# Patient Record
Sex: MALE | Race: WHITE | Employment: PART TIME | URBAN - METROPOLITAN AREA
[De-identification: names, ages, dates, MRNs, and addresses within clinical notes are randomized per-mention and may not be internally consistent; named-entity substitution may affect disease eponyms.]

---

## 2017-01-09 ENCOUNTER — PRE VISIT (OUTPATIENT)
Dept: UROLOGY | Facility: CLINIC | Age: 42
End: 2017-01-09

## 2017-01-09 NOTE — TELEPHONE ENCOUNTER
Patient coming in for UDS results. UDS completed 12/21/16 by Lety Dorantes. Results in [facility]. No need to call patient

## 2017-01-16 ENCOUNTER — OFFICE VISIT (OUTPATIENT)
Dept: UROLOGY | Facility: CLINIC | Age: 42
End: 2017-01-16

## 2017-01-16 VITALS
HEART RATE: 84 BPM | WEIGHT: 130 LBS | SYSTOLIC BLOOD PRESSURE: 87 MMHG | DIASTOLIC BLOOD PRESSURE: 61 MMHG | BODY MASS INDEX: 17.61 KG/M2 | HEIGHT: 72 IN

## 2017-01-16 DIAGNOSIS — N31.9 NEUROGENIC BLADDER: Primary | ICD-10-CM

## 2017-01-16 RX ORDER — CEFAZOLIN SODIUM 1 G/3ML
1 INJECTION, POWDER, FOR SOLUTION INTRAMUSCULAR; INTRAVENOUS SEE ADMIN INSTRUCTIONS
Status: CANCELLED | OUTPATIENT
Start: 2017-01-16

## 2017-01-16 ASSESSMENT — PAIN SCALES - GENERAL: PAINLEVEL: MILD PAIN (3)

## 2017-01-16 NOTE — MR AVS SNAPSHOT
After Visit Summary   1/16/2017    Riley Gifford    MRN: [id number]           Patient Information     Date Of Birth          1975        Visit Information        Provider Department      1/16/2017 3:30 PM Melo Walden, [facility] and [facility]rolog[facility]        Today's Diagnoses     Neurogenic bladder    -  1       Care Instructions    Follow up with Nelli Amador in June/2017 with a renal ultrasound/labs prior    It was a pleasure meeting with you today.  Thank you for allowing me and my team the privilege of caring for you today.  YOU are the reason we are here, and I truly hope we provided you with the excellent service you deserve.  Please let us know if there is anything else we can do for you so that we can be sure you are leaving completely satisfied with your care experience.                Follow-ups after your visit        Follow-up notes from your care team     Return in about 6 months (around 7/16/2017).      Your next 10 appointments already scheduled     Jun 05, 2017  1:45 PM   LAB with  LAB   [facility])    [address] [place] [id number] [phone]           Patient must bring picture ID.  Patient should be prepared to give a urine specimen  Please do not eat 10-12 hours before your appointment if you are coming in fasting for labs on lipids, cholesterol, or glucose (sugar).  Pregnant women should follow their Care Team instructions. Water with medications is okay. Do not drink coffee or other fluids.   If you have concerns about taking  your medications, please ask at office or if scheduling via [employer], send a message by clicking on Secure Messaging, Message Your Care Team.            Jun 05, 2017  2:00 PM   US RENAL COMPLETE with [id number]   [facility] US ([facility])    [address] [address]    [phone]           Please bring a list of your medicines (including vitamins, minerals and over-the-counter drugs). Also, tell your doctor about any allergies you may have. Wear comfortable clothes and leave your valuables at home.  You do not need to do anything special to prepare for your exam.  Please call the Imaging Department at your exam site with any questions.            Jun 05, 2017  3:00 PM   (Arrive by 2:45 PM)   Return Visit with Nelli Martell PA-C   [facility] and [facility] for Prostate and Urologic Cancers ([facility])    [address] [place] [address]  4th [facility] [address] [phone]              Future tests that were ordered for you today     Open Future Orders        Priority Expected Expires Ordered    Renal US Routine 1/16/2017 1/16/2018 1/16/2017    Basic metabolic panel Routine  1/16/2018 1/16/2017            Who to contact     Please call your clinic at [phone] to:    Ask questions about your health    Make or cancel appointments    Discuss your medicines    Learn about your test results    Speak to your doctor   If you have compliments or concerns about an experience at your clinic, or if you wish to file a complaint, please contact [facility] Patient Relations at [phone] or email us at [email or web address]         Additional Information About Your Visit        MyChart Information     [employer] gives you secure access to your electronic health record. If you see a primary care provider, you can also send messages to your care team and make appointments. If you have questions, please call your primary care clinic.  If you do not have a primary care provider, please call [phone] and they will assist you.      [employer] is an electronic gateway that provides easy, online access to your medical records. With [employer], you can request a clinic appointment, read your test results, renew a  prescription or communicate with your care team.     To access your existing account, please contact your [facility] or call [phone] for assistance.        Care EveryWhere ID     This is your Care EveryWhere ID. This could be used by other organizations to access your [place] medical records  [id number]        Your Vitals Were     Pulse Height BMI (Body Mass Index)             84 1.829 m (6') 17.63 kg/m2          Blood Pressure from Last 3 Encounters:   01/16/17 87/61   12/08/16 92/63   11/11/16 108/78    Weight from Last 3 Encounters:   01/16/17 58.968 kg (130 lb)   11/11/16 58.968 kg (130 lb)   06/13/16 58.968 kg (130 lb)               Primary Care Provider Office Phone # Fax #    Laura Yao [phone]       [address] [address]        Goals        Patient-Stated    I will use the medications Tylenol or Tramadol for pain every 4-6 hours as needed.  Evidenced by the patient  using the medications to control worsening pain as verbalized by the patient.     Goal Comments - Note created  3/22/2016  2:39 PM by Robbin Andino RN    As of today's date 3/22/2016 goal is met at 0 - 25%.   Goal Status:  Active        I will work with the [facility] nurse to get the best results for wound care as evidenced by decrease in size and verbalized pain in the area of the wound on the buttock.     Goal Comments - Note created  1/6/2016  1:43 PM by Robbin Andino RN    As of today's date 1/6/2016 goal is met at 0 - 25%.   Goal Status:  Active          Thank you!     Thank you for choosing [facility] UROLOGY AND [facility] FOR PROSTATE AND UROLOGIC CANCERS  for your care. Our goal is always to provide you with excellent care. Hearing back from our patients is one way we can continue to improve our services. Please take a few minutes to complete the written survey that you may receive in the mail after your visit with us. Thank you!              Your Updated Medication List - Protect others around you: Learn how to safely use, store and throw away your medicines at www.disposemymeds.org.          This list is accurate as of: 1/16/17  4:13 PM.  Always use your most recent med list.                   Brand Name Dispense Instructions for use    baclofen 20 MG tablet    LIORESAL    360 tablet    Take 1 tablet (20 mg) by mouth 4 times daily       budesonide 0.5 MG/2ML neb solution    PULMICORT    180 ampule    Use two vials twice daily , mix with honey and swallow.       clotrimazole 1 % cream    LOTRIMIN    60 g    Apply topically twice a day as needed.       diclofenac 1.3 % Patch    FLECTOR    30 patch    Place 1 patch onto the skin 2 times daily       diphenhydrAMINE 25 MG tablet    BENADRYL     Take 1 tablet (25 mg) by mouth every 8 hours as needed for itching or allergies       docusate sodium 283 MG enema    ENEMEEZ MINI    30 enema    Use one every other day and prn per patients's request. Please give patient whichever mini enema's are covered by his insurance       hydrocortisone 2.5 % cream    ANUSOL-HC    30 g    Place rectally 2 times daily       nystatin 969303 UNIT/GM Powd    MYCOSTATIN    60 g    Apply twice daily to rash 2-3 times daily       Omeprazole-Sodium Bicarbonate  MG Pack     90 each    Take 1 packet by mouth daily       ondansetron 4 MG ODT tab    ZOFRAN ODT    18 tablet    Take 1-2 tablets (4-8 mg) by mouth every 8 hours as needed for nausea       order for DME     30 Units    Equipment being ordered: 60 cc catheter tip syringes.       oxybutynin 5 MG tablet    DITROPAN    270 tablet    Crush to be instilled intravesically.  One tablet in the morning and 2 tablets in the evening.       phenylephrine-cocoa butter 0.25-88.44 % per suppository    PREPARATION H    48 suppository    Insert one suppository rectally twice daily as needed.       polyethylene glycol powder    MIRALAX    510 g    Take 17 g (1 capful) by mouth  daily as needed for constipation       simethicone 125 MG Chew chewable tablet    MYLICON    1 tablet    Take 1-2 tablets (125-250 mg) by mouth 4 times daily as needed for intestinal gas       Sterile Water Liqd     2 Bottle    60 cc sterile water irrigation daily for the bladder. Please provide the 500 cc bottle for patient.       tolterodine 2 MG tablet    DETROL    180 tablet    Take 1 tablet (2 mg) by mouth 2 times daily       traMADol 5 mg/mL suspension    ULTRAM    200 mL    Take 10 mLs (50 mg) by mouth 2 times daily as needed for moderate pain       TYLENOL PO      Take 500 mg by mouth as needed for mild pain or fever TAKE 2 TABS PRN       UNABLE TO FIND      Gentamycin and sodium chloride for bladder instillation daily       zolpidem 10 MG tablet    AMBIEN    30 tablet    Take 0.5-1 tablets (5-10 mg) by mouth nightly as needed for sleep

## 2017-01-16 NOTE — PATIENT INSTRUCTIONS
Follow up with Nelli Amador in June/2017 with a renal ultrasound/labs prior    It was a pleasure meeting with you today.  Thank you for allowing me and my team the privilege of caring for you today.  YOU are the reason we are here, and I truly hope we provided you with the excellent service you deserve.  Please let us know if there is anything else we can do for you so that we can be sure you are leaving completely satisfied with your care experience.
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
## 2017-01-19 DIAGNOSIS — K20.0 EOSINOPHILIC ESOPHAGITIS: Primary | ICD-10-CM

## 2017-01-19 RX ORDER — BUDESONIDE 0.5 MG/2ML
INHALANT ORAL
Qty: 360 AMPULE | Refills: 1 | Status: SHIPPED | OUTPATIENT
Start: 2017-01-19 | End: 2017-03-16

## 2017-01-19 NOTE — TELEPHONE ENCOUNTER
Prescription approved per [facility] Refill Protocol.  Neelima Yeung RN

## 2017-01-19 NOTE — TELEPHONE ENCOUNTER
Budesonide 0.5mg/2 ml       Last Written Prescription Date: 11/23/2016  Last Fill Quantity: 180, # refills: 3    Last Office Visit with G, P or [facility] prescribing provider:  10/19/2016   Future Office Visit:       Date of Last Asthma Action Plan Letter:   There are no preventive care reminders to display for this patient.   Asthma Control Test: No flowsheet data found.    Date of Last Spirometry Test:   No results found for this or any previous visit.    Patient requesting 90 day supply

## 2017-01-25 ENCOUNTER — CARE COORDINATION (OUTPATIENT)
Dept: CARE COORDINATION | Facility: CLINIC | Age: 42
End: 2017-01-25

## 2017-01-25 NOTE — PROGRESS NOTES
Clinic Care Coordination Contact  OUTREACH    Referral Information:  Referral Source: PCP  Reason for Contact: follow up chronic pain and neurogenic bladder  Care Conference: No     Universal Utilization:   ED Visits in last year: 1  Hospital visits in last year: 0  Last PCP appointment: 10/19/16  Missed Appointments: 0  Concerns: none  Multiple Providers or Specialists: yes    Clinical Concerns:  Current Medical Concerns: chronic back pain, neurogenic bladder, eosinophilic esophagitis,     Current Behavioral Concerns: worries    Education Provided to patient: reviewed goal of trying acupuncture again for chronic pain  Clinical Pathway Name: None  Clinical Pathway: None    Medication Management:  The patient was having trouble getting the pulmicort it needed a prior authorization and was then able to be filled.     Functional Status:  Mobility Status: Dependent/Assisted by Another  Equipment Currently Used at Home: power chair  Transportation: Uses transport with his insurance.            Psychosocial:  Current living arrangement:: I live in assisted living  Financial/Insurance: Medicare/[employer]       Resources and Interventions:  Current Resources:  (unknown);  (unknown)  PAS Number:  (unknown)  Senior Linkage Line Referral Placed:  (unknown)  Advanced Care Plans/Directives on file:: No  Referrals Placed:  (unknown)     Goals:   Goal 1 Statement: I will continue to try acupuncture to assist with pain control  Goal 1 Progression Percent: 10%  Goal 1 Progression Date: 01/25/17              Barriers: unsure if it was working before or not.  Strengths: positive attitude toward the treatment  Patient/Caregiver understanding: Full understanding of the process  Frequency of Care Coordination: 3-4 weeks  Upcoming appointment: 06/05/17     Plan: 1).  The patient will attend the upcoming appointments with acupuncture to see if it will help with pain control.  2).  The patient will notify the Care Coordination RN if there is a  problem obtaining any of his medications.  3).  The Care Coordination RN will contact the patient for follow up in 3-4 weeks.  4).  Care Coordination to remain available for the patient to contact in the event of future needs.          Robbin Vanegas, MSN, RN, PHN  [facility] Care Coordinator  [facility]  Phone: [phone]  [email or web address]

## 2017-01-27 DIAGNOSIS — N31.9 NEUROGENIC BLADDER: Primary | ICD-10-CM

## 2017-02-09 ENCOUNTER — TELEPHONE (OUTPATIENT)
Dept: PALLIATIVE MEDICINE | Facility: CLINIC | Age: 42
End: 2017-02-09

## 2017-02-09 ENCOUNTER — OFFICE VISIT (OUTPATIENT)
Dept: PALLIATIVE MEDICINE | Facility: CLINIC | Age: 42
End: 2017-02-09
Payer: MEDICARE

## 2017-02-09 ENCOUNTER — TRANSFERRED RECORDS (OUTPATIENT)
Dept: HEALTH INFORMATION MANAGEMENT | Facility: CLINIC | Age: 42
End: 2017-02-09

## 2017-02-09 VITALS
SYSTOLIC BLOOD PRESSURE: 96 MMHG | DIASTOLIC BLOOD PRESSURE: 68 MMHG | RESPIRATION RATE: 18 BRPM | HEART RATE: 81 BPM | OXYGEN SATURATION: 93 %

## 2017-02-09 DIAGNOSIS — G89.29 CHRONIC MIDLINE THORACIC BACK PAIN: Primary | ICD-10-CM

## 2017-02-09 DIAGNOSIS — M54.6 CHRONIC MIDLINE THORACIC BACK PAIN: Primary | ICD-10-CM

## 2017-02-09 PROCEDURE — 99214 OFFICE O/P EST MOD 30 MIN: CPT | Performed by: NURSE PRACTITIONER

## 2017-02-09 RX ORDER — HYDROCODONE BITARTRATE AND ACETAMINOPHEN 5; 325 MG/1; MG/1
1 TABLET ORAL DAILY PRN
Qty: 30 TABLET | Refills: 0 | Status: SHIPPED | OUTPATIENT
Start: 2017-02-09 | End: 2017-03-16

## 2017-02-09 RX ORDER — HYDROCODONE BITARTRATE AND ACETAMINOPHEN 5; 325 MG/1; MG/1
1 TABLET ORAL DAILY PRN
COMMUNITY
End: 2017-02-17

## 2017-02-09 ASSESSMENT — PAIN SCALES - GENERAL: PAINLEVEL: MILD PAIN (3)

## 2017-02-09 NOTE — MR AVS SNAPSHOT
After Visit Summary   2017    Riley Gifford    MRN: [id number]           Patient Information     Date Of Birth          1975        Visit Information        Provider Department      2017 4:00 PM Alice Marte APRN CNP [facility]        Today's Diagnoses     Chronic midline thoracic back pain    -  1       Care Instructions    After Visit Instructions:     Thank you for coming to [facility] for your care. It is my goal to partner with you to help you reach your optimal state of health.     I am recommending multidisciplinary care at this time.  The focus of care will be to continue gradual rehabilitation and pain management with medication adjustments as needed.    Continue daily self-care, identifying contributing factors, and monitoring variations in pain level. Continue to integrate self-care into your life.      1. Schedule follow-up with SUJATA Goss NP-C in 4 weeks  2. Labs: Oral swab drug screen   3. Medication recommendations: Norco (hydrocodone-APAP) 5/325 m tab daily as needed for pain.   4. Controlled substance agreement signed today.       SUJATA Whitman NP-C  [facility]    Contact information: [facility]    Please call if any side effects, questions, or concerns arise.    Nurse Triage line:  [phone]   Call this number with any questions or concerns. You may leave a detailed message anytime. Calls are typically returned Monday through Friday between 8 AM and 4:30 PM. We usually get back to you within 2 business days depending on the issue/request.    Scheduling number: [phone].  Call this number to schedule or change appointments.          Medication Refills Policy:    For non-narcotic medications, please your pharmacy directly to request a refill and the pharmacy will call the [facility] for authorization. Please allow 3-4 days  for these refills.    For narcotic refills, call the nurse triage line and leave a message requesting your refill along with the name of the pharmacy that you use. Narcotic prescriptions will be mailed to your pharmacy or you may pick them up at the clinic.  Please call 7-10 days before your refill is due  The above policy allows adequate time so that you do not run out of medication.    No Show - Late Cancellation - Late Arrival Policy  We believe regular attendance is key to your success in our program.    Any time you are unable to keep your appointment we ask that you call us at  least 24 hours in advance to let us know. This will allow us to offer the appointment time to another patient. The following is our policy for missed appointments. This also applies to appointments cancelled with less than 24 hours notice.    You will receive a letter after missing your 1st and 2nd appointments without contacting the clinic before your scheduled appointment time.     After missing 3 appointments without calling first, we will cancel all of your future appointments at [facility].    At that point, you will not be able to resume services unless approved by your care team  We understand that unforseen circumstances arise, however, out of respect for all concerned and to provide this appointment to another patient, this policy will be enforced.    Please note that most follow up appointments are 30 minutes long. If you arrive late, your provider may not be able to see you for the entire 30 minutes. Please also note that if you arrive more than 15 minutes for any appointment, it may be rescheduled.                                   Follow-ups after your visit        Your next 10 appointments already scheduled     Jun 05, 2017  1:45 PM   LAB with [facility] ([facility])    [address]  1st Floor  [facility] [address] [phone]           Patient must  bring picture ID.  Patient should be prepared to give a urine specimen  Please do not eat 10-12 hours before your appointment if you are coming in fasting for labs on lipids, cholesterol, or glucose (sugar).  Pregnant women should follow their Care Team instructions. Water with medications is okay. Do not drink coffee or other fluids.   If you have concerns about taking  your medications, please ask at office or if scheduling via i[employer], send a message by clicking on Secure Messaging, Message Your Care Team.            Jun 05, 2017  2:00 PM   US RENAL COMPLETE with UCUS3   [facility] US ([facility])    [address]  1st [facility] [address] [phone]           Please bring a list of your medicines (including vitamins, minerals and over-the-counter drugs). Also, tell your doctor about any allergies you may have. Wear comfortable clothes and leave your valuables at home.  You do not need to do anything special to prepare for your exam.  Please call the Imaging Department at your exam site with any questions.            Jun 05, 2017  3:00 PM   (Arrive by 2:45 PM)   Return Visit with Nelli Sims PA-C   [facility] ([facility])    [address] [place] [address]  4th [facility] [address] [phone]              Who to contact     If you have questions or need follow up information about today's clinic visit or your schedule please contact [facility] directly at [phone].  Normal or non-critical lab and imaging results will be communicated to you by MyChart, letter or phone within 4 business days after the clinic has received the results. If you do not hear from us within 7 days, please contact the clinic through MyChart or phone. If you have a critical or abnormal lab result, we will notify you by phone as soon as possible.  Submit refill requests  through [employer] or call your pharmacy and they will forward the refill request to us. Please allow 3 business days for your refill to be completed.          Additional Information About Your Visit        [employer] Information     [employer] gives you secure access to your electronic health record. If you see a primary care provider, you can also send messages to your care team and make appointments. If you have questions, please call your primary care clinic.  If you do not have a primary care provider, please call [phone] and they will assist you.        Care EveryWhere ID     This is your Care EveryWhere ID. This could be used by other organizations to access your [place] medical records  [id number]        Your Vitals Were     Pulse Respirations Pulse Oximetry             81 18 93%          Blood Pressure from Last 3 Encounters:   02/09/17 96/68   01/16/17 87/61   12/08/16 92/63    Weight from Last 3 Encounters:   01/16/17 58.968 kg (130 lb)   11/11/16 58.968 kg (130 lb)   06/13/16 58.968 kg (130 lb)              Today, you had the following     No orders found for display         Today's Medication Changes          These changes are accurate as of: 2/9/17  4:23 PM.  If you have any questions, ask your nurse or doctor.               These medicines have changed or have updated prescriptions.        Dose/Directions    * HYDROcodone-acetaminophen 5-325 MG per tablet   Commonly known as:  NORCO   This may have changed:  Another medication with the same name was added. Make sure you understand how and when to take each.   Changed by:  Alice Marte APRN CNP        Dose:  1 tablet   Take 1 tablet by mouth daily as needed for moderate to severe pain   Refills:  0       * HYDROcodone-acetaminophen 5-325 MG per tablet   Commonly known as:  NORCO   This may have changed:  You were already taking a medication with the same name, and this prescription was added. Make sure you understand how and when to take each.    Used for:  Chronic midline thoracic back pain   Changed by:  Alice Marte APRN CNP        Dose:  1 tablet   Take 1 tablet by mouth daily as needed for moderate to severe pain Max 1 tab per day.   Quantity:  30 tablet   Refills:  0       * Notice:  This list has 2 medication(s) that are the same as other medications prescribed for you. Read the directions carefully, and ask your doctor or other care provider to review them with you.         Where to get your medicines      Some of these will need a paper prescription and others can be bought over the counter.  Ask your nurse if you have questions.     Bring a paper prescription for each of these medications    - HYDROcodone-acetaminophen 5-325 MG per tablet             Primary Care Provider Office Phone # Fax #    Laura Yao [phone]       [address] [address]        Goals        Patient-Stated    I will use the medications Tylenol or Tramadol for pain every 4-6 hours as needed.  Evidenced by the patient  using the medications to control worsening pain as verbalized by the patient.     Goal Comments - Note created  3/22/2016  2:39 PM by Robbin Andino RN    As of today's date 3/22/2016 goal is met at 0 - 25%.   Goal Status:  Active        I will work with the [facility] nurse to get the best results for wound care as evidenced by decrease in size and verbalized pain in the area of the wound on the buttock.     Goal Comments - Note created  1/6/2016  1:43 PM by Robbin Andino RN    As of today's date 1/6/2016 goal is met at 0 - 25%.   Goal Status:  Active          Thank you!     Thank you for choosing [facility]  for your care. Our goal is always to provide you with excellent care. Hearing back from our patients is one way we can continue to improve our services. Please take a few minutes to complete the written survey that you may receive in the mail after your  visit with us. Thank you!             Your Updated Medication List - Protect others around you: Learn how to safely use, store and throw away your medicines at www.disposemymeds.org.          This list is accurate as of: 2/9/17  4:23 PM.  Always use your most recent med list.                   Brand Name Dispense Instructions for use    baclofen 20 MG tablet    LIORESAL    360 tablet    Take 1 tablet (20 mg) by mouth 4 times daily       budesonide 0.5 MG/2ML neb solution    PULMICORT    360 ampule    Use two vials twice daily , mix with honey and swallow.       clotrimazole 1 % cream    LOTRIMIN    60 g    Apply topically twice a day as needed.       diclofenac 1.3 % Patch    FLECTOR    30 patch    Place 1 patch onto the skin 2 times daily       diphenhydrAMINE 25 MG tablet    BENADRYL     Take 1 tablet (25 mg) by mouth every 8 hours as needed for itching or allergies       docusate sodium 283 MG enema    ENEMEEZ MINI    30 enema    Use one every other day and prn per patients's request. Please give patient whichever mini enema's are covered by his insurance       * HYDROcodone-acetaminophen 5-325 MG per tablet    NORCO     Take 1 tablet by mouth daily as needed for moderate to severe pain       * HYDROcodone-acetaminophen 5-325 MG per tablet    NORCO    30 tablet    Take 1 tablet by mouth daily as needed for moderate to severe pain Max 1 tab per day.       hydrocortisone 2.5 % cream    ANUSOL-HC    30 g    Place rectally 2 times daily       nystatin 730683 UNIT/GM Powd    MYCOSTATIN    60 g    Apply twice daily to rash 2-3 times daily       Omeprazole-Sodium Bicarbonate  MG Pack     90 each    Take 1 packet by mouth daily       ondansetron 4 MG ODT tab    ZOFRAN ODT    18 tablet    Take 1-2 tablets (4-8 mg) by mouth every 8 hours as needed for nausea       order for DME     30 Units    Equipment being ordered: 60 cc catheter tip syringes.       oxybutynin 5 MG tablet    DITROPAN    270 tablet    Crush to be  instilled intravesically.  One tablet in the morning and 2 tablets in the evening.       phenylephrine-cocoa butter 0.25-88.44 % per suppository    PREPARATION H    48 suppository    Insert one suppository rectally twice daily as needed.       polyethylene glycol powder    MIRALAX    510 g    Take 17 g (1 capful) by mouth daily as needed for constipation       simethicone 125 MG Chew chewable tablet    MYLICON    1 tablet    Take 1-2 tablets (125-250 mg) by mouth 4 times daily as needed for intestinal gas       sterile water (bottle) irrigation     1000 mL    Irrigate with 60 mLs as directed daily       Sterile Water Liqd     2 Bottle    60 cc sterile water irrigation daily for the bladder. Please provide the 500 cc bottle for patient.       tolterodine 2 MG tablet    DETROL    180 tablet    Take 1 tablet (2 mg) by mouth 2 times daily       traMADol 5 mg/mL suspension    ULTRAM    200 mL    Take 10 mLs (50 mg) by mouth 2 times daily as needed for moderate pain       TYLENOL PO      Take 500 mg by mouth as needed for mild pain or fever TAKE 2 TABS PRN       UNABLE TO FIND      Gentamycin and sodium chloride for bladder instillation daily       zolpidem 10 MG tablet    AMBIEN    30 tablet    Take 0.5-1 tablets (5-10 mg) by mouth nightly as needed for sleep       * Notice:  This list has 2 medication(s) that are the same as other medications prescribed for you. Read the directions carefully, and ask your doctor or other care provider to review them with you.

## 2017-02-09 NOTE — TELEPHONE ENCOUNTER
Patient left clinic without prescription and AVS. Script for Norco and AVS are in the med management folder. I called the patient to alert him and will touch base again tomorrow. I asked him to call the nurse line and we will have to decide how to get best get him his paperwork.      ONDINA KhanN, RN  Care Coordinator  [facility]

## 2017-02-09 NOTE — PATIENT INSTRUCTIONS
After Visit Instructions:     Thank you for coming to [facility] for your care. It is my goal to partner with you to help you reach your optimal state of health.     I am recommending multidisciplinary care at this time.  The focus of care will be to continue gradual rehabilitation and pain management with medication adjustments as needed.    Continue daily self-care, identifying contributing factors, and monitoring variations in pain level. Continue to integrate self-care into your life.      1. Schedule follow-up with SUJATA Goss NP-C in 4 weeks  2. Labs: Oral swab drug screen   3. Medication recommendations: Norco (hydrocodone-APAP) 5/325 m tab daily as needed for pain.   4. Controlled substance agreement signed today.       SUJATA Whitman, NP-C  [facility]    Contact information: [facility]    Please call if any side effects, questions, or concerns arise.    Nurse Triage line:  [phone]   Call this number with any questions or concerns. You may leave a detailed message anytime. Calls are typically returned Monday through Friday between 8 AM and 4:30 PM. We usually get back to you within 2 business days depending on the issue/request.    Scheduling number: [phone].  Call this number to schedule or change appointments.          Medication Refills Policy:    For non-narcotic medications, please your pharmacy directly to request a refill and the pharmacy will call the [facility] for authorization. Please allow 3-4 days for these refills.    For narcotic refills, call the nurse triage line and leave a message requesting your refill along with the name of the pharmacy that you use. Narcotic prescriptions will be mailed to your pharmacy or you may pick them up at the clinic.  Please call 7-10 days before your refill is due  The above policy allows adequate time so that you do not run out of  medication.    No Show - Late Cancellation - Late Arrival Policy  We believe regular attendance is key to your success in our program.    Any time you are unable to keep your appointment we ask that you call us at  least 24 hours in advance to let us know. This will allow us to offer the appointment time to another patient. The following is our policy for missed appointments. This also applies to appointments cancelled with less than 24 hours notice.    You will receive a letter after missing your 1st and 2nd appointments without contacting the clinic before your scheduled appointment time.     After missing 3 appointments without calling first, we will cancel all of your future appointments at [facility].    At that point, you will not be able to resume services unless approved by your care team  We understand that unforseen circumstances arise, however, out of respect for all concerned and to provide this appointment to another patient, this policy will be enforced.    Please note that most follow up appointments are 30 minutes long. If you arrive late, your provider may not be able to see you for the entire 30 minutes. Please also note that if you arrive more than 15 minutes for any appointment, it may be rescheduled.

## 2017-02-09 NOTE — Clinical Note
[facility]    02/09/2017    Patient: Riley Gifford  YOB: 1975  Medical Record Number: [id number]                                                                  Controlled Substance Agreement  I understand that my care provider has prescribed controlled substances (narcotics, tranquilizers, and/or stimulants) to help manage my condition(s).  I am taking this medicine to help me function or work.  I know that this is strong medicine.  It could have serious side effects and even cause a dependency on the drug.  If I stop these medicines suddenly, I could have severe withdrawal symptoms.    The risks, benefits, and side effects of these medication(s) were explained to me.  I agree that:  1. I will take part in other treatments as advised by my provider.  This may be psychiatry or counseling, physical therapy, behavioral therapy, group treatment, or a referral to a pain clinic.  I will reduce or stop my medicine when my provider tells me to do so.   2. I will take my medicines as prescribed.  I will not change the dose or schedule unless my provider tells me to.  There will be no refills if I  run out early.   I may be contacted at any time without warning and asked to complete a drug test or pill count.   3. I will keep all my appointments at the clinic.  If I miss appointments or fail to follow instructions, my provider may stop my medicine.  4. I will not ask other providers to prescribe controlled substances. And I will not accept controlled substances from other people. If I need another prescribed controlled substance for a new reason, I will notify my provider within one business day.  5. If I enroll in the [place] Medical Marijuana program, I will tell my provider.  I will also sign an agreement to share my medical records with my provider.  6. I will use one pharmacy to fill all of my controlled substance prescriptions.  If my prescription is mailed to my pharmacy, it may  take 5 to 7 days for my medicine to be ready.  7. I understand that my provider, clinic care team, and pharmacy can track controlled substance prescriptions from other providers through a central database (prescription monitoring program).  8. I will bring in my list of medications (or my medicine bottles) each time I come to the clinic.  REV- 04/2016                                                                                                                                            Page 1 of 2      [facility]    02/09/2017    Patient: Riley Gifford  YOB: 1975  Medical Record Number: [id number]    9. Refills of controlled substances will be made only during office hours.  It is up to me to make sure that I do not run out of my medicines on weekends or holidays.    10. I am responsible for my prescriptions.  If the medicine is lost or stolen, it will not be replaced.   I also agree not to share these medicines with anyone.  11. I agree to not use ANY illegal or recreational drugs.  This includes marijuana, cocaine, bath salts or other drugs.  I agree not to use alcohol unless my provider says I may.  I agree to give urine samples whenever asked.  If I fail to give a urine sample, the provider may stop my medicine.     12. I will tell my nurse or provider right away if I become pregnant or have a new medical problem treated outside of [facility].  13. I understand that this medicine can affect my thinking and judgment.  It may be unsafe for me to drive, use machinery and do dangerous tasks.  I will not do any of these things until I know how the medicine affects me.  If my dose changes, I will wait to see how it affects me.  I will contact my provider if I have concerns about medicine side effects.  I understand that if I do not follow any of the conditions above, my prescriptions or treatment may be stopped.    I agree that my provider, clinic care team, and pharmacy may  work with any city, state or federal law enforcement agency that investigates the misuse, sale, or other diversion of my controlled medicine. I will allow my provider to discuss my care with or share a copy of this agreement with any other treating provider, pharmacy or emergency room where I receive care.  I agree to give up (waive) any right of privacy or confidentiality with respect to these authorizations.   I have read this agreement and have asked questions about anything I did not understand.   ___________________________________    ___________________________  Patient Signature                                                           Date and Time  ___________________________________     ____________________________  Witness                                                                            Date and Time  ___________________________________  SUJATA Valdez CNP  REV-  04/2016                                                                                                                                                                 Page 2 of 2  {Controlled Substance Agreement (CSA) Patient Instructions:413690}

## 2017-02-09 NOTE — NURSING NOTE
Chief Complaint   Patient presents with     Pain       Initial BP 96/68 mmHg  Pulse 81  Resp 18  SpO2 93% Estimated body mass index is 17.63 kg/(m^2) as calculated from the following:    Height as of 1/16/17: 1.829 m (6').    Weight as of 1/16/17: 58.968 kg (130 lb).  Medication Reconciliation: complete       Tashi Mondragon MA  [facility]

## 2017-02-10 NOTE — TELEPHONE ENCOUNTER
I spoke with Riley and per his instructions mailed his script to the [facility] in [place]. The AVS was mailed to his house.    ONDINA KhanN, RN  Care Coordinator  [facility]

## 2017-02-10 NOTE — TELEPHONE ENCOUNTER
Pt LM on 2/9 at 1736:    States that he received a call that he left some paperwork and prescription at the clinic. States that it would be fine for it to be mailed to him. States that he doesn't really know what else to do. Please call back.   ----  Will route to nurse [place].     ONDINA SimmsN, RN-BC  Patient Care Supervisor/Care Coordinator  [facility]

## 2017-02-13 ENCOUNTER — TELEPHONE (OUTPATIENT)
Dept: FAMILY MEDICINE | Facility: CLINIC | Age: 42
End: 2017-02-13

## 2017-02-13 NOTE — TELEPHONE ENCOUNTER
Care Coordination RN spoke with the patient today and he is not feeling well with an increase in back and side pain again.  At this point he is not running a fever although is occasionally feeling the chills.  The pain is 8/10 at the worst and he is using the Vicodin that he was last prescribed by [facility].  The last injection of steroids was in November.  His EE is causing his throat to be very sore at this time.  He would like to be seen by Dr. Yao or anyone else that she recommends.  Please contact the patient back with an update as to appointment availability.      Robbin Vanegas, MSN, RN, PHN  N Clinic Care Coordinator  [facility]  Phone: [phone]  [email or web address]

## 2017-02-13 NOTE — TELEPHONE ENCOUNTER
"Will route to provider appointment scheduled with. ELISA-used a same day hold-only appointment available tomorrow. See note below, and pain clinic note. Please let me know if you would like him to be seen somewhere else.    Riley Gifford is a 41 year old male who calls with increasing back pain.    NURSING ASSESSMENT:  Description:  Patient is seen by pain clinic for back pain. He was seen on 2/9, and over the weekend his back pain has increased to 8/10 pain. He is unable to tell if he has weakness or change in leg sensation due to being quadraplegic. The pain does not radiate anywhere. He denies dizziness, cool/moist skin, inability to urinate (he straight caths himself), blood in urine, fever, sudden pain with prolonged time in bed/wheelchair. Patient requesting appointment with anyone ASAP.   Onset/duration:  Increase in pain over the weekend  Precip. factors:  n/a  Associated symptoms:  none  Improves/worsens symptoms:  norco helps some  Pain scale (0-10)   8/10  Allergies:   Allergies   Allergen Reactions     Egg/Pro [Chicken-Derived Products (Egg)]      Fish      Wheat        MEDICATIONS:   Taking medication(s) as prescribed? Yes  Taking over the counter medication(s?) No  Any medication side effects? No significant side effects    Any barriers to taking medication(s) as prescribed?  No  Medication(s) improving/managing symptoms?  Yes  Medication reconciliation completed: No      NURSING PLAN: Nursing advice to patient see provider within 24 hours    RECOMMENDED DISPOSITION:  See in 24 hours - appointment scheduled  Will comply with recommendation: Yes  If further questions/concerns or if symptoms do not improve, worsen or new symptoms develop, call your PCP or [facility] as soon as possible.      Guideline used:  Telephone Triage Protocols for Nurses, Fifth Edition, Yessy Anton \"back pain\"    Kem Joya RN    "

## 2017-02-15 ENCOUNTER — OFFICE VISIT (OUTPATIENT)
Dept: FAMILY MEDICINE | Facility: CLINIC | Age: 42
End: 2017-02-15
Payer: MEDICARE

## 2017-02-15 VITALS
HEART RATE: 74 BPM | SYSTOLIC BLOOD PRESSURE: 108 MMHG | DIASTOLIC BLOOD PRESSURE: 64 MMHG | HEIGHT: 72 IN | TEMPERATURE: 97.6 F

## 2017-02-15 DIAGNOSIS — R10.9 BILATERAL FLANK PAIN: ICD-10-CM

## 2017-02-15 DIAGNOSIS — K20.0 EOSINOPHILIC ESOPHAGITIS: ICD-10-CM

## 2017-02-15 DIAGNOSIS — M54.6 CHRONIC MIDLINE THORACIC BACK PAIN: Primary | ICD-10-CM

## 2017-02-15 DIAGNOSIS — G89.29 CHRONIC MIDLINE THORACIC BACK PAIN: Primary | ICD-10-CM

## 2017-02-15 PROCEDURE — 99214 OFFICE O/P EST MOD 30 MIN: CPT | Performed by: FAMILY MEDICINE

## 2017-02-15 PROCEDURE — 36415 COLL VENOUS BLD VENIPUNCTURE: CPT | Performed by: FAMILY MEDICINE

## 2017-02-15 PROCEDURE — 80048 BASIC METABOLIC PNL TOTAL CA: CPT | Performed by: FAMILY MEDICINE

## 2017-02-15 NOTE — MR AVS SNAPSHOT
After Visit Summary   2/15/2017    Riley Gifford    MRN: [id number]           Patient Information     Date Of Birth          1975        Visit Information        Provider Department      2/15/2017 11:00 AM Laura Yao MD [facility]        Today's Diagnoses     Chronic midline thoracic back pain    -  1    Bilateral flank pain          Care Instructions    Schedule with PMR    [facility]   Discharged by : Ammy JONES CMA (AAMA)    Paper scripts provided to patient : none      If you have any questions regarding your visit please contact your care team:     [facility] Clinic Hours Telephone Number   Dr. Isaura Gonzales, DANNI   7am-7pm Monday - Thursday   7am-5pm Fridays  [phone]   (Appointment scheduling available 24/7)   RN Line   [phone] option 2       For a Price Quote for your services, please call our Consumer Price Line at [phone].     What options do I have for visits at the clinic other than the traditional office visit?     To expand how we care for you, many of our providers are utilizing electronic visits (e-visits) and telephone visits, when medically appropriate, for interactions with their patients rather than a visit in the clinic. We also offer nurse visits for many medical concerns. Just like any other service, we will bill your insurance company for this type of visit based on time spent on the phone with your provider. Not all insurance companies cover these visits. Please check with your medical insurance if this type of visit is covered. You will be responsible for any charges that are not paid by your insurance.   E-visits via "[employer]": generally incur a $35.00 fee.     Telephone visits:   Time spent on the phone: *charged based on time that is spent on the phone in increments of 10 minutes. Estimated cost:   5-10 mins $30.00   11-20 mins. $59.00   21-30 mins. $85.00      Use eyetok (secure email communication and access to your chart) to send your primary care provider a message or make an appointment. Ask someone on your Team how to sign up for eyetok.     As always, Thank you for trusting us with your health care needs!      [facility]:    Scheduling Appointments  [facility]  Call: [phone]    [facility]  Call: [phone]    [facility]  Call: [phone]      WHERE TO GO FOR CARE?    Clinic    Make an appointment if you:       Are sick (cold, cough, flu, sore throat, earache or in pain).       Have a small injury (sprain, small cut, burn or broken bone).       Need a physical exam, Pap smear, vaccine or prescription refill.       Have questions about your health or medicines.    To reach us:      Call [phone] ([phone]). Open 24 hours every day. (For counseling services, call [phone].)    Log into eyetok at [email or web address]. (Visit [email or web address] to create an account.) Hospital emergency room    An emergency is a serious or life- threatening problem that must be treated right away.    Call 811 or get to the hospital if you have:      Very bad or sudden:            - Chest pain or pressure         - Bleeding         - Head or belly pain         - Dizziness or trouble seeing, walking or                          Speaking      Problems breathing      Blood in your vomit or you are coughing up blood      A major injury (knocked out, loss of a finger or limb, rape, broken bone protruding from skin)    A mental health crisis. (Or call the Mental Health Crisis line at 1-672.909.7798 or Suicide Prevention Hotline at 1-550.550.7938.)    Open 24 hours every day. You don't need an appointment.     Urgent care    Visit urgent care for sickness or small injuries when the clinic is closed. You don't need an appointment. To check hours or find an urgent care  near you, visit [email or web address]. Online care    Get online care from [facility] for more than 70 common problems, like colds, allergies and infections. Open 24 hours every day at: [email or web address]   Need help deciding?    For advice about where to be seen, you may call your clinic and ask to speak with a nurse. We're here for you 24 hours every day.         If you are deaf or hard of hearing, please let us know. We provide many free services including sign language interpreters, oral interpreters, TTYs, telephone amplifiers, note takers and written materials.                       Follow-ups after your visit        Your next 10 appointments already scheduled     Jun 05, 2017  1:45 PM CDT   LAB with [facility] ([facility])    [address] [place] [address] [phone]           Patient must bring picture ID.  Patient should be prepared to give a urine specimen  Please do not eat 10-12 hours before your appointment if you are coming in fasting for labs on lipids, cholesterol, or glucose (sugar).  Pregnant women should follow their Care Team instructions. Water with medications is okay. Do not drink coffee or other fluids.   If you have concerns about taking  your medications, please ask at office or if scheduling via AVIA, send a message by clicking on Secure Messaging, Message Your Care Team.            Jun 05, 2017  2:00 PM CDT   US RENAL COMPLETE with UC[facility]   [facility] US ([facility])    [address] [place] [address] [phone]           Please bring a list of your medicines (including vitamins, minerals and over-the-counter drugs). Also, tell your doctor about any allergies you may have. Wear comfortable clothes and leave your valuables at home.  You do not need to do anything special to prepare for your exam.  Please call the Imaging Department at your exam  site with any questions.            Jun 05, 2017  3:00 PM CDT   (Arrive by 2:45 PM)   Return Visit with Nelli Sims PA-C   [facility] ([facility])    [address] [address] [phone]              Who to contact     If you have questions or need follow up information about today's clinic visit or your schedule please contact [facility] directly at [phone].  Normal or non-critical lab and imaging results will be communicated to you by oroecohart, letter or phone within 4 business days after the clinic has received the results. If you do not hear from us within 7 days, please contact the clinic through [employer] or phone. If you have a critical or abnormal lab result, we will notify you by phone as soon as possible.  Submit refill requests through [employer]ustech or call your pharmacy and they will forward the refill request to us. Please allow 3 business days for your refill to be completed.          Additional Information About Your Visit        MyChart Information     Venustech gives you secure access to your electronic health record. If you see a primary care provider, you can also send messages to your care team and make appointments. If you have questions, please call your primary care clinic.  If you do not have a primary care provider, please call [phone] and they will assist you.        Care EveryWhere ID     This is your Care EveryWhere ID. This could be used by other organizations to access your [facility] medical records  [id number]        Your Vitals Were     Pulse Temperature Height             74 97.6  F (36.4  C) (Oral) 6' (1.829 m)          Blood Pressure from Last 3 Encounters:   02/15/17 108/64   02/09/17 96/68   01/16/17 (!) 87/61    Weight from Last 3 Encounters:   01/16/17 130 lb (59 kg)   11/11/16 130 lb (59 kg)   06/13/16 130 lb (59 kg)              We  Performed the Following     Basic metabolic panel        Primary Care Provider Office Phone # Fax #    Laura Yao [phone]       [facility] [address] [place] [address]        Goals        General    I will use the medications Tylenol or Tramadol for pain every 4-6 hours as needed.  Evidenced by the patient  using the medications to control worsening pain as verbalized by the patient. (pt-stated)     Notes - Note created  3/22/2016  2:39 PM by Robbin Andino RN    As of today's date 3/22/2016 goal is met at 0 - 25%.   Goal Status:  Active        I will work with the [facility] nurse to get the best results for wound care as evidenced by decrease in size and verbalized pain in the area of the wound on the buttock. (pt-stated)     Notes - Note created  1/6/2016  1:43 PM by Robbin Andino RN    As of today's date 1/6/2016 goal is met at 0 - 25%.   Goal Status:  Active          Thank you!     Thank you for choosing [facility]  for your care. Our goal is always to provide you with excellent care. Hearing back from our patients is one way we can continue to improve our services. Please take a few minutes to complete the written survey that you may receive in the mail after your visit with us. Thank you!             Your Updated Medication List - Protect others around you: Learn how to safely use, store and throw away your medicines at www.disposemymeds.org.          This list is accurate as of: 2/15/17 12:17 PM.  Always use your most recent med list.                   Brand Name Dispense Instructions for use    baclofen 20 MG tablet    LIORESAL    360 tablet    Take 1 tablet (20 mg) by mouth 4 times daily       budesonide 0.5 MG/2ML neb solution    PULMICORT    360 ampule    Use two vials twice daily , mix with honey and swallow.       clotrimazole 1 % cream    LOTRIMIN    60 g    Apply topically twice a day as needed.       diclofenac 1.3 % Patch     FLECTOR    30 patch    Place 1 patch onto the skin 2 times daily       diphenhydrAMINE 25 MG tablet    BENADRYL     Take 25 mg by mouth every 8 hours as needed for itching or allergies Reported on 2/15/2017       docusate sodium 283 MG enema    ENEMEEZ MINI    30 enema    Use one every other day and prn per patients's request. Please give patient whichever mini enema's are covered by his insurance       * HYDROcodone-acetaminophen 5-325 MG per tablet    NORCO     Take 1 tablet by mouth daily as needed for moderate to severe pain Reported on 2/15/2017       * HYDROcodone-acetaminophen 5-325 MG per tablet    NORCO    30 tablet    Take 1 tablet by mouth daily as needed for moderate to severe pain Max 1 tab per day.       hydrocortisone 2.5 % cream    ANUSOL-HC    30 g    Place rectally 2 times daily       nystatin 511134 UNIT/GM Powd    MYCOSTATIN    60 g    Apply twice daily to rash 2-3 times daily       Omeprazole-Sodium Bicarbonate  MG Pack     90 each    Take 1 packet by mouth daily       ondansetron 4 MG ODT tab    ZOFRAN ODT    18 tablet    Take 1-2 tablets (4-8 mg) by mouth every 8 hours as needed for nausea       order for DME     30 Units    Equipment being ordered: 60 cc catheter tip syringes.       oxybutynin 5 MG tablet    DITROPAN    270 tablet    Crush to be instilled intravesically.  One tablet in the morning and 2 tablets in the evening.       phenylephrine-cocoa butter 0.25-88.44 % per suppository    PREPARATION H    48 suppository    Insert one suppository rectally twice daily as needed.       polyethylene glycol powder    MIRALAX    510 g    Take 17 g (1 capful) by mouth daily as needed for constipation       simethicone 125 MG Chew chewable tablet    MYLICON    1 tablet    Take 1-2 tablets (125-250 mg) by mouth 4 times daily as needed for intestinal gas       sterile water (bottle) irrigation     1000 mL    Irrigate with 60 mLs as directed daily       Sterile Water Liqd     2 Bottle    60  cc sterile water irrigation daily for the bladder. Please provide the 500 cc bottle for patient.       tolterodine 2 MG tablet    DETROL    180 tablet    Take 1 tablet (2 mg) by mouth 2 times daily       traMADol 5 mg/mL suspension    ULTRAM    200 mL    Take 10 mLs (50 mg) by mouth 2 times daily as needed for moderate pain       TYLENOL PO      Take 500 mg by mouth as needed for mild pain or fever Reported on 2/15/2017       UNABLE TO FIND      Reported on 2/15/2017       zolpidem 10 MG tablet    AMBIEN    30 tablet    Take 0.5-1 tablets (5-10 mg) by mouth nightly as needed for sleep       * Notice:  This list has 2 medication(s) that are the same as other medications prescribed for you. Read the directions carefully, and ask your doctor or other care provider to review them with you.

## 2017-02-15 NOTE — PROGRESS NOTES
HPI      ROS    SUBJECTIVE:                                                    Riley Gifford is a 41 year old male who presents to clinic today for the following health issues:    Triage: Patient states no changes since discussion with nurses. He reported that he did get a good nights sleep last night which seems to help a bit.     Care Coordination RN spoke with the patient today and he is not feeling well with an increase in back and side pain again. At this point he is not running a fever although is occasionally feeling the chills. The pain is 8/10 at the worst and he is using the Vicodin that he was last prescribed by Pain [facility]. The last injection of steroids was in November. His EE is causing his throat to be very sore at this time    NURSING ASSESSMENT:  Description: Patient is seen by pain clinic for back pain. He was seen on 2/9, and over the weekend his back pain has increased to 8/10 pain. He is unable to tell if he has weakness or change in leg sensation due to being quadraplegic. The pain does not radiate anywhere. He denies dizziness, cool/moist skin, inability to urinate (he straight caths himself), blood in urine, fever, sudden pain with prolonged time in bed/wheelchair.    Low Back Pain  Has been trying acupuncture with little success   Was on keflex regimen after a tooth was pulled so ruled out infection  Middle and sides of back at abdominal area - stays around trunk  Vicodin - which he initially received from dental, has helped him sleep  Flector Patch - NSAIDs Path - was applying it every 12 hours   Had tried over the counter lidocaine patches with little success, also can't afford it   Tried massage yesterday, which didn't help   At times pain feels like it is near kidney area which concerns patient    EXAMINATION: US ABDOMEN COMPLETE, 8/11/2016 11:06 AM    COMPARISON: 1/25/2016  Impression:   Cholelithiasis without sonographic findings of cholecystitis.     I have personally  reviewed the examination and initial interpretation  and I agree with the findings.     KRYSTA CHEN MD    Urine  Urine clear  Has follow up ultrasound scheduled    Chronic Esophagitis  Seeing GI in March  Continues to be on Budesonide       Problem list and histories reviewed & adjusted, as indicated.  Additional history: as documented    Patient Active Problem List   Diagnosis     Vitamin D deficiency     Eosinophilic esophagitis     Neurogenic bladder     CARDIOVASCULAR SCREENING; LDL GOAL LESS THAN 160     Quadriplegia (H)     Chronic constipation     Internal hemorrhoids with other complication     Diagnostic skin and sensitization tests(aka ALLERGENS)     Anal or rectal pain     Allergic rhinitis due to animal dander     Allergy to mold spores     House dust mite allergy     Insomnia     Health Care Home     Feeling worried     Gallstones     Past Surgical History   Procedure Laterality Date     C nonspecific procedure       C5-6 Fusion     C nonspecific procedure       Pressure Ulcer     Back surgery       Colonoscopy       Gi surgery       endoscopy x2     Cystoscopy, intravesical injection N/A 5/12/2016     Procedure: CYSTOSCOPY, INTRAVESICAL INJECTION;  Surgeon: Evaristo Hayes MD;  Location: [facility] OR     Cystoscopy, intravesical injection N/A 11/11/2016     Procedure: CYSTOSCOPY, INTRAVESICAL INJECTION;  Surgeon: Evaristo Hayes MD;  Location:  OR       Social History   Substance Use Topics     Smoking status: Never Smoker     Smokeless tobacco: Never Used     Alcohol use 0.0 oz/week     0 Standard drinks or equivalent per week      Comment: 1-2 drinks per month     Family History   Problem Relation Age of Onset     Breast Cancer Mother      Prostate Cancer Father      Breast Cancer Maternal Grandmother      CANCER Maternal Grandfather      Glaucoma No family hx of      Macular Degeneration No family hx of      DIABETES No family hx of      Hypertension No family hx of          Current  Outpatient Prescriptions   Medication Sig Dispense Refill     HYDROcodone-acetaminophen (NORCO) 5-325 MG per tablet Take 1 tablet by mouth daily as needed for moderate to severe pain Reported on 2/15/2017       HYDROcodone-acetaminophen (NORCO) 5-325 MG per tablet Take 1 tablet by mouth daily as needed for moderate to severe pain Max 1 tab per day. 30 tablet 0     sterile water, bottle, irrigation Irrigate with 60 mLs as directed daily 1000 mL 0     budesonide (PULMICORT) 0.5 MG/2ML neb solution Use two vials twice daily , mix with honey and swallow. 360 ampule 1     diphenhydrAMINE (BENADRYL) 25 MG tablet Take 25 mg by mouth every 8 hours as needed for itching or allergies Reported on 2/15/2017       zolpidem (AMBIEN) 10 MG tablet Take 0.5-1 tablets (5-10 mg) by mouth nightly as needed for sleep 30 tablet 5     diclofenac (FLECTOR) 1.3 % patch Place 1 patch onto the skin 2 times daily 30 patch 1     hydrocortisone (ANUSOL-HC) 2.5 % rectal cream Place rectally 2 times daily 30 g 3     baclofen (LIORESAL) 20 MG tablet Take 1 tablet (20 mg) by mouth 4 times daily 360 tablet 1     order for DME Equipment being ordered: 60 cc catheter tip syringes. 30 Units 11     Omeprazole-Sodium Bicarbonate  MG PACK Take 1 packet by mouth daily 90 each 3     oxybutynin (DITROPAN) 5 MG tablet Crush to be instilled intravesically.  One tablet in the morning and 2 tablets in the evening. 270 tablet 3     traMADol (ULTRAM) 5 mg/mL suspension Take 10 mLs (50 mg) by mouth 2 times daily as needed for moderate pain 200 mL 0     UNABLE TO FIND Reported on 2/15/2017       Sterile Water LIQD 60 cc sterile water irrigation daily for the bladder. Please provide the 500 cc bottle for patient. 2 Bottle 6     polyethylene glycol (MIRALAX) powder Take 17 g (1 capful) by mouth daily as needed for constipation 510 g 1     docusate sodium (ENEMEEZ MINI) 283 MG enema Use one every other day and prn per patients's request.  Please give patient  whichever mini enema's are covered by his insurance 30 enema 6     simethicone (MYLICON) 125 MG CHEW Take 1-2 tablets (125-250 mg) by mouth 4 times daily as needed for intestinal gas 1 tablet 0     ondansetron (ZOFRAN ODT) 4 MG disintegrating tablet Take 1-2 tablets (4-8 mg) by mouth every 8 hours as needed for nausea 18 tablet 0     tolterodine (DETROL) 2 MG tablet Take 1 tablet (2 mg) by mouth 2 times daily 180 tablet 3     clotrimazole (LOTRIMIN) 1 % cream Apply topically twice a day as needed. 60 g 2     phenylephrine-cocoa butter (PREPARATION H) 0.25-88.44 % suppository Insert one suppository rectally twice daily as needed. 48 suppository 3     Acetaminophen (TYLENOL PO) Take 500 mg by mouth as needed for mild pain or fever Reported on 2/15/2017       nystatin (MYCOSTATIN) 656597 UNIT/GM POWD Apply twice daily to rash 2-3 times daily (Patient not taking: Reported on 2/15/2017) 60 g 1     Allergies   Allergen Reactions     Egg/Pro [Chicken-Derived Products (Egg)]      Fish      Wheat      Problem list, Medication list, Allergies, and Medical/Social/Surgical histories reviewed in [facility] and updated as appropriate.    ROS:  Constitutional, neuro, ENT, endocrine, pulmonary, cardiac, gastrointestinal, genitourinary, musculoskeletal, integument and psychiatric systems are negative, except as otherwise noted.    This document serves as a record of the services and decisions personally performed and made by Laura Yao. It was created on her behalf by Jamaal Ponce, a trained medical scribe. The creation of this document is based the provider's statements to the medical scribe.  aJmaal Ponce February 15, 2017 12:10 PM      OBJECTIVE:                                                    /64 (BP Location: Right arm, Patient Position: Chair, Cuff Size: Adult Regular)  Pulse 74  Temp 97.6  F (36.4  C) (Oral)  Ht 6' (1.829 m)  There is no height or weight on file to calculate BMI.  GENERAL APPEARANCE: healthy,  alert, no distress and in wheelchair   RESP: lungs clear to auscultation - no rales, rhonchi or wheezes  CV: regular rates and rhythm, normal S1 S2, no S3 or S4 and no murmur, click or rub  ABDOMEN: soft, nontender, without hepatosplenomegaly or masses and bowel sounds normal  MS: no CVA tenderness, right mid back pain to palpation, minimal, no localized tenderness  PSYCH: mentation appears normal and affect normal/bright         ASSESSMENT/PLAN:                                                      Riley was seen today for back pain.    Diagnoses and all orders for this visit:    Chronic midline thoracic back pain   - followed by pain clinic, will be scheduling with PMR.  Very reluctant to do mediation trial.  Has done PT    Bilateral flank pain  -     Basic metabolic panel  - Patients main concern with current flare up is kidney health, will check BMP, had abdominal US August of 2016 and we reviewed that   -  patient is quit aware of when he has symptoms of urinary tract infections and he is not having any acute symptoms today      (K20.0) Eosinophilic esophagitis  Comment:   Plan: scheduled with [facility] for follow up.  Still taking budosenide            Follow up with Provider - pending results    Patient Instructions     Schedule with PMR      The information in this document, created by the medical scribe, Jamaal Ponce, for me, accurately reflects the services I personally performed and the decisions made by me. I have reviewed and approved this document for accuracy prior to leaving the patient care area.  Laura Yao, February 15, 2017 12:01 PM      Laura Yao MD  [facility]      Physical Exam

## 2017-02-15 NOTE — NURSING NOTE
Chief Complaint   Patient presents with     Back Pain       Initial /64 (BP Location: Right arm, Patient Position: Chair, Cuff Size: Adult Regular)  Pulse 74  Temp 97.6  F (36.4  C) (Oral)  Ht 6' (1.829 m) Estimated body mass index is 17.63 kg/(m^2) as calculated from the following:    Height as of 1/16/17: 6' (1.829 m).    Weight as of 1/16/17: 130 lb (59 kg).  Medication Reconciliation: complete   Ammy Lambert MA

## 2017-02-15 NOTE — PATIENT INSTRUCTIONS
Schedule with PMR    [facility]   Discharged by : Ammy JONES CMA (AAMA)    Paper scripts provided to patient : none      If you have any questions regarding your visit please contact your care team:     Team Gold Clinic Hours Telephone Number   Dr. Isaura Gonzales, DANNI   7am-7pm Monday - Thursday   7am-5pm Fridays  [phone]   (Appointment scheduling available 24/7)   RN Line   [phone] option 2       For a Price Quote for your services, please call our [employer] Price Line at [phone].     What options do I have for visits at the clinic other than the traditional office visit?     To expand how we care for you, many of our providers are utilizing electronic visits (e-visits) and telephone visits, when medically appropriate, for interactions with their patients rather than a visit in the clinic. We also offer nurse visits for many medical concerns. Just like any other service, we will bill your insurance company for this type of visit based on time spent on the phone with your provider. Not all insurance companies cover these visits. Please check with your medical insurance if this type of visit is covered. You will be responsible for any charges that are not paid by your insurance.   E-visits via "[employer]: generally incur a $35.00 fee.     Telephone visits:   Time spent on the phone: *charged based on time that is spent on the phone in increments of 10 minutes. Estimated cost:   5-10 mins $30.00   11-20 mins. $59.00   21-30 mins. $85.00     Use Jocooshart (secure email communication and access to your chart) to send your primary care provider a message or make an appointment. Ask someone on your Team how to sign up for WishGeniet.     As always, Thank you for trusting us with your health care needs!      [facility]:    Scheduling Appointments  [facility]  Call: [phone]    [facility]  Call: [phone]    [facility]  Call: [phone]      WHERE TO GO FOR CARE?    Clinic    Make an appointment if you:       Are sick (cold, cough, flu, sore throat, earache or in pain).       Have a small injury (sprain, small cut, burn or broken bone).       Need a physical exam, Pap smear, vaccine or prescription refill.       Have questions about your health or medicines.    To reach us:      Call [phone] ([phone]). Open 24 hours every day. (For counseling services, call [phone].)    Log into [email or web address] at [employer]. (Visit [employer] to create an account.) Hospital emergency room    An emergency is a serious or life- threatening problem that must be treated right away.    Call 092 or get to the hospital if you have:      Very bad or sudden:            - Chest pain or pressure         - Bleeding         - Head or belly pain         - Dizziness or trouble seeing, walking or                          Speaking      Problems breathing      Blood in your vomit or you are coughing up blood      A major injury (knocked out, loss of a finger or limb, rape, broken bone protruding from skin)    A mental health crisis. (Or call the Mental Health Crisis line at 1-854.412.4589 or Suicide Prevention Hotline at 1-403.516.7620.)    Open 24 hours every day. You don't need an appointment.     Urgent care    Visit urgent care for sickness or small injuries when the clinic is closed. You don't need an appointment. To check hours or find an urgent care near you, visit [email or web address]. Online care    Get online care from [employer] for more than 70 common problems, like colds, allergies and infections. Open 24 hours every day at: [email or web address]   Need help deciding?    For advice about where to be seen, you may call your clinic and ask to speak with a nurse. We're here for you 24 hours every day.         If you are deaf or hard of  hearing, please let us know. We provide many free services including sign language interpreters, oral interpreters, TTYs, telephone amplifiers, note takers and written materials.

## 2017-02-16 LAB
ANION GAP SERPL CALCULATED.3IONS-SCNC: 11 MMOL/L (ref 3–14)
BUN SERPL-MCNC: 10 MG/DL (ref 7–30)
CALCIUM SERPL-MCNC: 8.8 MG/DL (ref 8.5–10.1)
CHLORIDE SERPL-SCNC: 105 MMOL/L (ref 94–109)
CO2 SERPL-SCNC: 23 MMOL/L (ref 20–32)
CREAT SERPL-MCNC: 0.56 MG/DL (ref 0.66–1.25)
GFR SERPL CREATININE-BSD FRML MDRD: ABNORMAL ML/MIN/1.7M2
GLUCOSE SERPL-MCNC: 86 MG/DL (ref 70–99)
POTASSIUM SERPL-SCNC: 4.2 MMOL/L (ref 3.4–5.3)
SODIUM SERPL-SCNC: 139 MMOL/L (ref 133–144)

## 2017-02-17 NOTE — PROGRESS NOTES
[facility]    CHIEF COMPLAINT: Thoracic back pain    INTERVAL HISTORY:  Last seen on 12/8/16.        Recommendations/plan at the last visit included:  1. Continue pain psychology visits with Shakir  2. Schedule follow-up with SUJATA Goss NP-C in 4 weeks. Alice will be in touch after talking to Dr Ahn.   3. Referrals: acupuncture referral given  4. Medication recommendations: Low dose naltrexone for pain control. We will talk about this in the future.      Since his last visit, Riley CROW Balta reports:  - Riley has tried several sessions of acupuncture without appreciable improvement in his pain.  - He does not wish to go back to [facility] at this time    Pain Information:   Pain quality: Aching and Sharp    Pain timing: Worse first thing in the morning     Pain rating: intensity ranges from 1/10 to 6/10, and averages 3/10 on a 0-10 scale.   Aggravating factors include: Nothing noted   Relieving factors include: Nothing noted      Annual Controlled Substance Agreement/UDS due date: Today    Current Pain Relevant Medications:   Baclofen 10 mg TID  Flector patch 1 BID     Patient is using the medication as prescribed:  N/A  Is your medication helpful? N/A   Medication side effects? N/A    Past Pain Treatments:    Pain Clinic:   No     PT: Yes: about dozen visits, was seen for thoracic pain, neck pain, shoulder pain. Somewhat helpful for shoulder, seemed to make back pain worse. Saw PM&R at .      Psychologist: Yes, saw counselor in the spring for depression, [facility]   Relaxation techniques/biofeedback: No   Chiropractor: No   Acupuncture: No   Pharmacotherapy:     Opioids: No      Non-opioids:  Yes    TENs Unit:No   Injections: Yes: NH   Self-care:   Yes       Previous Pain Relevant Medications: (H--helped; HI--Helped initially; SWH--Somewhat helpful; NH--No help; W--worse; SE--side effects; ?--Unsure if helpful)   NOTE: This medication information taken from patient's intake form, not medical  records.    Opiates: Codeine: H for dental work, tramadol:NH, caused constipation   NSAIDS: Ibuprofen:SE upset stomach, Flector patch: NH, SE nausea    Muscle Relaxants: Baclofen: Takes daily for spasticity related to quadriplegia   Anti-migraine mediations: None noted   Anti-depressants: None noted   Sleep aids: None noted   Anti-convulsants: None noted    Topicals: Lidocaine patch:NH, too expensive   Other medications not covered above: Tylenol: NH    Any illicit drug use: denies  EtOH use: monthly, 1 beer/drink  Any use of prescriptions other than how they were prescribed:emanuel  [place] Board of Pharmacy Data Base Reviewed:    YES; No concern for abuse or misuse of controlled medications based on this report.     SELF CARE:   How often do you practice SELF-CARE (relaxing, stretching, pacing, monitoring posture, taking mini-breaks) in a typical day:  3 x daily        Medications:  Current Outpatient Prescriptions   Medication Sig Dispense Refill     HYDROcodone-acetaminophen (NORCO) 5-325 MG per tablet Take 1 tablet by mouth daily as needed for moderate to severe pain Reported on 2/15/2017       HYDROcodone-acetaminophen (NORCO) 5-325 MG per tablet Take 1 tablet by mouth daily as needed for moderate to severe pain Max 1 tab per day. 30 tablet 0     sterile water, bottle, irrigation Irrigate with 60 mLs as directed daily 1000 mL 0     budesonide (PULMICORT) 0.5 MG/2ML neb solution Use two vials twice daily , mix with honey and swallow. 360 ampule 1     diphenhydrAMINE (BENADRYL) 25 MG tablet Take 25 mg by mouth every 8 hours as needed for itching or allergies Reported on 2/15/2017       zolpidem (AMBIEN) 10 MG tablet Take 0.5-1 tablets (5-10 mg) by mouth nightly as needed for sleep 30 tablet 5     hydrocortisone (ANUSOL-HC) 2.5 % rectal cream Place rectally 2 times daily 30 g 3     baclofen (LIORESAL) 20 MG tablet Take 1 tablet (20 mg) by mouth 4 times daily 360 tablet 1     order for DME Equipment being  ordered: 60 cc catheter tip syringes. 30 Units 11     Omeprazole-Sodium Bicarbonate  MG PACK Take 1 packet by mouth daily 90 each 3     oxybutynin (DITROPAN) 5 MG tablet Crush to be instilled intravesically.  One tablet in the morning and 2 tablets in the evening. 270 tablet 3     UNABLE TO FIND Reported on 2/15/2017       polyethylene glycol (MIRALAX) powder Take 17 g (1 capful) by mouth daily as needed for constipation 510 g 1     docusate sodium (ENEMEEZ MINI) 283 MG enema Use one every other day and prn per patients's request.  Please give patient whichever mini enema's are covered by his insurance 30 enema 6     simethicone (MYLICON) 125 MG CHEW Take 1-2 tablets (125-250 mg) by mouth 4 times daily as needed for intestinal gas 1 tablet 0     nystatin (MYCOSTATIN) 157594 UNIT/GM POWD Apply twice daily to rash 2-3 times daily (Patient not taking: Reported on 2/15/2017) 60 g 1     ondansetron (ZOFRAN ODT) 4 MG disintegrating tablet Take 1-2 tablets (4-8 mg) by mouth every 8 hours as needed for nausea 18 tablet 0     tolterodine (DETROL) 2 MG tablet Take 1 tablet (2 mg) by mouth 2 times daily 180 tablet 3     clotrimazole (LOTRIMIN) 1 % cream Apply topically twice a day as needed. 60 g 2     phenylephrine-cocoa butter (PREPARATION H) 0.25-88.44 % suppository Insert one suppository rectally twice daily as needed. 48 suppository 3     Acetaminophen (TYLENOL PO) Take 500 mg by mouth as needed for mild pain or fever Reported on 2/15/2017       diclofenac (FLECTOR) 1.3 % patch Place 1 patch onto the skin 2 times daily 30 patch 1     traMADol (ULTRAM) 5 mg/mL suspension Take 10 mLs (50 mg) by mouth 2 times daily as needed for moderate pain 200 mL 0     Sterile Water LIQD 60 cc sterile water irrigation daily for the bladder. Please provide the 500 cc bottle for patient. 2 Bottle 6       Review of Systems: A 10-point review of systems was negative, with the exception of chronic pain issues.      Social History:  Reviewed; unchanged from initial consultation.      Family history: Reviewed; unchanged from initial consultation.     PHYSICAL EXAM    Vitals:    02/09/17 1610   BP: 96/68   Pulse: 81   Resp: 18   SpO2: 93%       Constitutional: healthy, alert and no distress  HEENT: Head atraumatic, normocephalic. Eyes without conjunctival injection or jaundice. Neck supple. No obvious neck masses.  Skin: No rash, lesions, or petechiae of exposed skin.   Extremities: Peripheral pulses intact. No clubbing, cyanosis, or edema.   Psychiatric/mental status: Alert, without lethargy or stupor. Appropriate affect. Mood normal.   Neurologic exam:  CN:  Cranial nerves 2-12 are grossly normal.    Musculoskeletal exam:  Cervical spine:   Tenderness in the cervical spine at midline:  No   Tenderness in the cervical paraspinal muscles:  No   Thoracic spine:    Tenderness in the thoracic spine at midline:  Yes    Tenderness in the thoracic paraspinal muscles:  Yes      Psychiatric:  mentation appears normal., affect and mood normal    DIRE Score for ongoing opioid management is calculated as follows:    Diagnosis = 2 pts (slowly progressive; moderate pain/objective findings)    Intractability = 2 pts (most treatments tried; patient not fully engaged/barriers)    Risk        Psych = 3 pts (no significant personality dysfunction/mental illness; good communication with clinic)         Chem Hlth = 3 pts (no history of chemical dependency; not drug-focused)       Reliability = 3 pts (highly reliable with meds, appointments, treatments)       Social = 3 pts (supportive family/close relationships; involved in work/school; no isolation)       (Psych + Chem hlth + Reliability + Social) = 16    Efficacy = 2 pts (moderate benefit/function; low med dose; too early/not tried meds)    DIRE Score = 18        7-13: likely NOT suitable candidate for long-term opioid analgesia       14-21: may be a suitable candidate for long-term opioid analgesia    Previous  Diagnostic Tests:   Imaging Studies:   See EMR                                                                    Impression:     1. Posterior fusion with laminar hooks around the C5 and C6 lamina  with preserved anatomic alignment.  2. Multilevel neural foraminal stenoses as described above without  evidence of bony spinal canal stenosis at any level.    ASSESSMENT:   1.  Thoracic back pain, unknown etiology  2.  Bilateral flank pain, left > right    Riley follows up regarding thoracic and flank pain. He has had multiple work workups for this pain without a definitive cause. He does frequent UTIs which may intermittently explain the flank pain but does not fully determine cause. He's also had physical therapy and acupuncture without improvement. This is complicated by his quadriplegia. We will at this time allow a small amount of opiate pain medication for his most severe pain days. Controlled substance agreement is signed today and we will collect and oral swab drug screen.    Addendum: Oral swab drug screen returned without concern and will be scanned into the chart.    Plan:    Diagnosis reviewed, treatment option addressed, and risk/benifits discussed.  Self-care instructions given.  I am recommending a multidisciplinary treatment plan to help this patient better manage pain.      1. Schedule follow-up with SUJATA Goss NP-C in 4 weeks  2. Labs: Oral swab drug screen   3. Medication recommendations: Norco (hydrocodone-APAP) 5/325 m tab daily as needed for pain.   4. Controlled substance agreement signed today.       Total time spent face to face was 30 minutes and more than 50% of face to face time was spent in counseling and/or coordination of care regarding the diagnosis and recommendations above.      SUJATA Whitman, NP-C   [facility]

## 2017-02-22 ENCOUNTER — MYC MEDICAL ADVICE (OUTPATIENT)
Dept: PALLIATIVE MEDICINE | Facility: CLINIC | Age: 42
End: 2017-02-22

## 2017-02-22 ENCOUNTER — CARE COORDINATION (OUTPATIENT)
Dept: CARE COORDINATION | Facility: CLINIC | Age: 42
End: 2017-02-22

## 2017-02-22 NOTE — TELEPHONE ENCOUNTER
Rui message from pt at 1508:    Alice,   I think we talk brief once about doing a MRI or a CAT scan with contrast.  I know I had CAT scan done for my back year or 2 ago but it was with out contrast.  I was think maybe getting another CAT scan with contrast or MRI before my next visit.  Or we can talk about at my next visit and do the scans before I see Dr. Ahn in April.  You thoughts.     Riley Gifford   -----  Will route to Alice for review.     Anni Ortiz, ONDINAN, RN-BC  Patient Care Supervisor/Care Coordinator  [facility]

## 2017-02-22 NOTE — PROGRESS NOTES
Clinic Care Coordination Contact  OUTREACH    Referral Information:  Referral Source: PCP  Reason for Contact: follow up   Care Conference: No     Universal Utilization:   ED Visits in last year: 1  Hospital visits in last year: 0  Last PCP appointment: 02/17/17  Missed Appointments: 0  Concerns: none  Multiple Providers or Specialists: yes    Clinical Concerns:  Current Medical Concerns: quadriplegia, neurogenic bladder, eosinophilic esophagitis, chronic midline thoracic back pain.    Current Behavioral Concerns: none noted    Education Provided to patient: reassured that calling for appointment and leaving a EDUonGohart message for Alice may open the opportunity for MRI or CT with contrast prior to an appointment with her.   Clinical Pathway Name: None  Clinical Pathway: None    Medication Management:  Reviewed and answered patient questions.     Functional Status:  Mobility Status: Dependent/Assisted by Another  Equipment Currently Used at Home: power chair  Transportation: personal transportation options.           Psychosocial:  Current living arrangement:: I live in assisted living  Financial/Insurance: Medicare       Resources and Interventions:  Current Resources:  (unknown);  (unknown)  PAS Number:  (unknown)  Senior Linkage Line Referral Placed:  (unknown)  Advanced Care Plans/Directives on file:: No  Referrals Placed:  (unknown)     Goals:   Goal 1 Statement: I will continue to try accupuncture to assist with pain control              Barriers: not sure that it is helping at all.  Strengths: Always willing to try new options  Patient/Caregiver understanding: Very good understanding of the process going on, and the use of other modalities to overcome the pain.  Frequency of Care Coordination: 3-4 weeks  Upcoming appointment: 04/11/17     Plan: 1).  The patient will send a EDUonGohart message to Alice for follow up appointment and possible testing prior to the appointment.  2).  The patient will follow up with the  PCP as needed, and contact the Care Coordination RN with any questions or concerns.  3).  Care Coordination RN will contact the patient in 3-4 weeks for follow up.  4).  Care Coordination to remain available for the patient to contact in the event of future needs.          Robbin Vanegas, MSN, RN, PHN  [facility] Care Coordinator  [facility]  Phone: [phone]  [email or web address]

## 2017-02-28 NOTE — TELEPHONE ENCOUNTER
Riley,     Sorry for the delay in getting back to you.   I reviewed all the available imaging in your electronic medical record. I don't see a compelling reason to order additional imagine at this time. The CTs that we have are without contrast but do give a good picture of your spine. If Dr Ahn would like additional imaging, I would prefer that he order it so that he can request the specific imaging that he thinks is best to evaluate your pain issues. I hope that clarifies my thoughts.     Take care,   SUJATA Whitman, NP-C  [facility]

## 2017-03-06 ENCOUNTER — TRANSFERRED RECORDS (OUTPATIENT)
Dept: HEALTH INFORMATION MANAGEMENT | Facility: CLINIC | Age: 42
End: 2017-03-06

## 2017-03-08 ENCOUNTER — OFFICE VISIT (OUTPATIENT)
Dept: FAMILY MEDICINE | Facility: CLINIC | Age: 42
End: 2017-03-08
Payer: MEDICARE

## 2017-03-08 VITALS
SYSTOLIC BLOOD PRESSURE: 108 MMHG | HEART RATE: 60 BPM | HEIGHT: 72 IN | DIASTOLIC BLOOD PRESSURE: 64 MMHG | TEMPERATURE: 98 F

## 2017-03-08 DIAGNOSIS — N31.9 NEUROGENIC BLADDER: ICD-10-CM

## 2017-03-08 DIAGNOSIS — G82.50 QUADRIPLEGIA (H): Primary | ICD-10-CM

## 2017-03-08 PROCEDURE — 99213 OFFICE O/P EST LOW 20 MIN: CPT | Performed by: PHYSICIAN ASSISTANT

## 2017-03-08 NOTE — NURSING NOTE
Chief Complaint   Patient presents with     Clinic Care Coordination - Face To Face     evaluation for new equiptment, shower comode     Forms       Initial /64 (BP Location: Right arm, Patient Position: Chair, Cuff Size: Adult Regular)  Pulse 60  Temp 98  F (36.7  C) (Oral)  Ht 6' (1.829 m) Estimated body mass index is 17.63 kg/(m^2) as calculated from the following:    Height as of 1/16/17: 6' (1.829 m).    Weight as of 1/16/17: 130 lb (59 kg).  Medication Reconciliation: complete   Ammy Lambert MA

## 2017-03-08 NOTE — PATIENT INSTRUCTIONS
[facility]   Discharged by : Isaura RICH, Certified Medical Assistant (AAMA)March 8, 2017 3:53 PM    Paper scripts provided to patient : none   If you have any questions regarding to your visit please contact your care team:   Team Gold Clinic Hours Telephone Number   Dr. Isaura Gonzales, DANNI   7am-7pm Monday - Thursday   7am-5pm Fridays  [phone]   (Appointment scheduling available 24/7)   RN Line   [phone] option 2     What options do I have for visits at the clinic other than the traditional office visit?   To expand how we care for you, many of our providers are utilizing electronic visits (e-visits) and telephone visits, when medically appropriate, for interactions with their patients rather than a visit in the clinic. We also offer nurse visits for many medical concerns. Just like any other service, we will bill your insurance company for this type of visit based on time spent on the phone with your provider. Not all insurance companies cover these visits. Please check with your medical insurance if this type of visit is covered. You will be responsible for any charges that are not paid by your insurance.   E-visits via Chirpifyhart: generally incur a $35.00 fee.   Telephone visits:   Time spent on the phone: *charged based on time that is spent on the phone in increments of 10 minutes. Estimated cost:   5-10 mins $30.00   11-20 mins. $59.00   21-30 mins. $85.00   Use Chirpifyhart (secure email communication and access to your chart) to send your primary care provider a message or make an appointment. Ask someone on your Team how to sign up for LiveN[employer].   For a Price Quote for your services, please call our Consumer Price Line at [phone].   As always, Thank you for trusting us with your health care needs!                    [facility]:    Scheduling Appointments  [facility]  Call:  [phone]    [facility], [facility]  Call: [phone]    [facility]  Call: [phone]    WHERE TO GO FOR CARE?    Clinic    Make an appointment if you:       Are sick (cold, cough, flu, sore throat, earache or in pain).       Have a small injury (sprain, small cut, burn or broken bone).       Need a physical exam, Pap smear, vaccine or prescription refill.       Have questions about your health or medicines.    To reach us:      Call [phone] ([phone]). Open 24 hours every day. (For counseling services, call [phone].)    Log into Jaree at [employer] (Visit Cen[employer]Trunk Club to create an account.) Hospital emergency room    An emergency is a serious or life- threatening problem that must be treated right away.    Call [phone] or get to the hospital if you have:      Very bad or sudden:            - Chest pain or pressure         - Bleeding         - Head or belly pain         - Dizziness or trouble seeing, walking or                          Speaking      Problems breathing      Blood in your vomit or you are coughing up blood      A major injury (knocked out, loss of a finger or limb, rape, broken bone protruding from skin)    A mental health crisis. (Or call the Mental Health Crisis line at 1-825.773.5799 or Suicide Prevention Hotline at 1-406.927.4098.)    Open 24 hours every day. You don't need an appointment.     Urgent care    Visit urgent care for sickness or small injuries when the clinic is closed. You don't need an appointment. To check hours or find an urgent care near you, visit [email or web address]. Online care    Get online care from [employer] for more than 70 common problems, like colds, allergies and infections. Open 24 hours every day at: [email or web address]   Need help deciding?    For advice about where to be seen, you may call your clinic and ask to speak with a nurse. We're here for you 24 hours every day.          If you are deaf or hard of hearing, please let us know. We provide many free services including sign language interpreters, oral interpreters, TTYs, telephone amplifiers, note takers and written materials.

## 2017-03-08 NOTE — PROGRESS NOTES
SUBJECTIVE:                                                    Riley Gifford is a 41 year old male who presents to clinic today for the following health issues:    Face -to-face evaluation.  Patient needing shower comode.   Forms needing to be filled out.    Patient has history of quadriplegia and neurogenic bladder. He has used the same shower commode for the last 20 years. His commode has wheels and will also fit over the toilet. This has allowed patient to be independent. A standard shower chair would not work for him due to multiple complex physical issues including quadriplegia.       -------------------------------------    Problem list, Medication list, Allergies, and Medical/Social/Surgical histories reviewed in [facility] and updated as appropriate.    ROS:  A pertinent ROS of the General  Musculoskeletal   Neurological   systems is otherwise unremarkable.      OBJECTIVE:                                                    /64 (BP Location: Right arm, Patient Position: Chair, Cuff Size: Adult Regular)  Pulse 60  Temp 98  F (36.7  C) (Oral)  Ht 6' (1.829 m) .  GENERAL:: healthy, alert and no distress  Patient is power wheel chair.     Diagnostic test results:  Diagnostic Test Results:  none      ASSESSMENT/PLAN:                                                          ICD-10-CM    1. Quadriplegia (H) G82.50 OCCUPATIONAL THERAPY REFERRAL   2. Neurogenic bladder N31.9 OCCUPATIONAL THERAPY REFERRAL       Patient will need an OT evaluation to determine the best fit for a new shower commode. He will not be able to use a standard shower chair because this will not allow for patient to be independent using the shower and toilet.     Follow up with Provider - SHANE Gonzales PA-C  [facility]     Chart reviewed.  Encounter was reviewed with provider.  Patient was here with his AXIS worker.  Patient was not examined by me.  Laura Yao MD

## 2017-03-16 ENCOUNTER — OFFICE VISIT (OUTPATIENT)
Dept: PALLIATIVE MEDICINE | Facility: CLINIC | Age: 42
End: 2017-03-16
Payer: MEDICARE

## 2017-03-16 ENCOUNTER — TELEPHONE (OUTPATIENT)
Dept: FAMILY MEDICINE | Facility: CLINIC | Age: 42
End: 2017-03-16

## 2017-03-16 VITALS
SYSTOLIC BLOOD PRESSURE: 90 MMHG | RESPIRATION RATE: 16 BRPM | OXYGEN SATURATION: 93 % | DIASTOLIC BLOOD PRESSURE: 59 MMHG | HEART RATE: 81 BPM

## 2017-03-16 DIAGNOSIS — M54.6 CHRONIC MIDLINE THORACIC BACK PAIN: ICD-10-CM

## 2017-03-16 DIAGNOSIS — G82.50 QUADRIPLEGIA (H): ICD-10-CM

## 2017-03-16 DIAGNOSIS — K20.0 EOSINOPHILIC ESOPHAGITIS: ICD-10-CM

## 2017-03-16 DIAGNOSIS — G89.29 CHRONIC MIDLINE THORACIC BACK PAIN: ICD-10-CM

## 2017-03-16 PROCEDURE — 99214 OFFICE O/P EST MOD 30 MIN: CPT | Performed by: NURSE PRACTITIONER

## 2017-03-16 RX ORDER — HYDROCODONE BITARTRATE AND ACETAMINOPHEN 5; 325 MG/1; MG/1
1 TABLET ORAL DAILY PRN
Qty: 30 TABLET | Refills: 0 | Status: SHIPPED | OUTPATIENT
Start: 2017-03-16 | End: 2017-07-11

## 2017-03-16 RX ORDER — BUDESONIDE 0.5 MG/2ML
INHALANT ORAL
Qty: 1080 ML | Refills: 0 | Status: SHIPPED | OUTPATIENT
Start: 2017-03-16 | End: 2017-11-26

## 2017-03-16 ASSESSMENT — PAIN SCALES - GENERAL: PAINLEVEL: MODERATE PAIN (4)

## 2017-03-16 NOTE — TELEPHONE ENCOUNTER
baclofen (LIORESAL) 20 MG tablet   20 mg, 4 TIMES DAILY 1 ordered  Edit     Summary: Take 1 tablet (20 mg) by mouth 4 times daily, Disp-360 tablet, R-1, E-Prescribe   Dose, Route, Frequency: 20 mg, Oral, 4 TIMES DAILY  Start: 9/13/2016  Ord/Sold: 9/13/2016 (O)  Report  Taking:   Long-term:   Pharmacy: [facility] [address] [place] [address] AT [facility] [address]  Med Dose History       Patient Sig: Take 1 tablet (20 mg) by mouth 4 times daily       Ordered on: 9/13/2016       Authorized by: JUANITO MARTIN       Dispense: 360 tablet        Last Office Visit with [facility] prescribing provider: 3-8-2017  Future Office visit:    Next 5 appointments (look out 90 days)     Mar 16, 2017  4:00 PM CDT   Return Visit with SUJATA Valedz CNP   [facility] ([facility]    [address] [place], [place] [address]  [facility] [address]  [facility] [address] [phone]                   Routing refill request to provider for review/approval because:  Drug not on the [facility] refill protocol or controlled substance

## 2017-03-16 NOTE — PATIENT INSTRUCTIONS
After Visit Instructions:     Thank you for coming to [facility] for your care. It is my goal to partner with you to help you reach your optimal state of health.     I am recommending multidisciplinary care at this time.  The focus of care will be to continue gradual rehabilitation and pain management with medication adjustments as needed.    Continue daily self-care, identifying contributing factors, and monitoring variations in pain level. Continue to integrate self-care into your life.      1. Schedule follow-up with SUJATA Goss NP-C in 3 months or sooner as needed  2. Touch base after appt with Dr Ahn with update  3. Medication recommendations:   1. No change to Norco dose  2. Gabapentin 250 mg in 5 ml solution: Take 2.5 ml at bedtime for 4-5 days. If no excessive sleepiness, take 5 ml at bedtime for 4-5 days. Okay to increase in this fashion up to total of 10 ml (500 mg) at bedtime.       SUJATA Whitman, NP-C  [facility]    Contact information: [facility]    Please call if any side effects, questions, or concerns arise.    Nurse Triage line:  [phone]   Call this number with any questions or concerns. You may leave a detailed message anytime. Calls are typically returned Monday through Friday between 8 AM and 4:30 PM. We usually get back to you within 2 business days depending on the issue/request.    Scheduling number: [phone].  Call this number to schedule or change appointments.          Medication Refills Policy:    For non-narcotic medications, please your pharmacy directly to request a refill and the pharmacy will call the [facility] Management Center for authorization. Please allow 3-4 days for these refills.    For narcotic refills, call the nurse triage line and leave a message requesting your refill along with the name of the pharmacy that you use. Narcotic prescriptions will be mailed to your  pharmacy or you may pick them up at the clinic.  Please call 7-10 days before your refill is due  The above policy allows adequate time so that you do not run out of medication.    No Show - Late Cancellation - Late Arrival Policy  We believe regular attendance is key to your success in our program.    Any time you are unable to keep your appointment we ask that you call us at  least 24 hours in advance to let us know. This will allow us to offer the appointment time to another patient. The following is our policy for missed appointments. This also applies to appointments cancelled with less than 24 hours notice.    You will receive a letter after missing your 1st and 2nd appointments without contacting the clinic before your scheduled appointment time.     After missing 3 appointments without calling first, we will cancel all of your future appointments at [facility].    At that point, you will not be able to resume services unless approved by your care team  We understand that unforseen circumstances arise, however, out of respect for all concerned and to provide this appointment to another patient, this policy will be enforced.    Please note that most follow up appointments are 30 minutes long. If you arrive late, your provider may not be able to see you for the entire 30 minutes. Please also note that if you arrive more than 15 minutes for any appointment, it may be rescheduled.

## 2017-03-16 NOTE — TELEPHONE ENCOUNTER
budesonide (PULMICORT) 0.5 MG/2ML neb solution    1 ordered  Edit     Summary: Use two vials twice daily , mix with honey and swallow., Disp-360 ampule, R-1, E-Prescribe   Start: 1/19/2017  Ord/Sold: 1/19/2017 (O)  Report  Taking:   Long-term:   Pharmacy: [facility] [address] [place] [address] AT [facility] [address]  Med Dose History       Patient Sig: Use two vials twice daily , mix with honey and swallow.       Ordered on: 1/19/2017       Authorized by: SIMON FERGUSON       Dispense: 360 ampule       Admin Instructions: Use two vials twice daily , mix with honey and swallow.       Prior Authorization: Request PA        Last Office Visit with [facility] prescribing provider:  3-8-2017   Future Office Visit:    Next 5 appointments (look out 90 days)     Mar 16, 2017  4:00 PM CDT   Return Visit with SUJATA Valdez CNP   [facility] ([facility]    [address]  [address]  [facility] [address] [phone]                   Date of Last Asthma Action Plan Letter:   There are no preventive care reminders to display for this patient.   Asthma Control Test: No flowsheet data found.    Date of Last Spirometry Test:   No results found for this or any previous visit.

## 2017-03-16 NOTE — NURSING NOTE
Chief Complaint   Patient presents with     Pain       Initial BP 90/59  Pulse 81  Resp 16  SpO2 93% Estimated body mass index is 17.63 kg/(m^2) as calculated from the following:    Height as of 1/16/17: 1.829 m (6').    Weight as of 1/16/17: 59 kg (130 lb).  Medication Reconciliation: complete     Tashi Mondragon MA  [facility]

## 2017-03-16 NOTE — MR AVS SNAPSHOT
After Visit Summary   3/16/2017    Riley Gifford    MRN: [id number]           Patient Information     Date Of Birth          1975        Visit Information        Provider Department      3/16/2017 4:00 PM Alice Marte APRN CNP [facility]        Today's Diagnoses     Chronic midline thoracic back pain          Care Instructions    After Visit Instructions:     Thank you for coming to [facility] for your care. It is my goal to partner with you to help you reach your optimal state of health.     I am recommending multidisciplinary care at this time.  The focus of care will be to continue gradual rehabilitation and pain management with medication adjustments as needed.    Continue daily self-care, identifying contributing factors, and monitoring variations in pain level. Continue to integrate self-care into your life.      1. Schedule follow-up with SUJATA Goss NP-C in 3 months or sooner as needed  2. Touch base after appt with Dr Ahn with update  3. Medication recommendations:   1. No change to Norco dose  2. Gabapentin 250 mg in 5 ml solution: Take 2.5 ml at bedtime for 4-5 days. If no excessive sleepiness, take 5 ml at bedtime for 4-5 days. Okay to increase in this fashion up to total of 10 ml (500 mg) at bedtime.       SUJATA Whitman, NP-C  [facility]    Contact information: [facility]    Please call if any side effects, questions, or concerns arise.    Nurse Triage line:  [phone]   Call this number with any questions or concerns. You may leave a detailed message anytime. Calls are typically returned Monday through Friday between 8 AM and 4:30 PM. We usually get back to you within 2 business days depending on the issue/request.    Scheduling number: [phone].  Call this number to schedule or change appointments.          Medication Refills Policy:    For  non-narcotic medications, please your pharmacy directly to request a refill and the pharmacy will call the [facility] Center for authorization. Please allow 3-4 days for these refills.    For narcotic refills, call the nurse triage line and leave a message requesting your refill along with the name of the pharmacy that you use. Narcotic prescriptions will be mailed to your pharmacy or you may pick them up at the clinic.  Please call 7-10 days before your refill is due  The above policy allows adequate time so that you do not run out of medication.    No Show - Late Cancellation - Late Arrival Policy  We believe regular attendance is key to your success in our program.    Any time you are unable to keep your appointment we ask that you call us at  least 24 hours in advance to let us know. This will allow us to offer the appointment time to another patient. The following is our policy for missed appointments. This also applies to appointments cancelled with less than 24 hours notice.    You will receive a letter after missing your 1st and 2nd appointments without contacting the clinic before your scheduled appointment time.     After missing 3 appointments without calling first, we will cancel all of your future appointments at [facility].    At that point, you will not be able to resume services unless approved by your care team  We understand that unforseen circumstances arise, however, out of respect for all concerned and to provide this appointment to another patient, this policy will be enforced.    Please note that most follow up appointments are 30 minutes long. If you arrive late, your provider may not be able to see you for the entire 30 minutes. Please also note that if you arrive more than 15 minutes for any appointment, it may be rescheduled.                                   Follow-ups after your visit        Your next 10 appointments already scheduled     Mar 17, 2017  1:00 PM  CDT   Evaluation with Ana Laura Bruno OT   [facility] ([place])    [address] [facility] [id number] [phone]            Apr 11, 2017  4:00 PM CDT   (Arrive by 3:45 PM)   Return Visit with Josh Ahn MD   [facility] ([facility] [place])    [address] [place] [address]  3rd Floor  [facility] [address] [phone]            May 26, 2017   Procedure with Evaristo Hayes MD   [facility] ([facility] [place])    [address] [place] [address]  [address] [facility] [address] [phone]           Located in the Clinics and Surgery Center at [address] [place], [place] [id number].   parking is very convenient and highly recommended.  is a $6 flat rate fee.  Both  and self parkers should enter the main arrival plaza from [facility]; parking attendants will direct you based on your parking preference.            Jun 05, 2017  1:45 PM CDT   LAB with  LAB   [facility] ([facility])    [address] [place] [address] [phone]           Patient must bring picture ID.  Patient should be prepared to give a urine specimen  Please do not eat 10-12 hours before your appointment if you are coming in fasting for labs on lipids, cholesterol, or glucose (sugar).  Pregnant women should follow their Care Team instructions. Water with medications is okay. Do not drink coffee or other fluids.   If you have concerns about taking  your medications, please ask at office or if scheduling via [place], send a message by clicking on Secure Messaging, Message Your Care Team.            Jun 05, 2017  2:00 PM CDT   US RENAL COMPLETE with UCUS3   [facility] US ([facility])    [address] [place] [address]  [address] [facility] [address] [phone]            Please bring a list of your medicines (including vitamins, minerals and over-the-counter drugs). Also, tell your doctor about any allergies you may have. Wear comfortable clothes and leave your valuables at home.  You do not need to do anything special to prepare for your exam.  Please call the Imaging Department at your exam site with any questions.            Jun 05, 2017  3:00 PM CDT   (Arrive by 2:45 PM)   Return Visit with Nelli Sims PA-C   [facility] ([facility])    [address] [facility]  4th [facility] [address] [phone]              Who to contact     If you have questions or need follow up information about today's clinic visit or your schedule please contact [facility] directly at [phone].  Normal or non-critical lab and imaging results will be communicated to you by MyChart, letter or phone within 4 business days after the clinic has received the results. If you do not hear from us within 7 days, please contact the clinic through [facility] or phone. If you have a critical or abnormal lab result, we will notify you by phone as soon as possible.  Submit refill requests through epacube or call your pharmacy and they will forward the refill request to us. Please allow 3 business days for your refill to be completed.          Additional Information About Your Visit        MyChart Information     epacube gives you secure access to your electronic health record. If you see a primary care provider, you can also send messages to your care team and make appointments. If you have questions, please call your primary care clinic.  If you do not have a primary care provider, please call [phone] and they will assist you.        Care EveryWhere ID     This is your Care EveryWhere ID. This could be used by other organizations to access your [facility] medical records  [id number]        Your  Vitals Were     Pulse Respirations Pulse Oximetry             81 16 93%          Blood Pressure from Last 3 Encounters:   03/16/17 90/59   03/08/17 108/64   02/15/17 108/64    Weight from Last 3 Encounters:   01/16/17 59 kg (130 lb)   11/11/16 59 kg (130 lb)   06/13/16 59 kg (130 lb)              Today, you had the following     No orders found for display         Where to get your medicines      Some of these will need a paper prescription and others can be bought over the counter.  Ask your nurse if you have questions.     Bring a paper prescription for each of these medications     HYDROcodone-acetaminophen 5-325 MG per tablet          Primary Care Provider Office Phone # Fax #    Laura Yao [phone]       [address] [address]        Goals        General    I will use the medications Tylenol or Tramadol for pain every 4-6 hours as needed.  Evidenced by the patient  using the medications to control worsening pain as verbalized by the patient. (pt-stated)     Notes - Note created  3/22/2016  2:39 PM by Robbin Andino RN    As of today's date 3/22/2016 goal is met at 0 - 25%.   Goal Status:  Active        I will work with the [facility] nurse to get the best results for wound care as evidenced by decrease in size and verbalized pain in the area of the wound on the buttock. (pt-stated)     Notes - Note created  1/6/2016  1:43 PM by Robbin Andino RN    As of today's date 1/6/2016 goal is met at 0 - 25%.   Goal Status:  Active          Thank you!     Thank you for choosing [facility]  for your care. Our goal is always to provide you with excellent care. Hearing back from our patients is one way we can continue to improve our services. Please take a few minutes to complete the written survey that you may receive in the mail after your visit with us. Thank you!             Your Updated Medication List - Protect others around  you: Learn how to safely use, store and throw away your medicines at www.disposemymeds.org.          This list is accurate as of: 3/16/17  4:11 PM.  Always use your most recent med list.                   Brand Name Dispense Instructions for use    baclofen 20 MG tablet    LIORESAL    360 tablet    Take 1 tablet (20 mg) by mouth 4 times daily       budesonide 0.5 MG/2ML neb solution    PULMICORT    360 ampule    Use two vials twice daily , mix with honey and swallow.       clotrimazole 1 % cream    LOTRIMIN    60 g    Apply topically twice a day as needed.       diphenhydrAMINE 25 MG tablet    BENADRYL     Take 25 mg by mouth every 8 hours as needed for itching or allergies Reported on 2/15/2017       docusate sodium 283 MG enema    ENEMEEZ MINI    30 enema    Use one every other day and prn per patients's request. Please give patient whichever mini enema's are covered by his insurance       HYDROcodone-acetaminophen 5-325 MG per tablet    NORCO    30 tablet    Take 1 tablet by mouth daily as needed for moderate to severe pain Max 1 tab per day.       hydrocortisone 2.5 % cream    ANUSOL-HC    30 g    Place rectally 2 times daily       nystatin 225200 UNIT/GM Powd    MYCOSTATIN    60 g    Apply twice daily to rash 2-3 times daily       Omeprazole-Sodium Bicarbonate  MG Pack     90 each    Take 1 packet by mouth daily       ondansetron 4 MG ODT tab    ZOFRAN ODT    18 tablet    Take 1-2 tablets (4-8 mg) by mouth every 8 hours as needed for nausea       order for DME     30 Units    Equipment being ordered: 60 cc catheter tip syringes.       oxybutynin 5 MG tablet    DITROPAN    270 tablet    Crush to be instilled intravesically.  One tablet in the morning and 2 tablets in the evening.       phenylephrine-cocoa butter 0.25-88.44 % per suppository    PREPARATION H    48 suppository    Insert one suppository rectally twice daily as needed.       polyethylene glycol powder    MIRALAX    510 g    Take 17 g (1  capful) by mouth daily as needed for constipation       simethicone 125 MG Chew chewable tablet    MYLICON    1 tablet    Take 1-2 tablets (125-250 mg) by mouth 4 times daily as needed for intestinal gas       sterile water (bottle) irrigation     1000 mL    Irrigate with 60 mLs as directed daily       Sterile Water Liqd     2 Bottle    60 cc sterile water irrigation daily for the bladder. Please provide the 500 cc bottle for patient.       tolterodine 2 MG tablet    DETROL    180 tablet    Take 1 tablet (2 mg) by mouth 2 times daily       TYLENOL PO      Take 500 mg by mouth as needed for mild pain or fever Reported on 2/15/2017       UNABLE TO FIND      Reported on 2/15/2017       zolpidem 10 MG tablet    AMBIEN    30 tablet    Take 0.5-1 tablets (5-10 mg) by mouth nightly as needed for sleep

## 2017-03-17 ENCOUNTER — HOSPITAL ENCOUNTER (OUTPATIENT)
Dept: OCCUPATIONAL THERAPY | Facility: CLINIC | Age: 42
Setting detail: THERAPIES SERIES
End: 2017-03-17
Attending: PHYSICIAN ASSISTANT
Payer: MEDICARE

## 2017-03-17 PROCEDURE — 97166 OT EVAL MOD COMPLEX 45 MIN: CPT | Mod: GO | Performed by: OCCUPATIONAL THERAPIST

## 2017-03-17 PROCEDURE — 97535 SELF CARE MNGMENT TRAINING: CPT | Mod: GO | Performed by: OCCUPATIONAL THERAPIST

## 2017-03-17 PROCEDURE — G8989 SELF CARE D/C STATUS: HCPCS | Mod: GO,CI | Performed by: OCCUPATIONAL THERAPIST

## 2017-03-17 PROCEDURE — G8988 SELF CARE GOAL STATUS: HCPCS | Mod: GO,CI | Performed by: OCCUPATIONAL THERAPIST

## 2017-03-17 PROCEDURE — G8987 SELF CARE CURRENT STATUS: HCPCS | Mod: GO,CJ | Performed by: OCCUPATIONAL THERAPIST

## 2017-03-17 PROCEDURE — 40000125 ZZHC STATISTIC OT OUTPT VISIT: Performed by: OCCUPATIONAL THERAPIST

## 2017-03-17 NOTE — PROGRESS NOTES
[facility]          OUTPATIENT OCCUPATIONAL THERAPY  EVALUATION  PLAN OF TREATMENT FOR OUTPATIENT REHABILITATION  (COMPLETE FOR INITIAL CLAIMS ONLY)  Patient's Last Name, First Name, M.I.  YOB: 1975  Riley Gifford                        Provider's Name  [facility] Medical Record No.  [id number]                               Onset Date:         Start of Care Date:     03/17/17   Type:     ___PT   _X_OT   ___SLP Medical Diagnosis:     H/o quadraplegia, neurogenic bladder, hemorroids, back pain, insomnia.                            OT Diagnosis:     decreased ADL performance and pt safety with bathroom transfers Visits from SOC:  1   _________________________________________________________________________________  Plan of Treatment/Functional Goals:  ADL training, Self care/Home management                    Goals  Goal Identifier: DME  Goal Description: Pt will, independently, verbalize optimal DME needed for shower/toileting tasks to ensure pt safety and the lowest burden of care for caregivers at [facility].    Target Date: 03/17/17  Date Met: 03/17/17  Goal Identifier: ROM  Goal Description: Pt will demonstate IND with SROM HEP for digits to prevent greater digit contractures to ensure proper hygiene of hands and preserve use of hand function for ADLs.  Target Date: 03/17/17  Date Met: 03/17/17                                                                               Therapy Frequency: 1x/ evaluation and tx session     Predicted Duration of Therapy Intervention (days/wks): 1x evaluation and tx session  Ana Laura Bruno OT          I CERTIFY THE NEED FOR THESE SERVICES FURNISHED UNDER        THIS PLAN OF TREATMENT AND WHILE UNDER MY CARE     (Physician co-signature of this document indicates review and certification of the therapy plan).                 ,    Certification date  from: 03/17/17, Certification date to: 03/17/17               Referring Physician: Valery Gonzales PA-C     Initial Assessment        See Epic Evaluation      Start Of Care Date: 03/17/17

## 2017-03-17 NOTE — PROGRESS NOTES
03/17/17 1314   Quick Adds   Quick Adds Certification   Type of Visit Initial Outpatient Occupational Therapy Evaluation       Present No   General Information   Start Of Care Date 03/17/17   Referring Physician Valery Gonzales PA-C   Orders Evaluate and treat as indicated   Other Orders Assess for shower chair/commode with wheels per Medicare Guidelines   Orders Date 03/08/17   Medical Diagnosis H/o quadraplegia, neurogenic bladder, hemorroids, back pain, insomnia.     Surgical/Medical History Reviewed Yes   Comments/Observations Pt arrived in power, tilt WC but headrest not attached.  Pt, constantly readjusting postition in chair due to discomfort in back and pelvis. Using armrests and WC hands to assist with repositioning.  Very slight stature and muscle atrophy noted in BLE and BUEs.     Role/Living Environment   Current Community Support Personal care attendant;Housekeeping service;Emergency call system  (Pt able to use 24/7 call service as needed)   Patient role/Employment history Employed  (part-time, 5 hours/day)   Community/Avocational Activities WC adapted curling, archery   Current Living Environment Assisted living   Number of Stairs to Enter Home 0   Number of Stairs Within Home 0   Primary Bathroom Location/Comments too small to use shirin inside bathroom.  Uses shirin lift to transfer to shower chair with wheels, then wheeled into bathroom to use toilet and shower.   Home/Community Accessibility Comments WC assessible   Prior Level - Transfers Assistive equipment and person;Completely dependent  (set up and SBA for WC to bed, shirin to commode/shower chair)   Prior Level - Ambulation Completely dependent   Prior Level - ADLS Assistive person   Prior Responsibilities - IADL Meal Preparation;Driving;Work   Current Assistive Devices - Mobility Power wheelchair   Role/Living Environment Comments shower chair with wheels, cut out and roho cushion for pressure relief-old and needing  "replacement to ensure pt safety   Patient/family Goals Statement to get new shower chair with wheels and cut out to use the toilet as current chair is old and \"falling apart\"   Pain   Patient currently in pain Yes   Pain location butt, groin   Pain rating 5/10   Pain comments soreness, musculoskeletal   Cognitive Status Examination   Orientation Orientation to person, place and time   Level of Consciousness Alert   Follows Commands and Answers Questions 100% of the time   Organization/Problem Solving No deficits were identified   Visual Perception   Visual Perception Wears glasses   Sensation   Sensation Comments Pt with light touch sensation in feet and legs but unable to distiguish hot/cold.  Diminished sensation in BUEs on medial surface.  Intact light touch and dull sensation on lateral surface.  Able to feel hot/cold on thumb and first fingers, bilaterally.     Posture   Posture Forward head position   Range of Motion (ROM)   ROM Quick Adds Shoulder, Left;Shoulder, Right;Elbow/Forearm, Left;Elbow/Forearm, Right;Wrist, Left;Wrist, Right;Hand, General;Hand, Detailed   ROM Comments Bilateral shoulder flexion and abduction AROM to 90 degrees.  Right shoulder PROM flexion to 110 degrees and PROM abduction to 150 degrees-lowers head to compensate for deficits.  Left shoulder PROM flexion to 150 degrees and abduction to 170 degrees.  AROM of bilateral elbow flexion/extension, wrist flexion/extension to WNL. Digits with mild contractures of flexion; not able to achieve full passive extension.  Very limited AROM of bilateral digits   Strength   Strength Comments 4/5 strength of bilateral shoulder elevation, shoulder flexion/extension and elbow flexion/extension.   No AROM in BLEs due to muscle weakness   Hand Strength   Hand Dominance Right   Hand Strength Comments Significant digit weakness, bilaterally.  Relies on rings for zippers.  Flexion contractures assist with holding utensils.   Functional Mobility   Wheelchair " Use of power WC   Mobility   Bed Mobility Comments Per pt report, Alberto with supine to/from sit.  Unable to demonstrate as pt did not have gloves at appointment to use for WC to bed transfer.   Transfer Skills   Transfer Comments SBA for bed to/from WC transfer  with gloves and set up.  Isaac lift to commode/shower chair   Toilet Transfer   Toilet Transfer Comments dependent-isaac   Tub/Shower Transfer   Tub/Shower Transfer Comments dependent-isaac to shower chair with commode   Bathing   Bathing Comments Alberto and use of shower chair with wheels   Upper Body Dressing   Level of [place]: Dress Upper Body minimum assist (75% patients effort)   Physical Assist/Nonphysical Assist: Dress Upper Body set-up required;supervision   Lower Body Dressing   Level of [place]: Dress Lower Body dependent (less than 25% patients effort)   Toileting   Toileting Comments Pt on bowel program-isaac lift transfer to shower chair/commode with wheels to use toilet and then wheeled to shower to bathe   Grooming   Level of [place]: Grooming stand-by assist  (set up for oral care )   Eating/Self-Feeding   Level of [place]: Eating independent   Eating/Self Feeding Comments flexion contractures of digits allows pt to  utensils   Activity Tolerance   Activity Tolerance good, able to work 5 hours/day   Instrumental Activities of Daily Living Assessment   IADL Assessment/Observations assistance with laundry, cleaning, main meals, shopping   Planned Therapy Interventions   Planned Therapy Interventions ADL training;Self care/Home management   Adult OT Eval Goals   OT Eval Goals (Adult) 1;2   OT Goal 1   Goal Identifier DME   Goal Description Pt will, independently, verbalize optimal DME needed for shower/toileting tasks to ensure pt safety and the lowest burden of care for caregivers at [facility].     Target Date 03/17/17   Date Met 03/17/17   OT Goal 2   Goal Identifier ROM   Goal Description Pt will demonstate IND with SROM HEP  for digits to prevent greater digit contractures to ensure proper hygiene of hands and preserve use of hand function for ADLs.   Target Date 17   Date Met 17   Clinical Impression   Criteria for Skilled Therapeutic Interventions Met Yes, treatment indicated   OT Diagnosis decreased ADL performance and pt safety with bathroom transfers   Influenced by the following impairments decreased muscle strength of BUEs and no active movement/strength of BLEs   Assessment of Occupational Performance 3-5 Performance Deficits   Identified Performance Deficits assistance with LE dressing, bathing, transfers, dependent mobility in WC, assistanc with IADLs   Clinical Decision Making (Complexity) Moderate complexity   Therapy Frequency 1x/ evaluation and tx session   Predicted Duration of Therapy Intervention (days/wks) 1x evaluation and tx session   Risks and Benefits of Treatment have been explained. Yes   Patient, Family & other staff in agreement with plan of care Yes   Clinical Impression Comments Pt presents with  muscle strength in BUEs, no muscle activation in BLEs, decreased BUE/BLE AROM due to quadraplegia limiting ADL performance and transfers.  Pt, currently, uses a shower chair/commode with wheels, opening for commode use and roho cushion to prevent pressure ulcers but it is getting old and causing concern for pt safety.  Due to the above mentioned deficits, muscle atrophy and slight stature therapist recommends new shower chair/commode with wheels, opening and roho cushion to decrease caregiver burden, allow for adequate bowel/bladder program and modified IND with shower task. Pt at risk of skin shearing and breakdown using slide board transfer due to slight statue and muscle atrophy as well as safety risk with BUE muscle weakness in an environment with a wet surface.     Therapy Certification   Certification date from 17   Certification date to 17   Total Evaluation Time   Total  Evaluation Time 25

## 2017-03-19 RX ORDER — BACLOFEN 20 MG/1
TABLET ORAL
Qty: 360 TABLET | Refills: 3 | Status: SHIPPED | OUTPATIENT
Start: 2017-03-19 | End: 2018-03-01

## 2017-03-24 ENCOUNTER — CARE COORDINATION (OUTPATIENT)
Dept: CARE COORDINATION | Facility: CLINIC | Age: 42
End: 2017-03-24

## 2017-03-24 NOTE — LETTER
[facility]  [address] [place] [address]  [phone]    March 24, 2017    Riley Gifford  [address] [place] [address] I   [place] [address]    Dear Riley,  I am the Clinic Care Coordinator that works with your primary care provider's clinic. I wanted to introduce myself and provide you with my contact information for you to be able to call me with any questions or concerns. Below is a description of what Clinic Care Coordination is and how I can further assist you.     The Clinic Care Coordinator role is a Registered Nurse and/or  who understands the health care system. The goal of Clinic Care Coordination is to help you manage your health and improve access to the [facility] in the most efficient manner.  The Registered Nurse can assist you in meeting your health care goals by providing education, coordinating services, and strengthening the communication among your providers. The  can assist you with financial, behavioral, psychosocial, and chemical dependency and counseling/psychiatric resources.    Please feel free to keep this letter and contact information to contact me at [phone] with any further questions or concerns that may arise. We at [facility] are focused on providing you with the highest-quality healthcare experience possible and that all starts with you.       Sincerely,     Robbin Vanegas, MSN, RN, PHN  N Clinic Care Coordinator  [facility]  Phone: [phone]  [email or web address]      Enclosed: I have enclosed a copy of a 24 Hour Access Plan. This has helpful phone numbers for you to call when needed. Please keep this in an easy to access place to use as needed.

## 2017-03-24 NOTE — PROGRESS NOTES
[facility]    CHIEF COMPLAINT: mid back and flank pain    INTERVAL HISTORY:  Last seen on 17.        Recommendations/plan at the last visit included:  1. Schedule follow-up with SUJATA Goss NP-C in 4 weeks  2. Labs: Oral swab drug screen   3. Medication recommendations: Norco (hydrocodone-APAP) 5/325 m tab daily as needed for pain.   4. Controlled substance agreement signed today.     Since his last visit, Riley Gifford reports:  - Norco helps with pain at night allowing to get sleep    Pain Information:   Pain quality: Aching, Numb and Penetrating    Pain timing: Intermittent     Pain rating: intensity ranges from 1/10 to 6/10, and averages 3/10 on a 0-10 scale.   Aggravating factors include: Nothing noted   Relieving factors include: nothing noted      Annual Controlled Substance Agreement/UDS due date: 2018    Current Pain Relevant Medications:   Baclofen 10 mg TID  Flector patch 1 BID     Patient is using the medication as prescribed:  N/A  Is your medication helpful? N/A   Medication side effects? N/A    Past Pain Treatments:    Pain Clinic:   No     PT: Yes: about dozen visits, was seen for thoracic pain, neck pain, shoulder pain. Somewhat helpful for shoulder, seemed to make back pain worse. Saw PM&R at .      Psychologist: Yes, saw counselor in the spring for depression, [facility]   Relaxation techniques/biofeedback: No   Chiropractor: No   Acupuncture: No   Pharmacotherapy:     Opioids: No      Non-opioids:  Yes    TENs Unit:No   Injections: Yes: NH   Self-care:   Yes       Previous Pain Relevant Medications: (H--helped; HI--Helped initially; SWH--Somewhat helpful; NH--No help; W--worse; SE--side effects; ?--Unsure if helpful)   NOTE: This medication information taken from patient's intake form, not medical records.    Opiates: Codeine: H for dental work, tramadol:NH, caused constipation   NSAIDS: Ibuprofen:SE upset stomach, Flector patch: NH, SE nausea    Muscle  Relaxants: Baclofen: Takes daily for spasticity related to quadriplegia   Anti-migraine mediations: None noted   Anti-depressants: None noted   Sleep aids: None noted   Anti-convulsants: None noted    Topicals: Lidocaine patch:NH, too expensive   Other medications not covered above: Tylenol: NH    Any illicit drug use: denies  EtOH use: monthly, 1 beer/drink  Any use of prescriptions other than how they were prescribed:michelleies  [place] Board of Pharmacy Data Base Reviewed:    YES; No concern for abuse or misuse of controlled medications based on this report.     SELF CARE:   How often do you practice SELF-CARE (relaxing, stretching, pacing, monitoring posture, taking mini-breaks) in a typical day:  3 x daily      Medications:  Current Outpatient Prescriptions   Medication Sig Dispense Refill     HYDROcodone-acetaminophen (NORCO) 5-325 MG per tablet Take 1 tablet by mouth daily as needed for moderate to severe pain Max 1 tab per day. 30 tablet 0     sterile water, bottle, irrigation Irrigate with 60 mLs as directed daily 1000 mL 0     diphenhydrAMINE (BENADRYL) 25 MG tablet Take 25 mg by mouth every 8 hours as needed for itching or allergies Reported on 2/15/2017       zolpidem (AMBIEN) 10 MG tablet Take 0.5-1 tablets (5-10 mg) by mouth nightly as needed for sleep 30 tablet 5     hydrocortisone (ANUSOL-HC) 2.5 % rectal cream Place rectally 2 times daily 30 g 3     order for DME Equipment being ordered: 60 cc catheter tip syringes. 30 Units 11     Omeprazole-Sodium Bicarbonate  MG PACK Take 1 packet by mouth daily 90 each 3     oxybutynin (DITROPAN) 5 MG tablet Crush to be instilled intravesically.  One tablet in the morning and 2 tablets in the evening. 270 tablet 3     UNABLE TO FIND Reported on 2/15/2017       Sterile Water LIQD 60 cc sterile water irrigation daily for the bladder. Please provide the 500 cc bottle for patient. 2 Bottle 6     polyethylene glycol (MIRALAX) powder Take 17 g (1 capful) by mouth  daily as needed for constipation 510 g 1     docusate sodium (ENEMEEZ MINI) 283 MG enema Use one every other day and prn per patients's request.  Please give patient whichever mini enema's are covered by his insurance 30 enema 6     simethicone (MYLICON) 125 MG CHEW Take 1-2 tablets (125-250 mg) by mouth 4 times daily as needed for intestinal gas 1 tablet 0     nystatin (MYCOSTATIN) 776992 UNIT/GM POWD Apply twice daily to rash 2-3 times daily 60 g 1     ondansetron (ZOFRAN ODT) 4 MG disintegrating tablet Take 1-2 tablets (4-8 mg) by mouth every 8 hours as needed for nausea 18 tablet 0     tolterodine (DETROL) 2 MG tablet Take 1 tablet (2 mg) by mouth 2 times daily 180 tablet 3     clotrimazole (LOTRIMIN) 1 % cream Apply topically twice a day as needed. 60 g 2     phenylephrine-cocoa butter (PREPARATION H) 0.25-88.44 % suppository Insert one suppository rectally twice daily as needed. 48 suppository 3     Acetaminophen (TYLENOL PO) Take 500 mg by mouth as needed for mild pain or fever Reported on 2/15/2017       baclofen (LIORESAL) 20 MG tablet TAKE 1 TABLET(20 MG) BY MOUTH FOUR TIMES DAILY 360 tablet 3     budesonide (PULMICORT) 0.5 MG/2ML neb solution USE 2 VIALS TWICE DAILY. MIX WITH HONEY AND SWALLOW 1080 mL 0       Review of Systems: A 10-point review of systems was negative, with the exception of chronic pain issues.      Social History: Reviewed; unchanged from initial consultation.      Family history: Reviewed; unchanged from initial consultation.     PHYSICAL EXAM    Vitals:    03/16/17 1559   BP: 90/59   Pulse: 81   Resp: 16   SpO2: 93%       Constitutional: healthy, alert and no distress  HEENT: Head atraumatic, normocephalic. Eyes without conjunctival injection or jaundice. Neck supple. No obvious neck masses.  Skin: No rash, lesions, or petechiae of exposed skin.   Extremities: Peripheral pulses intact. No clubbing, cyanosis, or edema.   Psychiatric/mental status: Alert, without lethargy or stupor.  Appropriate affect. Mood normal.   Neurologic exam:  CN:  Cranial nerves 2-12 are grossly normal.    Musculoskeletal exam:  Cervical spine:   Tenderness in the cervical spine at midline:  No   Tenderness in the cervical paraspinal muscles:  No   Thoracic spine:    Tenderness in the thoracic spine at midline:  Yes    Tenderness in the thoracic paraspinal muscles:  Yes      Psychiatric:  mentation appears normal., affect and mood normal    DIRE Score for ongoing opioid management is calculated as follows:    Diagnosis = 2 pts (slowly progressive; moderate pain/objective findings)    Intractability = 2 pts (most treatments tried; patient not fully engaged/barriers)    Risk        Psych = 3 pts (no significant personality dysfunction/mental illness; good communication with clinic)         Chem Hlth = 3 pts (no history of chemical dependency; not drug-focused)       Reliability = 3 pts (highly reliable with meds, appointments, treatments)       Social = 3 pts (supportive family/close relationships; involved in work/school; no isolation)       (Psych + Chem hlth + Reliability + Social) = 16    Efficacy = 2 pts (moderate benefit/function; low med dose; too early/not tried meds)    DIRE Score = 18        7-13: likely NOT suitable candidate for long-term opioid analgesia       14-21: may be a suitable candidate for long-term opioid analgesia    Previous Diagnostic Tests:   Imaging Studies:   See EMR                                                                    Impression:     1. Posterior fusion with laminar hooks around the C5 and C6 lamina  with preserved anatomic alignment.  2. Multilevel neural foraminal stenoses as described above without  evidence of bony spinal canal stenosis at any level.    ASSESSMENT:   1.  Thoracic back pain, unknown etiology  2.  Bilateral flank pain, left > right    Riley presents for follow-up regarding thoracic back pain and bilateral flank pain. He notes Norco does help with the pain at  night to allow him to sleep better. He did several sessions of acupuncture and did not find that it was providing any significant benefit. He will be following with Dr. Ahn in the next couple of weeks and like him to touch base with me after that appointment with an update. Depending on his treatment plan with Dr. Ahn we may initiate health psychology through the pain clinic for additional pain management skill building. I'm also going to reinstitute gabapentin which she had previously taken and has a supply of at home to see if this helps with his pain. We will start with just a bedtime dose to avoid daytime sedation.    Plan:    Diagnosis reviewed, treatment option addressed, and risk/benifits discussed.  Self-care instructions given.  I am recommending a multidisciplinary treatment plan to help this patient better manage pain.      1. Schedule follow-up with SUJATA Goss, NP-C in 3 months or sooner as needed  2. Touch base after appt with Dr Ahn with update  3. Medication recommendations:   1. No change to Norco dose  2. Gabapentin 250 mg in 5 ml solution: Take 2.5 ml at bedtime for 4-5 days. If no excessive sleepiness, take 5 ml at bedtime for 4-5 days. Okay to increase in this fashion up to total of 10 ml (500 mg) at bedtime.       Total time spent face to face was 30 minutes and more than 50% of face to face time was spent in counseling and/or coordination of care regarding the diagnosis and recommendations above.      SUJATA Whitman, NP-C   [facility]

## 2017-03-24 NOTE — PROGRESS NOTES
Clinic Care Coordination Contact  [facility]/Voicemail    Referral Source: PCP  Clinical Data: Care Coordinator Outreach  Outreach attempted x 1.  Left message on voicemail with call back information and requested return call.  Plan: Care Coordinator will mail out care coordination introduction letter with care coordinator contact information and explanation of care coordination services. Care Coordinator will try to reach patient again in 3-5 business days.      Robbin Vanegas, MSN, RN, PHN  [facility] Clinic Care Coordinator  [facility]  Phone: [phone]  [email or web address]

## 2017-03-24 NOTE — LETTER
Health Care Home - Access Care Plan    About Me  Patient Name:  Riley Gifford    YOB: 1975  Age:                            41 year old   [place] MRN:         [id number] Telephone Information:     Home Phone [phone]   Mobile Not on file.       Address:    [address] [place] [address] [address] [place] [address] Email address:  [email or web address]      Emergency Contact(s)  Name Relationship Lgl Grd Work Phone Home Phone Mobile Phone   1. HEMANT, TREY Relative No noen none [phone]   2. ROCÍO HER Sister No none [phone] none             Health Maintenance:      My Access Plan  Medical Emergency 911   Questions or concerns during clinic hours Primary Clinic Line, I will call the clinic directly: Primary Clinic: [facility]- [phone]   24 Hour Appointment Line [phone] or  [phone])  (toll free)   24 Hour Nurse Line [phone] (toll free)   Questions or concerns outside clinic hours 24 Hour Appointment Line, I will call the after-hours on-call line:   [facility] [phone] or [phone]) (toll-free)   Preferred Urgent Care Preferred Urgent Care: [facility], [phone]   Preferred Hospital Preferred Hospital: [facility]  [phone]   Preferred Pharmacy [facility] - [place], [place] - [address]     Behavioral Health Crisis Line [facility], [phone] or 911     My Care Team Members  Patient Care Team       Relationship Specialty Notifications Start End    Laura Yao MD PCP - General Family Practice  7/18/13     Phone: [phone] Fax: [phone]         [facility] [address] [facility] [address]    Robbin Andino, RN Clinic Care Coordinator Nurse Admissions 1/6/16     Comment:  phone:  [phone]    Rober Leo MD Referring Physician Urology  1/8/16     Phone: [phone] Fax: [phone]         [place]  [place] [id number] [facility] [address]    Kimberly Zabala MD MD Urology  1/8/16     Phone: [phone] Fax: [phone]         [facility] [address] [address]    Amanda Other (see comments)   1/8/16     Comment:  [employer]     Phone: [phone]         Norma Urena MD MD Urology  4/1/16     Phone: [phone] Fax: [phone]         XX RESIGNED XX [facility] [address]    Evaristo Hayes MD MD Urology  4/19/16     Phone: [phone] Fax: [phone]         [address] [address]    Jenny Ames, RN Registered Nurse Urology  4/19/16     Marta Adames, RN Clinic Care Coordinator Urology Abnormal results only, Admissions 11/3/16     Phone: [phone] Pager: [phone]            My Medical and Care Information  Problem List   Patient Active Problem List   Diagnosis     Vitamin D deficiency     Eosinophilic esophagitis     Neurogenic bladder     CARDIOVASCULAR SCREENING; LDL GOAL LESS THAN 160     Quadriplegia (H)     Chronic constipation     Internal hemorrhoids with other complication     Diagnostic skin and sensitization tests(aka ALLERGENS)     Anal or rectal pain     Allergic rhinitis due to animal dander     Allergy to mold spores     House dust mite allergy     Insomnia     Health Care Home     Feeling worried     Gallstones     Chronic midline thoracic back pain

## 2017-03-27 ENCOUNTER — TELEPHONE (OUTPATIENT)
Dept: FAMILY MEDICINE | Facility: CLINIC | Age: 42
End: 2017-03-27

## 2017-03-27 DIAGNOSIS — M25.519 SHOULDER PAIN, UNSPECIFIED CHRONICITY, UNSPECIFIED LATERALITY: ICD-10-CM

## 2017-03-27 NOTE — TELEPHONE ENCOUNTER
Physician orders received.  Given to Cresencio Stuart RN for med rec.  Please give to provider for review and signature upon completion.    Fax to: [phone]      Luli Correa  Patient Representative

## 2017-03-28 NOTE — PROGRESS NOTES
Clinic Care Coordination Contact  Care Team Conversations    The patient returned the call of the Care Coordination RN to give an update on his current condition.  The patient stated that he is still having some back and side pain.  The GI specialist requests a repeat of his EGD to check on the EE status. The patient will be slightly sedated for this procedure as well as for the procedure involving the botox in his bladder a couple of weeks later.  The patient is scheduled for a pre-op visit with the PCP.  The patient was in recently with his axis worker to have the paperwork done for replacing his shower commode.  The axis worker is Amanda and she also assisted him with an OT review that was needed for this piece as well.  The Care Coordination RN was able to get the appointments scheduled for the patient that were needed.  Amanda's phone number is listed under the care teams.  The Care Coordination RN encouraged the patient to call with any questions or concerns.  This information will be forwarded to the PCP.      Robbin Vanegas, MSN, RN, PHN  [facility] Clinic Care Coordinator  [facility]  Phone: [phone]  [email or web address]

## 2017-03-29 ENCOUNTER — TRANSFERRED RECORDS (OUTPATIENT)
Dept: HEALTH INFORMATION MANAGEMENT | Facility: CLINIC | Age: 42
End: 2017-03-29

## 2017-04-07 ENCOUNTER — CARE COORDINATION (OUTPATIENT)
Dept: CARE COORDINATION | Facility: CLINIC | Age: 42
End: 2017-04-07

## 2017-04-07 DIAGNOSIS — R82.90 ABNORMAL URINE: Primary | ICD-10-CM

## 2017-04-07 DIAGNOSIS — R82.90 NONSPECIFIC FINDING ON EXAMINATION OF URINE: Primary | ICD-10-CM

## 2017-04-07 DIAGNOSIS — R82.90 ABNORMAL URINE: ICD-10-CM

## 2017-04-07 LAB
ALBUMIN UR-MCNC: NEGATIVE MG/DL
APPEARANCE UR: CLEAR
BACTERIA #/AREA URNS HPF: ABNORMAL /HPF
BILIRUB UR QL STRIP: NEGATIVE
COLOR UR AUTO: YELLOW
GLUCOSE UR STRIP-MCNC: NEGATIVE MG/DL
HGB UR QL STRIP: NEGATIVE
KETONES UR STRIP-MCNC: NEGATIVE MG/DL
LEUKOCYTE ESTERASE UR QL STRIP: ABNORMAL
NITRATE UR QL: NEGATIVE
PH UR STRIP: 7.5 PH (ref 5–7)
RBC #/AREA URNS AUTO: ABNORMAL /HPF (ref 0–2)
SP GR UR STRIP: 1.01 (ref 1–1.03)
URN SPEC COLLECT METH UR: ABNORMAL
UROBILINOGEN UR STRIP-ACNC: 0.2 EU/DL (ref 0.2–1)
WBC #/AREA URNS AUTO: ABNORMAL /HPF (ref 0–2)

## 2017-04-07 PROCEDURE — 81001 URINALYSIS AUTO W/SCOPE: CPT | Performed by: FAMILY MEDICINE

## 2017-04-07 PROCEDURE — 87088 URINE BACTERIA CULTURE: CPT | Performed by: FAMILY MEDICINE

## 2017-04-07 PROCEDURE — 87086 URINE CULTURE/COLONY COUNT: CPT | Performed by: FAMILY MEDICINE

## 2017-04-07 PROCEDURE — 87186 SC STD MICRODIL/AGAR DIL: CPT | Performed by: FAMILY MEDICINE

## 2017-04-07 NOTE — PROGRESS NOTES
Clinic Care Coordination Contact  Care Team Conversations    Spoke with the patient and he is having back and side pain as well as abdominal pain.  He has no fever although his urine is cloudy.  These symptoms have persisted for the last 11/2 weeks.  The patient does have an appointment with Dr. Ahn on Tuesday.  The request from the patient is could an order be placed for a U/A today as he doesn't feel that he needs to be seen but he has a concern for a UTI.  This request for a U/A is being sent to the provider.    Robbin Vanegas, MSN, RN, PHN  N Clinic Care Coordinator  [facility]  Phone: [phone]  [email or web address]

## 2017-04-07 NOTE — PROGRESS NOTES
The patient was notified and instructed to make an appointment with the lab prior to his arrival at the clinic.  He stated understanding.    Robbin Vanegas, MSN, RN, PHN  [facility] Clinic Care Coordinator  [facility]  Phone: [phone]  [email or web address]

## 2017-04-10 ENCOUNTER — TELEPHONE (OUTPATIENT)
Dept: FAMILY MEDICINE | Facility: CLINIC | Age: 42
End: 2017-04-10

## 2017-04-10 ENCOUNTER — MYC MEDICAL ADVICE (OUTPATIENT)
Dept: FAMILY MEDICINE | Facility: CLINIC | Age: 42
End: 2017-04-10

## 2017-04-10 DIAGNOSIS — N30.00 ACUTE CYSTITIS WITHOUT HEMATURIA: Primary | ICD-10-CM

## 2017-04-10 DIAGNOSIS — N31.9 NEUROGENIC BLADDER: ICD-10-CM

## 2017-04-10 DIAGNOSIS — K59.09 CHRONIC CONSTIPATION: ICD-10-CM

## 2017-04-10 LAB
BACTERIA SPEC CULT: ABNORMAL
MICRO REPORT STATUS: ABNORMAL
MICROORGANISM SPEC CULT: ABNORMAL
SPECIMEN SOURCE: ABNORMAL

## 2017-04-10 RX ORDER — AMOXICILLIN AND CLAVULANATE POTASSIUM 500; 125 MG/1; MG/1
1 TABLET, FILM COATED ORAL 2 TIMES DAILY
Qty: 20 TABLET | Refills: 0 | Status: CANCELLED | OUTPATIENT
Start: 2017-04-10

## 2017-04-10 NOTE — TELEPHONE ENCOUNTER
docusate sodium (ENEMEEZ MINI) 283 MG enema    6 ordered  EditCancel Reorder     Summary: Use one every other day and prn per patients's request. Please give patient whichever mini enema's are covered by his insurance, Disp-30 enema, R-6, Fax   Start: 3/23/2016  Ord/Sold: 3/23/2016 (O)  Report  Taking:   Long-term:   Pharmacy: [facility] [address] [place] [address] AT [facility] & [address]  Med Dose History       Patient Sig: Use one every other day and prn per patients's request.  Please give patient whichever mini enema's are covered by his insurance       Ordered on: 3/23/2016       Authorized by: LAURA YAO       Dispense: 30 enema       Admin Instructions: Use one every other day and prn per patients's request.  Please give patient whichever mini enema's are covered by his insurance          Last Office Visit with [facility], [facility] or [facility] prescribing provider: 3-8-2017  Future Office visit:    Next 5 appointments (look out 90 days)     Jun 07, 2017 12:40 PM CDT   Office Visit with Laura Yao MD   [facility]    [address] [place] [id number] [phone]                   Routing refill request to provider for review/approval because:  Drug not on the [facility], [facility] or [facility] refill protocol or controlled substance

## 2017-04-10 NOTE — TELEPHONE ENCOUNTER
tolterodine (DETROL) 2 MG tablet  Last Written Prescription Date: 03/02/2016  Last Fill Quantity: 180,  # refills: 3   Last Office Visit with [facility], [facility] or [facility] prescribing provider: 02/15/17 Maximilian                                         Next 5 appointments (look out 90 days)     Jun 07, 2017 12:40 PM CDT   Office Visit with Laura Yao MD   [facility] ([facility])    [address] [place] [id number] [phone]                        GIGI Shah (R)

## 2017-04-11 ENCOUNTER — OFFICE VISIT (OUTPATIENT)
Dept: PHYSICAL MEDICINE AND REHAB | Facility: CLINIC | Age: 42
End: 2017-04-11

## 2017-04-11 VITALS — SYSTOLIC BLOOD PRESSURE: 96 MMHG | HEIGHT: 72 IN | DIASTOLIC BLOOD PRESSURE: 63 MMHG | HEART RATE: 88 BPM

## 2017-04-11 DIAGNOSIS — G82.50 QUADRIPLEGIA (H): ICD-10-CM

## 2017-04-11 DIAGNOSIS — G89.29 CHRONIC MIDLINE THORACIC BACK PAIN: Primary | ICD-10-CM

## 2017-04-11 DIAGNOSIS — M54.6 CHRONIC MIDLINE THORACIC BACK PAIN: Primary | ICD-10-CM

## 2017-04-11 ASSESSMENT — PAIN SCALES - GENERAL: PAINLEVEL: MILD PAIN (3)

## 2017-04-11 NOTE — NURSING NOTE
Chief Complaint   Patient presents with     RECHECK     C6 quad, low back pain    Kiah Alejandro CMA

## 2017-04-11 NOTE — MR AVS SNAPSHOT
After Visit Summary   4/11/2017    Riley Gifford    MRN: [id number]           Patient Information     Date Of Birth          1975        Visit Information        Provider Department      4/11/2017 4:00 PM Josh Ahn MD [facility] Physical Medicine and Rehabilitation        Today's Diagnoses     Chronic midline thoracic back pain    -  1    Quadriplegia (H)           Follow-ups after your visit        Your next 10 appointments already scheduled     Apr 26, 2017  1:30 PM CDT   New Visit with Natalee Lopez OD   [facility] ([facility])    [address] [place] [id number] [phone]            Jun 05, 2017  1:45 PM CDT   LAB with  LAB   [facility] Lab ([facility])    [address] [place] [address] [phone]           Patient must bring picture ID.  Patient should be prepared to give a urine specimen  Please do not eat 10-12 hours before your appointment if you are coming in fasting for labs on lipids, cholesterol, or glucose (sugar).  Pregnant women should follow their Care Team instructions. Water with medications is okay. Do not drink coffee or other fluids.   If you have concerns about taking  your medications, please ask at office or if scheduling via [employer], send a message by clicking on Secure Messaging, Message Your Care Team.            Jun 05, 2017  2:00 PM CDT   US RENAL COMPLETE with UCUS3   [facility] US ([facility])    [address] [place] [address] [phone]           Please bring a list of your medicines (including vitamins, minerals and over-the-counter drugs). Also, tell your doctor about any allergies you may have. Wear comfortable clothes and leave your valuables at home.  You do not need to do anything special to prepare for your exam.  Please call the Imaging Department at your exam site with  any questions.            Jun 05, 2017  3:00 PM CDT   (Arrive by 2:45 PM)   Return Visit with Nelli Sims PA-C   [facility] ([facility])    [address] [place] [address]  4th [facility] [id number] [phone]            Jun 07, 2017 12:40 PM CDT   Office Visit with Laura Yao MD   [facility] ([facility])    [address] [place] [address] [phone]            Jun 23, 2017   Procedure with Evaristo Hayes MD   [facility] ([facility])    [address] [place] [address]  5th [facility] [address] [phone]           Located in the Clinics and Surgery Center at [address] [place] [address].   parking is very convenient and highly recommended.  is a $6 flat rate fee.  Both  and self parkers should enter the main arrival plaza from [facility]; parking attendants will direct you based on your parking preference.              Who to contact     Please call your clinic at [phone] to:    Ask questions about your health    Make or cancel appointments    Discuss your medicines    Learn about your test results    Speak to your doctor   If you have compliments or concerns about an experience at your clinic, or if you wish to file a complaint, please contact [facility] Patient Relations at [phone] or email us at [email or web address]         Additional Information About Your Visit        [employer] Information     TRA gives you secure access to your electronic health record. If you see a primary care provider, you can also send messages to your care team and make appointments. If you have questions, please call your primary care clinic.  If you do not have a primary care provider, please call [phone] and they will assist you.       [employer] is an electronic gateway that provides easy, online access to your medical records. With [employer], you can request a clinic appointment, read your test results, renew a prescription or communicate with your care team.     To access your existing account, please contact your [facility] or call [phone] for assistance.        Care EveryWhere ID     This is your Care EveryWhere ID. This could be used by other organizations to access your [place] medical records  [id number]        Your Vitals Were     Pulse Height                88 1.829 m (6')           Blood Pressure from Last 3 Encounters:   04/11/17 96/63   03/16/17 90/59   03/08/17 108/64    Weight from Last 3 Encounters:   01/16/17 59 kg (130 lb)   11/11/16 59 kg (130 lb)   06/13/16 59 kg (130 lb)              Today, you had the following     No orders found for display         Today's Medication Changes          These changes are accurate as of: 4/11/17 11:59 PM.  If you have any questions, ask your nurse or doctor.               Start taking these medicines.        Dose/Directions    gabapentin 8 % Gel topical PLO cream   Used for:  Chronic midline thoracic back pain, Quadriplegia (H)   Started by:  Josh Ahn MD        Dose:  1 g   Apply 1 g topically every 8 hours   Quantity:  100 g   Refills:  3       Lidocaine 0.5 % Gel   Used for:  Chronic midline thoracic back pain   Started by:  Josh Ahn MD        Dose:  1 Application   Externally apply 1 Application topically every 6 hours as needed (for mid thoracic pain)   Quantity:  5 Tube   Refills:  3            Where to get your medicines      These medications were sent to [facility] [address], [facility] [address]     Phone:  [phone]     Lidocaine 0.5 % Gel         Some of these will need a paper prescription and others can be bought over the  counter.  Ask your nurse if you have questions.     Bring a paper prescription for each of these medications     gabapentin 8 % Gel topical PLO cream                Primary Care Provider Office Phone # Fax #    Laura Yao [phone]       [address] [address]        Goals        General    I will use the medications Tylenol or Tramadol for pain every 4-6 hours as needed.  Evidenced by the patient  using the medications to control worsening pain as verbalized by the patient. (pt-stated)     Notes - Note created  3/22/2016  2:39 PM by Robbin Andino RN    As of today's date 3/22/2016 goal is met at 0 - 25%.   Goal Status:  Active        I will work with the [facility] nurse to get the best results for wound care as evidenced by decrease in size and verbalized pain in the area of the wound on the buttock. (pt-stated)     Notes - Note created  1/6/2016  1:43 PM by Robbin Andino RN    As of today's date 1/6/2016 goal is met at 0 - 25%.   Goal Status:  Active          Thank you!     Thank you for choosing [facility] PHYSICAL MEDICINE AND REHABILITATION  for your care. Our goal is always to provide you with excellent care. Hearing back from our patients is one way we can continue to improve our services. Please take a few minutes to complete the written survey that you may receive in the mail after your visit with us. Thank you!             Your Updated Medication List - Protect others around you: Learn how to safely use, store and throw away your medicines at www.disposemymeds.org.          This list is accurate as of: 4/11/17 11:59 PM.  Always use your most recent med list.                   Brand Name Dispense Instructions for use    baclofen 20 MG tablet    LIORESAL    360 tablet    TAKE 1 TABLET(20 MG) BY MOUTH FOUR TIMES DAILY       budesonide 0.5 MG/2ML neb solution    PULMICORT    1080 mL    USE 2 VIALS TWICE DAILY. MIX WITH HONEY AND SWALLOW        clotrimazole 1 % cream    LOTRIMIN    60 g    Apply topically twice a day as needed.       diphenhydrAMINE 25 MG tablet    BENADRYL     Take 25 mg by mouth every 8 hours as needed for itching or allergies Reported on 2/15/2017       docusate sodium 283 MG enema    ENEMEEZ MINI    30 enema    Use one every other day and prn per patients's request. Please give patient whichever mini enema's are covered by his insurance       gabapentin 8 % Gel topical PLO cream     100 g    Apply 1 g topically every 8 hours       HYDROcodone-acetaminophen 5-325 MG per tablet    NORCO    30 tablet    Take 1 tablet by mouth daily as needed for moderate to severe pain Max 1 tab per day.       hydrocortisone 2.5 % cream    ANUSOL-HC    30 g    Place rectally 2 times daily       Lidocaine 0.5 % Gel     5 Tube    Externally apply 1 Application topically every 6 hours as needed (for mid thoracic pain)       nystatin 684365 UNIT/GM Powd    MYCOSTATIN    60 g    Apply twice daily to rash 2-3 times daily       omeprazole 20 MG CR capsule    priLOSEC     Take 1 capsule (20 mg) by mouth daily as needed       ondansetron 4 MG ODT tab    ZOFRAN ODT    18 tablet    Take 1-2 tablets (4-8 mg) by mouth every 8 hours as needed for nausea       order for DME     30 Units    Equipment being ordered: 60 cc catheter tip syringes.       oxybutynin 5 MG tablet    DITROPAN    270 tablet    Crush to be instilled intravesically.  One tablet in the morning and 2 tablets in the evening.       phenylephrine-cocoa butter 0.25-88.44 % per suppository    PREPARATION H    48 suppository    Insert one suppository rectally twice daily as needed.       polyethylene glycol powder    MIRALAX    510 g    Take 17 g (1 capful) by mouth daily as needed for constipation       simethicone 125 MG Chew chewable tablet    MYLICON    1 tablet    Take 1-2 tablets (125-250 mg) by mouth 4 times daily as needed for intestinal gas       sterile water (bottle) irrigation     1000 mL     Irrigate with 60 mLs as directed daily       tolterodine 2 MG tablet    DETROL    180 tablet    Take 1 tablet (2 mg) by mouth 2 times daily       traMADol 5 mg/mL Susp suspension    ULTRAM     Take 10 mLs (50 mg) by mouth 2 times daily as needed for moderate pain       TYLENOL PO      Take 500 mg by mouth as needed for mild pain or fever Reported on 2/15/2017       zolpidem 10 MG tablet    AMBIEN    30 tablet    Take 0.5-1 tablets (5-10 mg) by mouth nightly as needed for sleep

## 2017-04-11 NOTE — LETTER
4/11/2017       RE: Riley Gifford  [address] [place] [address] I   [place] [address]     Dear Colleague,    Thank you for referring your patient, Riley Gifford, to the [facility] PHYSICAL MEDICINE AND REHABILITATION at [facility]. Please see a copy of my visit note below.    Physical Medicine and Rehabilitation Progress Note  4/11/17    HPI:  Riley Gifford is a 39 yo man presenting clinically with C6 quadriplegia. Continues to have mid back pain. Thinks it has gotten worse since last visit  3/16/16. Has also developed diffuse abdominal pain and has been worked up for causes and nothing remarkable was found. Ultrasound (08/11/16) Cholelithiasis without sonographic findings of cholecystitis. The pain in back and abdomen come and go. Rates it as 3/10. Has been following with Alice Marte in the pain management clinic. Last visit was 3/16/17. Recommended starting norco 5-325 mg 1 tab qhs and gabapentin. Has been using norco was some improvement. Has not used the gabapentin because has been forgetting to take it and it makes his groggy. Had thoracic epidural steroid injection 9/21/16 without improvement. Feels like a dull ache. No movements make it worse or better. Has tried acupuncture for 6 weeks and it didn't help. Has tried PT without much help. Takes tylenol 2000 mg daily.      PMHx:     -Quadriplegia with incomplete C5-C6 injury     -Neurogenic bladder     -Dysphagia     -Esophageal perforation (10/07)     -Vitamin D deficiency     -Hypoalbuminemia     -Eosinophilia     -Chronic constipation     -Eosinophilic esophagitis     -Allergic rhinitis    PSHx:       -C5-C6 spinal fusion    Current Outpatient Prescriptions   Medication     omeprazole (PRILOSEC) 20 MG CR capsule     traMADol (ULTRAM) 5 mg/mL SUSP suspension     baclofen (LIORESAL) 20 MG tablet     budesonide (PULMICORT) 0.5 MG/2ML neb solution     HYDROcodone-acetaminophen (NORCO) 5-325 MG per tablet     sterile water, bottle,  irrigation     diphenhydrAMINE (BENADRYL) 25 MG tablet     zolpidem (AMBIEN) 10 MG tablet     hydrocortisone (ANUSOL-HC) 2.5 % rectal cream     order for DME     oxybutynin (DITROPAN) 5 MG tablet     polyethylene glycol (MIRALAX) powder     docusate sodium (ENEMEEZ MINI) 283 MG enema     simethicone (MYLICON) 125 MG CHEW     nystatin (MYCOSTATIN) 626555 UNIT/GM POWD     ondansetron (ZOFRAN ODT) 4 MG disintegrating tablet     tolterodine (DETROL) 2 MG tablet     clotrimazole (LOTRIMIN) 1 % cream     phenylephrine-cocoa butter (PREPARATION H) 0.25-88.44 % suppository     Acetaminophen (TYLENOL PO)     No current facility-administered medications for this visit.      Family History:     -Mother - breast cancer     -Father - Prostate cancer     -Maternal grandmother - breast cancer    Social History: Riley lives alone in an assisted living apartment complex. He requires nursing help with ADLs. 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     Allergies: Egg/Pro (chicken-derived products), Fish, Wheat     Objective:  BP 96/63  Pulse 88  Ht 1.829 m (6')\    Physical Exam:  General: Sitting in powered wheelchair, no acute distress  MS: Tenderness along mid thoracic spine and paraspinous muscles.    Neuro: AOx3    Assessment/Plan:   Riley Gifford is a 41 yo man presenting clinically with C6 quadriplegia with thoracic back pain and bilateral flank/abdominal pain.     1. Will try lidocaine gel for back pain  2. Continue to f/u with Alice Byrnes, MS4       Patient seen with ALICE Byrnes this clinic visit.   We have seen Riley Gifford in the past with last visit about a year ago.   Hx of underlying C6 tetraplegia presents with predominant mid thoracic PSP back Pain.   Last year we have assessed him to have a component of myofascial pain and sent to PT.   Patient reports PT has not helped him when he went for his back pain.   He has been seeing pain  management service as well and now on Norco prn and was instructed to restart gabapentin.   He has relatively new chair with somewhat similar back seat portion and not a lot different from prior.   Instructed to follow pain management recs and restart back on gabapentin, if not beneficial, may consider trial of other medications such as pregabalin, amitriptyline, cymbalta, etc.   Will order topicals, lidocaine gel and gabapentin gel, and see if this will be covered by insurance and also to try if this will help alleviate some of this pain.   More than 15 minutes spent with at least half on care coordination.       Again, thank you for allowing me to participate in the care of your patient.      Sincerely,    Josh Ahn MD

## 2017-04-11 NOTE — PROGRESS NOTES
Physical Medicine and Rehabilitation Progress Note  4/11/17    HPI:  Riley Gifford is a 41 yo man presenting clinically with C6 quadriplegia. Continues to have mid back pain. Thinks it has gotten worse since last visit  3/16/16. Has also developed diffuse abdominal pain and has been worked up for causes and nothing remarkable was found. Ultrasound (08/11/16) Cholelithiasis without sonographic findings of cholecystitis. The pain in back and abdomen come and go. Rates it as 3/10. Has been following with Alice Marte in the pain management clinic. Last visit was 3/16/17. Recommended starting norco 5-325 mg 1 tab qhs and gabapentin. Has been using norco was some improvement. Has not used the gabapentin because has been forgetting to take it and it makes his groggy. Had thoracic epidural steroid injection 9/21/16 without improvement. Feels like a dull ache. No movements make it worse or better. Has tried acupuncture for 6 weeks and it didn't help. Has tried PT without much help. Takes tylenol 2000 mg daily.      PMHx:     -Quadriplegia with incomplete C5-C6 injury     -Neurogenic bladder     -Dysphagia     -Esophageal perforation (10/07)     -Vitamin D deficiency     -Hypoalbuminemia     -Eosinophilia     -Chronic constipation     -Eosinophilic esophagitis     -Allergic rhinitis    PSHx:       -C5-C6 spinal fusion    Current Outpatient Prescriptions   Medication     omeprazole (PRILOSEC) 20 MG CR capsule     traMADol (ULTRAM) 5 mg/mL SUSP suspension     baclofen (LIORESAL) 20 MG tablet     budesonide (PULMICORT) 0.5 MG/2ML neb solution     HYDROcodone-acetaminophen (NORCO) 5-325 MG per tablet     sterile water, bottle, irrigation     diphenhydrAMINE (BENADRYL) 25 MG tablet     zolpidem (AMBIEN) 10 MG tablet     hydrocortisone (ANUSOL-HC) 2.5 % rectal cream     order for DME     oxybutynin (DITROPAN) 5 MG tablet     polyethylene glycol (MIRALAX) powder     docusate sodium (ENEMEEZ MINI) 283 MG enema     simethicone (MYLICON)  125 MG CHEW     nystatin (MYCOSTATIN) 403345 UNIT/GM POWD     ondansetron (ZOFRAN ODT) 4 MG disintegrating tablet     tolterodine (DETROL) 2 MG tablet     clotrimazole (LOTRIMIN) 1 % cream     phenylephrine-cocoa butter (PREPARATION H) 0.25-88.44 % suppository     Acetaminophen (TYLENOL PO)     No current facility-administered medications for this visit.      Family History:     -Mother - breast cancer     -Father - Prostate cancer     -Maternal grandmother - breast cancer    Social History: Riley lives alone in an assisted living apartment complex. He requires nursing help with ADLs. 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     Allergies: Egg/Pro (chicken-derived products), Fish, Wheat     Objective:  BP 96/63  Pulse 88  Ht 1.829 m (6')\    Physical Exam:  General: Sitting in powered wheelchair, no acute distress  MS: Tenderness along mid thoracic spine and paraspinous muscles.    Neuro: AOx3    Assessment/Plan:   Riley Gifford is a 39 yo man presenting clinically with C6 quadriplegia with thoracic back pain and bilateral flank/abdominal pain.     1. Will try lidocaine gel for back pain  2. Continue to f/u with Alice Byrnes, MS4       Patient seen with MS4 Twan Byrnes this clinic visit.   We have seen Riley Gifford in the past with last visit about a year ago.   Hx of underlying C6 tetraplegia presents with predominant mid thoracic PSP back Pain.   Last year we have assessed him to have a component of myofascial pain and sent to PT.   Patient reports PT has not helped him when he went for his back pain.   He has been seeing pain management service as well and now on Norco prn and was instructed to restart gabapentin.   He has relatively new chair with somewhat similar back seat portion and not a lot different from prior.   Instructed to follow pain management recs and restart back on gabapentin, if not beneficial, may consider trial of  other medications such as pregabalin, amitriptyline, cymbalta, etc.   Will order topicals, lidocaine gel and gabapentin gel, and see if this will be covered by insurance and also to try if this will help alleviate some of this pain.   More than 15 minutes spent with at least half on care coordination.

## 2017-04-12 RX ORDER — TOLTERODINE TARTRATE 2 MG/1
2 TABLET, EXTENDED RELEASE ORAL 2 TIMES DAILY
Qty: 180 TABLET | Refills: 2 | Status: SHIPPED | OUTPATIENT
Start: 2017-04-12 | End: 2018-01-17

## 2017-04-12 RX ORDER — AMOXICILLIN 250 MG/5ML
500 POWDER, FOR SUSPENSION ORAL 2 TIMES DAILY
Qty: 200 ML | Refills: 0 | Status: SHIPPED | OUTPATIENT
Start: 2017-04-12 | End: 2017-04-22

## 2017-04-12 NOTE — TELEPHONE ENCOUNTER
Phone call to Riley. He reports his symptoms are unchanged (sometimes cloudy urine, mild back/abdominal pain).  If he is to take an antibiotic, he is requesting liquid form.  Pharmacy pended.    Rolo Marie RN

## 2017-04-12 NOTE — TELEPHONE ENCOUNTER
Unable to e-prescribe due to sig length, Will route to provider to print and sign or edit sig.     Yue Conroy RN

## 2017-04-12 NOTE — TELEPHONE ENCOUNTER
Routing refill request to provider for review/approval because:  Dose emily'd up does not match epic      Denisse Barrera RN

## 2017-04-21 ENCOUNTER — TELEPHONE (OUTPATIENT)
Dept: FAMILY MEDICINE | Facility: CLINIC | Age: 42
End: 2017-04-21

## 2017-04-21 DIAGNOSIS — N31.9 NEUROGENIC BLADDER: ICD-10-CM

## 2017-04-26 ENCOUNTER — OFFICE VISIT (OUTPATIENT)
Dept: OPTOMETRY | Facility: CLINIC | Age: 42
End: 2017-04-26
Payer: MEDICARE

## 2017-04-26 DIAGNOSIS — H52.203 MYOPIA OF BOTH EYES WITH ASTIGMATISM AND PRESBYOPIA: Primary | ICD-10-CM

## 2017-04-26 DIAGNOSIS — H52.13 MYOPIA OF BOTH EYES WITH ASTIGMATISM AND PRESBYOPIA: Primary | ICD-10-CM

## 2017-04-26 DIAGNOSIS — H52.4 MYOPIA OF BOTH EYES WITH ASTIGMATISM AND PRESBYOPIA: Primary | ICD-10-CM

## 2017-04-26 PROCEDURE — 92015 DETERMINE REFRACTIVE STATE: CPT | Mod: GY | Performed by: OPTOMETRIST

## 2017-04-26 PROCEDURE — 92004 COMPRE OPH EXAM NEW PT 1/>: CPT | Performed by: OPTOMETRIST

## 2017-04-26 ASSESSMENT — REFRACTION_WEARINGRX
OD_SPHERE: -2.75
OD_CYLINDER: +1.25
OD_CYLINDER: +1.25
OS_AXIS: 090
OD_SPHERE: -2.25
OS_SPHERE: -1.50
OS_AXIS: 098
OS_SPHERE: -1.50
OD_AXIS: 100
OD_AXIS: 099
OS_CYLINDER: +0.75
OS_CYLINDER: +0.50
SPECS_TYPE: SVL

## 2017-04-26 ASSESSMENT — REFRACTION_MANIFEST
OD_SPHERE: -2.75
OD_ADD: +1.25
OS_CYLINDER: +1.25
OS_AXIS: 090
OS_SPHERE: -2.00
OD_CYLINDER: +1.75
OD_AXIS: 102
OS_ADD: +1.25

## 2017-04-26 ASSESSMENT — CUP TO DISC RATIO
OD_RATIO: 0.3
OS_RATIO: 0.4

## 2017-04-26 ASSESSMENT — TONOMETRY
OS_IOP_MMHG: 9
OD_IOP_MMHG: 9
IOP_METHOD: APPLANATION

## 2017-04-26 ASSESSMENT — VISUAL ACUITY
OD_SC: 20/60
OS_SC: 20/40
OS_SC: 20/100
METHOD: SNELLEN - LINEAR
OD_CC: 20/20
OS_CC: 20/20
OS_CC: 20/30 -1
CORRECTION_TYPE: GLASSES
OD_CC: 20/40
OD_SC: 20/150

## 2017-04-26 ASSESSMENT — EXTERNAL EXAM - RIGHT EYE: OD_EXAM: NORMAL

## 2017-04-26 ASSESSMENT — CONF VISUAL FIELD
OS_NORMAL: 1
OD_NORMAL: 1

## 2017-04-26 ASSESSMENT — SLIT LAMP EXAM - LIDS
COMMENTS: NORMAL
COMMENTS: NORMAL

## 2017-04-26 ASSESSMENT — EXTERNAL EXAM - LEFT EYE: OS_EXAM: NORMAL

## 2017-04-26 NOTE — MR AVS SNAPSHOT
After Visit Summary   4/26/2017    Riley Gifford    MRN: [id number]           Patient Information     Date Of Birth          1975        Visit Information        Provider Department      4/26/2017 1:30 PM Natalee Lopez OD [facility]        Today's Diagnoses     Myopia of both eyes with astigmatism and presbyopia    -  1      Care Instructions        A final glasses prescription was given.  Allow plenty of time for adaptation.  The glasses may cause dizziness and affect depth perception for awhile.  Return to clinic 1 year for Comprehensive Vision Exam      Natalee Lopez O.D  [address]  [place]  [address]    [phone]                Follow-ups after your visit        Follow-up notes from your care team     Return in about 1 year (around 4/26/2018) for Eye Exam.      Your next 10 appointments already scheduled     May 15, 2017  4:20 PM CDT   [facility]    [address] [place] [address] [phone]            Jun 05, 2017  1:45 PM CDT   LAB with [facility] ([facility])    [address] [address] [phone]           Patient must bring picture ID.  Patient should be prepared to give a urine specimen  Please do not eat 10-12 hours before your appointment if you are coming in fasting for labs on lipids, cholesterol, or glucose (sugar).  Pregnant women should follow their Care Team instructions. Water with medications is okay. Do not drink coffee or other fluids.   If you have concerns about taking  your medications, please ask at office or if scheduling via [employer], send a message by clicking on Secure Messaging, Message Your Care Team.            Jun 05, 2017  2:00 PM CDT   US RENAL COMPLETE with [address] [facility])    [address] [place] [address]  1st Floor  [facility] [address] [phone]           Please bring a list of your medicines (including vitamins, minerals and over-the-counter drugs). Also, tell your doctor about any allergies you may have. Wear comfortable clothes and leave your valuables at home.  You do not need to do anything special to prepare for your exam.  Please call the Imaging Department at your exam site with any questions.            Jun 05, 2017  3:00 PM CDT   (Arrive by 2:45 PM)   Return Visit with Nelli Sims PA-C   [facility] ([facility])    [address] [place] [address]  4th Floor  [facility] [address] [phone]            Jun 07, 2017 12:40 PM CDT   Office Visit with Laura Yao MD   [facility]    [address] [facility] [address] [phone]            Jun 23, 2017   Procedure with Evaristo Hayes MD   [facility] ([facility])    [address] [place] [address]  5th Floor  [facility] [address] [phone]           Located in the Clinics and Surgery Center at [address] [place], [place] [id number].   parking is very convenient and highly recommended.  is a $6 flat rate fee.  Both  and self parkers should enter the main arrival plaza from [facility]; parking attendants will direct you based on your parking preference.              Who to contact     If you have questions or need follow up information about today's clinic visit or your schedule please contact [facility] directly at [phone].  Normal or non-critical lab and imaging results will be communicated to you by MyChart, letter or phone within 4 business days after the clinic has received the results. If you do not hear from us within 7 days, please contact the clinic  through [employer] or phone. If you have a critical or abnormal lab result, we will notify you by phone as soon as possible.  Submit refill requests through [employer] or call your pharmacy and they will forward the refill request to us. Please allow 3 business days for your refill to be completed.          Additional Information About Your Visit        [employer] Information     [employer] gives you secure access to your electronic health record. If you see a primary care provider, you can also send messages to your care team and make appointments. If you have questions, please call your primary care clinic.  If you do not have a primary care provider, please call [phone] and they will assist you.        Care EveryWhere ID     This is your Care EveryWhere ID. This could be used by other organizations to access your [facility] medical records  [id number]         Blood Pressure from Last 3 Encounters:   04/11/17 96/63   03/16/17 90/59   03/08/17 108/64    Weight from Last 3 Encounters:   01/16/17 59 kg (130 lb)   11/11/16 59 kg (130 lb)   06/13/16 59 kg (130 lb)              We Performed the Following     EYE EXAM (SIMPLE-NONBILLABLE)     REFRACTION        Primary Care Provider Office Phone # Fax #    Laura Yao [phone]       [address] [address]        Goals        General    I will use the medications Tylenol or Tramadol for pain every 4-6 hours as needed.  Evidenced by the patient  using the medications to control worsening pain as verbalized by the patient. (pt-stated)     Notes - Note created  3/22/2016  2:39 PM by Robbin Andino RN    As of today's date 3/22/2016 goal is met at 0 - 25%.   Goal Status:  Active        I will work with the [facility] nurse to get the best results for wound care as evidenced by decrease in size and verbalized pain in the area of the wound on the buttock. (pt-stated)     Notes - Note created  1/6/2016  1:43 PM by  Durose, Robbin L, RN    As of today's date 1/6/2016 goal is met at 0 - 25%.   Goal Status:  Active          Thank you!     Thank you for choosing [facility]  for your care. Our goal is always to provide you with excellent care. Hearing back from our patients is one way we can continue to improve our services. Please take a few minutes to complete the written survey that you may receive in the mail after your visit with us. Thank you!             Your Updated Medication List - Protect others around you: Learn how to safely use, store and throw away your medicines at www.disposemymeds.org.          This list is accurate as of: 4/26/17  2:48 PM.  Always use your most recent med list.                   Brand Name Dispense Instructions for use    baclofen 20 MG tablet    LIORESAL    360 tablet    TAKE 1 TABLET(20 MG) BY MOUTH FOUR TIMES DAILY       budesonide 0.5 MG/2ML neb solution    PULMICORT    1080 mL    USE 2 VIALS TWICE DAILY. MIX WITH HONEY AND SWALLOW       clotrimazole 1 % cream    LOTRIMIN    60 g    Apply topically twice a day as needed.       diphenhydrAMINE 25 MG tablet    BENADRYL     Take 25 mg by mouth every 8 hours as needed for itching or allergies Reported on 2/15/2017       docusate sodium 283 MG enema    ENEMEEZ MINI    30 enema    Use one every other day and prn per patients's request. Please give patient whichever mini enema's are covered by his insurance       gabapentin 8 % Gel topical PLO cream     100 g    Apply 1 g topically every 8 hours       HYDROcodone-acetaminophen 5-325 MG per tablet    NORCO    30 tablet    Take 1 tablet by mouth daily as needed for moderate to severe pain Max 1 tab per day.       hydrocortisone 2.5 % cream    ANUSOL-HC    30 g    Place rectally 2 times daily       Lidocaine 0.5 % Gel     5 Tube    Externally apply 1 Application topically every 6 hours as needed (for mid thoracic pain)       nystatin 554930 UNIT/GM Powd    MYCOSTATIN    60 g    Apply twice  daily to rash 2-3 times daily       omeprazole 20 MG CR capsule    priLOSEC     Take 1 capsule (20 mg) by mouth daily as needed       ondansetron 4 MG ODT tab    ZOFRAN ODT    18 tablet    Take 1-2 tablets (4-8 mg) by mouth every 8 hours as needed for nausea       order for DME     30 Units    Equipment being ordered: 60 cc catheter tip syringes.       oxybutynin 5 MG tablet    DITROPAN    270 tablet    Crush to be instilled intravesically.  One tablet in the morning and 2 tablets in the evening.       phenylephrine-cocoa butter 0.25-88.44 % per suppository    PREPARATION H    48 suppository    Insert one suppository rectally twice daily as needed.       polyethylene glycol powder    MIRALAX    510 g    Take 17 g (1 capful) by mouth daily as needed for constipation       simethicone 125 MG Chew chewable tablet    MYLICON    1 tablet    Take 1-2 tablets (125-250 mg) by mouth 4 times daily as needed for intestinal gas       sterile water (bottle) irrigation     1000 mL    Irrigate with 60 mLs as directed daily       tolterodine 2 MG tablet    DETROL    180 tablet    Take 1 tablet (2 mg) by mouth 2 times daily       traMADol 5 mg/mL Susp suspension    ULTRAM     Take 10 mLs (50 mg) by mouth 2 times daily as needed for moderate pain       TYLENOL PO      Take 500 mg by mouth as needed for mild pain or fever Reported on 2/15/2017       zolpidem 10 MG tablet    AMBIEN    30 tablet    Take 0.5-1 tablets (5-10 mg) by mouth nightly as needed for sleep

## 2017-04-26 NOTE — PATIENT INSTRUCTIONS
A final glasses prescription was given.  Allow plenty of time for adaptation.  The glasses may cause dizziness and affect depth perception for awhile.  Return to clinic 1 year for Comprehensive Vision Exam      Natalee Lopez O.D  [address]. [facility] [place]  [address]    [phone]

## 2017-04-26 NOTE — PROGRESS NOTES
Chief Complaint   Patient presents with     COMPREHENSIVE EYE EXAM      Accompanied by self  Last Eye Exam: 5 years ago  Dilated Previously: Yes    What are you currently using to see?  Glasses-tested patient with glasses of rx1       Distance Vision Acuity: Satisfied with vision    Near Vision Acuity: Satisfied with vision while reading  with glasses    Eye Comfort: good  Do you use eye drops? : No  Occupation or Hobbies: web site     Yvette Aviles, Optometric Tech          Medical, surgical and family histories reviewed and updated 4/26/2017.       OBJECTIVE: See Ophthalmology exam    ASSESSMENT:    ICD-10-CM    1. Myopia of both eyes with astigmatism and presbyopia H52.13 EYE EXAM (SIMPLE-NONBILLABLE)    H52.203 REFRACTION    H52.4       PLAN:   A final glasses prescription was given.  Allow plenty of time for adaptation.  The glasses may cause dizziness and affect depth perception for awhile.  Return to clinic 1 year for Comprehensive Vision Exam      Natalee Lopez O.D  [address] [place]  [address]    [phone]

## 2017-04-26 NOTE — TELEPHONE ENCOUNTER
Called patient and left a VM message to call clinic if they have any questions.    Luli Correa

## 2017-04-28 ENCOUNTER — APPOINTMENT (OUTPATIENT)
Dept: OPTOMETRY | Facility: CLINIC | Age: 42
End: 2017-04-28
Payer: MEDICARE

## 2017-04-28 PROCEDURE — 92340 FIT SPECTACLES MONOFOCAL: CPT | Performed by: OPTOMETRIST

## 2017-05-01 NOTE — TELEPHONE ENCOUNTER
Called patient and left a VM message that we do not have any earlier appointments to get him in earlier.    Luli Correa

## 2017-05-03 ENCOUNTER — TELEPHONE (OUTPATIENT)
Dept: FAMILY MEDICINE | Facility: CLINIC | Age: 42
End: 2017-05-03

## 2017-05-03 NOTE — TELEPHONE ENCOUNTER
Forms received from [facility] for Laura Yao MD.  Forms placed in provider 'sign me' folder.  Please fax forms to [phone] after completion.    Luli Correa  Patient Representative

## 2017-05-04 ENCOUNTER — CARE COORDINATION (OUTPATIENT)
Dept: CARE COORDINATION | Facility: CLINIC | Age: 42
End: 2017-05-04

## 2017-05-04 ENCOUNTER — TELEPHONE (OUTPATIENT)
Dept: CARE COORDINATION | Facility: CLINIC | Age: 42
End: 2017-05-04

## 2017-05-04 NOTE — TELEPHONE ENCOUNTER
The patient called and is no longer able to get the Gentamycin instillation for his bladder from the pharmacy that he has been using.  [facility] suggested using another pharmacy as they have a laguerre and they are unable to do it as well.  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  If this is permissible then the patient would need orders for the items sent to [facility].  This request will be sent to the PCP and Urology Department.  The patient has enough solution to get through the weekend.    Robbin Vanegas, MSN, RN, PHN  N Clinic Care Coordinator  [facility]  Phone: [phone]  [email or web address]

## 2017-05-05 NOTE — PROGRESS NOTES
Clinic Care Coordination Contact  OUTREACH    Referral Information:  Referral Source: PCP  Reason for Contact: follow up on chronic pain in a paraplegic patient.  The patient expressed the struggle that he is having trying to get the gentamycin solution for instillation in his bladder.  It is now required to have a laguerre in order for the pharmacy to mix the solution therefore [facility] can no longer mix the solution.  Lien has been assisting the patient in attempting to find a facility that can do this and has been unsuccessful.  Therefore is was suggested that the patient be given the drug Gentamycin separate from the Sterile water and have the staff that assists the patient mix it prior to instillation.  A request to this effect was sent to the urology department and the PCP of the patient for a prescription to be sent for the change.   Care Conference: No     Universal Utilization:   ED Visits in last year: 1  Hospital visits in last year: 0  Last PCP appointment: 02/17/17  Missed Appointments: 0  Concerns: none  Multiple Providers or Specialists: yes    Clinical Concerns:  Current Medical Concerns: paraplegic, chronic pain, neurogenic bladder, chronic constipation    Current Behavioral Concerns: anxiety, worried    Education Provided to patient: The process for sending the request to the providers to see if someone will send in the orders for Gentamycin separate from the sterile water.   Clinical Pathway Name: None  Clinical Pathway: None    Medication Management:  Reviewed.  Focus was on the change in obtaining the Gentamycin instillation solution.     Functional Status:  Mobility Status: Dependent/Assisted by Another  Equipment Currently Used at Home: power chair  Transportation: medical transportation           Psychosocial:  Current living arrangement:: I live in assisted living  Financial/Insurance: Medicare       Resources and Interventions:  Current Resources:  (unknown);  (unknown)  PAS Number:   (unknown)  [facility] Line Referral Placed:  (unknown)  Advanced Care Plans/Directives on file:: No  Referrals Placed:  (unknown)     Goals:   Goal 1 Statement: I will continue to try accupuncture to assist with pain control              Barriers: not instant relief  Strengths: willing to try  Patient/Caregiver understanding: The patient has an excellent understanding of the disease process.  Frequency of Care Coordination: 3-4 weeks  Upcoming appointment: 05/15/17     Plan: 1).  The patient will continue to work with the providers in getting a change on the in the Gentamycin solution.  2).  The patient will continue working with the pain clinic on modalities for pain control.  3).  The patient will attend all upcoming appointments.  4).  Care Coordination RN will make a follow up call to the patient again in 3-4 weeks.  5).  Care Coordination to remain available for the patient to contact in the event of future needs.        Robbin Vanegas, MSN, RN, PHN  [facility] Clinic Care Coordinator  [facility]  Phone: [phone]  [email or web address]

## 2017-05-09 ENCOUNTER — CARE COORDINATION (OUTPATIENT)
Dept: UROLOGY | Facility: CLINIC | Age: 42
End: 2017-05-09

## 2017-05-09 ENCOUNTER — PRE VISIT (OUTPATIENT)
Dept: UROLOGY | Facility: CLINIC | Age: 42
End: 2017-05-09

## 2017-05-09 DIAGNOSIS — N39.0 RECURRENT UTI: Primary | ICD-10-CM

## 2017-05-09 NOTE — TELEPHONE ENCOUNTER
Patient is coming in to see Nelli Sims PA-C for a neurogenic bladder follow up with labs and imaging before. Labs and imgaing are before appointment no need for a call.

## 2017-05-09 NOTE — PROGRESS NOTES
Needs refill of his Gentamycin bladder irrigation solution.    Marta Adames, ARISTEO-BC, BSN  Care Coordinator - Reconstructive Urology

## 2017-05-12 ENCOUNTER — CARE COORDINATION (OUTPATIENT)
Dept: CARE COORDINATION | Facility: CLINIC | Age: 42
End: 2017-05-12

## 2017-05-12 NOTE — PROGRESS NOTES
Clinic Care Coordination Contact  OUTREACH    Referral Information:  Referral Source: PCP  Reason for Contact: Follow up         Universal Utilization:   ED Visits in last year: 1  Hospital visits in last year: 0  Last PCP appointment: 02/17/17  Missed Appointments: 0  Concerns: none  Multiple Providers or Specialists: yes    Clinical Concerns:  Current Medical Concerns: Patient has an appointment on Monday with PCP.  Patient reporting he is feeling worse than yesterday and having abdominal pain. Patient wondering if there are any blood tests or urine samples needed for this appointment. He would like to know if he could come in today to have those labs done for his Monday appointment.       Plan: 1) Care team to review and advise.  2) Care Coordination to remain available for future needs.     René Villafana RN  Clinic Care Coordinator  [phone] or [phone]

## 2017-05-15 ENCOUNTER — OFFICE VISIT (OUTPATIENT)
Dept: FAMILY MEDICINE | Facility: CLINIC | Age: 42
End: 2017-05-15
Payer: MEDICARE

## 2017-05-15 VITALS — HEART RATE: 68 BPM | SYSTOLIC BLOOD PRESSURE: 90 MMHG | DIASTOLIC BLOOD PRESSURE: 58 MMHG

## 2017-05-15 DIAGNOSIS — R07.81 RIB PAIN: Primary | ICD-10-CM

## 2017-05-15 DIAGNOSIS — M54.6 CHRONIC MIDLINE THORACIC BACK PAIN: ICD-10-CM

## 2017-05-15 DIAGNOSIS — R10.84 ABDOMINAL PAIN, GENERALIZED: ICD-10-CM

## 2017-05-15 DIAGNOSIS — G89.29 CHRONIC MIDLINE THORACIC BACK PAIN: ICD-10-CM

## 2017-05-15 DIAGNOSIS — K20.0 EOSINOPHILIC ESOPHAGITIS: ICD-10-CM

## 2017-05-15 LAB
ALBUMIN UR-MCNC: NEGATIVE MG/DL
APPEARANCE UR: CLEAR
BACTERIA #/AREA URNS HPF: ABNORMAL /HPF
BILIRUB UR QL STRIP: NEGATIVE
COLOR UR AUTO: YELLOW
DIFFERENTIAL METHOD BLD: ABNORMAL
EOSINOPHIL # BLD AUTO: 2.2 10E9/L (ref 0–0.7)
EOSINOPHIL NFR BLD AUTO: 20 %
ERYTHROCYTE [DISTWIDTH] IN BLOOD BY AUTOMATED COUNT: 11.9 % (ref 10–15)
GLUCOSE UR STRIP-MCNC: NEGATIVE MG/DL
HCT VFR BLD AUTO: 41.3 % (ref 40–53)
HGB BLD-MCNC: 13.9 G/DL (ref 13.3–17.7)
HGB UR QL STRIP: NEGATIVE
KETONES UR STRIP-MCNC: NEGATIVE MG/DL
LEUKOCYTE ESTERASE UR QL STRIP: ABNORMAL
LYMPHOCYTES # BLD AUTO: 1.7 10E9/L (ref 0.8–5.3)
LYMPHOCYTES NFR BLD AUTO: 15 %
MCH RBC QN AUTO: 32.3 PG (ref 26.5–33)
MCHC RBC AUTO-ENTMCNC: 33.7 G/DL (ref 31.5–36.5)
MCV RBC AUTO: 96 FL (ref 78–100)
MONOCYTES # BLD AUTO: 0.7 10E9/L (ref 0–1.3)
MONOCYTES NFR BLD AUTO: 6 %
NEUTROPHILS # BLD AUTO: 6.6 10E9/L (ref 1.6–8.3)
NEUTROPHILS NFR BLD AUTO: 59 %
NITRATE UR QL: NEGATIVE
NON-SQ EPI CELLS #/AREA URNS LPF: ABNORMAL /LPF
PH UR STRIP: 7 PH (ref 5–7)
PLATELET # BLD AUTO: 256 10E9/L (ref 150–450)
RBC # BLD AUTO: 4.31 10E12/L (ref 4.4–5.9)
RBC #/AREA URNS AUTO: ABNORMAL /HPF (ref 0–2)
SP GR UR STRIP: 1.01 (ref 1–1.03)
URN SPEC COLLECT METH UR: ABNORMAL
UROBILINOGEN UR STRIP-ACNC: 0.2 EU/DL (ref 0.2–1)
WBC # BLD AUTO: 11.2 10E9/L (ref 4–11)
WBC #/AREA URNS AUTO: ABNORMAL /HPF (ref 0–2)

## 2017-05-15 PROCEDURE — 80053 COMPREHEN METABOLIC PANEL: CPT | Performed by: FAMILY MEDICINE

## 2017-05-15 PROCEDURE — 81001 URINALYSIS AUTO W/SCOPE: CPT | Performed by: FAMILY MEDICINE

## 2017-05-15 PROCEDURE — 36415 COLL VENOUS BLD VENIPUNCTURE: CPT | Performed by: FAMILY MEDICINE

## 2017-05-15 PROCEDURE — 87186 SC STD MICRODIL/AGAR DIL: CPT | Performed by: FAMILY MEDICINE

## 2017-05-15 PROCEDURE — 85025 COMPLETE CBC W/AUTO DIFF WBC: CPT | Performed by: FAMILY MEDICINE

## 2017-05-15 PROCEDURE — 87086 URINE CULTURE/COLONY COUNT: CPT | Performed by: FAMILY MEDICINE

## 2017-05-15 PROCEDURE — 99214 OFFICE O/P EST MOD 30 MIN: CPT | Performed by: FAMILY MEDICINE

## 2017-05-15 PROCEDURE — 87088 URINE BACTERIA CULTURE: CPT | Performed by: FAMILY MEDICINE

## 2017-05-15 PROCEDURE — 87077 CULTURE AEROBIC IDENTIFY: CPT | Performed by: FAMILY MEDICINE

## 2017-05-15 NOTE — MR AVS SNAPSHOT
After Visit Summary   5/15/2017    Riley Gifford    MRN: [id number]           Patient Information     Date Of Birth          1975        Visit Information        Provider Department      5/15/2017 4:20 PM Laura Yao MD [facility]        Today's Diagnoses     Rib pain    -  1    Abdominal pain, generalized        Eosinophilic esophagitis        Chronic midline thoracic back pain          Care Instructions    [facility]   Discharged by : Daniella    Paper scripts provided to patient :      If you have any questions regarding your visit please contact your care team:     [facility] Clinic Hours Telephone Number   DANNI Lawson Dr., Dr., Dr.   7am-7pm Monday - Thursday   7am-5pm Fridays  [phone]   (Appointment scheduling available 24/7)   RN Line   [phone] option 2       For a Price Quote for your services, please call our Consumer Price Line at [phone].     What options do I have for visits at the clinic other than the traditional office visit?     To expand how we care for you, many of our providers are utilizing electronic visits (e-visits) and telephone visits, when medically appropriate, for interactions with their patients rather than a visit in the clinic. We also offer nurse visits for many medical concerns. Just like any other service, we will bill your insurance company for this type of visit based on time spent on the phone with your provider. Not all insurance companies cover these visits. Please check with your medical insurance if this type of visit is covered. You will be responsible for any charges that are not paid by your insurance.   E-visits via Bizanga: generally incur a $35.00 fee.     Telephone visits:   Time spent on the phone: *charged based on time that is spent on the phone in increments of 10 minutes. Estimated cost:   5-10 mins $30.00   11-20  mins. $59.00   21-30 mins. $85.00     Use [employer] (secure email communication and access to your chart) to send your primary care provider a message or make an appointment. Ask someone on your Team how to sign up for [employer].     As always, Thank you for trusting us with your health care needs!      [facility]:    Scheduling Appointments  Danielle Camarena [facility]  Call: [phone]    [facility]  Call: [phone]    [facility]  Call: [phone]      WHERE TO GO FOR CARE?    Clinic    Make an appointment if you:       Are sick (cold, cough, flu, sore throat, earache or in pain).       Have a small injury (sprain, small cut, burn or broken bone).       Need a physical exam, Pap smear, vaccine or prescription refill.       Have questions about your health or medicines.    To reach us:      Call [phone] ([phone]). Open 24 hours every day. (For counseling services, call [phone].)    Log into [employer] at [email or web address]. (Visit [email or web address] to create an account.) Hospital emergency room    An emergency is a serious or life- threatening problem that must be treated right away.    Call 349 or get to the hospital if you have:      Very bad or sudden:            - Chest pain or pressure         - Bleeding         - Head or belly pain         - Dizziness or trouble seeing, walking or                          Speaking      Problems breathing      Blood in your vomit or you are coughing up blood      A major injury (knocked out, loss of a finger or limb, rape, broken bone protruding from skin)    A mental health crisis. (Or call the Mental Health Crisis line at 1-128.266.2345 or Suicide Prevention Hotline at 1-816.848.2593.)    Open 24 hours every day. You don't need an appointment.     Urgent care    Visit urgent care for sickness or small injuries when the clinic is closed. You don't need an appointment. To  check hours or find an urgent care near you, visit [email or web address]. Online care    Get online care from [facility] for more than 70 common problems, like colds, allergies and infections. Open 24 hours every day at:   [email or web address]   Need help deciding?    For advice about where to be seen, you may call your clinic and ask to speak with a nurse. We're here for you 24 hours every day.         If you are deaf or hard of hearing, please let us know. We provide many free services including sign language interpreters, oral interpreters, TTYs, telephone amplifiers, note takers and written materials.                       Follow-ups after your visit        Your next 10 appointments already scheduled     Jun 05, 2017  1:45 PM CDT   LAB with [facility] ([facility])    [address] [place] [id number] [phone]           Patient must bring picture ID.  Patient should be prepared to give a urine specimen  Please do not eat 10-12 hours before your appointment if you are coming in fasting for labs on lipids, cholesterol, or glucose (sugar).  Pregnant women should follow their Care Team instructions. Water with medications is okay. Do not drink coffee or other fluids.   If you have concerns about taking  your medications, please ask at office or if scheduling via Thalchemy, send a message by clicking on Secure Messaging, Message Your Care Team.            Jun 05, 2017  2:00 PM CDT   US RENAL COMPLETE with UCUS3   [facility] ([facility])    [address] [place] [address] [phone]           Please bring a list of your medicines (including vitamins, minerals and over-the-counter drugs). Also, tell your doctor about any allergies you may have. Wear comfortable clothes and leave your valuables at home.  You do not need to do anything special to prepare for your exam.  Please call the Imaging  Department at your exam site with any questions.            Jun 05, 2017  3:00 PM CDT   (Arrive by 2:45 PM)   Return Visit with Nelli Sims PA-C   [facility] ([facility])    [address] [place] [address]  [address] [facility] [id number] [phone]            Jun 07, 2017 12:40 PM CDT   [facility]   [facility]    [address] [place] [id number] [phone]            Jun 23, 2017   Procedure with Evaristo Hayes MD   [facility] ([facility])    [address] [place] [address]  [address] [facility] [address] [phone]           Located in the Clinics and Surgery Center at [address] [place] [address].   parking is very convenient and highly recommended.  is a $6 flat rate fee.  Both  and self parkers should enter the main arrival plaza from [facility]; parking attendants will direct you based on your parking preference.              Who to contact     If you have questions or need follow up information about today's clinic visit or your schedule please contact [facility] directly at [phone].  Normal or non-critical lab and imaging results will be communicated to you by Spindrift Beveragehart, letter or phone within 4 business days after the clinic has received the results. If you do not hear from us within 7 days, please contact the clinic through MedEncentivet or phone. If you have a critical or abnormal lab result, we will notify you by phone as soon as possible.  Submit refill requests through Melboss or call your pharmacy and they will forward the refill request to us. Please allow 3 business days for your refill to be completed.          Additional Information About Your Visit        Melboss Information     Melboss gives you secure access to  your electronic health record. If you see a primary care provider, you can also send messages to your care team and make appointments. If you have questions, please call your primary care clinic.  If you do not have a primary care provider, please call [phone] and they will assist you.        Care EveryWhere ID     This is your Care EveryWhere ID. This could be used by other organizations to access your [address] medical records  [id number]        Your Vitals Were     Pulse                   68            Blood Pressure from Last 3 Encounters:   05/15/17 90/58   04/11/17 96/63   03/16/17 90/59    Weight from Last 3 Encounters:   01/16/17 130 lb (59 kg)   11/11/16 130 lb (59 kg)   06/13/16 130 lb (59 kg)              We Performed the Following     *UA reflex to Microscopic and Culture ([place] [facility] (except [place] and [place])     CBC with platelets differential     Comprehensive metabolic panel (BMP + Alb, Alk Phos, ALT, AST, Total. Bili, TP)        Primary Care Provider Office Phone # Fax #    Laura Bree Yao [phone]       [facility] [address] [facility] [address]        Goals        General    I will use the medications Tylenol or Tramadol for pain every 4-6 hours as needed.  Evidenced by the patient  using the medications to control worsening pain as verbalized by the patient. (pt-stated)     Notes - Note created  3/22/2016  2:39 PM by Robbin Andino RN    As of today's date 3/22/2016 goal is met at 0 - 25%.   Goal Status:  Active        I will work with the [facility] nurse to get the best results for wound care as evidenced by decrease in size and verbalized pain in the area of the wound on the buttock. (pt-stated)     Notes - Note created  1/6/2016  1:43 PM by Robbin Andino RN    As of today's date 1/6/2016 goal is met at 0 - 25%.   Goal Status:  Active          Thank you!     Thank you for choosing [facility]  for  your care. Our goal is always to provide you with excellent care. Hearing back from our patients is one way we can continue to improve our services. Please take a few minutes to complete the written survey that you may receive in the mail after your visit with us. Thank you!             Your Updated Medication List - Protect others around you: Learn how to safely use, store and throw away your medicines at www.disposemymeds.org.          This list is accurate as of: 5/15/17  5:23 PM.  Always use your most recent med list.                   Brand Name Dispense Instructions for use    baclofen 20 MG tablet    LIORESAL    360 tablet    TAKE 1 TABLET(20 MG) BY MOUTH FOUR TIMES DAILY       budesonide 0.5 MG/2ML neb solution    PULMICORT    1080 mL    USE 2 VIALS TWICE DAILY. MIX WITH HONEY AND SWALLOW       clotrimazole 1 % cream    LOTRIMIN    60 g    Apply topically twice a day as needed.       diphenhydrAMINE 25 MG tablet    BENADRYL     Take 25 mg by mouth every 8 hours as needed for itching or allergies Reported on 2/15/2017       docusate sodium 283 MG enema    ENEMEEZ MINI    30 enema    Use one every other day and prn per patients's request. Please give patient whichever mini enema's are covered by his insurance       gabapentin 8 % Gel topical PLO cream     100 g    Apply 1 g topically every 8 hours       HYDROcodone-acetaminophen 5-325 MG per tablet    NORCO    30 tablet    Take 1 tablet by mouth daily as needed for moderate to severe pain Max 1 tab per day.       hydrocortisone 2.5 % cream    ANUSOL-HC    30 g    Place rectally 2 times daily       Lidocaine 0.5 % Gel     5 Tube    Externally apply 1 Application topically every 6 hours as needed (for mid thoracic pain)       NEW MED     500 mL    Gentamycin 240mg in 500mL 0.9% Normal Saline. Instill 30mL into empty bladder at bedtime. Leave in bladder overnight and drain in the morning       nystatin 761211 UNIT/GM Powd    MYCOSTATIN    60 g    Apply twice  daily to rash 2-3 times daily       omeprazole 20 MG CR capsule    priLOSEC     Take 1 capsule (20 mg) by mouth daily as needed       ondansetron 4 MG ODT tab    ZOFRAN ODT    18 tablet    Take 1-2 tablets (4-8 mg) by mouth every 8 hours as needed for nausea       order for DME     30 Units    Equipment being ordered: 60 cc catheter tip syringes.       oxybutynin 5 MG tablet    DITROPAN    270 tablet    Crush to be instilled intravesically.  One tablet in the morning and 2 tablets in the evening.       phenylephrine-cocoa butter 0.25-88.44 % per suppository    PREPARATION H    48 suppository    Insert one suppository rectally twice daily as needed.       polyethylene glycol powder    MIRALAX    510 g    Take 17 g (1 capful) by mouth daily as needed for constipation       simethicone 125 MG Chew chewable tablet    MYLICON    1 tablet    Take 1-2 tablets (125-250 mg) by mouth 4 times daily as needed for intestinal gas       sterile water (bottle) irrigation     1000 mL    Irrigate with 60 mLs as directed daily       tolterodine 2 MG tablet    DETROL    180 tablet    Take 1 tablet (2 mg) by mouth 2 times daily       TYLENOL PO      Take 500 mg by mouth as needed for mild pain or fever Reported on 2/15/2017       zolpidem 10 MG tablet    AMBIEN    30 tablet    Take 0.5-1 tablets (5-10 mg) by mouth nightly as needed for sleep

## 2017-05-15 NOTE — PATIENT INSTRUCTIONS
[facility]   Discharged by : Daniella    Paper scripts provided to patient :      If you have any questions regarding your visit please contact your care team:     [facility] Clinic Hours Telephone Number   DANNI Lawson Dr., Dr., Dr.   7am-7pm Monday - Thursday   7am-5pm Fridays  [phone]   (Appointment scheduling available 24/7)   RN Line   [phone] option 2       For a Price Quote for your services, please call our Consumer Price Line at [phone].     What options do I have for visits at the clinic other than the traditional office visit?     To expand how we care for you, many of our providers are utilizing electronic visits (e-visits) and telephone visits, when medically appropriate, for interactions with their patients rather than a visit in the clinic. We also offer nurse visits for many medical concerns. Just like any other service, we will bill your insurance company for this type of visit based on time spent on the phone with your provider. Not all insurance companies cover these visits. Please check with your medical insurance if this type of visit is covered. You will be responsible for any charges that are not paid by your insurance.   E-visits via TapMetrics: generally incur a $35.00 fee.     Telephone visits:   Time spent on the phone: *charged based on time that is spent on the phone in increments of 10 minutes. Estimated cost:   5-10 mins $30.00   11-20 mins. $59.00   21-30 mins. $85.00     Use Joule Unlimitedhart (secure email communication and access to your chart) to send your primary care provider a message or make an appointment. Ask someone on your Team how to sign up for ProtoSharet.     As always, Thank you for trusting us with your health care needs!      [facility]:    Scheduling Appointments  Danielle Camarena [facility]  Call: [phone]    [facility]  [facility]  Call: [phone]    [facility]  Call: [phone]      WHERE TO GO FOR CARE?    Clinic    Make an appointment if you:       Are sick (cold, cough, flu, sore throat, earache or in pain).       Have a small injury (sprain, small cut, burn or broken bone).       Need a physical exam, Pap smear, vaccine or prescription refill.       Have questions about your health or medicines.    To reach us:      Call [phone] ([phone]). Open 24 hours every day. (For counseling services, call [phone].)    Log into Sembraire at Gamida Cell. (Visit Squidbid to create an account.) Hospital emergency room    An emergency is a serious or life- threatening problem that must be treated right away.    Call [phone] or get to the hospital if you have:      Very bad or sudden:            - Chest pain or pressure         - Bleeding         - Head or belly pain         - Dizziness or trouble seeing, walking or                          Speaking      Problems breathing      Blood in your vomit or you are coughing up blood      A major injury (knocked out, loss of a finger or limb, rape, broken bone protruding from skin)    A mental health crisis. (Or call the Mental Health Crisis line at 1-308.712.8220 or Suicide Prevention Hotline at 1-965.772.5551.)    Open 24 hours every day. You don't need an appointment.     Urgent care    Visit urgent care for sickness or small injuries when the clinic is closed. You don't need an appointment. To check hours or find an urgent care near you, visit [email or web address]. Online care    Get online care from [facility] for more than 70 common problems, like colds, allergies and infections. Open 24 hours every day at:   [email or web address]   Need help deciding?    For advice about where to be seen, you may call your clinic and ask to speak with a nurse. We're here for you 24 hours every day.         If you are deaf or hard of hearing, please let us  know. We provide many free services including sign language interpreters, oral interpreters, TTYs, telephone amplifiers, note takers and written materials.

## 2017-05-15 NOTE — NURSING NOTE
Chief Complaint   Patient presents with     Recheck Medication     chronic pain       Initial BP 90/58 (BP Location: Right arm, Patient Position: Chair, Cuff Size: Adult Regular) Estimated body mass index is 17.63 kg/(m^2) as calculated from the following:    Height as of 1/16/17: 6' (1.829 m).    Weight as of 1/16/17: 130 lb (59 kg).  Medication Reconciliation: complete

## 2017-05-15 NOTE — PROGRESS NOTES
SUBJECTIVE:                                                    Riley Gifford is a 41 year old male who presents to clinic today for the following health issues:        Chronic Pain Follow-Up       Type / Location of Pain: thoracic , buttock ,rib pain and radiates to sides and of back and sides of abdomin  Analgesia/pain control:       Recent changes:  worse      Overall control: Inadequate pain control  Activity level/function:      Daily activities:  Functioning has been more difficult lately    Work:  not applicable  Adverse effects:  No  Adherance    Taking medication as directed?  Yes    Participating in other treatments: not applicable  Risk Factors:    Sleep:  Fair to poor    Mood/anxiety:  not addressed due to acute problem    Recent family or social stressors:  none noted    Other aggravating factors:       Middle back  Left buttock pain.  Bilateral torso pain  Abdominal pain  Some increase cloudy urine    No pressure sores  Nurse reports skin looks good    Upper endoscopy next week    Stools are regular    CT 2014 at [facility] everywhere:  Impression:  1. Long-standing deformity of the left inferior pubic ramus and ischial tuberosity with paucity of overlying soft tissues. This may therefore be related to an old pressure ulcer and osteomyelitis. However, there is no evidence of current infection in the soft tissues or osteomyelitis.  2. Degenerative changes in the hips with diffuse bone demineralization and ankylosis in the sacroiliac joints incidentally noted.  [facility]    PHQ-9 SCORE 2/5/2016 7/14/2016 11/16/2016   Total Score 9 5 9     JUANA-7 SCORE 7/14/2016   Total Score 3     Encounter-Level CSA - 02/09/2017:                 Controlled Substance Agreement - Scan on 2/13/2017  3:31 PM : CONTROLLED SUBSTANCE AGREEMENT (below)                 Amount of exercise or physical activity: N/A    Problems taking medications regularly: No    Medication side effects: none    Diet:  gluten-free/reduced          Problem list and histories reviewed & adjusted, as indicated.  Additional history: as documented    Patient Active Problem List   Diagnosis     Vitamin D deficiency     Eosinophilic esophagitis     Neurogenic bladder     CARDIOVASCULAR SCREENING; LDL GOAL LESS THAN 160     Quadriplegia (H)     Chronic constipation     Internal hemorrhoids with other complication     Diagnostic skin and sensitization tests(aka ALLERGENS)     Anal or rectal pain     Allergic rhinitis due to animal dander     Allergy to mold spores     House dust mite allergy     Insomnia     Health Care Home     Feeling worried     Gallstones     Chronic midline thoracic back pain     Past Surgical History:   Procedure Laterality Date     BACK SURGERY       C NONSPECIFIC PROCEDURE      C5-6 Fusion     C NONSPECIFIC PROCEDURE      Pressure Ulcer     COLONOSCOPY       CYSTOSCOPY, INTRAVESICAL INJECTION N/A 5/12/2016    Procedure: CYSTOSCOPY, INTRAVESICAL INJECTION;  Surgeon: Evaristo Hayes MD;  Location: [facility]     CYSTOSCOPY, INTRAVESICAL INJECTION N/A 11/11/2016    Procedure: CYSTOSCOPY, INTRAVESICAL INJECTION;  Surgeon: Evaristo Hayes MD;  [facility]     GI SURGERY      endoscopy x2       Social History   Substance Use Topics     Smoking status: Never Smoker     Smokeless tobacco: Never Used     Alcohol use 0.0 oz/week     0 Standard drinks or equivalent per week      Comment: 1-2 drinks per month     Family History   Problem Relation Age of Onset     Breast Cancer Mother      Prostate Cancer Father      Breast Cancer Maternal Grandmother      CANCER Maternal Grandfather      Glaucoma No family hx of      Macular Degeneration No family hx of      DIABETES No family hx of      Hypertension No family hx of            Reviewed and updated as needed this visit by clinical staff       Reviewed and updated as needed this visit by Provider         ROS:  Constitutional, HEENT, cardiovascular, pulmonary, gi and  gu systems are negative, except as otherwise noted.    OBJECTIVE:                                                    BP 90/58 (BP Location: Right arm, Patient Position: Chair, Cuff Size: Adult Regular)  Pulse 68  There is no height or weight on file to calculate BMI.  GENERAL: healthy, alert and no distress, in wheelchair  NECK: no adenopathy, no asymmetry, masses, or scars and thyroid normal to palpation  RESP: lungs clear to auscultation - no rales, rhonchi or wheezes  CV: regular rate and rhythm, normal S1 S2, no S3 or S4, no murmur, click or rub, no peripheral edema and peripheral pulses strong  ABDOMEN: soft, nontender, no hepatosplenomegaly, no masses and bowel sounds normal  MS: does have similar pain to palpation as previous visits in thoracic/rib cage area  SKIN: no suspicious lesions or rashes  PSYCH: mentation appears normal, affect normal/bright    Diagnostic Test Results:  Results for orders placed or performed in visit on 05/15/17   *UA reflex to Microscopic and Culture ([place] [facility] (except [place] and [place])   Result Value Ref Range    Color Urine Yellow     Appearance Urine Clear     Glucose Urine Negative NEG mg/dL    Bilirubin Urine Negative NEG    Ketones Urine Negative NEG mg/dL    Specific Gravity Urine 1.015 1.003 - 1.035    Blood Urine Negative NEG    pH Urine 7.0 5.0 - 7.0 pH    Protein Albumin Urine Negative NEG mg/dL    Urobilinogen Urine 0.2 0.2 - 1.0 EU/dL    Nitrite Urine Negative NEG    Leukocyte Esterase Urine Small (A) NEG    Source Catheterized Urine    Comprehensive metabolic panel (BMP + Alb, Alk Phos, ALT, AST, Total. Bili, TP)   Result Value Ref Range    Sodium 139 133 - 144 mmol/L    Potassium 4.4 3.4 - 5.3 mmol/L    Chloride 106 94 - 109 mmol/L    Carbon Dioxide 22 20 - 32 mmol/L    Anion Gap 11 3 - 14 mmol/L    Glucose 85 70 - 99 mg/dL    Urea Nitrogen 10 7 - 30 mg/dL    Creatinine 0.49 (L) 0.66 - 1.25 mg/dL    GFR Estimate >90  Non  GFR  Calc   >60 mL/min/1.7m2    GFR Estimate If Black >90   GFR Calc   >60 mL/min/1.7m2    Calcium 8.5 8.5 - 10.1 mg/dL    Bilirubin Total 0.4 0.2 - 1.3 mg/dL    Albumin 3.6 3.4 - 5.0 g/dL    Protein Total 6.3 (L) 6.8 - 8.8 g/dL    Alkaline Phosphatase 48 40 - 150 U/L    ALT 15 0 - 70 U/L    AST 9 0 - 45 U/L   CBC with platelets differential   Result Value Ref Range    WBC 11.2 (H) 4.0 - 11.0 10e9/L    RBC Count 4.31 (L) 4.4 - 5.9 10e12/L    Hemoglobin 13.9 13.3 - 17.7 g/dL    Hematocrit 41.3 40.0 - 53.0 %    MCV 96 78 - 100 fl    MCH 32.3 26.5 - 33.0 pg    MCHC 33.7 31.5 - 36.5 g/dL    RDW 11.9 10.0 - 15.0 %    Platelet Count 256 150 - 450 10e9/L    % Neutrophils 59.0 %    % Lymphocytes 15.0 %    % Monocytes 6.0 %    % Eosinophils 20.0 %    Absolute Neutrophil 6.6 1.6 - 8.3 10e9/L    Absolute Lymphocytes 1.7 0.8 - 5.3 10e9/L    Absolute Monocytes 0.7 0.0 - 1.3 10e9/L    Absolute Eosinophils 2.2 (H) 0.0 - 0.7 10e9/L    Diff Method Automated Method    Urine Microscopic   Result Value Ref Range    WBC Urine O - 2 0 - 2 /HPF    RBC Urine O - 2 0 - 2 /HPF    Squamous Epithelial /LPF Urine Few FEW /LPF    Bacteria Urine Few (A) NEG /HPF   Urine Culture Aerobic Bacterial   Result Value Ref Range    Specimen Description Catheterized Urine     Culture Micro (A)      50,000 to 100,000 colonies/mL Enterococcus faecalis    Micro Report Status FINAL 05/17/2017     Organism:       50,000 to 100,000 colonies/mL Enterococcus faecalis       Susceptibility    50,000 to 100,000 colonies/ml enterococcus faecalis (christine) -  (no method available)     AMPICILLIN <=2 Susceptible  ug/mL     NITROFURANTOIN <=16 Susceptible  ug/mL     PENICILLIN 8 Susceptible  ug/mL     VANCOMYCIN 1 Susceptible  ug/mL     Gentamicin Screen Value in next row        ResistantHigh level gentamicin resistance was found and this is predictive of resistance to tobramycin and amikacin.        ASSESSMENT/PLAN:                                                       1. Rib pain  Has seen PM & R  Has had injection through pain clinic that didn't seem to work for long  Interested in consult with pain clinic for comprehensive overview  - [facility] ([place] REFERRAL  - Urine Microscopic    2. Abdominal pain, generalized  Has seen PM & R  Has had injection through pain clinic that didn't seem to work for long  Interested in consult with pain clinic for comprehensive overview    Will tx UTI if positive culture  He is also seeing GI for esphagitis next week  - *UA reflex to Microscopic and Culture ([place] [facility] (except [place] and [place])  - Comprehensive metabolic panel (BMP + Alb, Alk Phos, ALT, AST, Total. Bili, TP)  - CBC with platelets differential  - Urine Culture Aerobic Bacterial    3. Eosinophilic esophagitis  Followed by [facility]    4. Chronic midline thoracic back pain  Has seen PM & R  Has had injection through pain clinic that didn't seem to work for long  Interested in consult with pain clinic for comprehensive overview  - [facility] ([place] REFERRAL    FUTURE APPOINTMENTS:       - Follow-up visit in 3 months    Laura Yao MD  [facility]

## 2017-05-16 LAB
ALBUMIN SERPL-MCNC: 3.6 G/DL (ref 3.4–5)
ALP SERPL-CCNC: 48 U/L (ref 40–150)
ALT SERPL W P-5'-P-CCNC: 15 U/L (ref 0–70)
ANION GAP SERPL CALCULATED.3IONS-SCNC: 11 MMOL/L (ref 3–14)
AST SERPL W P-5'-P-CCNC: 9 U/L (ref 0–45)
BILIRUB SERPL-MCNC: 0.4 MG/DL (ref 0.2–1.3)
BUN SERPL-MCNC: 10 MG/DL (ref 7–30)
CALCIUM SERPL-MCNC: 8.5 MG/DL (ref 8.5–10.1)
CHLORIDE SERPL-SCNC: 106 MMOL/L (ref 94–109)
CO2 SERPL-SCNC: 22 MMOL/L (ref 20–32)
CREAT SERPL-MCNC: 0.49 MG/DL (ref 0.66–1.25)
GFR SERPL CREATININE-BSD FRML MDRD: ABNORMAL ML/MIN/1.7M2
GLUCOSE SERPL-MCNC: 85 MG/DL (ref 70–99)
POTASSIUM SERPL-SCNC: 4.4 MMOL/L (ref 3.4–5.3)
PROT SERPL-MCNC: 6.3 G/DL (ref 6.8–8.8)
SODIUM SERPL-SCNC: 139 MMOL/L (ref 133–144)

## 2017-05-18 ENCOUNTER — MYC MEDICAL ADVICE (OUTPATIENT)
Dept: FAMILY MEDICINE | Facility: CLINIC | Age: 42
End: 2017-05-18

## 2017-05-18 DIAGNOSIS — N39.0 URINARY TRACT INFECTION ASSOCIATED WITH CATHETERIZATION OF URINARY TRACT, UNSPECIFIED INDWELLING URINARY CATHETER TYPE, INITIAL ENCOUNTER (H): Primary | ICD-10-CM

## 2017-05-18 DIAGNOSIS — T83.511A URINARY TRACT INFECTION ASSOCIATED WITH CATHETERIZATION OF URINARY TRACT, UNSPECIFIED INDWELLING URINARY CATHETER TYPE, INITIAL ENCOUNTER (H): Primary | ICD-10-CM

## 2017-05-18 RX ORDER — AMOXICILLIN 250 MG/5ML
500 POWDER, FOR SUSPENSION ORAL 2 TIMES DAILY
Qty: 200 ML | Refills: 0 | Status: SHIPPED | OUTPATIENT
Start: 2017-05-18 | End: 2017-06-07

## 2017-05-18 RX ORDER — AMOXICILLIN 500 MG/1
500 CAPSULE ORAL 2 TIMES DAILY
Qty: 20 CAPSULE | Refills: 0 | Status: CANCELLED | OUTPATIENT
Start: 2017-05-18

## 2017-05-18 NOTE — TELEPHONE ENCOUNTER
Please call or my chart Riley  Ucx positive  Amoxicillin 500 mg twice a day for 10 days pended.  Laura Yao MD

## 2017-05-18 NOTE — TELEPHONE ENCOUNTER
Relayed message. Patient prefers liquid if possible due to trouble swallowing.   Route to PCP or team to switch. Pended [facility] pharmacy.  Neelima Yeung RN

## 2017-05-19 ENCOUNTER — TELEPHONE (OUTPATIENT)
Dept: FAMILY MEDICINE | Facility: CLINIC | Age: 42
End: 2017-05-19

## 2017-05-19 NOTE — TELEPHONE ENCOUNTER
Forms received from [employer] for Laura Yao MD.  Forms placed in provider 'sign me' folder.  Please fax forms to [phone] after completion.    Luli Correa  Patient Representative

## 2017-05-23 ENCOUNTER — CARE COORDINATION (OUTPATIENT)
Dept: CARE COORDINATION | Facility: CLINIC | Age: 42
End: 2017-05-23

## 2017-05-23 NOTE — PROGRESS NOTES
Clinic Care Coordination Contact  Care Team Conversations    The Care Coordination RN spoke with the patient.  Yesterday he had abdominal pain in addition to the back pain that he consistently suffers from.  The patient states that his urine looks better and he denies any chills or fever.  The patient responds that yes he is using the Tylenol and occasionally the Norco for the pain.  He does not want to use the narcotics as constipation is a side effect that he suffers from.  Nausea is a problem as well although mostly in the morning.      The Care Coordination RN was able to follow up with the patient later the next day and the results were fairly good coverage of the pain and nausea.  The patient has a recheck with the PCP on 6-7-17.  He will notify us of any change in condition or any further questions.     Plan:  1).  The patient will try using Tylenol as a first thing in the morning medication to see if that will assist in decreasing the pain and nausea.  2).  The patient will also use the Lidoderm gel applied to the abdominal area and the back to try and control the pain.  3).  The Care Coordination RN will call the patient again in 3-4 weeks for a follow up.  4).  Care Coordination to remain available for the patient to contact in the event of future needs.        Robbin Vanegas, MSN, RN, PHN  N Clinic Care Coordinator  [facility]  Phone: [phone]  [email or web address]

## 2017-05-24 ENCOUNTER — TELEPHONE (OUTPATIENT)
Dept: FAMILY MEDICINE | Facility: CLINIC | Age: 42
End: 2017-05-24

## 2017-05-26 ENCOUNTER — TRANSFERRED RECORDS (OUTPATIENT)
Dept: HEALTH INFORMATION MANAGEMENT | Facility: CLINIC | Age: 42
End: 2017-05-26

## 2017-05-31 ENCOUNTER — TELEPHONE (OUTPATIENT)
Dept: FAMILY MEDICINE | Facility: CLINIC | Age: 42
End: 2017-05-31

## 2017-05-31 NOTE — TELEPHONE ENCOUNTER
Forms received from [facility] Order form for Shower commode chair assessment  for Laura Yao MD.  Forms placed in provider 'sign me' folder.  Please fax forms to [phone] after completion.    Luli Correa  Patient Representative

## 2017-05-31 NOTE — TELEPHONE ENCOUNTER
Reason for Call:  Other order    Detailed comments: Patient is in desperate need for chair. Care Coordinator Amanda is hoping someone besides Laura Yao can get the orders signed a bit faster.    Phone Number Patient can be reached at: Other phone number:  [phone]*    Best Time: anytime    Can we leave a detailed message on this number? Not Applicable    Call taken on 5/31/2017 at 4:54 PM by Kem Peters

## 2017-05-31 NOTE — TELEPHONE ENCOUNTER
Reason for Call:  Other     Detailed comments: Need clarification lidocaine ointment. This is only available 5 % percent.    Phone Number Patient can be reached at: Other phone number:    [facility]  [phone]         Best Time:     Can we leave a detailed message on this number? Not Applicable    Call taken on 5/31/2017 at 9:55 AM by Violetta Mcarthur

## 2017-06-05 ENCOUNTER — OFFICE VISIT (OUTPATIENT)
Dept: UROLOGY | Facility: CLINIC | Age: 42
End: 2017-06-05

## 2017-06-05 VITALS
SYSTOLIC BLOOD PRESSURE: 100 MMHG | HEART RATE: 86 BPM | HEIGHT: 72 IN | BODY MASS INDEX: 17.61 KG/M2 | DIASTOLIC BLOOD PRESSURE: 70 MMHG | WEIGHT: 130 LBS

## 2017-06-05 DIAGNOSIS — N31.9 NEUROGENIC BLADDER: Primary | ICD-10-CM

## 2017-06-05 DIAGNOSIS — N31.9 NEUROGENIC BLADDER: ICD-10-CM

## 2017-06-05 DIAGNOSIS — N32.89 BLADDER SPASMS: ICD-10-CM

## 2017-06-05 DIAGNOSIS — N39.0 RECURRENT UTI: ICD-10-CM

## 2017-06-05 LAB
ANION GAP SERPL CALCULATED.3IONS-SCNC: 10 MMOL/L (ref 3–14)
BUN SERPL-MCNC: 9 MG/DL (ref 7–30)
CALCIUM SERPL-MCNC: 8.6 MG/DL (ref 8.5–10.1)
CHLORIDE SERPL-SCNC: 106 MMOL/L (ref 94–109)
CO2 SERPL-SCNC: 23 MMOL/L (ref 20–32)
CREAT SERPL-MCNC: 0.51 MG/DL (ref 0.66–1.25)
GFR SERPL CREATININE-BSD FRML MDRD: ABNORMAL ML/MIN/1.7M2
GLUCOSE SERPL-MCNC: 109 MG/DL (ref 70–99)
POTASSIUM SERPL-SCNC: 3.8 MMOL/L (ref 3.4–5.3)
SODIUM SERPL-SCNC: 138 MMOL/L (ref 133–144)

## 2017-06-05 ASSESSMENT — PAIN SCALES - GENERAL: PAINLEVEL: MODERATE PAIN (4)
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
## 2017-06-05 NOTE — MR AVS SNAPSHOT
After Visit Summary   6/5/2017    Riley Gifford    MRN: [id number]           Patient Information     Date Of Birth          1975        Visit Information        Provider Department      6/5/2017 3:00 PM Nelli Sims PA-C [facility]        Today's Diagnoses     Neurogenic bladder    -  1    Bladder spasms        Recurrent UTI           Follow-ups after your visit        Your next 10 appointments already scheduled     Jun 07, 2017 12:40 PM CDT   [facility] with [facility]ao MD   [facility]    [address] [facility] [id number] [phone]            Jun 23, 2017   Procedure with Evaristo Hayes MD   [facility] ([facility])    [address] [place] [address]  [address]th [facility] [address] [phone]           Located in the Clinics and Surgery Center at [address] [place] [id number].   parking is very convenient and highly recommended.  is a $6 flat rate fee.  Both  and self parkers should enter the main arrival plaza from [facility]; parking attendants will direct you based on your parking preference.            Jul 31, 2017  3:00 PM CDT   New Visit with Evangelista Erazo MD   [facility] ([facility])    [address] [facility] [address] [phone]              Future tests that were ordered for you today     Open Future Orders        Priority Expected Expires Ordered    US Renal Complete [ESQ5489] Routine  6/5/2018 6/5/2017    Basic metabolic panel [LAB15] Routine 6/4/2018 6/5/2018 6/5/2017    UA reflex to Microscopic Routine 6/7/2017 6/6/2018 6/5/2017    Urine Culture Aerobic Bacterial Routine 6/7/2017 6/5/2018 6/5/2017            Who to contact     Please call your clinic at [phone] to:    Ask questions about your  health    Make or cancel appointments    Discuss your medicines    Learn about your test results    Speak to your doctor   If you have compliments or concerns about an experience at your clinic, or if you wish to file a complaint, please contact [facility] Patient Relations at [phone] or email us at [email or web address]         Additional Information About Your Visit        MyChart Information     [employer] gives you secure access to your electronic health record. If you see a primary care provider, you can also send messages to your care team and make appointments. If you have questions, please call your primary care clinic.  If you do not have a primary care provider, please call [phone] and they will assist you.      [employer] is an electronic gateway that provides easy, online access to your medical records. With [employer], you can request a clinic appointment, read your test results, renew a prescription or communicate with your care team.     To access your existing account, please contact your [facility] or call [phone] for assistance.        Care EveryWhere ID     This is your Care EveryWhere ID. This could be used by other organizations to access your [place] medical records  [id number]        Your Vitals Were     Pulse Height BMI (Body Mass Index)             86 1.829 m (6') 17.63 kg/m2          Blood Pressure from Last 3 Encounters:   06/05/17 100/70   05/15/17 90/58   04/11/17 96/63    Weight from Last 3 Encounters:   06/05/17 59 kg (130 lb)   01/16/17 59 kg (130 lb)   11/11/16 59 kg (130 lb)               Primary Care Provider Office Phone # Fax #    Laura Yao [phone]       [address] [address]        Goals        General    I will use the medications Tylenol or Tramadol for pain every 4-6 hours as needed.  Evidenced by the patient  using the  medications to control worsening pain as verbalized by the patient. (pt-stated)     Notes - Note created  3/22/2016  2:39 PM by Robbin Andino RN    As of today's date 3/22/2016 goal is met at 0 - 25%.   Goal Status:  Active        I will work with the [facility] nurse to get the best results for wound care as evidenced by decrease in size and verbalized pain in the area of the wound on the buttock. (pt-stated)     Notes - Note created  1/6/2016  1:43 PM by Robbin Andino RN    As of today's date 1/6/2016 goal is met at 0 - 25%.   Goal Status:  Active          Thank you!     Thank you for choosing [facility] AND [facility]  for your care. Our goal is always to provide you with excellent care. Hearing back from our patients is one way we can continue to improve our services. Please take a few minutes to complete the written survey that you may receive in the mail after your visit with us. Thank you!             Your Updated Medication List - Protect others around you: Learn how to safely use, store and throw away your medicines at www.disposemymeds.org.          This list is accurate as of: 6/5/17  3:53 PM.  Always use your most recent med list.                   Brand Name Dispense Instructions for use    amoxicillin 250 MG/5ML suspension    AMOXIL    200 mL    Take 10 mLs (500 mg) by mouth 2 times daily       baclofen 20 MG tablet    LIORESAL    360 tablet    TAKE 1 TABLET(20 MG) BY MOUTH FOUR TIMES DAILY       budesonide 0.5 MG/2ML neb solution    PULMICORT    1080 mL    USE 2 VIALS TWICE DAILY. MIX WITH HONEY AND SWALLOW       clotrimazole 1 % cream    LOTRIMIN    60 g    Apply topically twice a day as needed.       diphenhydrAMINE 25 MG tablet    BENADRYL     Take 25 mg by mouth every 8 hours as needed for itching or allergies Reported on 2/15/2017       docusate sodium 283 MG enema    ENEMEEZ MINI    30 enema    Use one every other day and prn per patients's request. Please give  patient whichever mini enema's are covered by his insurance       gabapentin 8 % Gel topical PLO cream     100 g    Apply 1 g topically every 8 hours       HYDROcodone-acetaminophen 5-325 MG per tablet    NORCO    30 tablet    Take 1 tablet by mouth daily as needed for moderate to severe pain Max 1 tab per day.       hydrocortisone 2.5 % cream    ANUSOL-HC    30 g    Place rectally 2 times daily       Lidocaine 0.5 % Gel     5 Tube    Externally apply 1 Application topically every 6 hours as needed (for mid thoracic pain)       NEW MED     500 mL    Gentamycin 240mg in 500mL 0.9% Normal Saline. Instill 30mL into empty bladder at bedtime. Leave in bladder overnight and drain in the morning       nystatin 182183 UNIT/GM Powd    MYCOSTATIN    60 g    Apply twice daily to rash 2-3 times daily       omeprazole 20 MG CR capsule    priLOSEC     Take 1 capsule (20 mg) by mouth daily as needed       ondansetron 4 MG ODT tab    ZOFRAN ODT    18 tablet    Take 1-2 tablets (4-8 mg) by mouth every 8 hours as needed for nausea       order for DME     30 Units    Equipment being ordered: 60 cc catheter tip syringes.       oxybutynin 5 MG tablet    DITROPAN    270 tablet    Crush to be instilled intravesically.  One tablet in the morning and 2 tablets in the evening.       phenylephrine-cocoa butter 0.25-88.44 % per suppository    PREPARATION H    48 suppository    Insert one suppository rectally twice daily as needed.       polyethylene glycol powder    MIRALAX    510 g    Take 17 g (1 capful) by mouth daily as needed for constipation       simethicone 125 MG Chew chewable tablet    MYLICON    1 tablet    Take 1-2 tablets (125-250 mg) by mouth 4 times daily as needed for intestinal gas       sterile water (bottle) irrigation     1000 mL    Irrigate with 60 mLs as directed daily       tolterodine 2 MG tablet    DETROL    180 tablet    Take 1 tablet (2 mg) by mouth 2 times daily       TYLENOL PO      Take 500 mg by mouth as needed  for mild pain or fever Reported on 2/15/2017       zolpidem 10 MG tablet    AMBIEN    30 tablet    Take 0.5-1 tablets (5-10 mg) by mouth nightly as needed for sleep
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
## 2017-06-05 NOTE — NURSING NOTE
Chief Complaint   Patient presents with     RECHECK     neurogenic bladder follow up with labs and imaging before       Ammy Perez MA

## 2017-06-07 ENCOUNTER — TELEPHONE (OUTPATIENT)
Dept: FAMILY MEDICINE | Facility: CLINIC | Age: 42
End: 2017-06-07

## 2017-06-07 ENCOUNTER — OFFICE VISIT (OUTPATIENT)
Dept: FAMILY MEDICINE | Facility: CLINIC | Age: 42
End: 2017-06-07
Payer: MEDICARE

## 2017-06-07 VITALS — TEMPERATURE: 97.6 F | HEART RATE: 85 BPM | SYSTOLIC BLOOD PRESSURE: 94 MMHG | DIASTOLIC BLOOD PRESSURE: 67 MMHG

## 2017-06-07 DIAGNOSIS — Z01.818 PREOP GENERAL PHYSICAL EXAM: Primary | ICD-10-CM

## 2017-06-07 DIAGNOSIS — R10.84 ABDOMINAL PAIN, GENERALIZED: ICD-10-CM

## 2017-06-07 DIAGNOSIS — G89.29 CHRONIC BILATERAL THORACIC BACK PAIN: ICD-10-CM

## 2017-06-07 DIAGNOSIS — N31.9 NEUROGENIC BLADDER: ICD-10-CM

## 2017-06-07 DIAGNOSIS — N39.0 RECURRENT UTI: ICD-10-CM

## 2017-06-07 DIAGNOSIS — M54.6 CHRONIC BILATERAL THORACIC BACK PAIN: ICD-10-CM

## 2017-06-07 LAB
ALBUMIN UR-MCNC: NEGATIVE MG/DL
AMORPH CRY #/AREA URNS HPF: ABNORMAL /HPF
APPEARANCE UR: ABNORMAL
BACTERIA #/AREA URNS HPF: ABNORMAL /HPF
BILIRUB UR QL STRIP: NEGATIVE
COLOR UR AUTO: YELLOW
GLUCOSE UR STRIP-MCNC: NEGATIVE MG/DL
HGB UR QL STRIP: NEGATIVE
KETONES UR STRIP-MCNC: NEGATIVE MG/DL
LEUKOCYTE ESTERASE UR QL STRIP: ABNORMAL
NITRATE UR QL: NEGATIVE
PH UR STRIP: 8.5 PH (ref 5–7)
RBC #/AREA URNS AUTO: ABNORMAL /HPF (ref 0–2)
SP GR UR STRIP: 1.01 (ref 1–1.03)
URN SPEC COLLECT METH UR: ABNORMAL
UROBILINOGEN UR STRIP-ACNC: 0.2 EU/DL (ref 0.2–1)
WBC #/AREA URNS AUTO: ABNORMAL /HPF (ref 0–2)

## 2017-06-07 PROCEDURE — 81001 URINALYSIS AUTO W/SCOPE: CPT | Performed by: PHYSICIAN ASSISTANT

## 2017-06-07 PROCEDURE — 87086 URINE CULTURE/COLONY COUNT: CPT | Performed by: PHYSICIAN ASSISTANT

## 2017-06-07 PROCEDURE — 87088 URINE BACTERIA CULTURE: CPT | Performed by: PHYSICIAN ASSISTANT

## 2017-06-07 PROCEDURE — 87186 SC STD MICRODIL/AGAR DIL: CPT | Performed by: PHYSICIAN ASSISTANT

## 2017-06-07 PROCEDURE — 99214 OFFICE O/P EST MOD 30 MIN: CPT | Performed by: FAMILY MEDICINE

## 2017-06-07 NOTE — MR AVS SNAPSHOT
After Visit Summary   6/7/2017    Riley Gifford    MRN: [id number]           Patient Information     Date Of Birth          1975        Visit Information        Provider Department      6/7/2017 12:40 PM Laura Yao MD [facility]        Today's Diagnoses     Preop general physical exam    -  1    Neurogenic bladder        Recurrent UTI        Chronic bilateral thoracic back pain        Abdominal pain, generalized          Care Instructions    - You can schedule an appointment with one of the allergists over at the [facility]. Let me know if you would like a referral.   - Schedule CT of the chest and abdomen.     Before Your Surgery      Call your surgeon if there is any change in your health. This includes signs of a cold or flu (such as a sore throat, runny nose, cough, rash or fever).    Do not smoke, drink alcohol or take over the counter medicine (unless your surgeon or primary care doctor tells you to) for the 24 hours before and after surgery.    If you take prescribed drugs: Follow your doctor s orders about which medicines to take and which to stop until after surgery.    Eating and drinking prior to surgery: follow the instructions from your surgeon    Take a shower or bath the night before surgery. Use the soap your surgeon gave you to gently clean your skin. If you do not have soap from your surgeon, use your regular soap. Do not shave or scrub the surgery site.  Wear clean pajamas and have clean sheets on your bed.     [facility]   Discharged by : Ammy JONES CMA (AAMA)    Paper scripts provided to patient : none      If you have any questions regarding your visit please contact your care team:     [facility] Clinic Hours Telephone Number   DANNI [address] Dr., Dr.   7am-7pm Monday - Thursday   7am-5pm Fridays  [phone]   (Appointment scheduling available  24/7)   RN Line   [phone] option 2       For a Price Quote for your services, please call our Consumer Price Line at [phone].     What options do I have for visits at the clinic other than the traditional office visit?     To expand how we care for you, many of our providers are utilizing electronic visits (e-visits) and telephone visits, when medically appropriate, for interactions with their patients rather than a visit in the clinic. We also offer nurse visits for many medical concerns. Just like any other service, we will bill your insurance company for this type of visit based on time spent on the phone with your provider. Not all insurance companies cover these visits. Please check with your medical insurance if this type of visit is covered. You will be responsible for any charges that are not paid by your insurance.   E-visits via CommProve: generally incur a $35.00 fee.     Telephone visits:   Time spent on the phone: *charged based on time that is spent on the phone in increments of 10 minutes. Estimated cost:   5-10 mins $30.00   11-20 mins. $59.00   21-30 mins. $85.00     Use Factabaset (secure email communication and access to your chart) to send your primary care provider a message or make an appointment. Ask someone on your Team how to sign up for CommProve.     As always, Thank you for trusting us with your health care needs!      [facility]:    Scheduling Appointments  Danielle Camarena [facility]  Call: [phone]    [facility]  Call: [phone]    [facility]  Call: [phone]      WHERE TO GO FOR CARE?    Clinic    Make an appointment if you:       Are sick (cold, cough, flu, sore throat, earache or in pain).       Have a small injury (sprain, small cut, burn or broken bone).       Need a physical exam, Pap smear, vaccine or prescription refill.       Have questions about your health or medicines.    To reach  us:      Call [phone] ([phone]). Open 24 hours every day. (For counseling services, call [phone].)    Log into View[employer] at [employer]. (Visit QBE to create an account.) Hospital emergency room    An emergency is a serious or life- threatening problem that must be treated right away.    Call 911 or get to the hospital if you have:      Very bad or sudden:            - Chest pain or pressure         - Bleeding         - Head or belly pain         - Dizziness or trouble seeing, walking or                          Speaking      Problems breathing      Blood in your vomit or you are coughing up blood      A major injury (knocked out, loss of a finger or limb, rape, broken bone protruding from skin)    A mental health crisis. (Or call the Mental Health Crisis line at 1-553.206.4388 or Suicide Prevention Hotline at 1-820.501.8928.)    Open 24 hours every day. You don't need an appointment.     Urgent care    Visit urgent care for sickness or small injuries when the clinic is closed. You don't need an appointment. To check hours or find an urgent care near you, visit [email or web address]. Online care    Get online care from [facility] for more than 70 common problems, like colds, allergies and infections. Open 24 hours every day at:   [email or web address]   Need help deciding?    For advice about where to be seen, you may call your clinic and ask to speak with a nurse. We're here for you 24 hours every day.         If you are deaf or hard of hearing, please let us know. We provide many free services including sign language interpreters, oral interpreters, TTYs, telephone amplifiers, note takers and written materials.                         Follow-ups after your visit        Your next 10 appointments already scheduled     Jun 23, 2017   Procedure with Evaristo Hayes MD   [facility] ([facility])    [address] [place] [address]  Floor  [facility] [address] [phone]           Located in the Clinics and Surgery Center at [address], [facility] [address].   parking is very convenient and highly recommended.  is a $6 flat rate fee.  Both  and self parkers should enter the main arrival plaza from [address]; parking attendants will direct you based on your parking preference.            Jul 31, 2017  3:00 PM CDT   New Visit with Evangelista Erazo MD   [facility] [place] ([facility])    [address] [address] [facility] [facility]  [place] [address] [phone]              Future tests that were ordered for you today     Open Future Orders        Priority Expected Expires Ordered    CT Chest Abdomen w/o & w Contrast Routine  6/7/2018 6/7/2017            Who to contact     If you have questions or need follow up information about today's clinic visit or your schedule please contact [facility] directly at [phone].  Normal or non-critical lab and imaging results will be communicated to you by MyChart, letter or phone within 4 business days after the clinic has received the results. If you do not hear from us within 7 days, please contact the clinic through [email or web address] or phone. If you have a critical or abnormal lab result, we will notify you by phone as soon as possible.  Submit refill requests through Omeg[employer] or call your pharmacy and they will forward the refill request to us. Please allow 3 business days for your refill to be completed.          Additional Information About Your Visit        [email or web address] Information     O[employer] gives you secure access to your electronic health record. If you see a primary care provider, you can also send messages to your care team and make appointments. If you have questions, please call your primary care clinic.  If you do not have a primary care provider, please call [phone] and they will assist you.        Care EveryWhere ID     This  is your Care EveryWhere ID. This could be used by other organizations to access your [facility] medical records  [id number]        Your Vitals Were     Pulse Temperature                85 97.6  F (36.4  C) (Oral)           Blood Pressure from Last 3 Encounters:   06/07/17 94/67   06/05/17 100/70   05/15/17 90/58    Weight from Last 3 Encounters:   06/05/17 130 lb (59 kg)   01/16/17 130 lb (59 kg)   11/11/16 130 lb (59 kg)              We Performed the Following     UA reflex to Microscopic     Urine Culture Aerobic Bacterial     Urine Microscopic          Today's Medication Changes          These changes are accurate as of: 6/7/17  1:26 PM.  If you have any questions, ask your nurse or doctor.               Stop taking these medicines if you haven't already. Please contact your care team if you have questions.     amoxicillin 250 MG/5ML suspension   Commonly known as:  AMOXIL   Stopped by:  Laura Yao MD           gabapentin 8 % Gel topical PLO cream   Stopped by:  Laura Yao MD                    Primary Care Provider Office Phone # Fax #    Laura Yao [phone]       [address] [address]        Goals        General    I will use the medications Tylenol or Tramadol for pain every 4-6 hours as needed.  Evidenced by the patient  using the medications to control worsening pain as verbalized by the patient. (pt-stated)     Notes - Note created  3/22/2016  2:39 PM by Robbin Andino RN    As of today's date 3/22/2016 goal is met at 0 - 25%.   Goal Status:  Active        I will work with the [facility] nurse to get the best results for wound care as evidenced by decrease in size and verbalized pain in the area of the wound on the buttock. (pt-stated)     Notes - Note created  1/6/2016  1:43 PM by Robbin Andino RN    As of today's date 1/6/2016 goal is met at 0 - 25%.   Goal Status:  Active          Thank you!     Thank you for  choosing [facility]  for your care. Our goal is always to provide you with excellent care. Hearing back from our patients is one way we can continue to improve our services. Please take a few minutes to complete the written survey that you may receive in the mail after your visit with us. Thank you!             Your Updated Medication List - Protect others around you: Learn how to safely use, store and throw away your medicines at www.disposemymeds.org.          This list is accurate as of: 6/7/17  1:26 PM.  Always use your most recent med list.                   Brand Name Dispense Instructions for use    baclofen 20 MG tablet    LIORESAL    360 tablet    TAKE 1 TABLET(20 MG) BY MOUTH FOUR TIMES DAILY       budesonide 0.5 MG/2ML neb solution    PULMICORT    1080 mL    USE 2 VIALS TWICE DAILY. MIX WITH HONEY AND SWALLOW       clotrimazole 1 % cream    LOTRIMIN    60 g    Apply topically twice a day as needed.       diphenhydrAMINE 25 MG tablet    BENADRYL     Take 25 mg by mouth every 8 hours as needed for itching or allergies Reported on 2/15/2017       docusate sodium 283 MG enema    ENEMEEZ MINI    30 enema    Use one every other day and prn per patients's request. Please give patient whichever mini enema's are covered by his insurance       HYDROcodone-acetaminophen 5-325 MG per tablet    NORCO    30 tablet    Take 1 tablet by mouth daily as needed for moderate to severe pain Max 1 tab per day.       hydrocortisone 2.5 % cream    ANUSOL-HC    30 g    Place rectally 2 times daily       Lidocaine 0.5 % Gel     5 Tube    Externally apply 1 Application topically every 6 hours as needed (for mid thoracic pain)       NEW MED     500 mL    Gentamycin 240mg in 500mL 0.9% Normal Saline. Instill 30mL into empty bladder at bedtime. Leave in bladder overnight and drain in the morning       nystatin 136183 UNIT/GM Powd    MYCOSTATIN    60 g    Apply twice daily to rash 2-3 times daily       omeprazole 20  MG CR capsule    priLOSEC     Take 1 capsule (20 mg) by mouth daily as needed       ondansetron 4 MG ODT tab    ZOFRAN ODT    18 tablet    Take 1-2 tablets (4-8 mg) by mouth every 8 hours as needed for nausea       order for DME     30 Units    Equipment being ordered: 60 cc catheter tip syringes.       oxybutynin 5 MG tablet    DITROPAN    270 tablet    Crush to be instilled intravesically.  One tablet in the morning and 2 tablets in the evening.       phenylephrine-cocoa butter 0.25-88.44 % per suppository    PREPARATION H    48 suppository    Insert one suppository rectally twice daily as needed.       polyethylene glycol powder    MIRALAX    510 g    Take 17 g (1 capful) by mouth daily as needed for constipation       simethicone 125 MG Chew chewable tablet    MYLICON    1 tablet    Take 1-2 tablets (125-250 mg) by mouth 4 times daily as needed for intestinal gas       sterile water (bottle) irrigation     1000 mL    Irrigate with 60 mLs as directed daily       tolterodine 2 MG tablet    DETROL    180 tablet    Take 1 tablet (2 mg) by mouth 2 times daily       TYLENOL PO      Take 500 mg by mouth as needed for mild pain or fever Reported on 2/15/2017       zolpidem 10 MG tablet    AMBIEN    30 tablet    Take 0.5-1 tablets (5-10 mg) by mouth nightly as needed for sleep

## 2017-06-07 NOTE — TELEPHONE ENCOUNTER
Forms received from [facility] [employer]/ labor repair 15 minutes - mattress protocol deluxe [phone] for Laura Yao MD.  Forms placed in provider 'sign me' folder.  Please fax forms to [phone] after completion.    Luli Correa  Patient Representative
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
## 2017-06-07 NOTE — PATIENT INSTRUCTIONS
- You can schedule an appointment with one of the allergists over at the [facility]. Let me know if you would like a referral.   - Schedule CT of the chest and abdomen.     Before Your Surgery      Call your surgeon if there is any change in your health. This includes signs of a cold or flu (such as a sore throat, runny nose, cough, rash or fever).    Do not smoke, drink alcohol or take over the counter medicine (unless your surgeon or primary care doctor tells you to) for the 24 hours before and after surgery.    If you take prescribed drugs: Follow your doctor s orders about which medicines to take and which to stop until after surgery.    Eating and drinking prior to surgery: follow the instructions from your surgeon    Take a shower or bath the night before surgery. Use the soap your surgeon gave you to gently clean your skin. If you do not have soap from your surgeon, use your regular soap. Do not shave or scrub the surgery site.  Wear clean pajamas and have clean sheets on your bed.     [facility]   Discharged by : Ammy JONES CMA ([facility])    Paper scripts provided to patient : none      If you have any questions regarding your visit please contact your care team:     [facility] Hours Telephone Number   [address] Dr., Dr.   7am-7pm Monday - Thursday   7am-5pm Fridays  [phone]   (Appointment scheduling available 24/7)   RN Line   [phone] option 2       For a Price Quote for your services, please call our Consumer Price Line at [phone].     What options do I have for visits at the clinic other than the traditional office visit?     To expand how we care for you, many of our providers are utilizing electronic visits (e-visits) and telephone visits, when medically appropriate, for interactions with their patients rather than a visit in the clinic. We also offer nurse visits for many medical  concerns. Just like any other service, we will bill your insurance company for this type of visit based on time spent on the phone with your provider. Not all insurance companies cover these visits. Please check with your medical insurance if this type of visit is covered. You will be responsible for any charges that are not paid by your insurance.   E-visits via [employer]: generally incur a $35.00 fee.     Telephone visits:   Time spent on the phone: *charged based on time that is spent on the phone in increments of 10 minutes. Estimated cost:   5-10 mins $30.00   11-20 mins. $59.00   21-30 mins. $85.00     Use [employer] (secure email communication and access to your chart) to send your primary care provider a message or make an appointment. Ask someone on your Team how to sign up for [employer].     As always, Thank you for trusting us with your health care needs!      [facility]:    Scheduling Appointments  [facility]  Call: [phone]    [facility]  Call: [phone]    [facility]  Call: [phone]      WHERE TO GO FOR CARE?    Clinic    Make an appointment if you:       Are sick (cold, cough, flu, sore throat, earache or in pain).       Have a small injury (sprain, small cut, burn or broken bone).       Need a physical exam, Pap smear, vaccine or prescription refill.       Have questions about your health or medicines.    To reach us:      Call [phone] ([phone]). Open 24 hours every day. (For counseling services, call [phone].)    Log into [employer] at [email or web address]. (Visit [email or web address] to create an account.) Hospital emergency room    An emergency is a serious or life- threatening problem that must be treated right away.    Call [phone] or get to the hospital if you have:      Very bad or sudden:            - Chest pain or pressure         - Bleeding         - Head or belly pain         -  Dizziness or trouble seeing, walking or                          Speaking      Problems breathing      Blood in your vomit or you are coughing up blood      A major injury (knocked out, loss of a finger or limb, rape, broken bone protruding from skin)    A mental health crisis. (Or call the Mental Health Crisis line at 1-149.264.6831 or Suicide Prevention Hotline at 1-135.482.7308.)    Open 24 hours every day. You don't need an appointment.     Urgent care    Visit urgent care for sickness or small injuries when the clinic is closed. You don't need an appointment. To check hours or find an urgent care near you, visit [email or web address]. Online care    Get online care from [facility] for more than 70 common problems, like colds, allergies and infections. Open 24 hours every day at:   [email or web address]   Need help deciding?    For advice about where to be seen, you may call your clinic and ask to speak with a nurse. We're here for you 24 hours every day.         If you are deaf or hard of hearing, please let us know. We provide many free services including sign language interpreters, oral interpreters, TTYs, telephone amplifiers, note takers and written materials.

## 2017-06-07 NOTE — NURSING NOTE
Chief Complaint   Patient presents with     Pre-Op Exam       Initial BP 94/67  Pulse 85  Temp 97.6  F (36.4  C) (Oral) Estimated body mass index is 17.63 kg/(m^2) as calculated from the following:    Height as of 6/5/17: 6' (1.829 m).    Weight as of 6/5/17: 130 lb (59 kg).  Medication Reconciliation: complete   Ammy Zelaya CMA (AAMA)

## 2017-06-09 ENCOUNTER — RADIANT APPOINTMENT (OUTPATIENT)
Dept: CT IMAGING | Facility: CLINIC | Age: 42
End: 2017-06-09
Attending: FAMILY MEDICINE
Payer: MEDICARE

## 2017-06-09 ENCOUNTER — TELEPHONE (OUTPATIENT)
Dept: FAMILY MEDICINE | Facility: CLINIC | Age: 42
End: 2017-06-09

## 2017-06-09 DIAGNOSIS — R10.84 ABDOMINAL PAIN, GENERALIZED: ICD-10-CM

## 2017-06-09 DIAGNOSIS — M54.6 CHRONIC BILATERAL THORACIC BACK PAIN: ICD-10-CM

## 2017-06-09 DIAGNOSIS — G89.29 CHRONIC BILATERAL THORACIC BACK PAIN: ICD-10-CM

## 2017-06-09 LAB — RADIOLOGIST FLAGS: NORMAL

## 2017-06-09 PROCEDURE — 71270 CT THORAX DX C-/C+: CPT | Performed by: RADIOLOGY

## 2017-06-09 PROCEDURE — 74170 CT ABD WO CNTRST FLWD CNTRST: CPT | Performed by: RADIOLOGY

## 2017-06-09 RX ORDER — IOPAMIDOL 755 MG/ML
80 INJECTION, SOLUTION INTRAVASCULAR ONCE
Status: COMPLETED | OUTPATIENT
Start: 2017-06-09 | End: 2017-06-09

## 2017-06-09 RX ADMIN — IOPAMIDOL 80 ML: 755 INJECTION, SOLUTION INTRAVASCULAR at 11:33

## 2017-06-09 NOTE — TELEPHONE ENCOUNTER
ARISTEO Alvarado states he does have a history of hemorrhoids & they use cream regularly. He requires digital stimulation every few days. Aides bath him every other day & haven't noticed anything but she will have her team look tomorrow & call us with a report & will schedule an appointment PRN. RN verbalizes plan.  Neelima Yeung RN

## 2017-06-09 NOTE — TELEPHONE ENCOUNTER
Patient had CT scan as ordered by Dr. Yao.  But she is now out of the office until Next Friday.  Dr. Yao will review in detail when she returns - but there is one finding I don't want to wait for her return if we can get more info    Please call patient to get a bit more information.    CT scan shows:  A small nodule near his anus/rectum  This could represent a hemorrhoid, but could also represent a pressure ulcer or rectal prolapse.  Does patient have a home health nurse or caregiver that could reassure patient and myself that he does not have a rectal prolapse or pressure ulcer?  If so, please gather that reassurance.  If not, patient may benefit from a visit to evaluate

## 2017-06-12 ENCOUNTER — CARE COORDINATION (OUTPATIENT)
Dept: UROLOGY | Facility: CLINIC | Age: 42
End: 2017-06-12

## 2017-06-12 NOTE — PROGRESS NOTES
Upcoming surgical procedure with Dr Evaristo Hayes on 6.23.17 at 1230, check in at 1100.   [facility]  Patient is having a Cystoscopy and Botox injection into the bladder  Pre-op physical completed: YES. Date-6.7.17.  PAC: No  Bowel Prep: No, not needed  Urine culture completed: YES. Date-6.7.17 - 10,000 to 50,000 colonies/mL Enterococcus faecalis. Macrobid BID started on 6.15.17 due to patient having symptoms. RX sent to patient's pharmacy.  Post-operative appointment needed: No    6.12.17 - [employer] message sent to patient.    Marta Adames RN-BC, BSN  Care Coordinator - Reconstructive Urology  [phone]

## 2017-06-12 NOTE — TELEPHONE ENCOUNTER
ARISTEO Siegel took a call from patient about the below. Left VM.   Urology is following [facility].  Neelima Yeung RN

## 2017-06-12 NOTE — TELEPHONE ENCOUNTER
Relayed message, he states his aide looked this morning & didn't see anything other than hemorrhoids. He has been having regular bowel movements.  He is aware to watch for results from urology in 2 more days.  Neelima Yeung RN

## 2017-06-13 ENCOUNTER — CARE COORDINATION (OUTPATIENT)
Dept: CARE COORDINATION | Facility: CLINIC | Age: 42
End: 2017-06-13

## 2017-06-13 NOTE — PROGRESS NOTES
Clinic Care Coordination Contact  Care Team Conversations    The patient called the Care Coordination RN regarding the results of the CT scan and some questions that he has.  The Care Coordination RN was able to clarify the information that the patient was requesting.  The patient is still having symptoms of abdominal pain of 6/10 with nausea no vomiting.  The patient occasionally will try omeprazole or Peptobismol with minimal relief.  The question that the patient has is whether the antibiotic could be started today rather than to wait until Sunday.  A call to Marta ALBERTS for urology was made [phone], option #3 and August answered stating that Marta is out of the office at this time.  A message was left with August who will contact the staff for Dr. Hayes the urologist.  And they will respond to the patient.   The patient was notified of this plan.    Robbin Vanegas, MSN, RN, PHN  N Clinic Care Coordinator  [facility]  Phone: [phone]  [email or web address]

## 2017-06-14 ENCOUNTER — TELEPHONE (OUTPATIENT)
Dept: FAMILY MEDICINE | Facility: CLINIC | Age: 42
End: 2017-06-14

## 2017-06-14 NOTE — TELEPHONE ENCOUNTER
Forms received from [employer]/ lubricating jelly 3gm/box of 144/month and syringe cath timp 60cc/month for Laura Yao MD.  Forms placed in provider 'sign me' folder.  Please fax forms to [phone] after completion.    Luli Correa  Patient Representative

## 2017-06-15 ENCOUNTER — TELEPHONE (OUTPATIENT)
Dept: FAMILY MEDICINE | Facility: CLINIC | Age: 42
End: 2017-06-15

## 2017-06-15 DIAGNOSIS — R93.5 ABNORMAL CT OF THE ABDOMEN: ICD-10-CM

## 2017-06-15 DIAGNOSIS — N30.00 ACUTE CYSTITIS WITHOUT HEMATURIA: Primary | ICD-10-CM

## 2017-06-15 DIAGNOSIS — N31.9 NEUROGENIC BLADDER: ICD-10-CM

## 2017-06-15 DIAGNOSIS — G89.29 CHRONIC MIDLINE THORACIC BACK PAIN: ICD-10-CM

## 2017-06-15 DIAGNOSIS — R91.8 PULMONARY NODULES: ICD-10-CM

## 2017-06-15 DIAGNOSIS — K62.89 NODULE OF ANUS: Primary | ICD-10-CM

## 2017-06-15 DIAGNOSIS — M54.6 CHRONIC MIDLINE THORACIC BACK PAIN: ICD-10-CM

## 2017-06-15 RX ORDER — NITROFURANTOIN 25 MG/5ML
100 SUSPENSION ORAL 2 TIMES DAILY
Qty: 280 ML | Refills: 0 | Status: SHIPPED | OUTPATIENT
Start: 2017-06-15 | End: 2017-06-23

## 2017-06-15 NOTE — TELEPHONE ENCOUNTER
Medication Detail      Disp Refills Start End TERRANCE   oxybutynin (DITROPAN) 5 MG tablet 270 tablet 3 6/15/2016  No   Sig: Crush to be instilled intravesically.  One tablet in the morning and 2 tablets in the evening.   Class: E-Prescribe   Order: [id number]       Last Office Visit with [facility], P or [facility] prescribing provider: 6/7/2017

## 2017-06-15 NOTE — TELEPHONE ENCOUNTER
Patient states he wanted to try gabapentin solution again before bedtime. His back & abdominal pain has been bothering him more. See CT results. He denies injury or any new concerns.   He had a pre-op on 6/7. Would you like some type of visit?  Neelima Yeung RN

## 2017-06-15 NOTE — TELEPHONE ENCOUNTER
gabapentin (NEURONTIN) 250 MG/5ML solution (Discontinued) 450 mL 0 7/29/2016 9/9/2016 --      Sig: Take 2.5 mLs (125 mg) by mouth 3 times daily as needed     Class: E-Prescribe     Route: Oral     Reason for Discontinue: Stopped by Patient     Order: [id number]       Last Office Visit with [facility], P or [facility] prescribing provider: 6/7/2017  Future Office visit:       Routing refill request to provider for review/approval because:  Drug not active on patient's medication list

## 2017-06-19 RX ORDER — GABAPENTIN 250 MG/5ML
125 SOLUTION ORAL 3 TIMES DAILY PRN
Qty: 450 ML | Refills: 0 | Status: SHIPPED | OUTPATIENT
Start: 2017-06-19 | End: 2018-03-01

## 2017-06-19 NOTE — TELEPHONE ENCOUNTER
Colorectal referral to confirm that the anal nodule is hemorrhoid and not anything else given the CT results.    Home care reports no decubitus ulcer, I would like to colorectal evaluation, see above     Okay to try gabapentin again with slow titration, reviewed with Robbin ALBERTS.    Laura Yao MD

## 2017-06-19 NOTE — TELEPHONE ENCOUNTER
Clinic Care Coordination Contact  [facility]/Voicemail      Clinical Data: Care Coordinator Outreach  Outreach attempted x 1.  Left message on voicemail with call back information and requested return call.  Plan: Care Coordinator mailed out care coordination introduction letter on 3-24-17. Care Coordinator will try to reach patient again in 3-5 business days.      Robbin Vanegas, MSN, RN, PHN  [facility] Clinic Care Coordinator  [facility]  Phone: [phone]  [email or web address]

## 2017-06-19 NOTE — TELEPHONE ENCOUNTER
Robbin, Please talk with me about this patient and making a plan regarding his recent CT scan and request for medication.  Laura Yao MD

## 2017-06-19 NOTE — TELEPHONE ENCOUNTER
Clinic Care Coordination Contact  Care Team Conversations    The patient called back to the Care Coordination RN.  The information was given to the patient to seek an opinion from the colorectal surgeons of the situation and the results of the CT scan.  The patient was willing to follow through with this suggestion.  The phone number was given to the patient for general surgery [phone].  Then the prescription for the gabapentin was reviewed with the patient and to begin with the nighttime dose due to the side effect of drowsiness.  The Care Coordination RN also explained the option of decreasing the dose if the drowsiness became a problem and then to increase the dose slowly.  The patient stated understanding.  The Care Coordination RN will contact the patient again in 3-4 weeks with the agreement that the patient will call should any questions arise in the interim.      Robbin Vanegas, MSN, RN, PHN  [facility] Clinic Care Coordinator  [facility]  Phone: [phone]  [email or web address]

## 2017-06-22 ENCOUNTER — ANESTHESIA EVENT (OUTPATIENT)
Dept: SURGERY | Facility: AMBULATORY SURGERY CENTER | Age: 42
End: 2017-06-22

## 2017-06-23 ENCOUNTER — HOSPITAL ENCOUNTER (OUTPATIENT)
Facility: AMBULATORY SURGERY CENTER | Age: 42
End: 2017-06-23
Attending: UROLOGY

## 2017-06-23 ENCOUNTER — ANESTHESIA (OUTPATIENT)
Dept: SURGERY | Facility: AMBULATORY SURGERY CENTER | Age: 42
End: 2017-06-23

## 2017-06-23 VITALS
HEIGHT: 73 IN | DIASTOLIC BLOOD PRESSURE: 104 MMHG | TEMPERATURE: 98 F | RESPIRATION RATE: 16 BRPM | BODY MASS INDEX: 17.23 KG/M2 | WEIGHT: 130 LBS | SYSTOLIC BLOOD PRESSURE: 137 MMHG | OXYGEN SATURATION: 94 %

## 2017-06-23 RX ORDER — ONDANSETRON 4 MG/1
4 TABLET, ORALLY DISINTEGRATING ORAL EVERY 30 MIN PRN
Status: DISCONTINUED | OUTPATIENT
Start: 2017-06-23 | End: 2017-06-24 | Stop reason: HOSPADM

## 2017-06-23 RX ORDER — FENTANYL CITRATE 50 UG/ML
25-50 INJECTION, SOLUTION INTRAMUSCULAR; INTRAVENOUS
Status: DISCONTINUED | OUTPATIENT
Start: 2017-06-23 | End: 2017-06-23 | Stop reason: HOSPADM

## 2017-06-23 RX ORDER — PROPOFOL 10 MG/ML
INJECTION, EMULSION INTRAVENOUS PRN
Status: DISCONTINUED | OUTPATIENT
Start: 2017-06-23 | End: 2017-06-23

## 2017-06-23 RX ORDER — SODIUM CHLORIDE, SODIUM LACTATE, POTASSIUM CHLORIDE, CALCIUM CHLORIDE 600; 310; 30; 20 MG/100ML; MG/100ML; MG/100ML; MG/100ML
INJECTION, SOLUTION INTRAVENOUS CONTINUOUS
Status: DISCONTINUED | OUTPATIENT
Start: 2017-06-23 | End: 2017-06-24 | Stop reason: HOSPADM

## 2017-06-23 RX ORDER — SODIUM CHLORIDE, SODIUM LACTATE, POTASSIUM CHLORIDE, CALCIUM CHLORIDE 600; 310; 30; 20 MG/100ML; MG/100ML; MG/100ML; MG/100ML
INJECTION, SOLUTION INTRAVENOUS CONTINUOUS
Status: DISCONTINUED | OUTPATIENT
Start: 2017-06-23 | End: 2017-06-23 | Stop reason: HOSPADM

## 2017-06-23 RX ORDER — ONDANSETRON 2 MG/ML
INJECTION INTRAMUSCULAR; INTRAVENOUS PRN
Status: DISCONTINUED | OUTPATIENT
Start: 2017-06-23 | End: 2017-06-23

## 2017-06-23 RX ORDER — ACETAMINOPHEN 325 MG/1
975 TABLET ORAL ONCE
Status: COMPLETED | OUTPATIENT
Start: 2017-06-23 | End: 2017-06-23

## 2017-06-23 RX ORDER — NALOXONE HYDROCHLORIDE 0.4 MG/ML
.1-.4 INJECTION, SOLUTION INTRAMUSCULAR; INTRAVENOUS; SUBCUTANEOUS
Status: DISCONTINUED | OUTPATIENT
Start: 2017-06-23 | End: 2017-06-24 | Stop reason: HOSPADM

## 2017-06-23 RX ORDER — MEPERIDINE HYDROCHLORIDE 25 MG/ML
12.5 INJECTION INTRAMUSCULAR; INTRAVENOUS; SUBCUTANEOUS
Status: DISCONTINUED | OUTPATIENT
Start: 2017-06-23 | End: 2017-06-24 | Stop reason: HOSPADM

## 2017-06-23 RX ORDER — DEXAMETHASONE SODIUM PHOSPHATE 4 MG/ML
INJECTION, SOLUTION INTRA-ARTICULAR; INTRALESIONAL; INTRAMUSCULAR; INTRAVENOUS; SOFT TISSUE PRN
Status: DISCONTINUED | OUTPATIENT
Start: 2017-06-23 | End: 2017-06-23

## 2017-06-23 RX ORDER — PROPOFOL 10 MG/ML
INJECTION, EMULSION INTRAVENOUS CONTINUOUS PRN
Status: DISCONTINUED | OUTPATIENT
Start: 2017-06-23 | End: 2017-06-23

## 2017-06-23 RX ORDER — CEFAZOLIN SODIUM 1 G/3ML
1 INJECTION, POWDER, FOR SOLUTION INTRAMUSCULAR; INTRAVENOUS SEE ADMIN INSTRUCTIONS
Status: DISCONTINUED | OUTPATIENT
Start: 2017-06-23 | End: 2017-06-23 | Stop reason: HOSPADM

## 2017-06-23 RX ORDER — ONDANSETRON 2 MG/ML
4 INJECTION INTRAMUSCULAR; INTRAVENOUS EVERY 30 MIN PRN
Status: DISCONTINUED | OUTPATIENT
Start: 2017-06-23 | End: 2017-06-24 | Stop reason: HOSPADM

## 2017-06-23 RX ORDER — LIDOCAINE HYDROCHLORIDE 20 MG/ML
INJECTION, SOLUTION INFILTRATION; PERINEURAL PRN
Status: DISCONTINUED | OUTPATIENT
Start: 2017-06-23 | End: 2017-06-23

## 2017-06-23 RX ORDER — GABAPENTIN 300 MG/1
300 CAPSULE ORAL ONCE
Status: DISCONTINUED | OUTPATIENT
Start: 2017-06-23 | End: 2017-06-23 | Stop reason: HOSPADM

## 2017-06-23 RX ORDER — LIDOCAINE 40 MG/G
CREAM TOPICAL
Status: DISCONTINUED | OUTPATIENT
Start: 2017-06-23 | End: 2017-06-23 | Stop reason: HOSPADM

## 2017-06-23 RX ORDER — KETOROLAC TROMETHAMINE 30 MG/ML
30 INJECTION, SOLUTION INTRAMUSCULAR; INTRAVENOUS EVERY 6 HOURS PRN
Status: DISCONTINUED | OUTPATIENT
Start: 2017-06-23 | End: 2017-06-24 | Stop reason: HOSPADM

## 2017-06-23 RX ADMIN — LIDOCAINE HYDROCHLORIDE 100 MG: 20 INJECTION, SOLUTION INFILTRATION; PERINEURAL at 13:08

## 2017-06-23 RX ADMIN — PROPOFOL 30 MG: 10 INJECTION, EMULSION INTRAVENOUS at 13:31

## 2017-06-23 RX ADMIN — SODIUM CHLORIDE, SODIUM LACTATE, POTASSIUM CHLORIDE, CALCIUM CHLORIDE: 600; 310; 30; 20 INJECTION, SOLUTION INTRAVENOUS at 13:06

## 2017-06-23 RX ADMIN — ONDANSETRON 4 MG: 2 INJECTION INTRAMUSCULAR; INTRAVENOUS at 13:35

## 2017-06-23 RX ADMIN — DEXAMETHASONE SODIUM PHOSPHATE 4 MG: 4 INJECTION, SOLUTION INTRA-ARTICULAR; INTRALESIONAL; INTRAMUSCULAR; INTRAVENOUS; SOFT TISSUE at 13:06

## 2017-06-23 RX ADMIN — SODIUM CHLORIDE, SODIUM LACTATE, POTASSIUM CHLORIDE, CALCIUM CHLORIDE: 600; 310; 30; 20 INJECTION, SOLUTION INTRAVENOUS at 11:06

## 2017-06-23 RX ADMIN — PROPOFOL 50 MCG/KG/MIN: 10 INJECTION, EMULSION INTRAVENOUS at 13:11

## 2017-06-23 RX ADMIN — ACETAMINOPHEN 975 MG: 325 TABLET ORAL at 11:05

## 2017-06-23 NOTE — IP AVS SNAPSHOT
[facility]    [address] [place] [id number]    Phone:  [phone]    Fax:  [phone]                                       After Visit Summary   6/23/2017    Riley Gifford    MRN: [id number]           After Visit Summary Signature Page     I have received my discharge instructions, and my questions have been answered. I have discussed any challenges I see with this plan with the nurse or doctor.    ..........................................................................................................................................  Patient/Patient Representative Signature      ..........................................................................................................................................  Patient Representative Print Name and Relationship to Patient    ..................................................               ................................................  Date                                            Time    ..........................................................................................................................................  Reviewed by Signature/Title    ...................................................              ..............................................  Date                                                            Time

## 2017-06-23 NOTE — IP AVS SNAPSHOT
MRN:[id number]                      After Visit Summary   6/23/2017    Riley Gifford    MRN: [id number]           Thank you!     Thank you for choosing [facility] for your care. Our goal is always to provide you with excellent care. Hearing back from our patients is one way we can continue to improve our services. Please take a few minutes to complete the written survey that you may receive in the mail after you visit with us. Thank you!        Patient Information     Date Of Birth          1975        About your hospital stay     You were admitted on:  June 23, 2017 [facility]    You were discharged on:  June 23, 2017       Who to Call     For medical emergencies, please call 911.  For non-urgent questions about your medical care, please call your primary care provider or clinic, [phone]  For questions related to your surgery, please call your surgery clinic        Attending Provider     Provider Specialty    Evaristo Hayes MD Urology       Primary Care Provider Office Phone # Fax #    Laura Yao [phone]      After Care Instructions     No lifting        Activity  - No strenuous exercise for 3-5 days.  - No lifting, pushing, pulling more than 15 pounds for 3-5 days.   - Do not strain with bowel movements.  - Do not drive until you can press the brake pedal quickly and fully without pain.   - Do not operate a motor vehicle while taking narcotic pain medications.   - Some blood in the urine is expected. Increase your fluid intake to help flush the blood out of your bladder      Medications  - No narcotic pain medications are required and over the counter tylenol should suffice.  Wean yourself off all pain medications as you are able.  - Some pain medications contain both tylenol (acetaminophen) and a narcotic (Norco, vicodin, percocet), do not take more than 4,000mg of Tylenol (acetaminophen) from  "all sources in any 24 hour period.  - Take over the counter fiber (metamucil or benefiber) and stool softeners (miralax, docusate or senna) to prevent postoperative constipation, but stop if you develop diarrhea.  - No driving or operating machinery while taking narcotic pain medications     Follow-Up:  - Follow up as scheduled. Otherwise you can just follow up when your symptoms return for your next botox injection  - Call your primary care provider to touch base regarding your recent admission.     - Call or return sooner than your regularly scheduled visit if you develop any of the following: fever (greater than 101.5), uncontrolled pain, uncontrolled nausea or vomiting, as well as worsening blood in the urine    Phone numbers:   - Monday through Friday 8am to 4:30pm: Call [phone] with questions or to schedule or confirm appointment.    - Nights or weekends: call the after hours emergency pager - [phone] and tell the  \"I would like to page the Urology Resident on call.\"  - For emergencies, call 911                  Your next 10 appointments already scheduled     Jul 31, 2017  3:00 PM CDT   New Visit with Evangelista Erazo MD   [facility] ([facility])    [address] [facility] [address] [phone]              Further instructions from your care team       [facility]  Home Care Following Anesthesia  For 24 hours after surgery:  1. Get plenty of rest.  A responsible adult must stay with you for at least 24 hours after you leave the surgery center.  2. Do not drive or use heavy equipment.  If you have weakness or tingling, don't drive or use heavy equipment until this feeling goes away.   3. Do not drink alcohol.   4. Avoid strenuous or risky activities.  Ask for help when climbing stairs.  5. You may feel lightheaded.  IF so, sit for a few minutes before standing.  Have someone help you get up.   6. If " you have nausea (feel sick to your stomach): Drink only clear liquids such as apple juice, ginger ale, broth or 7-Up.  Rest may also help.  Be sure to drink enough fluids.  Move to a regular diet as you feel able.   7. You may have a slight fever.  Call the doctor if your fever is over 100 F (37.7 C) (taken under the tongue) or lasts longer than 24 hours.  8. You may have a dry mouth, a sore throat, muscle aches or trouble sleeping. These should go away after 24 hours.  9. Do not make important or legal decisions.               Tips for taking pain medications  To get the best pain relief possible, remember these points:    Take pain medications as directed, before pain becomes severe.    Pain medication can upset your stomach: taking it with food may help.    Constipation is a common side effect of pain medication. Drink plenty of  fluids.    Eat foods high in fiber. Take a stool softener if recommended by your doctor or pharmacist.    Do not drink alcohol, drive or operate machinery while taking pain medications.    Ask about other ways to control pain, such as with heat, ice or relaxation.    Call a doctor for any of the followin. Signs of infection (fever, growing tenderness at the surgery site, a large amount of drainage or bleeding, severe pain, foul-smelling drainage, redness, swelling).  2. It has been over 8 to 10 hours since surgery and you are still not able to urinate (pass water).  3. Headache for over 24 hours.  4. Numbness, tingling or weakness the day after surgery (if you had spinal anesthesia).  Your doctor is:  Dr. Evaristo Giraldo, Prostate and Urology: [phone]                    Or dial [phone] and ask for the resident on call for:  Prostate Urology  For emergency care, call the:  [facility]:  [phone] (TTY for hearing impaired: [phone])                Pending Results     No orders found from 2017 to 2017.            Admission Information     Date  "& Time Provider Department Dept. Phone    6/23/2017 Evaristo Hayes MD [facility] [phone]      Your Vitals Were     Blood Pressure Temperature Respirations Height Weight Pulse Oximetry    145/107 98  F (36.7  C) (Oral) 18 1.854 m (6' 1\") 59 kg (130 lb) 95%    BMI (Body Mass Index)                   17.15 kg/m2           Re-APP Information     Re-APP gives you secure access to your electronic health record. If you see a primary care provider, you can also send messages to your care team and make appointments. If you have questions, please call your primary care clinic.  If you do not have a primary care provider, please call [phone] and they will assist you.      Re-APP is an electronic gateway that provides easy, online access to your medical records. With Re-APP, you can request a clinic appointment, read your test results, renew a prescription or communicate with your care team.     To access your existing account, please contact your [facility] or call [phone] for assistance.        Care EveryWhere ID     This is your Care EveryWhere ID. This could be used by other organizations to access your [facility] medical records  [id number]        Equal Access to Services     THERON REEVES : Juhi Vides, waphu esparza, qayossita kaalmada petra, monae diaz. So [facility] [phone].    ATENCIÓN: Si habla español, tiene a borden disposición servicios gratuitos de asistencia lingüística. Llame al [phone].    We comply with applicable federal civil rights laws and [place] laws. We do not discriminate on the basis of race, color, national origin, age, disability sex, sexual orientation or gender identity.               Review of your medicines      CONTINUE these medicines which have NOT CHANGED        Dose / Directions    baclofen 20 MG tablet   Commonly known as:  LIORESAL   Used for:  " Quadriplegia (H)        TAKE 1 TABLET(20 MG) BY MOUTH FOUR TIMES DAILY   Quantity:  360 tablet   Refills:  3       budesonide 0.5 MG/2ML neb solution   Commonly known as:  PULMICORT   Used for:  Eosinophilic esophagitis        USE 2 VIALS TWICE DAILY. MIX WITH HONEY AND SWALLOW   Quantity:  1080 mL   Refills:  0       clotrimazole 1 % cream   Commonly known as:  LOTRIMIN   Used for:  Dermatophytosis of groin and perianal area        Apply topically twice a day as needed.   Quantity:  60 g   Refills:  2       diphenhydrAMINE 25 MG tablet   Commonly known as:  BENADRYL        Dose:  25 mg   Take 25 mg by mouth every 8 hours as needed for itching or allergies Reported on 2/15/2017   Refills:  0       docusate sodium 283 MG enema   Commonly known as:  ENEMEEZ MINI   Used for:  Chronic constipation        Use one every other day and prn per patients's request. Please give patient whichever mini enema's are covered by his insurance   Quantity:  30 enema   Refills:  6       gabapentin 250 MG/5ML solution   Commonly known as:  NEURONTIN   Used for:  Chronic midline thoracic back pain        Dose:  125 mg   Take 2.5 mLs (125 mg) by mouth 3 times daily as needed   Quantity:  450 mL   Refills:  0       HYDROcodone-acetaminophen 5-325 MG per tablet   Commonly known as:  NORCO   Used for:  Chronic midline thoracic back pain        Dose:  1 tablet   Take 1 tablet by mouth daily as needed for moderate to severe pain Max 1 tab per day.   Quantity:  30 tablet   Refills:  0       hydrocortisone 2.5 % cream   Commonly known as:  ANUSOL-HC   Used for:  Hemorrhoids, unspecified hemorrhoid type        Place rectally 2 times daily   Quantity:  30 g   Refills:  3       Lidocaine 0.5 % Gel   Used for:  Chronic midline thoracic back pain        Dose:  1 Application   Externally apply 1 Application topically every 6 hours as needed (for mid thoracic pain)   Quantity:  5 Tube   Refills:  3       NEW MED   Used for:  Recurrent UTI         Gentamycin 240mg in 500mL 0.9% Normal Saline. Instill 30mL into empty bladder at bedtime. Leave in bladder overnight and drain in the morning   Quantity:  500 mL   Refills:  3       nitrofurantoin 25 MG/5ML suspension   Commonly known as:  FURADANTIN   Used for:  Acute cystitis without hematuria        Dose:  100 mg   Take 20 mLs (100 mg) by mouth 2 times daily for 7 days   Quantity:  280 mL   Refills:  0       nystatin 838793 UNIT/GM Powd   Commonly known as:  MYCOSTATIN   Used for:  Candidal intertrigo        Apply twice daily to rash 2-3 times daily   Quantity:  60 g   Refills:  1       omeprazole 20 MG CR capsule   Commonly known as:  priLOSEC        Dose:  20 mg   Take 1 capsule (20 mg) by mouth daily as needed   Refills:  0       ondansetron 4 MG ODT tab   Commonly known as:  ZOFRAN ODT   Used for:  Nausea        Dose:  4-8 mg   Take 1-2 tablets (4-8 mg) by mouth every 8 hours as needed for nausea   Quantity:  18 tablet   Refills:  0       order for DME   Used for:  Neurogenic bladder        Equipment being ordered: 60 cc catheter tip syringes.   Quantity:  30 Units   Refills:  11       oxybutynin 5 MG tablet   Commonly known as:  DITROPAN   Used for:  Neurogenic bladder        Crush to be instilled intravesically.  One tablet in the morning and 2 tablets in the evening.   Quantity:  270 tablet   Refills:  3       phenylephrine-cocoa butter 0.25-88.44 % per suppository   Commonly known as:  PREPARATION H   Used for:  External hemorrhoids        Insert one suppository rectally twice daily as needed.   Quantity:  48 suppository   Refills:  3       polyethylene glycol powder   Commonly known as:  MIRALAX   Used for:  Constipation, unspecified constipation type        Dose:  1 capful   Take 17 g (1 capful) by mouth daily as needed for constipation   Quantity:  510 g   Refills:  1       simethicone 125 MG Chew chewable tablet   Commonly known as:  MYLICON   Used for:  LLQ abdominal pain        Dose:  125-250 mg    Take 1-2 tablets (125-250 mg) by mouth 4 times daily as needed for intestinal gas   Quantity:  1 tablet   Refills:  0       sterile water (bottle) irrigation   Used for:  Neurogenic bladder        Dose:  60 mL   Irrigate with 60 mLs as directed daily   Quantity:  1000 mL   Refills:  0       tolterodine 2 MG tablet   Commonly known as:  DETROL   Used for:  Neurogenic bladder        Dose:  2 mg   Take 1 tablet (2 mg) by mouth 2 times daily   Quantity:  180 tablet   Refills:  2       TYLENOL PO        Dose:  500 mg   Take 500 mg by mouth as needed for mild pain or fever Reported on 2/15/2017   Refills:  0       zolpidem 10 MG tablet   Commonly known as:  AMBIEN   Used for:  Primary insomnia        Dose:  5-10 mg   Take 0.5-1 tablets (5-10 mg) by mouth nightly as needed for sleep   Quantity:  30 tablet   Refills:  5                Protect others around you: Learn how to safely use, store and throw away your medicines at www.disposemymeds.org.             Medication List: This is a list of all your medications and when to take them. Check marks below indicate your daily home schedule. Keep this list as a reference.      Medications           Morning Afternoon Evening Bedtime As Needed    baclofen 20 MG tablet   Commonly known as:  LIORESAL   TAKE 1 TABLET(20 MG) BY MOUTH FOUR TIMES DAILY                                budesonide 0.5 MG/2ML neb solution   Commonly known as:  PULMICORT   USE 2 VIALS TWICE DAILY. MIX WITH HONEY AND SWALLOW                                clotrimazole 1 % cream   Commonly known as:  LOTRIMIN   Apply topically twice a day as needed.                                diphenhydrAMINE 25 MG tablet   Commonly known as:  BENADRYL   Take 25 mg by mouth every 8 hours as needed for itching or allergies Reported on 2/15/2017                                docusate sodium 283 MG enema   Commonly known as:  ENEMEEZ MINI   Use one every other day and prn per patients's request. Please give patient  whichever mini enema's are covered by his insurance                                gabapentin 250 MG/5ML solution   Commonly known as:  NEURONTIN   Take 2.5 mLs (125 mg) by mouth 3 times daily as needed                                HYDROcodone-acetaminophen 5-325 MG per tablet   Commonly known as:  NORCO   Take 1 tablet by mouth daily as needed for moderate to severe pain Max 1 tab per day.                                hydrocortisone 2.5 % cream   Commonly known as:  ANUSOL-HC   Place rectally 2 times daily                                Lidocaine 0.5 % Gel   Externally apply 1 Application topically every 6 hours as needed (for mid thoracic pain)                                NEW MED   Gentamycin 240mg in 500mL 0.9% Normal Saline. Instill 30mL into empty bladder at bedtime. Leave in bladder overnight and drain in the morning                                nitrofurantoin 25 MG/5ML suspension   Commonly known as:  FURADANTIN   Take 20 mLs (100 mg) by mouth 2 times daily for 7 days                                nystatin [id number] UNIT/GM Powd   Commonly known as:  MYCOSTATIN   Apply twice daily to rash 2-3 times daily                                omeprazole 20 MG CR capsule   Commonly known as:  priLOSEC   Take 1 capsule (20 mg) by mouth daily as needed                                ondansetron 4 MG ODT tab   Commonly known as:  ZOFRAN ODT   Take 1-2 tablets (4-8 mg) by mouth every 8 hours as needed for nausea                                order for DME   Equipment being ordered: 60 cc catheter tip syringes.                                oxybutynin 5 MG tablet   Commonly known as:  DITROPAN   Crush to be instilled intravesically.  One tablet in the morning and 2 tablets in the evening.                                phenylephrine-cocoa butter 0.25-88.44 % per suppository   Commonly known as:  PREPARATION H   Insert one suppository rectally twice daily as needed.                                polyethylene  glycol powder   Commonly known as:  MIRALAX   Take 17 g (1 capful) by mouth daily as needed for constipation                                simethicone 125 MG Chew chewable tablet   Commonly known as:  MYLICON   Take 1-2 tablets (125-250 mg) by mouth 4 times daily as needed for intestinal gas                                sterile water (bottle) irrigation   Irrigate with 60 mLs as directed daily                                tolterodine 2 MG tablet   Commonly known as:  DETROL   Take 1 tablet (2 mg) by mouth 2 times daily                                TYLENOL PO   Take 500 mg by mouth as needed for mild pain or fever Reported on 2/15/2017   Last time this was given:  975 mg on 6/23/2017 11:05 AM                                zolpidem 10 MG tablet   Commonly known as:  AMBIEN   Take 0.5-1 tablets (5-10 mg) by mouth nightly as needed for sleep

## 2017-06-23 NOTE — DISCHARGE INSTRUCTIONS
[facility]  Home Care Following Anesthesia  For 24 hours after surgery:  1. Get plenty of rest.  A responsible adult must stay with you for at least 24 hours after you leave the surgery center.  2. Do not drive or use heavy equipment.  If you have weakness or tingling, don't drive or use heavy equipment until this feeling goes away.   3. Do not drink alcohol.   4. Avoid strenuous or risky activities.  Ask for help when climbing stairs.  5. You may feel lightheaded.  IF so, sit for a few minutes before standing.  Have someone help you get up.   6. If you have nausea (feel sick to your stomach): Drink only clear liquids such as apple juice, ginger ale, broth or 7-Up.  Rest may also help.  Be sure to drink enough fluids.  Move to a regular diet as you feel able.   7. You may have a slight fever.  Call the doctor if your fever is over 100 F (37.7 C) (taken under the tongue) or lasts longer than 24 hours.  8. You may have a dry mouth, a sore throat, muscle aches or trouble sleeping. These should go away after 24 hours.  9. Do not make important or legal decisions.               Tips for taking pain medications  To get the best pain relief possible, remember these points:    Take pain medications as directed, before pain becomes severe.    Pain medication can upset your stomach: taking it with food may help.    Constipation is a common side effect of pain medication. Drink plenty of  fluids.    Eat foods high in fiber. Take a stool softener if recommended by your doctor or pharmacist.    Do not drink alcohol, drive or operate machinery while taking pain medications.    Ask about other ways to control pain, such as with heat, ice or relaxation.    Call a doctor for any of the followin. Signs of infection (fever, growing tenderness at the surgery site, a large amount of drainage or bleeding, severe pain, foul-smelling drainage, redness, swelling).  2. It has been over 8 to 10 hours  since surgery and you are still not able to urinate (pass water).  3. Headache for over 24 hours.  4. Numbness, tingling or weakness the day after surgery (if you had spinal anesthesia).  Your doctor is:  Dr. Evaristo Giraldo, Prostate and Urology: [phone]                    Or dial [phone] and ask for the resident on call for:  Prostate Urology  For emergency care, call the:  [facility]:  [phone] (TTY for hearing impaired: [phone])

## 2017-06-23 NOTE — ANESTHESIA PREPROCEDURE EVALUATION
Anesthesia Evaluation     . Pt has had prior anesthetic. Type: General and MAC    No history of anesthetic complications (Egg allergy - propofol avoided during last anesthestic.  Patient unsure if he has a propofol allergy.)          ROS/MED HX    ENT/Pulmonary:  - neg pulmonary ROS     Neurologic:     (+)Spinal cord injury (Incomplete C5-6 injury) year sustained: 1995 without autonomic hyperflexia symptoms,     Cardiovascular:  - neg cardiovascular ROS       METS/Exercise Tolerance:  1 - Eating, dressing   Hematologic:  - neg hematologic  ROS       Musculoskeletal:  - neg musculoskeletal ROS       GI/Hepatic:     (+) GERD (Eosinophillic Esophagitis) Asymptomatic on medication,      (-) liver disease   Renal/Genitourinary:  - ROS Renal section negative   (+) Other Renal/ Genitourinary, Neurogenic bladder - self caths q3 hours.      Endo:  - neg endo ROS       Psychiatric:  - neg psychiatric ROS       Infectious Disease:  - neg infectious disease ROS       Malignancy:      - no malignancy   Other:    (+) other significant disability Wheelchair bound  - neg other ROS                 Physical Exam  Normal systems: dental    Airway   Mallampati: II  TM distance: >3 FB  Neck ROM: full    Dental     Cardiovascular   Rhythm and rate: regular and normal      Pulmonary    breath sounds clear to auscultation                        Anesthesia Plan      History & Physical Review  History and physical reviewed and following examination; no interval change.    ASA Status:  3 .    NPO Status:  > 8 hours    Plan for MAC with Intravenous (MAC with Methohexital, fentanyl, versed) induction. Maintenance will be TIVA.  Reason for MAC:  Deep or markedly invasive procedure (G8)  PONV prophylaxis:  Ondansetron       Postoperative Care  Postoperative pain management:  IV analgesics and Oral pain medications.      Consents  Anesthetic plan, risks, benefits and alternatives discussed with:  Patient.  Use of blood products discussed: No .    .                          .

## 2017-06-23 NOTE — OP NOTE
PRE-OPERATIVE DIAGNOSIS:   1.  Neurogenic bladder    POST-OPERATIVE DIAGNOSIS:  1.  Neurogenic bladder    PROCEDURE:    1. Cystourethroscopy.   2. Injection of botulinum toxin A 200units in 20cc sterile saline      SURGEON:  Evaristo Hayes MD; available for the entire case, present for key portions of the procedure    RESIDENT:  Sade Young    ANESTHESIA:  MAC    ESTIMATED BLOOD LOSS:  5 mL    DRAINS:  None    SIGNIFICANT FINDINGS: Very high bladder neck. Autonomic dysreflexia with full bladder. Small capacity bladder.    OPERATIVE INDICATIONS:  Riley Gifford is a 41 year old male with neurogenic bladder due to C-5 spinal cord injury. He elects to proceed with botulinum toxin injection understanding the risks for urinary retention, infection, pain, bleeding, need for future procedures and risks of anesthesia.     OPERATIVE DETAILS:  The patient was taken to the operating room in her usual state of health.   He was positioned in modified dorsal lithotomy with yellowfin stirrups.  His genitalia were prepped and draped in the standard fashion. A time-out was performed to ensure proper patient, procedure and positioning.  The patient received appropriate IV antibiotics prior to the procedure.    A 21-Gambian De La Garza injection cystoscope was advanced into the urethra and into the bladder.  The urethra was unremarkable. The bladder neck was high. The bladder mucosa appeared to be normal without stones, tumors or diverticulum on 360 degree inspection. The ureteral orifices were in the normal orthotopic position bilaterally.  We mixed 2 vials of 100 U botulinum toxin A into 20 mL of injectable saline for a total of (10 units/mL).  We performed a template injection procedure with 5 columns of 4 injections. All injections were placed deep to the mucosa into the detrusor muscle.  We then emptied the bladder to re-inspect our injection sites and found that there was a minimal amount of blood. The bladder was then emptied  again. The patient was returned to supine position and transported to recovery in stable condition.

## 2017-06-23 NOTE — ANESTHESIA POSTPROCEDURE EVALUATION
Patient: Riley Gifford    Procedure(s):  Cystoscopy and Botox Injection Into the Bladder   - Wound Class: II-Clean Contaminated   - Wound Class: II-Clean Contaminated    Diagnosis:Neurogenic Bladder  Diagnosis Additional Information: No value filed.    Anesthesia Type:  MAC    Note:  Anesthesia Post Evaluation    Patient location during evaluation: Phase 2  Patient participation: Able to fully participate in evaluation  Level of consciousness: awake and alert  Pain management: adequate  Airway patency: patent  Cardiovascular status: acceptable  Respiratory status: acceptable  Hydration status: acceptable  PONV: none     Anesthetic complications: None          Last vitals:  Vitals:    06/23/17 1053 06/23/17 1341 06/23/17 1356   BP: (!) 89/60 (!) 137/101 (!) 145/107   Resp: 12 18 18   Temp: 36.4  C (97.5  F) 36.7  C (98.1  F) 36.7  C (98  F)   SpO2: 93% 94% 95%         Electronically Signed By: Rubina Abad MD  June 23, 2017  2:09 PM

## 2017-06-26 RX ORDER — OXYBUTYNIN CHLORIDE 5 MG/1
TABLET ORAL
Qty: 270 TABLET | Refills: 3 | Status: SHIPPED | OUTPATIENT
Start: 2017-06-26 | End: 2017-12-05

## 2017-06-27 ENCOUNTER — CARE COORDINATION (OUTPATIENT)
Dept: CARE COORDINATION | Facility: CLINIC | Age: 42
End: 2017-06-27

## 2017-06-27 NOTE — PROGRESS NOTES
Clinic Care Coordination Contact  Care Team Conversations    Spoke with the patient answering questions regarding the constipation resulting from the medications and procedures from last week.  The process was reviewed to reduce the pain and be successful at relieving the constipation. The Care Coordination RN discussed with the patient starting the process a couple of days prior to the next procedure to maybe avoid a similar outcome.  The patient is agreeable. The patient is using the Neurontin only at night as it is still making him drowsy although it does help him get a good night's sleep. The Care Coordination RN encouraged the patient to continue to trying the Neurontin as it does take a while for the side effects to subside and the positive effects to be noted.  The patient is otherwise doing very well.   At this point in time he has no other questions or concerns.  The patient stated that he has an appointment with colorectal surgery in July.  The Care Coordination RN encouraged the patient to keep the team updated as to the signs and symptoms that are bothering him.    Plan:  1).  The patient will use the plan for constipation to relieve the symptoms that are present now.  2).  The patient will follow up with the appointment with colorectal surgery in July.  3).  The patient will continue to use the Neurontin on a daily basis.  4).  The Care Coordination RN will make a follow up call to the patient in 3-4 weeks.  5).  Care Coordination to remain available for the patient to contact in the event of future needs.        Robbin Vanegas, MSN, RN, PHN  N Clinic Care Coordinator  [facility]  Phone: [phone]  [email or web address]

## 2017-06-28 ENCOUNTER — TELEPHONE (OUTPATIENT)
Dept: FAMILY MEDICINE | Facility: CLINIC | Age: 42
End: 2017-06-28

## 2017-06-28 NOTE — TELEPHONE ENCOUNTER
Forms received from [employer]/ physician order - clarified lidocaine 5% apply topically to affected areas every 6 hours as needed for Laura Yao MD.  Forms placed in provider 'sign me' folder.  Please fax forms to [phone] after completion.    Luli Correa  Patient Representative

## 2017-07-04 DIAGNOSIS — K64.9 HEMORRHOIDS, UNSPECIFIED HEMORRHOID TYPE: ICD-10-CM

## 2017-07-05 NOTE — TELEPHONE ENCOUNTER
hydrocortisone (ANUSOL-HC) 2.5 % rectal cream (Discontinued) 30 g 3 8/9/2016 11/14/2016 No      Sig: Place rectally 2 times daily     Class: E-Prescribe     Route: Rectal     Reason for Discontinue: Reorder     Order: [id number]       Last Office Visit with [facility] or [facility] prescribing provider: 6/7/2017  Future Office visit:       Routing refill request to provider for review/approval because:  Drug not active on patient's medication list

## 2017-07-07 ENCOUNTER — PRE VISIT (OUTPATIENT)
Dept: SURGERY | Facility: CLINIC | Age: 42
End: 2017-07-07

## 2017-07-07 NOTE — TELEPHONE ENCOUNTER
1.  Date/reason for appt: 7/24/17- Hemorrhoid     2.  Referring provider: Laura Yao MD     3.  Call to patient (Yes / No - short description): No - pt is referred. All records and imaging in Epic/PACS.     4.  Previous care at / records requested from:     1. YADIRA [facility]Farlan

## 2017-07-10 ENCOUNTER — TELEPHONE (OUTPATIENT)
Dept: FAMILY MEDICINE | Facility: CLINIC | Age: 42
End: 2017-07-10

## 2017-07-10 NOTE — TELEPHONE ENCOUNTER
The patient called the Care Coordination RN with symptoms of substernal chest pain, fatigue, mild abdominal pain, and, cloudy urine.  The patient denies fever, although he has felt warm.  He states that he is taking all of his medications including the pulmicort for the eosinophilic esophagitis.  The substernal chest pain is reminiscent of the previous EE pain.  He is concerned about the fatigue and the cloudy urine.  The Care Coordination RN will forward to the PCP and the Gold team at [facility] for a possible appointment or orders for a UA/UC.  Please contact the patient with a decision.      Robbin Vanegas, MSN, RN, PHN  N Clinic Care Coordinator  [facility]  Phone: [phone]  [email or web address]

## 2017-07-10 NOTE — TELEPHONE ENCOUNTER
Scheduled with Lily Harper PA-C tomorrow AM as PCP is out of the office until Wednesday.  I looked at 5 clinics for this afternoon & all clinics were booked.  Neelima Yeung RN

## 2017-07-11 ENCOUNTER — OFFICE VISIT (OUTPATIENT)
Dept: FAMILY MEDICINE | Facility: CLINIC | Age: 42
End: 2017-07-11
Payer: MEDICARE

## 2017-07-11 VITALS
TEMPERATURE: 98.3 F | SYSTOLIC BLOOD PRESSURE: 104 MMHG | DIASTOLIC BLOOD PRESSURE: 60 MMHG | HEART RATE: 97 BPM | OXYGEN SATURATION: 93 %

## 2017-07-11 DIAGNOSIS — J30.81 ALLERGIC RHINITIS DUE TO ANIMAL DANDER: ICD-10-CM

## 2017-07-11 DIAGNOSIS — F51.04 PSYCHOPHYSIOLOGICAL INSOMNIA: ICD-10-CM

## 2017-07-11 DIAGNOSIS — R53.83 FATIGUE, UNSPECIFIED TYPE: Primary | ICD-10-CM

## 2017-07-11 DIAGNOSIS — K20.0 EOSINOPHILIC ESOPHAGITIS: ICD-10-CM

## 2017-07-11 DIAGNOSIS — N31.9 NEUROGENIC BLADDER: ICD-10-CM

## 2017-07-11 DIAGNOSIS — G82.50 QUADRIPLEGIA (H): ICD-10-CM

## 2017-07-11 DIAGNOSIS — N30.00 ACUTE CYSTITIS WITHOUT HEMATURIA: ICD-10-CM

## 2017-07-11 LAB
ALBUMIN UR-MCNC: NEGATIVE MG/DL
AMORPH CRY #/AREA URNS HPF: ABNORMAL /HPF
APPEARANCE UR: CLEAR
BACTERIA #/AREA URNS HPF: ABNORMAL /HPF
BASOPHILS # BLD AUTO: 0.1 10E9/L (ref 0–0.2)
BASOPHILS NFR BLD AUTO: 1.1 %
BILIRUB UR QL STRIP: NEGATIVE
COLOR UR AUTO: YELLOW
DIFFERENTIAL METHOD BLD: ABNORMAL
EOSINOPHIL # BLD AUTO: 4.3 10E9/L (ref 0–0.7)
EOSINOPHIL NFR BLD AUTO: 42 %
ERYTHROCYTE [DISTWIDTH] IN BLOOD BY AUTOMATED COUNT: 11.8 % (ref 10–15)
GLUCOSE UR STRIP-MCNC: NEGATIVE MG/DL
HCT VFR BLD AUTO: 42.1 % (ref 40–53)
HGB BLD-MCNC: 14.3 G/DL (ref 13.3–17.7)
HGB UR QL STRIP: NEGATIVE
KETONES UR STRIP-MCNC: NEGATIVE MG/DL
LEUKOCYTE ESTERASE UR QL STRIP: ABNORMAL
LYMPHOCYTES # BLD AUTO: 1.7 10E9/L (ref 0.8–5.3)
LYMPHOCYTES NFR BLD AUTO: 16.2 %
MCH RBC QN AUTO: 32.4 PG (ref 26.5–33)
MCHC RBC AUTO-ENTMCNC: 34 G/DL (ref 31.5–36.5)
MCV RBC AUTO: 95 FL (ref 78–100)
MONOCYTES # BLD AUTO: 0.5 10E9/L (ref 0–1.3)
MONOCYTES NFR BLD AUTO: 4.6 %
NEUTROPHILS # BLD AUTO: 3.7 10E9/L (ref 1.6–8.3)
NEUTROPHILS NFR BLD AUTO: 36.1 %
NITRATE UR QL: NEGATIVE
PH UR STRIP: 8.5 PH (ref 5–7)
PLATELET # BLD AUTO: 246 10E9/L (ref 150–450)
RBC # BLD AUTO: 4.42 10E12/L (ref 4.4–5.9)
RBC #/AREA URNS AUTO: ABNORMAL /HPF (ref 0–2)
SP GR UR STRIP: 1.01 (ref 1–1.03)
URN SPEC COLLECT METH UR: ABNORMAL
UROBILINOGEN UR STRIP-ACNC: 0.2 EU/DL (ref 0.2–1)
VIT B12 SERPL-MCNC: 591 PG/ML (ref 193–986)
WBC # BLD AUTO: 10.2 10E9/L (ref 4–11)
WBC #/AREA URNS AUTO: ABNORMAL /HPF (ref 0–2)

## 2017-07-11 PROCEDURE — 82607 VITAMIN B-12: CPT | Performed by: PHYSICIAN ASSISTANT

## 2017-07-11 PROCEDURE — 87086 URINE CULTURE/COLONY COUNT: CPT | Performed by: PHYSICIAN ASSISTANT

## 2017-07-11 PROCEDURE — 80050 GENERAL HEALTH PANEL: CPT | Performed by: PHYSICIAN ASSISTANT

## 2017-07-11 PROCEDURE — 82306 VITAMIN D 25 HYDROXY: CPT | Performed by: PHYSICIAN ASSISTANT

## 2017-07-11 PROCEDURE — 36415 COLL VENOUS BLD VENIPUNCTURE: CPT | Performed by: PHYSICIAN ASSISTANT

## 2017-07-11 PROCEDURE — 81001 URINALYSIS AUTO W/SCOPE: CPT | Performed by: PHYSICIAN ASSISTANT

## 2017-07-11 PROCEDURE — 99214 OFFICE O/P EST MOD 30 MIN: CPT | Performed by: PHYSICIAN ASSISTANT

## 2017-07-11 RX ORDER — SULFAMETHOXAZOLE AND TRIMETHOPRIM 200; 40 MG/5ML; MG/5ML
20 SUSPENSION ORAL 2 TIMES DAILY
Qty: 280 ML | Refills: 0 | Status: SHIPPED | OUTPATIENT
Start: 2017-07-11 | End: 2018-09-25

## 2017-07-11 ASSESSMENT — ENCOUNTER SYMPTOMS
CONSTIPATION: 0
COUGH: 1
DIARRHEA: 0
HEADACHES: 0
SORE THROAT: 0
ABDOMINAL PAIN: 1
CHILLS: 0
SHORTNESS OF BREATH: 0
FREQUENCY: 0
VOMITING: 0
NAUSEA: 1
FEVER: 0
HEMATURIA: 0
BLOOD IN STOOL: 0
FOCAL WEAKNESS: 0
MYALGIAS: 0

## 2017-07-11 NOTE — NURSING NOTE
"Chief Complaint   Patient presents with     Urinary Problem     Fatigue       Initial /60 (BP Location: Right arm, Patient Position: Chair, Cuff Size: Adult Regular)  Pulse 97  Temp 98.3  F (36.8  C) (Oral)  SpO2 93% Estimated body mass index is 17.15 kg/(m^2) as calculated from the following:    Height as of 6/23/17: 6' 1\" (1.854 m).    Weight as of 6/23/17: 130 lb (59 kg).  Medication Reconciliation: complete    "

## 2017-07-11 NOTE — PATIENT INSTRUCTIONS
[facility]   Discharged by : Sue POLANCO MA    If you have any questions regarding your visit please contact your care team:     Team [facility] Clinic Hours Telephone Number   [address] Dr., Dr.   7am-7pm Monday - Thursday   7am-5pm Fridays  [phone]   (Appointment scheduling available 24/7)   RN Line   [phone] option 2       For a Price Quote for your services, please call our Consumer Price Line at [phone].     What options do I have for visits at the clinic other than the traditional office visit?     To expand how we care for you, many of our providers are utilizing electronic visits (e-visits) and telephone visits, when medically appropriate, for interactions with their patients rather than a visit in the clinic. We also offer nurse visits for many medical concerns. Just like any other service, we will bill your insurance company for this type of visit based on time spent on the phone with your provider. Not all insurance companies cover these visits. Please check with your medical insurance if this type of visit is covered. You will be responsible for any charges that are not paid by your insurance.   E-visits via [employer]: generally incur a $35.00 fee.     Telephone visits:   Time spent on the phone: *charged based on time that is spent on the phone in increments of 10 minutes. Estimated cost:   5-10 mins $30.00   11-20 mins. $59.00   21-30 mins. $85.00     Use [employer] (secure email communication and access to your chart) to send your primary care provider a message or make an appointment. Ask someone on your Team how to sign up for [employer].     As always, Thank you for trusting us with your health care needs!      [facility]:    Scheduling Appointments  [facility]  Call: [phone]    [facility]  Call: [phone]    [facility]  [facility]  Call: [phone]      WHERE TO GO FOR CARE?    Clinic    Make an appointment if you:       Are sick (cold, cough, flu, sore throat, earache or in pain).       Have a small injury (sprain, small cut, burn or broken bone).       Need a physical exam, Pap smear, vaccine or prescription refill.       Have questions about your health or medicines.    To reach us:      Call [phone] ([phone]). Open 24 hours every day. (For counseling services, call [phone].)    Log into [employer] [facility] [employer]. (Visit [place] to create an account.) Hospital emergency room    An emergency is a serious or life- threatening problem that must be treated right away.    Call 392 or get to the hospital if you have:      Very bad or sudden:            - Chest pain or pressure         - Bleeding         - Head or belly pain         - Dizziness or trouble seeing, walking or                          Speaking      Problems breathing      Blood in your vomit or you are coughing up blood      A major injury (knocked out, loss of a finger or limb, rape, broken bone protruding from skin)    A mental health crisis. (Or call the Mental Health Crisis line at 1-879.947.1581 or Suicide Prevention Hotline at 1-312.121.5126.)    Open 24 hours every day. You don't need an appointment.     Urgent care    Visit urgent care for sickness or small injuries when the clinic is closed. You don't need an appointment. To check hours or find an urgent care near you, visit [email or web address]. Online care    Get online care from [facility] for more than 70 common problems, like colds, allergies and infections. Open 24 hours every day at:   [email or web address]   Need help deciding?    For advice about where to be seen, you may call your clinic and ask to speak with a nurse. We're here for you 24 hours every day.         If you are deaf or hard of hearing, please let us know. We provide many free services including  sign language interpreters, oral interpreters, TTYs, telephone amplifiers, note takers and written materials.

## 2017-07-11 NOTE — MR AVS SNAPSHOT
After Visit Summary   7/11/2017    Riley Gifford    MRN: [id number]           Patient Information     Date Of Birth          1975        Visit Information        Provider Department      7/11/2017 9:20 AM Lily Harper PA-C [facility]        Today's Diagnoses     Fatigue, unspecified type    -  1    Eosinophilic esophagitis        Acute cystitis without hematuria        Quadriplegia (H)        Neurogenic bladder        Allergic rhinitis due to animal dander        Psychophysiological insomnia          Care Instructions    [facility]   Discharged by : Sue POLANCO MA    If you have any questions regarding your visit please contact your care team:     [facility] Clinic Hours Telephone Number   [address] Dr., Dr.   7am-7pm Monday - Thursday   7am-5pm Fridays  [phone]   (Appointment scheduling available 24/7)   RN Line   [phone] option 2       For a Price Quote for your services, please call our Tink Price Line at [phone].     What options do I have for visits at the clinic other than the traditional office visit?     To expand how we care for you, many of our providers are utilizing electronic visits (e-visits) and telephone visits, when medically appropriate, for interactions with their patients rather than a visit in the clinic. We also offer nurse visits for many medical concerns. Just like any other service, we will bill your insurance company for this type of visit based on time spent on the phone with your provider. Not all insurance companies cover these visits. Please check with your medical insurance if this type of visit is covered. You will be responsible for any charges that are not paid by your insurance.   E-visits via DecisionDesk: generally incur a $35.00 fee.     Telephone visits:   Time spent on the phone: *charged based on time that is spent  on the phone in increments of 10 minutes. Estimated cost:   5-10 mins $30.00   11-20 mins. $59.00   21-30 mins. $85.00     Use ???? (secure email communication and access to your chart) to send your primary care provider a message or make an appointment. Ask someone on your Team how to sign up for ????.     As always, Thank you for trusting us with your health care needs!      [facility]:    Scheduling Appointments  [place] [facility]  Call: [phone]    [place] [facility]  Call: [phone]    [facility]  Call: [phone]      WHERE TO GO FOR CARE?    Clinic    Make an appointment if you:       Are sick (cold, cough, flu, sore throat, earache or in pain).       Have a small injury (sprain, small cut, burn or broken bone).       Need a physical exam, Pap smear, vaccine or prescription refill.       Have questions about your health or medicines.    To reach us:      Call [phone] ([phone]). Open 24 hours every day. (For counseling services, call [phone].)    Log into ???? at [email or web address]. (Visit [email or web address] to create an account.) Hospital emergency room    An emergency is a serious or life- threatening problem that must be treated right away.    Call 363 or get to the hospital if you have:      Very bad or sudden:            - Chest pain or pressure         - Bleeding         - Head or belly pain         - Dizziness or trouble seeing, walking or                          Speaking      Problems breathing      Blood in your vomit or you are coughing up blood      A major injury (knocked out, loss of a finger or limb, rape, broken bone protruding from skin)    A mental health crisis. (Or call the Mental Health Crisis line at 1-426.297.8596 or Suicide Prevention Hotline at 1-308.180.1397.)    Open 24 hours every day. You don't need an appointment.     Urgent care    Visit urgent care for  sickness or small injuries when the clinic is closed. You don't need an appointment. To check hours or find an urgent care near you, visit [email or web address]. Online care    Get online care from [facility] for more than 70 common problems, like colds, allergies and infections. Open 24 hours every day at:   [email or web address]   Need help deciding?    For advice about where to be seen, you may call your clinic and ask to speak with a nurse. We're here for you 24 hours every day.         If you are deaf or hard of hearing, please let us know. We provide many free services including sign language interpreters, oral interpreters, TTYs, telephone amplifiers, note takers and written materials.                         Follow-ups after your visit        Your next 10 appointments already scheduled     Jul 24, 2017  3:45 PM CDT   (Arrive by 3:30 PM)   New Patient Visit with SUJATA Kerr [facility] ([facility])    [address] [place] [address]  4th [facility] [address] [phone]            Jul 31, 2017  3:00 PM CDT   New Visit with Evangelista Erazo MD   [facility] ([facility])    [address] [facility] [address] [phone]              Who to contact     If you have questions or need follow up information about today's clinic visit or your schedule please contact [facility] directly at [phone].  Normal or non-critical lab and imaging results will be communicated to you by MyChart, letter or phone within 4 business days after the clinic has received the results. If you do not hear from us within 7 days, please contact the clinic through MyChart or phone. If you have a critical or abnormal lab result, we will notify you by phone as soon as possible.  Submit refill requests through Snaptrip or call your pharmacy and they will forward the refill request to us. Please allow 3  business days for your refill to be completed.          Additional Information About Your Visit        MyChart Information     mediafeedia gives you secure access to your electronic health record. If you see a primary care provider, you can also send messages to your care team and make appointments. If you have questions, please call your primary care clinic.  If you do not have a primary care provider, please call [phone] and they will assist you.        Care EveryWhere ID     This is your Care EveryWhere ID. This could be used by other organizations to access your [place] medical records  [id number]        Your Vitals Were     Pulse Temperature Pulse Oximetry             97 98.3  F (36.8  C) (Oral) 93%          Blood Pressure from Last 3 Encounters:   07/11/17 104/60   06/23/17 (!) 137/104   06/07/17 94/67    Weight from Last 3 Encounters:   06/23/17 130 lb (59 kg)   06/05/17 130 lb (59 kg)   01/16/17 130 lb (59 kg)              We Performed the Following     CBC with platelets differential     Comprehensive metabolic panel     TSH with free T4 reflex     UA reflex to Microscopic and Culture     Urine Culture Aerobic Bacterial     Urine Microscopic     Vitamin B12     Vitamin D Deficiency          Today's Medication Changes          These changes are accurate as of: 7/11/17 10:10 AM.  If you have any questions, ask your nurse or doctor.               Start taking these medicines.        Dose/Directions    sod bicarbonate-citric acid-simethicone 2.21-1.53-0.04 G Pack   Commonly known as:  EZ GAS   Used for:  Eosinophilic esophagitis   Started by:  Lily Harper PA-C        Dose:  4 g   Take 1 packet (4 g) by mouth daily as needed   Quantity:  50 packet   Refills:  0       sulfamethoxazole-trimethoprim suspension   Commonly known as:  BACTRIM/SEPTRA   Used for:  Acute cystitis without hematuria   Started by:  Lily Harper PA-C        Dose:  20 mL   Take 20 mLs (160 mg) by mouth 2 times  daily for 7 days Dose based on TMP component.   Quantity:  280 mL   Refills:  0            Where to get your medicines      These medications were sent to [id number] - [place], [place] - [address] AT [facility] [address]  [address], [place] [address]     Phone:  [phone]     sod bicarbonate-citric acid-simethicone 2.21-1.53-0.04 G Pack    sulfamethoxazole-trimethoprim suspension                Primary Care Provider Office Phone # Fax #    Laura Yao [phone]       [facility] [address] [facility] [address]        Goals        General    I will use the medications Tylenol or Tramadol for pain every 4-6 hours as needed.  Evidenced by the patient  using the medications to control worsening pain as verbalized by the patient. (pt-stated)     Notes - Note created  3/22/2016  2:39 PM by Robbin Andino RN    As of today's date 3/22/2016 goal is met at 0 - 25%.   Goal Status:  Active        I will work with the [facility] nurse to get the best results for wound care as evidenced by decrease in size and verbalized pain in the area of the wound on the buttock. (pt-stated)     Notes - Note created  1/6/2016  1:43 PM by Robbin Andino RN    As of today's date 1/6/2016 goal is met at 0 - 25%.   Goal Status:  Active          Equal Access to Services     [facility] AH: Hadii alan jorge hadasho Somarisolali, waaxda luqadaha, qaybta kaalmada adeegyada, monae castillo adenancie diaz. So [facility] [phone].    ATENCIÓN: Si habla español, tiene a borden disposición servicios gratuitos de asistencia lingüística. Llame al [phone].    We comply with applicable federal civil rights laws and [place] laws. We do not discriminate on the basis of race, color, national origin, age, disability sex, sexual orientation or gender identity.            Thank you!     Thank you for choosing [facility]  for your care. Our goal is  always to provide you with excellent care. Hearing back from our patients is one way we can continue to improve our services. Please take a few minutes to complete the written survey that you may receive in the mail after your visit with us. Thank you!             Your Updated Medication List - Protect others around you: Learn how to safely use, store and throw away your medicines at www.disposemymeds.org.          This list is accurate as of: 7/11/17 10:10 AM.  Always use your most recent med list.                   Brand Name Dispense Instructions for use Diagnosis    baclofen 20 MG tablet    LIORESAL    360 tablet    TAKE 1 TABLET(20 MG) BY MOUTH FOUR TIMES DAILY    Quadriplegia (H)       budesonide 0.5 MG/2ML neb solution    PULMICORT    1080 mL    USE 2 VIALS TWICE DAILY. MIX WITH HONEY AND SWALLOW    Eosinophilic esophagitis       clotrimazole 1 % cream    LOTRIMIN    60 g    Apply topically twice a day as needed.    Dermatophytosis of groin and perianal area       diphenhydrAMINE 25 MG tablet    BENADRYL     Take 25 mg by mouth every 8 hours as needed for itching or allergies Reported on 2/15/2017        docusate sodium 283 MG enema    ENEMEEZ MINI    30 enema    Use one every other day and prn per patients's request. Please give patient whichever mini enema's are covered by his insurance    Chronic constipation       gabapentin 250 MG/5ML solution    NEURONTIN    450 mL    Take 2.5 mLs (125 mg) by mouth 3 times daily as needed    Chronic midline thoracic back pain       hydrocortisone 2.5 % cream    ANUSOL-HC    30 g    Place rectally 2 times daily    Hemorrhoids, unspecified hemorrhoid type       Lidocaine 0.5 % Gel     5 Tube    Externally apply 1 Application topically every 6 hours as needed (for mid thoracic pain)    Chronic midline thoracic back pain       NEW MED     500 mL    Gentamycin 240mg in 500mL 0.9% Normal Saline. Instill 30mL into empty bladder at bedtime. Leave in bladder overnight and drain  in the morning    Recurrent UTI       nystatin 546099 UNIT/GM Powd    MYCOSTATIN    60 g    Apply twice daily to rash 2-3 times daily    Candidal intertrigo       omeprazole 20 MG CR capsule    priLOSEC     Take 1 capsule (20 mg) by mouth daily as needed        ondansetron 4 MG ODT tab    ZOFRAN ODT    18 tablet    Take 1-2 tablets (4-8 mg) by mouth every 8 hours as needed for nausea    Nausea       order for DME     30 Units    Equipment being ordered: 60 cc catheter tip syringes.    Neurogenic bladder       oxybutynin 5 MG tablet    DITROPAN    270 tablet    Crush to be instilled intravesically.  One tablet in the morning and 2 tablets in the evening.    Neurogenic bladder       phenylephrine-cocoa butter 0.25-88.44 % per suppository    PREPARATION H    48 suppository    Insert one suppository rectally twice daily as needed.    External hemorrhoids       polyethylene glycol powder    MIRALAX    510 g    Take 17 g (1 capful) by mouth daily as needed for constipation    Constipation, unspecified constipation type       simethicone 125 MG Chew chewable tablet    MYLICON    1 tablet    Take 1-2 tablets (125-250 mg) by mouth 4 times daily as needed for intestinal gas    LLQ abdominal pain       sod bicarbonate-citric acid-simethicone 2.21-1.53-0.04 G Pack    EZ GAS    50 packet    Take 1 packet (4 g) by mouth daily as needed    Eosinophilic esophagitis       sterile water (bottle) irrigation     1000 mL    Irrigate with 60 mLs as directed daily    Neurogenic bladder       sulfamethoxazole-trimethoprim suspension    BACTRIM/SEPTRA    280 mL    Take 20 mLs (160 mg) by mouth 2 times daily for 7 days Dose based on TMP component.    Acute cystitis without hematuria       tolterodine 2 MG tablet    DETROL    180 tablet    Take 1 tablet (2 mg) by mouth 2 times daily    Neurogenic bladder       TYLENOL PO      Take 500 mg by mouth as needed for mild pain or fever Reported on 2/15/2017        zolpidem 10 MG tablet    AMBIEN     30 tablet    Take 0.5-1 tablets (5-10 mg) by mouth nightly as needed for sleep    Primary insomnia

## 2017-07-11 NOTE — PROGRESS NOTES
HPI    SUBJECTIVE:                                                    Riley Gifford is a 41 year old male with hx quadriplegia who presents to clinic today for the following health issues:    Genitourinary - Male-- Cloudy urine  Onset: Ongoing- off and on    Description:   Dysuria (painful urination): no   Hematuria (blood in urine): no   Frequency: no - Cath  Are you urinating at night : no   Hesitancy (delay in urine): no   Retention (unable to empty): no   Decrease in urinary flow: no   Incontinence: no     Progression of Symptoms:  same    Accompanying Signs & Symptoms:  Fever: no - felt warm  Back/Flank pain: YES-  Chronic Pain  Urethral discharge: no   Testicle lumps/masses/pain: no   Nausea and/or vomiting: YES-  Nausea this morning  Abdominal pain: no     History:   History of frequent UTI's: YES  History of kidney stones: no   History of hernias: no   Personal or Family history of Prostate problems: YES- Father  Sexually active: no     Precipitating factors:   None    Alleviating factors:  None    Has some bladder spasms (hx neurogenic bladder) but these have improved since receiving Botox a few weeks ago.    Fatigue  Onset 1 week ago. Pt has hx insomnia due to medical issues, usually only gets 6 hrs a night. Takes 0.5 mg Ambien, doesn't like to take full 1 mg as it worsens his incontinence at night.   Also reports some cough & congestion which pt believes may be due to his allergic rhinitis.  Some nausea which is normal for him.  Denies sore throat, rash, arthralgias, myalgias, fever, chills, vomiting, easy bruising/bleeding, or weight changes.   BMs regular    Epigastric pain  Midsternal pain past 2 days, has now resolved. Felt similar to past exacerbations of EE. Takes omeprazole daily and budesonide neb. Is followed by [facility]. Denies hematemesis, dark/bloody stool, worsening dysphagia,or  globus sensation.    States he used to use a liquid compound for acute episodes of EE and would like to  know if he can get another Rx for that. He cannot recall the name of this medication.    Chart Review:  History   Smoking Status     Never Smoker   Smokeless Tobacco     Never Used     PHQ-9 SCORE 2/5/2016 7/14/2016 11/16/2016   Total Score 9 5 9     JUANA-7 SCORE 7/14/2016   Total Score 3     Patient Active Problem List   Diagnosis     Vitamin D deficiency     Eosinophilic esophagitis     Neurogenic bladder     CARDIOVASCULAR SCREENING; LDL GOAL LESS THAN 160     Quadriplegia (H)     Chronic constipation     Internal hemorrhoids with other complication     Diagnostic skin and sensitization tests(aka ALLERGENS)     Anal or rectal pain     Allergic rhinitis due to animal dander     Allergy to mold spores     House dust mite allergy     Insomnia     Health Care Home     Feeling worried     Gallstones     Chronic midline thoracic back pain     Pulmonary nodules     Past Surgical History:   Procedure Laterality Date     BACK SURGERY       C NONSPECIFIC PROCEDURE      C5-6 Fusion     C NONSPECIFIC PROCEDURE      Pressure Ulcer     COLONOSCOPY       CYSTOSCOPY N/A 6/23/2017    Procedure: CYSTOSCOPY;  Cystoscopy and Botox Injection Into the Bladder  ;  Surgeon: Evaristo Hayes MD;  Location: [facility]     CYSTOSCOPY, INTRAVESICAL INJECTION N/A 5/12/2016    Procedure: CYSTOSCOPY, INTRAVESICAL INJECTION;  Surgeon: Evaristo Hayes MD;  Location: [facility] OR     CYSTOSCOPY, INTRAVESICAL INJECTION N/A 11/11/2016    Procedure: CYSTOSCOPY, INTRAVESICAL INJECTION;  Surgeon: Evaristo Hayes MD;  Location: [facility]     GI SURGERY      endoscopy x2     INJECT BOTOX N/A 6/23/2017    Procedure: INJECT BOTOX;;  Surgeon: Evaristo Hayes MD;  Location: [facility]     Problem list, Medication list, Allergies, Medical/Social/Surg hx reviewed in [facility], updated as appropriate.      Review of Systems   Constitutional: Positive for malaise/fatigue. Negative for chills and fever.   HENT: Positive for congestion. Negative for sore throat.     Respiratory: Positive for cough. Negative for shortness of breath.    Cardiovascular: Negative for chest pain.   Gastrointestinal: Positive for abdominal pain (epigastric-resolved) and nausea. Negative for blood in stool, constipation, diarrhea, melena and vomiting.   Genitourinary: Negative for frequency and hematuria.        Cloudy urine   Musculoskeletal: Negative for joint pain and myalgias.   Skin: Negative for rash.   Neurological: Negative for focal weakness and headaches.   All other systems reviewed and are negative.        Physical Exam   Constitutional: He is oriented to person, place, and time and well-developed, well-nourished, and in no distress.   HENT:   Head: Normocephalic and atraumatic.   Mouth/Throat: Uvula is midline, oropharynx is clear and moist and mucous membranes are normal.   Cardiovascular: Normal rate, regular rhythm and normal heart sounds.    Pulmonary/Chest: Effort normal and breath sounds normal.   Abdominal: Soft. Normal appearance. There is no tenderness.   Musculoskeletal: Normal range of motion.   Neurological: He is alert and oriented to person, place, and time. Gait normal.   Skin: Skin is warm and dry.   Nursing note and vitals reviewed.    Vital Signs  /60 (BP Location: Right arm, Patient Position: Chair, Cuff Size: Adult Regular)  Pulse 97  Temp 98.3  F (36.8  C) (Oral)  SpO2 93%   There is no height or weight on file to calculate BMI.    Diagnostic Test Results:  Results for orders placed or performed in visit on 07/11/17 (from the past 24 hour(s))   UA reflex to Microscopic and Culture   Result Value Ref Range    Color Urine Yellow     Appearance Urine Clear     Glucose Urine Negative NEG mg/dL    Bilirubin Urine Negative NEG    Ketones Urine Negative NEG mg/dL    Specific Gravity Urine 1.015 1.003 - 1.035    Blood Urine Negative NEG    pH Urine 8.5 (H) 5.0 - 7.0 pH    Protein Albumin Urine Negative NEG mg/dL    Urobilinogen Urine 0.2 0.2 - 1.0 EU/dL    Nitrite  Urine Negative NEG    Leukocyte Esterase Urine Small (A) NEG    Source Midstream Urine    Urine Microscopic   Result Value Ref Range    WBC Urine O - 2 0 - 2 /HPF    RBC Urine O - 2 0 - 2 /HPF    Bacteria Urine Few (A) NEG /HPF    Amorphous Crystals Moderate (A) NEG /HPF       ASSESSMENT/PLAN:                                                        ICD-10-CM    1. Fatigue, unspecified type R53.83 CBC with platelets differential     Comprehensive metabolic panel     Vitamin B12     Vitamin D Deficiency     TSH with free T4 reflex   2. Acute cystitis without hematuria N30.00 sulfamethoxazole-trimethoprim (BACTRIM/SEPTRA) suspension     Urine Culture Aerobic Bacterial   3. Neurogenic bladder N31.9    4. Eosinophilic esophagitis K20.0 sod bicarbonate-citric acid-simethicone (EZ GAS) 2.21-1.53-0.04 G PACK   5. Quadriplegia (H) G82.50    6. Allergic rhinitis due to animal dander J30.81    7. Psychophysiological insomnia F51.04      Suspect pt may have viral illness with fatigue, cough, & congestion. Will get labs to rule out other causes of fatigue.    Small leuk est on UA, no other signs of infection. Discussed starting empiric antibiotic for UTI vs. Waiting for culture result. Pt would like to get started on an antibiotic now. Prefers liquid meds due to dysphagia.    Neurogenic bladder improved s/p Botox injections. Continue to monitor.    After review of medical record, past Rx for sodium bicarb/citric acid/simethicone noted. Unsure if this is medication pt is referring to that helped with his EE, but will give it a try. Also recommended contacting GI to see what they recommend.    Informed pt he may use 1 mg Ambien occasionally if having a difficult time sleeping.     I have discussed any lab or imaging results, the patient's diagnosis, and my plan of treatment with the patient and/or family. Patient is aware to come back in if with worsening symptoms or if no relief despite treatment plan.  Patient voiced  understanding and had no further questions.       Follow Up: Data Unavailable    KATHY Barnett, PA-C  [facility]

## 2017-07-12 LAB
ALBUMIN SERPL-MCNC: 3.6 G/DL (ref 3.4–5)
ALP SERPL-CCNC: 52 U/L (ref 40–150)
ALT SERPL W P-5'-P-CCNC: 15 U/L (ref 0–70)
ANION GAP SERPL CALCULATED.3IONS-SCNC: 8 MMOL/L (ref 3–14)
AST SERPL W P-5'-P-CCNC: 9 U/L (ref 0–45)
BACTERIA SPEC CULT: NO GROWTH
BILIRUB SERPL-MCNC: 0.4 MG/DL (ref 0.2–1.3)
BUN SERPL-MCNC: 8 MG/DL (ref 7–30)
CALCIUM SERPL-MCNC: 8.4 MG/DL (ref 8.5–10.1)
CHLORIDE SERPL-SCNC: 108 MMOL/L (ref 94–109)
CO2 SERPL-SCNC: 24 MMOL/L (ref 20–32)
CREAT SERPL-MCNC: 0.54 MG/DL (ref 0.66–1.25)
DEPRECATED CALCIDIOL+CALCIFEROL SERPL-MC: 25 UG/L (ref 20–75)
GFR SERPL CREATININE-BSD FRML MDRD: ABNORMAL ML/MIN/1.7M2
GLUCOSE SERPL-MCNC: 76 MG/DL (ref 70–99)
MICRO REPORT STATUS: NORMAL
POTASSIUM SERPL-SCNC: 4.2 MMOL/L (ref 3.4–5.3)
PROT SERPL-MCNC: 6.3 G/DL (ref 6.8–8.8)
SODIUM SERPL-SCNC: 140 MMOL/L (ref 133–144)
SPECIMEN SOURCE: NORMAL
TSH SERPL DL<=0.005 MIU/L-ACNC: 1.47 MU/L (ref 0.4–4)

## 2017-07-17 DIAGNOSIS — R10.13 EPIGASTRIC PAIN: ICD-10-CM

## 2017-07-17 DIAGNOSIS — K20.0 EOSINOPHILIC ESOPHAGITIS: ICD-10-CM

## 2017-07-17 NOTE — TELEPHONE ENCOUNTER
Patient sent Robbin care coordinator, a email requesting refill of mouthwash.    Please review his medication history and confirm what patient needs.  Okay to refill if this is a chronic medication he uses.    Laura Yao MD

## 2017-07-18 NOTE — TELEPHONE ENCOUNTER
"Called and spoke to  Riley. It was magic mouthwash he was referring to which he states he uses periodically for \"esophagus pain.\" He isn't having pain any more but he did over the weekend. Prior to this he hadn't used it in a few months. Will route to provider to advise if OK to send refills to pharmacy for them to have on file in case it flares up again. RN will not refill as this is not a \"chronic\" medication as specified below.     Yue Conroy RN    "

## 2017-07-19 ENCOUNTER — CARE COORDINATION (OUTPATIENT)
Dept: CARE COORDINATION | Facility: CLINIC | Age: 42
End: 2017-07-19

## 2017-07-19 ENCOUNTER — OFFICE VISIT (OUTPATIENT)
Dept: FAMILY MEDICINE | Facility: CLINIC | Age: 42
End: 2017-07-19
Payer: MEDICARE

## 2017-07-19 VITALS
HEART RATE: 82 BPM | SYSTOLIC BLOOD PRESSURE: 100 MMHG | DIASTOLIC BLOOD PRESSURE: 60 MMHG | OXYGEN SATURATION: 96 % | TEMPERATURE: 97.1 F

## 2017-07-19 DIAGNOSIS — I51.7 BILATERAL ENLARGEMENT OF ATRIA: Primary | ICD-10-CM

## 2017-07-19 PROCEDURE — 99213 OFFICE O/P EST LOW 20 MIN: CPT | Performed by: FAMILY MEDICINE

## 2017-07-19 RX ORDER — DIPHENHYDRAMINE HYDROCHLORIDE AND LIDOCAINE HYDROCHLORIDE AND ALUMINUM HYDROXIDE AND MAGNESIUM HYDRO
KIT
Qty: 240 ML | Refills: 1 | Status: SHIPPED | OUTPATIENT
Start: 2017-07-19 | End: 2017-07-19

## 2017-07-19 NOTE — PATIENT INSTRUCTIONS
[facility]    If you have any questions regarding to your visit please contact your care team:       Team Purple:   Clinic Hours Telephone Number   Dr. Fadumo Pierson     7am-7pm  Monday - Thursday   7am-5pm  Fridays  [phone]  (Appointment scheduling available 24/7)    Questions about your Visit?   Team Line:  [phone]   Urgent Care - [place] and [facility] - 11am-9pm Monday-Friday Saturday-Sunday- 9am-5pm   [place] - 5pm-9pm Monday-Friday Saturday-Sunday- 9am-5pm  [phone] - [facility]  [phone] - [place]       What options do I have for visits at the clinic other than the traditional office visit?  To expand how we care for you, many of our providers are utilizing electronic visits (e-visits) and telephone visits, when medically appropriate, for interactions with their patients rather than a visit in the clinic.   We also offer nurse visits for many medical concerns. Just like any other service, we will bill your insurance company for this type of visit based on time spent on the phone with your provider. Not all insurance companies cover these visits. Please check with your medical insurance if this type of visit is covered. You will be responsible for any charges that are not paid by your insurance.      E-visits via Joberator:  generally incur a $35.00 fee.  Telephone visits:  Time spent on the phone: *charged based on time that is spent on the phone in increments of 10 minutes. Estimated cost:   5-10 mins $30.00   11-20 mins. $59.00   21-30 mins. $85.00     Use Telepot (secure email communication and access to your chart) to send your primary care provider a message or make an appointment. Ask someone on your Team how to sign up for Joberator.  For a Price Quote for your services, please call our Consumer Price Line at [phone].  As always, Thank you for trusting us with your health care needs!

## 2017-07-19 NOTE — PROGRESS NOTES
SUBJECTIVE:                                                    Riley Gifford is a 41 year old male who presents to clinic today for the following health issues:      Patient presents with:  Chest Pain: center of chest for off and on x 1 week   Hand Problem: both hand and wrist numbness and tingling              Problem list and histories reviewed & adjusted, as indicated.  Additional history: as documented    Patient Active Problem List   Diagnosis     Vitamin D deficiency     Eosinophilic esophagitis     Neurogenic bladder     CARDIOVASCULAR SCREENING; LDL GOAL LESS THAN 160     Quadriplegia (H)     Chronic constipation     Internal hemorrhoids with other complication     Diagnostic skin and sensitization tests(aka ALLERGENS)     Anal or rectal pain     Allergic rhinitis due to animal dander     Allergy to mold spores     House dust mite allergy     Insomnia     Health Care Home     Feeling worried     Gallstones     Chronic midline thoracic back pain     Pulmonary nodules     Past Surgical History:   Procedure Laterality Date     BACK SURGERY       C NONSPECIFIC PROCEDURE      C5-6 Fusion     C NONSPECIFIC PROCEDURE      Pressure Ulcer     COLONOSCOPY       CYSTOSCOPY N/A 6/23/2017    Procedure: CYSTOSCOPY;  Cystoscopy and Botox Injection Into the Bladder  ;  Surgeon: Evaristo Hayes MD;  [facility]     CYSTOSCOPY, INTRAVESICAL INJECTION N/A 5/12/2016    Procedure: CYSTOSCOPY, INTRAVESICAL INJECTION;  Surgeon: Evaristo Hayes MD;  [facility] OR     CYSTOSCOPY, INTRAVESICAL INJECTION N/A 11/11/2016    Procedure: CYSTOSCOPY, INTRAVESICAL INJECTION;  Surgeon: Evaristo Hayes MD;  [facility]     GI SURGERY      endoscopy x2     INJECT BOTOX N/A 6/23/2017    Procedure: INJECT BOTOX;;  Surgeon: Evaristo Hayes MD;  [facility]       Social History   Substance Use Topics     Smoking status: Never Smoker     Smokeless tobacco: Never Used     Alcohol use 0.0 oz/week     0  Standard drinks or equivalent per week      Comment: 1-2 drinks per month     Family History   Problem Relation Age of Onset     Breast Cancer Mother      Prostate Cancer Father      Breast Cancer Maternal Grandmother      CANCER Maternal Grandfather      Glaucoma No family hx of      Macular Degeneration No family hx of      DIABETES No family hx of      Hypertension No family hx of          Current Outpatient Prescriptions   Medication Sig Dispense Refill     hydrocortisone (ANUSOL-HC) 2.5 % cream Place rectally 2 times daily 30 g 3     oxybutynin (DITROPAN) 5 MG tablet Crush to be instilled intravesically.  One tablet in the morning and 2 tablets in the evening. 270 tablet 3     gabapentin (NEURONTIN) 250 MG/5ML solution Take 2.5 mLs (125 mg) by mouth 3 times daily as needed 450 mL 0     NEW MED Gentamycin 240mg in 500mL 0.9% Normal Saline.  Instill 30mL into empty bladder at bedtime.  Leave in bladder overnight and drain in the morning 500 mL 3     tolterodine (DETROL) 2 MG tablet Take 1 tablet (2 mg) by mouth 2 times daily 180 tablet 2     docusate sodium (ENEMEEZ MINI) 283 MG enema Use one every other day and prn per patients's request.  Please give patient whichever mini enema's are covered by his insurance 30 enema 6     Lidocaine 0.5 % GEL Externally apply 1 Application topically every 6 hours as needed (for mid thoracic pain) 5 Tube 3     omeprazole (PRILOSEC) 20 MG CR capsule Take 1 capsule (20 mg) by mouth daily as needed       baclofen (LIORESAL) 20 MG tablet TAKE 1 TABLET(20 MG) BY MOUTH FOUR TIMES DAILY 360 tablet 3     budesonide (PULMICORT) 0.5 MG/2ML neb solution USE 2 VIALS TWICE DAILY. MIX WITH HONEY AND SWALLOW 1080 mL 0     sterile water, bottle, irrigation Irrigate with 60 mLs as directed daily 1000 mL 0     diphenhydrAMINE (BENADRYL) 25 MG tablet Take 25 mg by mouth every 8 hours as needed for itching or allergies Reported on 2/15/2017       zolpidem (AMBIEN) 10 MG tablet Take 0.5-1 tablets  (5-10 mg) by mouth nightly as needed for sleep 30 tablet 5     polyethylene glycol (MIRALAX) powder Take 17 g (1 capful) by mouth daily as needed for constipation 510 g 1     clotrimazole (LOTRIMIN) 1 % cream Apply topically twice a day as needed. 60 g 2     phenylephrine-cocoa butter (PREPARATION H) 0.25-88.44 % suppository Insert one suppository rectally twice daily as needed. 48 suppository 3     Acetaminophen (TYLENOL PO) Take 500 mg by mouth as needed for mild pain or fever Reported on 2/15/2017       Allergies   Allergen Reactions     Egg/Pro [Chicken-Derived Products (Egg)]      Fish      Wheat      BP Readings from Last 3 Encounters:   07/19/17 100/60   07/11/17 104/60   06/23/17 (!) 137/104    Wt Readings from Last 3 Encounters:   06/23/17 130 lb (59 kg)   06/05/17 130 lb (59 kg)   01/16/17 130 lb (59 kg)                  Labs reviewed in EPIC        Reviewed and updated as needed this visit by clinical staffTobacco  Allergies  Meds  Med Hx  Surg Hx  Fam Hx  Soc Hx      Reviewed and updated as needed this visit by Provider         ROS:  This 41 year old male is here today because he has a history of being quadriplegic and having eosinophilic esophagitis. He is on omeprazole daily and has had several esophageal dilations for esophageal stricture and retained foods. He comes today because he has had some left chest pains associated with some numb feelings in his wrists. His last lipid panel was 5 years ago and it was extremely normal. He stays very slim as it is hard for him to eat quickly or in large volumes. He is worried that something may be wrong with his heart. He had 2 EKG's at [facility] in the past: both showing bilateral atrial enlargements. He wasn't aware of that. He is willing to have an echocardiogram. He is not able to get on our exam table as he needs a shirin lift to get in and out of his chair. All other review of systems are negative  Personal, family, and social history reviewed with  patient and revised.         OBJECTIVE:     /60  Pulse 82  Temp 97.1  F (36.2  C) (Oral)  SpO2 96%  There is no height or weight on file to calculate BMI.   patient is wheelchair bound in his motorized wheelchair.   He is quadriplegic but does have some wrist and arm movements.   Negative tinnel's sign of his wrists  He is tender when I push on his left sternal area. This could be all costochondritis as he is constantly arching his back and trying to reposition his upper body in the chair  Heart: normal rate and rhythm with no murmur  Lungs: clear in all fields  Well hydrated  Well nourished  Well groomed      Diagnostic Test Results:  none     ASSESSMENT/PLAN:              1. Bilateral enlargement of atria  As above   - Echocardiogram Complete; Future    Return to clinic if no improvement     DEVON KELLER MD  [facility]

## 2017-07-19 NOTE — MR AVS SNAPSHOT
After Visit Summary   7/19/2017    Riley Gifford    MRN: [id number]           Patient Information     Date Of Birth          1975        Visit Information        Provider Department      7/19/2017 4:45 PM Fadumo Camilo MD [facility]        Today's Diagnoses     Bilateral enlargement of atria    -  1      Care Instructions    [facility]    If you have any questions regarding to your visit please contact your care team:       Team Purple:   Clinic Hours Telephone Number   Dr. Fadumo Pierson     7am-7pm  Monday - Thursday   7am-5pm  Fridays  [phone]  (Appointment scheduling available 24/7)    Questions about your Visit?   Team Line:  [phone]   Urgent Care - [place] and [facility] - 11am-9pm Monday-Friday Saturday-Sunday- 9am-5pm   [place] - 5pm-9pm Monday-Friday Saturday-Sunday- 9am-5pm  [phone] - [facility]  [phone] - [place]       What options do I have for visits at the clinic other than the traditional office visit?  To expand how we care for you, many of our providers are utilizing electronic visits (e-visits) and telephone visits, when medically appropriate, for interactions with their patients rather than a visit in the clinic.   We also offer nurse visits for many medical concerns. Just like any other service, we will bill your insurance company for this type of visit based on time spent on the phone with your provider. Not all insurance companies cover these visits. Please check with your medical insurance if this type of visit is covered. You will be responsible for any charges that are not paid by your insurance.      E-visits via [employer]:  generally incur a $35.00 fee.  Telephone visits:  Time spent on the phone: *charged based on time that is spent on the phone in increments of 10 minutes. Estimated cost:   5-10 mins $30.00   11-20 mins. $59.00   21-30 mins. $85.00     Use  SpringSourcehart (secure email communication and access to your chart) to send your primary care provider a message or make an appointment. Ask someone on your Team how to sign up for CAPPTURE.  For a Price Quote for your services, please call our [employer] Price Line at [phone].  As always, Thank you for trusting us with your health care needs!              Follow-ups after your visit        Your next 10 appointments already scheduled     Jul 24, 2017  3:45 PM CDT   (Arrive by 3:30 PM)   New Patient Visit with SUJATA Kerr [facility] ([facility])    [address]  [address]  [place] [address] [phone]            Jul 31, 2017  3:00 PM CDT   New Visit with Evangelista Erazo MD   [facility] ([facility])    [address] [address] [phone]              Future tests that were ordered for you today     Open Future Orders        Priority Expected Expires Ordered    Echocardiogram Complete Routine  7/19/2018 7/19/2017            Who to contact     If you have questions or need follow up information about today's clinic visit or your schedule please contact [facility] directly at [phone].  Normal or non-critical lab and imaging results will be communicated to you by SpringSourcehart, letter or phone within 4 business days after the clinic has received the results. If you do not hear from us within 7 days, please contact the clinic through MyChart or phone. If you have a critical or abnormal lab result, we will notify you by phone as soon as possible.  Submit refill requests through CAPPTURE or call your pharmacy and they will forward the refill request to us. Please allow 3 business days for your refill to be completed.          Additional Information About Your Visit        SpringSourceharGlocalReach Information     CAPPTURE gives you secure access to your electronic health  record. If you see a primary care provider, you can also send messages to your care team and make appointments. If you have questions, please call your primary care clinic.  If you do not have a primary care provider, please call [phone] and they will assist you.        Care EveryWhere ID     This is your Care EveryWhere ID. This could be used by other organizations to access your [place] medical records  [id number]        Your Vitals Were     Pulse Temperature Pulse Oximetry             82 97.1  F (36.2  C) (Oral) 96%          Blood Pressure from Last 3 Encounters:   07/19/17 100/60   07/11/17 104/60   06/23/17 (!) 137/104    Weight from Last 3 Encounters:   06/23/17 130 lb (59 kg)   06/05/17 130 lb (59 kg)   01/16/17 130 lb (59 kg)               Primary Care Provider Office Phone # Fax #    Laura Yao [phone]       [address] [address]        Goals        General    I will use the medications Tylenol or Tramadol for pain every 4-6 hours as needed.  Evidenced by the patient  using the medications to control worsening pain as verbalized by the patient. (pt-stated)     Notes - Note created  3/22/2016  2:39 PM by Robbin Andino, RN    As of today's date 3/22/2016 goal is met at 0 - 25%.   Goal Status:  Active        I will work with the [facility] nurse to get the best results for wound care as evidenced by decrease in size and verbalized pain in the area of the wound on the buttock. (pt-stated)     Notes - Note created  1/6/2016  1:43 PM by Robbin Andino RN    As of today's date 1/6/2016 goal is met at 0 - 25%.   Goal Status:  Active          Equal Access to Services     [facility] : Juhi Vides, tom esparza, monae stafford. [facility] [phone].    ATENCIÓN: Si habla español, tiene a borden disposición servicios gratuitos de asistencia lingüística. Llame al  [phone].    We comply with applicable federal civil rights laws and [place] laws. We do not discriminate on the basis of race, color, national origin, age, disability sex, sexual orientation or gender identity.            Thank you!     Thank you for choosing [facility]  for your care. Our goal is always to provide you with excellent care. Hearing back from our patients is one way we can continue to improve our services. Please take a few minutes to complete the written survey that you may receive in the mail after your visit with us. Thank you!             Your Updated Medication List - Protect others around you: Learn how to safely use, store and throw away your medicines at www.disposemymeds.org.          This list is accurate as of: 7/19/17  5:05 PM.  Always use your most recent med list.                   Brand Name Dispense Instructions for use Diagnosis    baclofen 20 MG tablet    LIORESAL    360 tablet    TAKE 1 TABLET(20 MG) BY MOUTH FOUR TIMES DAILY    Quadriplegia (H)       budesonide 0.5 MG/2ML neb solution    PULMICORT    1080 mL    USE 2 VIALS TWICE DAILY. MIX WITH HONEY AND SWALLOW    Eosinophilic esophagitis       clotrimazole 1 % cream    LOTRIMIN    60 g    Apply topically twice a day as needed.    Dermatophytosis of groin and perianal area       diphenhydrAMINE 25 MG tablet    BENADRYL     Take 25 mg by mouth every 8 hours as needed for itching or allergies Reported on 2/15/2017        docusate sodium 283 MG enema    ENEMEEZ MINI    30 enema    Use one every other day and prn per patients's request. Please give patient whichever mini enema's are covered by his insurance    Chronic constipation       gabapentin 250 MG/5ML solution    NEURONTIN    450 mL    Take 2.5 mLs (125 mg) by mouth 3 times daily as needed    Chronic midline thoracic back pain       hydrocortisone 2.5 % cream    ANUSOL-HC    30 g    Place rectally 2 times daily    Hemorrhoids, unspecified hemorrhoid type        Lidocaine 0.5 % Gel     5 Tube    Externally apply 1 Application topically every 6 hours as needed (for mid thoracic pain)    Chronic midline thoracic back pain       NEW MED     500 mL    Gentamycin 240mg in 500mL 0.9% Normal Saline. Instill 30mL into empty bladder at bedtime. Leave in bladder overnight and drain in the morning    Recurrent UTI       omeprazole 20 MG CR capsule    priLOSEC     Take 1 capsule (20 mg) by mouth daily as needed        oxybutynin 5 MG tablet    DITROPAN    270 tablet    Crush to be instilled intravesically.  One tablet in the morning and 2 tablets in the evening.    Neurogenic bladder       phenylephrine-cocoa butter 0.25-88.44 % per suppository    PREPARATION H    48 suppository    Insert one suppository rectally twice daily as needed.    External hemorrhoids       polyethylene glycol powder    MIRALAX    510 g    Take 17 g (1 capful) by mouth daily as needed for constipation    Constipation, unspecified constipation type       sterile water (bottle) irrigation     1000 mL    Irrigate with 60 mLs as directed daily    Neurogenic bladder       tolterodine 2 MG tablet    DETROL    180 tablet    Take 1 tablet (2 mg) by mouth 2 times daily    Neurogenic bladder       TYLENOL PO      Take 500 mg by mouth as needed for mild pain or fever Reported on 2/15/2017        zolpidem 10 MG tablet    AMBIEN    30 tablet    Take 0.5-1 tablets (5-10 mg) by mouth nightly as needed for sleep    Primary insomnia

## 2017-07-19 NOTE — PROGRESS NOTES
Clinic Care Coordination Contact  Care Team Conversations    The Care Coordination RN spoke with the patient today.  The patient is stating that he is having sternal chest pain as well as numbness and tingling in both arms times 2 days.  The patient is very concerned regarding the symptoms as they are not subsiding.  No signs or symptoms remain of a UTI at this time.  An appointment was made with Dr. Camilo at the [facility].  The Care Coordination RN will follow up with the patient again in a few days.      Robbin Vanegas, MSN, RN, PHN  N Clinic Care Coordinator  [facility]  Phone: [phone]  [email or web address]

## 2017-07-19 NOTE — NURSING NOTE
"Chief Complaint   Patient presents with     Chest Pain     center of chest for off and on x 1 week      Hand Problem     both hand and wrist numbness and tingling        Initial /60  Pulse 82  Temp 97.1  F (36.2  C) (Oral)  SpO2 96% Estimated body mass index is 17.15 kg/(m^2) as calculated from the following:    Height as of 6/23/17: 6' 1\" (1.854 m).    Weight as of 6/23/17: 130 lb (59 kg).  Medication Reconciliation: complete     An DUANE Hartley    "

## 2017-07-20 ENCOUNTER — TELEPHONE (OUTPATIENT)
Dept: FAMILY MEDICINE | Facility: CLINIC | Age: 42
End: 2017-07-20

## 2017-07-20 NOTE — TELEPHONE ENCOUNTER
Forms received from [employer]: Certification of Need for Laura Yao MD.  Forms placed in provider 'sign me' folder.  Please fax forms to [phone] after completion.    Gem Segal,

## 2017-07-21 ENCOUNTER — TELEPHONE (OUTPATIENT)
Dept: CARE COORDINATION | Facility: CLINIC | Age: 42
End: 2017-07-21

## 2017-07-21 ENCOUNTER — RADIANT APPOINTMENT (OUTPATIENT)
Dept: CARDIOLOGY | Facility: CLINIC | Age: 42
End: 2017-07-21
Attending: FAMILY MEDICINE
Payer: MEDICARE

## 2017-07-21 DIAGNOSIS — I51.7 BILATERAL ENLARGEMENT OF ATRIA: ICD-10-CM

## 2017-07-21 PROCEDURE — 40000264 ZZHC STATISTIC IV PUSH SINGLE INITIAL SUBSTANCE: Performed by: INTERNAL MEDICINE

## 2017-07-21 PROCEDURE — 93306 TTE W/DOPPLER COMPLETE: CPT | Performed by: INTERNAL MEDICINE

## 2017-07-21 RX ADMIN — Medication 7 ML: at 16:45

## 2017-07-21 NOTE — TELEPHONE ENCOUNTER
The patient is having an ECHO done this afternoon at 4 pm and he wanted the PCP to know so that she would get the results.  The patient is also questioning the use of a short burst of prednisone to get the EE under control again.  He thought that this may help to reduce the pain that he is having.  The final request from the patient was a follow up appointment to be able to discuss all of these items.  The Care Coordination RN was unable to schedule and will defer to the team to find an appointment for the patient.  This message will be forwarded to the PCP and the team.      Robbin Vanegas, MSN, RN, PHN  N Clinic Care Coordinator  [facility]  Phone: [phone]  [email or web address]

## 2017-07-24 ENCOUNTER — OFFICE VISIT (OUTPATIENT)
Dept: SURGERY | Facility: CLINIC | Age: 42
End: 2017-07-24

## 2017-07-24 VITALS
HEART RATE: 89 BPM | OXYGEN SATURATION: 95 % | SYSTOLIC BLOOD PRESSURE: 94 MMHG | DIASTOLIC BLOOD PRESSURE: 64 MMHG | TEMPERATURE: 98.4 F

## 2017-07-24 DIAGNOSIS — K62.5 HEMORRHAGE OF RECTUM AND ANUS: ICD-10-CM

## 2017-07-24 DIAGNOSIS — K64.8 HEMORRHOID PROLAPSE: Primary | ICD-10-CM

## 2017-07-24 ASSESSMENT — PAIN SCALES - GENERAL: PAINLEVEL: SEVERE PAIN (6)
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
## 2017-07-24 NOTE — MR AVS SNAPSHOT
After Visit Summary   7/24/2017    Riley Gifford    MRN: [id number]           Patient Information     Date Of Birth          1975        Visit Information        Provider Department      7/24/2017 3:45 PM Lulú Reveles APRN CNP [facility]        Today's Diagnoses     Hemorrhoid prolapse    -  1    Hemorrhage of rectum and anus           Follow-ups after your visit        Your next 10 appointments already scheduled     Jul 31, 2017  3:00 PM CDT   New Visit with Evangelista Erazo MD   [facility] ([facility])    [address] [facility] [address] [phone]              Who to contact     Please call your clinic at [phone] to:    Ask questions about your health    Make or cancel appointments    Discuss your medicines    Learn about your test results    Speak to your doctor   If you have compliments or concerns about an experience at your clinic, or if you wish to file a complaint, please contact [facility] Patient Relations at [phone] or email us at [email or web address]         Additional Information About Your Visit        MyChart Information     [employer] gives you secure access to your electronic health record. If you see a primary care provider, you can also send messages to your care team and make appointments. If you have questions, please call your primary care clinic.  If you do not have a primary care provider, please call [phone] and they will assist you.      [employer] is an electronic gateway that provides easy, online access to your medical records. With [employer], you can request a clinic appointment, read your test results, renew a prescription or communicate with your care team.     To access your existing account, please contact your [facility] or call [phone] for assistance.        Care EveryWhere ID      This is your Care EveryWhere ID. This could be used by other organizations to access your [facility] medical records  [id number]        Your Vitals Were     Pulse Temperature Pulse Oximetry             89 98.4  F (36.9  C) (Oral) 95%          Blood Pressure from Last 3 Encounters:   07/24/17 94/64   07/19/17 100/60   07/11/17 104/60    Weight from Last 3 Encounters:   06/23/17 130 lb   06/05/17 130 lb   01/16/17 130 lb              We Performed the Following     ANOSCOPY W/WO BRUSH/WASH        Primary Care Provider Office Phone # Fax #    Laura Yao [phone]       [address] [address]        Goals        General    I will use the medications Tylenol or Tramadol for pain every 4-6 hours as needed.  Evidenced by the patient  using the medications to control worsening pain as verbalized by the patient. (pt-stated)     Notes - Note created  3/22/2016  2:39 PM by Robbin Andino RN    As of today's date 3/22/2016 goal is met at 0 - 25%.   Goal Status:  Active        I will work with the [facility] nurse to get the best results for wound care as evidenced by decrease in size and verbalized pain in the area of the wound on the buttock. (pt-stated)     Notes - Note created  1/6/2016  1:43 PM by Robbin Andino RN    As of today's date 1/6/2016 goal is met at 0 - 25%.   Goal Status:  Active          Equal Access to Services     [facility] AH: Hadii aad ku hadasho Soomaali, waaxda luqadaha, qaybta kaalmada olenaegyada, waxmarylu cherie castillo [facility] cerv[facility] . So [facility] [phone].    ATENCIÓN: Si habla español, tiene a borden disposición servicios gratuitos de asistencia lingüística. Llame al [phone].    We comply with applicable federal civil rights laws and [place] laws. We do not discriminate on the basis of race, color, national origin, age, disability sex, sexual orientation or gender identity.            Thank you!     Thank you for choosing [facility]  for your care. Our goal is always to provide you with excellent care. Hearing back from our patients is one way we can continue to improve our services. Please take a few minutes to complete the written survey that you may receive in the mail after your visit with us. Thank you!             Your Updated Medication List - Protect others around you: Learn how to safely use, store and throw away your medicines at www.disposemymeds.org.          This list is accurate as of: 7/24/17  4:54 PM.  Always use your most recent med list.                   Brand Name Dispense Instructions for use Diagnosis    baclofen 20 MG tablet    LIORESAL    360 tablet    TAKE 1 TABLET(20 MG) BY MOUTH FOUR TIMES DAILY    Quadriplegia (H)       budesonide 0.5 MG/2ML neb solution    PULMICORT    1080 mL    USE 2 VIALS TWICE DAILY. MIX WITH HONEY AND SWALLOW    Eosinophilic esophagitis       clotrimazole 1 % cream    LOTRIMIN    60 g    Apply topically twice a day as needed.    Dermatophytosis of groin and perianal area       diphenhydrAMINE 25 MG tablet    BENADRYL     Take 25 mg by mouth every 8 hours as needed for itching or allergies Reported on 2/15/2017        docusate sodium 283 MG enema    ENEMEEZ MINI    30 enema    Use one every other day and prn per patients's request. Please give patient whichever mini enema's are covered by his insurance    Chronic constipation       gabapentin 250 MG/5ML solution    NEURONTIN    450 mL    Take 2.5 mLs (125 mg) by mouth 3 times daily as needed    Chronic midline thoracic back pain       hydrocortisone 2.5 % cream    ANUSOL-HC    30 g    Place rectally 2 times daily    Hemorrhoids, unspecified hemorrhoid type       Lidocaine 0.5 % Gel     5 Tube    Externally apply 1 Application topically every 6 hours as needed (for mid thoracic pain)    Chronic midline thoracic back pain       NEW MED     500 mL    Gentamycin 240mg in 500mL 0.9% Normal Saline. Instill 30mL into  empty bladder at bedtime. Leave in bladder overnight and drain in the morning    Recurrent UTI       omeprazole 20 MG CR capsule    priLOSEC     Take 1 capsule (20 mg) by mouth daily as needed        oxybutynin 5 MG tablet    DITROPAN    270 tablet    Crush to be instilled intravesically.  One tablet in the morning and 2 tablets in the evening.    Neurogenic bladder       phenylephrine-cocoa butter 0.25-88.44 % per suppository    PREPARATION H    48 suppository    Insert one suppository rectally twice daily as needed.    External hemorrhoids       polyethylene glycol powder    MIRALAX    510 g    Take 17 g (1 capful) by mouth daily as needed for constipation    Constipation, unspecified constipation type       sterile water (bottle) irrigation     1000 mL    Irrigate with 60 mLs as directed daily    Neurogenic bladder       tolterodine 2 MG tablet    DETROL    180 tablet    Take 1 tablet (2 mg) by mouth 2 times daily    Neurogenic bladder       TYLENOL PO      Take 500 mg by mouth as needed for mild pain or fever Reported on 2/15/2017        zolpidem 10 MG tablet    AMBIEN    30 tablet    Take 0.5-1 tablets (5-10 mg) by mouth nightly as needed for sleep    Primary insomnia

## 2017-07-24 NOTE — NURSING NOTE
Chief Complaint   Patient presents with     Clinic Care Coordination - Initial     new       Vitals:    07/24/17 1602   BP: 94/64   Pulse: 89   Temp: 98.4  F (36.9  C)   TempSrc: Oral   SpO2: 95%       There is no height or weight on file to calculate BMI.      Aib REDDING LPN

## 2017-07-25 NOTE — TELEPHONE ENCOUNTER
I.  Noted the echo  2.  Is in connection with his GI team regarding the EE symptoms?  He reported to me that his recent endoscopy was okay, I don't have the results.  I don't see that I have prescribed prednisone for him in past, not sure who has?  3.  Schedule him a visit with me at next available.  Laura Yao MD

## 2017-07-25 NOTE — TELEPHONE ENCOUNTER
Relayed message, he will call his GI team next. He wasn't sure who had given him prednisone in the past.  Scheduled 9/6 with PCP.  Neelima Yeung RN

## 2017-07-26 ENCOUNTER — CARE COORDINATION (OUTPATIENT)
Dept: CARE COORDINATION | Facility: CLINIC | Age: 42
End: 2017-07-26

## 2017-07-26 NOTE — PROGRESS NOTES
Clinic Care Coordination Contact    Situation: Patient chart reviewed by care coordinator.    Background: Quadriplegic with recurrent back pain and UTI.       Assessment: The patient has completed the current course of antibiotics.  The patient has been having a flare of his EE and recently requested a burst of prednisone which was referred to his GI specialist.  The patient has followed up with colo-rectal surgery with reassurance that the findings of the CT scan are the hemorrhoids that the patient suffers from.  The patient follows a bowel program to prevent constipation.  The patient does have a follow up appointment set with the PCP for 9-6-17.       Plan/Recommendations: 1).  The patient will follow up with the GI physician for the question on the prednisone burst.  2).  The patient will attend the appointment with the PCP for follow up on chronic conditions.  3).  The patient will continue the current plan for bowel control.  4).  Care Coordination RN will make a follow up call to the patient in 4 weeks.  5).  Care Coordination to remain available for the patient to contact in the event of future needs.        Robbin Vanegas, MSN, RN, PHN  [facility] Care Coordinator  [facility]  Phone: [phone]  [email or web address]

## 2017-07-28 DIAGNOSIS — K59.09 CHRONIC CONSTIPATION: ICD-10-CM

## 2017-07-28 DIAGNOSIS — K20.0 EOSINOPHILIC ESOPHAGITIS: ICD-10-CM

## 2017-07-28 NOTE — TELEPHONE ENCOUNTER
docusate sodium (ENEMEEZ MINI) 283 MG enema    6 ordered  EditCancel Reorder     Summary: Use one every other day and prn per patients's request. Please give patient whichever mini enema's are covered by his insurance, Disp-30 enema, R-6, Local Print   Start: 4/12/2017  Ord/Sold: 4/12/2017 (O)  Report  Taking:   Long-term:   [email or web address] [address] [place] [address] AT [facility]  Med Dose History       Patient Sig: Use one every other day and prn per patients's request.  Please give patient whichever mini enema's are covered by his insurance       Ordered on: 4/12/2017       Authorized by: LAURA YAO       Dispense: 30 enema       Admin Instructions: Use one every other day and prn per patients's request.  Please give patient whichever mini enema's are covered by his insurance          Last Office Visit with [facility], [facility] or [facility] prescribing provider: 7-                                         Next 5 appointments (look out 90 days)     Sep 06, 2017  1:00 PM CDT   SHORT with Laura Yao MD   [facility] ([facility])    [address] [place] [id number] [phone]                  Gas Relief Max Str, Lactose-Free 125mg tab chew (Mylanta Gas Maximum Strength)    Reorder       Summary: Take 1-2 tablets (125-250 mg) by mouth 4 times daily as needed for intestinal gas, Disp-1 tablet, R-0, OTC   Dose, Route, Frequency: 125-250 mg, Oral, 4 TIMES DAILY PRN  Start: 3/14/2016  End: 7/19/2017  Ord/Sold: 3/14/2016 (O)  Report  Long-term:   Pharmacy: [facility] [address] [place] [address] AT [facility] [address]  Med Dose History  ChangeDiscontinue        Patient Sig: Take 1-2 tablets (125-250 mg) by mouth 4 times daily as needed for intestinal gas        Ordered on: 3/14/2016        Authorized by: LAURA YAO        Dispense: 1 tablet        Discontinued On: 7/19/2017         DC Reason: Stopped by Patient        Patient not taking. Reported on 7/19/2017         Last Office Visit with [facility], [facility] or [facility] prescribing provider: 7-  Future Office visit:    Next 5 appointments (look out 90 days)     Sep 06, 2017  1:00 PM CDT   [facility]    [address] [place] [id number] [phone]                   Routing refill request to provider for review/approval because:  Drug not active on patient's medication list

## 2017-07-28 NOTE — TELEPHONE ENCOUNTER
He should have several refills left of the docusate if pharmacy received paper script on 4/12. Resent as a e-prescribe script.  Simethicone was marked as stopped by patient on 7/19 by another provider but it was just ordered on 7/11 by Lily Harper PA-C. Route to reorder, pended.   Neelima Yeung RN

## 2017-07-31 ENCOUNTER — OFFICE VISIT (OUTPATIENT)
Dept: PALLIATIVE MEDICINE | Facility: CLINIC | Age: 42
End: 2017-07-31
Payer: MEDICARE

## 2017-07-31 VITALS — SYSTOLIC BLOOD PRESSURE: 94 MMHG | HEART RATE: 97 BPM | DIASTOLIC BLOOD PRESSURE: 67 MMHG

## 2017-07-31 DIAGNOSIS — G89.29 CHRONIC MIDLINE THORACIC BACK PAIN: Primary | ICD-10-CM

## 2017-07-31 DIAGNOSIS — K20.0 EOSINOPHILIC ESOPHAGITIS: ICD-10-CM

## 2017-07-31 DIAGNOSIS — M51.369 DDD (DEGENERATIVE DISC DISEASE), LUMBAR: ICD-10-CM

## 2017-07-31 DIAGNOSIS — M47.894 THORACIC FACET JOINT SYNDROME: ICD-10-CM

## 2017-07-31 DIAGNOSIS — G82.50 QUADRIPLEGIA (H): ICD-10-CM

## 2017-07-31 DIAGNOSIS — M51.34 THORACIC DEGENERATIVE DISC DISEASE: ICD-10-CM

## 2017-07-31 DIAGNOSIS — G82.20 PARAPLEGIC SPINAL PARALYSIS (H): ICD-10-CM

## 2017-07-31 DIAGNOSIS — M54.6 CHRONIC MIDLINE THORACIC BACK PAIN: Primary | ICD-10-CM

## 2017-07-31 PROCEDURE — 99215 OFFICE O/P EST HI 40 MIN: CPT | Performed by: ANESTHESIOLOGY

## 2017-07-31 ASSESSMENT — PAIN SCALES - GENERAL: PAINLEVEL: MILD PAIN (3)
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
## 2017-07-31 NOTE — NURSING NOTE
The PT orders were faxed to Jasvir Payton.    Joie Mirza RN-BSN  [facility]

## 2017-07-31 NOTE — PATIENT INSTRUCTIONS
Assessment:  1.  Chronic thoracic pain  2.  Sternal pain due to esophagitis  3.  Paraplegia  4.  Thoracic degenerative disc disease  5.  Sacral pain    Plan:  Diagnosis reviewed, treatment option addressed, and risk/benefits discussed.  Self-care instructions given.  I am recommending a multidisciplinary treatment plan to help this patient better manage his pain.      1. Physical Therapy: Jasvir Payton for physical therapy eval and treat   2. Clinical Health Psychologist to address issues of relaxation, behavioral change, coping style, and other factors important to improvement: deferred - has undergone multiple pain psychology treatments  3. Diagnostic Studies: not indicated at this time  4. Medication Management:   1. Continue Baclofen 20 mg QID  2. Continue Gabapentin as prescribed - try to get some daytime doses on board  3. Continue Tylenol 500 mg two tabs TID  4. Continue Ambien 10 mg QHS  5. Further procedures recommended:   1. Consider thoracic facet joint injections at T10-11  2. Consider thoracic trigger point injections  6. Acupuncture: deferred  7. Urine toxicology screen today: deferred   8. Recommendations/follow-up for PCP:  Continue current treatments  9. Release of information: n/a  10. Follow up: three months      ----------------------------------------------------------------  Nurse Triage line:  [phone]   Call this number with any questions or concerns. You may leave a detailed message anytime. Calls are typically returned Monday through Friday between 8 AM and 4:30 PM. We usually get back to you within 2 business days depending on the issue/request.       Medication refills:    For non-narcotic medications, call your pharmacy directly to request a refill. The pharmacy will contact the [facility] for authorization. Please allow 3-4 days for these refills to be processed.     For narcotic refills, call the nurse triage line or send a [employer] message. Please contact us 7-10 days  before your refill is due. The message MUST include the name of the specific medication(s) requested and how you would like to receive the prescription(s). The options are as follows:    Pain Clinic staff can mail the prescription to your pharmacy. Please tell us the name of the pharmacy.    You may pick the prescription up at the Pain Clinic (tell us the location) or during a clinic visit with your pain provider    Pain Clinic staff can deliver the prescription to the [facility] in the clinic building. Please tell us the location.      Scheduling number: [phone].  Call this number to schedule or change appointments.    We believe regular attendance is key to your success in our program.    Any time you are unable to keep your appointment we ask that you call us at least 24 hours in advance to let us know. This will allow us to offer the appointment time to another patient.

## 2017-07-31 NOTE — NURSING NOTE
"Chief Complaint   Patient presents with     Pain       Initial BP 94/67  Pulse 97 Estimated body mass index is 17.15 kg/(m^2) as calculated from the following:    Height as of 6/23/17: 1.854 m (6' 1\").    Weight as of 6/23/17: 59 kg (130 lb).  Medication Reconciliation: mack Addison CMA (AAMA)      "

## 2017-07-31 NOTE — MR AVS SNAPSHOT
After Visit Summary   7/31/2017    Riley Gifford    MRN: [id number]           Patient Information     Date Of Birth          1975        Visit Information        Provider Department      7/31/2017 3:00 PM Evangelista Carreno MD [facility]        Today's Diagnoses     Chronic midline thoracic back pain    -  1    Paraplegic spinal paralysis (H)          Care Instructions    Assessment:  1.  Chronic thoracic pain  2.  Sternal pain due to esophagitis  3.  Paraplegia  4.  Thoracic degenerative disc disease  5.  Sacral pain    Plan:  Diagnosis reviewed, treatment option addressed, and risk/benefits discussed.  Self-care instructions given.  I am recommending a multidisciplinary treatment plan to help this patient better manage his pain.      1. Physical Therapy: Jasvir Payton for physical therapy eval and treat   2. Clinical Health Psychologist to address issues of relaxation, behavioral change, coping style, and other factors important to improvement: deferred - has undergone multiple pain psychology treatments  3. Diagnostic Studies: not indicated at this time  4. Medication Management:   1. Continue Baclofen 20 mg QID  2. Continue Gabapentin as prescribed - try to get some daytime doses on board  3. Continue Tylenol 500 mg two tabs TID  4. Continue Ambien 10 mg QHS  5. Further procedures recommended:   1. Consider thoracic facet joint injections at T10-11  2. Consider thoracic trigger point injections  6. Acupuncture: deferred  7. Urine toxicology screen today: deferred   8. Recommendations/follow-up for PCP:  Continue current treatments  9. Release of information: n/a  10. Follow up: three months      ----------------------------------------------------------------  Nurse Triage line:  [phone]   Call this number with any questions or concerns. You may leave a detailed message anytime. Calls are typically returned Monday through Friday between 8 AM and 4:30 PM. We usually get back  to you within 2 business days depending on the issue/request.       Medication refills:    For non-narcotic medications, call your pharmacy directly to request a refill. The pharmacy will contact the [facility] for authorization. Please allow 3-4 days for these refills to be processed.     For narcotic refills, call the nurse triage line or send a VibeDeckt message. Please contact us 7-10 days before your refill is due. The message MUST include the name of the specific medication(s) requested and how you would like to receive the prescription(s). The options are as follows:    Pain Clinic staff can mail the prescription to your pharmacy. Please tell us the name of the pharmacy.    You may pick the prescription up at the Pain Clinic (tell us the location) or during a clinic visit with your pain provider    Pain Clinic staff can deliver the prescription to the [facility] pharmacy in the clinic building. Please tell us the location.      Scheduling number: [phone].  Call this number to schedule or change appointments.    We believe regular attendance is key to your success in our program.    Any time you are unable to keep your appointment we ask that you call us at least 24 hours in advance to let us know. This will allow us to offer the appointment time to another patient.               Follow-ups after your visit        Additional Services     PHYSICAL THERAPY REFERRAL       Jasvir Payton physical therapy eval and treat                  Your next 10 appointments already scheduled     Sep 06, 2017  1:00 PM CDT   SHORT with Laura Yao MD   [facility] ([facility])    [address] [place] [id number] [phone]           Your Arrival times is X, We need you to be here at this time to get checked in and have the assistant get you ready for the provider, which can take about 15 minutes. Your appointment time with your provider is at  X. Thank  you.              Who to contact     If you have questions or need follow up information about today's clinic visit or your schedule please contact [facility] directly at [phone].  Normal or non-critical lab and imaging results will be communicated to you by MyChart, letter or phone within 4 business days after the clinic has received the results. If you do not hear from us within 7 days, please contact the clinic through [employer] or phone. If you have a critical or abnormal lab result, we will notify you by phone as soon as possible.  Submit refill requests through [employer] or call your pharmacy and they will forward the refill request to us. Please allow 3 business days for your refill to be completed.          Additional Information About Your Visit        [employer] Information     [employer] gives you secure access to your electronic health record. If you see a primary care provider, you can also send messages to your care team and make appointments. If you have questions, please call your primary care clinic.  If you do not have a primary care provider, please call [phone] and they will assist you.        Care EveryWhere ID     This is your Care EveryWhere ID. This could be used by other organizations to access your [facility] medical records  [id number]        Your Vitals Were     Pulse                   97            Blood Pressure from Last 3 Encounters:   07/31/17 94/67   07/24/17 94/64   07/19/17 100/60    Weight from Last 3 Encounters:   06/23/17 59 kg (130 lb)   06/05/17 59 kg (130 lb)   01/16/17 59 kg (130 lb)              We Performed the Following     PHYSICAL THERAPY REFERRAL        Primary Care Provider Office Phone # Fax #    Laura Yao [phone]       [address] [address]        Goals        General    I will use the medications Tylenol or Tramadol for pain every 4-6 hours as needed.  Evidenced by the  patient  using the medications to control worsening pain as verbalized by the patient. (pt-stated)     Notes - Note created  3/22/2016  2:39 PM by Robbin Andino RN    As of today's date 3/22/2016 goal is met at 0 - 25%.   Goal Status:  Active        I will work with the [facility] nurse to get the best results for wound care as evidenced by decrease in size and verbalized pain in the area of the wound on the buttock. (pt-stated)     Notes - Note created  1/6/2016  1:43 PM by Robbin Andino RN    As of today's date 1/6/2016 goal is met at 0 - 25%.   Goal Status:  Active          Equal Access to Services     [facility]: Hadlenora Vides, tom esparza, carolann bakermalillian lambert, monae cervantes . So [facility] [phone].    ATENCIÓN: Si habla español, tiene a borden disposición servicios gratuitos de asistencia lingüística. Llame al [phone].    We comply with applicable federal civil rights laws and [place] laws. We do not discriminate on the basis of race, color, national origin, age, disability sex, sexual orientation or gender identity.            Thank you!     Thank you for choosing [facility]  for your care. Our goal is always to provide you with excellent care. Hearing back from our patients is one way we can continue to improve our services. Please take a few minutes to complete the written survey that you may receive in the mail after your visit with us. Thank you!             Your Updated Medication List - Protect others around you: Learn how to safely use, store and throw away your medicines at www.disposemymeds.org.          This list is accurate as of: 7/31/17  4:21 PM.  Always use your most recent med list.                   Brand Name Dispense Instructions for use Diagnosis    baclofen 20 MG tablet    LIORESAL    360 tablet    TAKE 1 TABLET(20 MG) BY MOUTH FOUR TIMES DAILY    Quadriplegia (H)       budesonide 0.5 MG/2ML neb solution    PULMICORT    1080  mL    USE 2 VIALS TWICE DAILY. MIX WITH HONEY AND SWALLOW    Eosinophilic esophagitis       clotrimazole 1 % cream    LOTRIMIN    60 g    Apply topically twice a day as needed.    Dermatophytosis of groin and perianal area       diphenhydrAMINE 25 MG tablet    BENADRYL     Take 25 mg by mouth every 8 hours as needed for itching or allergies Reported on 2/15/2017        docusate sodium 283 MG enema    ENEMEEZ MINI    30 enema    Use one every other day and prn per patients's request.    Chronic constipation       gabapentin 250 MG/5ML solution    NEURONTIN    450 mL    Take 2.5 mLs (125 mg) by mouth 3 times daily as needed    Chronic midline thoracic back pain       hydrocortisone 2.5 % cream    ANUSOL-HC    30 g    Place rectally 2 times daily    Hemorrhoids, unspecified hemorrhoid type       Lidocaine 0.5 % Gel     5 Tube    Externally apply 1 Application topically every 6 hours as needed (for mid thoracic pain)    Chronic midline thoracic back pain       NEW MED     500 mL    Gentamycin 240mg in 500mL 0.9% Normal Saline. Instill 30mL into empty bladder at bedtime. Leave in bladder overnight and drain in the morning    Recurrent UTI       omeprazole 20 MG CR capsule    priLOSEC     Take 1 capsule (20 mg) by mouth daily as needed        oxybutynin 5 MG tablet    DITROPAN    270 tablet    Crush to be instilled intravesically.  One tablet in the morning and 2 tablets in the evening.    Neurogenic bladder       phenylephrine-cocoa butter 0.25-88.44 % per suppository    PREPARATION H    48 suppository    Insert one suppository rectally twice daily as needed.    External hemorrhoids       polyethylene glycol powder    MIRALAX    510 g    Take 17 g (1 capful) by mouth daily as needed for constipation    Constipation, unspecified constipation type       sod bicarbonate-citric acid-simethicone 2.21-1.53-0.04 G Pack    EZ GAS    50 packet    Take 1 packet (4 g) by mouth daily as needed    Eosinophilic esophagitis        sterile water (bottle) irrigation     1000 mL    Irrigate with 60 mLs as directed daily    Neurogenic bladder       tolterodine 2 MG tablet    DETROL    180 tablet    Take 1 tablet (2 mg) by mouth 2 times daily    Neurogenic bladder       TYLENOL PO      Take 500 mg by mouth as needed for mild pain or fever Reported on 2/15/2017        zolpidem 10 MG tablet    AMBIEN    30 tablet    Take 0.5-1 tablets (5-10 mg) by mouth nightly as needed for sleep    Primary insomnia

## 2017-08-04 ENCOUNTER — TELEPHONE (OUTPATIENT)
Dept: PALLIATIVE MEDICINE | Facility: CLINIC | Age: 42
End: 2017-08-04

## 2017-08-04 NOTE — TELEPHONE ENCOUNTER
Received initial physical therapy assessment from Jasvir Payton. Put in Dr. Ary Erazo's in-basket for review and signature. Will fax back when signed.

## 2017-08-04 NOTE — TELEPHONE ENCOUNTER
Form signed and returned to MA basket for processing.    Evangelista Erazo MD  [facility]

## 2017-08-04 NOTE — TELEPHONE ENCOUNTER
[facility] to [facility]. RightFax confirmed. Placed in to area to be scanned to Epic.   Close encounter.       Philip Herzog MA

## 2017-08-14 ENCOUNTER — TELEPHONE (OUTPATIENT)
Dept: FAMILY MEDICINE | Facility: CLINIC | Age: 42
End: 2017-08-14

## 2017-08-14 NOTE — TELEPHONE ENCOUNTER
Yes this is the team he has worked with in the past.  Usually they are able to go out the same day.    Robbin Vanegas, MSN, RN, PHN  [facility] Clinic Care Coordinator  [facility]  Phone: [phone]  [email or web address]

## 2017-08-14 NOTE — TELEPHONE ENCOUNTER
TC returned call to ARISTEO POTTS at 11:55 wondering about his insurance number & his phone number.  Left a detailed VM with the above information.  Neelima Yeung RN

## 2017-08-14 NOTE — TELEPHONE ENCOUNTER
Left a detailed VM giving verbal orders to [facility] at [phone] with our call back & fax number. Asked that they call back stating they received message & when they may be able to see patient.   Neelima Yeung RN

## 2017-08-14 NOTE — TELEPHONE ENCOUNTER
Clinic Care Coordination Contact  Care Team Conversations    The patient called today with a sore on his tailbone.  Per his caregivers it is very dark and about a quarter in size. None of the creams available to them or bandaging options have worked to reduce the size or color of the spot.  And it has gotten darker over the last few days.  The patient denies drainage, fever, chills.  He feels no pain in that area from the accident.  No signs of UTI per the patient.  He is still having the lower back pain unchanged.  Due to the history that he has with open wounds he would like to have [facility] come out for an assessment and to treat as necessary.  He would need orders sent to them.  Any additional questions can be directed to the Care Coordination RN or the patient directly.      Robbin Vanegas, MSN, RN, PHN  [facility] Clinic Care Coordinator  [facility]  Phone: [phone]  [email or web address]

## 2017-08-14 NOTE — TELEPHONE ENCOUNTER
Please give orders for [facility] wound care team for assessment and treatment.  Is this a team that Riley has worked with in the past?  Find out how quickly this can be addressed.    Laura Yao MD

## 2017-08-17 ENCOUNTER — TELEPHONE (OUTPATIENT)
Dept: FAMILY MEDICINE | Facility: CLINIC | Age: 42
End: 2017-08-17

## 2017-08-17 DIAGNOSIS — K20.0 EOSINOPHILIC ESOPHAGITIS: ICD-10-CM

## 2017-08-17 DIAGNOSIS — K21.9 GASTROESOPHAGEAL REFLUX DISEASE WITHOUT ESOPHAGITIS: ICD-10-CM

## 2017-08-17 RX ORDER — OMEPRAZOLE/SODIUM BICARBONATE 20; 1680 MG/1; MG/1
POWDER, FOR SUSPENSION ORAL
Qty: 90 EACH | Refills: 0 | Status: SHIPPED | OUTPATIENT
Start: 2017-08-17 | End: 2020-01-24

## 2017-08-17 NOTE — TELEPHONE ENCOUNTER
See 8/17 encounter. Scheduled 9/6. Prescription approved per [facility] Refill Protocol.    Neelima Yeung RN

## 2017-08-17 NOTE — TELEPHONE ENCOUNTER
The patient called today stating that he has been having an increase in the pain in his chest from his EE and as has happened before it is now affecting his wrists and hands.  There is pain in the wrists and hands.  The patient does not have any braces for this area at all and the pain coincides with the sternal chest pain.  Lying down helps, and it is much worse when he sits up.  The patient did make an appointment with GLEN DIALLO for next Thursday.  The patient requests that Dr. Yao and the triage nurse be informed in case he should be seen in the clinic sooner than the upcoming appointment with GLEN DIALLO.  The Care Coordination RN suggested to the patient the use of warm or cool packs on the area to reduce the pain.  The patient was also reminded to use the pain medications that he has to get on top of the pain.  The patient stated understanding.  This will be forwarded to the PCP and triage nurse.    Robbin Vanegas, MSN, RN, PHN  [facility] Clinic Care Coordinator  [facility]  Phone: [phone]  [email or web address]

## 2017-08-17 NOTE — TELEPHONE ENCOUNTER
Route to team as PCP is out of the office for 1.5 weeks. Patient last seen for EE on 7/11. Okay to wait until next Thursday for GI or do you have a different recommendation?  Reviewed 3/21 [place] [facility] notes & they are very familiar with patient.   Neelima Yeung RN

## 2017-08-17 NOTE — TELEPHONE ENCOUNTER
Relayed message, patient verbalized understanding.  He just asked for a refill of sodium bicarb combo from [facility], prescription approved per [facility] Refill Protocol. He is scheduled here 9/6.  Neelima Yeung RN

## 2017-08-17 NOTE — TELEPHONE ENCOUNTER
Reviewed. I know Riley well. His is a complex case. It isn't clear to me if this is his eosinophilic esophagitis or not.  I'd suggest he double up on his omeprazole for now and can try taking ranitidine (zanctac) 150 mg over the counter today x 1 pill, can take 1 pill twice daily of that in addition to the omeprazole. If it is related to GI cause, I would expect his wrist / hand symptoms to improve.    However, I don't know that the two are related and he can probably review that with his PCP upon return if the issue persists.    Thanks.  Raffy Harris, MPAS, PA-C

## 2017-08-17 NOTE — TELEPHONE ENCOUNTER
Medication Detail      Disp Refills Start End TERRANCE   omeprazole (PRILOSEC) 20 MG CR capsule   3/28/2017  --   Sig: Take 1 capsule (20 mg) by mouth daily as needed   Class: Historical   Route: Oral       Last Office Visit with [facility], [facility] or [facility] prescribing provider: 7/11/2017                                         Next 5 appointments (look out 90 days)     Sep 06, 2017  1:00 PM CDT   SHORT with Laura [facility]ao MD   [facility]    [address] [id number] [phone]            Nov 07, 2017  3:30 PM CST   Return Visit with Evangelista Erazo MD   [facility] ([facility])    [address]  [place] [id number] [phone]

## 2017-08-24 ENCOUNTER — TRANSFERRED RECORDS (OUTPATIENT)
Dept: HEALTH INFORMATION MANAGEMENT | Facility: CLINIC | Age: 42
End: 2017-08-24

## 2017-08-29 ENCOUNTER — TELEPHONE (OUTPATIENT)
Dept: FAMILY MEDICINE | Facility: CLINIC | Age: 42
End: 2017-08-29

## 2017-08-29 DIAGNOSIS — F51.01 PRIMARY INSOMNIA: ICD-10-CM

## 2017-08-29 NOTE — TELEPHONE ENCOUNTER
Forms received from [employer] [facility]ician Certification of Need for Assisted Living Services  for Laura Yao MD.  Forms placed in provider 'sign me' folder.  Please fax forms to [phone] after completion.    Luli Correa  Patient Representative

## 2017-08-29 NOTE — TELEPHONE ENCOUNTER
Medication Detail      Disp Refills Start End TERRANCE   zolpidem (AMBIEN) 10 MG tablet 30 tablet 5 11/30/2016  No   Sig: Take 0.5-1 tablets (5-10 mg) by mouth nightly as needed for sleep   Class: Local Print   Route: Oral   Order: [id number]       Last Office Visit with [facility], [facility] or [facility] prescribing provider: 7/19/2017  Future Office visit:    Next 5 appointments (look out 90 days)     Sep 06, 2017  1:00 PM CDT   SHORT with Laura Yao MD   [facility] ([facility])    [address] [place] [phone]   [phone]            Nov 07, 2017  3:30 PM CST   Return Visit with Evangelista Erazo MD   [facility] [place] ([facility])    [address] [place] [id number]   [phone]                   Routing refill request to provider for review/approval because:  Drug not on the [facility], [facility] or [facility] refill protocol or controlled substance

## 2017-08-30 RX ORDER — ZOLPIDEM TARTRATE 10 MG/1
5-10 TABLET ORAL
Qty: 30 TABLET | Refills: 5 | Status: SHIPPED | OUTPATIENT
Start: 2017-08-30 | End: 2018-03-01

## 2017-08-31 NOTE — TELEPHONE ENCOUNTER
Prescription for Ambient 10 mg faxed to; [facility].  Ammy Zelaya CMA ([facility])

## 2017-09-11 ENCOUNTER — CARE COORDINATION (OUTPATIENT)
Dept: CARE COORDINATION | Facility: CLINIC | Age: 42
End: 2017-09-11

## 2017-09-11 NOTE — PROGRESS NOTES
Clinic Care Coordination Contact  Care Team Conversations    The Care Coordination RN contacted the patient for a follow up call.  The patient states that he is still having some chest and lower back discomfort.  The patient has recently seen GI which gave him carafate to try and reduce the chest discomfort feeling that this is most likely due to the EE.  The patient is uncertain if it is helping although he will continue to use it for at least 1-2 weeks prior to contacting the GI provider again.  The patient is also interested in seeing a new allergist as the one he had been seeing is no longer a part of [place].  The Care Coordination RN encouraged the patient to look on line at the information for each of the new allergists to see if one of them fits his needs.  He stated that he will do that and then make an appointment.  The patient is still going to physical therapy and feels that it is helping somewhat.  The Care Coordination RN encouraged him to look for small successes rather than looking for all of the pain to be gone.  He stated understanding and said that he has been having some small successes.  The Care Coordination RN asked the patient about the wound on his tailbone, he stated that the wound nurse feels that it is from the way he lays in bed at night and that it will always have some pinkness to the area.      Plan:  1).  The patient will research the allergists within [place] and make an appointment with one in the near future.  2).  The patient will continue with the carafate for 1-2 additional weeks and then notify the GI provider if it is not working.  3).  The patient will continue to monitor the spot on his tailbone for an increase in redness.  4).  Care Coordination RN will contact the patient for a follow up call again in 4 weeks.  5).  Care Coordination to remain available for the patient to contact in the event of future needs.        Robbin Vanegas, MSN, RN, PHN  [facility] Care  Coordinator  [facility]  Phone: [phone]  [email or web address]

## 2017-09-14 ENCOUNTER — TELEPHONE (OUTPATIENT)
Dept: FAMILY MEDICINE | Facility: CLINIC | Age: 42
End: 2017-09-14

## 2017-09-14 NOTE — TELEPHONE ENCOUNTER
Forms received from [facility]/ Discharge Summary dated 08/25/2017  for Laura Yao MD.  Forms placed in provider 'sign me' folder.  Please fax forms to [phone] after completion.    Luli Correa  Patient Representative

## 2017-09-21 ENCOUNTER — OFFICE VISIT (OUTPATIENT)
Dept: ALLERGY | Facility: CLINIC | Age: 42
End: 2017-09-21
Payer: MEDICARE

## 2017-09-21 VITALS
BODY MASS INDEX: 17.15 KG/M2 | DIASTOLIC BLOOD PRESSURE: 68 MMHG | WEIGHT: 130 LBS | TEMPERATURE: 97.1 F | HEART RATE: 85 BPM | SYSTOLIC BLOOD PRESSURE: 106 MMHG

## 2017-09-21 DIAGNOSIS — K20.0 EOSINOPHILIC ESOPHAGITIS: Primary | ICD-10-CM

## 2017-09-21 DIAGNOSIS — J30.89 OTHER ALLERGIC RHINITIS: ICD-10-CM

## 2017-09-21 DIAGNOSIS — D72.10 EOSINOPHILIA: ICD-10-CM

## 2017-09-21 DIAGNOSIS — R07.9 CHEST PAIN, UNSPECIFIED TYPE: ICD-10-CM

## 2017-09-21 DIAGNOSIS — T78.1XXA REACTION TO FOOD, INITIAL ENCOUNTER: ICD-10-CM

## 2017-09-21 PROCEDURE — 36415 COLL VENOUS BLD VENIPUNCTURE: CPT | Performed by: ALLERGY & IMMUNOLOGY

## 2017-09-21 PROCEDURE — 86003 ALLG SPEC IGE CRUDE XTRC EA: CPT | Performed by: ALLERGY & IMMUNOLOGY

## 2017-09-21 PROCEDURE — 85025 COMPLETE CBC W/AUTO DIFF WBC: CPT | Performed by: ALLERGY & IMMUNOLOGY

## 2017-09-21 PROCEDURE — 99204 OFFICE O/P NEW MOD 45 MIN: CPT | Performed by: ALLERGY & IMMUNOLOGY

## 2017-09-21 RX ORDER — EPINEPHRINE 0.3 MG/.3ML
0.3 INJECTION SUBCUTANEOUS PRN
Qty: 0.6 ML | Refills: 3 | Status: SHIPPED | OUTPATIENT
Start: 2017-09-21 | End: 2021-06-21

## 2017-09-21 RX ORDER — PREDNISONE 20 MG/1
40 TABLET ORAL DAILY
Qty: 14 TABLET | Refills: 0 | Status: SHIPPED | OUTPATIENT
Start: 2017-09-21 | End: 2017-09-28

## 2017-09-21 ASSESSMENT — ENCOUNTER SYMPTOMS
DIARRHEA: 0
CHEST TIGHTNESS: 0
HEADACHES: 0
ACTIVITY CHANGE: 0
EYE DISCHARGE: 0
WHEEZING: 0
RHINORRHEA: 1
SINUS PRESSURE: 0
EYE ITCHING: 0
FATIGUE: 1
BACK PAIN: 1
NAUSEA: 0
STRIDOR: 0
EYE REDNESS: 0
VOMITING: 0
COUGH: 1
SHORTNESS OF BREATH: 0
FACIAL SWELLING: 0
MYALGIAS: 0
ADENOPATHY: 0
FEVER: 0

## 2017-09-21 NOTE — Clinical Note
Dear Laura Gage  The patient was seen in Allergy Clinic for  a new patient visit. Please see the office visit note for more details. Thank you for the referral!

## 2017-09-21 NOTE — LETTER
ANAPHYLAXIS ALLERGY PLAN    Name: Riley Gifford      :  1975    Allergy to:  Wheat, fish    Weight: 130 lbs 0 oz           Asthma:  No      Do not depend on antihistamines or inhalers (bronchodilators) to treat a severe reaction; USE EPINEPHRINE      MEDICATIONS/DOSES   Epinephrine dose:  0.3 mg IM   Antihistamine:  Zyrtec (Cetirizine)  Antihistamine dose:  20 mg  Other (e.g., inhaler-bronchodilator if wheezing):         ANAPHYLAXIS ALLERGY PLAN (Page 2)  Patient:  Riley Gifford  :  1975         Electronically signed on 2017 by:  Zelalem Reid MD  Parent/Guardian Authorization Signature:  ___________________________ Date:    FORM PROVIDED COURTESY OF FOOD ALLERGY RESEARCH & EDUCATION (FARE) ([email or web address]) 2017

## 2017-09-21 NOTE — PATIENT INSTRUCTIONS
Get the bloodwork done. If high eosinophils, consider Hem-Onc evaluation.  Talk to GI about colonoscopy, if there is a concern about eosinophilic gastrointestinal disease. Tell them you are taking 1 mg of budesonide twice daily. Should you use Splenda as a vehicle?    --Start prednisone 40 mg by mouth once a day for 7 days. Crush the tablets.    6 food elimination should be considered. You will need a nutritionist for that.   Http://apfed.org/ for eoe patients      - Recommended avoidance of all fish and wheat.   - Carry EpiPen 2-Keon and liquid cetirizine all the time and use it accordingly in case of accidental exposure. Call 911 or see ER after use of epinephrine.  -  Www.foodallergy.org      View  Recognizing and Treating Anaphylaxis , an online video produced by the [employer] at [facility]: https://www.motify.com/watch?v=TULhp3tp44M&feature=youtu.be

## 2017-09-21 NOTE — MR AVS SNAPSHOT
After Visit Summary   9/21/2017    Riley Gifford    MRN: [id number]           Patient Information     Date Of Birth          1975        Visit Information        Provider Department      9/21/2017 2:00 PM Zelalem Reid MD [facility] [address]        Today's Diagnoses     Eosinophilic esophagitis    -  1    Chest pain, unspecified type        Reaction to food, initial encounter        Other allergic rhinitis           Care Instructions    Get the bloodwork done. If high eosinophils, consider Hem-Onc evaluation.  Talk to GI about colonoscopy, if there is a concern about eosinophilic gastrointestinal disease. Tell them you are taking 1 gm of budesonide twice daily. Should you use Splenda as a vehicle?    --Start prednisone 40 mg by mouth once a day for 7 days. Crush the tablets.    6 food elimination should be considered. You will jesus a nutritionist for that.   Http://apfed.org/ for eoe patients      - Recommended avoidance of all fish and wheat.   - Carry EpiPen 2-Keon and liquid cetirizine all the time and use it accordingly in case of accidental exposure. Call 911 or see ER after use of epinephrine.  -  Www.foodallergy.org      View  Recognizing and Treating Anaphylaxis , an online video produced by the [employer] at [facility]: [email or web address].com/[email or web address]?[email or web address].be                    Follow-ups after your visit        Your next 10 appointments already scheduled     Nov 07, 2017  3:30 PM CST   Return Visit with Evangelista Erazo MD   [facility] [place] ([facility] [place])    [address]  [place] [address] [phone]              Who to contact     If you have questions or need follow up information about today's clinic visit or your schedule please contact [facility] directly at [phone].  Normal or non-critical lab and imaging results will be communicated to you by MyChart, letter or  phone within 4 business days after the clinic has received the results. If you do not hear from us within 7 days, please contact the clinic through Mapflow or phone. If you have a critical or abnormal lab result, we will notify you by phone as soon as possible.  Submit refill requests through Mapflow or call your pharmacy and they will forward the refill request to us. Please allow 3 business days for your refill to be completed.          Additional Information About Your Visit        ViewdleharMyows Information     Mapflow gives you secure access to your electronic health record. If you see a primary care provider, you can also send messages to your care team and make appointments. If you have questions, please call your primary care clinic.  If you do not have a primary care provider, please call [phone] and they will assist you.        Care EveryWhere ID     This is your Care EveryWhere ID. This could be used by other organizations to access your [place] medical records  [id number]        Your Vitals Were     Pulse Temperature BMI (Body Mass Index)             85 97.1  F (36.2  C) (Oral) 17.15 kg/m2          Blood Pressure from Last 3 Encounters:   09/21/17 106/68   07/31/17 94/67   07/24/17 94/64    Weight from Last 3 Encounters:   09/21/17 130 lb (59 kg)   06/23/17 130 lb (59 kg)   06/05/17 130 lb (59 kg)              We Performed the Following     Allergen codfish IgE     Allergen flounder IgE     Allergen Tramaine IgE     Allergen Halibut IgE     Allergen salmon IgE     Allergen Trout IgE     Allergen tuna IgE     CBC with platelets differential          Today's Medication Changes          These changes are accurate as of: 9/21/17  3:23 PM.  If you have any questions, ask your nurse or doctor.               Start taking these medicines.        Dose/Directions    EPINEPHrine 0.3 MG/0.3ML injection 2-pack   Commonly known as:  EPIPEN/ADRENACLICK/or ANY BX GENERIC EQUIV   Used for:  Reaction to food, initial  encounter   Started by:  Zelalem Reid MD        Dose:  0.3 mg   Inject 0.3 mLs (0.3 mg) into the muscle as needed for anaphylaxis   Quantity:  0.6 mL   Refills:  3       predniSONE 20 MG tablet   Commonly known as:  DELTASONE   Started by:  Zelalem Reid MD        Dose:  40 mg   Take 2 tablets (40 mg) by mouth daily for 7 days   Quantity:  14 tablet   Refills:  0            Where to get your medicines      These medications were sent to [x+1[id number] [place], [place] [id number] [facility] [address] [facility] [address] [facility] [address]  [address], [place] [address]     Phone:  [phone]     EPINEPHrine 0.3 MG/0.3ML injection 2-pack    predniSONE 20 MG tablet                Primary Care Provider Office Phone # Fax #    Laura Yao [phone]       [address] [facility] [address]        Goals        General    I will use the medications Tylenol or Tramadol for pain every 4-6 hours as needed.  Evidenced by the patient  using the medications to control worsening pain as verbalized by the patient. (pt-stated)     Notes - Note created  3/22/2016  2:39 PM by Robbin Andino RN    As of today's date 3/22/2016 goal is met at 0 - 25%.   Goal Status:  Active        I will work with the [facility] nurse to get the best results for wound care as evidenced by decrease in size and verbalized pain in the area of the wound on the buttock. (pt-stated)     Notes - Note created  1/6/2016  1:43 PM by Robbin Andino RN    As of today's date 1/6/2016 goal is met at 0 - 25%.   Goal Status:  Active          Equal Access to Services     [facility]: Hadlenora Vides, tom esparza, carolann youngalmamonae ponce. So [facility] [phone].    ATENCIÓN: Si habla español, tiene a borden disposición servicios gratuitos de asistencia lingüística. Llame al [phone].    We comply with applicable federal civil rights laws and [place] laws. We do not  discriminate on the basis of race, color, national origin, age, disability sex, sexual orientation or gender identity.            Thank you!     Thank you for choosing [facility]  for your care. Our goal is always to provide you with excellent care. Hearing back from our patients is one way we can continue to improve our services. Please take a few minutes to complete the written survey that you may receive in the mail after your visit with us. Thank you!             Your Updated Medication List - Protect others around you: Learn how to safely use, store and throw away your medicines at www.disposemymeds.org.          This list is accurate as of: 9/21/17  3:23 PM.  Always use your most recent med list.                   Brand Name Dispense Instructions for use Diagnosis    baclofen 20 MG tablet    LIORESAL    360 tablet    TAKE 1 TABLET(20 MG) BY MOUTH FOUR TIMES DAILY    Quadriplegia (H)       budesonide 0.5 MG/2ML neb solution    PULMICORT    1080 mL    USE 2 VIALS TWICE DAILY. MIX WITH HONEY AND SWALLOW    Eosinophilic esophagitis       clotrimazole 1 % cream    LOTRIMIN    60 g    Apply topically twice a day as needed.    Dermatophytosis of groin and perianal area       diphenhydrAMINE 25 MG tablet    BENADRYL     Take 25 mg by mouth every 8 hours as needed for itching or allergies Reported on 2/15/2017        docusate sodium 283 MG enema    ENEMEEZ MINI    30 enema    Use one every other day and prn per patients's request.    Chronic constipation       EPINEPHrine 0.3 MG/0.3ML injection 2-pack    EPIPEN/ADRENACLICK/or ANY BX GENERIC EQUIV    0.6 mL    Inject 0.3 mLs (0.3 mg) into the muscle as needed for anaphylaxis    Reaction to food, initial encounter       gabapentin 250 MG/5ML solution    NEURONTIN    450 mL    Take 2.5 mLs (125 mg) by mouth 3 times daily as needed    Chronic midline thoracic back pain       hydrocortisone 2.5 % cream    ANUSOL-HC    30 g    Place rectally 2 times daily     Hemorrhoids, unspecified hemorrhoid type       Lidocaine 0.5 % Gel     5 Tube    Externally apply 1 Application topically every 6 hours as needed (for mid thoracic pain)    Chronic midline thoracic back pain       NEW MED     500 mL    Gentamycin 240mg in 500mL 0.9% Normal Saline. Instill 30mL into empty bladder at bedtime. Leave in bladder overnight and drain in the morning    Recurrent UTI       omeprazole 20 MG CR capsule    priLOSEC     Take 1 capsule (20 mg) by mouth daily as needed        Omeprazole-Sodium Bicarbonate  MG Pack     90 each    TAKE 1 PACKET BY MOUTH DISSOLVED IN LIQUID DAILY    Eosinophilic esophagitis, Gastroesophageal reflux disease without esophagitis       oxybutynin 5 MG tablet    DITROPAN    270 tablet    Crush to be instilled intravesically.  One tablet in the morning and 2 tablets in the evening.    Neurogenic bladder       phenylephrine-cocoa butter 0.25-88.44 % per suppository    PREPARATION H    48 suppository    Insert one suppository rectally twice daily as needed.    External hemorrhoids       polyethylene glycol powder    MIRALAX    510 g    Take 17 g (1 capful) by mouth daily as needed for constipation    Constipation, unspecified constipation type       predniSONE 20 MG tablet    DELTASONE    14 tablet    Take 2 tablets (40 mg) by mouth daily for 7 days        sod bicarbonate-citric acid-simethicone 2.21-1.53-0.04 G Pack    EZ GAS    50 packet    Take 1 packet (4 g) by mouth daily as needed    Eosinophilic esophagitis       sterile water (bottle) irrigation     1000 mL    Irrigate with 60 mLs as directed daily    Neurogenic bladder       tolterodine 2 MG tablet    DETROL    180 tablet    Take 1 tablet (2 mg) by mouth 2 times daily    Neurogenic bladder       TYLENOL PO      Take 500 mg by mouth as needed for mild pain or fever Reported on 2/15/2017        zolpidem 10 MG tablet    AMBIEN    30 tablet    Take 0.5-1 tablets (5-10 mg) by mouth nightly as needed for sleep     Primary insomnia

## 2017-09-21 NOTE — NURSING NOTE
"Chief Complaint   Patient presents with     Other     EoE       Initial /68 (BP Location: Left arm, Patient Position: Sitting, Cuff Size: Adult Regular)  Pulse 85  Temp 97.1  F (36.2  C) (Oral)  Wt 130 lb (59 kg)  BMI 17.15 kg/m2 Estimated body mass index is 17.15 kg/(m^2) as calculated from the following:    Height as of 6/23/17: 6' 1\" (1.854 m).    Weight as of this encounter: 130 lb (59 kg).  Medication Reconciliation: complete    "

## 2017-09-21 NOTE — NURSING NOTE
RN educated on the symptoms and treatment of anaphylaxis. Went through the different ways that a reaction can present, and the body systems that it can affect. Instructed on when to use Epinephrine Auto Injector if she has a reaction. Patient verbalized understanding.     Writer demonstrated how to use an EpiPen auto-injector.  Patient instructed to form a fist around the auto-injector, remove blue safety release by pulling straight up, then firmly push orange tip against outer thigh so it clicks, holding for 5 seconds.  Patient advised that once used, needle will not be exposed, as orange tip extends.  Patient advised to call 911 or go to emergency department after epi-pen use for further monitoring.   Pt demonstrated EpiPen use.    Release of information sent to [facility] for results of pt's endoscopy.      Janett Zhou RN
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
## 2017-09-23 LAB
CODFISH IGE QN: <0.1 KU(A)/L
FLOUNDER IGE QN: <0.1 KU(A)/L
HADDOCK IGE QN: <0.1 KU(A)/L
HALIBUT IGE QN: 0.1 KU(A)/L
SALMON IGE QN: 0.12 KU(A)/L
TROUT IGE QN: <0.1 KU(A)/L
TUNA IGE QN: <0.1 KU(A)/L

## 2017-09-26 LAB
BASOPHILS # BLD AUTO: 0 10E9/L (ref 0–0.2)
BASOPHILS NFR BLD AUTO: 0 %
DIFFERENTIAL METHOD BLD: ABNORMAL
EOSINOPHIL # BLD AUTO: 3.2 10E9/L (ref 0–0.7)
EOSINOPHIL NFR BLD AUTO: 31 %
ERYTHROCYTE [DISTWIDTH] IN BLOOD BY AUTOMATED COUNT: 11.4 % (ref 10–15)
HCT VFR BLD AUTO: 43 % (ref 40–53)
HGB BLD-MCNC: 14.4 G/DL (ref 13.3–17.7)
LYMPHOCYTES # BLD AUTO: 1.8 10E9/L (ref 0.8–5.3)
LYMPHOCYTES NFR BLD AUTO: 17 %
MCH RBC QN AUTO: 32.4 PG (ref 26.5–33)
MCHC RBC AUTO-ENTMCNC: 33.5 G/DL (ref 31.5–36.5)
MCV RBC AUTO: 97 FL (ref 78–100)
MONOCYTES # BLD AUTO: 0.6 10E9/L (ref 0–1.3)
MONOCYTES NFR BLD AUTO: 6 %
NEUTROPHILS # BLD AUTO: 4.8 10E9/L (ref 1.6–8.3)
NEUTROPHILS NFR BLD AUTO: 46 %
PLATELET # BLD AUTO: 241 10E9/L (ref 150–450)
PLATELET # BLD EST: NORMAL 10*3/UL
RBC # BLD AUTO: 4.44 10E12/L (ref 4.4–5.9)
RBC MORPH BLD: ABNORMAL
WBC # BLD AUTO: 10.4 10E9/L (ref 4–11)

## 2017-09-28 ENCOUNTER — TRANSFERRED RECORDS (OUTPATIENT)
Dept: HEALTH INFORMATION MANAGEMENT | Facility: CLINIC | Age: 42
End: 2017-09-28

## 2017-10-01 NOTE — PROGRESS NOTES
Equivocal results for salmon.  The rest of fish IgE is negative.  -In case if his GI symptoms, and esophageal eosinophilia are well controlled, I may suggest performing percutaneous skin puncture testing and oral food challenge test.  However, at this point, he will need 6 food elimination diet.    Significant hypereosinophilia noted.  Some patients with eosinophilic esophagitis may have high levels of eosinophils; however, I suggest the patient to have a formal evaluation by hematology for hypereosinophilic syndrome.

## 2017-10-02 ENCOUNTER — TELEPHONE (OUTPATIENT)
Dept: ALLERGY | Facility: CLINIC | Age: 42
End: 2017-10-02

## 2017-10-02 NOTE — PROGRESS NOTES
Reviewed the results of upper GI endoscopy with biopsy. Procedure date: May 26, 2017.    Severe, benign-appearing, stenosis, that was dilated. Mucosal changes, including ringed esophagus and longitudinal furrows in the middle third of the esophagus and in the lower third of the esophagus.  Biopsy, esophagus, distal, eosinophilia with peak count of 30.  Mid esophagus: Esophageal eosinophilia with peak count of 10.

## 2017-10-02 NOTE — TELEPHONE ENCOUNTER
"Per Dr. Reid result note: \"Equivocal results for salmon.  The rest of fish IgE is negative.   -In case if his GI symptoms, and esophageal eosinophilia are well controlled, I may suggest performing percutaneous skin puncture testing and oral food challenge test.  However, at this point, he will need 6 food elimination diet.    Significant hypereosinophilia noted.  Some patients with eosinophilic esophagitis may have high levels of eosinophils; however, I suggest the patient to have a formal evaluation by hematology for hypereosinophilic syndrome.   Referral placed. \"      "

## 2017-10-02 NOTE — TELEPHONE ENCOUNTER
Spoke with patient and reviewed lab results.  He is not interested in skin testing or oral food challenges at this time.  He will contact his GI specialist for recommendations on the 6 food elimination diet and will call to schedule an appointment with Hematology.  Phone # given.  No further questions or concerns.  Silvina Zambrano RN

## 2017-10-06 ENCOUNTER — OFFICE VISIT (OUTPATIENT)
Dept: FAMILY MEDICINE | Facility: CLINIC | Age: 42
End: 2017-10-06
Payer: MEDICARE

## 2017-10-06 ENCOUNTER — TELEPHONE (OUTPATIENT)
Dept: PALLIATIVE MEDICINE | Facility: CLINIC | Age: 42
End: 2017-10-06

## 2017-10-06 ENCOUNTER — CARE COORDINATION (OUTPATIENT)
Dept: CARE COORDINATION | Facility: CLINIC | Age: 42
End: 2017-10-06

## 2017-10-06 VITALS
DIASTOLIC BLOOD PRESSURE: 62 MMHG | HEART RATE: 77 BPM | TEMPERATURE: 97.9 F | SYSTOLIC BLOOD PRESSURE: 98 MMHG | OXYGEN SATURATION: 98 %

## 2017-10-06 DIAGNOSIS — N31.9 NEUROGENIC BLADDER: ICD-10-CM

## 2017-10-06 DIAGNOSIS — K20.0 EOSINOPHILIC ESOPHAGITIS: ICD-10-CM

## 2017-10-06 DIAGNOSIS — R53.83 FATIGUE, UNSPECIFIED TYPE: Primary | ICD-10-CM

## 2017-10-06 LAB
ALBUMIN UR-MCNC: NEGATIVE MG/DL
AMORPH CRY #/AREA URNS HPF: ABNORMAL /HPF
APPEARANCE UR: CLEAR
BACTERIA #/AREA URNS HPF: ABNORMAL /HPF
BILIRUB UR QL STRIP: NEGATIVE
COLOR UR AUTO: YELLOW
GLUCOSE UR STRIP-MCNC: NEGATIVE MG/DL
HGB UR QL STRIP: NEGATIVE
KETONES UR STRIP-MCNC: NEGATIVE MG/DL
LEUKOCYTE ESTERASE UR QL STRIP: ABNORMAL
MUCOUS THREADS #/AREA URNS LPF: PRESENT /LPF
NITRATE UR QL: NEGATIVE
NON-SQ EPI CELLS #/AREA URNS LPF: ABNORMAL /LPF
PH UR STRIP: 7 PH (ref 5–7)
RBC #/AREA URNS AUTO: ABNORMAL /HPF
SOURCE: ABNORMAL
SP GR UR STRIP: 1.01 (ref 1–1.03)
UROBILINOGEN UR STRIP-ACNC: 0.2 EU/DL (ref 0.2–1)
WBC #/AREA URNS AUTO: ABNORMAL /HPF

## 2017-10-06 PROCEDURE — 99213 OFFICE O/P EST LOW 20 MIN: CPT | Performed by: FAMILY MEDICINE

## 2017-10-06 PROCEDURE — 81001 URINALYSIS AUTO W/SCOPE: CPT | Performed by: FAMILY MEDICINE

## 2017-10-06 NOTE — PROGRESS NOTES
Clinic Care Coordination Contact    Situation: Patient chart reviewed by care coordinator.    Background: The patient is confined to a wheelchair.  Chronic pain syndrome.    Assessment: The patient was seen by allergy and GI for the Eosinophilic Esophagitis.  The allergist has referred the patient to hematology for further evaluation of the fatigue that the patient has a continual problem with.  The patient has an appointment next week with hematology and an appointment today with [facility] practice at [facility].  The patient is being seen today for fatigue as there is concern he has picked up something such as the flu. Chronic fatigue and pain plague the patient constantly and he is being seen by specialists for these reasons.  It would maybe be a good idea if the patient has a set appointment with the provider every so many months so that his concerns can be addressed.  Without this type of set up the patient is seeing other providers that are not familiar with him and many times are not at the same clinic.  The Care Coordination RN will present this to the patient at the next call.   The patient will make and attend all follow up appointments with the PCP and the specialists.   The Care Coordination RN will make a follow up call to the patient again in 3-4 weeks.    Plan/Recommendations: 1).   The patient will make and attend all follow up appointments with the PCP and the specialists.   2).  The patient will be presented with the idea of making an appointment with the PCP for every so many weeks or months to preserve the continuity of care.  3).  The Care Coordination RN will make a follow up call to the patient again in 3-4 weeks.  4).  Care Coordination to remain available for the patient to contact in the event of future needs.        Robbin Vanegas, MSN, RN, PHN  N Clinic Care Coordinator  [facility]  Phone: [phone]  [email or web address]

## 2017-10-06 NOTE — NURSING NOTE
"Chief Complaint   Patient presents with     Fatigue       Initial BP 98/62  Pulse 77  Temp 97.9  F (36.6  C) (Oral)  SpO2 98% Estimated body mass index is 17.15 kg/(m^2) as calculated from the following:    Height as of 6/23/17: 6' 1\" (1.854 m).    Weight as of 9/21/17: 130 lb (59 kg).  Medication Reconciliation: complete    "

## 2017-10-06 NOTE — MR AVS SNAPSHOT
After Visit Summary   10/6/2017    Riley Gifford    MRN: [id number]           Patient Information     Date Of Birth          1975        Visit Information        Provider Department      10/6/2017 2:20 PM Cyrus Duffy MD [facility]        Today's Diagnoses     Fatigue, unspecified type    -  1    Neurogenic bladder        Eosinophilic esophagitis          Care Instructions    [facility]    If you have any questions regarding to your visit please contact your care team:       Team Purple:   Clinic Hours Telephone Number   Dr. Fadumo Pierson     7am-7pm  Monday - Thursday   7am-5pm  Fridays  [phone]  (Appointment scheduling available 24/7)    Questions about your Visit?   Team Line:  [phone]   Urgent Care - [place] and [facility] - 11am-9pm Monday-Friday Saturday-Sunday- 9am-5pm   [place] - 5pm-9pm Monday-Friday Saturday-Sunday- 9am-5pm  [phone] - [facility]  [phone] - [place]       What options do I have for visits at the clinic other than the traditional office visit?  To expand how we care for you, many of our providers are utilizing electronic visits (e-visits) and telephone visits, when medically appropriate, for interactions with their patients rather than a visit in the clinic.   We also offer nurse visits for many medical concerns. Just like any other service, we will bill your insurance company for this type of visit based on time spent on the phone with your provider. Not all insurance companies cover these visits. Please check with your medical insurance if this type of visit is covered. You will be responsible for any charges that are not paid by your insurance.      E-visits via Pixate:  generally incur a $35.00 fee.  Telephone visits:  Time spent on the phone: *charged based on time that is spent on the phone in increments of 10 minutes. Estimated cost:   5-10 mins  $30.00   11-20 mins. $59.00   21-30 mins. $85.00     Use NovoPedicshart (secure email communication and access to your chart) to send your primary care provider a message or make an appointment. Ask someone on your Team how to sign up for [employer].  For a Price Quote for your services, please call our [employer] Price Line at [phone].  As always, Thank you for trusting us with your health care needs!    Discharge by DUANE TERESA             Follow-ups after your visit        Your next 10 appointments already scheduled     Oct 11, 2017 11:15 AM CDT   [facility] Visit with Hazel Thompson MD   [facility]    [address] [place] [address] [phone]            Nov 07, 2017  3:30 PM CST   Return Visit with Evangelista Erazo MD   [facility] ([facility])    [address] [facility] [address] [phone]              Who to contact     If you have questions or need follow up information about today's clinic visit or your schedule please contact [facility] directly at [phone].  Normal or non-critical lab and imaging results will be communicated to you by NovoPedicshart, letter or phone within 4 business days after the clinic has received the results. If you do not hear from us within 7 days, please contact the clinic through NovoPedicshart or phone. If you have a critical or abnormal lab result, we will notify you by phone as soon as possible.  Submit refill requests through Openfinance or call your pharmacy and they will forward the refill request to us. Please allow 3 business days for your refill to be completed.          Additional Information About Your Visit        NovoPedicshar[employer] Network Information     [employer]ance gives you secure access to your electronic health record. If you see a primary care provider, you can also send messages to your care team and make appointments. If you have questions, please call your primary care  clinic.  If you do not have a primary care provider, please call [phone] and they will assist you.        Care EveryWhere ID     This is your Care EveryWhere ID. This could be used by other organizations to access your [place] medical records  [id number]        Your Vitals Were     Pulse Temperature Pulse Oximetry             77 97.9  F (36.6  C) (Oral) 98%          Blood Pressure from Last 3 Encounters:   10/06/17 98/62   09/21/17 106/68   07/31/17 94/67    Weight from Last 3 Encounters:   09/21/17 130 lb (59 kg)   06/23/17 130 lb (59 kg)   06/05/17 130 lb (59 kg)              We Performed the Following     UA reflex to Microscopic and Culture        Primary Care Provider Office Phone # Fax #    Laura Yao [phone] [address] [facility] [address]        Goals        General    I will use the medications Tylenol or Tramadol for pain every 4-6 hours as needed.  Evidenced by the patient  using the medications to control worsening pain as verbalized by the patient. (pt-stated)     Notes - Note created  3/22/2016  2:39 PM by Robbin Andino, RN    As of today's date 3/22/2016 goal is met at 0 - 25%.   Goal Status:  Active        I will work with the [facility] nurse to get the best results for wound care as evidenced by decrease in size and verbalized pain in the area of the wound on the buttock. (pt-stated)     Notes - Note created  1/6/2016  1:43 PM by Robbin Andino, RN    As of today's date 1/6/2016 goal is met at 0 - 25%.   Goal Status:  Active          Equal Access to Services     [facility] AH: Hadii alan Vides, tom esparza, monae stafford. So [facility] [phone].    ATENCIÓN: Si habla español, tiene a borden disposición servicios gratuitos de asistencia lingüística. Llame al [phone].    We comply with applicable federal civil rights laws and [place] laws. We do not discriminate on the basis of  race, color, national origin, age, disability, sex, sexual orientation, or gender identity.            Thank you!     Thank you for choosing [facility]  for your care. Our goal is always to provide you with excellent care. Hearing back from our patients is one way we can continue to improve our services. Please take a few minutes to complete the written survey that you may receive in the mail after your visit with us. Thank you!             Your Updated Medication List - Protect others around you: Learn how to safely use, store and throw away your medicines at www.disposemymeds.org.          This list is accurate as of: 10/6/17  2:56 PM.  Always use your most recent med list.                   Brand Name Dispense Instructions for use Diagnosis    baclofen 20 MG tablet    LIORESAL    360 tablet    TAKE 1 TABLET(20 MG) BY MOUTH FOUR TIMES DAILY    Quadriplegia (H)       budesonide 0.5 MG/2ML neb solution    PULMICORT    1080 mL    USE 2 VIALS TWICE DAILY. MIX WITH HONEY AND SWALLOW    Eosinophilic esophagitis       clotrimazole 1 % cream    LOTRIMIN    60 g    Apply topically twice a day as needed.    Dermatophytosis of groin and perianal area       diphenhydrAMINE 25 MG tablet    BENADRYL     Take 25 mg by mouth every 8 hours as needed for itching or allergies Reported on 2/15/2017        docusate sodium 283 MG enema    ENEMEEZ MINI    30 enema    Use one every other day and prn per patients's request.    Chronic constipation       EPINEPHrine 0.3 MG/0.3ML injection 2-pack    EPIPEN/ADRENACLICK/or ANY BX GENERIC EQUIV    0.6 mL    Inject 0.3 mLs (0.3 mg) into the muscle as needed for anaphylaxis    Reaction to food, initial encounter       gabapentin 250 MG/5ML solution    NEURONTIN    450 mL    Take 2.5 mLs (125 mg) by mouth 3 times daily as needed    Chronic midline thoracic back pain       hydrocortisone 2.5 % cream    ANUSOL-HC    30 g    Place rectally 2 times daily    Hemorrhoids, unspecified  hemorrhoid type       Lidocaine 0.5 % Gel     5 Tube    Externally apply 1 Application topically every 6 hours as needed (for mid thoracic pain)    Chronic midline thoracic back pain       NEW MED     500 mL    Gentamycin 240mg in 500mL 0.9% Normal Saline. Instill 30mL into empty bladder at bedtime. Leave in bladder overnight and drain in the morning    Recurrent UTI       omeprazole 20 MG CR capsule    priLOSEC     Take 1 capsule (20 mg) by mouth daily as needed        Omeprazole-Sodium Bicarbonate  MG Pack     90 each    TAKE 1 PACKET BY MOUTH DISSOLVED IN LIQUID DAILY    Eosinophilic esophagitis, Gastroesophageal reflux disease without esophagitis       oxybutynin 5 MG tablet    DITROPAN    270 tablet    Crush to be instilled intravesically.  One tablet in the morning and 2 tablets in the evening.    Neurogenic bladder       phenylephrine-cocoa butter 0.25-88.44 % per suppository    PREPARATION H    48 suppository    Insert one suppository rectally twice daily as needed.    External hemorrhoids       polyethylene glycol powder    MIRALAX    510 g    Take 17 g (1 capful) by mouth daily as needed for constipation    Constipation, unspecified constipation type       sod bicarbonate-citric acid-simethicone 2.21-1.53-0.04 G Pack    EZ GAS    50 packet    Take 1 packet (4 g) by mouth daily as needed    Eosinophilic esophagitis       sterile water (bottle) irrigation     1000 mL    Irrigate with 60 mLs as directed daily    Neurogenic bladder       tolterodine 2 MG tablet    DETROL    180 tablet    Take 1 tablet (2 mg) by mouth 2 times daily    Neurogenic bladder       TYLENOL PO      Take 500 mg by mouth as needed for mild pain or fever Reported on 2/15/2017        zolpidem 10 MG tablet    AMBIEN    30 tablet    Take 0.5-1 tablets (5-10 mg) by mouth nightly as needed for sleep    Primary insomnia

## 2017-10-06 NOTE — PROGRESS NOTES
SUBJECTIVE:   Riley Gifford is a 41 year old male who presents to clinic today for the following health issues:    Chief Complaint   Patient presents with     Fatigue     Fatigue:  X 1 week  He was having more spasticity in the last week; urine been cloudy early in the week but not bad lately.  He was on prednisone and completed dose a week ago  He slef catches  Wt Readings from Last 4 Encounters:   09/21/17 130 lb (59 kg)   06/23/17 130 lb (59 kg)   06/05/17 130 lb (59 kg)   01/16/17 130 lb (59 kg)     Problem list and histories reviewed & adjusted, as indicated.  Additional history: as documented    Patient Active Problem List   Diagnosis     Vitamin D deficiency     Eosinophilic esophagitis     Neurogenic bladder     CARDIOVASCULAR SCREENING; LDL GOAL LESS THAN 160     Quadriplegia (H)     Chronic constipation     Internal hemorrhoids with other complication     Diagnostic skin and sensitization tests(aka ALLERGENS)     Anal or rectal pain     Allergic rhinitis due to animal dander     Allergy to mold spores     House dust mite allergy     Insomnia     Health Care Home     Feeling worried     Gallstones     Chronic midline thoracic back pain     Pulmonary nodules     Past Surgical History:   Procedure Laterality Date     BACK SURGERY       C NONSPECIFIC PROCEDURE      C5-6 Fusion     C NONSPECIFIC PROCEDURE      Pressure Ulcer     COLONOSCOPY       CYSTOSCOPY N/A 6/23/2017    Procedure: CYSTOSCOPY;  Cystoscopy and Botox Injection Into the Bladder  ;  Surgeon: Evaristo Hayes MD;  [facility]     CYSTOSCOPY, INTRAVESICAL INJECTION N/A 5/12/2016    Procedure: CYSTOSCOPY, INTRAVESICAL INJECTION;  Surgeon: Evaristo Hayes MD;  [facility] OR     CYSTOSCOPY, INTRAVESICAL INJECTION N/A 11/11/2016    Procedure: CYSTOSCOPY, INTRAVESICAL INJECTION;  Surgeon: Evaristo Hayes MD;  [facility]     GI SURGERY      endoscopy x2     INJECT BOTOX N/A 6/23/2017    Procedure: INJECT BOTOX;;  Surgeon:  Evaristo Hayes MD;  Location:  OR       Social History   Substance Use Topics     Smoking status: Never Smoker     Smokeless tobacco: Never Used     Alcohol use 0.0 oz/week     0 Standard drinks or equivalent per week      Comment: 1-2 drinks per month     Family History   Problem Relation Age of Onset     Breast Cancer Mother      Prostate Cancer Father      Breast Cancer Maternal Grandmother      CANCER Maternal Grandfather      Glaucoma No family hx of      Macular Degeneration No family hx of      DIABETES No family hx of      Hypertension No family hx of          Reviewed and updated as needed this visit by clinical staffTobacco  Allergies  Meds       ROS:  Constitutional, HEENT, cardiovascular, pulmonary and gi systems are negative, except as otherwise noted.      OBJECTIVE:   BP 98/62  Pulse 77  Temp 97.9  F (36.6  C) (Oral)  SpO2 98%  There is no height or weight on file to calculate BMI.  GENERAL: Wheelchair bound, alert and no distress  NECK: no adenopathy and thyroid normal to palpation  RESP: lungs clear to auscultation - no rales, rhonchi or wheezes  CV: regular rate and rhythm, no murmur, click or rub, no peripheral edema   MS: no gross musculoskeletal defects noted, no edema    Diagnostic Test Results:  Urinalysis - unremarkable    ASSESSMENT/PLAN:     (R53.83) Fatigue, unspecified type  (primary encounter diagnosis)  Comment: Concerned about UTI. UA unremarkable discussed good hydration and recheck if not improving in 1 week  Plan: UA reflex to Microscopic and Culture, Urine         Microscopic    (N31.9) Neurogenic bladder  Comment: Wheel chair bound and self cathes    Call or return to clinic prn if these symptoms worsen or fail to improve as anticipated in 1 week.    Cyrus Duffy MD  [facility]

## 2017-10-06 NOTE — TELEPHONE ENCOUNTER
Form received from [facility]. Asking for signature. Will fax back when it's signed.     Routed to Dr. Castaneda.     Philip Herzog MA

## 2017-10-06 NOTE — PATIENT INSTRUCTIONS
[facility]    If you have any questions regarding to your visit please contact your care team:       Team Purple:   Clinic Hours Telephone Number   Dr. Fadumo Pierson     7am-7pm  Monday - Thursday   7am-5pm  Fridays  [phone]  (Appointment scheduling available 24/7)    Questions about your Visit?   Team Line:  [phone]   Urgent Care - [place] and [facility] - 11am-9pm Monday-Friday Saturday-Sunday- 9am-5pm   [place] - 5pm-9pm Monday-Friday Saturday-Sunday- 9am-5pm  [phone] - [facility]  [phone] - [place]       What options do I have for visits at the clinic other than the traditional office visit?  To expand how we care for you, many of our providers are utilizing electronic visits (e-visits) and telephone visits, when medically appropriate, for interactions with their patients rather than a visit in the clinic.   We also offer nurse visits for many medical concerns. Just like any other service, we will bill your insurance company for this type of visit based on time spent on the phone with your provider. Not all insurance companies cover these visits. Please check with your medical insurance if this type of visit is covered. You will be responsible for any charges that are not paid by your insurance.      E-visits via [employer]:  generally incur a $35.00 fee.  Telephone visits:  Time spent on the phone: *charged based on time that is spent on the phone in increments of 10 minutes. Estimated cost:   5-10 mins $30.00   11-20 mins. $59.00   21-30 mins. $85.00     Use CollabRxt (secure email communication and access to your chart) to send your primary care provider a message or make an appointment. Ask someone on your Team how to sign up for [employer].  For a Price Quote for your services, please call our Consumer Price Line at [phone].  As always, Thank you for trusting us with your health care needs!    Discharge by DUANE TERESA

## 2017-10-09 NOTE — TELEPHONE ENCOUNTER
Form completed and placed in MA basket.    Evangelista Erazo MD  [facility]

## 2017-10-11 ENCOUNTER — ONCOLOGY VISIT (OUTPATIENT)
Dept: ONCOLOGY | Facility: CLINIC | Age: 42
End: 2017-10-11
Payer: MEDICARE

## 2017-10-11 VITALS
RESPIRATION RATE: 20 BRPM | HEART RATE: 80 BPM | DIASTOLIC BLOOD PRESSURE: 64 MMHG | OXYGEN SATURATION: 95 % | TEMPERATURE: 98 F | SYSTOLIC BLOOD PRESSURE: 91 MMHG

## 2017-10-11 DIAGNOSIS — D72.10 EOSINOPHILIA: Primary | ICD-10-CM

## 2017-10-11 LAB
DIFFERENTIAL METHOD BLD: ABNORMAL
EOSINOPHIL # BLD AUTO: 1.7 10E9/L (ref 0–0.7)
EOSINOPHIL NFR BLD AUTO: 19 %
ERYTHROCYTE [DISTWIDTH] IN BLOOD BY AUTOMATED COUNT: 12 % (ref 10–15)
HCT VFR BLD AUTO: 43.2 % (ref 40–53)
HGB BLD-MCNC: 14.6 G/DL (ref 13.3–17.7)
LYMPHOCYTES # BLD AUTO: 2.4 10E9/L (ref 0.8–5.3)
LYMPHOCYTES NFR BLD AUTO: 27 %
MCH RBC QN AUTO: 32.7 PG (ref 26.5–33)
MCHC RBC AUTO-ENTMCNC: 33.8 G/DL (ref 31.5–36.5)
MCV RBC AUTO: 97 FL (ref 78–100)
MONOCYTES # BLD AUTO: 0.1 10E9/L (ref 0–1.3)
MONOCYTES NFR BLD AUTO: 1 %
NEUTROPHILS # BLD AUTO: 4.8 10E9/L (ref 1.6–8.3)
NEUTROPHILS NFR BLD AUTO: 53 %
PLATELET # BLD AUTO: 249 10E9/L (ref 150–450)
PLATELET # BLD EST: NORMAL 10*3/UL
RBC # BLD AUTO: 4.47 10E12/L (ref 4.4–5.9)
RBC MORPH BLD: NORMAL
RETICS # AUTO: 69.7 10E9/L (ref 25–95)
RETICS/RBC NFR AUTO: 1.6 % (ref 0.5–2)
WBC # BLD AUTO: 9 10E9/L (ref 4–11)

## 2017-10-11 PROCEDURE — 99204 OFFICE O/P NEW MOD 45 MIN: CPT | Performed by: INTERNAL MEDICINE

## 2017-10-11 PROCEDURE — 36415 COLL VENOUS BLD VENIPUNCTURE: CPT | Performed by: INTERNAL MEDICINE

## 2017-10-11 PROCEDURE — 40000611 ZZHCL STATISTIC MORPHOLOGY W/INTERP HEMEPATH TC 85060: Performed by: INTERNAL MEDICINE

## 2017-10-11 PROCEDURE — 86682 HELMINTH ANTIBODY: CPT | Mod: 90 | Performed by: INTERNAL MEDICINE

## 2017-10-11 PROCEDURE — 85045 AUTOMATED RETICULOCYTE COUNT: CPT | Performed by: INTERNAL MEDICINE

## 2017-10-11 PROCEDURE — 99000 SPECIMEN HANDLING OFFICE-LAB: CPT | Performed by: INTERNAL MEDICINE

## 2017-10-11 PROCEDURE — 85025 COMPLETE CBC W/AUTO DIFF WBC: CPT | Performed by: INTERNAL MEDICINE

## 2017-10-11 ASSESSMENT — PAIN SCALES - GENERAL: PAINLEVEL: NO PAIN (0)

## 2017-10-11 NOTE — MR AVS SNAPSHOT
After Visit Summary   10/11/2017    Riley Gifford    MRN: [id number]           Patient Information     Date Of Birth          1975        Visit Information        Provider Department      10/11/2017 11:15 AM Hazel Thompson MD [facility]        Today's Diagnoses     Eosinophilia    -  1      Care Instructions    Labs today    Follow with PCP    See me back as needed          Follow-ups after your visit        Your next 10 appointments already scheduled     Nov 07, 2017  3:30 PM CST   Return Visit with Evangelista Erazo MD   [facility] [place] ([facility])    [address] [facility]  [place] [address] [phone]              Who to contact     If you have questions or need follow up information about today's clinic visit or your schedule please contact [facility] directly at [phone].  Normal or non-critical lab and imaging results will be communicated to you by YourPlacehart, letter or phone within 4 business days after the clinic has received the results. If you do not hear from us within 7 days, please contact the clinic through YourPlacehart or phone. If you have a critical or abnormal lab result, we will notify you by phone as soon as possible.  Submit refill requests through HaloSource or call your pharmacy and they will forward the refill request to us. Please allow 3 business days for your refill to be completed.          Additional Information About Your Visit        MyChart Information     [employer] gives you secure access to your electronic health record. If you see a primary care provider, you can also send messages to your care team and make appointments. If you have questions, please call your primary care clinic.  If you do not have a primary care provider, please call [phone] and they will assist you.      HaloSource is an electronic gateway that provides easy, online access to your medical records. With HaloSource, you can  request a clinic appointment, read your test results, renew a prescription or communicate with your care team.     To access your existing account, please contact your [facility] or call [phone] for assistance.        Care EveryWhere ID     This is your Care EveryWhere ID. This could be used by other organizations to access your [place] medical records  [id number]        Your Vitals Were     Pulse Temperature Respirations Pulse Oximetry          80 98  F (36.7  C) (Oral) 20 95%         Blood Pressure from Last 3 Encounters:   10/11/17 91/64   10/06/17 98/62   09/21/17 106/68    Weight from Last 3 Encounters:   09/21/17 59 kg (130 lb)   06/23/17 59 kg (130 lb)   06/05/17 59 kg (130 lb)              We Performed the Following     Blood Morphology Pathologist Review     CBC with platelets differential     Reticulocyte Count     Strongyloides antibody IgG        Primary Care Provider Office Phone # Fax #    Laura Yao [phone] [address] [facility] [address]        Goals        General    I will use the medications Tylenol or Tramadol for pain every 4-6 hours as needed.  Evidenced by the patient  using the medications to control worsening pain as verbalized by the patient. (pt-stated)     Notes - Note created  3/22/2016  2:39 PM by Robbin Andino RN    As of today's date 3/22/2016 goal is met at 0 - 25%.   Goal Status:  Active        I will work with the [facility] nurse to get the best results for wound care as evidenced by decrease in size and verbalized pain in the area of the wound on the buttock. (pt-stated)     Notes - Note created  1/6/2016  1:43 PM by Robbin Andino RN    As of today's date 1/6/2016 goal is met at 0 - 25%.   Goal Status:  Active          Equal Access to Services     [facility]ES : Juhi Vides, tom esparza, monae stafford. So [facility]  [phone].    ATENCIÓN: Si karishma boogie, tiene a borden disposición servicios gratuitos de asistencia lingüística. Juhi mcmahon [phone].    We comply with applicable federal civil rights laws and [place] laws. We do not discriminate on the basis of race, color, national origin, age, disability, sex, sexual orientation, or gender identity.            Thank you!     Thank you for choosing [facility]  for your care. Our goal is always to provide you with excellent care. Hearing back from our patients is one way we can continue to improve our services. Please take a few minutes to complete the written survey that you may receive in the mail after your visit with us. Thank you!             Your Updated Medication List - Protect others around you: Learn how to safely use, store and throw away your medicines at www.disposemymeds.org.          This list is accurate as of: 10/11/17 12:18 PM.  Always use your most recent med list.                   Brand Name Dispense Instructions for use Diagnosis    baclofen 20 MG tablet    LIORESAL    360 tablet    TAKE 1 TABLET(20 MG) BY MOUTH FOUR TIMES DAILY    Quadriplegia (H)       budesonide 0.5 MG/2ML neb solution    PULMICORT    1080 mL    USE 2 VIALS TWICE DAILY. MIX WITH HONEY AND SWALLOW    Eosinophilic esophagitis       clotrimazole 1 % cream    LOTRIMIN    60 g    Apply topically twice a day as needed.    Dermatophytosis of groin and perianal area       diphenhydrAMINE 25 MG tablet    BENADRYL     Take 25 mg by mouth every 8 hours as needed for itching or allergies Reported on 2/15/2017        docusate sodium 283 MG enema    ENEMEEZ MINI    30 enema    Use one every other day and prn per patients's request.    Chronic constipation       EPINEPHrine 0.3 MG/0.3ML injection 2-pack    EPIPEN/ADRENACLICK/or ANY BX GENERIC EQUIV    0.6 mL    Inject 0.3 mLs (0.3 mg) into the muscle as needed for anaphylaxis    Reaction to food, initial encounter       gabapentin  250 MG/5ML solution    NEURONTIN    450 mL    Take 2.5 mLs (125 mg) by mouth 3 times daily as needed    Chronic midline thoracic back pain       hydrocortisone 2.5 % cream    ANUSOL-HC    30 g    Place rectally 2 times daily    Hemorrhoids, unspecified hemorrhoid type       Lidocaine 0.5 % Gel     5 Tube    Externally apply 1 Application topically every 6 hours as needed (for mid thoracic pain)    Chronic midline thoracic back pain       NEW MED     500 mL    Gentamycin 240mg in 500mL 0.9% Normal Saline. Instill 30mL into empty bladder at bedtime. Leave in bladder overnight and drain in the morning    Recurrent UTI       omeprazole 20 MG CR capsule    priLOSEC     Take 1 capsule (20 mg) by mouth daily as needed        Omeprazole-Sodium Bicarbonate  MG Pack     90 each    TAKE 1 PACKET BY MOUTH DISSOLVED IN LIQUID DAILY    Eosinophilic esophagitis, Gastroesophageal reflux disease without esophagitis       oxybutynin 5 MG tablet    DITROPAN    270 tablet    Crush to be instilled intravesically.  One tablet in the morning and 2 tablets in the evening.    Neurogenic bladder       phenylephrine-cocoa butter 0.25-88.44 % per suppository    PREPARATION H    48 suppository    Insert one suppository rectally twice daily as needed.    External hemorrhoids       polyethylene glycol powder    MIRALAX    510 g    Take 17 g (1 capful) by mouth daily as needed for constipation    Constipation, unspecified constipation type       sod bicarbonate-citric acid-simethicone 2.21-1.53-0.04 G Pack    EZ GAS    50 packet    Take 1 packet (4 g) by mouth daily as needed    Eosinophilic esophagitis       sterile water (bottle) irrigation     1000 mL    Irrigate with 60 mLs as directed daily    Neurogenic bladder       tolterodine 2 MG tablet    DETROL    180 tablet    Take 1 tablet (2 mg) by mouth 2 times daily    Neurogenic bladder       TYLENOL PO      Take 500 mg by mouth as needed for mild pain or fever Reported on 2/15/2017         zolpidem 10 MG tablet    AMBIEN    30 tablet    Take 0.5-1 tablets (5-10 mg) by mouth nightly as needed for sleep    Primary insomnia
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
## 2017-10-11 NOTE — NURSING NOTE
"Oncology Rooming Note    October 11, 2017 11:26 AM   Riley Gifford is a 41 year old male who presents for:    Chief Complaint   Patient presents with     Oncology Clinic Visit     [facility]     Initial Vitals: There were no vitals taken for this visit. Estimated body mass index is 17.15 kg/(m^2) as calculated from the following:    Height as of 6/23/17: 1.854 m (6' 1\").    Weight as of 9/21/17: 59 kg (130 lb). There is no height or weight on file to calculate BSA.  Data Unavailable Comment: Data Unavailable   No LMP for male patient.  Allergies reviewed: Yes  Medications reviewed: Yes    Medications: Medication refills not needed today.  Pharmacy name entered into [employer]: [facility] - [address] AT [facility] [address]    Clinical concerns:     8 minutes for nursing intake (face to face time)     CAITLYN CESAR LPN              "

## 2017-10-12 LAB — COPATH REPORT: NORMAL

## 2017-10-12 NOTE — TELEPHONE ENCOUNTER
Signed form was faxed to [facility]. RightFax confirmed.     Closing encounter.       Philip Herzog MA

## 2017-10-16 LAB — STRONGYLOIDES IGG SER IA-ACNC: 0.8 IV

## 2017-11-01 ENCOUNTER — TRANSFERRED RECORDS (OUTPATIENT)
Dept: HEALTH INFORMATION MANAGEMENT | Facility: CLINIC | Age: 42
End: 2017-11-01

## 2017-11-07 ENCOUNTER — TELEPHONE (OUTPATIENT)
Dept: FAMILY MEDICINE | Facility: CLINIC | Age: 42
End: 2017-11-07

## 2017-11-07 ENCOUNTER — OFFICE VISIT (OUTPATIENT)
Dept: PALLIATIVE MEDICINE | Facility: CLINIC | Age: 42
End: 2017-11-07
Payer: MEDICARE

## 2017-11-07 VITALS
DIASTOLIC BLOOD PRESSURE: 65 MMHG | WEIGHT: 130 LBS | BODY MASS INDEX: 17.15 KG/M2 | SYSTOLIC BLOOD PRESSURE: 96 MMHG | HEART RATE: 82 BPM

## 2017-11-07 DIAGNOSIS — G82.50 QUADRIPLEGIA (H): Primary | ICD-10-CM

## 2017-11-07 DIAGNOSIS — R10.9 FLANK PAIN: ICD-10-CM

## 2017-11-07 DIAGNOSIS — G89.29 CHRONIC MIDLINE THORACIC BACK PAIN: ICD-10-CM

## 2017-11-07 DIAGNOSIS — M54.6 CHRONIC MIDLINE THORACIC BACK PAIN: ICD-10-CM

## 2017-11-07 DIAGNOSIS — M53.3 SACRAL PAIN: ICD-10-CM

## 2017-11-07 PROCEDURE — 99213 OFFICE O/P EST LOW 20 MIN: CPT | Performed by: ANESTHESIOLOGY

## 2017-11-07 RX ORDER — CETIRIZINE HYDROCHLORIDE 10 MG/1
10 TABLET ORAL DAILY
COMMUNITY

## 2017-11-07 ASSESSMENT — PAIN SCALES - GENERAL: PAINLEVEL: SEVERE PAIN (6)

## 2017-11-07 NOTE — TELEPHONE ENCOUNTER
"Riley Gifford is a 41 year old male who calls with abdominal pain and nausea.    NURSING ASSESSMENT:  Description:  Patient has had abdominal pain and nausea the past 3-4 days, that is gradually getting worse. He says that he thinks it could be a UTI. He does not have any urinary symptoms. He denies severe weakness, cold pale skin, light headedness, vomiting black or bloody stools  Onset/duration:  3-4 days  Precip. factors:  n/a  Associated symptoms:  See description  Improves/worsens symptoms:  n/a  Allergies:   Allergies   Allergen Reactions     Banana Hives     Other reaction(s): GI Upset     Egg/Pro [Chicken-Derived Products (Egg)]      Fish      Soybean Oil      Other reaction(s): *Unknown  Discovered on allergy testing. Has never had any reaction to his knowledge     Wheat      NURSING PLAN: Nursing advice to patient see provider in 24 hours    RECOMMENDED DISPOSITION:  See in 24 hours, go to ER if symptoms worsen  Will comply with recommendation: Yes  If further questions/concerns or if symptoms do not improve, worsen or new symptoms develop, call your PCP or [facility] as soon as possible.      Guideline used:  Telephone Triage Protocols for Nurses, Fifth Edition, Yessy Anton \"abdominal pain\"    Kem Joya RN    "

## 2017-11-07 NOTE — TELEPHONE ENCOUNTER
Reason for call:  Patient reporting a symptom    Symptom or request: UTI, Patient is calling to request to be tested for a UTI.  He has been feeling sick to his stomach-getting worse and abdominal pain    Duration (how long have symptoms been present): 3-4 days    Have you been treated for this before? No    Additional comments: Patient would like to come in and drop off a Urine Sample    Phone Number patient can be reached at:  Home number on file [phone] (home)    Best Time:  Any    Can we leave a detailed message on this number:  YES    Call taken on 11/7/2017 at 4:43 PM by Mary Ramos

## 2017-11-07 NOTE — NURSING NOTE
"Chief Complaint   Patient presents with     Pain       Initial Wt 59 kg (130 lb)  BMI 17.15 kg/m2 Estimated body mass index is 17.15 kg/(m^2) as calculated from the following:    Height as of 6/23/17: 1.854 m (6' 1\").    Weight as of this encounter: 59 kg (130 lb).  Medication Reconciliation: complete     Alix Addison CMA (AAMA)      "

## 2017-11-07 NOTE — PROGRESS NOTES
[facility]    Date of visit: 11/7/2017    Chief complaint:   Chief Complaint   Patient presents with     Pain       Interval history:  Riley Gifford was last seen by me on 7/31/17.      Recommendations/plan at the last visit included:  Plan:  Diagnosis reviewed, treatment option addressed, and risk/benefits discussed.  Self-care instructions given.  I am recommending a multidisciplinary treatment plan to help this patient better manage his pain.       1. Physical Therapy: Jasvir Payton for physical therapy eval and treat   2. Clinical Health Psychologist to address issues of relaxation, behavioral change, coping style, and other factors important to improvement: deferred - has undergone multiple pain psychology treatments  3. Diagnostic Studies: not indicated at this time  4. Medication Management:   1. Continue Baclofen 20 mg QID  2. Continue Gabapentin as prescribed - try to get some daytime doses on board  3. Continue Tylenol 500 mg two tabs TID  4. Continue Ambien 10 mg QHS  5. Further procedures recommended:   1. Consider thoracic facet joint injections at T10-11  2. Consider thoracic trigger point injections  6. Acupuncture: deferred  7. Urine toxicology screen today: deferred   8. Recommendations/follow-up for PCP:  Continue current treatments  9. Release of information: n/a  10. Follow up: three months    Since his last visit, Riley Gifford reports:  He has been having lower abdominal and flank pain that started about 5 days ago.  He states that he was very fatigued last month and had labs and urine done.  The UA showed WBC, bacteria and was thought to be due to self cath.  Patient states they were going to send the urine for culture but he has not heard back from his doctor yet and no culture results are in the chart.    He complains of nausea associated with his abdominal pain.  He has had decreased appetite but denies nausea.  He denies fever or chills.    He continues with pain in the  thoracic area as well as his sacral area.  This pain is essentially unchanged since the last time he was seen.  He does have pain across his lower back into the flanks bilaterally associated with his abdominal pain.  He denies fever, chills, or vomiting associated with his nausea and states he had a normal bowel movement yesterday.  He has incomplete sensory sensation below his level of cervical injury to include his lower extremities.  He states that physical therapy was not very helpful.  He tries to get to the fitness center twice a week.    He states that his spasms have been about the same.  He denies side effects from the medications. He has cut back on the Gabapentin to sometimes at bedtime.  He states that the Ambien helps with sleep.    Pain scores:  Pain intensity on average is 4 on a scale of 0-10. Ranges from 2/10 to 6/10    Current pain treatments:   Gabapentin 125 mg TID  Baclofen 20 mg QID  Ambien 10 mg prn  Tylenol 500 mg two tabs TID    Past pain treatments:  Hydrocodone  Tramadol  Tylenol  Baclofen  Ambien  Gabapentin    Side Effects: no side effect    Medications:  Current Outpatient Prescriptions   Medication Sig Dispense Refill     cetirizine (ZYRTEC) 10 MG tablet Take 10 mg by mouth daily       EPINEPHrine (EPIPEN/ADRENACLICK/OR ANY BX GENERIC EQUIV) 0.3 MG/0.3ML injection 2-pack Inject 0.3 mLs (0.3 mg) into the muscle as needed for anaphylaxis 0.6 mL 3     zolpidem (AMBIEN) 10 MG tablet Take 0.5-1 tablets (5-10 mg) by mouth nightly as needed for sleep 30 tablet 5     Omeprazole-Sodium Bicarbonate  MG PACK TAKE 1 PACKET BY MOUTH DISSOLVED IN LIQUID DAILY 90 each 0     docusate sodium (ENEMEEZ MINI) 283 MG enema Use one every other day and prn per patients's request. 30 enema 3     hydrocortisone (ANUSOL-HC) 2.5 % cream Place rectally 2 times daily 30 g 3     oxybutynin (DITROPAN) 5 MG tablet Crush to be instilled intravesically.  One tablet in the morning and 2 tablets in the evening.  270 tablet 3     gabapentin (NEURONTIN) 250 MG/5ML solution Take 2.5 mLs (125 mg) by mouth 3 times daily as needed 450 mL 0     NEW MED Gentamycin 240mg in 500mL 0.9% Normal Saline.  Instill 30mL into empty bladder at bedtime.  Leave in bladder overnight and drain in the morning 500 mL 3     tolterodine (DETROL) 2 MG tablet Take 1 tablet (2 mg) by mouth 2 times daily 180 tablet 2     omeprazole (PRILOSEC) 20 MG CR capsule Take 1 capsule (20 mg) by mouth daily as needed       baclofen (LIORESAL) 20 MG tablet TAKE 1 TABLET(20 MG) BY MOUTH FOUR TIMES DAILY 360 tablet 3     budesonide (PULMICORT) 0.5 MG/2ML neb solution USE 2 VIALS TWICE DAILY. MIX WITH HONEY AND SWALLOW 1080 mL 0     sterile water, bottle, irrigation Irrigate with 60 mLs as directed daily 1000 mL 0     polyethylene glycol (MIRALAX) powder Take 17 g (1 capful) by mouth daily as needed for constipation 510 g 1     phenylephrine-cocoa butter (PREPARATION H) 0.25-88.44 % suppository Insert one suppository rectally twice daily as needed. 48 suppository 3     Acetaminophen (TYLENOL PO) Take 500 mg by mouth as needed for mild pain or fever Reported on 2/15/2017       sod bicarbonate-citric acid-simethicone (EZ GAS) 2.21-1.53-0.04 G PACK Take 1 packet (4 g) by mouth daily as needed (Patient not taking: Reported on 11/7/2017) 50 packet 0     Lidocaine 0.5 % GEL Externally apply 1 Application topically every 6 hours as needed (for mid thoracic pain) (Patient not taking: Reported on 11/7/2017) 5 Tube 3     diphenhydrAMINE (BENADRYL) 25 MG tablet Take 25 mg by mouth every 8 hours as needed for itching or allergies Reported on 2/15/2017       clotrimazole (LOTRIMIN) 1 % cream Apply topically twice a day as needed. (Patient not taking: Reported on 11/7/2017) 60 g 2       Medical History: any changes in medical history since they were last seen? Yes - seen in the FP clinic for fatigue, possible UTI    Review of Systems:  The 14 system ROS was reviewed from the intake  questionnaire, and is positive for: abdominal pain, nausea, back pain, thoracic pain, self catheterizes due to quadriplegia, wheelchair bound  Any bowel or bladder problems: self catheterizes,   Mood: good    Physical Exam:  BP 96/65  Pulse 82  Wt 59 kg (130 lb)  BMI 17.15 kg/m2  Constitutional: alert and no distress.  Pt is thin, well nourished  Head: Normocephalic. No masses, lesions, tenderness or abnormalities  ENT: EOMI, mucosal surfaces moist.  Neck with full ROM, posture fair  Cardiovascular: No edema or JVD appreciated.  Respiratory: Good diaphragmatic excursion. No wheezes appreciated.  Speaking in complete sentences without shortness of breath.  No accessory muscle use.  Abdomen:  Tender to palpation epigastric and left upper quadrant, non-tender in the suprapubic area. No CVA tenderness.  Abdomen soft without guarding. No organomegaly appreciated   Musculoskeletal: quadriplegia with limited use of upper extremities  Skin: no suspicious lesions or rashes appreciated on exposed areas  Neurologic: wheelchair bound due to quadriplegia    Psychiatric: mentation appears normal, affect full and good eye contact.      Cervical Spine:  Good range of motion  Thoracic Spine:  Mild bilateral tenderness  Lumbar Spine:  Non-tender, exam limited    Assessment:   1.  Chronic thoracic pain  2.  Sternal pain due to esophagitis  3.  Quadriplegia with C5-6 level  4.  Thoracic degenerative disc disease  5.  Sacral pain  6.  Thoracic and lumbar facet degenerative changes  7.  Thoracolumbar scoliosis - degenerative  8.  Abdominal pain    Riley Gifford is a 41 year old male who is seen at the pain clinic for chronic lower back and thoracic pain.  He is more concerned today about his abdominal pain and flank pain associated with nausea.  He was instructed to follow up with his PCP or with urgent care to make sure he does not have a UTI.  We will continue his current treatment.    Plan:  1. Physical Therapy:  Continue self  directed exercise  2. Clinical Health Psychologist to address issues of relaxation, behavioral change, coping style, and other factors important to improvement.  deferred  3. Diagnostic Studies:  Not indicated  4. Medication Management:    1. Continue Baclofen 20 mg QID  2. Continue Gabapentin   3. Continue Tylenol 500 mg two tabs TID  4. Continue Ambien 10 mg QHS prn  5. Further procedures recommended: none at this time  6. Recommendations to PCP: patient may be having UTI or Pyelo with new onset flank and abdominal pain - he was instructed to present for work up  7. Follow up: 3 months    Total time spent was 30 minutes, and more than 50% of face to face time was spent in counseling and/or coordination of care regarding diagnosis, medication management, and interventional procedures.    Evangelista Erazo MD  [facility]

## 2017-11-07 NOTE — PATIENT INSTRUCTIONS
Assessment:   1.  Chronic thoracic pain  2.  Sternal pain due to esophagitis  3.  Quadriplegia with C5-6 level  4.  Thoracic degenerative disc disease  5.  Sacral pain  6.  Thoracic and lumbar facet degenerative changes  7.  Thoracolumbar scoliosis - degenerative  8.  Abdominal pain      Plan:  1. Physical Therapy:  Continue self directed exercise  2. Clinical Health Psychologist to address issues of relaxation, behavioral change, coping style, and other factors important to improvement.  deferred  3. Diagnostic Studies:  Not indicated  4. Medication Management:    1. Continue Baclofen 20 mg QID  2. Continue Gabapentin   3. Continue Tylenol 500 mg two tabs TID  4. Continue Ambien 10 mg QHS prn  5. Further procedures recommended: none at this time  6. Recommendations to PCP: patient may be having UTI or Pyelo with new onset flank and abdominal pain - he was instructed to present for work up  7. Follow up: 3 months      ----------------------------------------------------------------  Nurse Triage line:  [phone]   Call this number with any questions or concerns. You may leave a detailed message anytime. Calls are typically returned Monday through Friday between 8 AM and 4:30 PM. We usually get back to you within 2 business days depending on the issue/request.       Medication refills:    For non-narcotic medications, call your pharmacy directly to request a refill. The pharmacy will contact the [facility] for authorization. Please allow 3-4 days for these refills to be processed.     For narcotic refills, call the nurse triage line or send a RentersQ message. Please contact us 7-10 days before your refill is due. The message MUST include the name of the specific medication(s) requested and how you would like to receive the prescription(s). The options are as follows:    Pain Clinic staff can mail the prescription to your pharmacy. Please tell us the name of the pharmacy.    You may pick the  prescription up at the Pain Clinic (tell us the location) or during a clinic visit with your pain provider    Pain Clinic staff can deliver the prescription to the [facility] pharmacy in the clinic building. Please tell us the location.      Scheduling number: [phone].  Call this number to schedule or change appointments.    We believe regular attendance is key to your success in our program.    Any time you are unable to keep your appointment we ask that you call us at least 24 hours in advance to let us know. This will allow us to offer the appointment time to another patient.

## 2017-11-07 NOTE — MR AVS SNAPSHOT
After Visit Summary   11/7/2017    Riley Gifford    MRN: [id number]           Patient Information     Date Of Birth          1975        Visit Information        Provider Department      11/7/2017 3:30 PM Evangelista Carreno MD [facility] Instructions    Assessment:   1.  Chronic thoracic pain  2.  Sternal pain due to esophagitis  3.  Quadriplegia with C5-6 level  4.  Thoracic degenerative disc disease  5.  Sacral pain  6.  Thoracic and lumbar facet degenerative changes  7.  Thoracolumbar scoliosis - degenerative  8.  Abdominal pain      Plan:  1. Physical Therapy:  Continue self directed exercise  2. Clinical Health Psychologist to address issues of relaxation, behavioral change, coping style, and other factors important to improvement.  deferred  3. Diagnostic Studies:  Not indicated  4. Medication Management:    1. Continue Baclofen 20 mg QID  2. Continue Gabapentin   3. Continue Tylenol 500 mg two tabs TID  4. Continue Ambien 10 mg QHS prn  5. Further procedures recommended: none at this time  6. Recommendations to PCP: patient may be having UTI or Pyelo with new onset flank and abdominal pain - he was instructed to present for work up  7. Follow up: 3 months      ----------------------------------------------------------------  Nurse Triage line:  [phone]   Call this number with any questions or concerns. You may leave a detailed message anytime. Calls are typically returned Monday through Friday between 8 AM and 4:30 PM. We usually get back to you within 2 business days depending on the issue/request.       Medication refills:    For non-narcotic medications, call your pharmacy directly to request a refill. The pharmacy will contact the [facility] for authorization. Please allow 3-4 days for these refills to be processed.     For narcotic refills, call the nurse triage line or send a Urban Remedy message. Please contact us 7-10 days before your refill is  due. The message MUST include the name of the specific medication(s) requested and how you would like to receive the prescription(s). The options are as follows:    Pain Clinic staff can mail the prescription to your pharmacy. Please tell us the name of the pharmacy.    You may pick the prescription up at the Pain Clinic (tell us the location) or during a clinic visit with your pain provider    Pain Clinic staff can deliver the prescription to the [facility] pharmacy in the clinic building. Please tell us the location.      Scheduling number: [phone].  Call this number to schedule or change appointments.    We believe regular attendance is key to your success in our program.    Any time you are unable to keep your appointment we ask that you call us at least 24 hours in advance to let us know. This will allow us to offer the appointment time to another patient.               Follow-ups after your visit        Who to contact     If you have questions or need follow up information about today's clinic visit or your schedule please contact [facility] directly at [phone].  Normal or non-critical lab and imaging results will be communicated to you by OneTaghart, letter or phone within 4 business days after the clinic has received the results. If you do not hear from us within 7 days, please contact the clinic through ThrowMotiont or phone. If you have a critical or abnormal lab result, we will notify you by phone as soon as possible.  Submit refill requests through Looklet or call your pharmacy and they will forward the refill request to us. Please allow 3 business days for your refill to be completed.          Additional Information About Your Visit        Looklet Information     Looklet gives you secure access to your electronic health record. If you see a primary care provider, you can also send messages to your care team and make appointments. If you have questions, please call your primary care clinic.   If you do not have a primary care provider, please call [phone] and they will assist you.        Care EveryWhere ID     This is your Care EveryWhere ID. This could be used by other organizations to access your [facility] medical records  [id number]        Your Vitals Were     Pulse BMI (Body Mass Index)                82 17.15 kg/m2           Blood Pressure from Last 3 Encounters:   11/07/17 96/65   10/11/17 91/64   10/06/17 98/62    Weight from Last 3 Encounters:   11/07/17 59 kg (130 lb)   09/21/17 59 kg (130 lb)   06/23/17 59 kg (130 lb)              Today, you had the following     No orders found for display       Primary Care Provider Office Phone # Fax #    Laura Yao [phone] [address] [facility] [address]        Goals        General    I will use the medications Tylenol or Tramadol for pain every 4-6 hours as needed.  Evidenced by the patient  using the medications to control worsening pain as verbalized by the patient. (pt-stated)     Notes - Note created  3/22/2016  2:39 PM by Robbin Andino RN    As of today's date 3/22/2016 goal is met at 0 - 25%.   Goal Status:  Active        I will work with the [facility] nurse to get the best results for wound care as evidenced by decrease in size and verbalized pain in the area of the wound on the buttock. (pt-stated)     Notes - Note created  1/6/2016  1:43 PM by Robbin Andino, RN    As of today's date 1/6/2016 goal is met at 0 - 25%.   Goal Status:  Active          Equal Access to Services     [facility]: Hadii alan ford Soalaina, waaxda luqadaha, qaybta kaalmamonae ponce. So [facility] [phone].    ATENCIÓN: Si habla español, tiene a borden disposición servicios gratuitos de asistencia lingüística. Llame al [phone].    We comply with applicable federal civil rights laws and [place] laws. We do not discriminate on the basis of race, color, national origin, age,  disability, sex, sexual orientation, or gender identity.            Thank you!     Thank you for choosing [facility]  for your care. Our goal is always to provide you with excellent care. Hearing back from our patients is one way we can continue to improve our services. Please take a few minutes to complete the written survey that you may receive in the mail after your visit with us. Thank you!             Your Updated Medication List - Protect others around you: Learn how to safely use, store and throw away your medicines at www.disposemymeds.org.          This list is accurate as of: 11/7/17  4:09 PM.  Always use your most recent med list.                   Brand Name Dispense Instructions for use Diagnosis    baclofen 20 MG tablet    LIORESAL    360 tablet    TAKE 1 TABLET(20 MG) BY MOUTH FOUR TIMES DAILY    Quadriplegia (H)       budesonide 0.5 MG/2ML neb solution    PULMICORT    1080 mL    USE 2 VIALS TWICE DAILY. MIX WITH HONEY AND SWALLOW    Eosinophilic esophagitis       cetirizine 10 MG tablet    zyrTEC     Take 10 mg by mouth daily        clotrimazole 1 % cream    LOTRIMIN    60 g    Apply topically twice a day as needed.    Dermatophytosis of groin and perianal area       diphenhydrAMINE 25 MG tablet    BENADRYL     Take 25 mg by mouth every 8 hours as needed for itching or allergies Reported on 2/15/2017        docusate sodium 283 MG enema    ENEMEEZ MINI    30 enema    Use one every other day and prn per patients's request.    Chronic constipation       EPINEPHrine 0.3 MG/0.3ML injection 2-pack    EPIPEN/ADRENACLICK/or ANY BX GENERIC EQUIV    0.6 mL    Inject 0.3 mLs (0.3 mg) into the muscle as needed for anaphylaxis    Reaction to food, initial encounter       gabapentin 250 MG/5ML solution    NEURONTIN    450 mL    Take 2.5 mLs (125 mg) by mouth 3 times daily as needed    Chronic midline thoracic back pain       hydrocortisone 2.5 % cream    ANUSOL-HC    30 g    Place rectally 2 times  daily    Hemorrhoids, unspecified hemorrhoid type       Lidocaine 0.5 % Gel     5 Tube    Externally apply 1 Application topically every 6 hours as needed (for mid thoracic pain)    Chronic midline thoracic back pain       NEW MED     500 mL    Gentamycin 240mg in 500mL 0.9% Normal Saline. Instill 30mL into empty bladder at bedtime. Leave in bladder overnight and drain in the morning    Recurrent UTI       omeprazole 20 MG CR capsule    priLOSEC     Take 1 capsule (20 mg) by mouth daily as needed        Omeprazole-Sodium Bicarbonate  MG Pack     90 each    TAKE 1 PACKET BY MOUTH DISSOLVED IN LIQUID DAILY    Eosinophilic esophagitis, Gastroesophageal reflux disease without esophagitis       oxybutynin 5 MG tablet    DITROPAN    270 tablet    Crush to be instilled intravesically.  One tablet in the morning and 2 tablets in the evening.    Neurogenic bladder       phenylephrine-cocoa butter 0.25-88.44 % per suppository    PREPARATION H    48 suppository    Insert one suppository rectally twice daily as needed.    External hemorrhoids       polyethylene glycol powder    MIRALAX    510 g    Take 17 g (1 capful) by mouth daily as needed for constipation    Constipation, unspecified constipation type       sod bicarbonate-citric acid-simethicone 2.21-1.53-0.04 G Pack    EZ GAS    50 packet    Take 1 packet (4 g) by mouth daily as needed    Eosinophilic esophagitis       sterile water (bottle) irrigation     1000 mL    Irrigate with 60 mLs as directed daily    Neurogenic bladder       tolterodine 2 MG tablet    DETROL    180 tablet    Take 1 tablet (2 mg) by mouth 2 times daily    Neurogenic bladder       TYLENOL PO      Take 500 mg by mouth as needed for mild pain or fever Reported on 2/15/2017        zolpidem 10 MG tablet    AMBIEN    30 tablet    Take 0.5-1 tablets (5-10 mg) by mouth nightly as needed for sleep    Primary insomnia

## 2017-11-08 ENCOUNTER — OFFICE VISIT (OUTPATIENT)
Dept: FAMILY MEDICINE | Facility: CLINIC | Age: 42
End: 2017-11-08
Payer: MEDICARE

## 2017-11-08 ENCOUNTER — TELEPHONE (OUTPATIENT)
Dept: FAMILY MEDICINE | Facility: CLINIC | Age: 42
End: 2017-11-08

## 2017-11-08 VITALS — DIASTOLIC BLOOD PRESSURE: 60 MMHG | TEMPERATURE: 97.8 F | SYSTOLIC BLOOD PRESSURE: 94 MMHG | HEART RATE: 84 BPM

## 2017-11-08 DIAGNOSIS — N39.0 URINARY TRACT INFECTION WITHOUT HEMATURIA, SITE UNSPECIFIED: Primary | ICD-10-CM

## 2017-11-08 DIAGNOSIS — R10.84 ABDOMINAL PAIN, GENERALIZED: Primary | ICD-10-CM

## 2017-11-08 LAB
ALBUMIN UR-MCNC: NEGATIVE MG/DL
AMORPH CRY #/AREA URNS HPF: ABNORMAL /HPF
APPEARANCE UR: CLEAR
BACTERIA #/AREA URNS HPF: ABNORMAL /HPF
BASOPHILS # BLD AUTO: 0.1 10E9/L (ref 0–0.2)
BASOPHILS NFR BLD AUTO: 0.7 %
BILIRUB UR QL STRIP: NEGATIVE
COLOR UR AUTO: YELLOW
DIFFERENTIAL METHOD BLD: ABNORMAL
EOSINOPHIL # BLD AUTO: 3.2 10E9/L (ref 0–0.7)
EOSINOPHIL NFR BLD AUTO: 37.4 %
ERYTHROCYTE [DISTWIDTH] IN BLOOD BY AUTOMATED COUNT: 11.8 % (ref 10–15)
GLUCOSE UR STRIP-MCNC: NEGATIVE MG/DL
HCT VFR BLD AUTO: 43.6 % (ref 40–53)
HGB BLD-MCNC: 14.5 G/DL (ref 13.3–17.7)
HGB UR QL STRIP: NEGATIVE
KETONES UR STRIP-MCNC: NEGATIVE MG/DL
LEUKOCYTE ESTERASE UR QL STRIP: ABNORMAL
LIPASE SERPL-CCNC: 129 U/L (ref 73–393)
LYMPHOCYTES # BLD AUTO: 1.4 10E9/L (ref 0.8–5.3)
LYMPHOCYTES NFR BLD AUTO: 16.4 %
MCH RBC QN AUTO: 32.2 PG (ref 26.5–33)
MCHC RBC AUTO-ENTMCNC: 33.3 G/DL (ref 31.5–36.5)
MCV RBC AUTO: 97 FL (ref 78–100)
MONOCYTES # BLD AUTO: 0.4 10E9/L (ref 0–1.3)
MONOCYTES NFR BLD AUTO: 5.1 %
NEUTROPHILS # BLD AUTO: 3.5 10E9/L (ref 1.6–8.3)
NEUTROPHILS NFR BLD AUTO: 40.4 %
NITRATE UR QL: NEGATIVE
PH UR STRIP: 7.5 PH (ref 5–7)
PLATELET # BLD AUTO: 253 10E9/L (ref 150–450)
RBC # BLD AUTO: 4.5 10E12/L (ref 4.4–5.9)
RBC #/AREA URNS AUTO: ABNORMAL /HPF
SOURCE: ABNORMAL
SP GR UR STRIP: 1.02 (ref 1–1.03)
UROBILINOGEN UR STRIP-ACNC: 0.2 EU/DL (ref 0.2–1)
WBC # BLD AUTO: 8.6 10E9/L (ref 4–11)
WBC #/AREA URNS AUTO: ABNORMAL /HPF

## 2017-11-08 PROCEDURE — 87086 URINE CULTURE/COLONY COUNT: CPT | Performed by: PHYSICIAN ASSISTANT

## 2017-11-08 PROCEDURE — 99214 OFFICE O/P EST MOD 30 MIN: CPT | Performed by: PHYSICIAN ASSISTANT

## 2017-11-08 PROCEDURE — 83690 ASSAY OF LIPASE: CPT | Performed by: PHYSICIAN ASSISTANT

## 2017-11-08 PROCEDURE — 36415 COLL VENOUS BLD VENIPUNCTURE: CPT | Performed by: PHYSICIAN ASSISTANT

## 2017-11-08 PROCEDURE — 81001 URINALYSIS AUTO W/SCOPE: CPT | Performed by: PHYSICIAN ASSISTANT

## 2017-11-08 PROCEDURE — 85025 COMPLETE CBC W/AUTO DIFF WBC: CPT | Performed by: PHYSICIAN ASSISTANT

## 2017-11-08 PROCEDURE — 80053 COMPREHEN METABOLIC PANEL: CPT | Performed by: PHYSICIAN ASSISTANT

## 2017-11-08 RX ORDER — SULFAMETHOXAZOLE AND TRIMETHOPRIM 200; 40 MG/5ML; MG/5ML
20 SUSPENSION ORAL 2 TIMES DAILY
Qty: 400 ML | Refills: 0 | Status: SHIPPED | OUTPATIENT
Start: 2017-11-08 | End: 2017-12-05

## 2017-11-08 RX ORDER — ONDANSETRON 4 MG/1
4-8 TABLET, ORALLY DISINTEGRATING ORAL EVERY 8 HOURS PRN
Qty: 20 TABLET | Refills: 1 | Status: SHIPPED | OUTPATIENT
Start: 2017-11-08 | End: 2021-01-13

## 2017-11-08 RX ORDER — SULFAMETHOXAZOLE/TRIMETHOPRIM 800-160 MG
1 TABLET ORAL 2 TIMES DAILY
Qty: 14 TABLET | Refills: 0 | Status: SHIPPED | OUTPATIENT
Start: 2017-11-08 | End: 2017-11-08

## 2017-11-08 RX ORDER — CIPROFLOXACIN 500 MG/5ML
500 KIT ORAL 2 TIMES DAILY
Qty: 70 ML | Refills: 0 | Status: SHIPPED | OUTPATIENT
Start: 2017-11-08 | End: 2017-11-15

## 2017-11-08 ASSESSMENT — ENCOUNTER SYMPTOMS
FREQUENCY: 0
CONSTIPATION: 0
NAUSEA: 1
BLOOD IN STOOL: 0
DIARRHEA: 0
COUGH: 0
HEMATURIA: 0
VOMITING: 0
ABDOMINAL PAIN: 1
DYSURIA: 0
RESPIRATORY NEGATIVE: 1
DIAPHORESIS: 0
EYE PAIN: 0
CONSTITUTIONAL NEGATIVE: 1
CARDIOVASCULAR NEGATIVE: 1
FLANK PAIN: 0
PALPITATIONS: 0
WEIGHT LOSS: 0
HEMOPTYSIS: 0
FEVER: 0

## 2017-11-08 NOTE — TELEPHONE ENCOUNTER
Will switch rx to bactrim DS oral solution twice daily X7days since it will be 24hours before they can get oral soln cipro into the pharmacy.    Lien Mckinnon PA-C

## 2017-11-08 NOTE — MR AVS SNAPSHOT
After Visit Summary   11/8/2017    Riley Gifford    MRN: [id number]           Patient Information     Date Of Birth          1975        Visit Information        Provider Department      11/8/2017 10:20 AM Lien Mckinnon PA-C [facility]        Today's Diagnoses     Abdominal pain, generalized    -  1    Acute pyelonephritis           Follow-ups after your visit        Your next 10 appointments already scheduled     Feb 12, 2018  3:00 PM CST   Return Visit with Evangelista Erazo MD   [facility] [place] ([facility])    [address]  [place] [id number] [phone]              Future tests that were ordered for you today     Open Future Orders        Priority Expected Expires Ordered    CT Abdomen Pelvis w Contrast Routine  11/8/2018 11/8/2017            Who to contact     If you have questions or need follow up information about today's clinic visit or your schedule please contact [facility] directly at [phone].  Normal or non-critical lab and imaging results will be communicated to you by Mission Marketshart, letter or phone within 4 business days after the clinic has received the results. If you do not hear from us within 7 days, please contact the clinic through Stellart or phone. If you have a critical or abnormal lab result, we will notify you by phone as soon as possible.  Submit refill requests through Hickies or call your pharmacy and they will forward the refill request to us. Please allow 3 business days for your refill to be completed.          Additional Information About Your Visit        Mission Marketshart Information     Hickies gives you secure access to your electronic health record. If you see a primary care provider, you can also send messages to your care team and make appointments. If you have questions, please call your primary care clinic.  If you do not have a primary care provider, please call [phone]  and they will assist you.        Care EveryWhere ID     This is your Care EveryWhere ID. This could be used by other organizations to access your [facility] medical records  [id number]        Your Vitals Were     Pulse Temperature                84 97.8  F (36.6  C) (Oral)           Blood Pressure from Last 3 Encounters:   11/08/17 94/60   11/07/17 96/65   10/11/17 91/64    Weight from Last 3 Encounters:   11/07/17 130 lb (59 kg)   09/21/17 130 lb (59 kg)   06/23/17 130 lb (59 kg)              We Performed the Following     CBC with platelets differential     Comprehensive metabolic panel     Lipase     UA with Microscopic reflex to Culture     Urine Culture Aerobic Bacterial          Today's Medication Changes          These changes are accurate as of: 11/8/17 11:25 AM.  If you have any questions, ask your nurse or doctor.               Start taking these medicines.        Dose/Directions    ciprofloxacin 500 MG/5ML (10%) suspension   Commonly known as:  CIPRO   Used for:  Acute pyelonephritis   Started by:  Lien Mckinnon PA-C        Dose:  500 mg   Take 5 mLs (500 mg) by mouth 2 times daily for 7 days   Quantity:  70 mL   Refills:  0       ondansetron 4 MG ODT tab   Commonly known as:  ZOFRAN ODT   Used for:  Abdominal pain, generalized   Started by:  Lien Mckinnon PA-C        Dose:  4-8 mg   Take 1-2 tablets (4-8 mg) by mouth every 8 hours as needed for nausea   Quantity:  20 tablet   Refills:  1            Where to get your medicines      These medications were sent to [facility] [address] [place] [address] AT [place] [address], [facility] [address]     Phone:  [phone]     ciprofloxacin 500 MG/5ML (10%) suspension    ondansetron 4 MG ODT tab                Primary Care Provider Office Phone # Fax #    Laura Yao [phone]       [address] [facility] [address]        Goals        General    I will use the  medications Tylenol or Tramadol for pain every 4-6 hours as needed.  Evidenced by the patient  using the medications to control worsening pain as verbalized by the patient. (pt-stated)     Notes - Note created  3/22/2016  2:39 PM by Robbin Andino RN    As of today's date 3/22/2016 goal is met at 0 - 25%.   Goal Status:  Active        I will work with the [facility] nurse to get the best results for wound care as evidenced by decrease in size and verbalized pain in the area of the wound on the buttock. (pt-stated)     Notes - Note created  1/6/2016  1:43 PM by Robbin Andino RN    As of today's date 1/6/2016 goal is met at 0 - 25%.   Goal Status:  Active          Equal Access to Services     THERON REEVES : Juhi Vides, waaxda luqadaha, qaybta kaalmada petra, monae diaz. So [facility] [phone].    ATENCIÓN: Si habla español, tiene a borden disposición servicios gratuitos de asistencia lingüística. Llame al [phone].    We comply with applicable federal civil rights laws and [place] laws. We do not discriminate on the basis of race, color, national origin, age, disability, sex, sexual orientation, or gender identity.            Thank you!     Thank you for choosing [facility]  for your care. Our goal is always to provide you with excellent care. Hearing back from our patients is one way we can continue to improve our services. Please take a few minutes to complete the written survey that you may receive in the mail after your visit with us. Thank you!             Your Updated Medication List - Protect others around you: Learn how to safely use, store and throw away your medicines at www.disposemymeds.org.          This list is accurate as of: 11/8/17 11:25 AM.  Always use your most recent med list.                   Brand Name Dispense Instructions for use Diagnosis    baclofen 20 MG tablet    LIORESAL    360 tablet    TAKE 1 TABLET(20 MG) BY MOUTH FOUR  TIMES DAILY    Quadriplegia (H)       budesonide 0.5 MG/2ML neb solution    PULMICORT    1080 mL    USE 2 VIALS TWICE DAILY. MIX WITH HONEY AND SWALLOW    Eosinophilic esophagitis       cetirizine 10 MG tablet    zyrTEC     Take 10 mg by mouth daily        ciprofloxacin 500 MG/5ML (10%) suspension    CIPRO    70 mL    Take 5 mLs (500 mg) by mouth 2 times daily for 7 days    Acute pyelonephritis       clotrimazole 1 % cream    LOTRIMIN    60 g    Apply topically twice a day as needed.    Dermatophytosis of groin and perianal area       diphenhydrAMINE 25 MG tablet    BENADRYL     Take 25 mg by mouth every 8 hours as needed for itching or allergies Reported on 2/15/2017        docusate sodium 283 MG enema    ENEMEEZ MINI    30 enema    Use one every other day and prn per patients's request.    Chronic constipation       EPINEPHrine 0.3 MG/0.3ML injection 2-pack    EPIPEN/ADRENACLICK/or ANY BX GENERIC EQUIV    0.6 mL    Inject 0.3 mLs (0.3 mg) into the muscle as needed for anaphylaxis    Reaction to food, initial encounter       gabapentin 250 MG/5ML solution    NEURONTIN    450 mL    Take 2.5 mLs (125 mg) by mouth 3 times daily as needed    Chronic midline thoracic back pain       hydrocortisone 2.5 % cream    ANUSOL-HC    30 g    Place rectally 2 times daily    Hemorrhoids, unspecified hemorrhoid type       Lidocaine 0.5 % Gel     5 Tube    Externally apply 1 Application topically every 6 hours as needed (for mid thoracic pain)    Chronic midline thoracic back pain       NEW MED     500 mL    Gentamycin 240mg in 500mL 0.9% Normal Saline. Instill 30mL into empty bladder at bedtime. Leave in bladder overnight and drain in the morning    Recurrent UTI       omeprazole 20 MG CR capsule    priLOSEC     Take 1 capsule (20 mg) by mouth daily as needed        Omeprazole-Sodium Bicarbonate  MG Pack     90 each    TAKE 1 PACKET BY MOUTH DISSOLVED IN LIQUID DAILY    Eosinophilic esophagitis, Gastroesophageal reflux  disease without esophagitis       ondansetron 4 MG ODT tab    ZOFRAN ODT    20 tablet    Take 1-2 tablets (4-8 mg) by mouth every 8 hours as needed for nausea    Abdominal pain, generalized       oxybutynin 5 MG tablet    DITROPAN    270 tablet    Crush to be instilled intravesically.  One tablet in the morning and 2 tablets in the evening.    Neurogenic bladder       phenylephrine-cocoa butter 0.25-88.44 % per suppository    PREPARATION H    48 suppository    Insert one suppository rectally twice daily as needed.    External hemorrhoids       polyethylene glycol powder    MIRALAX    510 g    Take 17 g (1 capful) by mouth daily as needed for constipation    Constipation, unspecified constipation type       sod bicarbonate-citric acid-simethicone 2.21-1.53-0.04 G Pack    EZ GAS    50 packet    Take 1 packet (4 g) by mouth daily as needed    Eosinophilic esophagitis       sterile water (bottle) irrigation     1000 mL    Irrigate with 60 mLs as directed daily    Neurogenic bladder       tolterodine 2 MG tablet    DETROL    180 tablet    Take 1 tablet (2 mg) by mouth 2 times daily    Neurogenic bladder       TYLENOL PO      Take 500 mg by mouth as needed for mild pain or fever Reported on 2/15/2017        zolpidem 10 MG tablet    AMBIEN    30 tablet    Take 0.5-1 tablets (5-10 mg) by mouth nightly as needed for sleep    Primary insomnia

## 2017-11-08 NOTE — TELEPHONE ENCOUNTER
Reason for Call:  Other     Detailed comments: [facility]se marian Josette calling stating they received scripts for both Cipro and Bactrim. Cipro is not in stock. Provider had called to clarify to cancel the Bactrim. Being the Cipro is out of stock, can the patient be provided the Bactrim in liquid form.    Phone Number Patient can be reached at: Other phone number:  [phone]    Best Time: any    Can we leave a detailed message on this number? NO    Call taken on 11/8/2017 at 11:53 AM by Keesha Abel

## 2017-11-08 NOTE — NURSING NOTE
"Chief Complaint   Patient presents with     Abdominal Pain     Flu Shot     Pt declined        Initial BP 94/60 (BP Location: Right arm, Cuff Size: Adult Regular)  Pulse 84  Temp 97.8  F (36.6  C) (Oral) Estimated body mass index is 17.15 kg/(m^2) as calculated from the following:    Height as of 6/23/17: 6' 1\" (1.854 m).    Weight as of 11/7/17: 130 lb (59 kg).  Medication Reconciliation: complete     Valery Zamora CMA (AAMA)      "

## 2017-11-08 NOTE — PROGRESS NOTES
"  SUBJECTIVE:   Riley Gifford is a 41 year old male who presents to clinic today for the following health issues:  {Provider please address medication reconciliation discrepancies--rooming staff please delete if no med/rec issues}    { Abdominal and/or Flank :543821} PAIN     Onset: ***    Description:   Character: {.:949718}  Location: {.:854638}  Radiation: {.:212261::\"None\"}    Intensity: {.:407760}    Progression of Symptoms:  {.:812628}    Accompanying Signs & Symptoms:  Fever/Chills?: { :043807}  Gas/Bloating: { :335855}  Nausea: { :537779}  Vomitting: { :416020}  Diarrhea?: { :413536}  Constipation:{ :119454}  Dysuria or Hematuria: { :852515}   History:   Trauma: { :819926}  Previous similar pain: { :817606}   Previous tests done: { .:227569}    Precipitating factors:   Does the pain change with:     Food: { :755195}     BM: { :599148}    Urination: { :487948}    Alleviating factors:  ***    Therapies Tried and outcome: ***    LMP:  {NOT applicable:037119::\"not applicable\"}         {additional problems for provider to add:240011}    Problem list and histories reviewed & adjusted, as indicated.  Additional history: {NONE - AS DOCUMENTED:213113::\"as documented\"}    {HIST REVIEW/ LINKS 2:433410}    Reviewed and updated as needed this visit by clinical staff     Reviewed and updated as needed this visit by Provider         {PROVIDER CHARTING PREFERENCE:044722}    "

## 2017-11-08 NOTE — PROGRESS NOTES
SUBJECTIVE:     HPI  Riley Gifford is a 41 year old male who presents to clinic today for the following health issues:  ABDOMINAL and FLANK PAIN     Onset: 1 week.      Description:   Character: Dull ache  Location: across the middle of his abdomen  Radiation:  To the back    Intensity: moderate    Progression of Symptoms:  Worsening, 5/10    Accompanying Signs & Symptoms:  Fever/Chills?: no   Gas/Bloating: no   Nausea: YES  Vomitting: no   Diarrhea?: no   Constipation:no.  No bloody or black tarry stools.   Dysuria or Hematuria: Noticed cloudy urine.  No dysuria, urinary frequency, urgency or hematuria.  Quadriplegia and is unable to determine if he has these symptoms at times.  No testicular pain, penile d/c or lesions.   History:   Trauma: no   Previous similar pain: YES, similar to his UTI pain which he is prone to from self caths.  Also reports hx of gallstones as well.   Previous tests done: none    Precipitating factors:   Does the pain change with:     Food: yes       BM: no     Urination: no     Alleviating factors:  Nothing     Therapies Tried and outcome: tylenol and fluids without any relief    LMP:  not applicable       Reviewed PMH.  Patient Active Problem List   Diagnosis     Vitamin D deficiency     Eosinophilic esophagitis     Neurogenic bladder     CARDIOVASCULAR SCREENING; LDL GOAL LESS THAN 160     Quadriplegia (H)     Chronic constipation     Internal hemorrhoids with other complication     Diagnostic skin and sensitization tests(aka ALLERGENS)     Anal or rectal pain     Allergic rhinitis due to animal dander     Allergy to mold spores     House dust mite allergy     Insomnia     Health Care Home     Feeling worried     Gallstones     Chronic midline thoracic back pain     Pulmonary nodules     Current Outpatient Prescriptions   Medication Sig Dispense Refill     cetirizine (ZYRTEC) 10 MG tablet Take 10 mg by mouth daily       EPINEPHrine (EPIPEN/ADRENACLICK/OR ANY BX GENERIC EQUIV) 0.3  MG/0.3ML injection 2-pack Inject 0.3 mLs (0.3 mg) into the muscle as needed for anaphylaxis 0.6 mL 3     zolpidem (AMBIEN) 10 MG tablet Take 0.5-1 tablets (5-10 mg) by mouth nightly as needed for sleep 30 tablet 5     Omeprazole-Sodium Bicarbonate  MG PACK TAKE 1 PACKET BY MOUTH DISSOLVED IN LIQUID DAILY 90 each 0     docusate sodium (ENEMEEZ MINI) 283 MG enema Use one every other day and prn per patients's request. 30 enema 3     hydrocortisone (ANUSOL-HC) 2.5 % cream Place rectally 2 times daily 30 g 3     oxybutynin (DITROPAN) 5 MG tablet Crush to be instilled intravesically.  One tablet in the morning and 2 tablets in the evening. 270 tablet 3     gabapentin (NEURONTIN) 250 MG/5ML solution Take 2.5 mLs (125 mg) by mouth 3 times daily as needed 450 mL 0     NEW MED Gentamycin 240mg in 500mL 0.9% Normal Saline.  Instill 30mL into empty bladder at bedtime.  Leave in bladder overnight and drain in the morning 500 mL 3     tolterodine (DETROL) 2 MG tablet Take 1 tablet (2 mg) by mouth 2 times daily 180 tablet 2     omeprazole (PRILOSEC) 20 MG CR capsule Take 1 capsule (20 mg) by mouth daily as needed       baclofen (LIORESAL) 20 MG tablet TAKE 1 TABLET(20 MG) BY MOUTH FOUR TIMES DAILY 360 tablet 3     budesonide (PULMICORT) 0.5 MG/2ML neb solution USE 2 VIALS TWICE DAILY. MIX WITH HONEY AND SWALLOW 1080 mL 0     sterile water, bottle, irrigation Irrigate with 60 mLs as directed daily 1000 mL 0     diphenhydrAMINE (BENADRYL) 25 MG tablet Take 25 mg by mouth every 8 hours as needed for itching or allergies Reported on 2/15/2017       polyethylene glycol (MIRALAX) powder Take 17 g (1 capful) by mouth daily as needed for constipation 510 g 1     phenylephrine-cocoa butter (PREPARATION H) 0.25-88.44 % suppository Insert one suppository rectally twice daily as needed. 48 suppository 3     Acetaminophen (TYLENOL PO) Take 500 mg by mouth as needed for mild pain or fever Reported on 2/15/2017       sod  bicarbonate-citric acid-simethicone (EZ GAS) 2.21-1.53-0.04 G PACK Take 1 packet (4 g) by mouth daily as needed (Patient not taking: Reported on 11/7/2017) 50 packet 0     Lidocaine 0.5 % GEL Externally apply 1 Application topically every 6 hours as needed (for mid thoracic pain) (Patient not taking: Reported on 11/7/2017) 5 Tube 3     clotrimazole (LOTRIMIN) 1 % cream Apply topically twice a day as needed. (Patient not taking: Reported on 11/7/2017) 60 g 2     Allergies   Allergen Reactions     Banana Hives     Other reaction(s): GI Upset     Egg/Pro [Chicken-Derived Products (Egg)]      Fish      Soybean Oil      Other reaction(s): *Unknown  Discovered on allergy testing. Has never had any reaction to his knowledge     Wheat        Review of Systems   Constitutional: Negative.  Negative for diaphoresis, fever and weight loss.   Eyes: Negative for pain.   Respiratory: Negative.  Negative for cough and hemoptysis.    Cardiovascular: Negative.  Negative for chest pain and palpitations.   Gastrointestinal: Positive for abdominal pain and nausea. Negative for blood in stool, constipation, diarrhea, melena and vomiting.   Genitourinary: Negative.  Negative for dysuria, flank pain, frequency, hematuria and urgency.   Endo/Heme/Allergies: Negative for environmental allergies.   All other systems reviewed and are negative.      BP 94/60 (BP Location: Right arm, Cuff Size: Adult Regular)  Pulse 84  Temp 97.8  F (36.6  C) (Oral)  Physical Exam   Constitutional: He is oriented to person, place, and time and well-developed, well-nourished, and in no distress. No distress.   Wheelchair bound with spastic movements   Cardiovascular: Normal rate, regular rhythm, normal heart sounds and intact distal pulses.  Exam reveals no gallop and no friction rub.    No murmur heard.  Pulmonary/Chest: Effort normal and breath sounds normal. No respiratory distress. He has no wheezes. He has no rales.   Abdominal: Soft. Normal appearance,  normal aorta and bowel sounds are normal. He exhibits no mass. There is no hepatosplenomegaly. There is generalized tenderness. There is no rebound, no guarding, no CVA tenderness, no tenderness at McBurney's point and negative Horton's sign. No hernia.   Neurological: He is alert and oriented to person, place, and time.   Skin: Skin is warm and dry.   Nursing note and vitals reviewed.        Assessment/Plan:  Abdominal pain, generalized:  ?cholecystitis vs appendicitis vs pyelonephritis vs kidney stones vs UTI.  Recommend further evaluation and management in the ER and workup with abdominal CT which he has declined.  CBC with diff in clinic was normal, will check labs below and send for outpatient CT scan.  UA and micro is suspicious for UTI and will empirically treat with [id number].  Will send for UC to help guide abx treatment.  Will also give zofran as needed for nausea.  Discussed risks and benefits of medication along with side effects, direction for use, and warning signs for tendon rupture.  To the ER if worsening pain, fevers/chills/sweats, vomiting, bloody stools or urine.    -     CBC with platelets differential  -     Comprehensive metabolic panel  -     Lipase  -     UA with Microscopic reflex to Culture  -     Urine Culture Aerobic Bacterial  -     CT Abdomen Pelvis w Contrast; Future  -     ondansetron (ZOFRAN ODT) 4 MG ODT tab; Take 1-2 tablets (4-8 mg) by mouth every 8 hours as needed for nausea  -     ciprofloxacin (CIPRO) 500 MG/5ML (10%) suspension; Take 5 mLs (500 mg) by mouth 2 times daily for 7 days        Lien Mckinnon PA-C

## 2017-11-09 ENCOUNTER — TELEPHONE (OUTPATIENT)
Dept: FAMILY MEDICINE | Facility: CLINIC | Age: 42
End: 2017-11-09

## 2017-11-09 ENCOUNTER — RADIANT APPOINTMENT (OUTPATIENT)
Dept: CT IMAGING | Facility: CLINIC | Age: 42
End: 2017-11-09
Attending: PHYSICIAN ASSISTANT
Payer: MEDICARE

## 2017-11-09 DIAGNOSIS — R10.84 ABDOMINAL PAIN, GENERALIZED: ICD-10-CM

## 2017-11-09 LAB
ALBUMIN SERPL-MCNC: 3.6 G/DL (ref 3.4–5)
ALP SERPL-CCNC: 58 U/L (ref 40–150)
ALT SERPL W P-5'-P-CCNC: 17 U/L (ref 0–70)
ANION GAP SERPL CALCULATED.3IONS-SCNC: 7 MMOL/L (ref 3–14)
AST SERPL W P-5'-P-CCNC: 11 U/L (ref 0–45)
BACTERIA SPEC CULT: NORMAL
BILIRUB SERPL-MCNC: 0.4 MG/DL (ref 0.2–1.3)
BUN SERPL-MCNC: 7 MG/DL (ref 7–30)
CALCIUM SERPL-MCNC: 8.4 MG/DL (ref 8.5–10.1)
CHLORIDE SERPL-SCNC: 109 MMOL/L (ref 94–109)
CO2 SERPL-SCNC: 23 MMOL/L (ref 20–32)
CREAT SERPL-MCNC: 0.63 MG/DL (ref 0.66–1.25)
GFR SERPL CREATININE-BSD FRML MDRD: >90 ML/MIN/1.7M2
GLUCOSE SERPL-MCNC: 95 MG/DL (ref 70–99)
POTASSIUM SERPL-SCNC: 4.2 MMOL/L (ref 3.4–5.3)
PROT SERPL-MCNC: 6.3 G/DL (ref 6.8–8.8)
SODIUM SERPL-SCNC: 139 MMOL/L (ref 133–144)
SPECIMEN SOURCE: NORMAL

## 2017-11-09 PROCEDURE — 74177 CT ABD & PELVIS W/CONTRAST: CPT | Performed by: RADIOLOGY

## 2017-11-09 RX ORDER — IOPAMIDOL 755 MG/ML
135 INJECTION, SOLUTION INTRAVASCULAR ONCE
Status: COMPLETED | OUTPATIENT
Start: 2017-11-09 | End: 2017-11-09

## 2017-11-09 RX ADMIN — IOPAMIDOL 80 ML: 755 INJECTION, SOLUTION INTRAVASCULAR at 15:15

## 2017-11-09 NOTE — TELEPHONE ENCOUNTER
Forms received from [employer]/ Prescription for Catheter supplies for Laura Yao MD.  Forms placed in provider 'sign me' folder.  Please fax forms to [phone] after completion.    Luli Correa  Patient Representative

## 2017-11-10 ENCOUNTER — TELEPHONE (OUTPATIENT)
Dept: FAMILY MEDICINE | Facility: CLINIC | Age: 42
End: 2017-11-10

## 2017-11-10 ENCOUNTER — CARE COORDINATION (OUTPATIENT)
Dept: CARE COORDINATION | Facility: CLINIC | Age: 42
End: 2017-11-10

## 2017-11-10 NOTE — PROGRESS NOTES
Clinic Care Coordination Contact  Care Team Conversations    The Care Coordination RN spoke with the patient.  He states that the pain in abdomin, sides, and back is unchanged from when he was last seen.  He did start Maalox today thinking that maybe there is some constipation present.  The patient states that he feels better lying down although that is so hard on his skin.  The Care Coordination RN encouraged the patient to try an oral bowel plan in addition to the enemas that he uses every other day to see if constipation is present.  Then if this does clear things out a thought should be given to adding a oral component to the bowel program.  The patient will call back next week with an update so the next plan can be set in place if necessary.      Plan:  1).  The patient will use Maalox and Miralax in addition to the enemas used every other day.  2).  The patient will report an update to the Care Coordination RN.  3).   The patient will make and attend all follow up appointments with the PCP and the specialists.   4).  The Care Coordination RN will make a follow up call to the patient again in 4 weeks.  5).  Care Coordination to remain available for the patient to contact in the event of future needs.      Robbin Vanegas, MSN, RN, PHN  [facility] Clinic Care Coordinator  [facility]  Phone: [phone]  [email or web address]

## 2017-11-10 NOTE — TELEPHONE ENCOUNTER
Relayed message, patient verbalized understanding. He has miralax & will take it.  Neelima Yeung RN

## 2017-11-10 NOTE — TELEPHONE ENCOUNTER
Please inform patient that his abdominal CT revealed gallstones present but no signs of infection, moderate stool, and deformity of his pelvis most likely from his paraplegia.   I would recommend miralax for constipation and calcium+D for his bones. Otherwise, the appendix, liver, kidneys, and bladder were normal.  Will also send Cloudary message.     Lien See DANNI Mckinnon

## 2017-11-16 ENCOUNTER — CARE COORDINATION (OUTPATIENT)
Dept: CARE COORDINATION | Facility: CLINIC | Age: 42
End: 2017-11-16

## 2017-11-16 NOTE — PROGRESS NOTES
Clinic Care Coordination Contact  Care Team Conversations    The patient called the Care Coordination RN stating that the antibiotic has relieved some of his symptoms.  The nausea is gone and some of the abdominal and flank pain has resolved.  The patient states that he has no appetite and denies any vomiting.  The pain has not completely resolved and the patient states that the pain clinic provider suggested injections although he does not need to be seen for 3 months.  The patient is wondering if those injections could be given prior to the next appointment with the provider.  The Care Coordination RN suggested that the patient contact the pain clinic for their process and if having the injections sooner is a possibility.  The patient stated understanding and will make the call.  The patient also set up a goal of going to the fitness center 2-3 times per week.  An updated Care Plan was created.   The patient will make and attend all follow up appointments with the PCP and the specialists.   The Care Coordination RN will make a follow up call to the patient again in 4 weeks.    Plan:  1)   The patient will go to the fitness center 2-3 times per week.  2).  The patient will call the pain clinic to see about the injections and if they can be scheduled now.  3).   The patient will make and attend all follow up appointments with the PCP and the specialists.   4).  The Care Coordination RN will make a follow up call to the patient again in 4 weeks.  5).  Care Coordination to remain available for the patient to contact in the event of future needs.      Robbin Vanegas, MSN, RN, PHN  [facility] Clinic Care Coordinator  [facility]  Phone: [phone]  [email or web address]

## 2017-11-16 NOTE — LETTER
[facility] Home  Complex Care Plan  About Me  Patient Name:  Riley Gifford    YOB: 1975  Age:   41 year old   Rut MRN: [id number] Telephone Information:     Home Phone [phone]   Mobile NONE       Address:    [address] [place] [address] [address]   [facility] [address] Email address:  [email or web address]      Emergency Contact(s)  Name Relationship Lgl Grd Work Phone Home Phone Mobile Phone   1. HEMANT, TREY Relative No noen none [phone]           Primary language:  English     needed? No   [employer]:  [phone] op. 1  Other communication barriers: No  Preferred Method of Communication:  Phone  Current living arrangement: I live in assisted living  Mobility Status/ Medical Equipment: Dependent/Assisted by Another  Other information to know about me:    Health Maintenance  Health Maintenance Reviewed: Due/Overdue     My Access Plan  Medical Emergency 911   Primary Clinic Line [facility]- [phone]   24 Hour Appointment Line [phone] or  [phone] (toll-free)   24 Hour Nurse Line [phone] (toll-free)   Preferred Urgent Care [facility] [phone]   Preferred Hospital [facility]  [phone]   Preferred Pharmacy [facility] [address] [facility] [address] AT [place] [address]     Behavioral Health Crisis Line The National Suicide Prevention Lifeline at 1-241.311.2809 or 911     My Care Team Members  Patient Care Team       Relationship Specialty Notifications Start End    Laura Yao MD PCP - General Family Practice  7/18/13     Phone: [phone] Fax: [phone]         [address] [facility] [address]    Robbin Andino, ARISTEO Clinic Care Coordinator Nurse Admissions 1/6/16     Comment:  phone:  [phone]    Rober Leo MD Referring Physician Urology  1/8/16     Phone: [phone] Fax: [phone]  [facility] [address]    Kimberly Zabala MD MD Urology  1/8/16     Phone: [phone] Fax: [phone]         [address] [place] [address]    Amanda Other (see comments)   1/8/16     Comment:  [facility]     Phone: [phone]         Norma Urena MD MD Urology  4/1/16     Phone: [phone] Fax: [phone]         XX RESIGNED XX [facility] [address]    Evaristo Hayes MD MD Urology  4/19/16     Phone: [phone] Fax: [phone]         [address] [place] [address]    Jenny Ames, RN Registered Nurse Urology  4/19/16     Marta Adames, RN Clinic Care Coordinator Urology Abnormal results only, Admissions 11/3/16     Phone: [phone] Pager: [phone]        Rosemary Thomas, RN Nurse Coordinator Physical Medicine and Rehab  3/30/17     Phone: [phone] Pager: [phone]             My Care Plans  Self Management and Treatment Plan  Goals and (Comments)  Goal #1: I will continue to try accupuncture to assist with pain control        of goal reached    Goal #2: I will continue to go to the gym 2-3 times per week      20% of goal reached    Action Plans on File: None  Advance Care Plans/Directives Type:   Type Advanced Care Plans/Directives:  (n/a)    My Medical and Care Information  Problem List   Patient Active Problem List   Diagnosis     Vitamin D deficiency     Eosinophilic esophagitis     Neurogenic bladder     CARDIOVASCULAR SCREENING; LDL GOAL LESS THAN 160     Quadriplegia (H)     Chronic constipation     Internal hemorrhoids with other complication     Diagnostic skin and sensitization tests(aka ALLERGENS)     Anal or rectal pain     Allergic rhinitis due to animal dander     Allergy to mold spores     House dust mite allergy     Insomnia     Health Care Home     Feeling worried     Gallstones     Chronic midline thoracic back pain     Pulmonary nodules      Current Medications and Allergies:  See printed  Medication Report.    Care Coordination Start Date: 01/04/16   Frequency of Care Coordination: 3-4 weeks   Form Last Updated: 11/16/2017

## 2017-11-16 NOTE — LETTER
Health Care Home - Access Care Plan    About Me  Patient Name:  Riley Gifford    YOB: 1975  Age:                            41 year old   Rut MRN:         [id number] Telephone Information:     Home Phone [phone]   Mobile NONE       Address:    [address] [place] [address] [address]   [facility] [address] Email address:  [email or web address]      Emergency Contact(s)  Name Relationship Lgl Grd Work Phone Home Phone Mobile Phone   1. HEMANT, TREY Relative No noen none [phone]             Health Maintenance: Routine Health maintenance Reviewed: Due/Overdue     My Access Plan  Medical Emergency 911   Questions or concerns during clinic hours Primary Clinic Line, I will call the clinic directly: Primary Clinic: [facility] [phone]   24 Hour Appointment Line [phone] or  [phone])  (toll free)   24 Hour Nurse Line [phone] (toll free)   Questions or concerns outside clinic hours 24 Hour Appointment Line, I will call the after-hours on-call line:   [facility] [phone] or [phone]) (toll-free)   Preferred Urgent Care Preferred Urgent Care: [facility] [phone]   Preferred Hospital Preferred Hospital: [facility]  [phone]   Preferred Pharmacy [facility] [id number] [place] [address] [facility] [facility] [address]     Behavioral Health Crisis Line The National Suicide Prevention Lifeline at 1-459.701.9226 or 91     My Care Team Members  Patient Care Team       Relationship Specialty Notifications Start End    Laura Yao MD PCP - General Family Practice  7/18/13     Phone: [phone] Fax: [phone]         [facility] [address]    Robbin Andino, RN Clinic Care Coordinator Nurse Admissions 1/6/16     Comment:  phone:  [phone]    Rober Leo MD Referring Physician Urology  1/8/16     Phone: [phone] Fax:  [phone]         [address] [facility] [facility] [address]    Kimberly Zabala MD MD Urology  1/8/16     Phone: [phone] Fax: [phone]         [address] [place] [address]    Amanda Other (see comments)   1/8/16     Comment:  [facility]     Phone: [phone]         Norma Urena MD MD Urology  4/1/16     Phone: [phone] Fax: [phone]         XX RESIGNED XX [facility] [address]    Evaristo Hayes MD MD Urology  4/19/16     Phone: [phone] Fax: [phone]         [address] [place] [address]    Jenny Ames, RN Registered Nurse Urology  4/19/16     Marta Adames, RN Clinic Care Coordinator Urology Abnormal results only, Admissions 11/3/16     Phone: [phone] Pager: [phone]        Rosemary Thomas, RN Nurse Coordinator Physical Medicine and Rehab  3/30/17     Phone: [phone] Pager: [phone]            My Medical and Care Information  Problem List   Patient Active Problem List   Diagnosis     Vitamin D deficiency     Eosinophilic esophagitis     Neurogenic bladder     CARDIOVASCULAR SCREENING; LDL GOAL LESS THAN 160     Quadriplegia (H)     Chronic constipation     Internal hemorrhoids with other complication     Diagnostic skin and sensitization tests(aka ALLERGENS)     Anal or rectal pain     Allergic rhinitis due to animal dander     Allergy to mold spores     House dust mite allergy     Insomnia     Health Care Home     Feeling worried     Gallstones     Chronic midline thoracic back pain     Pulmonary nodules      Current Medications and Allergies:  See printed Medication Report

## 2017-11-22 ENCOUNTER — TELEPHONE (OUTPATIENT)
Dept: FAMILY MEDICINE | Facility: CLINIC | Age: 42
End: 2017-11-22

## 2017-11-22 DIAGNOSIS — N39.0 RECURRENT UTI: ICD-10-CM

## 2017-11-22 NOTE — TELEPHONE ENCOUNTER
The Care Coordination RN received a call from the patient stating that the [place] pain clinic would have a first available appointment in February.  He checked with [facility] and they have a program that his insurance will cover although he needs a referral.  The referral needs to be faxed to [facility]as[facility] at [phone] and needs to include a request for evaluation and treatment.  They would also like office visit for the last 6 months.  This request will be forwarded to the PCP.    Robbin Vanegas, MSN, RN, PHN  [facility] Clinic Care Coordinator  [facility]  Phone: [phone]  [email or web address]

## 2017-11-24 NOTE — TELEPHONE ENCOUNTER
The patient called back today.  The information of needing to see Dr. Yao was given to the patient and he is willing to come in when an appointment is available.  He is also having increased abdominal pain and nausea.  Has been using Pepto bismal will minimal relief.  The patient has been using Zegrid as per [facility].  The Care Coordination RN questioned the patient as to using the Zofran and he avoids it due to the constipation that is a side effect.  Also questioned if the patient thinks this is a UTI and he said no, denies fever, cloudy urine.  The Care Coordination RN questioned if the symptoms could be compatible with EE and the patient said yes, and he is using the medications for it as prescribed.  The Care Coordination RN encouraged the patient to take the Zofran as prescribed and to increase the medications he uses for constipation.  It was explained to the patient that the nausea and abdominal pain could be greatly helped with taking the Zofran.  He stated understanding and will increase fluids as directed.  The Care Coordination RN will forward to the PCP for any other directions and to the team for assistance in making the appointment with the patient.      Robbin Vanegas, MSN, RN, PHN  [facility] Clinic Care Coordinator  [facility]  Phone: [phone]  [email or web address]

## 2017-11-26 DIAGNOSIS — K20.0 EOSINOPHILIC ESOPHAGITIS: ICD-10-CM

## 2017-11-29 RX ORDER — BUDESONIDE 0.5 MG/2ML
INHALANT ORAL
Qty: 1080 ML | Refills: 0 | Status: SHIPPED | OUTPATIENT
Start: 2017-11-29 | End: 2018-06-02

## 2017-11-29 NOTE — TELEPHONE ENCOUNTER
Prescription approved per [facility] Refill Protocol.  Lety El RN

## 2017-12-01 ENCOUNTER — OFFICE VISIT (OUTPATIENT)
Dept: FAMILY MEDICINE | Facility: CLINIC | Age: 42
End: 2017-12-01
Payer: MEDICARE

## 2017-12-01 VITALS
TEMPERATURE: 98 F | HEART RATE: 80 BPM | HEIGHT: 73 IN | DIASTOLIC BLOOD PRESSURE: 64 MMHG | SYSTOLIC BLOOD PRESSURE: 102 MMHG

## 2017-12-01 DIAGNOSIS — N31.9 NEUROGENIC BLADDER: ICD-10-CM

## 2017-12-01 DIAGNOSIS — M54.6 CHRONIC MIDLINE THORACIC BACK PAIN: ICD-10-CM

## 2017-12-01 DIAGNOSIS — K59.09 CHRONIC CONSTIPATION: ICD-10-CM

## 2017-12-01 DIAGNOSIS — G89.29 CHRONIC MIDLINE THORACIC BACK PAIN: ICD-10-CM

## 2017-12-01 DIAGNOSIS — K20.0 EOSINOPHILIC ESOPHAGITIS: Primary | ICD-10-CM

## 2017-12-01 DIAGNOSIS — G82.50 QUADRIPLEGIA (H): ICD-10-CM

## 2017-12-01 PROCEDURE — 99214 OFFICE O/P EST MOD 30 MIN: CPT | Performed by: FAMILY MEDICINE

## 2017-12-01 NOTE — PROGRESS NOTES
SUBJECTIVE:   Riley Gifford is a 41 year old male who presents to clinic today for the following health issues:    Discuss referral to Jasvir Paytno    Nurse Triage:  He is also having increased abdominal pain and nausea.  Has been using Pepto bismal will minimal relief.  The patient has been using Zegrid as per [facility].  The Care Coordination RN questioned the patient as to using the Zofran and he avoids it due to the constipation that is a side effect.  Also questioned if the patient thinks this is a UTI and he said no, denies fever, cloudy urine.  The Care Coordination RN questioned if the symptoms could be compatible with EE and the patient said yes, and he is using the medications for it as prescribed.  The Care Coordination RN encouraged the patient to take the Zofran as prescribed and to increase the medications he uses for constipation.  It was explained to the patient that the nausea and abdominal pain could be greatly helped with taking the Zofran.  He stated understanding and will increase fluids as directed.  The Care Coordination RN will forward to the PCP for any other directions and to the team for assistance in making the appointment with the patient.    ABDOMINAL   PAIN     Onset: ongoing and worsening    Description:   Character: Dull ache, today he is constipated  Radiation: None    Intensity: moderate    Progression of Symptoms:  worsening    Accompanying Signs & Symptoms:  Fever/Chills?: no   Gas/Bloating: no   Nausea: YES  Vomitting: YES  Diarrhea?: no   Constipation:YES with use of zofran, currently constipated today  Dysuria or Hematuria: no    History:   Trauma: no   Previous similar pain: YES     Precipitating factors:   Does the pain change with:     Food: no      BM: no     Urination: no     Alleviating factors:  nothing    Therapies Tried and outcome: zegrid per [facility], pepto with minimal relief    LMP:  not applicable       Back: Back pain across back that has been ongoing for the past few  months.  Esophagitis: flared up recently. Using Pulmicort 2x daily. Has been meaning to call GI. Saw hematologist and thought it was food allergy related and did not need bone marrow checked. Has considered 6 food elimination diet but was told to see a dietician first.   GI: Abdominal pain across entire abdomen that waxes and wanes over the past few months worsening in the past month. He has associated  nausea, and constipation today.     Jasvir Payton: has been doing PT and was notified they have a pain management center at [facility] but his insurance does not cover this. However he is able to get a consult if he has a referral.    Problem list and histories reviewed & adjusted, as indicated.  Additional history: as documented    Patient Active Problem List   Diagnosis     Vitamin D deficiency     Eosinophilic esophagitis     Neurogenic bladder     CARDIOVASCULAR SCREENING; LDL GOAL LESS THAN 160     Quadriplegia (H)     Chronic constipation     Internal hemorrhoids with other complication     Diagnostic skin and sensitization tests(aka ALLERGENS)     Anal or rectal pain     Allergic rhinitis due to animal dander     Allergy to mold spores     House dust mite allergy     Insomnia     Health Care Home     Feeling worried     Gallstones     Chronic midline thoracic back pain     Pulmonary nodules     Past Surgical History:   Procedure Laterality Date     BACK SURGERY       C NONSPECIFIC PROCEDURE      C5-6 Fusion     C NONSPECIFIC PROCEDURE      Pressure Ulcer     COLONOSCOPY       CYSTOSCOPY N/A 6/23/2017    Procedure: CYSTOSCOPY;  Cystoscopy and Botox Injection Into the Bladder  ;  Surgeon: Evaristo Hayes MD;  Location: [facility]     CYSTOSCOPY, INTRAVESICAL INJECTION N/A 5/12/2016    Procedure: CYSTOSCOPY, INTRAVESICAL INJECTION;  Surgeon: Evaristo Hayes MD;  Location: [facility] OR     CYSTOSCOPY, INTRAVESICAL INJECTION N/A 11/11/2016    Procedure: CYSTOSCOPY, INTRAVESICAL INJECTION;  Surgeon: Freddy  Evaristo Agarwal MD;  Location: [facility]     GI SURGERY      endoscopy x2     INJECT BOTOX N/A 6/23/2017    Procedure: INJECT BOTOX;;  Surgeon: Evaristo Hayes MD;  Location:  OR       Social History   Substance Use Topics     Smoking status: Never Smoker     Smokeless tobacco: Never Used     Alcohol use 0.0 oz/week     0 Standard drinks or equivalent per week      Comment: 1-2 drinks per month     Family History   Problem Relation Age of Onset     Breast Cancer Mother      Prostate Cancer Father      Breast Cancer Maternal Grandmother      CANCER Maternal Grandfather      Glaucoma No family hx of      Macular Degeneration No family hx of      DIABETES No family hx of      Hypertension No family hx of          Current Outpatient Prescriptions   Medication Sig Dispense Refill     budesonide (PULMICORT) 0.5 MG/2ML neb solution USE 2 VIALS TWICE DAILY. MIX WITH HONEY AND SWALLOW 1080 mL 0     NEW MED Gentamycin 240mg in 500mL 0.9% Normal Saline.  Instill 30mL into empty bladder at bedtime.  Leave in bladder overnight and drain in the morning 500 mL 3     ondansetron (ZOFRAN ODT) 4 MG ODT tab Take 1-2 tablets (4-8 mg) by mouth every 8 hours as needed for nausea 20 tablet 1     cetirizine (ZYRTEC) 10 MG tablet Take 10 mg by mouth daily       EPINEPHrine (EPIPEN/ADRENACLICK/OR ANY BX GENERIC EQUIV) 0.3 MG/0.3ML injection 2-pack Inject 0.3 mLs (0.3 mg) into the muscle as needed for anaphylaxis 0.6 mL 3     zolpidem (AMBIEN) 10 MG tablet Take 0.5-1 tablets (5-10 mg) by mouth nightly as needed for sleep 30 tablet 5     Omeprazole-Sodium Bicarbonate  MG PACK TAKE 1 PACKET BY MOUTH DISSOLVED IN LIQUID DAILY 90 each 0     docusate sodium (ENEMEEZ MINI) 283 MG enema Use one every other day and prn per patients's request. 30 enema 3     hydrocortisone (ANUSOL-HC) 2.5 % cream Place rectally 2 times daily 30 g 3     gabapentin (NEURONTIN) 250 MG/5ML solution Take 2.5 mLs (125 mg) by mouth 3 times daily as needed 450 mL 0      tolterodine (DETROL) 2 MG tablet Take 1 tablet (2 mg) by mouth 2 times daily 180 tablet 2     Lidocaine 0.5 % GEL Externally apply 1 Application topically every 6 hours as needed (for mid thoracic pain) 5 Tube 3     omeprazole (PRILOSEC) 20 MG CR capsule Take 1 capsule (20 mg) by mouth daily as needed       baclofen (LIORESAL) 20 MG tablet TAKE 1 TABLET(20 MG) BY MOUTH FOUR TIMES DAILY 360 tablet 3     sterile water, bottle, irrigation Irrigate with 60 mLs as directed daily 1000 mL 0     diphenhydrAMINE (BENADRYL) 25 MG tablet Take 25 mg by mouth every 8 hours as needed for itching or allergies Reported on 2/15/2017       polyethylene glycol (MIRALAX) powder Take 17 g (1 capful) by mouth daily as needed for constipation 510 g 1     phenylephrine-cocoa butter (PREPARATION H) 0.25-88.44 % suppository Insert one suppository rectally twice daily as needed. 48 suppository 3     Acetaminophen (TYLENOL PO) Take 500 mg by mouth as needed for mild pain or fever Reported on 2/15/2017       sod bicarbonate-citric acid-simethicone (EZ GAS) 2.21-1.53-0.04 G PACK Take 1 packet (4 g) by mouth daily as needed (Patient not taking: Reported on 11/7/2017) 50 packet 0     oxybutynin (DITROPAN) 5 MG tablet Crush to be instilled intravesically.  One tablet in the morning and 2 tablets in the evening. (Patient not taking: Reported on 12/1/2017) 270 tablet 3     clotrimazole (LOTRIMIN) 1 % cream Apply topically twice a day as needed. (Patient not taking: Reported on 12/1/2017) 60 g 2     Allergies   Allergen Reactions     Banana Hives     Other reaction(s): GI Upset     Egg/Pro [Chicken-Derived Products (Egg)]      Fish      Soybean Oil      Other reaction(s): *Unknown  Discovered on allergy testing. Has never had any reaction to his knowledge     Wheat        Reviewed and updated as needed this visit by clinical staffTobacco  Allergies  Meds  Med Hx  Surg Hx  Fam Hx  Soc Hx      Reviewed and updated as needed this visit by  "Provider       ROS:  Constitutional, HEENT, cardiovascular, pulmonary, GI, , musculoskeletal, neuro, skin, endocrine and psych systems are negative, except as otherwise noted.    This document serves as a record of the services and decisions personally performed and made by Laura Yao MD. It was created on her behalf by Sarah Solis, a trained medical scribe. The creation of this document is based the provider's statements to the medical scribe.    Sarah Solis December 1, 2017 11:50 PM    OBJECTIVE:   /64  Pulse 80  Temp 98  F (36.7  C) (Oral)  Ht 6' 1\" (1.854 m)  There is no height or weight on file to calculate BMI.  GENERAL: In wheelchair, alert and no distress  RESP: lungs clear to auscultation - no rales, rhonchi or wheezes  CV: regular rate and rhythm, normal S1 S2, no S3 or S4, no murmur, click or rub,   ABD: soft, nontender to palpation  PSYCH: mentation appears normal, affect normal    Diagnostic Test Results:  none     ASSESSMENT/PLAN:     1. Eosinophilic esophagitis  Followed by [facility]. He will contact them for follow-up appointment. Has recently been evaluated by hematology and allergy.  He is considering elimination diet.    2. Neurogenic bladder  Followed by urology. Botox is helping.    3. Chronic constipation  He is on a bowel regiment. No changes needed today.    4. Chronic midline thoracic back pain  Reviewed notes from [place] pain clinic. He was unable to pursue scheduling injections as he had active UTI at last visit. He prefers scheduling with [facility] pain clinic.  - PAIN MANAGEMENT REFERRAL    5. Quadriplegia (H)  Prefers to go to [facility] where he has been seen many times.  - PAIN MANAGEMENT REFERRAL    FUTURE APPOINTMENTS:       - Follow-up visit in 3 months.    Laura Yao MD  [facility]    The information in this document, created by the medical scribe for me, accurately reflects the services I personally performed and the " decisions made by me. I have reviewed and approved this document for accuracy prior to leaving the patient care area.

## 2017-12-01 NOTE — NURSING NOTE
"Chief Complaint   Patient presents with     Referral     discuss Jasvir Payton     Abdominal Pain     increased     Flu Shot     declines       Initial /64  Pulse 80  Temp 98  F (36.7  C) (Oral)  Ht 6' 1\" (1.854 m) Estimated body mass index is 17.15 kg/(m^2) as calculated from the following:    Height as of 6/23/17: 6' 1\" (1.854 m).    Weight as of 11/7/17: 130 lb (59 kg).  Medication Reconciliation: complete   Ammy Lambert MA      "

## 2017-12-01 NOTE — PATIENT INSTRUCTIONS
Call GLEN DILALO about your symptoms and think about setting up a visit with the dietician  Call Jasvir Acosta regarding pain clinic evaluation  Think about trying gabapentin dose during the day so you can let the pain team know if it works or not.    [facility]   Discharged by : Ammy Lambert MA    Paper scripts provided to patient : no     If you have any questions regarding your visit please contact your care team:     Team Gold                Clinic Hours Telephone Number     Dr. Isaura Farrell 7am-7pm  Monday - Thursday   7am-5pm  Fridays  [phone]   (Appointment scheduling available 24/7)     RN Line  [phone] option 2     Urgent Care - [place] and [facility] - 11am-9pm Monday-Friday Saturday-Sunday- 9am-5pm     [place] -   5pm-9pm Monday-Friday Saturday-Sunday- 9am-5pm    [phone] - [place]    [phone] - [place]       For a Price Quote for your services, please call our Consumer Price Line at [phone].     What options do I have for visits at the clinic other than the traditional office visit?     To expand how we care for you, many of our providers are utilizing electronic visits (e-visits) and telephone visits, when medically appropriate, for interactions with their patients rather than a visit in the clinic. We also offer nurse visits for many medical concerns. Just like any other service, we will bill your insurance company for this type of visit based on time spent on the phone with your provider. Not all insurance companies cover these visits. Please check with your medical insurance if this type of visit is covered. You will be responsible for any charges that are not paid by your insurance.   E-visits via G-mode: generally incur a $35.00 fee.     Telephone visits:   Time spent on the phone: *charged based on time that is spent on the phone in increments  of 10 minutes. Estimated cost:   5-10 mins $30.00   11-20 mins. $59.00   21-30 mins. $85.00     Use [employer] (secure email communication and access to your chart) to send your primary care provider a message or make an appointment. Ask someone on your Team how to sign up for [employer].     As always, Thank you for trusting us with your health care needs!      [facility]:    Scheduling Appointments  [place]  Call: [phone]    [place] [facility]  Call: [phone]    [facility]  Call: [phone]    For Gastroenterology referrals   [facility]   Clinics and Surgery Center, 4th Floor   [address] [place] [address]   Appointments: [phone]    WHERE TO GO FOR CARE?  Clinic    Make an appointment if you:       Are sick (cold, cough, flu, sore throat, earache or in pain).       Have a small injury (sprain, small cut, burn or broken bone).       Need a physical exam, Pap smear, vaccine or prescription refill.       Have questions about your health or medicines.    To reach us:      Call [phone] ([phone]). Open 24 hours every day. (For counseling services, call [phone].)    Log into [facility] at [email or web address]. (Visit [email or web address] to create an account.) Hospital emergency room    An emergency is a serious or life- threatening problem that must be treated right away.    Call 265 or get to the hospital if you have:      Very bad or sudden:            - Chest pain or pressure         - Bleeding         - Head or belly pain         - Dizziness or trouble seeing, walking or                          Speaking      Problems breathing      Blood in your vomit or you are coughing up blood      A major injury (knocked out, loss of a finger or limb, rape, broken bone protruding from skin)    A mental health crisis. (Or call the Mental Health Crisis line at 1-873.892.9585 or Suicide  Prevention Hotline at 1-585.662.7423.)    Open 24 hours every day. You don't need an appointment.     Urgent care    Visit urgent care for sickness or small injuries when the clinic is closed. You don't need an appointment. To check hours or find an urgent care near you, visit [email or web address]. Online care    Get online care from [facility] for more than 70 common problems, like colds, allergies and infections. Open 24 hours every day at:   [email or web address]   Need help deciding?    For advice about where to be seen, you may call your clinic and ask to speak with a nurse. We're here for you 24 hours every day.         If you are deaf or hard of hearing, please let us know. We provide many free services including sign language interpreters, oral interpreters, TTYs, telephone amplifiers, note takers and written materials.

## 2017-12-01 NOTE — MR AVS SNAPSHOT
After Visit Summary   12/1/2017    Riley Gifford    MRN: [id number]           Patient Information     Date Of Birth          1975        Visit Information        Provider Department      12/1/2017 11:00 AM Laura Yao MD [facility]        Today's Diagnoses     Eosinophilic esophagitis    -  1    Neurogenic bladder        Chronic constipation        Chronic midline thoracic back pain        Quadriplegia (H)          Care Instructions    Call [facility] about your symptoms and think about setting up a visit with the dietician  Call Jasvir Acosta regarding pain clinic evaluation  Think about trying gabapentin dose during the day so you can let the pain team know if it works or not.    [facility]   Discharged by : Ammy Lambert MA    Paper scripts provided to patient : no     If you have any questions regarding your visit please contact your care team:     Team Gold                Clinic Hours Telephone Number     Dr. Isaura Farrell 7am-7pm  Monday - Thursday   7am-5pm  Fridays  [phone]   (Appointment scheduling available 24/7)     RN Line  [phone] option 2     Urgent Care - [place] - 11am-9pm Monday-Friday Saturday-Sunday- 9am-5pm     [place] -   5pm-9pm Monday-Friday Saturday-Sunday- 9am-5pm    [phone] - [place]    [phone] - [place]       For a Price Quote for your services, please call our Consumer Price Line at [phone].     What options do I have for visits at the clinic other than the traditional office visit?     To expand how we care for you, many of our providers are utilizing electronic visits (e-visits) and telephone visits, when medically appropriate, for interactions with their patients rather than a visit in the clinic. We also offer nurse visits for many medical concerns. Just like any other  service, we will bill your insurance company for this type of visit based on time spent on the phone with your provider. Not all insurance companies cover these visits. Please check with your medical insurance if this type of visit is covered. You will be responsible for any charges that are not paid by your insurance.   E-visits via "[employer]"hart: generally incur a $35.00 fee.     Telephone visits:   Time spent on the phone: *charged based on time that is spent on the phone in increments of 10 minutes. Estimated cost:   5-10 mins $30.00   11-20 mins. $59.00   21-30 mins. $85.00     Use [facility] (secure email communication and access to your chart) to send your primary care provider a message or make an appointment. Ask someone on your Team how to sign up for [facility].     As always, Thank you for trusting us with your health care needs!      [facility]:    Scheduling Appointments  [facility]  Call: [phone]    [facility]  Call: [phone]    [facility]  Call: [phone]    For Gastroenterology referrals   [facility]   Clinics and Surgery Center, 4th Floor   [address] [place] [address]   Appointments: [phone]    WHERE TO GO FOR CARE?  Clinic    Make an appointment if you:       Are sick (cold, cough, flu, sore throat, earache or in pain).       Have a small injury (sprain, small cut, burn or broken bone).       Need a physical exam, Pap smear, vaccine or prescription refill.       Have questions about your health or medicines.    To reach us:      Call [phone] ([phone]). Open 24 hours every day. (For counseling services, call [phone].)    Log into [facility] at [email or web address]. (Visit [email or web address] to create an account.) Hospital emergency room    An emergency is a serious or life- threatening problem that must be treated right away.    Call 746 or get to the  hospital if you have:      Very bad or sudden:            - Chest pain or pressure         - Bleeding         - Head or belly pain         - Dizziness or trouble seeing, walking or                          Speaking      Problems breathing      Blood in your vomit or you are coughing up blood      A major injury (knocked out, loss of a finger or limb, rape, broken bone protruding from skin)    A mental health crisis. (Or call the Mental Health Crisis line at 1-280.267.4491 or Suicide Prevention Hotline at 1-753.851.1556.)    Open 24 hours every day. You don't need an appointment.     Urgent care    Visit urgent care for sickness or small injuries when the clinic is closed. You don't need an appointment. To check hours or find an urgent care near you, visit [email or web address]. Online care    Get online care from [employer] for more than 70 common problems, like colds, allergies and infections. Open 24 hours every day at:   [email or web address]   Need help deciding?    For advice about where to be seen, you may call your clinic and ask to speak with a nurse. We're here for you 24 hours every day.         If you are deaf or hard of hearing, please let us know. We provide many free services including sign language interpreters, oral interpreters, TTYs, telephone amplifiers, note takers and written materials.                   Follow-ups after your visit        Additional Services     PAIN MANAGEMENT REFERRAL       Your provider has referred you to: patient requesting pain clinic at [facility] I am requesting evaluation and treatment.  Fax at [phone]    **ANY DIAGNOSTIC TESTS THAT ARE NOT IN [facility] SHOULD BE SENT TO THE PAIN CENTER**    REGARDING OPIOID MEDICATIONS:  We will always address appropriateness of opioid pain medications, but we generally will not automatically take on a prescribing role. When we do take on prescribing of opioids for chronic pain, it is in collaboration with the referring physician for an  intermediate period of time (months), with an expectation that the primary physician or provider will assume the prescribing role if medications are effective at stable doses with demonstrated compliance.  Therefore, please do not assume that your prescribing responsibilities end on the day of pain clinic consultation.  Is this agreeable to you? YES    Please be aware that coverage of these services is subject to the terms and limitations of your health insurance plan.  Call member services at your health plan with any benefit or coverage questions.      Please bring the following with you to your appointment:    (1) Any X-Rays, CTs or MRIs which have been performed.  Contact the facility where they were done to arrange for  prior to your scheduled appointment.    (2) List of current medications   (3) This referral request   (4) Any documents/labs given to you for this referral                  Your next 10 appointments already scheduled     Feb 12, 2018  3:00 PM CST   Return Visit with Evangelista Erazo MD   [facility] ([facility])    [address] [address] [address] [phone]              Who to contact     If you have questions or need follow up information about today's clinic visit or your schedule please contact [facility] directly at [phone].  Normal or non-critical lab and imaging results will be communicated to you by MyChart, letter or phone within 4 business days after the clinic has received the results. If you do not hear from us within 7 days, please contact the clinic through MyChart or phone. If you have a critical or abnormal lab result, we will notify you by phone as soon as possible.  Submit refill requests through [employer] or call your pharmacy and they will forward the refill request to us. Please allow 3 business days for your refill to be completed.          Additional Information About Your Visit       "  [employer] Information     [employer] gives you secure access to your electronic health record. If you see a primary care provider, you can also send messages to your care team and make appointments. If you have questions, please call your primary care clinic.  If you do not have a primary care provider, please call [phone] and they will assist you.        Care EveryWhere ID     This is your Care EveryWhere ID. This could be used by other organizations to access your [facility] medical records  [id number]        Your Vitals Were     Pulse Temperature Height             80 98  F (36.7  C) (Oral) 6' 1\" (1.854 m)          Blood Pressure from Last 3 Encounters:   12/01/17 102/64   11/08/17 94/60   11/07/17 96/65    Weight from Last 3 Encounters:   11/07/17 130 lb (59 kg)   09/21/17 130 lb (59 kg)   06/23/17 130 lb (59 kg)              We Performed the Following     PAIN MANAGEMENT REFERRAL        Primary Care Provider Office Phone # Fax #    Laura Yao [phone] [address] [facility] [address]        Goals        General    I will use the medications Tylenol or Tramadol for pain every 4-6 hours as needed.  Evidenced by the patient  using the medications to control worsening pain as verbalized by the patient. (pt-stated)     Notes - Note created  3/22/2016  2:39 PM by Robbin Andino RN    As of today's date 3/22/2016 goal is met at 0 - 25%.   Goal Status:  Active        I will work with the [facility] [place] nurse to get the best results for wound care as evidenced by decrease in size and verbalized pain in the area of the wound on the buttock. (pt-stated)     Notes - Note created  1/6/2016  1:43 PM by Robbin Andino, RN    As of today's date 1/6/2016 goal is met at 0 - 25%.   Goal Status:  Active          Equal Access to Services     [facility] LEANDRO AH: Juhi Vides, waaxda luqadaha, qaybta kaalmonae odonnell . So [facility] " [phone].    ATENCIÓN: Si karishma boogie, tiene a borden disposición servicios gratuitos de asistencia lingüística. Juhi mcmahon [phone].    We comply with applicable federal civil rights laws and [place] laws. We do not discriminate on the basis of race, color, national origin, age, disability, sex, sexual orientation, or gender identity.            Thank you!     Thank you for choosing [facility]  for your care. Our goal is always to provide you with excellent care. Hearing back from our patients is one way we can continue to improve our services. Please take a few minutes to complete the written survey that you may receive in the mail after your visit with us. Thank you!             Your Updated Medication List - Protect others around you: Learn how to safely use, store and throw away your medicines at www.disposemymeds.org.          This list is accurate as of: 12/1/17 11:48 AM.  Always use your most recent med list.                   Brand Name Dispense Instructions for use Diagnosis    baclofen 20 MG tablet    LIORESAL    360 tablet    TAKE 1 TABLET(20 MG) BY MOUTH FOUR TIMES DAILY    Quadriplegia (H)       budesonide 0.5 MG/2ML neb solution    PULMICORT    1080 mL    USE 2 VIALS TWICE DAILY. MIX WITH HONEY AND SWALLOW    Eosinophilic esophagitis       cetirizine 10 MG tablet    zyrTEC     Take 10 mg by mouth daily        clotrimazole 1 % cream    LOTRIMIN    60 g    Apply topically twice a day as needed.    Dermatophytosis of groin and perianal area       diphenhydrAMINE 25 MG tablet    BENADRYL     Take 25 mg by mouth every 8 hours as needed for itching or allergies Reported on 2/15/2017        docusate sodium 283 MG enema    ENEMEEZ MINI    30 enema    Use one every other day and prn per patients's request.    Chronic constipation       EPINEPHrine 0.3 MG/0.3ML injection 2-pack    EPIPEN/ADRENACLICK/or ANY BX GENERIC EQUIV    0.6 mL    Inject 0.3 mLs (0.3 mg) into the muscle as needed  for anaphylaxis    Reaction to food, initial encounter       gabapentin 250 MG/5ML solution    NEURONTIN    450 mL    Take 2.5 mLs (125 mg) by mouth 3 times daily as needed    Chronic midline thoracic back pain       hydrocortisone 2.5 % cream    ANUSOL-HC    30 g    Place rectally 2 times daily    Hemorrhoids, unspecified hemorrhoid type       Lidocaine 0.5 % Gel     5 Tube    Externally apply 1 Application topically every 6 hours as needed (for mid thoracic pain)    Chronic midline thoracic back pain       NEW MED     500 mL    Gentamycin 240mg in 500mL 0.9% Normal Saline. Instill 30mL into empty bladder at bedtime. Leave in bladder overnight and drain in the morning    Recurrent UTI       omeprazole 20 MG CR capsule    priLOSEC     Take 1 capsule (20 mg) by mouth daily as needed        Omeprazole-Sodium Bicarbonate  MG Pack     90 each    TAKE 1 PACKET BY MOUTH DISSOLVED IN LIQUID DAILY    Eosinophilic esophagitis, Gastroesophageal reflux disease without esophagitis       ondansetron 4 MG ODT tab    ZOFRAN ODT    20 tablet    Take 1-2 tablets (4-8 mg) by mouth every 8 hours as needed for nausea    Abdominal pain, generalized       oxybutynin 5 MG tablet    DITROPAN    270 tablet    Crush to be instilled intravesically.  One tablet in the morning and 2 tablets in the evening.    Neurogenic bladder       phenylephrine-cocoa butter 0.25-88.44 % per suppository    PREPARATION H    48 suppository    Insert one suppository rectally twice daily as needed.    External hemorrhoids       polyethylene glycol powder    MIRALAX    510 g    Take 17 g (1 capful) by mouth daily as needed for constipation    Constipation, unspecified constipation type       sod bicarbonate-citric acid-simethicone 2.21-1.53-0.04 G Pack    EZ GAS    50 packet    Take 1 packet (4 g) by mouth daily as needed    Eosinophilic esophagitis       sterile water (bottle) irrigation     1000 mL    Irrigate with 60 mLs as directed daily    Neurogenic  bladder       tolterodine 2 MG tablet    DETROL    180 tablet    Take 1 tablet (2 mg) by mouth 2 times daily    Neurogenic bladder       TYLENOL PO      Take 500 mg by mouth as needed for mild pain or fever Reported on 2/15/2017        zolpidem 10 MG tablet    AMBIEN    30 tablet    Take 0.5-1 tablets (5-10 mg) by mouth nightly as needed for sleep    Primary insomnia

## 2017-12-01 NOTE — Clinical Note
Patient requesting pain clinic evaluation at [facility]. He has been there in past for therapy

## 2017-12-04 NOTE — PROGRESS NOTES
Riley has open access insurance plan so referrals are not required only that they contract with his plan for location.    [facility] Referral Dept.

## 2017-12-05 ENCOUNTER — OFFICE VISIT (OUTPATIENT)
Dept: FAMILY MEDICINE | Facility: CLINIC | Age: 42
End: 2017-12-05
Payer: MEDICARE

## 2017-12-05 ENCOUNTER — TELEPHONE (OUTPATIENT)
Dept: FAMILY MEDICINE | Facility: CLINIC | Age: 42
End: 2017-12-05

## 2017-12-05 VITALS — TEMPERATURE: 98.8 F | DIASTOLIC BLOOD PRESSURE: 75 MMHG | HEART RATE: 104 BPM | SYSTOLIC BLOOD PRESSURE: 107 MMHG

## 2017-12-05 DIAGNOSIS — N30.01 ACUTE CYSTITIS WITH HEMATURIA: ICD-10-CM

## 2017-12-05 DIAGNOSIS — R31.0 GROSS HEMATURIA: Primary | ICD-10-CM

## 2017-12-05 LAB
ALBUMIN UR-MCNC: NEGATIVE MG/DL
AMORPH CRY #/AREA URNS HPF: ABNORMAL /HPF
APPEARANCE UR: ABNORMAL
BACTERIA #/AREA URNS HPF: ABNORMAL /HPF
BILIRUB UR QL STRIP: NEGATIVE
COLOR UR AUTO: YELLOW
GLUCOSE UR STRIP-MCNC: NEGATIVE MG/DL
HGB UR QL STRIP: ABNORMAL
KETONES UR STRIP-MCNC: NEGATIVE MG/DL
LEUKOCYTE ESTERASE UR QL STRIP: NEGATIVE
MUCOUS THREADS #/AREA URNS LPF: PRESENT /LPF
NITRATE UR QL: NEGATIVE
NON-SQ EPI CELLS #/AREA URNS LPF: ABNORMAL /LPF
PH UR STRIP: 7 PH (ref 5–7)
RBC #/AREA URNS AUTO: ABNORMAL /HPF
SOURCE: ABNORMAL
SP GR UR STRIP: 1.02 (ref 1–1.03)
UROBILINOGEN UR STRIP-ACNC: 1 EU/DL (ref 0.2–1)
WBC #/AREA URNS AUTO: ABNORMAL /HPF

## 2017-12-05 PROCEDURE — 81001 URINALYSIS AUTO W/SCOPE: CPT | Performed by: PHYSICIAN ASSISTANT

## 2017-12-05 PROCEDURE — 87088 URINE BACTERIA CULTURE: CPT | Performed by: PHYSICIAN ASSISTANT

## 2017-12-05 PROCEDURE — 99213 OFFICE O/P EST LOW 20 MIN: CPT | Performed by: PHYSICIAN ASSISTANT

## 2017-12-05 PROCEDURE — 87186 SC STD MICRODIL/AGAR DIL: CPT | Performed by: PHYSICIAN ASSISTANT

## 2017-12-05 PROCEDURE — 87086 URINE CULTURE/COLONY COUNT: CPT | Performed by: PHYSICIAN ASSISTANT

## 2017-12-05 RX ORDER — SULFAMETHOXAZOLE AND TRIMETHOPRIM 200; 40 MG/5ML; MG/5ML
20 SUSPENSION ORAL 2 TIMES DAILY
Qty: 400 ML | Refills: 0 | Status: SHIPPED | OUTPATIENT
Start: 2017-12-05 | End: 2018-03-01

## 2017-12-05 NOTE — MR AVS SNAPSHOT
After Visit Summary   12/5/2017    Riley Gifford    MRN: [id number]           Patient Information     Date Of Birth          1975        Visit Information        Provider Department      12/5/2017 2:20 PM Catalina Dong PA-C [facility]        Today's Diagnoses     Gross hematuria    -  1    Acute cystitis with hematuria          Care Instructions    Bactrim 20 ml twice a day for 10 days   Follow up or call in 3 days if not better           Follow-ups after your visit        Your next 10 appointments already scheduled     Feb 12, 2018  3:00 PM CST   Return Visit with Evangelista Erazo MD   [facility] ([facility])    [address] [facility] [address] [phone]              Who to contact     If you have questions or need follow up information about today's clinic visit or your schedule please contact [facility] directly at [phone].  Normal or non-critical lab and imaging results will be communicated to you by "[employer]hart, letter or phone within 4 business days after the clinic has received the results. If you do not hear from us within 7 days, please contact the clinic through [employer] or phone. If you have a critical or abnormal lab result, we will notify you by phone as soon as possible.  Submit refill requests through [employer] or call your pharmacy and they will forward the refill request to us. Please allow 3 business days for your refill to be completed.          Additional Information About Your Visit        MyChart Information     [employer] gives you secure access to your electronic health record. If you see a primary care provider, you can also send messages to your care team and make appointments. If you have questions, please call your primary care clinic.  If you do not have a primary care provider, please call [phone] and they will assist you.        Care EveryWhere ID      This is your Care EveryWhere ID. This could be used by other organizations to access your [place] medical records  [id number]        Your Vitals Were     Pulse Temperature                104 98.8  F (37.1  C) (Oral)           Blood Pressure from Last 3 Encounters:   12/05/17 107/75   12/01/17 102/64   11/08/17 94/60    Weight from Last 3 Encounters:   11/07/17 130 lb (59 kg)   09/21/17 130 lb (59 kg)   06/23/17 130 lb (59 kg)              We Performed the Following     UA with Microscopic reflex to Culture     Urine Culture Aerobic Bacterial          Today's Medication Changes          These changes are accurate as of: 12/5/17  3:09 PM.  If you have any questions, ask your nurse or doctor.               Start taking these medicines.        Dose/Directions    sulfamethoxazole-trimethoprim suspension   Commonly known as:  BACTRIM/SEPTRA   Used for:  Acute cystitis with hematuria   Started by:  Catalina Dong PA-C        Dose:  20 mL   Take 20 mLs (160 mg) by mouth 2 times daily Dose based on TMP component.   Quantity:  400 mL   Refills:  0            Where to get your medicines      These medications were sent to [facility] - [place], [place] - [address]  [address], [facility] [address]     Phone:  [phone]     sulfamethoxazole-trimethoprim suspension                Primary Care Provider Office Phone # Fax #    Laura Yao [phone] [address] [facility] [address]        Goals        General    I will use the medications Tylenol or Tramadol for pain every 4-6 hours as needed.  Evidenced by the patient  using the medications to control worsening pain as verbalized by the patient. (pt-stated)     Notes - Note created  3/22/2016  2:39 PM by Robbin Andino RN    As of today's date 3/22/2016 goal is met at 0 - 25%.   Goal Status:  Active        I will work with the [facility] nurse to get the best results for wound care as  evidenced by decrease in size and verbalized pain in the area of the wound on the buttock. (pt-stated)     Notes - Note created  1/6/2016  1:43 PM by Robbin Andino RN    As of today's date 1/6/2016 goal is met at 0 - 25%.   Goal Status:  Active          Equal Access to Services     THERON REEVES AH: Hadii aad ku hadasho Soomaali, waaxda luqadaha, qaybta kaalmada adeegyada, monae crespo melisaadrian hyattnancie salas la'asadadrian . So [facility] [phone].    ATENCIÓN: Si habla español, tiene a borden disposición servicios gratuitos de asistencia lingüística. Llame al [phone].    We comply with applicable federal civil rights laws and [place] laws. We do not discriminate on the basis of race, color, national origin, age, disability, sex, sexual orientation, or gender identity.            Thank you!     Thank you for choosing [facility]  for your care. Our goal is always to provide you with excellent care. Hearing back from our patients is one way we can continue to improve our services. Please take a few minutes to complete the written survey that you may receive in the mail after your visit with us. Thank you!             Your Updated Medication List - Protect others around you: Learn how to safely use, store and throw away your medicines at www.disposemymeds.org.          This list is accurate as of: 12/5/17  3:09 PM.  Always use your most recent med list.                   Brand Name Dispense Instructions for use Diagnosis    baclofen 20 MG tablet    LIORESAL    360 tablet    TAKE 1 TABLET(20 MG) BY MOUTH FOUR TIMES DAILY    Quadriplegia (H)       budesonide 0.5 MG/2ML neb solution    PULMICORT    1080 mL    USE 2 VIALS TWICE DAILY. MIX WITH HONEY AND SWALLOW    Eosinophilic esophagitis       cetirizine 10 MG tablet    zyrTEC     Take 10 mg by mouth daily        diphenhydrAMINE 25 MG tablet    BENADRYL     Take 25 mg by mouth every 8 hours as needed for itching or allergies Reported on 2/15/2017        docusate  sodium 283 MG enema    ENEMEEZ MINI    30 enema    Use one every other day and prn per patients's request.    Chronic constipation       EPINEPHrine 0.3 MG/0.3ML injection 2-pack    EPIPEN/ADRENACLICK/or ANY BX GENERIC EQUIV    0.6 mL    Inject 0.3 mLs (0.3 mg) into the muscle as needed for anaphylaxis    Reaction to food, initial encounter       gabapentin 250 MG/5ML solution    NEURONTIN    450 mL    Take 2.5 mLs (125 mg) by mouth 3 times daily as needed    Chronic midline thoracic back pain       hydrocortisone 2.5 % cream    ANUSOL-HC    30 g    Place rectally 2 times daily    Hemorrhoids, unspecified hemorrhoid type       Lidocaine 0.5 % Gel     5 Tube    Externally apply 1 Application topically every 6 hours as needed (for mid thoracic pain)    Chronic midline thoracic back pain       NEW MED     500 mL    Gentamycin 240mg in 500mL 0.9% Normal Saline. Instill 30mL into empty bladder at bedtime. Leave in bladder overnight and drain in the morning    Recurrent UTI       omeprazole 20 MG CR capsule    priLOSEC     Take 1 capsule (20 mg) by mouth daily as needed        Omeprazole-Sodium Bicarbonate  MG Pack     90 each    TAKE 1 PACKET BY MOUTH DISSOLVED IN LIQUID DAILY    Eosinophilic esophagitis, Gastroesophageal reflux disease without esophagitis       ondansetron 4 MG ODT tab    ZOFRAN ODT    20 tablet    Take 1-2 tablets (4-8 mg) by mouth every 8 hours as needed for nausea    Abdominal pain, generalized       phenylephrine-cocoa butter 0.25-88.44 % per suppository    PREPARATION H    48 suppository    Insert one suppository rectally twice daily as needed.    External hemorrhoids       polyethylene glycol powder    MIRALAX    510 g    Take 17 g (1 capful) by mouth daily as needed for constipation    Constipation, unspecified constipation type       sterile water (bottle) irrigation     1000 mL    Irrigate with 60 mLs as directed daily    Neurogenic bladder       sulfamethoxazole-trimethoprim suspension     BACTRIM/SEPTRA    400 mL    Take 20 mLs (160 mg) by mouth 2 times daily Dose based on TMP component.    Acute cystitis with hematuria       tolterodine 2 MG tablet    DETROL    180 tablet    Take 1 tablet (2 mg) by mouth 2 times daily    Neurogenic bladder       TYLENOL PO      Take 500 mg by mouth as needed for mild pain or fever Reported on 2/15/2017        zolpidem 10 MG tablet    AMBIEN    30 tablet    Take 0.5-1 tablets (5-10 mg) by mouth nightly as needed for sleep    Primary insomnia

## 2017-12-05 NOTE — TELEPHONE ENCOUNTER
These could also be symptoms of a much worse condition like sepsis (blood poisoning) and best care is that he be evaluated by a provider.  He really should be seen.  Please call patient to let him know.    Rober Ortega MD

## 2017-12-05 NOTE — NURSING NOTE
"Chief Complaint   Patient presents with     UTI     Dark urine, blood and muscle spasm, started last night       Initial /75 (BP Location: Left arm, Patient Position: Chair, Cuff Size: Adult Regular)  Pulse 104  Temp 98.8  F (37.1  C) (Oral) Estimated body mass index is 17.15 kg/(m^2) as calculated from the following:    Height as of 6/23/17: 6' 1\" (1.854 m).    Weight as of 11/7/17: 130 lb (59 kg).  Medication Reconciliation:   Jessica Thao MA      "

## 2017-12-05 NOTE — TELEPHONE ENCOUNTER
The patient called the Care Coordination RN with concern over bladder symptoms.  The urine is very dark and blood tinged.  The patient is having chills and muscle aches and spasms as well as bladder spasms when he caths.  This started yesterday and became worse overnight.  The patient states that he was just seen on 12-1-17 and would like to be able to come in for a UA/UC only if possible.  The Care Coordination RN explained that the PCP is not in the office today although the message will be sent to the team.  Please contact the patient with the outcome.      Robbin Vanegas, MSN, RN, PHN  N Clinic Care Coordinator  [facility]  Phone: [phone]  [email or web address]

## 2017-12-06 LAB
BACTERIA SPEC CULT: ABNORMAL
SPECIMEN SOURCE: ABNORMAL

## 2017-12-08 ENCOUNTER — TELEPHONE (OUTPATIENT)
Dept: FAMILY MEDICINE | Facility: CLINIC | Age: 42
End: 2017-12-08

## 2017-12-08 DIAGNOSIS — R31.9 URINARY TRACT INFECTION WITH HEMATURIA, SITE UNSPECIFIED: Primary | ICD-10-CM

## 2017-12-08 DIAGNOSIS — N39.0 URINARY TRACT INFECTION WITH HEMATURIA, SITE UNSPECIFIED: Primary | ICD-10-CM

## 2017-12-08 RX ORDER — CIPROFLOXACIN 500 MG/1
500 TABLET, FILM COATED ORAL 2 TIMES DAILY
Qty: 14 TABLET | Refills: 0 | Status: SHIPPED | OUTPATIENT
Start: 2017-12-08 | End: 2018-03-01

## 2017-12-08 NOTE — TELEPHONE ENCOUNTER
The patient is requesting a follow up UA/UC after he finishes the medication for his UTI.  The last of the medication will be taken on 12-14-17.  Please let the patient know if an order can be placed for the patient to be seen only in the lab.  Call the patient or let Care Coordination know if this is ok and the preferred date for the follow up sample.      Robbin Vanegas, MSN, RN, PHN  [facility] Clinic Care Coordinator  [facility]  Phone: [phone]  [email or web address]

## 2017-12-08 NOTE — TELEPHONE ENCOUNTER
I have ordered the UA/UC future.  Has he talked with his urologist about the recurrent UTI?  Need to consider consult with infectious disease to try and help with antibiotic plan and urine culture.    Laura Yao MD

## 2017-12-08 NOTE — TELEPHONE ENCOUNTER
Called patient, relayed message. He will call his urologist today or next week- leave open to follow up. I informed patient & sent a MyChart with the Infectious Disease referral information.   Neelima Yeung RN

## 2017-12-11 ENCOUNTER — CARE COORDINATION (OUTPATIENT)
Dept: CARE COORDINATION | Facility: CLINIC | Age: 42
End: 2017-12-11

## 2017-12-11 NOTE — PROGRESS NOTES
Clinic Care Coordination Contact    Situation: Patient chart reviewed by care coordinator.    Background: quadriplegic, eosinophilic esophagitis, recurrent UTI     Assessment: The patient recently had another diagnosis of a UTI.  Following this the PCP has referred the patient to infectious disease for another look at the recurring UTI's and the best treatment going forward.  The patient is also using the [facility] gym for working out 3 times per week is his goal.  The patient is working with the pain clinic at [facility] although he was told that he needs a referral for his type of insurance.  The referral was written on 12-1-17.  The patient follows up as directed.  The patient will finish all medications as directed.    Plan/Recommendations: 1).  The patient will make an appointment with infectious disease to follow up on the UTI.  2).  The patient will follow up with the pain clinic at [facility] for possible treatment.  3).   The patient will make and attend all follow up appointments with the PCP and the specialists.   4).  The Care Coordination RN will make a follow up call to the patient again in 3-4 weeks.  5).  Care Coordination to remain available for the patient to contact in the event of future needs.      Robbin Vanegas, MSN, RN, PHN  N Clinic Care Coordinator  [facility]  Phone: [phone]  [email or web address]

## 2017-12-12 ENCOUNTER — TELEPHONE (OUTPATIENT)
Dept: UROLOGY | Facility: CLINIC | Age: 42
End: 2017-12-12

## 2017-12-12 NOTE — TELEPHONE ENCOUNTER
Patient having a lot of problems with chronic utis.  Wants to get botox injections in January  Message sent to dr germain to put orders in and appointment made with laurie on feb 12th. Blanka White LPN Staff Nurse

## 2017-12-14 NOTE — TELEPHONE ENCOUNTER
Patient is scheduled 2/12 with urology. ARISTEO Siegel reminded him on 12/11 to also call ID.  Neelima Yeung RN

## 2017-12-15 DIAGNOSIS — N31.9 NEUROGENIC BLADDER: Primary | ICD-10-CM

## 2017-12-15 RX ORDER — CEFAZOLIN SODIUM 1 G/3ML
1 INJECTION, POWDER, FOR SOLUTION INTRAMUSCULAR; INTRAVENOUS SEE ADMIN INSTRUCTIONS
Status: CANCELLED | OUTPATIENT
Start: 2017-12-15

## 2017-12-15 NOTE — PROGRESS NOTES
Cysto/botox planned  Needs to be done with sedation due to very small bladder, high BN and AD with filling.

## 2017-12-20 ENCOUNTER — CARE COORDINATION (OUTPATIENT)
Dept: UROLOGY | Facility: CLINIC | Age: 42
End: 2017-12-20

## 2017-12-20 NOTE — PROGRESS NOTES
Upcoming surgical procedure with Dr Evaristo Hayes on 1.4.18 at 1520, check in at 1320.   Surgery at ([facility] or [facility]): [facility]  Patient is having a Cystoscopy and Botox injection into the bladder  Pre-op physical completed: YES. Date-1.2.18.  PAC: No  Bowel Prep: No, not needed  Urine culture completed: YES. Date-12.28.17 NO GROWTH.  Post-operative appointment needed: YES. Date-2.12.18 at 1700 with Nelli Sims PA-C.    12.20.17 - [employer] message sent to patient. Needs UC at his pre-op    Marta Adames RN-BC, BSN  Care Coordinator - Reconstructive Urology  [phone]

## 2017-12-27 ENCOUNTER — TELEPHONE (OUTPATIENT)
Dept: FAMILY MEDICINE | Facility: CLINIC | Age: 42
End: 2017-12-27

## 2017-12-27 NOTE — TELEPHONE ENCOUNTER
Reason for Call:  Other appointment    Detailed comments: Patient stated that he is have surgery and will need a pre op.    Phone Number Patient can be reached at: Home number on file [phone] (home)    Best Time:     Can we leave a detailed message on this number? Not Applicable    Call taken on 12/27/2017 at 10:55 AM by Violetta Mcarthur

## 2017-12-28 DIAGNOSIS — N39.0 URINARY TRACT INFECTION WITH HEMATURIA, SITE UNSPECIFIED: ICD-10-CM

## 2017-12-28 DIAGNOSIS — R31.9 URINARY TRACT INFECTION WITH HEMATURIA, SITE UNSPECIFIED: ICD-10-CM

## 2017-12-28 LAB
ALBUMIN UR-MCNC: NEGATIVE MG/DL
APPEARANCE UR: CLEAR
BACTERIA #/AREA URNS HPF: ABNORMAL /HPF
BILIRUB UR QL STRIP: NEGATIVE
COLOR UR AUTO: YELLOW
GLUCOSE UR STRIP-MCNC: NEGATIVE MG/DL
HGB UR QL STRIP: NEGATIVE
KETONES UR STRIP-MCNC: NEGATIVE MG/DL
LEUKOCYTE ESTERASE UR QL STRIP: ABNORMAL
NITRATE UR QL: NEGATIVE
NON-SQ EPI CELLS #/AREA URNS LPF: ABNORMAL /LPF
PH UR STRIP: 7 PH (ref 5–7)
RBC #/AREA URNS AUTO: ABNORMAL /HPF
SOURCE: ABNORMAL
SP GR UR STRIP: 1.01 (ref 1–1.03)
UROBILINOGEN UR STRIP-ACNC: 0.2 EU/DL (ref 0.2–1)
WBC #/AREA URNS AUTO: ABNORMAL /HPF

## 2017-12-28 PROCEDURE — 87086 URINE CULTURE/COLONY COUNT: CPT | Performed by: FAMILY MEDICINE

## 2017-12-28 PROCEDURE — 81001 URINALYSIS AUTO W/SCOPE: CPT | Performed by: FAMILY MEDICINE

## 2017-12-28 NOTE — TELEPHONE ENCOUNTER
Patient cancelled his appointment with Dr Yao and scheduled a pre op at [facility].    Luli Correa

## 2017-12-29 LAB
BACTERIA SPEC CULT: NO GROWTH
SPECIMEN SOURCE: NORMAL
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
## 2017-12-29 NOTE — PATIENT INSTRUCTIONS
Before Your Surgery      Call your surgeon if there is any change in your health. This includes signs of a cold or flu (such as a sore throat, runny nose, cough, rash or fever).    Do not smoke, drink alcohol or take over the counter medicine (unless your surgeon or primary care doctor tells you to) for the 24 hours before and after surgery.    If you take prescribed drugs: Follow your doctor s orders about which medicines to take and which to stop until after surgery.    Eating and drinking prior to surgery: follow the instructions from your surgeon    Take a shower or bath the night before surgery. Use the soap your surgeon gave you to gently clean your skin. If you do not have soap from your surgeon, use your regular soap. Do not shave or scrub the surgery site.  Wear clean pajamas and have clean sheets on your bed.     --Patient is to take all scheduled medications on the day of surgery EXCEPT for modifications listed below.  None.    APPROVAL GIVEN to proceed with proposed procedure, without further diagnostic evaluation       Signed Electronically by: Cyrus Duffy MD    [facility]    If you have any questions regarding to your visit please contact your care team:       Team Purple:   Clinic Hours Telephone Number   Dr. Fadumo Cooper   7am-7pm  Monday - Thursday   7am-5pm  Fridays  [phone]  (Appointment scheduling available 24/7)    Questions about your Visit?   Team Line:  [phone]   Urgent Care - [place] - 11am-9pm Monday-Friday Saturday-Sunday- 9am-5pm   [place] - 5pm-9pm Monday-Friday Saturday-Sunday- 9am-5pm  [phone] - [place]   [phone] - [place]       What options do I have for visits at the clinic other than the traditional office visit?  To expand how we care for you, many of our providers are utilizing electronic visits (e-visits) and telephone visits, when  medically appropriate, for interactions with their patients rather than a visit in the clinic.   We also offer nurse visits for many medical concerns. Just like any other service, we will bill your insurance company for this type of visit based on time spent on the phone with your provider. Not all insurance companies cover these visits. Please check with your medical insurance if this type of visit is covered. You will be responsible for any charges that are not paid by your insurance.      E-visits via [employer]:  generally incur a $35.00 fee.  Telephone visits:  Time spent on the phone: *charged based on time that is spent on the phone in increments of 10 minutes. Estimated cost:   5-10 mins $30.00   11-20 mins. $59.00   21-30 mins. $85.00     Use [employer] (secure email communication and access to your chart) to send your primary care provider a message or make an appointment. Ask someone on your Team how to sign up for [employer].  For a Price Quote for your services, please call our Consumer Price Line at [phone].  As always, Thank you for trusting us with your health care needs!    Alea Miller MA

## 2018-01-02 ENCOUNTER — OFFICE VISIT (OUTPATIENT)
Dept: FAMILY MEDICINE | Facility: CLINIC | Age: 43
End: 2018-01-02
Payer: MEDICARE

## 2018-01-02 VITALS
WEIGHT: 315 LBS | DIASTOLIC BLOOD PRESSURE: 74 MMHG | OXYGEN SATURATION: 93 % | HEART RATE: 77 BPM | SYSTOLIC BLOOD PRESSURE: 126 MMHG | BODY MASS INDEX: 66.89 KG/M2 | TEMPERATURE: 96.8 F

## 2018-01-02 DIAGNOSIS — G82.50 QUADRIPLEGIA (H): ICD-10-CM

## 2018-01-02 DIAGNOSIS — K20.0 EOSINOPHILIC ESOPHAGITIS: ICD-10-CM

## 2018-01-02 DIAGNOSIS — N31.9 NEUROGENIC BLADDER: ICD-10-CM

## 2018-01-02 DIAGNOSIS — Z01.818 PREOP GENERAL PHYSICAL EXAM: Primary | ICD-10-CM

## 2018-01-02 DIAGNOSIS — Z23 NEED FOR PROPHYLACTIC VACCINATION AND INOCULATION AGAINST INFLUENZA: ICD-10-CM

## 2018-01-02 PROCEDURE — 99214 OFFICE O/P EST MOD 30 MIN: CPT | Performed by: FAMILY MEDICINE

## 2018-01-02 ASSESSMENT — PAIN SCALES - GENERAL: PAINLEVEL: MILD PAIN (2)

## 2018-01-02 NOTE — NURSING NOTE
"Chief Complaint   Patient presents with     Pre-Op Exam     1/4/2018- Freddy Lester      Health Maintenance     Urine, GAD7, Flu Shot, Lipid, PHQ9       Initial /74  Pulse 77  Temp 96.8  F (36  C) (Oral)  Wt (!) 507 lb (230 kg)  SpO2 93%  BMI 66.89 kg/m2 Estimated body mass index is 66.89 kg/(m^2) as calculated from the following:    Height as of 12/1/17: 6' 1\" (1.854 m).    Weight as of this encounter: 507 lb (230 kg).  Medication Reconciliation: complete     Allyson Rick MA       "

## 2018-01-02 NOTE — MR AVS SNAPSHOT
After Visit Summary   1/2/2018    Riley Gifford    MRN: [id number]           Patient Information     Date Of Birth          1975        Visit Information        Provider Department      1/2/2018 12:40 PM Cyrus Duffy MD [facility]        Today's Diagnoses     Preop general physical exam    -  1    Neurogenic bladder        Quadriplegia (H)        Need for prophylactic vaccination and inoculation against influenza          Care Instructions      Before Your Surgery      Call your surgeon if there is any change in your health. This includes signs of a cold or flu (such as a sore throat, runny nose, cough, rash or fever).    Do not smoke, drink alcohol or take over the counter medicine (unless your surgeon or primary care doctor tells you to) for the 24 hours before and after surgery.    If you take prescribed drugs: Follow your doctor s orders about which medicines to take and which to stop until after surgery.    Eating and drinking prior to surgery: follow the instructions from your surgeon    Take a shower or bath the night before surgery. Use the soap your surgeon gave you to gently clean your skin. If you do not have soap from your surgeon, use your regular soap. Do not shave or scrub the surgery site.  Wear clean pajamas and have clean sheets on your bed.     --Patient is to take all scheduled medications on the day of surgery EXCEPT for modifications listed below.  None.    APPROVAL GIVEN to proceed with proposed procedure, without further diagnostic evaluation       Signed Electronically by: Cyrus Duffy MD    [facility]    If you have any questions regarding to your visit please contact your care team:       Team Purple:   Clinic Hours Telephone Number   Dr. Fadumo Cooper   7am-7pm  Monday - Thursday   7am-5pm  Fridays  [phone]  (Appointment scheduling available 24/7)    Questions  about your Visit?   Team Line:  [phone]   Urgent Care - [place] and [place] - 11am-9pm Monday-Friday Saturday-Sunday- 9am-5pm   [place] - 5pm-9pm Monday-Friday Saturday-Sunday- 9am-5pm  [phone] - [place]   [phone] - [place]       What options do I have for visits at the clinic other than the traditional office visit?  To expand how we care for you, many of our providers are utilizing electronic visits (e-visits) and telephone visits, when medically appropriate, for interactions with their patients rather than a visit in the clinic.   We also offer nurse visits for many medical concerns. Just like any other service, we will bill your insurance company for this type of visit based on time spent on the phone with your provider. Not all insurance companies cover these visits. Please check with your medical insurance if this type of visit is covered. You will be responsible for any charges that are not paid by your insurance.      E-visits via [employer]:  generally incur a $35.00 fee.  Telephone visits:  Time spent on the phone: *charged based on time that is spent on the phone in increments of 10 minutes. Estimated cost:   5-10 mins $30.00   11-20 mins. $59.00   21-30 mins. $85.00     Use [employer] (secure email communication and access to your chart) to send your primary care provider a message or make an appointment. Ask someone on your Team how to sign up for [employer].  For a Price Quote for your services, please call our Consumer Price Line at [phone].  As always, Thank you for trusting us with your health care needs!    Alea Miller MA            Follow-ups after your visit        Your next 10 appointments already scheduled     Jan 04, 2018   Procedure with Evaristo Hayes MD   [facility] [place], Same Day Surgery (--)    [address]  [place] [address]   [phone]            Feb 12, 2018  3:00 PM CST   Return Visit with Evangelista Erazo MD   [facility]  [place] ([facility] [place])    [address]  [place] [id number] [phone]            Feb 12, 2018  5:00 PM CST   (Arrive by 4:45 PM)   Return Visit with Nelli Sims PA-C   [facility] ([facility])    [address] [facility] [address] [phone]              Who to contact     If you have questions or need follow up information about today's clinic visit or your schedule please contact [facility] directly at [phone].  Normal or non-critical lab and imaging results will be communicated to you by MyChart, letter or phone within 4 business days after the clinic has received the results. If you do not hear from us within 7 days, please contact the clinic through "[email or web address]hart or phone. If you have a critical or abnormal lab result, we will notify you by phone as soon as possible.  Submit refill requests through [employer] or call your pharmacy and they will forward the refill request to us. Please allow 3 business days for your refill to be completed.          Additional Information About Your Visit        "[email or web address] Information     [employer] gives you secure access to your electronic health record. If you see a primary care provider, you can also send messages to your care team and make appointments. If you have questions, please call your primary care clinic.  If you do not have a primary care provider, please call [phone] and they will assist you.        Care EveryWhere ID     This is your Care EveryWhere ID. This could be used by other organizations to access your [facility] medical records  [id number]        Your Vitals Were     Pulse Temperature Pulse Oximetry BMI (Body Mass Index)          77 96.8  F (36  C) (Oral) 93% 66.89 kg/m2         Blood Pressure from Last 3 Encounters:   01/02/18 126/74   12/05/17 107/75   12/01/17 102/64    Weight from Last 3  Encounters:   01/02/18 (!) 507 lb (230 kg)   11/07/17 130 lb (59 kg)   09/21/17 130 lb (59 kg)              Today, you had the following     No orders found for display       Primary Care Provider Office Phone # Fax #    Laura Yao [phone]       [address] [address]        Goals        General    I will use the medications Tylenol or Tramadol for pain every 4-6 hours as needed.  Evidenced by the patient  using the medications to control worsening pain as verbalized by the patient. (pt-stated)     Notes - Note created  3/22/2016  2:39 PM by Robbin Andino RN    As of today's date 3/22/2016 goal is met at 0 - 25%.   Goal Status:  Active        I will work with the [facility] nurse to get the best results for wound care as evidenced by decrease in size and verbalized pain in the area of the wound on the buttock. (pt-stated)     Notes - Note created  1/6/2016  1:43 PM by Robbin Andino RN    As of today's date 1/6/2016 goal is met at 0 - 25%.   Goal Status:  Active          Equal Access to Services     [facility] : Hadii alan jorge hadasho Soomaali, waaxda luqadaha, qaybta kaalmada adeegyada, monae diaz. So [facility] [phone].    ATENCIÓN: Si habla español, tiene a borden disposición servicios gratuitos de asistencia lingüística. Llame al [phone].    We comply with applicable federal civil rights laws and [place] laws. We do not discriminate on the basis of race, color, national origin, age, disability, sex, sexual orientation, or gender identity.            Thank you!     Thank you for choosing [facility]  for your care. Our goal is always to provide you with excellent care. Hearing back from our patients is one way we can continue to improve our services. Please take a few minutes to complete the written survey that you may receive in the mail after your visit with us. Thank you!             Your Updated Medication List - Protect  others around you: Learn how to safely use, store and throw away your medicines at www.disposemymeds.org.          This list is accurate as of: 1/2/18  1:11 PM.  Always use your most recent med list.                   Brand Name Dispense Instructions for use Diagnosis    baclofen 20 MG tablet    LIORESAL    360 tablet    TAKE 1 TABLET(20 MG) BY MOUTH FOUR TIMES DAILY    Quadriplegia (H)       budesonide 0.5 MG/2ML neb solution    PULMICORT    1080 mL    USE 2 VIALS TWICE DAILY. MIX WITH HONEY AND SWALLOW    Eosinophilic esophagitis       cetirizine 10 MG tablet    zyrTEC     Take 10 mg by mouth daily        ciprofloxacin 500 MG tablet    CIPRO    14 tablet    Take 1 tablet (500 mg) by mouth 2 times daily    Acute cystitis with hematuria       diphenhydrAMINE 25 MG tablet    BENADRYL     Take 25 mg by mouth every 8 hours as needed for itching or allergies Reported on 2/15/2017        docusate sodium 283 MG enema    ENEMEEZ MINI    30 enema    Use one every other day and prn per patients's request.    Chronic constipation       EPINEPHrine 0.3 MG/0.3ML injection 2-pack    EPIPEN/ADRENACLICK/or ANY BX GENERIC EQUIV    0.6 mL    Inject 0.3 mLs (0.3 mg) into the muscle as needed for anaphylaxis    Reaction to food, initial encounter       gabapentin 250 MG/5ML solution    NEURONTIN    450 mL    Take 2.5 mLs (125 mg) by mouth 3 times daily as needed    Chronic midline thoracic back pain       hydrocortisone 2.5 % cream    ANUSOL-HC    30 g    Place rectally 2 times daily    Hemorrhoids, unspecified hemorrhoid type       Lidocaine 0.5 % Gel     5 Tube    Externally apply 1 Application topically every 6 hours as needed (for mid thoracic pain)    Chronic midline thoracic back pain       NEW MED     500 mL    Gentamycin 240mg in 500mL 0.9% Normal Saline. Instill 30mL into empty bladder at bedtime. Leave in bladder overnight and drain in the morning    Recurrent UTI       omeprazole 20 MG CR capsule    priLOSEC     Take 1  capsule (20 mg) by mouth daily as needed        Omeprazole-Sodium Bicarbonate  MG Pack     90 each    TAKE 1 PACKET BY MOUTH DISSOLVED IN LIQUID DAILY    Eosinophilic esophagitis, Gastroesophageal reflux disease without esophagitis       ondansetron 4 MG ODT tab    ZOFRAN ODT    20 tablet    Take 1-2 tablets (4-8 mg) by mouth every 8 hours as needed for nausea    Abdominal pain, generalized       phenylephrine-cocoa butter 0.25-88.44 % per suppository    PREPARATION H    48 suppository    Insert one suppository rectally twice daily as needed.    External hemorrhoids       polyethylene glycol powder    MIRALAX    510 g    Take 17 g (1 capful) by mouth daily as needed for constipation    Constipation, unspecified constipation type       sterile water (bottle) irrigation     1000 mL    Irrigate with 60 mLs as directed daily    Neurogenic bladder       sulfamethoxazole-trimethoprim suspension    BACTRIM/SEPTRA    400 mL    Take 20 mLs (160 mg) by mouth 2 times daily Dose based on TMP component.    Acute cystitis with hematuria       tolterodine 2 MG tablet    DETROL    180 tablet    Take 1 tablet (2 mg) by mouth 2 times daily    Neurogenic bladder       TYLENOL PO      Take 500 mg by mouth as needed for mild pain or fever Reported on 2/15/2017        zolpidem 10 MG tablet    AMBIEN    30 tablet    Take 0.5-1 tablets (5-10 mg) by mouth nightly as needed for sleep    Primary insomnia

## 2018-01-04 ENCOUNTER — HOSPITAL ENCOUNTER (OUTPATIENT)
Facility: CLINIC | Age: 43
Discharge: HOME | End: 2018-01-04
Attending: UROLOGY | Admitting: UROLOGY
Payer: MEDICARE

## 2018-01-04 ENCOUNTER — ANESTHESIA EVENT (OUTPATIENT)
Dept: SURGERY | Facility: CLINIC | Age: 43
End: 2018-01-04
Payer: MEDICARE

## 2018-01-04 ENCOUNTER — ANESTHESIA (OUTPATIENT)
Dept: SURGERY | Facility: CLINIC | Age: 43
End: 2018-01-04
Payer: MEDICARE

## 2018-01-04 VITALS
OXYGEN SATURATION: 100 % | TEMPERATURE: 98.1 F | DIASTOLIC BLOOD PRESSURE: 86 MMHG | HEIGHT: 72 IN | SYSTOLIC BLOOD PRESSURE: 104 MMHG | BODY MASS INDEX: 19.46 KG/M2 | WEIGHT: 143.7 LBS | RESPIRATION RATE: 16 BRPM

## 2018-01-04 LAB — GLUCOSE BLDC GLUCOMTR-MCNC: 83 MG/DL (ref 70–99)

## 2018-01-04 PROCEDURE — 82962 GLUCOSE BLOOD TEST: CPT

## 2018-01-04 PROCEDURE — 25000125 ZZHC RX 250: Performed by: NURSE ANESTHETIST, CERTIFIED REGISTERED

## 2018-01-04 PROCEDURE — 71000027 ZZH RECOVERY PHASE 2 EACH 15 MINS: Performed by: UROLOGY

## 2018-01-04 PROCEDURE — 36000051 ZZH SURGERY LEVEL 2 1ST 30 MIN - UMMC: Performed by: UROLOGY

## 2018-01-04 PROCEDURE — 25000128 H RX IP 250 OP 636: Performed by: RESIDENTIAL TREATMENT FACILITY, PHYSICAL DISABILITIES

## 2018-01-04 PROCEDURE — 25000128 H RX IP 250 OP 636: Performed by: NURSE ANESTHETIST, CERTIFIED REGISTERED

## 2018-01-04 PROCEDURE — 27210794 ZZH OR GENERAL SUPPLY STERILE: Performed by: UROLOGY

## 2018-01-04 PROCEDURE — 25000128 H RX IP 250 OP 636: Performed by: UROLOGY

## 2018-01-04 PROCEDURE — 37000009 ZZH ANESTHESIA TECHNICAL FEE, EACH ADDTL 15 MIN: Performed by: UROLOGY

## 2018-01-04 PROCEDURE — 36000053 ZZH SURGERY LEVEL 2 EA 15 ADDTL MIN - UMMC: Performed by: UROLOGY

## 2018-01-04 PROCEDURE — 25000576 ZZH RX IP 250 OP 636 J0585: Performed by: UROLOGY

## 2018-01-04 PROCEDURE — 40000170 ZZH STATISTIC PRE-PROCEDURE ASSESSMENT II: Performed by: UROLOGY

## 2018-01-04 PROCEDURE — 37000008 ZZH ANESTHESIA TECHNICAL FEE, 1ST 30 MIN: Performed by: UROLOGY

## 2018-01-04 RX ORDER — DEXAMETHASONE SODIUM PHOSPHATE 4 MG/ML
INJECTION, SOLUTION INTRA-ARTICULAR; INTRALESIONAL; INTRAMUSCULAR; INTRAVENOUS; SOFT TISSUE PRN
Status: DISCONTINUED | OUTPATIENT
Start: 2018-01-04 | End: 2018-01-04

## 2018-01-04 RX ORDER — FENTANYL CITRATE 50 UG/ML
25-50 INJECTION, SOLUTION INTRAMUSCULAR; INTRAVENOUS
Status: CANCELLED | OUTPATIENT
Start: 2018-01-04

## 2018-01-04 RX ORDER — ONDANSETRON 2 MG/ML
INJECTION INTRAMUSCULAR; INTRAVENOUS PRN
Status: DISCONTINUED | OUTPATIENT
Start: 2018-01-04 | End: 2018-01-04

## 2018-01-04 RX ORDER — OXYBUTYNIN CHLORIDE 5 MG/1
TABLET ORAL
Refills: 3 | COMMUNITY
Start: 2017-09-19 | End: 2018-02-12

## 2018-01-04 RX ORDER — NALOXONE HYDROCHLORIDE 0.4 MG/ML
.1-.4 INJECTION, SOLUTION INTRAMUSCULAR; INTRAVENOUS; SUBCUTANEOUS
Status: CANCELLED | OUTPATIENT
Start: 2018-01-04 | End: 2018-01-05

## 2018-01-04 RX ORDER — LIDOCAINE 40 MG/G
CREAM TOPICAL
Status: DISCONTINUED | OUTPATIENT
Start: 2018-01-04 | End: 2018-01-04 | Stop reason: HOSPADM

## 2018-01-04 RX ORDER — FENTANYL CITRATE 50 UG/ML
INJECTION, SOLUTION INTRAMUSCULAR; INTRAVENOUS PRN
Status: DISCONTINUED | OUTPATIENT
Start: 2018-01-04 | End: 2018-01-04

## 2018-01-04 RX ORDER — SODIUM CHLORIDE, SODIUM LACTATE, POTASSIUM CHLORIDE, CALCIUM CHLORIDE 600; 310; 30; 20 MG/100ML; MG/100ML; MG/100ML; MG/100ML
INJECTION, SOLUTION INTRAVENOUS CONTINUOUS
Status: DISCONTINUED | OUTPATIENT
Start: 2018-01-04 | End: 2018-01-04 | Stop reason: HOSPADM

## 2018-01-04 RX ORDER — ONDANSETRON 4 MG/1
4 TABLET, ORALLY DISINTEGRATING ORAL EVERY 30 MIN PRN
Status: CANCELLED | OUTPATIENT
Start: 2018-01-04

## 2018-01-04 RX ORDER — SODIUM CHLORIDE, SODIUM LACTATE, POTASSIUM CHLORIDE, CALCIUM CHLORIDE 600; 310; 30; 20 MG/100ML; MG/100ML; MG/100ML; MG/100ML
INJECTION, SOLUTION INTRAVENOUS CONTINUOUS
Status: CANCELLED | OUTPATIENT
Start: 2018-01-04

## 2018-01-04 RX ORDER — MEPERIDINE HYDROCHLORIDE 25 MG/ML
12.5 INJECTION INTRAMUSCULAR; INTRAVENOUS; SUBCUTANEOUS
Status: CANCELLED | OUTPATIENT
Start: 2018-01-04

## 2018-01-04 RX ORDER — CEFAZOLIN SODIUM 2 G/100ML
2 INJECTION, SOLUTION INTRAVENOUS
Status: COMPLETED | OUTPATIENT
Start: 2018-01-04 | End: 2018-01-04

## 2018-01-04 RX ORDER — CEFAZOLIN SODIUM 1 G/3ML
1 INJECTION, POWDER, FOR SOLUTION INTRAMUSCULAR; INTRAVENOUS SEE ADMIN INSTRUCTIONS
Status: DISCONTINUED | OUTPATIENT
Start: 2018-01-04 | End: 2018-01-04 | Stop reason: HOSPADM

## 2018-01-04 RX ORDER — LIDOCAINE HYDROCHLORIDE 20 MG/ML
INJECTION, SOLUTION INFILTRATION; PERINEURAL PRN
Status: DISCONTINUED | OUTPATIENT
Start: 2018-01-04 | End: 2018-01-04

## 2018-01-04 RX ORDER — ONDANSETRON 2 MG/ML
4 INJECTION INTRAMUSCULAR; INTRAVENOUS EVERY 30 MIN PRN
Status: CANCELLED | OUTPATIENT
Start: 2018-01-04

## 2018-01-04 RX ORDER — PROPOFOL 10 MG/ML
INJECTION, EMULSION INTRAVENOUS CONTINUOUS PRN
Status: DISCONTINUED | OUTPATIENT
Start: 2018-01-04 | End: 2018-01-04

## 2018-01-04 RX ADMIN — DEXAMETHASONE SODIUM PHOSPHATE 4 MG: 4 INJECTION, SOLUTION INTRA-ARTICULAR; INTRALESIONAL; INTRAMUSCULAR; INTRAVENOUS; SOFT TISSUE at 14:44

## 2018-01-04 RX ADMIN — FENTANYL CITRATE 50 MCG: 50 INJECTION, SOLUTION INTRAMUSCULAR; INTRAVENOUS at 14:45

## 2018-01-04 RX ADMIN — SODIUM CHLORIDE, POTASSIUM CHLORIDE, SODIUM LACTATE AND CALCIUM CHLORIDE: 600; 310; 30; 20 INJECTION, SOLUTION INTRAVENOUS at 14:35

## 2018-01-04 RX ADMIN — MIDAZOLAM 2 MG: 1 INJECTION INTRAMUSCULAR; INTRAVENOUS at 14:38

## 2018-01-04 RX ADMIN — PROPOFOL 50 MCG/KG/MIN: 10 INJECTION, EMULSION INTRAVENOUS at 14:40

## 2018-01-04 RX ADMIN — ONDANSETRON 4 MG: 2 INJECTION INTRAMUSCULAR; INTRAVENOUS at 15:00

## 2018-01-04 RX ADMIN — CEFAZOLIN SODIUM 2 G: 2 INJECTION, SOLUTION INTRAVENOUS at 14:45

## 2018-01-04 RX ADMIN — LIDOCAINE HYDROCHLORIDE 40 MG: 20 INJECTION, SOLUTION INFILTRATION; PERINEURAL at 14:40

## 2018-01-04 NOTE — ANESTHESIA PREPROCEDURE EVALUATION
"  Anesthesia Evaluation     . Pt has had prior anesthetic. Type: MAC    No history of anesthetic complications          ROS/MED HX    ENT/Pulmonary:  - neg pulmonary ROS     Neurologic:     (+)Spinal cord injury year sustained: 1995 level of injury: Incomplete C5-6 injury without autonomic hyperflexia symptoms,     Cardiovascular:  - neg cardiovascular ROS       METS/Exercise Tolerance:     Hematologic:         Musculoskeletal:  - neg musculoskeletal ROS       GI/Hepatic: Comment: Eosinophillic Esophagitis    (+) GERD       Renal/Genitourinary:     (+) Other Renal/ Genitourinary, neurogenic bladder      Endo:  - neg endo ROS       Psychiatric:         Infectious Disease:  - neg infectious disease ROS       Malignancy:      - no malignancy   Other:                     Physical Exam  Normal systems: cardiovascular, pulmonary and dental    Airway   Mallampati: III  TM distance: >3 FB  Neck ROM: full    Dental     Cardiovascular       Pulmonary                     Anesthesia Plan      History & Physical Review  History and physical reviewed and following examination; no interval change.    ASA Status:  3 .    NPO Status:  > 8 hours    Plan for MAC with Intravenous induction. Maintenance will be TIVA.  Reason for MAC:  Deep or markedly invasive procedure (G8)  PONV prophylaxis:  Ondansetron (or other 5HT-3)  Patient with noted egg \"allergy\".  Patient states eggs upset his stomach. He has tolerated Propofol in past.  Plan for MAC with Versed, Fentanyl, Propofol.      Postoperative Care  Postoperative pain management:  Multi-modal analgesia.      Consents  Anesthetic plan, risks, benefits and alternatives discussed with:  Patient..                          .  "

## 2018-01-04 NOTE — IP AVS SNAPSHOT
[facility]    [address] [address]    Phone:  [phone]                                       After Visit Summary   1/4/2018    Riley Gifford    MRN: [id number]           After Visit Summary Signature Page     I have received my discharge instructions, and my questions have been answered. I have discussed any challenges I see with this plan with the nurse or doctor.    ..........................................................................................................................................  Patient/Patient Representative Signature      ..........................................................................................................................................  Patient Representative Print Name and Relationship to Patient    ..................................................               ................................................  Date                                            Time    ..........................................................................................................................................  Reviewed by Signature/Title    ...................................................              ..............................................  Date                                                            Time

## 2018-01-04 NOTE — OR NURSING
Pt has done well.. VSS no c/o pain.. Was able to self cath with 400cc result.. D/C instructions to care giver.

## 2018-01-04 NOTE — OP NOTE
PRE-OPERATIVE DIAGNOSIS:   1.  Neurogenic bladder    POST-OPERATIVE DIAGNOSIS:  1.  Neurogenic bladder    PROCEDURE:    1. Cystourethroscopy.   2. Injection of botulinum toxin A 200units in 20cc sterile saline      SURGEON:  Evaristo Hayes MD    RESIDENT:  KASEY Street    ANESTHESIA:  MAC    ESTIMATED BLOOD LOSS:  <5 mL    DRAINS:  None    SIGNIFICANT FINDINGS: High bladder neck.  Small capacity bladder. No obvious tumors or stones.    OPERATIVE INDICATIONS:  Riley Gifford is a 42 year old male with neurogenic bladder due to C-5 spinal cord injury. He elects to proceed with botulinum toxin injection understanding the risks for urinary retention, infection, pain, bleeding, need for future procedures and risks of anesthesia.     OPERATIVE DETAILS:  The patient was taken to the operating room in her usual state of health.   He was positioned in modified dorsal lithotomy with yellowfin stirrups.  His genitalia were prepped and draped in the standard fashion. A time-out was performed to ensure proper patient, procedure and positioning.  The patient received appropriate IV antibiotics prior to the procedure.    A 21-Cub[place] De La Garza injection cystoscope was advanced into the urethra and into the bladder.  The urethra was unremarkable. The bladder neck was high. The bladder mucosa appeared to be normal without stones, tumors or diverticulum on 360 degree inspection. The ureteral orifices were in the normal orthotopic position bilaterally.  We mixed 2 vials of 100 U botulinum toxin A into 20 mL of injectable saline for a total of 200 units.  We performed a template injection procedure with 5 columns of 4 injections. All injections were placed deep to the mucosa into the detrusor muscle.  We then emptied the bladder to re-inspect our injection sites and found that there was a minimal amount of blood. The bladder was then emptied again. The patient was returned to supine position and transported to recovery in stable  condition.      Manuel Street MD  Urology Resident

## 2018-01-04 NOTE — IP AVS SNAPSHOT
MRN:[id number]                      After Visit Summary   1/4/2018    Riley Gifford    MRN: [id number]           Thank you!     Thank you for choosing [facility] for your care. Our goal is always to provide you with excellent care. Hearing back from our patients is one way we can continue to improve our services. Please take a few minutes to complete the written survey that you may receive in the mail after you visit with us. Thank you!        Patient Information     Date Of Birth          1975        About your hospital stay     You were admitted on:  January 4, 2018 You last received care in the:  [facility] MAIN OR    You were discharged on:  January 4, 2018       Who to Call     For medical emergencies, please call 911.  For non-urgent questions about your medical care, please call your primary care provider or clinic, [phone]  For questions related to your surgery, please call your surgery clinic        Attending Provider     Provider Specialty    Evaristo Hayes MD Urology       Primary Care Provider Office Phone # Fax #    Laura Yao [phone]      After Care Instructions     Discharge Instructions       Activity  - No strenuous exercise for 3-5 days.  - No lifting, pushing, pulling more than 15 pounds for 3-5 days.   - Do not strain with bowel movements.  - Do not drive until you can press the brake pedal quickly and fully without pain.   - Do not operate a motor vehicle while taking narcotic pain medications.   - Some blood in the urine is expected. Increase your fluid intake to help flush the blood out of your bladder      Medications  - No narcotic pain medications are required and over the counter tylenol should suffice.  Wean yourself off all pain medications as you are able.  - Some pain medications contain both tylenol (acetaminophen) and a narcotic (Norco, vicodin, percocet), do not take more than 4,000mg of Tylenol (acetaminophen) from all sources  "in any 24 hour period.  - Take over the counter fiber (metamucil or benefiber) and stool softeners (miralax, docusate or senna) to prevent postoperative constipation, but stop if you develop diarrhea.  - No driving or operating machinery while taking narcotic pain medications     Follow-Up:  - Follow up with Nelli Sims on 2/12/18 in the urology clinic.  - Call your primary care provider to touch base regarding your recent admission.     - Call or return sooner than your regularly scheduled visit if you develop any of the following: fever (greater than 101.5), uncontrolled pain, uncontrolled nausea or vomiting, as well as worsening blood in the urine    Phone numbers:   - Monday through Friday 8am to 4:30pm: Call [phone] with questions or to schedule or confirm appointment.    - Nights or weekends: call the after hours emergency pager - [phone] and tell the  \"I would like to page the Urology Resident on call.\"  - For emergencies, call 911                  Your next 10 appointments already scheduled     Feb 12, 2018  3:00 PM CST   Return Visit with Evangelista Erazo MD   [facility] ([place] [facility])    [id number] [place] [address] [phone]            Feb 12, 2018  5:00 PM CST   (Arrive by 4:45 PM)   Return Visit with Nelli Sims PA-C   [facility] ([facility])    [address]  [address] [facility] [address] [phone]              Pending Results     No orders found from 1/2/2018 to 1/5/2018.            Statement of Approval     Ordered          01/04/18 1512  I have reviewed and agree with all the recommendations and orders detailed in this document.  EFFECTIVE NOW     Approved and electronically signed by:  Manuel Street MD             Admission Information     Date & Time Provider Department Dept. Phone    1/4/2018 Evaristo Hayes, " MD [facility] [phone]      Your Vitals Were     Blood Pressure Temperature Respirations Height Weight Pulse Oximetry    98/74 97.7  F (36.5  C) (Oral) 16 1.829 m (6') 65.2 kg (143 lb 11.2 oz) 99%    BMI (Body Mass Index)                   19.49 kg/m2           [employer] Information     [employer] gives you secure access to your electronic health record. If you see a primary care provider, you can also send messages to your care team and make appointments. If you have questions, please call your primary care clinic.  If you do not have a primary care provider, please call [phone] and they will assist you.        Care EveryWhere ID     This is your Care EveryWhere ID. This could be used by other organizations to access your [facility] medical records  [id number]        Equal Access to Services     THERON REEVES : Juhi Vides, otm esparza, carolann lambert, monae diaz. So [facility] [place] [phone].    ATENCIÓN: Si habla español, tiene a borden disposición servicios gratuitos de asistencia lingüística. Juhi al [phone].    We comply with applicable federal civil rights laws and [place] laws. We do not discriminate on the basis of race, color, national origin, age, disability, sex, sexual orientation, or gender identity.               Review of your medicines      CONTINUE these medicines which have NOT CHANGED        Dose / Directions    baclofen 20 MG tablet   Commonly known as:  LIORESAL   Used for:  Quadriplegia (H)        TAKE 1 TABLET(20 MG) BY MOUTH FOUR TIMES DAILY   Quantity:  360 tablet   Refills:  3       budesonide 0.5 MG/2ML neb solution   Commonly known as:  PULMICORT   Used for:  Eosinophilic esophagitis        USE 2 VIALS TWICE DAILY. MIX WITH HONEY AND SWALLOW   Quantity:  1080 mL   Refills:  0       cetirizine 10 MG tablet   Commonly known as:  zyrTEC   Indication:  Hayfever        Dose:  10 mg   Take 10 mg by mouth daily   Refills:  0        ciprofloxacin 500 MG tablet   Commonly known as:  CIPRO   Used for:  Acute cystitis with hematuria        Dose:  500 mg   Take 1 tablet (500 mg) by mouth 2 times daily   Quantity:  14 tablet   Refills:  0       diphenhydrAMINE 25 MG tablet   Commonly known as:  BENADRYL        Dose:  25 mg   Take 25 mg by mouth every 8 hours as needed for itching or allergies Reported on 2/15/2017   Refills:  0       docusate sodium 283 MG enema   Commonly known as:  ENEMEEZ MINI   Used for:  Chronic constipation        Use one every other day and prn per patients's request.   Quantity:  30 enema   Refills:  3       EPINEPHrine 0.3 MG/0.3ML injection 2-pack   Commonly known as:  EPIPEN/ADRENACLICK/or ANY BX GENERIC EQUIV   Used for:  Reaction to food, initial encounter        Dose:  0.3 mg   Inject 0.3 mLs (0.3 mg) into the muscle as needed for anaphylaxis   Quantity:  0.6 mL   Refills:  3       gabapentin 250 MG/5ML solution   Commonly known as:  NEURONTIN   Used for:  Chronic midline thoracic back pain        Dose:  125 mg   Take 2.5 mLs (125 mg) by mouth 3 times daily as needed   Quantity:  450 mL   Refills:  0       hydrocortisone 2.5 % cream   Commonly known as:  ANUSOL-HC   Used for:  Hemorrhoids, unspecified hemorrhoid type        Place rectally 2 times daily   Quantity:  30 g   Refills:  3       Lidocaine 0.5 % Gel   Used for:  Chronic midline thoracic back pain        Dose:  1 Application   Externally apply 1 Application topically every 6 hours as needed (for mid thoracic pain)   Quantity:  5 Tube   Refills:  3       NEW MED   Used for:  Recurrent UTI        Gentamycin 240mg in 500mL 0.9% Normal Saline. Instill 30mL into empty bladder at bedtime. Leave in bladder overnight and drain in the morning   Quantity:  500 mL   Refills:  3       omeprazole 20 MG CR capsule   Commonly known as:  priLOSEC        Dose:  20 mg   Take 1 capsule (20 mg) by mouth daily as needed   Refills:  0       Omeprazole-Sodium Bicarbonate  MG  Pack   Used for:  Eosinophilic esophagitis, Gastroesophageal reflux disease without esophagitis        TAKE 1 PACKET BY MOUTH DISSOLVED IN LIQUID DAILY   Quantity:  90 each   Refills:  0       ondansetron 4 MG ODT tab   Commonly known as:  ZOFRAN ODT   Used for:  Abdominal pain, generalized        Dose:  4-8 mg   Take 1-2 tablets (4-8 mg) by mouth every 8 hours as needed for nausea   Quantity:  20 tablet   Refills:  1       oxybutynin 5 MG tablet   Commonly known as:  DITROPAN        CRUSH TO BE INSTILLED  INTRAVESICALLY UTD. 1 T IN THE MORNING AND 2 TS QPM   Refills:  3       phenylephrine-cocoa butter 0.25-88.44 % per suppository   Commonly known as:  PREPARATION H   Used for:  External hemorrhoids        Insert one suppository rectally twice daily as needed.   Quantity:  48 suppository   Refills:  3       polyethylene glycol powder   Commonly known as:  MIRALAX   Used for:  Constipation, unspecified constipation type        Dose:  1 capful   Take 17 g (1 capful) by mouth daily as needed for constipation   Quantity:  510 g   Refills:  1       sterile water (bottle) irrigation   Used for:  Neurogenic bladder        Dose:  60 mL   Irrigate with 60 mLs as directed daily   Quantity:  1000 mL   Refills:  0       sulfamethoxazole-trimethoprim suspension   Commonly known as:  BACTRIM/SEPTRA   Used for:  Acute cystitis with hematuria        Dose:  20 mL   Take 20 mLs (160 mg) by mouth 2 times daily Dose based on TMP component.   Quantity:  400 mL   Refills:  0       tolterodine 2 MG tablet   Commonly known as:  DETROL   Used for:  Neurogenic bladder        Dose:  2 mg   Take 1 tablet (2 mg) by mouth 2 times daily   Quantity:  180 tablet   Refills:  2       TYLENOL PO        Dose:  500 mg   Take 500 mg by mouth as needed for mild pain or fever Reported on 2/15/2017   Refills:  0       zolpidem 10 MG tablet   Commonly known as:  AMBIEN   Used for:  Primary insomnia        Dose:  5-10 mg   Take 0.5-1 tablets (5-10 mg) by  mouth nightly as needed for sleep   Quantity:  30 tablet   Refills:  5                Protect others around you: Learn how to safely use, store and throw away your medicines at www.disposemymeds.org.             Medication List: This is a list of all your medications and when to take them. Check marks below indicate your daily home schedule. Keep this list as a reference.      Medications           Morning Afternoon Evening Bedtime As Needed    baclofen 20 MG tablet   Commonly known as:  LIORESAL   TAKE 1 TABLET(20 MG) BY MOUTH FOUR TIMES DAILY                                budesonide 0.5 MG/2ML neb solution   Commonly known as:  PULMICORT   USE 2 VIALS TWICE DAILY. MIX WITH HONEY AND SWALLOW                                cetirizine 10 MG tablet   Commonly known as:  zyrTEC   Take 10 mg by mouth daily                                ciprofloxacin 500 MG tablet   Commonly known as:  CIPRO   Take 1 tablet (500 mg) by mouth 2 times daily                                diphenhydrAMINE 25 MG tablet   Commonly known as:  BENADRYL   Take 25 mg by mouth every 8 hours as needed for itching or allergies Reported on 2/15/2017                                docusate sodium 283 MG enema   Commonly known as:  ENEMEEZ MINI   Use one every other day and prn per patients's request.                                EPINEPHrine 0.3 MG/0.3ML injection 2-pack   Commonly known as:  EPIPEN/ADRENACLICK/or ANY BX GENERIC EQUIV   Inject 0.3 mLs (0.3 mg) into the muscle as needed for anaphylaxis                                gabapentin 250 MG/5ML solution   Commonly known as:  NEURONTIN   Take 2.5 mLs (125 mg) by mouth 3 times daily as needed                                hydrocortisone 2.5 % cream   Commonly known as:  ANUSOL-HC   Place rectally 2 times daily                                Lidocaine 0.5 % Gel   Externally apply 1 Application topically every 6 hours as needed (for mid thoracic pain)                                NEW MED    Gentamycin 240mg in 500mL 0.9% Normal Saline. Instill 30mL into empty bladder at bedtime. Leave in bladder overnight and drain in the morning                                omeprazole 20 MG CR capsule   Commonly known as:  priLOSEC   Take 1 capsule (20 mg) by mouth daily as needed                                Omeprazole-Sodium Bicarbonate  MG Pack   TAKE 1 PACKET BY MOUTH DISSOLVED IN LIQUID DAILY                                ondansetron 4 MG ODT tab   Commonly known as:  ZOFRAN ODT   Take 1-2 tablets (4-8 mg) by mouth every 8 hours as needed for nausea                                oxybutynin 5 MG tablet   Commonly known as:  DITROPAN   CRUSH TO BE INSTILLED  INTRAVESICALLY UTD. 1 T IN THE MORNING AND 2 TS QPM                                phenylephrine-cocoa butter 0.25-88.44 % per suppository   Commonly known as:  PREPARATION H   Insert one suppository rectally twice daily as needed.                                polyethylene glycol powder   Commonly known as:  MIRALAX   Take 17 g (1 capful) by mouth daily as needed for constipation                                sterile water (bottle) irrigation   Irrigate with 60 mLs as directed daily                                sulfamethoxazole-trimethoprim suspension   Commonly known as:  BACTRIM/SEPTRA   Take 20 mLs (160 mg) by mouth 2 times daily Dose based on TMP component.                                tolterodine 2 MG tablet   Commonly known as:  DETROL   Take 1 tablet (2 mg) by mouth 2 times daily                                TYLENOL PO   Take 500 mg by mouth as needed for mild pain or fever Reported on 2/15/2017                                zolpidem 10 MG tablet   Commonly known as:  AMBIEN   Take 0.5-1 tablets (5-10 mg) by mouth nightly as needed for sleep

## 2018-01-04 NOTE — ANESTHESIA CARE TRANSFER NOTE
Patient: Riley CROW Gifford    Procedure(s):  Cystoscopy Botox Injection Into The Bladder - Wound Class: II-Clean Contaminated    Diagnosis: Neurogenic Bladder   Diagnosis Additional Information: No value filed.    Anesthesia Type:   MAC     Note:  Airway :Room Air  Patient transferred to:Phase II  Comments: VSS on RA, report to RN.       Vitals: (Last set prior to Anesthesia Care Transfer)    CRNA VITALS  1/4/2018 1448 - 1/4/2018 1528      1/4/2018             NIBP: 129/81    Pulse: 77    SpO2: 97 %                Electronically Signed By: SUJATA Espinoza CRNA  January 4, 2018  3:28 PM

## 2018-01-05 ENCOUNTER — CARE COORDINATION (OUTPATIENT)
Dept: UROLOGY | Facility: CLINIC | Age: 43
End: 2018-01-05

## 2018-01-05 NOTE — ANESTHESIA POSTPROCEDURE EVALUATION
Patient: Riley CROW Gifford    Procedure(s):  Cystoscopy Botox Injection Into The Bladder - Wound Class: II-Clean Contaminated    Diagnosis:Neurogenic Bladder   Diagnosis Additional Information: No value filed.    Anesthesia Type:  MAC    Note:  Anesthesia Post Evaluation    Patient location during evaluation: PACU and Phase 2  Patient participation: Able to fully participate in evaluation  Level of consciousness: awake and alert  Pain management: adequate  Airway patency: patent  Cardiovascular status: hemodynamically stable  Respiratory status: acceptable  Hydration status: acceptable  PONV: none             Last vitals:  Vitals:    01/04/18 1550 01/04/18 1600 01/04/18 1620   BP: 128/86  104/86   Resp:   16   Temp:   36.7  C (98.1  F)   SpO2: 96% 97% 100%         Electronically Signed By: Brown Larsen MD  January 5, 2018  6:53 AM

## 2018-01-05 NOTE — PROGRESS NOTES
Urology Postop Phone Note:    Mr. Riley Gifford is a 42 year old male who underwent Cystourethroscopy, Injection of botulinum toxin A 200units in 20cc sterile saline on 1.4.18 with Dr. Hayes for a history of neurogenic bladder.  His postoperative course was unremarkable and the patient was d/c to home on 1.4.18.      1.4.18 - iCat[employer]ult[employer] message sent to patient.  Please see response from patient via Imsyshart.    Informed the patient of the clinic triage nursing # ([phone] option 3) and urology resident on call # for after hours concerns.    Marta Adames, RN-BC, BSN  Care Coordinator - Reconstructive Urology

## 2018-01-08 ENCOUNTER — CARE COORDINATION (OUTPATIENT)
Dept: CARE COORDINATION | Facility: CLINIC | Age: 43
End: 2018-01-08

## 2018-01-08 NOTE — PROGRESS NOTES
Clinic Care Coordination Contact  OUTREACH    Referral Information:  Referral Source: PCP  Reason for Contact: follow up call to the patient.  The patient recently underwent a procedure in urology for placement of botox.  He states that everything went really well.  No complaints of pain out of the ordinary for the patient.    Care Conference: No     Universal Utilization:   ED Visits in last year: 1  Hospital visits in last year: 0  Last PCP appointment: 06/07/17  Missed Appointments: 0  Concerns: none  Multiple Providers or Specialists: yes    Clinical Concerns:  Current Medical Concerns:   Pulmonary nodules   Chronic midline thoracic back pain   Gallstones   Feeling worried   Insomnia   Allergic rhinitis due to animal dander   Allergy to mold spores   House dust mite allergy   Anal or rectal pain   Diagnostic skin and sensitization tests(aka ALLERGENS)   Internal hemorrhoids with other complication   Quadriplegia (H)   Chronic constipation   CARDIOVASCULAR SCREENING; LDL GOAL LESS THAN 160   Neurogenic bladder   Vitamin D deficiency   Eosinophilic esophagitis   Health Care Home         Current Behavioral Concerns: feels worried at times.    Education Provided to patient: none   Clinical Pathway Name: None  Clinical Pathway: None    Medication Management:  Patient is knowledgeable on medications and is adherent.  No financial concerns reported at this time.       Functional Status:  Mobility Status: Dependent/Assisted by Another  Equipment Currently Used at Home: commode, grab bar, shower chair, transfer board, wheelchair, power  Transportation: self or PCA with adapted van           Psychosocial:  Current living arrangement:: I live in assisted living  Financial/Insurance: medicare       Resources and Interventions:  Current Resources:  (unknown);  (unknown)  PAS Number:  (unknown)  Senior Linkage Line Referral Placed:  (unknown)  Advanced Care Plans/Directives on file:: No  Referrals Placed:  (unknown)      Goals:   Goal 1 Statement: I will continue to try accupuncture to assist with pain control  Goal 1 Supportive Steps: patient will resume appointments  Goal 2 Statement: I will continue to go to the gym 2-3 times per week  Goal 2 Supportive Steps: encouragment of the benefits  Goal 2 Progression Percent: 30%  Goal 2 Progression Date: 01/08/18              Barriers: physical limitations  Strengths: motivated  Patient/Caregiver understanding: The patient has an excellent understanding of the disease process.  Frequency of Care Coordination: 3-4 weeks  Upcoming appointment: 02/12/18     Plan: 1).  The patient will continue to work towards getting to the gym 2-3 times per week.  2).  The patient will make an appointment with a new provider for a get to know meeting.  3).  The patient will make and attend all follow up appointments with the PCP and the specialists.   4).  The Care Coordination RN will make a follow up call to the patient again in 4 weeks.  5).  Care Coordination to remain available for the patient to contact in the event of future needs.      Robbin Vanegas, MSN, RN, PHN  [facility] Care Coordinator  [facility]  Phone: [phone]  [email or web address]

## 2018-01-17 DIAGNOSIS — N31.9 NEUROGENIC BLADDER: ICD-10-CM

## 2018-01-18 ENCOUNTER — TRANSFERRED RECORDS (OUTPATIENT)
Dept: HEALTH INFORMATION MANAGEMENT | Facility: CLINIC | Age: 43
End: 2018-01-18

## 2018-01-18 NOTE — TELEPHONE ENCOUNTER
"Requested Prescriptions   Pending Prescriptions Disp Refills     tolterodine (DETROL) 2 MG tablet  Last Written Prescription Date:  4/12/2017  Last Fill Quantity: 180 tablet,  # refills: 2   Last Office Visit with [facility], P or [facility] prescribing provider:  12/1/2017  Future Office Visit:    Next 5 appointments (look out 90 days)     Feb 12, 2018  3:00 PM CST   Return Visit with Evangelista Erazo MD   [facility] ([place] [facility])    [address] [address]   [phone]                  180 tablet 2     Sig: Take 1 tablet (2 mg) by mouth 2 times daily    Muscarinic Antagonists (Urinary Incontinence Agents) Passed    1/17/2018  6:18 PM       Passed - Recent or future visit with authorizing provider's specialty    Patient had office visit in the last year or has a visit in the next 30 days with authorizing provider.  See \"Patient Info\" tab in inbasket, or \"Choose Columns\" in Meds & Orders section of the refill encounter.            Passed - Patient does not have a diagnosis of glaucoma on the problem list    If glaucoma diagnosis is new, refer refill to physician.         Passed - Patient is 18 years of age or older          "

## 2018-01-19 RX ORDER — TOLTERODINE TARTRATE 2 MG/1
2 TABLET, EXTENDED RELEASE ORAL 2 TIMES DAILY
Qty: 180 TABLET | Refills: 3 | Status: SHIPPED | OUTPATIENT
Start: 2018-01-19 | End: 2018-12-29

## 2018-01-29 ENCOUNTER — PRE VISIT (OUTPATIENT)
Dept: UROLOGY | Facility: CLINIC | Age: 43
End: 2018-01-29

## 2018-01-29 NOTE — TELEPHONE ENCOUNTER
Patient is coming in to see Nelli Sims PA-C for UTI's, called patient and left a message to please come with a full bladder for a urine test.

## 2018-02-05 ENCOUNTER — CARE COORDINATION (OUTPATIENT)
Dept: CARE COORDINATION | Facility: CLINIC | Age: 43
End: 2018-02-05

## 2018-02-05 NOTE — PROGRESS NOTES
Clinic Care Coordination Contact    Situation: Patient chart reviewed by care coordinator.    Background: The patient is a quadriplegic.  Pulmonary nodules   Chronic midline thoracic back pain   Gallstones   Feeling worried   Insomnia   Allergic rhinitis due to animal dander   Allergy to mold spores   House dust mite allergy   Anal or rectal pain   Diagnostic skin and sensitization tests(aka ALLERGENS)   Internal hemorrhoids with other complication   Quadriplegia (H)   Chronic constipation   CARDIOVASCULAR SCREENING; LDL GOAL LESS THAN 160   Neurogenic bladder   Vitamin D deficiency   Eosinophilic esophagitis   Health Care Home     Assessment: The patient has recurrent side and back pain which is often associated with a UTI.  Treatment with an antibiotic does eliminate this type of pain.  The   Patient also receives injections in his back for the pain.  The pain medication orally causes constipation and the patient tries to avoid it at all costs.  A neurogenic bladder is treated with among other things botox every few months.  The patient is coordinating his medications to receive the best possible outcome from them.  The patient will take his medications as prescribed by the providers.   The patient will make and attend all follow up appointments with the PCP and the specialists.   The Care Coordination RN will make a follow up call to the patient again in 4 weeks.    Plan/Recommendations: 1).  The patient will take the medications as prescribed by the providers.  2).  The patient will continue to coordinate his medications to receive the best possible outcome from them.  3).   The patient will make and attend all follow up appointments with the PCP and the specialists.   4).  The Care Coordination RN will make a follow up call to the patient again in 4 weeks.  5).  Care Coordination to remain available for the patient to contact in the event of future needs.    Robbin Vanegas, MSN, RN, PHN  N Clinic Care  Coordinator  [facility]  Phone: [phone]  [email or web address]

## 2018-02-08 ENCOUNTER — CARE COORDINATION (OUTPATIENT)
Dept: CARE COORDINATION | Facility: CLINIC | Age: 43
End: 2018-02-08

## 2018-02-08 NOTE — PROGRESS NOTES
Clinic Care Coordination Contact  Care Team Conversations    The patient called with facial pain on the right cheek.  He denies a fever as this time.  The pain is controlled with Tylenol.  The patient endorses that this has been present for the last week gradually getting worse over time.  He feels that it could be related to his allergies and is not presently taking an antihistamine.  The Care Coordination RN encouraged the patient to take the antihistamine for the next few days and we will touch base early next week to see if there is any difference in the symptoms or the pain level.  Signs and symptoms that would indicate a need to be seen urgently were reviewed with the patient.  He states understanding.      Plan:  The Care Coordination RN will contact the patient on Monday 2-12-18 for an update on the patient's condition.    Robbin Vanegas, MSN, RN, PHN  N Clinic Care Coordinator  [facility]  Phone: [phone]  [email or web address]

## 2018-02-12 ENCOUNTER — OFFICE VISIT (OUTPATIENT)
Dept: UROLOGY | Facility: CLINIC | Age: 43
End: 2018-02-12
Payer: MEDICARE

## 2018-02-12 VITALS
DIASTOLIC BLOOD PRESSURE: 59 MMHG | HEIGHT: 72 IN | BODY MASS INDEX: 17.61 KG/M2 | WEIGHT: 130 LBS | SYSTOLIC BLOOD PRESSURE: 89 MMHG | HEART RATE: 87 BPM

## 2018-02-12 DIAGNOSIS — N31.9 NEUROGENIC BLADDER: Primary | ICD-10-CM

## 2018-02-12 ASSESSMENT — PAIN SCALES - GENERAL: PAINLEVEL: NO PAIN (0)

## 2018-02-12 NOTE — PROGRESS NOTES
Clinic Care Coordination Contact  [facility]/Voicemail    Referral Source: PCP  Clinical Data: Care Coordinator Outreach  Outreach attempted x 1.  Left message on voicemail with call back information and requested return call.  Plan: Care Coordinator mailed out care coordination introduction letter on 11-16-17. Care Coordinator will try to reach patient again in 1-2 business days.      Robbin Vanegas, MSN, RN, PHN  [facility] Care Coordinator  [facility]  Phone: [phone]  [email or web address]
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
## 2018-02-12 NOTE — PATIENT INSTRUCTIONS
UROLOGY CLINIC VISIT PATIENT INSTRUCTIONS    1) Follow up in July for another bladder botox injection. We will schedule this in the clinic if Dr. Hayes agrees.    2) Follow up with me in November 2018 with a kidney ultrasound and blood work beforehand.    If you have any issues, questions or concerns in the meantime, do not hesitate to contact us at [phone] or via [employer].     It was a pleasure meeting with you today.  Thank you for allowing me and my team the privilege of caring for you today.  YOU are the reason we are here, and I truly hope we provided you with the excellent service you deserve.  Please let us know if there is anything else we can do for you so that we can be sure you are leaving completely satisfied with your care experience.

## 2018-02-12 NOTE — MR AVS SNAPSHOT
After Visit Summary   2/12/2018    Riley Gifford    MRN: [id number]           Patient Information     Date Of Birth          1975        Visit Information        Provider Department      2/12/2018 5:00 PM Nelli Sims PA-C [facility] and [facility]        Today's Diagnoses     Neurogenic bladder    -  1      Care Instructions    UROLOGY CLINIC VISIT PATIENT INSTRUCTIONS    1) Follow up in July for another bladder botox injection. We will schedule this in the clinic if Dr. Hayes agrees.    2) Follow up with me in November 2018 with a kidney ultrasound and blood work beforehand.    If you have any issues, questions or concerns in the meantime, do not hesitate to contact us at [phone] or via Pros[employer].     It was a pleasure meeting with you today.  Thank you for allowing me and my team the privilege of caring for you today.  YOU are the reason we are here, and I truly hope we provided you with the excellent service you deserve.  Please let us know if there is anything else we can do for you so that we can be sure you are leaving completely satisfied with your care experience.                Follow-ups after your visit        Who to contact     Please call your clinic at [phone] to:    Ask questions about your health    Make or cancel appointments    Discuss your medicines    Learn about your test results    Speak to your doctor            Additional Information About Your Visit        [employer] Information     Prosodic gives you secure access to your electronic health record. If you see a primary care provider, you can also send messages to your care team and make appointments. If you have questions, please call your primary care clinic.  If you do not have a primary care provider, please call [phone] and they will assist you.      Prosodic is an electronic gateway that provides easy, online access to your medical records. With Prosodic, you can  request a clinic appointment, read your test results, renew a prescription or communicate with your care team.     To access your existing account, please contact your [facility] or call [phone] for assistance.        Care EveryWhere ID     This is your Care EveryWhere ID. This could be used by other organizations to access your [place] medical records  [id number]        Your Vitals Were     Pulse Height BMI (Body Mass Index)             87 1.829 m (6') 17.63 kg/m2          Blood Pressure from Last 3 Encounters:   02/12/18 (!) 89/59   01/04/18 104/86   01/02/18 126/74    Weight from Last 3 Encounters:   02/12/18 59 kg (130 lb)   01/04/18 65.2 kg (143 lb 11.2 oz)   01/02/18 (!) 230 kg (507 lb)              Today, you had the following     No orders found for display       Primary Care Provider Office Phone # Fax #    Laura Yao [phone]       [facility] [address]        Goals        General    I will use the medications Tylenol or Tramadol for pain every 4-6 hours as needed.  Evidenced by the patient  using the medications to control worsening pain as verbalized by the patient. (pt-stated)     Notes - Note created  3/22/2016  2:39 PM by Robbin Andino, RN    As of today's date 3/22/2016 goal is met at 0 - 25%.   Goal Status:  Active        I will work with the [facility] nurse to get the best results for wound care as evidenced by decrease in size and verbalized pain in the area of the wound on the buttock. (pt-stated)     Notes - Note created  1/6/2016  1:43 PM by Robbin Andino, RN    As of today's date 1/6/2016 goal is met at 0 - 25%.   Goal Status:  Active          Equal Access to Services     BENITA REEVES : Juhi Vides, tom esparza, qaybta kaalmonae odonnell. So [facility] [phone].    ATENCIÓN: Si habla español, tiene a borden disposición servicios gratuitos de asistencia  lingüística. Juhi al [phone].    We comply with applicable federal civil rights laws and [place] laws. We do not discriminate on the basis of race, color, national origin, age, disability, sex, sexual orientation, or gender identity.            Thank you!     Thank you for choosing [facility] UROLOGY AND [facility] PROSTATE [facility] UROLOGIC CANCERS  for your care. Our goal is always to provide you with excellent care. Hearing back from our patients is one way we can continue to improve our services. Please take a few minutes to complete the written survey that you may receive in the mail after your visit with us. Thank you!             Your Updated Medication List - Protect others around you: Learn how to safely use, store and throw away your medicines at www.disposemymeds.org.          This list is accurate as of 2/12/18  5:13 PM.  Always use your most recent med list.                   Brand Name Dispense Instructions for use Diagnosis    baclofen 20 MG tablet    LIORESAL    360 tablet    TAKE 1 TABLET(20 MG) BY MOUTH FOUR TIMES DAILY    Quadriplegia (H)       budesonide 0.5 MG/2ML neb solution    PULMICORT    1080 mL    USE 2 VIALS TWICE DAILY. MIX WITH HONEY AND SWALLOW    Eosinophilic esophagitis       cetirizine 10 MG tablet    zyrTEC     Take 10 mg by mouth daily        ciprofloxacin 500 MG tablet    CIPRO    14 tablet    Take 1 tablet (500 mg) by mouth 2 times daily    Acute cystitis with hematuria       diphenhydrAMINE 25 MG tablet    BENADRYL     Take 25 mg by mouth every 8 hours as needed for itching or allergies Reported on 2/15/2017        docusate sodium 283 MG enema    ENEMEEZ MINI    30 enema    Use one every other day and prn per patients's request.    Chronic constipation       EPINEPHrine 0.3 MG/0.3ML injection 2-pack    EPIPEN/ADRENACLICK/or ANY BX GENERIC EQUIV    0.6 mL    Inject 0.3 mLs (0.3 mg) into the muscle as needed for anaphylaxis    Reaction to food, initial encounter       gabapentin  250 MG/5ML solution    NEURONTIN    450 mL    Take 2.5 mLs (125 mg) by mouth 3 times daily as needed    Chronic midline thoracic back pain       hydrocortisone 2.5 % cream    ANUSOL-HC    30 g    Place rectally 2 times daily    Hemorrhoids, unspecified hemorrhoid type       Lidocaine 0.5 % Gel     5 Tube    Externally apply 1 Application topically every 6 hours as needed (for mid thoracic pain)    Chronic midline thoracic back pain       NEW MED     500 mL    Gentamycin 240mg in 500mL 0.9% Normal Saline. Instill 30mL into empty bladder at bedtime. Leave in bladder overnight and drain in the morning    Recurrent UTI       omeprazole 20 MG CR capsule    priLOSEC     Take 1 capsule (20 mg) by mouth daily as needed        Omeprazole-Sodium Bicarbonate  MG Pack     90 each    TAKE 1 PACKET BY MOUTH DISSOLVED IN LIQUID DAILY    Eosinophilic esophagitis, Gastroesophageal reflux disease without esophagitis       ondansetron 4 MG ODT tab    ZOFRAN ODT    20 tablet    Take 1-2 tablets (4-8 mg) by mouth every 8 hours as needed for nausea    Abdominal pain, generalized       phenylephrine-cocoa butter 0.25-88.44 % per suppository    PREPARATION H    48 suppository    Insert one suppository rectally twice daily as needed.    External hemorrhoids       polyethylene glycol powder    MIRALAX    510 g    Take 17 g (1 capful) by mouth daily as needed for constipation    Constipation, unspecified constipation type       sterile water (bottle) irrigation     1000 mL    Irrigate with 60 mLs as directed daily    Neurogenic bladder       sulfamethoxazole-trimethoprim suspension    BACTRIM/SEPTRA    400 mL    Take 20 mLs (160 mg) by mouth 2 times daily Dose based on TMP component.    Acute cystitis with hematuria       tolterodine 2 MG tablet    DETROL    180 tablet    Take 1 tablet (2 mg) by mouth 2 times daily    Neurogenic bladder       TYLENOL PO      Take 500 mg by mouth as needed for mild pain or fever Reported on 2/15/2017         zolpidem 10 MG tablet    AMBIEN    30 tablet    Take 0.5-1 tablets (5-10 mg) by mouth nightly as needed for sleep    Primary insomnia

## 2018-02-14 NOTE — PROGRESS NOTES
Clinic Care Coordination Contact  OUTREACH    Referral Information:  Referral Source: PCP  Reason for Contact: The patient called back for a follow up conversation.  The patient states that the facial pain has resolved its self, although the congestion does continue it is less with the use of antihistamines.   The mucus is clear and thin most of the time and only occasionally thicker and the patient feels that he may have a cold as well.  The patient states that he is feeling much better and does not need an appointment at this time.    Care Conference: No     Universal Utilization:   ED Visits in last year: 1  Hospital visits in last year: 0  Last PCP appointment: 06/07/17  Missed Appointments: 0  Concerns: none  Multiple Providers or Specialists: yes    Clinical Concerns:  Current Medical Concerns:   Pulmonary nodules   Chronic midline thoracic back pain   Gallstones   Feeling worried   Insomnia   Allergic rhinitis due to animal dander   Allergy to mold spores   House dust mite allergy   Anal or rectal pain   Diagnostic skin and sensitization tests(aka ALLERGENS)   Internal hemorrhoids with other complication   Quadriplegia (H)   Chronic constipation   CARDIOVASCULAR SCREENING; LDL GOAL LESS THAN 160   Neurogenic bladder   Vitamin D deficiency   Eosinophilic esophagitis   Health Care Home         Current Behavioral Concerns: none noted    Education Provided to patient: signs and symptoms that would indicate that an appointment is necessary   Clinical Pathway Name: None  Clinical Pathway: None    Medication Management:  Patient is knowledgeable on medications and is adherent.  No financial concerns reported at this time.       Functional Status:  Mobility Status: Dependent/Assisted by Another  Equipment Currently Used at Home: commode, grab bar, shower chair, transfer board, wheelchair, power  Transportation: The patient has his own transportation           Psychosocial:  Current living arrangement:: I live in  assisted living  Financial/Insurance: medicare       Resources and Interventions:  Current Resources:  (unknown);  (unknown)  PAS Number:  (unknown)  Senior Linkage Line Referral Placed:  (unknown)  Advanced Care Plans/Directives on file:: No  Referrals Placed:  (unknown)     Goals:   Goal 1 Statement: I will continue to try accupuncture to assist with pain control  Goal 1 Supportive Steps: patient will resume appointments  Goal 2 Statement: I will continue to go to the gym 2-3 times per week  Goal 2 Supportive Steps: encouragment of the benefits  Goal 2 Progression Percent: 30%  Goal 2 Progression Date: 01/08/18              Barriers: pain  Strengths: motivated  Patient/Caregiver understanding: The patient has a very good understanding of the disease process.  Frequency of Care Coordination: 3-4 weeks  Upcoming appointment: 02/12/18     Plan: 1).  The patient will continue to monitor the signs and symptoms that would indicate a possible sinus infection.  2).   The patient will make and attend all follow up appointments with the PCP and the specialists.   3).  The Care Coordination RN will make a follow up call to the patient again in 3-4 weeks.  4).  Care Coordination to remain available for the patient to contact in the event of future needs.      Robbin Vanegas, MSN, RN, PHN  N Clinic Care Coordinator  [facility]  Phone: [phone]  [email or web address]

## 2018-02-27 ENCOUNTER — TRANSFERRED RECORDS (OUTPATIENT)
Dept: HEALTH INFORMATION MANAGEMENT | Facility: CLINIC | Age: 43
End: 2018-02-27

## 2018-02-27 ENCOUNTER — DOCUMENTATION ONLY (OUTPATIENT)
Dept: OTHER | Facility: CLINIC | Age: 43
End: 2018-02-27

## 2018-02-27 PROBLEM — Z71.89 ACP (ADVANCE CARE PLANNING): Chronic | Status: ACTIVE | Noted: 2018-02-27

## 2018-03-01 ENCOUNTER — OFFICE VISIT (OUTPATIENT)
Dept: FAMILY MEDICINE | Facility: CLINIC | Age: 43
End: 2018-03-01
Payer: MEDICARE

## 2018-03-01 VITALS
SYSTOLIC BLOOD PRESSURE: 88 MMHG | DIASTOLIC BLOOD PRESSURE: 62 MMHG | HEIGHT: 72 IN | TEMPERATURE: 97.7 F | HEART RATE: 80 BPM

## 2018-03-01 DIAGNOSIS — Z91.018 FOOD ALLERGY: ICD-10-CM

## 2018-03-01 DIAGNOSIS — G82.50 QUADRIPLEGIA (H): Primary | ICD-10-CM

## 2018-03-01 DIAGNOSIS — K20.0 EOSINOPHILIC ESOPHAGITIS: ICD-10-CM

## 2018-03-01 DIAGNOSIS — N31.9 NEUROGENIC BLADDER: ICD-10-CM

## 2018-03-01 DIAGNOSIS — F51.01 PRIMARY INSOMNIA: ICD-10-CM

## 2018-03-01 DIAGNOSIS — E55.9 VITAMIN D DEFICIENCY: ICD-10-CM

## 2018-03-01 DIAGNOSIS — R91.8 PULMONARY NODULES: ICD-10-CM

## 2018-03-01 PROCEDURE — 99214 OFFICE O/P EST MOD 30 MIN: CPT | Performed by: PHYSICIAN ASSISTANT

## 2018-03-01 RX ORDER — BACLOFEN 20 MG/1
20 TABLET ORAL 3 TIMES DAILY
Qty: 360 TABLET | Refills: 3 | Status: SHIPPED | OUTPATIENT
Start: 2018-03-01 | End: 2018-04-23

## 2018-03-01 RX ORDER — ZOLPIDEM TARTRATE 10 MG/1
5-10 TABLET ORAL
Qty: 30 TABLET | Refills: 5 | Status: SHIPPED | OUTPATIENT
Start: 2018-03-01 | End: 2018-09-21

## 2018-03-01 ASSESSMENT — ANXIETY QUESTIONNAIRES
1. FEELING NERVOUS, ANXIOUS, OR ON EDGE: NOT AT ALL
2. NOT BEING ABLE TO STOP OR CONTROL WORRYING: NOT AT ALL
GAD7 TOTAL SCORE: 0
5. BEING SO RESTLESS THAT IT IS HARD TO SIT STILL: NOT AT ALL
6. BECOMING EASILY ANNOYED OR IRRITABLE: NOT AT ALL
7. FEELING AFRAID AS IF SOMETHING AWFUL MIGHT HAPPEN: NOT AT ALL
3. WORRYING TOO MUCH ABOUT DIFFERENT THINGS: NOT AT ALL

## 2018-03-01 ASSESSMENT — PATIENT HEALTH QUESTIONNAIRE - PHQ9: 5. POOR APPETITE OR OVEREATING: NOT AT ALL

## 2018-03-01 NOTE — MR AVS SNAPSHOT
After Visit Summary   3/1/2018    Riley Gifford    MRN: [id number]           Patient Information     Date Of Birth          1975        Visit Information        Provider Department      3/1/2018 11:20 AM Raffy Harris PA-C [facility]        Today's Diagnoses     Quadriplegia (H)    -  1    Primary insomnia        Vitamin D deficiency        Eosinophilic esophagitis        Neurogenic bladder        Pulmonary nodules        Food allergy          Care Instructions    1. Can consider CT chest  [facility] Scheduling Phone Number: [phone]  Or   [facility] Scheduling Phone number: [phone]           Follow-ups after your visit        Future tests that were ordered for you today     Open Future Orders        Priority Expected Expires Ordered    CT Chest w/o Contrast Routine  3/1/2019 3/1/2018            Who to contact     If you have questions or need follow up information about today's clinic visit or your schedule please contact [facility] directly at [phone].  Normal or non-critical lab and imaging results will be communicated to you by [facility], letter or phone within 4 business days after the clinic has received the results. If you do not hear from us within 7 days, please contact the clinic through FXT[employer] or phone. If you have a critical or abnormal lab result, we will notify you by phone as soon as possible.  Submit refill requests through [employer] or call your pharmacy and they will forward the refill request to us. Please allow 3 business days for your refill to be completed.          Additional Information About Your Visit        [facility] Information     [employer] gives you secure access to your electronic health record. If you see a primary care provider, you can also send messages to your care team and make appointments. If you have questions, please call your primary care clinic.  If you do not have  a primary care provider, please call [phone] and they will assist you.        Care EveryWhere ID     This is your Care EveryWhere ID. This could be used by other organizations to access your [place] medical records  [id number]        Your Vitals Were     Pulse Temperature Height             80 97.7  F (36.5  C) (Oral) 6' (1.829 m)          Blood Pressure from Last 3 Encounters:   03/01/18 (!) 88/62   02/12/18 (!) 89/59   01/04/18 104/86    Weight from Last 3 Encounters:   02/12/18 130 lb (59 kg)   01/04/18 143 lb 11.2 oz (65.2 kg)   01/02/18 (!) 507 lb (230 kg)                 Today's Medication Changes          These changes are accurate as of 3/1/18 12:01 PM.  If you have any questions, ask your nurse or doctor.               These medicines have changed or have updated prescriptions.        Dose/Directions    baclofen 20 MG tablet   Commonly known as:  LIORESAL   This may have changed:  See the new instructions.   Used for:  Quadriplegia (H)   Changed by:  Raffy Harris PA-C        Dose:  20 mg   Take 1 tablet (20 mg) by mouth 3 times daily   Quantity:  360 tablet   Refills:  3         Stop taking these medicines if you haven't already. Please contact your care team if you have questions.     gabapentin 250 MG/5ML solution   Commonly known as:  NEURONTIN   Stopped by:  Raffy Harris PA-C                Where to get your medicines      These medications were sent to [facility] [address] [address] AT [address] [address], [facility] [address]     Phone:  [phone]     baclofen 20 MG tablet         Some of these will need a paper prescription and others can be bought over the counter.  Ask your nurse if you have questions.     Bring a paper prescription for each of these medications     zolpidem 10 MG tablet                Primary Care Provider    None Specified       No primary provider on file.        Goals        General    I will  use the medications Tylenol or Tramadol for pain every 4-6 hours as needed.  Evidenced by the patient  using the medications to control worsening pain as verbalized by the patient. (pt-stated)     Notes - Note created  3/22/2016  2:39 PM by Robbin Andino RN    As of today's date 3/22/2016 goal is met at 0 - 25%.   Goal Status:  Active        I will work with the [facility] nurse to get the best results for wound care as evidenced by decrease in size and verbalized pain in the area of the wound on the buttock. (pt-stated)     Notes - Note created  1/6/2016  1:43 PM by Robbin Andino RN    As of today's date 1/6/2016 goal is met at 0 - 25%.   Goal Status:  Active          Equal Access to Services     THERON REEVES : Juhi Vides, waaxda luqadaha, qaybta kaalmalillian lambert, monae diaz. So [facility] [phone].    ATENCIÓN: Si habla español, tiene a borden disposición servicios gratuitos de asistencia lingüística. Llame al [phone].    We comply with applicable federal civil rights laws and [place] laws. We do not discriminate on the basis of race, color, national origin, age, disability, sex, sexual orientation, or gender identity.            Thank you!     Thank you for choosing [facility]  for your care. Our goal is always to provide you with excellent care. Hearing back from our patients is one way we can continue to improve our services. Please take a few minutes to complete the written survey that you may receive in the mail after your visit with us. Thank you!             Your Updated Medication List - Protect others around you: Learn how to safely use, store and throw away your medicines at www.disposemymeds.org.          This list is accurate as of 3/1/18 12:01 PM.  Always use your most recent med list.                   Brand Name Dispense Instructions for use Diagnosis    baclofen 20 MG tablet    LIORESAL    360 tablet    Take 1 tablet (20 mg) by  mouth 3 times daily    Quadriplegia (H)       budesonide 0.5 MG/2ML neb solution    PULMICORT    1080 mL    USE 2 VIALS TWICE DAILY. MIX WITH HONEY AND SWALLOW    Eosinophilic esophagitis       cetirizine 10 MG tablet    zyrTEC     Take 10 mg by mouth daily        diphenhydrAMINE 25 MG tablet    BENADRYL     Take 25 mg by mouth every 8 hours as needed for itching or allergies Reported on 2/15/2017        docusate sodium 283 MG enema    ENEMEEZ MINI    30 enema    Use one every other day and prn per patients's request.    Chronic constipation       EPINEPHrine 0.3 MG/0.3ML injection 2-pack    EPIPEN/ADRENACLICK/or ANY BX GENERIC EQUIV    0.6 mL    Inject 0.3 mLs (0.3 mg) into the muscle as needed for anaphylaxis    Reaction to food, initial encounter       hydrocortisone 2.5 % cream    ANUSOL-HC    30 g    Place rectally 2 times daily    Hemorrhoids, unspecified hemorrhoid type       NEW MED     500 mL    Gentamycin 240mg in 500mL 0.9% Normal Saline. Instill 30mL into empty bladder at bedtime. Leave in bladder overnight and drain in the morning    Recurrent UTI       Omeprazole-Sodium Bicarbonate  MG Pack     90 each    TAKE 1 PACKET BY MOUTH DISSOLVED IN LIQUID DAILY    Eosinophilic esophagitis, Gastroesophageal reflux disease without esophagitis       ondansetron 4 MG ODT tab    ZOFRAN ODT    20 tablet    Take 1-2 tablets (4-8 mg) by mouth every 8 hours as needed for nausea    Abdominal pain, generalized       phenylephrine-cocoa butter 0.25-88.44 % per suppository    PREPARATION H    48 suppository    Insert one suppository rectally twice daily as needed.    External hemorrhoids       polyethylene glycol powder    MIRALAX    510 g    Take 17 g (1 capful) by mouth daily as needed for constipation    Constipation, unspecified constipation type       sterile water (bottle) irrigation     1000 mL    Irrigate with 60 mLs as directed daily    Neurogenic bladder       tolterodine 2 MG tablet    DETROL    180  tablet    Take 1 tablet (2 mg) by mouth 2 times daily    Neurogenic bladder       TYLENOL PO      Take 500 mg by mouth as needed for mild pain or fever Reported on 2/15/2017        zolpidem 10 MG tablet    AMBIEN    30 tablet    Take 0.5-1 tablets (5-10 mg) by mouth nightly as needed for sleep    Primary insomnia

## 2018-03-01 NOTE — PROGRESS NOTES
SUBJECTIVE:   Riley Gifford is a 42 year old male who presents to clinic today for the following health issues:      New Patient/Transfer of Care - I've known Riley for some time. He's been seeing previous PCP Dr. Laura Yao. He has no specific concerns, simply transfer of care to me and needs a few meds refilled.     Patient Active Problem List   Diagnosis     Vitamin D deficiency     Eosinophilic esophagitis     Neurogenic bladder     CARDIOVASCULAR SCREENING; LDL GOAL LESS THAN 160     Quadriplegia (H)     Chronic constipation     Internal hemorrhoids with other complication     Diagnostic skin and sensitization tests(aka ALLERGENS)     Anal or rectal pain     Allergic rhinitis due to animal dander     Allergy to mold spores     House dust mite allergy     Insomnia     Health Care Home     Feeling worried     Gallstones     Chronic midline thoracic back pain     Pulmonary nodules     ACP (advance care planning)      Current Outpatient Prescriptions   Medication     baclofen (LIORESAL) 20 MG tablet     zolpidem (AMBIEN) 10 MG tablet     tolterodine (DETROL) 2 MG tablet     budesonide (PULMICORT) 0.5 MG/2ML neb solution     NEW MED     ondansetron (ZOFRAN ODT) 4 MG ODT tab     cetirizine (ZYRTEC) 10 MG tablet     EPINEPHrine (EPIPEN/ADRENACLICK/OR ANY BX GENERIC EQUIV) 0.3 MG/0.3ML injection 2-pack     Omeprazole-Sodium Bicarbonate  MG PACK     docusate sodium (ENEMEEZ MINI) 283 MG enema     hydrocortisone (ANUSOL-HC) 2.5 % cream     sterile water, bottle, irrigation     diphenhydrAMINE (BENADRYL) 25 MG tablet     polyethylene glycol (MIRALAX) powder     phenylephrine-cocoa butter (PREPARATION H) 0.25-88.44 % suppository     Acetaminophen (TYLENOL PO)     [DISCONTINUED] zolpidem (AMBIEN) 10 MG tablet     No current facility-administered medications for this visit.       Social History     Social History     Marital status: Single     Spouse name: N/A     Number of children: N/A     Years of education: N/A      Occupational History     Not on file.     Social History Main Topics     Smoking status: Never Smoker     Smokeless tobacco: Never Used     Alcohol use 0.0 oz/week     0 Standard drinks or equivalent per week      Comment: 1-2 drinks per month     Drug use: No     Sexual activity: Not Currently     Partners: Female     Other Topics Concern     Parent/Sibling W/ Cabg, Mi Or Angioplasty Before 65f 55m? No     Social History Narrative    Assisted living.Aide in the morning, Has call [employer]    [employer], reading.    Tests [employer].    [employer] weekly to work out    Drives.     Family lives in [place].          September 21, 2017    ENVIRONMENTAL HISTORY: The family lives in a 15 year old apartment [place]. The home is heated with a boiler heat. They do not have central air conditioning. The patient's bedroom is furnished with carpeting in bedroom. No pets inside the house. There is not history of cockroach or mice infestation. There are no smokers in the house.  The house does not have a basement.           Problem list and histories reviewed & adjusted, as indicated.  Additional history: as documented    Labs reviewed in EPIC    Reviewed and updated as needed this visit by clinical staff  Tobacco  Allergies  Meds  Med Hx  Surg Hx  Fam Hx  Soc Hx      Reviewed and updated as needed this visit by Provider         ROS:  Constitutional, HEENT, cardiovascular, pulmonary, GI, , musculoskeletal, neuro, skin, endocrine and psych systems are negative, except as otherwise noted.    OBJECTIVE:     BP (!) 88/62 (Cuff Size: Adult Regular)  Pulse 80  Temp 97.7  F (36.5  C) (Oral)  Ht 6' (1.829 m)  There is no height or weight on file to calculate BMI.  GENERAL: healthy, alert and no distress    ASSESSMENT/PLAN:     (G82.50) Quadriplegia (H)  (primary encounter diagnosis)  Comment:   Plan: baclofen (LIORESAL) 20 MG tablet        He gets spasms but no concerns today. Refills given.    (F51.01)  Primary insomnia  Comment:   Plan: zolpidem (AMBIEN) 10 MG tablet        Stable.     (E55.9) Vitamin D deficiency  Comment:   Plan: History of     (K20.0) Eosinophilic esophagitis  Comment:   Plan: Is managed by GI and does an oral / swallowed  Inhaled steroid     (N31.9) Neurogenic bladder  Comment:   Plan: Self catheterizes     (R91.8) Pulmonary nodules  Comment:   Plan: CT Chest w/o Contrast        Reviewed in my chart review 5 mm ground glass - but likely can skip further imaging based on the fact he is low risk    (Z91.018) Food allergy  Comment:   Plan: As noted above. He is self eliminating foods to avoid symptoms.     JUANITO MARTIN PA-C  [facility]

## 2018-03-01 NOTE — PATIENT INSTRUCTIONS
1. Can consider CT chest  [facility] Scheduling Phone Number: [phone]  Or   [facility] Scheduling Phone number: [phone]

## 2018-03-02 ASSESSMENT — PATIENT HEALTH QUESTIONNAIRE - PHQ9: SUM OF ALL RESPONSES TO PHQ QUESTIONS 1-9: 5

## 2018-03-02 ASSESSMENT — ANXIETY QUESTIONNAIRES: GAD7 TOTAL SCORE: 0

## 2018-03-07 ENCOUNTER — TELEPHONE (OUTPATIENT)
Dept: FAMILY MEDICINE | Facility: CLINIC | Age: 43
End: 2018-03-07

## 2018-03-07 NOTE — TELEPHONE ENCOUNTER
Forms received from: [employer]   Phone number listed: [phone]  Fax listed: [phone]  Date received: 3-7-18  Form description: Request for medical supplies   Once forms are completed, please return to [employer] via fax.  Is patient requesting to be contacted when forms are completed: n/a  Form placed: Provider folder     Piedad Goss

## 2018-03-09 ENCOUNTER — TELEPHONE (OUTPATIENT)
Dept: FAMILY MEDICINE | Facility: CLINIC | Age: 43
End: 2018-03-09

## 2018-03-12 ENCOUNTER — TELEPHONE (OUTPATIENT)
Dept: FAMILY MEDICINE | Facility: CLINIC | Age: 43
End: 2018-03-12

## 2018-03-12 ENCOUNTER — TRANSFERRED RECORDS (OUTPATIENT)
Dept: HEALTH INFORMATION MANAGEMENT | Facility: CLINIC | Age: 43
End: 2018-03-12

## 2018-03-12 ENCOUNTER — RADIANT APPOINTMENT (OUTPATIENT)
Dept: GENERAL RADIOLOGY | Facility: CLINIC | Age: 43
End: 2018-03-12
Attending: PHYSICIAN ASSISTANT
Payer: MEDICARE

## 2018-03-12 ENCOUNTER — OFFICE VISIT (OUTPATIENT)
Dept: FAMILY MEDICINE | Facility: CLINIC | Age: 43
End: 2018-03-12
Payer: MEDICARE

## 2018-03-12 VITALS
OXYGEN SATURATION: 92 % | TEMPERATURE: 97.6 F | HEIGHT: 72 IN | SYSTOLIC BLOOD PRESSURE: 82 MMHG | HEART RATE: 93 BPM | DIASTOLIC BLOOD PRESSURE: 60 MMHG

## 2018-03-12 DIAGNOSIS — G82.50 QUADRIPLEGIA (H): ICD-10-CM

## 2018-03-12 DIAGNOSIS — R07.9 CHEST PAIN, UNSPECIFIED TYPE: ICD-10-CM

## 2018-03-12 DIAGNOSIS — R06.02 SOB (SHORTNESS OF BREATH): ICD-10-CM

## 2018-03-12 DIAGNOSIS — R07.9 CHEST PAIN, UNSPECIFIED TYPE: Primary | ICD-10-CM

## 2018-03-12 LAB
D DIMER PPP FEU-MCNC: 0.2 UG/ML FEU (ref 0–0.5)
ERYTHROCYTE [DISTWIDTH] IN BLOOD BY AUTOMATED COUNT: 12.1 % (ref 10–15)
HCT VFR BLD AUTO: 42.2 % (ref 40–53)
HGB BLD-MCNC: 14.2 G/DL (ref 13.3–17.7)
MCH RBC QN AUTO: 31.9 PG (ref 26.5–33)
MCHC RBC AUTO-ENTMCNC: 33.6 G/DL (ref 31.5–36.5)
MCV RBC AUTO: 95 FL (ref 78–100)
PLATELET # BLD AUTO: 235 10E9/L (ref 150–450)
RBC # BLD AUTO: 4.45 10E12/L (ref 4.4–5.9)
WBC # BLD AUTO: 13.1 10E9/L (ref 4–11)

## 2018-03-12 PROCEDURE — 85027 COMPLETE CBC AUTOMATED: CPT | Performed by: PHYSICIAN ASSISTANT

## 2018-03-12 PROCEDURE — 93000 ELECTROCARDIOGRAM COMPLETE: CPT | Performed by: PHYSICIAN ASSISTANT

## 2018-03-12 PROCEDURE — 85379 FIBRIN DEGRADATION QUANT: CPT | Performed by: PHYSICIAN ASSISTANT

## 2018-03-12 PROCEDURE — 71046 X-RAY EXAM CHEST 2 VIEWS: CPT | Mod: FY

## 2018-03-12 PROCEDURE — 99214 OFFICE O/P EST MOD 30 MIN: CPT | Performed by: PHYSICIAN ASSISTANT

## 2018-03-12 PROCEDURE — 36415 COLL VENOUS BLD VENIPUNCTURE: CPT | Performed by: PHYSICIAN ASSISTANT

## 2018-03-12 NOTE — TELEPHONE ENCOUNTER
Reason for call:  Patient reporting a symptom    Symptom or request: Trouble Breathing / Chest Congestion    Duration (how long have symptoms been present): 3 days    Have you been treated for this before? No    Additional comments: Patient called and stated he has been having trouble breathing for the last few days and feels maybe his chest is congested. Patient declined FNA and stated he would prefer to wait for a call back from the care team. Please call back.    Phone Number patient can be reached at:  Home number on file [phone] (home)    Best Time:  Anytime    Can we leave a detailed message on this number:  YES    Call taken on 3/12/2018 at 9:40 AM by Janett Figueroa

## 2018-03-12 NOTE — TELEPHONE ENCOUNTER
Forms received from: [employer]    Phone number listed: [phone]   Fax listed: [phone]  Date received: 3-12-18  Form description: DME  Once forms are completed, please return to [employer]  via fax.  Is patient requesting to be contacted when forms are completed: n/a  Form placed: Provider folder     Piedad Goss

## 2018-03-12 NOTE — TELEPHONE ENCOUNTER
"Route to PCP.  Patient has appt with you at 1040 today.    Riley Gifford is a 42 year old male who calls with breathing issues.    NURSING ASSESSMENT:  Description:  He reports he is coughing \"off and on\", denies sputum production.  Reports \"a little bit short of breath\" with \"slightly labored breathing\" denies any pain with breathing, fever, states he might be wheezing \"a little bit\".  No audible wheezes on the phone, and he is speaking in full sentences. He denies any further cold/respiratory sx, denies chest pain or feeling of suffocation.  He states this has happened a couple of times in the past, usually resolves after a couple of days.  He states sometimes this happens with his allergies when he eats something, but he has not changed anything in his diet recently. He does not use oxygen or take any inhaled medications at home.  Onset/duration:3 days  Precip. factors: N/A  Associated symptoms: See above  Improves/worsens symptoms:  N/A  Allergies:   Allergies   Allergen Reactions     Banana Hives     Other reaction(s): GI Upset     Egg/Pro [Chicken-Derived Products (Egg)]      Fish      Soybean Oil      Other reaction(s): *Unknown  Discovered on allergy testing. Has never had any reaction to his knowledge     Wheat      NURSING PLAN: Routed to provider Yes and Nursing advice to patient : visit with provider today.    RECOMMENDED DISPOSITION:  See in 4 hours, another person to drive - Same-day appointment set up for this morning with Jan Harris PA-C.  Will comply with recommendation: Yes  If further questions/concerns or if symptoms do not improve, worsen or new symptoms develop, call your PCP or [facility] as soon as possible.      Guideline used:  Telephone Triage Protocols for Nurses, Fifth Edition, Yessy Anton \"Breathing Problems\"    LAUREL BAUTISTA RN    "

## 2018-03-12 NOTE — MR AVS SNAPSHOT
After Visit Summary   3/12/2018    Riley Gifford    MRN: [id number]           Patient Information     Date Of Birth          1975        Visit Information        Provider Department      3/12/2018 10:40 AM Raffy Harris PA-C [facility]        Today's Diagnoses     Chest pain, unspecified type    -  1    SOB (shortness of breath)        Quadriplegia (H)           Follow-ups after your visit        Who to contact     If you have questions or need follow up information about today's clinic visit or your schedule please contact [facility] directly at [phone].  Normal or non-critical lab and imaging results will be communicated to you by [employer], letter or phone within 4 business days after the clinic has received the results. If you do not hear from us within 7 days, please contact the clinic through [employer] or phone. If you have a critical or abnormal lab result, we will notify you by phone as soon as possible.  Submit refill requests through [employer] or call your pharmacy and they will forward the refill request to us. Please allow 3 business days for your refill to be completed.          Additional Information About Your Visit        MyChart Information     [employer] gives you secure access to your electronic health record. If you see a primary care provider, you can also send messages to your care team and make appointments. If you have questions, please call your primary care clinic.  If you do not have a primary care provider, please call [phone] and they will assist you.        Care EveryWhere ID     This is your Care EveryWhere ID. This could be used by other organizations to access your [place] medical records  [id number]        Your Vitals Were     Pulse Temperature Height Pulse Oximetry          93 97.6  F (36.4  C) (Oral) 6' (1.829 m) 92%         Blood Pressure from Last 3 Encounters:   03/12/18 (!) 82/60   03/01/18 (!) 88/62   02/12/18  (!) 89/59    Weight from Last 3 Encounters:   02/12/18 130 lb (59 kg)   01/04/18 143 lb 11.2 oz (65.2 kg)   01/02/18 (!) 507 lb (230 kg)              We Performed the Following     CBC with platelets     D dimer, quantitative     EKG 12-lead complete w/read - Clinics        Primary Care Provider Office Phone # Fax #    Raffy Harris PA-C [phone] [address] [facility] [id number]        Goals        General    I will use the medications Tylenol or Tramadol for pain every 4-6 hours as needed.  Evidenced by the patient  using the medications to control worsening pain as verbalized by the patient. (pt-stated)     Notes - Note created  3/22/2016  2:39 PM by Robbin Andino, RN    As of today's date 3/22/2016 goal is met at 0 - 25%.   Goal Status:  Active        I will work with the [facility] nurse to get the best results for wound care as evidenced by decrease in size and verbalized pain in the area of the wound on the buttock. (pt-stated)     Notes - Note created  1/6/2016  1:43 PM by Robbin Andino, ARISTEO    As of today's date 1/6/2016 goal is met at 0 - 25%.   Goal Status:  Active          Equal Access to Services     THERON REEVES AH: Hadii aad ku hadasho Soomaali, waaxda luqadaha, qaybta kaalmada adeegyada, monae pearlin hayaasdn jarek diaz. So [facility] [phone].    ATENCIÓN: Si habla español, tiene a borden disposición servicios gratuitos de asistencia lingüística. Llame al [phone].    We comply with applicable federal civil rights laws and [place] laws. We do not discriminate on the basis of race, color, national origin, age, disability, sex, sexual orientation, or gender identity.            Thank you!     Thank you for choosing [facility]  for your care. Our goal is always to provide you with excellent care. Hearing back from our patients is one way we can continue to improve our services. Please take a few minutes to complete the written survey that you may  receive in the mail after your visit with us. Thank you!             Your Updated Medication List - Protect others around you: Learn how to safely use, store and throw away your medicines at www.disposemymeds.org.          This list is accurate as of 3/12/18 12:04 PM.  Always use your most recent med list.                   Brand Name Dispense Instructions for use Diagnosis    baclofen 20 MG tablet    LIORESAL    360 tablet    Take 1 tablet (20 mg) by mouth 3 times daily    Quadriplegia (H)       budesonide 0.5 MG/2ML neb solution    PULMICORT    1080 mL    USE 2 VIALS TWICE DAILY. MIX WITH HONEY AND SWALLOW    Eosinophilic esophagitis       cetirizine 10 MG tablet    zyrTEC     Take 10 mg by mouth daily        diphenhydrAMINE 25 MG tablet    BENADRYL     Take 25 mg by mouth every 8 hours as needed for itching or allergies Reported on 2/15/2017        docusate sodium 283 MG enema    ENEMEEZ MINI    30 enema    Use one every other day and prn per patients's request.    Chronic constipation       EPINEPHrine 0.3 MG/0.3ML injection 2-pack    EPIPEN/ADRENACLICK/or ANY BX GENERIC EQUIV    0.6 mL    Inject 0.3 mLs (0.3 mg) into the muscle as needed for anaphylaxis    Reaction to food, initial encounter       hydrocortisone 2.5 % cream    ANUSOL-HC    30 g    Place rectally 2 times daily    Hemorrhoids, unspecified hemorrhoid type       NEW MED     500 mL    Gentamycin 240mg in 500mL 0.9% Normal Saline. Instill 30mL into empty bladder at bedtime. Leave in bladder overnight and drain in the morning    Recurrent UTI       Omeprazole-Sodium Bicarbonate  MG Pack     90 each    TAKE 1 PACKET BY MOUTH DISSOLVED IN LIQUID DAILY    Eosinophilic esophagitis, Gastroesophageal reflux disease without esophagitis       ondansetron 4 MG ODT tab    ZOFRAN ODT    20 tablet    Take 1-2 tablets (4-8 mg) by mouth every 8 hours as needed for nausea    Abdominal pain, generalized       phenylephrine-cocoa butter 0.25-88.44 % per  suppository    PREPARATION H    48 suppository    Insert one suppository rectally twice daily as needed.    External hemorrhoids       polyethylene glycol powder    MIRALAX    510 g    Take 17 g (1 capful) by mouth daily as needed for constipation    Constipation, unspecified constipation type       sterile water (bottle) irrigation     1000 mL    Irrigate with 60 mLs as directed daily    Neurogenic bladder       tolterodine 2 MG tablet    DETROL    180 tablet    Take 1 tablet (2 mg) by mouth 2 times daily    Neurogenic bladder       TYLENOL PO      Take 500 mg by mouth as needed for mild pain or fever Reported on 2/15/2017        zolpidem 10 MG tablet    AMBIEN    30 tablet    Take 0.5-1 tablets (5-10 mg) by mouth nightly as needed for sleep    Primary insomnia

## 2018-03-12 NOTE — PROGRESS NOTES
SUBJECTIVE:   Riley Gifford is a 42 year old male who presents to clinic today for the following health issues:      Concern - Shortness of breath  Onset: 2 days (Saturday evening) after eating    Description:   Patient states that he is having shortness of breath, cough occasional.  Fatigued, hasn't slept much since Saturday, lack of appetite    Intensity: moderate    Progression of Symptoms:  same    Accompanying Signs & Symptoms:  Currently a little chest pain    Previous history of similar problem:   Yes, after he has eaten before.      Precipitating factors:   Worsened by: none    Alleviating factors:  Improved by: none    Therapies Tried and outcome: none    HPI: Pt is a 42 year old male with a pertinent history of eosinophilic esophagitis and quadriplegia. He presents today with SOB since Saturday evening. This started after he ate. He states he has been feeling SOB constantly and nothing seems to make it better. This has happened in the past after eating and possibly due to allergies. However, It has never lasted this long. He admits to producing some phlegm but has not coughed it all the way up to tell what color it is. Nothing has made the SOB better or worse. He denies fevers and has no ill contacts. No changes in bowel habits, no new rashes and no new pedal edema. He also started with some chest pain today which he states is not out of the norm due to his eosinophilic esophagitis.  It is constant, substernal, dull, non-radiating, and currently rates as a 2 or 3/10. Nothing makes the pain better or worse. He has not tried anything to help his symptoms.     Problem list and histories reviewed & adjusted, as indicated.  Additional history: as documented    Patient Active Problem List   Diagnosis     Vitamin D deficiency     Eosinophilic esophagitis     Neurogenic bladder     CARDIOVASCULAR SCREENING; LDL GOAL LESS THAN 160     Quadriplegia (H)     Chronic constipation     Internal hemorrhoids with other  complication     Diagnostic skin and sensitization tests(aka ALLERGENS)     Anal or rectal pain     Allergic rhinitis due to animal dander     Allergy to mold spores     House dust mite allergy     Insomnia     Health Care Home     Feeling worried     Gallstones     Chronic midline thoracic back pain     Pulmonary nodules     ACP (advance care planning)     Past Surgical History:   Procedure Laterality Date     BACK SURGERY       C NONSPECIFIC PROCEDURE      C5-6 Fusion     C NONSPECIFIC PROCEDURE      Pressure Ulcer     COLONOSCOPY       CYSTOSCOPY N/A 6/23/2017    Procedure: CYSTOSCOPY;  Cystoscopy and Botox Injection Into the Bladder  ;  Surgeon: Evaristo Hayes MD;  Location: [facility]     CYSTOSCOPY, INTRAVESICAL INJECTION N/A 5/12/2016    Procedure: CYSTOSCOPY, INTRAVESICAL INJECTION;  Surgeon: Evaristo Hayes MD;  Location: [facility] OR     CYSTOSCOPY, INTRAVESICAL INJECTION N/A 11/11/2016    Procedure: CYSTOSCOPY, INTRAVESICAL INJECTION;  Surgeon: Evaristo Hayes MD;  Location: [facility]     CYSTOSCOPY, INTRAVESICAL INJECTION N/A 1/4/2018    Procedure: CYSTOSCOPY, INTRAVESICAL INJECTION;  Cystoscopy Botox Injection Into The Bladder;  Surgeon: Evaristo Hayes MD;  Location: [facility] OR     GI SURGERY      endoscopy x2     INJECT BOTOX N/A 6/23/2017    Procedure: INJECT BOTOX;;  Surgeon: Evaristo Hayes MD;  Location: [facility]       Social History   Substance Use Topics     Smoking status: Never Smoker     Smokeless tobacco: Never Used     Alcohol use 0.0 oz/week     0 Standard drinks or equivalent per week      Comment: 1-2 drinks per month     Family History   Problem Relation Age of Onset     Breast Cancer Mother      Prostate Cancer Father      Breast Cancer Maternal Grandmother      CANCER Maternal Grandfather      Glaucoma No family hx of      Macular Degeneration No family hx of      DIABETES No family hx of      Hypertension No family hx of          Current Outpatient Prescriptions    Medication Sig Dispense Refill     baclofen (LIORESAL) 20 MG tablet Take 1 tablet (20 mg) by mouth 3 times daily 360 tablet 3     zolpidem (AMBIEN) 10 MG tablet Take 0.5-1 tablets (5-10 mg) by mouth nightly as needed for sleep 30 tablet 5     tolterodine (DETROL) 2 MG tablet Take 1 tablet (2 mg) by mouth 2 times daily 180 tablet 3     budesonide (PULMICORT) 0.5 MG/2ML neb solution USE 2 VIALS TWICE DAILY. MIX WITH HONEY AND SWALLOW 1080 mL 0     NEW MED Gentamycin 240mg in 500mL 0.9% Normal Saline.  Instill 30mL into empty bladder at bedtime.  Leave in bladder overnight and drain in the morning 500 mL 3     ondansetron (ZOFRAN ODT) 4 MG ODT tab Take 1-2 tablets (4-8 mg) by mouth every 8 hours as needed for nausea 20 tablet 1     cetirizine (ZYRTEC) 10 MG tablet Take 10 mg by mouth daily       EPINEPHrine (EPIPEN/ADRENACLICK/OR ANY BX GENERIC EQUIV) 0.3 MG/0.3ML injection 2-pack Inject 0.3 mLs (0.3 mg) into the muscle as needed for anaphylaxis 0.6 mL 3     Omeprazole-Sodium Bicarbonate  MG PACK TAKE 1 PACKET BY MOUTH DISSOLVED IN LIQUID DAILY 90 each 0     docusate sodium (ENEMEEZ MINI) 283 MG enema Use one every other day and prn per patients's request. 30 enema 3     hydrocortisone (ANUSOL-HC) 2.5 % cream Place rectally 2 times daily 30 g 3     sterile water, bottle, irrigation Irrigate with 60 mLs as directed daily 1000 mL 0     diphenhydrAMINE (BENADRYL) 25 MG tablet Take 25 mg by mouth every 8 hours as needed for itching or allergies Reported on 2/15/2017       polyethylene glycol (MIRALAX) powder Take 17 g (1 capful) by mouth daily as needed for constipation 510 g 1     phenylephrine-cocoa butter (PREPARATION H) 0.25-88.44 % suppository Insert one suppository rectally twice daily as needed. 48 suppository 3     Acetaminophen (TYLENOL PO) Take 500 mg by mouth as needed for mild pain or fever Reported on 2/15/2017       Allergies   Allergen Reactions     Banana Hives     Other reaction(s): GI Upset      Egg/Pro [Chicken-Derived Products (Egg)]      Fish      Soybean Oil      Other reaction(s): *Unknown  Discovered on allergy testing. Has never had any reaction to his knowledge     Wheat        Reviewed and updated as needed this visit by clinical staff  Tobacco  Allergies  Meds  Med Hx  Surg Hx  Fam Hx  Soc Hx      Reviewed and updated as needed this visit by Provider       ROS:  Constitutional, HEENT, cardiovascular, pulmonary, gi and gu systems are negative, except as otherwise noted.    OBJECTIVE:     BP (!) 82/60 (Cuff Size: Adult Regular)  Pulse 93  Temp 97.6  F (36.4  C) (Oral)  Ht 6' (1.829 m)  SpO2 92%  There is no height or weight on file to calculate BMI.     GENERAL: healthy, alert and no distress  EYES: Eyes grossly normal to inspection, PERRL and conjunctivae and sclerae normal  HENT: ear canals and TM's normal, nose and mouth without ulcers or lesions  NECK: no adenopathy, no asymmetry, masses, or scars and thyroid normal to palpation  RESP: mildly congested sounding/restrictive, but overall lungs clear to auscultation - no rales, rhonchi or wheezes. Seems to be using more accessory muscles compared to baseline.   CV: regular rate and rhythm, normal S1 S2, no S3 or S4, no murmur, click or rub, no peripheral edema and peripheral pulses strong  MS: no gross musculoskeletal defects noted, no edema    Diagnostic Test Results:  Chest X-ray: No obvious infiltrates noted.   EKG: abnormal. Showing sinus rhythm with atrial enlargement. ST abnormalities/1 mm elevation in V2 and V3 Possibly V4.   Labs:   CBC: Leukocytosis at 13.1 otherwise normal CBC   D-dimer - pending     ASSESSMENT/PLAN:     1. Chest pain/SOB  -Discussed potential causes for symptoms. Discussed work up and reasons for them. Pt agreed to plan.   -AP/lateral CXR: no obvious infiltrates noted.   - EKG performed. Abnormal. Pt was advised of findings and suggested that he be seen in the ER due to the fact we cannot compare EKG to others,  he has EKG changes and concerning symptoms. With his new symptoms of SOB and chest pain we need to rule out cardiac etiology and the need for cardiac markers to be drawn. Pt did not want to go by ambulance and refused. Vitals stable, symptoms mild, risk for true cardiac cause seems low and Pt agreed to drive to [place] ER to be seen. Provider to provider report called to ER staff physician at [place].   -Leukocytosis noted on CBC  -D-Dimer - pending     2. Cough   - deferred treatment due to findings of EKG and sending pt to ER.     Juanito العراقيS - note completed as part of scribed documentation of preceptor's work today    The information in this document, created by the medical student for me, accurately reflects the services I personally performed and the decisions made by me. I have reviewed and approved this document for accuracy prior to leaving the patient care area     JUANITO MARTIN PA-C  [facility]

## 2018-03-13 ENCOUNTER — TELEPHONE (OUTPATIENT)
Dept: CARE COORDINATION | Facility: CLINIC | Age: 43
End: 2018-03-13

## 2018-03-13 DIAGNOSIS — Z71.89 COMPLEX CARE COORDINATION: Primary | ICD-10-CM

## 2018-03-13 NOTE — TELEPHONE ENCOUNTER
DC'd from [facility] on 3-12-18 to home self care   Primary Problem: Chest pain/SOB  LACE: 57 moderate

## 2018-03-14 ENCOUNTER — CARE COORDINATION (OUTPATIENT)
Dept: CARE COORDINATION | Facility: CLINIC | Age: 43
End: 2018-03-14

## 2018-03-14 NOTE — PROGRESS NOTES
Clinic Care Coordination Contact    OUTREACH    Referral Information:  Referral Source: PCP    Primary Diagnosis: Genitourinary Disorders (wound care)    Chief Complaint   Patient presents with     Clinic Care Coordination - [facility]     RN CC     Care Team     Chart Review Please        Universal Utilization: 4  Utilization    Last refreshed: 3/14/2018  9:21 AM:  No Show Count (past year) 0       Last refreshed: 3/14/2018  9:21 AM:  ED visits 0       Last refreshed: 3/14/2018  9:21 AM:  Hospital admissions 0          Current as of: 3/14/2018  9:21 AM             Clinical Concerns:  Current Medical Concerns:    ACP (advance care planning)   Pulmonary nodules   Chronic midline thoracic back pain   Gallstones   Feeling worried   Insomnia   Allergic rhinitis due to animal dander   Allergy to mold spores   House dust mite allergy   Anal or rectal pain   Diagnostic skin and sensitization tests(aka ALLERGENS)   Internal hemorrhoids with other complication   Quadriplegia (H)   Chronic constipation   CARDIOVASCULAR SCREENING; LDL GOAL LESS THAN 160   Neurogenic bladder   Vitamin D deficiency   Eosinophilic esophagitis   Health Care Home         Current Behavioral Concerns: feeling worried    Education Provided to patient: reviewed antibiotic use and the UTI.   Clinical Pathway Name: None  Health Maintenance Reviewed: Due/Overdue     Medication Management:  Patient is knowledgeable on medications and is adherent.  No financial concerns reported at this time.       Functional Status:  Mobility Status: Dependent/Assisted by Another  Equipment Currently Used at Home: commode, grab bar, shower chair, transfer board, wheelchair, power  Transportation:  Drives self in adapted van        Psychosocial:  Current living arrangement:: I live in assisted living  Financial/Insurance: medicare       Resources and Interventions:  Current Resources:  ;    Advanced Care Plans/Directives on file:: No  Referrals Placed:  (unknown)         Future Appointments              In 6 days Raffy Harris PA-C [place], [place]           Plan: 1).  The patient will follow up with the PCP on the UTI diagnosed at the ER.  2).  The patient will return to the ER if the chest pain and shortness of breath should return.  3).  The Care Coordination RN will make a follow up call to the patient again in 4 weeks.  4).  Care Coordination to remain available for the patient to contact in the event of future needs.      Robbin Vanegas, MSN, RN, PHN  N Clinic Care Coordinator  [facility]  Phone: [phone]  [email or web address]

## 2018-03-20 ENCOUNTER — OFFICE VISIT (OUTPATIENT)
Dept: FAMILY MEDICINE | Facility: CLINIC | Age: 43
End: 2018-03-20
Payer: MEDICARE

## 2018-03-20 VITALS
RESPIRATION RATE: 22 BRPM | OXYGEN SATURATION: 91 % | SYSTOLIC BLOOD PRESSURE: 122 MMHG | HEART RATE: 90 BPM | DIASTOLIC BLOOD PRESSURE: 72 MMHG

## 2018-03-20 DIAGNOSIS — G82.50 QUADRIPLEGIA (H): ICD-10-CM

## 2018-03-20 DIAGNOSIS — R07.9 CHEST PAIN, UNSPECIFIED TYPE: Primary | ICD-10-CM

## 2018-03-20 DIAGNOSIS — N39.0 URINARY TRACT INFECTION WITHOUT HEMATURIA, SITE UNSPECIFIED: ICD-10-CM

## 2018-03-20 DIAGNOSIS — R05.9 COUGH: ICD-10-CM

## 2018-03-20 PROCEDURE — 99213 OFFICE O/P EST LOW 20 MIN: CPT | Performed by: PHYSICIAN ASSISTANT

## 2018-03-20 RX ORDER — NITROFURANTOIN 25; 75 MG/1; MG/1
100 CAPSULE ORAL 2 TIMES DAILY
Qty: 14 CAPSULE | Refills: 0 | Status: SHIPPED | OUTPATIENT
Start: 2018-03-20 | End: 2018-05-03

## 2018-03-20 RX ORDER — ALBUTEROL SULFATE 90 UG/1
2 AEROSOL, METERED RESPIRATORY (INHALATION) PRN
COMMUNITY
Start: 2018-03-12 | End: 2018-08-03

## 2018-03-20 RX ORDER — GUAIFENESIN 200 MG/10ML
LIQUID ORAL
Qty: 560 ML | Refills: 1 | Status: SHIPPED | OUTPATIENT
Start: 2018-03-20 | End: 2019-10-15

## 2018-03-20 ASSESSMENT — PAIN SCALES - GENERAL: PAINLEVEL: MILD PAIN (2)

## 2018-03-20 NOTE — NURSING NOTE
Chief Complaint   Patient presents with     RECHECK     [facility], 3/12/2018       Initial /72 (BP Location: Right arm, Patient Position: Chair, Cuff Size: Adult Regular)  Pulse 90  Resp 22  SpO2 91% Estimated body mass index is 17.63 kg/(m^2) as calculated from the following:    Height as of 2/12/18: 6' (1.829 m).    Weight as of 2/12/18: 130 lb (59 kg).  Medication Reconciliation: complete     Ivonne Manriquez CMA

## 2018-03-20 NOTE — MR AVS SNAPSHOT
After Visit Summary   3/20/2018    Riley Gifford    MRN: [id number]           Patient Information     Date Of Birth          1975        Visit Information        Provider Department      3/20/2018 2:40 PM Raffy Harris PA-C [facility]        Today's Diagnoses     Chest pain, unspecified type    -  1    Urinary tract infection without hematuria, site unspecified        Cough           Follow-ups after your visit        Future tests that were ordered for you today     Open Future Orders        Priority Expected Expires Ordered    Urine Culture Aerobic Bacterial Routine 3/27/2018 4/24/2018 3/20/2018            Who to contact     If you have questions or need follow up information about today's clinic visit or your schedule please contact [facility] directly at [phone].  Normal or non-critical lab and imaging results will be communicated to you by MyChart, letter or phone within 4 business days after the clinic has received the results. If you do not hear from us within 7 days, please contact the clinic through MyChart or phone. If you have a critical or abnormal lab result, we will notify you by phone as soon as possible.  Submit refill requests through [employer] or call your pharmacy and they will forward the refill request to us. Please allow 3 business days for your refill to be completed.          Additional Information About Your Visit        MyChart Information     [employer] gives you secure access to your electronic health record. If you see a primary care provider, you can also send messages to your care team and make appointments. If you have questions, please call your primary care clinic.  If you do not have a primary care provider, please call [phone] and they will assist you.        Care EveryWhere ID     This is your Care EveryWhere ID. This could be used by other organizations to access your [facility] [place] medical records  [id number]         Your Vitals Were     Pulse Respirations Pulse Oximetry             90 22 91%          Blood Pressure from Last 3 Encounters:   03/20/18 122/72   03/12/18 (!) 82/60   03/01/18 (!) 88/62    Weight from Last 3 Encounters:   02/12/18 130 lb (59 kg)   01/04/18 143 lb 11.2 oz (65.2 kg)   01/02/18 (!) 507 lb (230 kg)                 Today's Medication Changes          These changes are accurate as of 3/20/18  3:11 PM.  If you have any questions, ask your nurse or doctor.               Start taking these medicines.        Dose/Directions    guaiFENesin 100 MG/5ML Liqd   Commonly known as:  ROBITUSSIN   Used for:  Cough   Started by:  Raffy Harris PA-C        4-5 TSP twice a day as needed   Quantity:  560 mL   Refills:  1       nitroFURantoin (macrocrystal-monohydrate) 100 MG capsule   Commonly known as:  MACROBID   Used for:  Urinary tract infection without hematuria, site unspecified   Started by:  Raffy Harris PA-C        Dose:  100 mg   Take 1 capsule (100 mg) by mouth 2 times daily   Quantity:  14 capsule   Refills:  0            Where to get your medicines      These medications were sent to [facility] [address] [place] [address] AT [place] [address], [facility] [address]     Phone:  [phone]     guaiFENesin 100 MG/5ML Liqd    nitroFURantoin (macrocrystal-monohydrate) 100 MG capsule                Primary Care Provider Office Phone # Fax #    Raffy Harris PA-C [phone]       [facility] [address]        Goals        General    I will use the medications Tylenol or Tramadol for pain every 4-6 hours as needed.  Evidenced by the patient  using the medications to control worsening pain as verbalized by the patient. (pt-stated)     Notes - Note created  3/22/2016  2:39 PM by Robbin Andino RN    As of today's date 3/22/2016 goal is met at 0 - 25%.   Goal Status:  Active        I will work with the [facility]  nurse to get the best results for wound care as evidenced by decrease in size and verbalized pain in the area of the wound on the buttock. (pt-stated)     Notes - Note created  1/6/2016  1:43 PM by Robbin Andino RN    As of today's date 1/6/2016 goal is met at 0 - 25%.   Goal Status:  Active          Equal Access to Services     [facility]: Hadii aad ku hadasho Soomaali, waaxda luqadaha, qaybta kaalmada adeegyada, waxay ryannein melisan adenancie salas laangeln . So [facility] [phone].    ATENCIÓN: Si habla español, tiene a borden disposición servicios gratuitos de asistencia lingüística. Llame al [phone].    We comply with applicable federal civil rights laws and [place] laws. We do not discriminate on the basis of race, color, national origin, age, disability, sex, sexual orientation, or gender identity.            Thank you!     Thank you for choosing [facility]  for your care. Our goal is always to provide you with excellent care. Hearing back from our patients is one way we can continue to improve our services. Please take a few minutes to complete the written survey that you may receive in the mail after your visit with us. Thank you!             Your Updated Medication List - Protect others around you: Learn how to safely use, store and throw away your medicines at www.disposemymeds.org.          This list is accurate as of 3/20/18  3:11 PM.  Always use your most recent med list.                   Brand Name Dispense Instructions for use Diagnosis    albuterol 108 (90 BASE) MCG/ACT Inhaler    PROAIR HFA/PROVENTIL HFA/VENTOLIN HFA     Inhale 2 puffs into the lungs as needed        baclofen 20 MG tablet    LIORESAL    360 tablet    Take 1 tablet (20 mg) by mouth 3 times daily    Quadriplegia (H)       budesonide 0.5 MG/2ML neb solution    PULMICORT    1080 mL    USE 2 VIALS TWICE DAILY. MIX WITH HONEY AND SWALLOW    Eosinophilic esophagitis       cetirizine 10 MG tablet    zyrTEC     Take 10  mg by mouth daily        diphenhydrAMINE 25 MG tablet    BENADRYL     Take 25 mg by mouth every 8 hours as needed for itching or allergies Reported on 2/15/2017        docusate sodium 283 MG enema    ENEMEEZ MINI    30 enema    Use one every other day and prn per patients's request.    Chronic constipation       EPINEPHrine 0.3 MG/0.3ML injection 2-pack    EPIPEN/ADRENACLICK/or ANY BX GENERIC EQUIV    0.6 mL    Inject 0.3 mLs (0.3 mg) into the muscle as needed for anaphylaxis    Reaction to food, initial encounter       guaiFENesin 100 MG/5ML Liqd    ROBITUSSIN    560 mL    4-5 TSP twice a day as needed    Cough       hydrocortisone 2.5 % cream    ANUSOL-HC    30 g    Place rectally 2 times daily    Hemorrhoids, unspecified hemorrhoid type       NEW MED     500 mL    Gentamycin 240mg in 500mL 0.9% Normal Saline. Instill 30mL into empty bladder at bedtime. Leave in bladder overnight and drain in the morning    Recurrent UTI       nitroFURantoin (macrocrystal-monohydrate) 100 MG capsule    MACROBID    14 capsule    Take 1 capsule (100 mg) by mouth 2 times daily    Urinary tract infection without hematuria, site unspecified       Omeprazole-Sodium Bicarbonate  MG Pack     90 each    TAKE 1 PACKET BY MOUTH DISSOLVED IN LIQUID DAILY    Eosinophilic esophagitis, Gastroesophageal reflux disease without esophagitis       ondansetron 4 MG ODT tab    ZOFRAN ODT    20 tablet    Take 1-2 tablets (4-8 mg) by mouth every 8 hours as needed for nausea    Abdominal pain, generalized       phenylephrine-cocoa butter 0.25-88.44 % per suppository    PREPARATION H    48 suppository    Insert one suppository rectally twice daily as needed.    External hemorrhoids       polyethylene glycol powder    MIRALAX    510 g    Take 17 g (1 capful) by mouth daily as needed for constipation    Constipation, unspecified constipation type       sterile water (bottle) irrigation     1000 mL    Irrigate with 60 mLs as directed daily     Neurogenic bladder       tolterodine 2 MG tablet    DETROL    180 tablet    Take 1 tablet (2 mg) by mouth 2 times daily    Neurogenic bladder       TYLENOL PO      Take 500 mg by mouth as needed for mild pain or fever Reported on 2/15/2017        zolpidem 10 MG tablet    AMBIEN    30 tablet    Take 0.5-1 tablets (5-10 mg) by mouth nightly as needed for sleep    Primary insomnia

## 2018-03-20 NOTE — PROGRESS NOTES
SUBJECTIVE:   Riley Gifford is a 42 year old male who presents to clinic today for the following health issues:      ED/UC Followup:    Facility:  [facility]   Date of visit: 3/12/2018  Reason for visit: chest pain   Current Status: better      See prior chart note - was seen here with chest pain and found to have an abnormal EKG and a white count. Was sent to the ER and work up showed no acute cardiac muscle damage or injury - his EKG was unchanged and likely just a normal variant. He did have a urine test done showing possible UTI. Culture did grow E. Coli which was resistant to a number of antibiotics. They started him on amoxicillin prior to the culture results. His symptoms have improved. There seems to be cross resistance to cillin meds.    He reports today feeling good. No chest pain. No fevers. No chills. Mild cough.    He does do daily straight catheterization as he is a quadriplegic. He does daily Gentamycin flushes. The Urine culture showed no resistance to Gentamicin.    Patient Active Problem List   Diagnosis     Vitamin D deficiency     Eosinophilic esophagitis     Neurogenic bladder     CARDIOVASCULAR SCREENING; LDL GOAL LESS THAN 160     Quadriplegia (H)     Chronic constipation     Internal hemorrhoids with other complication     Diagnostic skin and sensitization tests(aka ALLERGENS)     Anal or rectal pain     Allergic rhinitis due to animal dander     Allergy to mold spores     House dust mite allergy     Insomnia     Health Care Home     Feeling worried     Gallstones     Chronic midline thoracic back pain     Pulmonary nodules     ACP (advance care planning)      Current Outpatient Prescriptions   Medication     albuterol (PROAIR HFA/PROVENTIL HFA/VENTOLIN HFA) 108 (90 BASE) MCG/ACT Inhaler     nitroFURantoin, macrocrystal-monohydrate, (MACROBID) 100 MG capsule     guaiFENesin (ROBITUSSIN) 100 MG/5ML LIQD     baclofen (LIORESAL) 20 MG tablet     zolpidem (AMBIEN) 10 MG tablet     tolterodine  (DETROL) 2 MG tablet     budesonide (PULMICORT) 0.5 MG/2ML neb solution     NEW MED     ondansetron (ZOFRAN ODT) 4 MG ODT tab     cetirizine (ZYRTEC) 10 MG tablet     EPINEPHrine (EPIPEN/ADRENACLICK/OR ANY BX GENERIC EQUIV) 0.3 MG/0.3ML injection 2-pack     Omeprazole-Sodium Bicarbonate  MG PACK     docusate sodium (ENEMEEZ MINI) 283 MG enema     hydrocortisone (ANUSOL-HC) 2.5 % cream     sterile water, bottle, irrigation     diphenhydrAMINE (BENADRYL) 25 MG tablet     polyethylene glycol (MIRALAX) powder     phenylephrine-cocoa butter (PREPARATION H) 0.25-88.44 % suppository     Acetaminophen (TYLENOL PO)     No current facility-administered medications for this visit.       Social History     Social History     Marital status: Single     Spouse name: N/A     Number of children: N/A     Years of education: N/A     Occupational History     Not on file.     Social History Main Topics     Smoking status: Never Smoker     Smokeless tobacco: Never Used     Alcohol use 0.0 oz/week     0 Standard drinks or equivalent per week      Comment: 1-2 drinks per month     Drug use: No     Sexual activity: Not Currently     Partners: Female     Other Topics Concern     Parent/Sibling W/ Cabg, Mi Or Angioplasty Before 65f 55m? No     Social History Narrative    Assisted living.Aide in the morning, Has call light system    [employer]    [employer]    [employer] weekly to work out    Drives.     Family lives in [place].          September 21, 2017    ENVIRONMENTAL HISTORY: The family lives in a 15 year old apartment [place]. The home is heated with a boiler heat. They do not have central air conditioning. The patient's bedroom is furnished with carpeting in bedroom. No pets inside the house. There is not history of cockroach or mice infestation. There are no smokers in the house.  The house does not have a basement.           Problem list and histories reviewed & adjusted, as  indicated.  Additional history: as documented    Labs reviewed in EPIC    Reviewed and updated as needed this visit by clinical staff  Tobacco  Allergies  Meds       Reviewed and updated as needed this visit by Provider         ROS:  Constitutional, HEENT, cardiovascular, pulmonary, gi and gu systems are negative, except as otherwise noted.    OBJECTIVE:     /72 (BP Location: Right arm, Patient Position: Chair, Cuff Size: Adult Regular)  Pulse 90  Resp 22  SpO2 91%  There is no height or weight on file to calculate BMI.  GENERAL: healthy, alert and no distress  HENT: ear canals and TM's normal, nose and mouth without ulcers or lesions  NECK: no adenopathy, no asymmetry, masses, or scars and thyroid normal to palpation  RESP: lungs clear to auscultation - no rales, rhonchi or wheezes      ASSESSMENT/PLAN:       ICD-10-CM    1. Chest pain, unspecified type R07.9    2. Urinary tract infection without hematuria, site unspecified N39.0 nitroFURantoin, macrocrystal-monohydrate, (MACROBID) 100 MG capsule     Urine Culture Aerobic Bacterial   3. Cough R05 guaiFENesin (ROBITUSSIN) 100 MG/5ML LIQD   4. Quadriplegia (H) G82.50       He's nearly completely resolved. I wonder if the E. Coli finding was a contaminant. He reports resolution, even on the amoxicillin which I would assume the E Coli was resistant to.    I will switch him to Macrobid which didn't have any resistance on this culture just to be certain he has cleared the infection. He can continue his Gentamycin daily flushes as he's been doing per his Urologist.    Gets a cough and chest congestion secondary to his weak abdominal muscles related to his quadriplegia status. Did give a script for a mucus thinner.    Raffy Harris, MPAS, PA-C

## 2018-03-22 ENCOUNTER — TRANSFERRED RECORDS (OUTPATIENT)
Dept: HEALTH INFORMATION MANAGEMENT | Facility: CLINIC | Age: 43
End: 2018-03-22

## 2018-03-26 ENCOUNTER — TELEPHONE (OUTPATIENT)
Dept: FAMILY MEDICINE | Facility: CLINIC | Age: 43
End: 2018-03-26

## 2018-03-26 DIAGNOSIS — K21.9 ESOPHAGEAL REFLUX: Primary | ICD-10-CM

## 2018-03-26 NOTE — TELEPHONE ENCOUNTER
Forms received from: [employer].    Phone number listed: [phone]   Fax listed: [phone]  Date received: 3-26-18  Form description: Current Medications   Once forms are completed, please return to [email or web address]  via fax.  Is patient requesting to be contacted when forms are completed: n/a  Form placed: RN Folder     Piedad Goss

## 2018-03-27 ENCOUNTER — TRANSFERRED RECORDS (OUTPATIENT)
Dept: HEALTH INFORMATION MANAGEMENT | Facility: CLINIC | Age: 43
End: 2018-03-27

## 2018-03-27 RX ORDER — OMEGA-3 FATTY ACIDS/FISH OIL 300-1000MG
400 CAPSULE ORAL EVERY 4 HOURS PRN
Qty: 120 CAPSULE | COMMUNITY
Start: 2018-03-27 | End: 2019-10-15

## 2018-03-27 RX ORDER — MAGNESIUM HYDROXIDE/ALUMINUM HYDROXICE/SIMETHICONE 120; 1200; 1200 MG/30ML; MG/30ML; MG/30ML
30 SUSPENSION ORAL
Refills: 0 | COMMUNITY
Start: 2018-03-27

## 2018-03-27 RX ORDER — NYSTATIN 100000 [USP'U]/G
POWDER TOPICAL DAILY PRN
Qty: 30 G | Refills: 1 | COMMUNITY
Start: 2018-03-27 | End: 2022-12-19

## 2018-03-27 RX ORDER — LIDOCAINE 50 MG/G
OINTMENT TOPICAL PRN
COMMUNITY
Start: 2018-03-27 | End: 2020-02-20

## 2018-03-27 RX ORDER — SIMETHICONE 125 MG
CAPSULE ORAL
Qty: 28 CAPSULE | COMMUNITY
Start: 2018-03-27 | End: 2022-12-19

## 2018-03-27 RX ORDER — LOPERAMIDE HYDROCHLORIDE 2 MG/1
TABLET ORAL
Qty: 30 TABLET | Refills: 0 | COMMUNITY
Start: 2018-03-27 | End: 2021-08-13

## 2018-03-27 RX ORDER — BACITRACIN ZINC 500 [USP'U]/G
OINTMENT TOPICAL 3 TIMES DAILY PRN
COMMUNITY
Start: 2018-03-27 | End: 2020-02-20

## 2018-03-27 RX ORDER — CLOTRIMAZOLE 10 MG/1
1 LOZENGE ORAL
Qty: 70 TROCHE | Refills: 0 | COMMUNITY
Start: 2018-03-27 | End: 2022-12-19

## 2018-04-12 ENCOUNTER — PATIENT OUTREACH (OUTPATIENT)
Dept: CARE COORDINATION | Facility: CLINIC | Age: 43
End: 2018-04-12

## 2018-04-12 ASSESSMENT — ACTIVITIES OF DAILY LIVING (ADL): DEPENDENT_IADLS:: CLEANING;COOKING;LAUNDRY;SHOPPING;MEAL PREPARATION;MEDICATION MANAGEMENT

## 2018-04-12 NOTE — PROGRESS NOTES
Clinic Care Coordination Contact    Situation: Patient chart reviewed by care coordinator.    Background: The patient was recently seen in the clinic and then in the ED for chest pain.    ACP (advance care planning)   Pulmonary nodules   Chronic midline thoracic back pain   Gallstones   Feeling worried   Insomnia   Allergic rhinitis due to animal dander   Allergy to mold spores   House dust mite allergy   Anal or rectal pain   Diagnostic skin and sensitization tests(aka ALLERGENS)   Internal hemorrhoids with other complication   Quadriplegia (H)   Chronic constipation   CARDIOVASCULAR SCREENING; LDL GOAL LESS THAN 160   Neurogenic bladder   Vitamin D deficiency   Eosinophilic esophagitis   Health Care Home     Assessment: The patient was seen in the clinic for chest pain and was referred on to the ED for evaluation of an abnormal finding on the ECG.  The changes were stable and not a concern.  The patient was given a neb treatment which resolved the chest pain.  A UTI was found and treated.  The patient did come in to the PCP for a follow up appointment.  The patient also had a follow up visit from his  from [employer] ensuring that he has all of the assistance that he needs.  The patient is still attending pain clinic at [facility].  He is also going to have another power wheelchair fitting to ensure that he is not feeling pain from improper fitting.  The Primary Care Coordination RN will make a follow up call to the patient again in 4 weeks.    Plan/Recommendations: 1).  The patient will take all medications as prescribed by the provider.  2).  The patient will make and attend a follow up appointment with the pain clinic at [facility].  3).  The Primary Care Coordination RN will make a follow up call to the patient again in 4 weeks.  4).  Care Coordination to remain available for the patient to contact in the event of future needs.      Robbin Vanegas, MSN, RN, PHN I Clinic Care Coordinator  [place]  [facility]  [facility]  Phone: [phone]  [email or web address]

## 2018-04-23 ENCOUNTER — TELEPHONE (OUTPATIENT)
Dept: CARE COORDINATION | Facility: CLINIC | Age: 43
End: 2018-04-23

## 2018-04-23 DIAGNOSIS — G82.50 QUADRIPLEGIA (H): ICD-10-CM

## 2018-04-23 RX ORDER — BACLOFEN 20 MG/1
20 TABLET ORAL 4 TIMES DAILY
Qty: 360 TABLET | Refills: 3 | Status: SHIPPED | OUTPATIENT
Start: 2018-04-23 | End: 2019-05-09

## 2018-04-23 NOTE — TELEPHONE ENCOUNTER
The patient called stating that the Baclofen 20 mg tablet should be 4 times per day.  The last refill has only 3 times per day and therefore the patient is running very low on supply.  Pharmacy is indicated.  Please make the changes on the prescription.  The patient requests a call when this has been completed.      Robbin Vanegas, MSN, RN, PHN I Clinic Care Coordinator  [facility]  Phone: [phone]  [email or web address]

## 2018-05-01 ENCOUNTER — TRANSFERRED RECORDS (OUTPATIENT)
Dept: HEALTH INFORMATION MANAGEMENT | Facility: CLINIC | Age: 43
End: 2018-05-01

## 2018-05-01 ENCOUNTER — TELEPHONE (OUTPATIENT)
Dept: UROLOGY | Facility: CLINIC | Age: 43
End: 2018-05-01

## 2018-05-01 NOTE — TELEPHONE ENCOUNTER
Genta/sodiu irrigation 480 mg/l instill 30 ml into empty bladder at night and hold to morning and remove  1000 quantity monthly with 5 refills to [facility] pharmacy [phone] Blanka White LPN Staff Nurse

## 2018-05-03 ENCOUNTER — OFFICE VISIT (OUTPATIENT)
Dept: FAMILY MEDICINE | Facility: CLINIC | Age: 43
End: 2018-05-03
Payer: MEDICARE

## 2018-05-03 VITALS — DIASTOLIC BLOOD PRESSURE: 66 MMHG | SYSTOLIC BLOOD PRESSURE: 108 MMHG | TEMPERATURE: 97.7 F | HEART RATE: 84 BPM

## 2018-05-03 DIAGNOSIS — R10.84 ABDOMINAL PAIN, GENERALIZED: ICD-10-CM

## 2018-05-03 LAB
ALBUMIN UR-MCNC: NEGATIVE MG/DL
AMORPH CRY #/AREA URNS HPF: ABNORMAL /HPF
APPEARANCE UR: CLEAR
BACTERIA #/AREA URNS HPF: ABNORMAL /HPF
BILIRUB UR QL STRIP: NEGATIVE
COLOR UR AUTO: YELLOW
GLUCOSE UR STRIP-MCNC: NEGATIVE MG/DL
HGB UR QL STRIP: NEGATIVE
KETONES UR STRIP-MCNC: NEGATIVE MG/DL
LEUKOCYTE ESTERASE UR QL STRIP: ABNORMAL
NITRATE UR QL: NEGATIVE
PH UR STRIP: 7 PH (ref 5–7)
RBC #/AREA URNS AUTO: ABNORMAL /HPF
SOURCE: ABNORMAL
SP GR UR STRIP: 1.02 (ref 1–1.03)
UROBILINOGEN UR STRIP-ACNC: 0.2 EU/DL (ref 0.2–1)
WBC #/AREA URNS AUTO: ABNORMAL /HPF

## 2018-05-03 PROCEDURE — 87086 URINE CULTURE/COLONY COUNT: CPT | Performed by: FAMILY MEDICINE

## 2018-05-03 PROCEDURE — 81001 URINALYSIS AUTO W/SCOPE: CPT | Performed by: FAMILY MEDICINE

## 2018-05-03 PROCEDURE — 99214 OFFICE O/P EST MOD 30 MIN: CPT | Performed by: FAMILY MEDICINE

## 2018-05-03 RX ORDER — CEFDINIR 250 MG/5ML
500 POWDER, FOR SUSPENSION ORAL DAILY
Qty: 100 ML | Refills: 0 | Status: SHIPPED | OUTPATIENT
Start: 2018-05-03 | End: 2018-05-10

## 2018-05-03 NOTE — PROGRESS NOTES
SUBJECTIVE:   Riley Gifford is a 42 year old male who presents to clinic today for the following health issues:      ABDOMINAL PAIN     Onset: 4 days    Description:   Character: Dull ache  Location: elissa-umbilical region  Radiation: Back    Intensity: moderate    Progression of Symptoms:  intermittent    Accompanying Signs & Symptoms:  Fever/Chills?: no   Gas/Bloating: no   Nausea: YES  Vomitting: no   Diarrhea?: no   Constipation:no   Dysuria or Hematuria: no    History:   Trauma: no   Previous similar pain: YES- about a month ago, lasted for a day or so    Previous tests done: in the past    Precipitating factors:   Does the pain change with:     Food: no      BM: no     Urination: no     Alleviating factors:  none    Therapies Tried and outcome: Tylenol    LMP:  not applicable     Current symptoms started about 4-5 days ago and her perceived as a dull ache in the abdomen.  He has not had any vomiting.  Stool pattern is unchanged from his usual maintenance regimen with bowel movements every 1-2 days.  There has been no blood in the stool.  He has had CT imaging of his abdomen in June and November 2017 that did not show any acute cause for the symptoms and were done for reasons similar to his current symptoms.  In the past the symptoms have been attributed to urinary tract infections.  Laboratory studies including recent urinalyses and urine cultures were reviewed.  He thinks he might actually be improving over the last day or so.    He does have some chronic pain related to the quadriplegia and asked about certification for medical cannabis.  Are you getting a lot of patients from [place]        Problem list and histories reviewed & adjusted, as indicated.  Additional history: as documented    Patient Active Problem List   Diagnosis     Vitamin D deficiency     Eosinophilic esophagitis     Neurogenic bladder     CARDIOVASCULAR SCREENING; LDL GOAL LESS THAN 160     Quadriplegia (H)     Chronic constipation      Internal hemorrhoids with other complication     Diagnostic skin and sensitization tests(aka ALLERGENS)     Anal or rectal pain     Allergic rhinitis due to animal dander     Allergy to mold spores     House dust mite allergy     Insomnia     Health Care Home     Feeling worried     Gallstones     Chronic midline thoracic back pain     Pulmonary nodules     ACP (advance care planning)     Past Surgical History:   Procedure Laterality Date     BACK SURGERY       C NONSPECIFIC PROCEDURE      C5-6 Fusion     C NONSPECIFIC PROCEDURE      Pressure Ulcer     COLONOSCOPY       CYSTOSCOPY N/A 6/23/2017    Procedure: CYSTOSCOPY;  Cystoscopy and Botox Injection Into the Bladder  ;  Surgeon: Evaristo Hayes MD;  [facility]     CYSTOSCOPY, INTRAVESICAL INJECTION N/A 5/12/2016    Procedure: CYSTOSCOPY, INTRAVESICAL INJECTION;  Surgeon: Evaristo Hayes MD;  Location: [facility]     CYSTOSCOPY, INTRAVESICAL INJECTION N/A 11/11/2016    Procedure: CYSTOSCOPY, INTRAVESICAL INJECTION;  Surgeon: Evaristo Hayes MD;  [facility]     CYSTOSCOPY, INTRAVESICAL INJECTION N/A 1/4/2018    Procedure: CYSTOSCOPY, INTRAVESICAL INJECTION;  Cystoscopy Botox Injection Into The Bladder;  Surgeon: Evaristo Hayes MD;  Location: [facility] OR     GI SURGERY      endoscopy x2     INJECT BOTOX N/A 6/23/2017    Procedure: INJECT BOTOX;;  Surgeon: Evaristo Hayes MD;  Location: [facility]       Social History   Substance Use Topics     Smoking status: Never Smoker     Smokeless tobacco: Never Used     Alcohol use 0.0 oz/week     0 Standard drinks or equivalent per week      Comment: 1-2 drinks per month     Family History   Problem Relation Age of Onset     Breast Cancer Mother      Prostate Cancer Father      Breast Cancer Maternal Grandmother      CANCER Maternal Grandfather      Glaucoma No family hx of      Macular Degeneration No family hx of      DIABETES No family hx of      Hypertension No family hx of          Current  Outpatient Prescriptions   Medication Sig Dispense Refill     Acetaminophen (TYLENOL PO) Take 500 mg by mouth as needed for mild pain or fever Reported on 2/15/2017       albuterol (PROAIR HFA/PROVENTIL HFA/VENTOLIN HFA) 108 (90 BASE) MCG/ACT Inhaler Inhale 2 puffs into the lungs as needed       alum & mag hydroxide-simethicone (MYLANTA/MAALOX) 200-200-20 MG/5ML SUSP suspension Take 30 mLs by mouth  0     bacitracin ([employer] BACITRACIN ZINC) ointment Apply topically 3 times daily as needed for wound care       baclofen (LIORESAL) 20 MG tablet Take 1 tablet (20 mg) by mouth 4 times daily 360 tablet 3     budesonide (PULMICORT) 0.5 MG/2ML neb solution USE 2 VIALS TWICE DAILY. MIX WITH HONEY AND SWALLOW 1080 mL 0     cefdinir (OMNICEF) 250 MG/5ML suspension Take 10 mLs (500 mg) by mouth daily for 10 days 100 mL 0     cetirizine (ZYRTEC) 10 MG tablet Take 10 mg by mouth daily       clotrimazole 10 MG maya Place 1 Maya (10 mg) inside cheek 5 times daily 70 Maya 0     diphenhydrAMINE (BENADRYL) 25 MG tablet Take 25 mg by mouth every 8 hours as needed for itching or allergies Reported on 2/15/2017       docusate sodium (ENEMEEZ MINI) 283 MG enema Use one every other day and prn per patients's request. 30 enema 3     EPINEPHrine (EPIPEN/ADRENACLICK/OR ANY BX GENERIC EQUIV) 0.3 MG/0.3ML injection 2-pack Inject 0.3 mLs (0.3 mg) into the muscle as needed for anaphylaxis 0.6 mL 3     guaiFENesin (ROBITUSSIN) 100 MG/5ML LIQD 4-5 TSP twice a day as needed 560 mL 1     hydrocortisone (ANUSOL-HC) 2.5 % cream Place rectally 2 times daily 30 g 3     hydrocortisone (ANUSOL-HC) 2.5 % cream Place rectally 2 times daily       lidocaine (XYLOCAINE) 5 % ointment Apply topically as needed for moderate pain       loperamide (IMODIUM A-D) 2 MG tablet Take 2 tabs (4 mg) after first loose stool, and then take one tab (2 mg) after each diarrheal stool.  Max of 8 tabs (16 mg) per day. 30 tablet 0     magnesium hydroxide (MILK OF MAGNESIA) 400  MG/5ML suspension Take 30 mLs by mouth daily as needed for constipation or heartburn 360 mL 1     NEW MED Gentamycin 240mg in 500mL 0.9% Normal Saline.  Instill 30mL into empty bladder at bedtime.  Leave in bladder overnight and drain in the morning 500 mL 3     nystatin (MYCOSTATIN) 772594 UNIT/GM POWD Apply topically daily as needed 30 g 1     Omeprazole-Sodium Bicarbonate  MG PACK TAKE 1 PACKET BY MOUTH DISSOLVED IN LIQUID DAILY 90 each 0     ondansetron (ZOFRAN ODT) 4 MG ODT tab Take 1-2 tablets (4-8 mg) by mouth every 8 hours as needed for nausea 20 tablet 1     phenylephrine-cocoa butter (PREPARATION H) 0.25-88.44 % suppository Insert one suppository rectally twice daily as needed. 48 suppository 3     polyethylene glycol (MIRALAX) powder Take 17 g (1 capful) by mouth daily as needed for constipation 510 g 1     Simethicone 125 MG CAPS  28 capsule      sodium phosphate (FLEET ENEMA) 7-19 GM/118ML rectal enema Place 1 Bottle (1 enema) rectally daily as needed for constipation 1 Bottle 0     sterile water, bottle, irrigation Irrigate with 60 mLs as directed daily 1000 mL 0     tolterodine (DETROL) 2 MG tablet Take 1 tablet (2 mg) by mouth 2 times daily 180 tablet 3     zinc oxide 10 % OINT Externally apply topically 3 times daily       zolpidem (AMBIEN) 10 MG tablet Take 0.5-1 tablets (5-10 mg) by mouth nightly as needed for sleep 30 tablet 5     ibuprofen 200 MG capsule Take 400 mg by mouth every 4 hours as needed for fever 120 capsule      Allergies   Allergen Reactions     Banana Hives     Other reaction(s): GI Upset     Egg/Pro [Chicken-Derived Products (Egg)]      Fish      Soybean Oil      Other reaction(s): *Unknown  Discovered on allergy testing. Has never had any reaction to his knowledge     Wheat        Reviewed and updated as needed this visit by clinical staff  Tobacco  Allergies  Meds  Problems  Med Hx  Surg Hx  Fam Hx  Soc Hx        Reviewed and updated as needed this visit by  Provider  Allergies  Meds  Problems         ROS:  Constitutional, HEENT, cardiovascular, pulmonary, gi and gu systems are negative, except as otherwise noted.    OBJECTIVE:     /66  Pulse 84  Temp 97.7  F (36.5  C) (Oral)  There is no height or weight on file to calculate BMI.  GENERAL: healthy, alert and no distress  EYES: Eyes grossly normal to inspection, PERRL and conjunctivae and sclerae normal  NECK: no adenopathy, no asymmetry, masses, or scars and thyroid normal to palpation  RESP: lungs clear to auscultation - no rales, rhonchi or wheezes  CV: regular rate and rhythm, normal S1 S2, no S3 or S4, no murmur, click or rub, no peripheral edema and peripheral pulses strong  ABDOMEN: soft, nontender, no hepatosplenomegaly, no masses and bowel sounds normal  MS: no gross musculoskeletal defects noted, no edema  PSYCH: mentation appears normal, affect normal/bright    Diagnostic Test Results:  Results for orders placed or performed in visit on 05/03/18   UA with Microscopic reflex to Culture   Result Value Ref Range    Color Urine Yellow     Appearance Urine Clear     Glucose Urine Negative NEG^Negative mg/dL    Bilirubin Urine Negative NEG^Negative    Ketones Urine Negative NEG^Negative mg/dL    Specific Gravity Urine 1.020 1.003 - 1.035    pH Urine 7.0 5.0 - 7.0 pH    Protein Albumin Urine Negative NEG^Negative mg/dL    Urobilinogen Urine 0.2 0.2 - 1.0 EU/dL    Nitrite Urine Negative NEG^Negative    Blood Urine Negative NEG^Negative    Leukocyte Esterase Urine Trace (A) NEG^Negative    Source Catheterized Urine     WBC Urine 0 - 5 OTO5^0 - 5 /HPF    RBC Urine O - 2 OTO2^O - 2 /HPF    Bacteria Urine Few (A) NEG^Negative /HPF    Amorphous Crystals Moderate (A) NEG^Negative /HPF   Urine Culture Aerobic Bacterial   Result Value Ref Range    Specimen Description Catheterized Urine     Culture Micro       <10,000 colonies/mL  urogenital elsa  Susceptibility testing not routinely done         ASSESSMENT/PLAN:              1. Abdominal pain, generalized  The cause for the patient's abdominal pain is not entirely clear.  On examination he has no evidence of an acute abdomen and I did not feel that repeat imaging was warranted at this time.  He does interestingly have a history of gallstones but reports minimal symptoms related to those and the current symptomatology is not aggravated with eating.  I did issue a contingent prescription for Omnicef based on sensitivities available from previous urine cultures.  Patient wants to wait and see what the urine analysis and urine culture show before starting the antibiotic and since he looks well at this time that seems reasonable.  Of note he does use gentamicin bladder washes once daily.  - UA with Microscopic reflex to Culture  - Urine Culture Aerobic Bacterial  - cefdinir (OMNICEF) 250 MG/5ML suspension; Take 10 mLs (500 mg) by mouth daily for 10 days  Dispense: 100 mL; Refill: 0    FUTURE APPOINTMENTS:       - Follow-up visit in one week pradrian Ortega MD  [facility]

## 2018-05-03 NOTE — MR AVS SNAPSHOT
After Visit Summary   5/3/2018    Riley Gifford    MRN: [id number]           Patient Information     Date Of Birth          1975        Visit Information        Provider Department      5/3/2018 2:40 PM Rober Ortega MD [facility]        Today's Diagnoses     Abdominal pain, generalized           Follow-ups after your visit        Your next 10 appointments already scheduled     May 10, 2018  5:20 PM CDT   Office Visit with Rober Ortega MD   [facility]    [address] [place] [id number] [phone]           Bring a current list of meds and any records pertaining to this visit. For Physicals, please bring immunization records and any forms needing to be filled out. Please arrive 10 minutes early to complete paperwork.              Who to contact     If you have questions or need follow up information about today's clinic visit or your schedule please contact [facility] directly at [phone].  Normal or non-critical lab and imaging results will be communicated to you by [employer], letter or phone within 4 business days after the clinic has received the results. If you do not hear from us within 7 days, please contact the clinic through [employer] or phone. If you have a critical or abnormal lab result, we will notify you by phone as soon as possible.  Submit refill requests through [facility] or call your pharmacy and they will forward the refill request to us. Please allow 3 business days for your refill to be completed.          Additional Information About Your Visit        MyChart Information     [facility] gives you secure access to your electronic health record. If you see a primary care provider, you can also send messages to your care team and make appointments. If you have questions, please call your primary care clinic.  If you do not have a primary care provider,  please call [phone] and they will assist you.        Care EveryWhere ID     This is your Care EveryWhere ID. This could be used by other organizations to access your [facility] medical records  [id number]        Your Vitals Were     Pulse Temperature                84 97.7  F (36.5  C) (Oral)           Blood Pressure from Last 3 Encounters:   05/03/18 108/66   03/20/18 122/72   03/12/18 (!) 82/60    Weight from Last 3 Encounters:   02/12/18 130 lb (59 kg)   01/04/18 143 lb 11.2 oz (65.2 kg)   01/02/18 (!) 507 lb (230 kg)              We Performed the Following     UA with Microscopic reflex to Culture     Urine Culture Aerobic Bacterial          Today's Medication Changes          These changes are accurate as of 5/3/18 11:59 PM.  If you have any questions, ask your nurse or doctor.               Start taking these medicines.        Dose/Directions    cefdinir 250 MG/5ML suspension   Commonly known as:  OMNICEF   Used for:  Abdominal pain, generalized   Started by:  Rober Ortega MD        Dose:  500 mg   Take 10 mLs (500 mg) by mouth daily for 10 days   Quantity:  100 mL   Refills:  0         Stop taking these medicines if you haven't already. Please contact your care team if you have questions.     nitroFURantoin (macrocrystal-monohydrate) 100 MG capsule   Commonly known as:  MACROBID   Stopped by:  Rober Ortega MD                Where to get your medicines      Some of these will need a paper prescription and others can be bought over the counter.  Ask your nurse if you have questions.     Bring a paper prescription for each of these medications     cefdinir 250 MG/5ML suspension                Primary Care Provider Office Phone # Fax #    Raffy Harris PA-C [phone] [address] [facility] [address]        Goals        General    I will use the medications Tylenol or Tramadol for pain every 4-6 hours as needed.  Evidenced by the patient  using  the medications to control worsening pain as verbalized by the patient. (pt-stated)     Notes - Note created  3/22/2016  2:39 PM by Robbin Andino RN    As of today's date 3/22/2016 goal is met at 0 - 25%.   Goal Status:  Active        I will work with the [facility] nurse to get the best results for wound care as evidenced by decrease in size and verbalized pain in the area of the wound on the buttock. (pt-stated)     Notes - Note created  1/6/2016  1:43 PM by Robbin Andino RN    As of today's date 1/6/2016 goal is met at 0 - 25%.   Goal Status:  Active          Equal Access to Services     [facility]: Hadii alan Vides, waaxda luqadaha, qaybta kaalmada jarekyada, monae cervantes . So [facility] [phone].    ATENCIÓN: Si habla español, tiene a borden disposición servicios gratuitos de asistencia lingüística. Llame al [phone].    We comply with applicable federal civil rights laws and [place] laws. We do not discriminate on the basis of race, color, national origin, age, disability, sex, sexual orientation, or gender identity.            Thank you!     Thank you for choosing [facility]  for your care. Our goal is always to provide you with excellent care. Hearing back from our patients is one way we can continue to improve our services. Please take a few minutes to complete the written survey that you may receive in the mail after your visit with us. Thank you!             Your Updated Medication List - Protect others around you: Learn how to safely use, store and throw away your medicines at www.disposemymeds.org.          This list is accurate as of 5/3/18 11:59 PM.  Always use your most recent med list.                   Brand Name Dispense Instructions for use Diagnosis    albuterol 108 (90 Base) MCG/ACT Inhaler    PROAIR HFA/PROVENTIL HFA/VENTOLIN HFA     Inhale 2 puffs into the lungs as needed        alum & mag hydroxide-simethicone 200-200-20 MG/5ML Susp  suspension    MYLANTA/MAALOX     Take 30 mLs by mouth    Esophageal reflux       baclofen 20 MG tablet    LIORESAL    360 tablet    Take 1 tablet (20 mg) by mouth 4 times daily    Quadriplegia (H)       budesonide 0.5 MG/2ML neb solution    PULMICORT    1080 mL    USE 2 VIALS TWICE DAILY. MIX WITH HONEY AND SWALLOW    Eosinophilic esophagitis       cefdinir 250 MG/5ML suspension    OMNICEF    100 mL    Take 10 mLs (500 mg) by mouth daily for 10 days    Abdominal pain, generalized       cetirizine 10 MG tablet    zyrTEC     Take 10 mg by mouth daily        clotrimazole 10 MG maya     70 Maya    Place 1 Maya (10 mg) inside cheek 5 times daily        CVS BACITRACIN ZINC ointment   Generic drug:  bacitracin      Apply topically 3 times daily as needed for wound care        diphenhydrAMINE 25 MG tablet    BENADRYL     Take 25 mg by mouth every 8 hours as needed for itching or allergies Reported on 2/15/2017        docusate sodium 283 MG enema    ENEMEEZ MINI    30 enema    Use one every other day and prn per patients's request.    Chronic constipation       EPINEPHrine 0.3 MG/0.3ML injection 2-pack    EPIPEN/ADRENACLICK/or ANY BX GENERIC EQUIV    0.6 mL    Inject 0.3 mLs (0.3 mg) into the muscle as needed for anaphylaxis    Reaction to food, initial encounter       guaiFENesin 100 MG/5ML Liqd    ROBITUSSIN    560 mL    4-5 TSP twice a day as needed    Cough       * hydrocortisone 2.5 % cream    ANUSOL-HC    30 g    Place rectally 2 times daily    Hemorrhoids, unspecified hemorrhoid type       * hydrocortisone 2.5 % cream    ANUSOL-HC     Place rectally 2 times daily        ibuprofen 200 MG capsule     120 capsule    Take 400 mg by mouth every 4 hours as needed for fever        lidocaine 5 % ointment    XYLOCAINE     Apply topically as needed for moderate pain        loperamide 2 MG tablet    IMODIUM A-D    30 tablet    Take 2 tabs (4 mg) after first loose stool, and then take one tab (2 mg) after each diarrheal  stool.  Max of 8 tabs (16 mg) per day.        MILK OF MAGNESIA 400 MG/5ML suspension   Generic drug:  magnesium hydroxide     360 mL    Take 30 mLs by mouth daily as needed for constipation or heartburn        NEW MED     500 mL    Gentamycin 240mg in 500mL 0.9% Normal Saline. Instill 30mL into empty bladder at bedtime. Leave in bladder overnight and drain in the morning    Recurrent UTI       nystatin 980804 UNIT/GM Powd    MYCOSTATIN    30 g    Apply topically daily as needed        Omeprazole-Sodium Bicarbonate  MG Pack     90 each    TAKE 1 PACKET BY MOUTH DISSOLVED IN LIQUID DAILY    Eosinophilic esophagitis, Gastroesophageal reflux disease without esophagitis       ondansetron 4 MG ODT tab    ZOFRAN ODT    20 tablet    Take 1-2 tablets (4-8 mg) by mouth every 8 hours as needed for nausea    Abdominal pain, generalized       phenylephrine-cocoa butter 0.25-88.44 % per suppository    PREPARATION H    48 suppository    Insert one suppository rectally twice daily as needed.    External hemorrhoids       polyethylene glycol powder    MIRALAX    510 g    Take 17 g (1 capful) by mouth daily as needed for constipation    Constipation, unspecified constipation type       Simethicone 125 MG Caps     28 capsule         sodium phosphate 7-19 GM/118ML rectal enema     1 Bottle    Place 1 Bottle (1 enema) rectally daily as needed for constipation        sterile water (bottle) irrigation     1000 mL    Irrigate with 60 mLs as directed daily    Neurogenic bladder       tolterodine 2 MG tablet    DETROL    180 tablet    Take 1 tablet (2 mg) by mouth 2 times daily    Neurogenic bladder       TYLENOL PO      Take 500 mg by mouth as needed for mild pain or fever Reported on 2/15/2017        zinc oxide 10 % Oint      Externally apply topically 3 times daily        zolpidem 10 MG tablet    AMBIEN    30 tablet    Take 0.5-1 tablets (5-10 mg) by mouth nightly as needed for sleep    Primary insomnia       * Notice:  This list  has 2 medication(s) that are the same as other medications prescribed for you. Read the directions carefully, and ask your doctor or other care provider to review them with you.

## 2018-05-04 LAB
BACTERIA SPEC CULT: NORMAL
SPECIMEN SOURCE: NORMAL

## 2018-05-08 ENCOUNTER — TELEPHONE (OUTPATIENT)
Dept: FAMILY MEDICINE | Facility: CLINIC | Age: 43
End: 2018-05-08

## 2018-05-08 NOTE — TELEPHONE ENCOUNTER
Forms received from: [employer]    Phone number listed: [phone]   Fax listed: [phone]  Date received: 5-8-18  Form description: Individualized Medication Service Plan   Once forms are completed, please return to [employer]  via fax.  Is patient requesting to be contacted when forms are completed: n/a  Form placed: Provider folder      Piedad Goss

## 2018-05-10 ENCOUNTER — OFFICE VISIT (OUTPATIENT)
Dept: FAMILY MEDICINE | Facility: CLINIC | Age: 43
End: 2018-05-10
Payer: MEDICARE

## 2018-05-10 VITALS — DIASTOLIC BLOOD PRESSURE: 61 MMHG | HEART RATE: 84 BPM | TEMPERATURE: 98 F | SYSTOLIC BLOOD PRESSURE: 87 MMHG

## 2018-05-10 DIAGNOSIS — M54.6 CHRONIC MIDLINE THORACIC BACK PAIN: Primary | ICD-10-CM

## 2018-05-10 DIAGNOSIS — G89.29 CHRONIC MIDLINE THORACIC BACK PAIN: Primary | ICD-10-CM

## 2018-05-10 DIAGNOSIS — M41.9 SCOLIOSIS OF THORACIC SPINE, UNSPECIFIED SCOLIOSIS TYPE: ICD-10-CM

## 2018-05-10 DIAGNOSIS — G82.50 QUADRIPLEGIA (H): ICD-10-CM

## 2018-05-10 PROCEDURE — 99213 OFFICE O/P EST LOW 20 MIN: CPT | Performed by: FAMILY MEDICINE

## 2018-05-10 NOTE — MR AVS SNAPSHOT
After Visit Summary   5/10/2018    Riley Gifford    MRN: [id number]           Patient Information     Date Of Birth          1975        Visit Information        Provider Department      5/10/2018 5:20 PM Rober Ortega MD [facility]        Today's Diagnoses     Chronic midline thoracic back pain    -  1    Quadriplegia (H)        Scoliosis of thoracic spine, unspecified scoliosis type           Follow-ups after your visit        Who to contact     If you have questions or need follow up information about today's clinic visit or your schedule please contact [facility] directly at [phone].  Normal or non-critical lab and imaging results will be communicated to you by MyChart, letter or phone within 4 business days after the clinic has received the results. If you do not hear from us within 7 days, please contact the clinic through TrackerSpherehart or phone. If you have a critical or abnormal lab result, we will notify you by phone as soon as possible.  Submit refill requests through Dctio or call your pharmacy and they will forward the refill request to us. Please allow 3 business days for your refill to be completed.          Additional Information About Your Visit        MyChart Information     Dctio gives you secure access to your electronic health record. If you see a primary care provider, you can also send messages to your care team and make appointments. If you have questions, please call your primary care clinic.  If you do not have a primary care provider, please call [phone] and they will assist you.        Care EveryWhere ID     This is your Care EveryWhere ID. This could be used by other organizations to access your [facility] medical records  [id number]        Your Vitals Were     Pulse Temperature                84 98  F (36.7  C) (Oral)           Blood Pressure from Last 3 Encounters:   05/10/18 (!) 87/61   05/03/18 108/66    03/20/18 122/72    Weight from Last 3 Encounters:   02/12/18 130 lb (59 kg)   01/04/18 143 lb 11.2 oz (65.2 kg)   01/02/18 (!) 507 lb (230 kg)              Today, you had the following     No orders found for display         Today's Medication Changes          These changes are accurate as of 5/10/18 11:59 PM.  If you have any questions, ask your nurse or doctor.               Stop taking these medicines if you haven't already. Please contact your care team if you have questions.     cefdinir 250 MG/5ML suspension   Commonly known as:  OMNICEF   Stopped by:  Rober Ortega MD                    Primary Care Provider Office Phone # Fax #    Raffy Harris PA-C [phone]       [facility] [facility] [address]        Goals        General    I will use the medications Tylenol or Tramadol for pain every 4-6 hours as needed.  Evidenced by the patient  using the medications to control worsening pain as verbalized by the patient. (pt-stated)     Notes - Note created  3/22/2016  2:39 PM by Robbin Andino RN    As of today's date 3/22/2016 goal is met at 0 - 25%.   Goal Status:  Active        I will work with the [facility] nurse to get the best results for wound care as evidenced by decrease in size and verbalized pain in the area of the wound on the buttock. (pt-stated)     Notes - Note created  1/6/2016  1:43 PM by Robbin Andino RN    As of today's date 1/6/2016 goal is met at 0 - 25%.   Goal Status:  Active          Equal Access to Services     [facility]: Hadii alan Vides, waaxda luqadaha, qaybta kaalmonae odonnell . So [facility] [phone].    ATENCIÓN: Si habla español, tiene a borden disposición servicios gratuitos de asistencia lingüística. Llame al [phone].    We comply with applicable federal civil rights laws and [place] laws. We do not discriminate on the basis of race, color, national origin, age, disability, sex,  sexual orientation, or gender identity.            Thank you!     Thank you for choosing [facility]  for your care. Our goal is always to provide you with excellent care. Hearing back from our patients is one way we can continue to improve our services. Please take a few minutes to complete the written survey that you may receive in the mail after your visit with us. Thank you!             Your Updated Medication List - Protect others around you: Learn how to safely use, store and throw away your medicines at www.disposemymeds.org.          This list is accurate as of 5/10/18 11:59 PM.  Always use your most recent med list.                   Brand Name Dispense Instructions for use Diagnosis    albuterol 108 (90 Base) MCG/ACT Inhaler    PROAIR HFA/PROVENTIL HFA/VENTOLIN HFA     Inhale 2 puffs into the lungs as needed        alum & mag hydroxide-simethicone 200-200-20 MG/5ML Susp suspension    MYLANTA/MAALOX     Take 30 mLs by mouth    Esophageal reflux       baclofen 20 MG tablet    LIORESAL    360 tablet    Take 1 tablet (20 mg) by mouth 4 times daily    Quadriplegia (H)       budesonide 0.5 MG/2ML neb solution    PULMICORT    1080 mL    USE 2 VIALS TWICE DAILY. MIX WITH HONEY AND SWALLOW    Eosinophilic esophagitis       cetirizine 10 MG tablet    zyrTEC     Take 10 mg by mouth daily        clotrimazole 10 MG maya     70 Maya    Place 1 Maya (10 mg) inside cheek 5 times daily        [employer] BACITRACIN ZINC ointment   Generic drug:  bacitracin      Apply topically 3 times daily as needed for wound care        diphenhydrAMINE 25 MG tablet    BENADRYL     Take 25 mg by mouth every 8 hours as needed for itching or allergies Reported on 2/15/2017        docusate sodium 283 MG enema    ENEMEEZ MINI    30 enema    Use one every other day and prn per patients's request.    Chronic constipation       EPINEPHrine 0.3 MG/0.3ML injection 2-pack    EPIPEN/ADRENACLICK/or ANY BX GENERIC EQUIV    0.6 mL     Inject 0.3 mLs (0.3 mg) into the muscle as needed for anaphylaxis    Reaction to food, initial encounter       guaiFENesin 100 MG/5ML Liqd    ROBITUSSIN    560 mL    4-5 TSP twice a day as needed    Cough       * hydrocortisone 2.5 % cream    ANUSOL-HC    30 g    Place rectally 2 times daily    Hemorrhoids, unspecified hemorrhoid type       * hydrocortisone 2.5 % cream    ANUSOL-HC     Place rectally 2 times daily        ibuprofen 200 MG capsule     120 capsule    Take 400 mg by mouth every 4 hours as needed for fever        lidocaine 5 % ointment    XYLOCAINE     Apply topically as needed for moderate pain        loperamide 2 MG tablet    IMODIUM A-D    30 tablet    Take 2 tabs (4 mg) after first loose stool, and then take one tab (2 mg) after each diarrheal stool.  Max of 8 tabs (16 mg) per day.        MILK OF MAGNESIA 400 MG/5ML suspension   Generic drug:  magnesium hydroxide     360 mL    Take 30 mLs by mouth daily as needed for constipation or heartburn        NEW MED     500 mL    Gentamycin 240mg in 500mL 0.9% Normal Saline. Instill 30mL into empty bladder at bedtime. Leave in bladder overnight and drain in the morning    Recurrent UTI       nystatin 839487 UNIT/GM Powd    MYCOSTATIN    30 g    Apply topically daily as needed        Omeprazole-Sodium Bicarbonate  MG Pack     90 each    TAKE 1 PACKET BY MOUTH DISSOLVED IN LIQUID DAILY    Eosinophilic esophagitis, Gastroesophageal reflux disease without esophagitis       ondansetron 4 MG ODT tab    ZOFRAN ODT    20 tablet    Take 1-2 tablets (4-8 mg) by mouth every 8 hours as needed for nausea    Abdominal pain, generalized       phenylephrine-cocoa butter 0.25-88.44 % per suppository    PREPARATION H    48 suppository    Insert one suppository rectally twice daily as needed.    External hemorrhoids       polyethylene glycol powder    MIRALAX    510 g    Take 17 g (1 capful) by mouth daily as needed for constipation    Constipation, unspecified  constipation type       Simethicone 125 MG Caps     28 capsule         sodium phosphate 7-19 GM/118ML rectal enema     1 Bottle    Place 1 Bottle (1 enema) rectally daily as needed for constipation        sterile water (bottle) irrigation     1000 mL    Irrigate with 60 mLs as directed daily    Neurogenic bladder       tolterodine 2 MG tablet    DETROL    180 tablet    Take 1 tablet (2 mg) by mouth 2 times daily    Neurogenic bladder       TYLENOL PO      Take 500 mg by mouth as needed for mild pain or fever Reported on 2/15/2017        zinc oxide 10 % Oint      Externally apply topically 3 times daily        zolpidem 10 MG tablet    AMBIEN    30 tablet    Take 0.5-1 tablets (5-10 mg) by mouth nightly as needed for sleep    Primary insomnia       * Notice:  This list has 2 medication(s) that are the same as other medications prescribed for you. Read the directions carefully, and ask your doctor or other care provider to review them with you.

## 2018-05-10 NOTE — PROGRESS NOTES
SUBJECTIVE:   Riley Gifford is a 42 year old male who presents to clinic today for the following health issues:      * Certification for Medical Cannabis     Patient has 2-3 years of midline thoracic pain that will radiate anteriorly bilaterally.  Stable over time.  Has tried a number of medications such has prn opiates and gabapentin and they have not helped.  Has seen spine surgery and did not feel any operative intervention was needed.  Has had normal CT's in the last year that did not indicate a viscerogenic cause of his pain.  Daily pain can be around 3-6 in severity and affects his ability to do things and his quality of life to a moderate degree.  Has PM and R doc he sees regularly (DR. Terry at [facility]) and asked him about medical cannabis.  He thought it would be helpful but was not a certifier so patient had inquired at this clinic, is PCP clinic and came to me for discussion.  Has not used MJ in the past or medical cannabis.    Symptoms I had seen him for at the last visit have improved.  Also reviewed notes from PMR doc in Care Everywhere as well as scanned notes in our chart from spine surgery and GI.    No other concerns were noted today.    Excerpted from his last visit on 5/1/2018 at [facility]:    He is here today to talk about different options. We discussed the possibility of medical marijuana and I told him my feelings about this but indicated that I am not certified by the state. I did give him however several doctors who do certify for medical marijuana and he certainly does meet the criteria of having intractable pain.    I also suggested that he talk with his primary to see if he is certified to refer to the medical marijuana dispensaryp    I discussed with him other options that I did take the opportunity today to refer him for a trial with a TENS unit, and I also put him on the waiting list for our 8 week pain education series.    I will discuss his case with Dr. Cleveland, and if  he feels that there is anything more for him to offer that I would refer him to meet with Dr. Cleveland.           Problem list and histories reviewed & adjusted, as indicated.  Additional history: as documented    Patient Active Problem List   Diagnosis     Vitamin D deficiency     Eosinophilic esophagitis     Neurogenic bladder     CARDIOVASCULAR SCREENING; LDL GOAL LESS THAN 160     Quadriplegia (H)     Chronic constipation     Internal hemorrhoids with other complication     Diagnostic skin and sensitization tests(aka ALLERGENS)     Anal or rectal pain     Allergic rhinitis due to animal dander     Allergy to mold spores     House dust mite allergy     Insomnia     Health Care Home     Feeling worried     Gallstones     Chronic midline thoracic back pain     Pulmonary nodules     ACP (advance care planning)     Past Surgical History:   Procedure Laterality Date     BACK SURGERY       C NONSPECIFIC PROCEDURE      C5-6 Fusion     C NONSPECIFIC PROCEDURE      Pressure Ulcer     COLONOSCOPY       CYSTOSCOPY N/A 6/23/2017    Procedure: CYSTOSCOPY;  Cystoscopy and Botox Injection Into the Bladder  ;  Surgeon: Evaristo Hayes MD;  Location: [facility]     CYSTOSCOPY, INTRAVESICAL INJECTION N/A 5/12/2016    Procedure: CYSTOSCOPY, INTRAVESICAL INJECTION;  Surgeon: Evaristo Hayes MD;  Location: [facility] OR     CYSTOSCOPY, INTRAVESICAL INJECTION N/A 11/11/2016    Procedure: CYSTOSCOPY, INTRAVESICAL INJECTION;  Surgeon: Evaristo Hayes MD;  Location: [facility]     CYSTOSCOPY, INTRAVESICAL INJECTION N/A 1/4/2018    Procedure: CYSTOSCOPY, INTRAVESICAL INJECTION;  Cystoscopy Botox Injection Into The Bladder;  Surgeon: Evaristo Hayes MD;  Location: [facility] OR     GI SURGERY      endoscopy x2     INJECT BOTOX N/A 6/23/2017    Procedure: INJECT BOTOX;;  Surgeon: Evaristo Hayes MD;  Location: [facility]       Social History   Substance Use Topics     Smoking status: Never Smoker     Smokeless tobacco: Never Used      Alcohol use 0.0 oz/week     0 Standard drinks or equivalent per week      Comment: 1-2 drinks per month     Family History   Problem Relation Age of Onset     Breast Cancer Mother      Prostate Cancer Father      Breast Cancer Maternal Grandmother      CANCER Maternal Grandfather      Glaucoma No family hx of      Macular Degeneration No family hx of      DIABETES No family hx of      Hypertension No family hx of          Current Outpatient Prescriptions   Medication Sig Dispense Refill     Acetaminophen (TYLENOL PO) Take 500 mg by mouth as needed for mild pain or fever Reported on 2/15/2017       albuterol (PROAIR HFA/PROVENTIL HFA/VENTOLIN HFA) 108 (90 BASE) MCG/ACT Inhaler Inhale 2 puffs into the lungs as needed       alum & mag hydroxide-simethicone (MYLANTA/MAALOX) 200-200-20 MG/5ML SUSP suspension Take 30 mLs by mouth  0     bacitracin ([employer]S BACITRACIN ZINC) ointment Apply topically 3 times daily as needed for wound care       baclofen (LIORESAL) 20 MG tablet Take 1 tablet (20 mg) by mouth 4 times daily 360 tablet 3     budesonide (PULMICORT) 0.5 MG/2ML neb solution USE 2 VIALS TWICE DAILY. MIX WITH HONEY AND SWALLOW 1080 mL 0     cetirizine (ZYRTEC) 10 MG tablet Take 10 mg by mouth daily       clotrimazole 10 MG maya Place 1 Maya (10 mg) inside cheek 5 times daily 70 Maya 0     diphenhydrAMINE (BENADRYL) 25 MG tablet Take 25 mg by mouth every 8 hours as needed for itching or allergies Reported on 2/15/2017       docusate sodium (ENEMEEZ MINI) 283 MG enema Use one every other day and prn per patients's request. 30 enema 3     EPINEPHrine (EPIPEN/ADRENACLICK/OR ANY BX GENERIC EQUIV) 0.3 MG/0.3ML injection 2-pack Inject 0.3 mLs (0.3 mg) into the muscle as needed for anaphylaxis 0.6 mL 3     guaiFENesin (ROBITUSSIN) 100 MG/5ML LIQD 4-5 TSP twice a day as needed 560 mL 1     hydrocortisone (ANUSOL-HC) 2.5 % cream Place rectally 2 times daily 30 g 3     hydrocortisone (ANUSOL-HC) 2.5 % cream Place rectally 2  times daily       ibuprofen 200 MG capsule Take 400 mg by mouth every 4 hours as needed for fever 120 capsule      lidocaine (XYLOCAINE) 5 % ointment Apply topically as needed for moderate pain       loperamide (IMODIUM A-D) 2 MG tablet Take 2 tabs (4 mg) after first loose stool, and then take one tab (2 mg) after each diarrheal stool.  Max of 8 tabs (16 mg) per day. 30 tablet 0     magnesium hydroxide (MILK OF MAGNESIA) 400 MG/5ML suspension Take 30 mLs by mouth daily as needed for constipation or heartburn 360 mL 1     NEW MED Gentamycin 240mg in 500mL 0.9% Normal Saline.  Instill 30mL into empty bladder at bedtime.  Leave in bladder overnight and drain in the morning 500 mL 3     nystatin (MYCOSTATIN) 051111 UNIT/GM POWD Apply topically daily as needed 30 g 1     Omeprazole-Sodium Bicarbonate  MG PACK TAKE 1 PACKET BY MOUTH DISSOLVED IN LIQUID DAILY 90 each 0     ondansetron (ZOFRAN ODT) 4 MG ODT tab Take 1-2 tablets (4-8 mg) by mouth every 8 hours as needed for nausea 20 tablet 1     phenylephrine-cocoa butter (PREPARATION H) 0.25-88.44 % suppository Insert one suppository rectally twice daily as needed. 48 suppository 3     polyethylene glycol (MIRALAX) powder Take 17 g (1 capful) by mouth daily as needed for constipation 510 g 1     Simethicone 125 MG CAPS  28 capsule      sodium phosphate (FLEET ENEMA) 7-19 GM/118ML rectal enema Place 1 Bottle (1 enema) rectally daily as needed for constipation 1 Bottle 0     sterile water, bottle, irrigation Irrigate with 60 mLs as directed daily 1000 mL 0     tolterodine (DETROL) 2 MG tablet Take 1 tablet (2 mg) by mouth 2 times daily 180 tablet 3     zinc oxide 10 % OINT Externally apply topically 3 times daily       zolpidem (AMBIEN) 10 MG tablet Take 0.5-1 tablets (5-10 mg) by mouth nightly as needed for sleep 30 tablet 5     Allergies   Allergen Reactions     Banana Hives     Other reaction(s): GI Upset     Egg/Pro [Chicken-Derived Products (Egg)]      Fish       Soybean Oil      Other reaction(s): *Unknown  Discovered on allergy testing. Has never had any reaction to his knowledge     Wheat        Reviewed and updated as needed this visit by clinical staff  Tobacco  Allergies  Meds  Problems  Med Hx  Surg Hx  Fam Hx  Soc Hx        Reviewed and updated as needed this visit by Provider  Allergies  Meds  Problems         ROS:  CONSTITUTIONAL: NEGATIVE for fever, chills, change in weight  ENT/MOUTH: NEGATIVE for ear, mouth and throat problems  RESP: NEGATIVE for significant cough or SOB  CV: NEGATIVE for chest pain, palpitations or peripheral edema    OBJECTIVE:     BP (!) 87/61  Pulse 84  Temp 98  F (36.7  C) (Oral)  There is no height or weight on file to calculate BMI.  GENERAL: healthy, alert and no distress  EYES: Eyes grossly normal to inspection, PERRL and conjunctivae and sclerae normal  MS: no gross musculoskeletal defects noted, no edema  NEURO: mentation intact  PSYCH: mentation appears normal, affect normal/bright    Diagnostic Test Results:  none     ASSESSMENT/PLAN:             1. Chronic midline thoracic back pain  Patient has chronic intractable pain and has tried many options without sustained success.  Furthermore he reports moderate impact on his daily activities and quality of life related to the pain.  Certification process was reviewed with the patient and will do at this time.    2. Quadriplegia (H)  Stable.      3. Scoliosis of thoracic spine, unspecified scoliosis type  Noted on spine surgery noted and added today.  Not felt to be amenable to surgery.      FUTURE APPOINTMENTS:       - Follow-up for annual visit or as needed    Rober Ortega MD  [facility]

## 2018-05-16 DIAGNOSIS — K59.09 CHRONIC CONSTIPATION: ICD-10-CM

## 2018-05-16 NOTE — TELEPHONE ENCOUNTER
"Requested Prescriptions   Pending Prescriptions Disp Refills     docusate sodium (ENEMEEZ MINI) 283 MG enema 30 enema 3     Sig: Use one every other day and prn per patients's request.    Laxatives Protocol Passed    5/16/2018 12:09 PM       Passed - Patient is age 6 or older       Passed - Recent (12 mo) or future (30 days) visit within the authorizing provider's specialty    Patient had office visit in the last 12 months or has a visit in the next 30 days with authorizing provider or within the authorizing provider's specialty.  See \"Patient Info\" tab in inbasket, or \"Choose Columns\" in Meds & Orders section of the refill encounter.              "

## 2018-05-27 ENCOUNTER — PATIENT OUTREACH (OUTPATIENT)
Dept: CARE COORDINATION | Facility: CLINIC | Age: 43
End: 2018-05-27

## 2018-05-27 NOTE — PROGRESS NOTES
Clinic Care Coordination Contact    Situation: Patient chart reviewed by care coordinator.    Background: The patient is a paraplegic with chronic back pain.      Assessment: The patient is working with Jasvir Payton for therapy.  Through this program he has used a TENS unit which has helped the pain by 50% per the notes.  Therefore a TENS unit has been ordered for the patient and the [facility]  is working out the details to obtain the unit.  The patient has also be certified for medical marijuana and is working through the providers at the clinic to finalize the details.      Plan/Recommendations: 1).  The patient will use the TENS unit daily to relieve some of the chronic pain that he suffers from once it is obtained.  2).  The patient will follow the procedures for obtaining medical marijuana.  3).  The Primary Care Coordination RN will make a follow up call to the patient again in 4 weeks.  4).  Care Coordination to remain available for the patient to contact in the event of future needs.      Robbin Vanegas, MSN, RN, PHN I Clinic Care Coordinator  [facility]  Phone: [phone]  [email or web address]

## 2018-05-29 ENCOUNTER — TELEPHONE (OUTPATIENT)
Dept: CARE COORDINATION | Facility: CLINIC | Age: 43
End: 2018-05-29

## 2018-05-29 DIAGNOSIS — R07.9 CHEST PAIN, UNSPECIFIED TYPE: Primary | ICD-10-CM

## 2018-05-29 DIAGNOSIS — R10.84 ABDOMINAL PAIN, GENERALIZED: ICD-10-CM

## 2018-05-30 NOTE — TELEPHONE ENCOUNTER
Patient/family was instructed to return call to [facility] RN directly on the RN Call back line at [phone].     Kem Joya RN

## 2018-05-30 NOTE — TELEPHONE ENCOUNTER
"Called and spoke with patient. He says that the back pain and abdominal pain has been more consistent and worse the past couple of weeks. \"My back has been locking up a lot, I have no appetite at all from the pain.\" He denies numbness or weakness in extremities, or loss of bowel or bladder control. Route to provider to advise.    Kem Joya RN    "

## 2018-05-30 NOTE — TELEPHONE ENCOUNTER
Reviewed. He had abdominal CT scans in June and November of 2017 showing no abnormality. I don't think there is any medical indication to getting another abdominal CT scan. I do understand that he is worried about this however. Is he having new symptoms or progression of symptoms that are concerning?     He is going to get the chest CT to reevaluate the lung nodule.    Let me know on symptoms and related issues and I will decide on the best plan after that.    Thanks.  Jan

## 2018-06-01 NOTE — TELEPHONE ENCOUNTER
Called and gave patient message below. Patient verbalized understanding. He said he is already seeing Jasvir Payton pain management.     Kem Joya RN

## 2018-06-01 NOTE — TELEPHONE ENCOUNTER
I would recommend he get back in with his GI specialist. He has eosinophilic esophagitis and this might be related to that. His back pain has been worked up as well and pain management felt that this is perhaps related to his quadriplegia.     Sometimes pain in this area can be related to abnormally firing nerves because of his prior neck injury and will result in a lot of pain. We can consider having him get back in with pain management.    I don't think the CT scan will show us anything. I will place the order for chest, abdomen, pelvis since we are going to get a chest CT anyhow to recheck the lung nodule.     Orders placed.  Raffy Harris, JOE, PA-C

## 2018-06-01 NOTE — TELEPHONE ENCOUNTER
Patient/family was instructed to return call to [facility] [place] RN directly on the RN Call back line at [phone].     Kem Joya RN

## 2018-06-02 DIAGNOSIS — K20.0 EOSINOPHILIC ESOPHAGITIS: ICD-10-CM

## 2018-06-04 RX ORDER — BUDESONIDE 0.5 MG/2ML
INHALANT ORAL
Qty: 1080 ML | Refills: 0 | Status: SHIPPED | OUTPATIENT
Start: 2018-06-04 | End: 2019-05-31

## 2018-06-04 NOTE — TELEPHONE ENCOUNTER
Prescription approved per [facility] Refill Protocol.  Neelima Yeung RN

## 2018-06-04 NOTE — TELEPHONE ENCOUNTER
"Requested Prescriptions   Pending Prescriptions Disp Refills     budesonide (PULMICORT) 0.5 MG/2ML neb solution [Pharmacy Med Name: BUDESONIDE 0.5MG/2ML VIALS 30X2ML]  Last Written Prescription Date:  11/29/2017  Last Fill Quantity: 1080 mL,  # refills: 0   Last office visit: 5/10/2018 with prescribing provider:  MICHELLE Ortega   Future Office Visit:     1080 mL 0     Sig: USE 2 VIALS TWICE DAILY. MIX WITH HONEY AND SWALLOW    Inhaled Steroids Protocol Passed    6/2/2018 11:28 AM       Passed - Patient is age 12 or older       Passed - Recent (12 mo) or future (30 days) visit within the authorizing provider's specialty    Patient had office visit in the last 12 months or has a visit in the next 30 days with authorizing provider or within the authorizing provider's specialty.  See \"Patient Info\" tab in inbasket, or \"Choose Columns\" in Meds & Orders section of the refill encounter.              "

## 2018-06-05 ENCOUNTER — OFFICE VISIT (OUTPATIENT)
Dept: PODIATRY | Facility: CLINIC | Age: 43
End: 2018-06-05
Payer: MEDICARE

## 2018-06-05 VITALS — SYSTOLIC BLOOD PRESSURE: 92 MMHG | HEART RATE: 80 BPM | DIASTOLIC BLOOD PRESSURE: 66 MMHG

## 2018-06-05 DIAGNOSIS — B35.1 DERMATOPHYTOSIS OF NAIL: Primary | ICD-10-CM

## 2018-06-05 DIAGNOSIS — M79.671 RIGHT FOOT PAIN: ICD-10-CM

## 2018-06-05 DIAGNOSIS — G82.50 QUADRIPLEGIA (H): ICD-10-CM

## 2018-06-05 PROCEDURE — 99203 OFFICE O/P NEW LOW 30 MIN: CPT | Mod: 25 | Performed by: PODIATRIST

## 2018-06-05 PROCEDURE — 11720 DEBRIDE NAIL 1-5: CPT | Performed by: PODIATRIST

## 2018-06-05 NOTE — PATIENT INSTRUCTIONS
We wish you continued good healing. If you have any questions or concerns, please do not hesitate to contact us at [phone]    Please remember to call and schedule a follow up appointment if one was recommended at your earliest convenience.   PODIATRY CLINIC HOURS  TELEPHONE NUMBER    Dr. Joseluis Lo D.P.M [facility]    Clinics:  [facility]    [facility]   Tuesday 1PM-6PM  [place]  Wednesday 7AM-2PM  [facility]  Thursday 10AM-6PM  [place]  Friday 7AM-3PM  [place]  Specialty schedulers:   [phone] to make an appointment with any Specialty Provider.        Urgent Care locations:    [facility] Monday-Friday 5 pm - 9 pm. Saturday-Sunday 9 am -5pm    Monday-Friday 11 am - 9 pm Saturday 9 am - 5 pm     Monday-Sunday 12 noon-8PM [phone]     If you need a medication refill, please contact us you may need lab work and/or a follow up visit prior to your refill (i.e. Antifungal medications).    USEUMt (secure e-mail communication and access to your chart) to send a message or to make an appointment.    If MRI needed please call [place] [facility] at [phone]        Weight management plan: Patient was referred to their PCP to discuss a diet and exercise plan.
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
## 2018-06-05 NOTE — MR AVS SNAPSHOT
After Visit Summary   6/5/2018    Riley Gifford    MRN: [id number]           Patient Information     Date Of Birth          1975        Visit Information        Provider Department      6/5/2018 4:00 PM Joseluis Lo, DPMICHELLE [facility]        Today's Diagnoses     Dermatophytosis of nail    -  1    Right foot pain        Quadriplegia (H)          Care Instructions    We wish you continued good healing. If you have any questions or concerns, please do not hesitate to contact us at [phone]    Please remember to call and schedule a follow up appointment if one was recommended at your earliest convenience.   PODIATRY CLINIC HOURS  TELEPHONE NUMBER    Dr. Joseluis CHUNGPBRENDA [facility]    Clinics:  [facility]    [facility]   Tuesday 1PM-6PM  [place]  Wednesday 7AM-2PM  [facility]  Thursday 10AM-6PM  [place]  Friday 7AM-3PM  [place]  Specialty schedulers:   [phone] to make an appointment with any Specialty Provider.        Urgent Care locations:    [facility] Monday-Friday 5 pm - 9 pm. Saturday-Sunday 9 am -5pm    Monday-Friday 11 am - 9 pm Saturday 9 am - 5 pm     Monday-Sunday 12 noon-8PM [phone]     If you need a medication refill, please contact us you may need lab work and/or a follow up visit prior to your refill (i.e. Antifungal medications).    [employer] (secure e-mail communication and access to your chart) to send a message or to make an appointment.    If MRI needed please call [place] Marcelino at [phone]        Weight management plan: Patient was referred to their PCP to discuss a diet and exercise plan.               Follow-ups after your visit        Who to contact     If you have questions or need follow up information about today's clinic visit or your schedule please [facility]  directly at [phone].  Normal or non-critical lab and imaging results will be communicated to you by [employer], letter or phone within 4 business days after the clinic has received the results. If you do not hear from us within 7 days, please contact the clinic through [employer] or phone. If you have a critical or abnormal lab result, we will notify you by phone as soon as possible.  Submit refill requests through [employer] or call your pharmacy and they will forward the refill request to us. Please allow 3 business days for your refill to be completed.          Additional Information About Your Visit        [employer] Information     [employer] gives you secure access to your electronic health record. If you see a primary care provider, you can also send messages to your care team and make appointments. If you have questions, please call your primary care clinic.  If you do not have a primary care provider, please call [phone] and they will assist you.        Care EveryWhere ID     This is your Care EveryWhere ID. This could be used by other organizations to access your [place] medical records  [id number]        Your Vitals Were     Pulse                   80            Blood Pressure from Last 3 Encounters:   06/05/18 92/66   05/10/18 (!) 87/61   05/03/18 108/66    Weight from Last 3 Encounters:   02/12/18 130 lb (59 kg)   01/04/18 143 lb 11.2 oz (65.2 kg)   01/02/18 (!) 507 lb (230 kg)              We Performed the Following     DEBRIDEMENT OF NAIL(S), 1-5          Today's Medication Changes          These changes are accurate as of 6/5/18  4:38 PM.  If you have any questions, ask your nurse or doctor.               These medicines have changed or have updated prescriptions.        Dose/Directions    hydrocortisone 2.5 % cream   Commonly known as:  ANUSOL-HC   This may have changed:  Another medication with the same name was removed. Continue taking this medication, and follow the directions you see here.    Changed by:  Joseluis Lo, OSCAR        Place rectally 2 times daily   Refills:  0                Primary Care Provider Office Phone # Fax #    Raffy Harris PA-C [phone]       [facility] [address]        Goals        General    I will use the medications Tylenol or Tramadol for pain every 4-6 hours as needed.  Evidenced by the patient  using the medications to control worsening pain as verbalized by the patient. (pt-stated)     Notes - Note created  3/22/2016  2:39 PM by Robbin Andino RN    As of today's date 3/22/2016 goal is met at 0 - 25%.   Goal Status:  Active        I will work with the [facility] nurse to get the best results for wound care as evidenced by decrease in size and verbalized pain in the area of the wound on the buttock. (pt-stated)     Notes - Note created  1/6/2016  1:43 PM by Robbin Andino RN    As of today's date 1/6/2016 goal is met at 0 - 25%.   Goal Status:  Active          Equal Access to Services     [facility]: Hadii alan jorge hadasho Soalaina, waaxda luqadaha, qaybta kaalmada adenancieyalillian, monae cervantes . So [facility] [phone].    ATENCIÓN: Si habla español, tiene a borden disposición servicios gratuitos de asistencia lingüística. Llame al [phone].    We comply with applicable federal civil rights laws and [place] laws. We do not discriminate on the basis of race, color, national origin, age, disability, sex, sexual orientation, or gender identity.            Thank you!     Thank you for choosing [facility]  for your care. Our goal is always to provide you with excellent care. Hearing back from our patients is one way we can continue to improve our services. Please take a few minutes to complete the written survey that you may receive in the mail after your visit with us. Thank you!             Your Updated Medication List - Protect others around you: Learn how to safely use, store and throw away your  medicines at www.disposemymeds.org.          This list is accurate as of 6/5/18  4:38 PM.  Always use your most recent med list.                   Brand Name Dispense Instructions for use Diagnosis    albuterol 108 (90 Base) MCG/ACT Inhaler    PROAIR HFA/PROVENTIL HFA/VENTOLIN HFA     Inhale 2 puffs into the lungs as needed        alum & mag hydroxide-simethicone 200-200-20 MG/5ML Susp suspension    MYLANTA/MAALOX     Take 30 mLs by mouth    Esophageal reflux       baclofen 20 MG tablet    LIORESAL    360 tablet    Take 1 tablet (20 mg) by mouth 4 times daily    Quadriplegia (H)       budesonide 0.5 MG/2ML neb solution    PULMICORT    1080 mL    USE 2 VIALS TWICE DAILY. MIX WITH HONEY AND SWALLOW    Eosinophilic esophagitis       cetirizine 10 MG tablet    zyrTEC     Take 10 mg by mouth daily        clotrimazole 10 MG maya     70 Maya    Place 1 Maya (10 mg) inside cheek 5 times daily        COMPRESSION STOCKINGS      Tens unit and electrodes        CVS BACITRACIN ZINC ointment   Generic drug:  bacitracin      Apply topically 3 times daily as needed for wound care        diphenhydrAMINE 25 MG tablet    BENADRYL     Take 25 mg by mouth every 8 hours as needed for itching or allergies Reported on 2/15/2017        docusate sodium 283 MG enema    ENEMEEZ MINI    30 enema    Use one every other day and prn per patients's request.    Chronic constipation       EPINEPHrine 0.3 MG/0.3ML injection 2-pack    EPIPEN/ADRENACLICK/or ANY BX GENERIC EQUIV    0.6 mL    Inject 0.3 mLs (0.3 mg) into the muscle as needed for anaphylaxis    Reaction to food, initial encounter       guaiFENesin 100 MG/5ML Liqd    ROBITUSSIN    560 mL    4-5 TSP twice a day as needed    Cough       hydrocortisone 2.5 % cream    ANUSOL-HC     Place rectally 2 times daily        ibuprofen 200 MG capsule     120 capsule    Take 400 mg by mouth every 4 hours as needed for fever        lidocaine 5 % ointment    XYLOCAINE     Apply topically as needed  for moderate pain        loperamide 2 MG tablet    IMODIUM A-D    30 tablet    Take 2 tabs (4 mg) after first loose stool, and then take one tab (2 mg) after each diarrheal stool.  Max of 8 tabs (16 mg) per day.        MILK OF MAGNESIA 400 MG/5ML suspension   Generic drug:  magnesium hydroxide     360 mL    Take 30 mLs by mouth daily as needed for constipation or heartburn        NEW MED     500 mL    Gentamycin 240mg in 500mL 0.9% Normal Saline. Instill 30mL into empty bladder at bedtime. Leave in bladder overnight and drain in the morning    Recurrent UTI       nystatin 805731 UNIT/GM Powd    MYCOSTATIN    30 g    Apply topically daily as needed        Omeprazole-Sodium Bicarbonate  MG Pack     90 each    TAKE 1 PACKET BY MOUTH DISSOLVED IN LIQUID DAILY    Eosinophilic esophagitis, Gastroesophageal reflux disease without esophagitis       ondansetron 4 MG ODT tab    ZOFRAN ODT    20 tablet    Take 1-2 tablets (4-8 mg) by mouth every 8 hours as needed for nausea    Abdominal pain, generalized       phenylephrine-cocoa butter 0.25-88.44 % per suppository    PREPARATION H    48 suppository    Insert one suppository rectally twice daily as needed.    External hemorrhoids       polyethylene glycol powder    MIRALAX    510 g    Take 17 g (1 capful) by mouth daily as needed for constipation    Constipation, unspecified constipation type       Simethicone 125 MG Caps     28 capsule         sodium phosphate 7-19 GM/118ML rectal enema     1 Bottle    Place 1 Bottle (1 enema) rectally daily as needed for constipation        sterile water (bottle) irrigation     1000 mL    Irrigate with 60 mLs as directed daily    Neurogenic bladder       tolterodine 2 MG tablet    DETROL    180 tablet    Take 1 tablet (2 mg) by mouth 2 times daily    Neurogenic bladder       TYLENOL PO      Take 500 mg by mouth as needed for mild pain or fever Reported on 2/15/2017        zinc oxide 10 % Oint      Externally apply topically 3 times  daily        zolpidem 10 MG tablet    AMBIEN    30 tablet    Take 0.5-1 tablets (5-10 mg) by mouth nightly as needed for sleep    Primary insomnia
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
## 2018-06-06 ENCOUNTER — TELEPHONE (OUTPATIENT)
Dept: FAMILY MEDICINE | Facility: CLINIC | Age: 43
End: 2018-06-06

## 2018-06-06 NOTE — TELEPHONE ENCOUNTER
Forms received from: [employer]    Phone number listed: [phone]   Fax listed: [phone]  Date received: 6-6-18  Form description: [employer]   Once forms are completed, please return to [employer]  via fax.  Is patient requesting to be contacted when forms are completed: na  Form placed: Provider folder    Piedad Goss

## 2018-06-08 ENCOUNTER — RADIANT APPOINTMENT (OUTPATIENT)
Dept: CT IMAGING | Facility: CLINIC | Age: 43
End: 2018-06-08
Attending: PHYSICIAN ASSISTANT
Payer: MEDICARE

## 2018-06-08 DIAGNOSIS — R10.84 ABDOMINAL PAIN, GENERALIZED: ICD-10-CM

## 2018-06-08 DIAGNOSIS — R07.9 CHEST PAIN, UNSPECIFIED TYPE: ICD-10-CM

## 2018-06-08 PROCEDURE — 74176 CT ABD & PELVIS W/O CONTRAST: CPT | Performed by: RADIOLOGY

## 2018-06-08 PROCEDURE — 71250 CT THORAX DX C-: CPT | Performed by: RADIOLOGY

## 2018-06-12 ENCOUNTER — TELEPHONE (OUTPATIENT)
Dept: CARE COORDINATION | Facility: CLINIC | Age: 43
End: 2018-06-12

## 2018-06-12 DIAGNOSIS — R30.0 DYSURIA: ICD-10-CM

## 2018-06-12 DIAGNOSIS — T83.511A URINARY TRACT INFECTION ASSOCIATED WITH CATHETERIZATION OF URINARY TRACT, UNSPECIFIED INDWELLING URINARY CATHETER TYPE, INITIAL ENCOUNTER (H): Primary | ICD-10-CM

## 2018-06-12 DIAGNOSIS — N31.9 NEUROGENIC BLADDER: Primary | ICD-10-CM

## 2018-06-12 DIAGNOSIS — N39.0 URINARY TRACT INFECTION ASSOCIATED WITH CATHETERIZATION OF URINARY TRACT, UNSPECIFIED INDWELLING URINARY CATHETER TYPE, INITIAL ENCOUNTER (H): Primary | ICD-10-CM

## 2018-06-12 LAB
ALBUMIN UR-MCNC: NEGATIVE MG/DL
APPEARANCE UR: CLEAR
BACTERIA #/AREA URNS HPF: ABNORMAL /HPF
BILIRUB UR QL STRIP: NEGATIVE
COLOR UR AUTO: YELLOW
GLUCOSE UR STRIP-MCNC: NEGATIVE MG/DL
HGB UR QL STRIP: NEGATIVE
KETONES UR STRIP-MCNC: NEGATIVE MG/DL
LEUKOCYTE ESTERASE UR QL STRIP: ABNORMAL
NITRATE UR QL: NEGATIVE
PH UR STRIP: 7 PH (ref 5–7)
RBC #/AREA URNS AUTO: ABNORMAL /HPF
SOURCE: ABNORMAL
SP GR UR STRIP: 1.01 (ref 1–1.03)
UROBILINOGEN UR STRIP-ACNC: 0.2 EU/DL (ref 0.2–1)
WBC #/AREA URNS AUTO: ABNORMAL /HPF

## 2018-06-12 PROCEDURE — 87088 URINE BACTERIA CULTURE: CPT | Performed by: PHYSICIAN ASSISTANT

## 2018-06-12 PROCEDURE — 87186 SC STD MICRODIL/AGAR DIL: CPT | Performed by: PHYSICIAN ASSISTANT

## 2018-06-12 PROCEDURE — 81001 URINALYSIS AUTO W/SCOPE: CPT | Performed by: PHYSICIAN ASSISTANT

## 2018-06-12 PROCEDURE — 87086 URINE CULTURE/COLONY COUNT: CPT | Performed by: PHYSICIAN ASSISTANT

## 2018-06-12 NOTE — TELEPHONE ENCOUNTER
The patient called this morning.  He is having symptoms of a UTI.  Endorses cloudy urine upon self cath, increase in back pain and body aches above the normal, and feeling under the weather.  Denies any fever.  Due to the paralysis he is unable to feel any burning or frequency.  The patient is requesting to drop a urine sample to rule out a UTI.  Please contact the patient with a decision today.  Pharmacy is indicated.    Robbin Vanegas MSN, RN, PHN I Clinic Care Coordinator  [facility]  Phone: [phone]  [email or web address]

## 2018-06-12 NOTE — TELEPHONE ENCOUNTER
Reviewed - Sorry to hear he is having problems.     He has a pretty complex urologic history due to his quadraplegia status, it appears that Urology has mostly managed his chronic UTIs and neurogenic bladder issues. I don't see a really well established record of Dr. Yao running UAs here and empirically treating those.     He should have an established plan with his urologist in the future for this if it happens regularly.     However, today I will place orders and if there are findings suggestive of a UTI, we can treat.    Orders placed.  Thanks.  Jan

## 2018-06-14 LAB
BACTERIA SPEC CULT: ABNORMAL
SPECIMEN SOURCE: ABNORMAL

## 2018-06-14 RX ORDER — SULFAMETHOXAZOLE/TRIMETHOPRIM 800-160 MG
1 TABLET ORAL 2 TIMES DAILY
Qty: 20 TABLET | Refills: 0 | Status: SHIPPED | OUTPATIENT
Start: 2018-06-14 | End: 2018-07-18

## 2018-06-28 ENCOUNTER — PATIENT OUTREACH (OUTPATIENT)
Dept: CARE COORDINATION | Facility: CLINIC | Age: 43
End: 2018-06-28

## 2018-06-28 ASSESSMENT — ACTIVITIES OF DAILY LIVING (ADL)
DEPENDENT_IADLS:: CLEANING;COOKING;LAUNDRY;SHOPPING;MEAL PREPARATION;MEDICATION MANAGEMENT
DEPENDENT_IADLS:: CLEANING;COOKING;LAUNDRY;SHOPPING;MEAL PREPARATION;MEDICATION MANAGEMENT

## 2018-06-28 NOTE — PROGRESS NOTES
Clinic Care Coordination Contact    Clinic Care Coordination Contact  OUTREACH    Referral Information:  Referral Source: PCP    Primary Diagnosis: Genitourinary Disorders (wound care)    Chief Complaint   Patient presents with     Clinic Care Coordination - Follow-up     RN CC        [place] Utilization:   Clinic Utilization  Difficulty keeping appointments:: No  Utilization    Last refreshed: 6/26/2018  3:36 PM:  No Show Count (past year) 0       Last refreshed: 6/26/2018  3:36 PM:  ED visits 0       Last refreshed: 6/26/2018  3:36 PM:  Hospital admissions 0          Current as of: 6/26/2018  3:36 PM             Clinical Concerns:  Current Medical Concerns:  The patient is still having the substernal pain, nausea, and the pain on his sides.  The patient was recently certified for medical marijuana.  He must pay for this out of his pocket and he purchased the started pack for the vapor delivery system.  The patient tried it once and it did not change the pain at all so he is concerned about continuing.  The Primary Care Coordination RN spoke with the patient about how long each vapor dose should last and the response was 2-6 hours.  The decision was made that the patient would continue to use it 2-3 times per day and the 1:1 concentration of HCT:CBT.  Then we would re-evaluate if it controlled any of his pain symptoms and which ones and for how long.  Therefore the Primary Care Coordination RN will contact the patient again in 2 weeks to see what results he has determined.   Current Behavioral Concerns: none noted    Education Provided to patient: Results to look for with the medical marijuana   Pain  Chronic pain (GOAL):: Yes  Location of chronic pain:: back and sacral area  Chronic pain timing:: Constant  Chronic pain severity:: 3  Limitation of routine activities due to chronic pain:: Yes  Health Maintenance Reviewed:    Clinical Pathway: None    Medication Management:  Patient is knowledgeable on medications  and is adherent.  No financial concerns reported at this time.       Functional Status:  Dependent ADLs:: Wheelchair-independent, Bathing, Dressing, Grooming, Positioning, Transfers  Dependent IADLs:: Cleaning, Cooking, Laundry, Shopping, Meal Preparation, Medication Management  Bed or wheelchair confined:: No  Mobility Status: Dependent/Assisted by Another  Fallen 2 or more times in the past year?: No  Any fall with injury in the past year?: No    Living Situation:  Current living arrangement:: I live in assisted living  Type of residence:: Assisted living    Diet/Exercise/Sleep:  Diet:: Regular  Inadequate nutrition (GOAL):: No  Food Insecurity: No  Tube Feeding: No  Exercise:: Unable to exercise  Inadequate activity/exercise (GOAL):: No  Significant changes in sleep pattern (GOAL): No    Transportation:  Transportation concerns (GOAL):: No  Transportation means:: Accessible car     Psychosocial:  Yazidism or spiritual beliefs that impact treatment:: No  Mental health DX:: No  Mental health management concern (GOAL):: No  Informal Support system:: Family, Friends     Financial/Insurance:   Financial/Insurance concerns (GOAL):: No       Resources and Interventions:  Current Resources:   List of home care services:: Skilled Nursing;         Equipment Currently Used at Home: commode, grab bar, shower chair, transfer board, wheelchair, power    Advance Care Plan/Directive  Advanced Care Plans/Directives on file:: Yes  Type Advanced Care Plans/Directives: Advanced Directive - On File (n/a)  Advanced Care Plan/Directive Status: Not Applicable    Referrals Placed:  (unknown)     Goals:   Goals        General    #1 Improve chronic symptoms (pt-stated)     Notes - Note created  6/28/2018  2:09 PM by Robbin Andino RN    Goal Statement: I will try using the marijuana vapor on a scheduled basis to see if it will control the chronic symptoms of substernal pain as evidenced by patient verbalization.  Measure of Success:  patient verbalizes that the substernal pain is controlled for a specific amount of time.  Supportive Steps to Achieve: Try using the vapor 2-3 times per day for 2 weeks to see if a pattern of pain control is noticed  Barriers: none  Strengths: motivated  Date to Achieve By: ongoing                Patient/Caregiver understanding: The patient has an excellent understanding of the disease process.    Outreach Frequency: monthly      Plan: 1).  The patient will continue to try the medical marijuana vapor and record the results that he finds.  2).  The patient will treat the EE which may be a source of the substernal pain with the current medications that he has as well as add in the medical marijuana.  3).  The Primary Care Coordination RN will make a follow up call to the patient again in 2 weeks.  4).  Care Coordination to remain available for the patient to contact in the event of future needs.        Robbin Vanegas, MSN, RN, PHN I Clinic Care Coordinator  [facility]  Phone: [phone]  [email or web address]

## 2018-07-10 ENCOUNTER — PATIENT OUTREACH (OUTPATIENT)
Dept: CARE COORDINATION | Facility: CLINIC | Age: 43
End: 2018-07-10

## 2018-07-10 ASSESSMENT — ACTIVITIES OF DAILY LIVING (ADL): DEPENDENT_IADLS:: CLEANING;COOKING;LAUNDRY;SHOPPING;MEAL PREPARATION;MEDICATION MANAGEMENT

## 2018-07-10 NOTE — PROGRESS NOTES
Clinic Care Coordination Contact    Clinic Care Coordination Contact  OUTREACH    Referral Information:  Referral Source: PCP    Primary Diagnosis: Genitourinary Disorders (wound care)    Chief Complaint   Patient presents with     Clinic Care Coordination - Follow-up     RN CC        [place] Utilization:   Clinic Utilization  Difficulty keeping appointments:: No  Utilization    Last refreshed: 7/10/2018  2:30 PM:  No Show Count (past year) 0       Last refreshed: 7/10/2018  2:30 PM:  ED visits 0       Last refreshed: 7/10/2018  2:30 PM:  Hospital admissions 0          Current as of: 7/10/2018  2:30 PM             Clinical Concerns:  Current Medical Concerns:  The patient is a quadriplegic with recurring sub sternal pain.  Today is also had sharp pains in his wrists.  He tried using the medical marijuana vapor and did not feel that he had any relief at all.  At this point the patient does not feel that the marijuana is the solution to his recurring pain.  At this point with the level of the pain the patient requests an appointment with a provider.  The earliest opening at [facility] or [facility] is Thursday.  He took the appointment with the idea that if the pain became worse he would go to the urgent care for treatment.  ACP (advance care planning)   Pulmonary nodules   Chronic midline thoracic back pain   Gallstones   Feeling worried   Insomnia   Allergic rhinitis due to animal dander   Allergy to mold spores   House dust mite allergy   Anal or rectal pain   Diagnostic skin and sensitization tests(aka ALLERGENS)   Internal hemorrhoids with other complication   Quadriplegia (H)   Chronic constipation   CARDIOVASCULAR SCREENING; LDL GOAL LESS THAN 160   Neurogenic bladder   Vitamin D deficiency   Eosinophilic esophagitis   Health Care Home        Current Behavioral Concerns: none noted currently    Education Provided to patient: indications for going to urgent care   Pain  Chronic pain (GOAL):: Yes  Location of  chronic pain:: back and sacral area  Chronic pain timing:: Constant  Chronic pain severity:: 3  Limitation of routine activities due to chronic pain:: Yes  Health Maintenance Reviewed: Due/Overdue   Health Maintenance Due   Topic Date Due     HIV SCREEN (SYSTEM ASSIGNED)  12/07/1994     LIPID SCREEN Q5 YR MALE (SYSTEM ASSIGNED)  11/07/2017     EYE EXAM Q1 YEAR  04/26/2018     BMP Q6 MOS  05/08/2018       Clinical Pathway: None    Medication Management:  Patient is knowledgeable on medications and is adherent.  No financial concerns reported at this time.       Functional Status:  Dependent ADLs:: Wheelchair-independent, Bathing, Dressing, Grooming, Positioning, Transfers  Dependent IADLs:: Cleaning, Cooking, Laundry, Shopping, Meal Preparation, Medication Management  Bed or wheelchair confined:: No  Mobility Status: Dependent/Assisted by Another  Fallen 2 or more times in the past year?: No  Any fall with injury in the past year?: No    Living Situation:  Current living arrangement:: I live in assisted living  Type of residence:: Assisted living    Diet/Exercise/Sleep:  Diet:: Regular  Inadequate nutrition (GOAL):: No  Food Insecurity: No  Tube Feeding: No  Exercise:: Unable to exercise  Inadequate activity/exercise (GOAL):: No  Significant changes in sleep pattern (GOAL): No    Transportation:  Transportation concerns (GOAL):: No  Transportation means:: Accessible car     Psychosocial:  Catholic or spiritual beliefs that impact treatment:: No  Mental health DX:: No  Mental health management concern (GOAL):: No  Informal Support system:: Family, Friends     Financial/Insurance:   Financial/Insurance concerns (GOAL):: No       Resources and Interventions:  Current Resources:   List of home care services:: Skilled Nursing;         Equipment Currently Used at Home: commode, grab bar, shower chair, transfer board, wheelchair, power    Advance Care Plan/Directive  Advanced Care Plans/Directives on file:: Yes  Type  Advanced Care Plans/Directives: Advanced Directive - On File (n/a)  Advanced Care Plan/Directive Status: Not Applicable    Referrals Placed:  (unknown)     Goals:   Goals        General    #1 Improve chronic symptoms (pt-stated)     Notes - Note created  6/28/2018  2:09 PM by Robbin Vanegas, RN    Goal Statement: I will try using the marijuana vapor on a scheduled basis to see if it will control the chronic symptoms of substernal pain as evidenced by patient verbalization.  Measure of Success: patient verbalizes that the substernal pain is controlled for a specific amount of time.  Supportive Steps to Achieve: Try using the vapor 2-3 times per day for 2 weeks to see if a pattern of pain control is noticed  Barriers: none  Strengths: motivated  Date to Achieve By: ongoing                Patient/Caregiver understanding: The patient has a very good understanding of the disease process.    Outreach Frequency: monthly  Future Appointments              In 2 days Sinai Hernandez NP [place], [place]          Plan: 1).  The patient will proceed to the urgent care if the symptoms get worse.  Otherwise attend appointment on Thursday.  2).  The patient will take all medications as prescribed by the providers.  3).  Care Coordination to remain available for the patient to contact in the event of future needs.    4).  The Primary Care Coordination RN will make a follow up call to the patient again in 4 weeks.        Robbin Vanegas, MSN, RN, PHN I Clinic Care Coordinator  [facility]  Phone: [phone]  [email or web address]

## 2018-07-10 NOTE — LETTER
[facility]  Complex Care Plan  About Me  Patient Name:  Riley Marcos    YOB: 1975  Age:     42 year old   Rut MRN:   [id number] Telephone Information:    Home Phone [phone]   Mobile NONE   Mobile [phone]       Address:    [address] [place] [address] [address]  [place] [address] Email address:  [email or web address]      Emergency Contact(s)  Name Relationship Lgl Grd Work Phone Home Phone Mobile Phone   1. TREY MARCOS (NE* Relative No noen none [phone]   2. HODA MARCOS Brother No  [phone]            Primary language:  English     needed? No   [facility] [employer] [facility]:  [phone] op. 1  Other communication barriers:    Preferred Method of Communication:  Lisa  Current living arrangement: I live in assisted living  Mobility Status/ Medical Equipment: Dependent/Assisted by Another    Health Maintenance  Health Maintenance Reviewed: Due/Overdue     My Access Plan  Medical Emergency 911   Primary Clinic Line [facility] - [phone]   24 Hour Appointment Line [phone] or  [phone]) (toll-free)   24 Hour Nurse Line [phone] (toll-free)   Preferred Urgent Care [facility] [phone]   Preferred Hospital [facility]  [phone]   Preferred Pharmacy [facility] [address] [place] [address] [address] AT [place] [address]     Behavioral Health Crisis Line The National Suicide Prevention Lifeline at 1-918.593.5254 or 911     My Care Team Members    Patient Care Team       Relationship Specialty Notifications Start End    Raffy Harris PA-C PCP - General Physician Assistant  3/2/18     Phone: [phone] Fax: [phone]         [address] [facility] [address]    Robbin Andino, RN Lead Care Coordinator Nurse Admissions 1/6/16     Comment:  phone:  [phone]    Phone: [phone] Fax: [phone]        Rober Leo MD  Referring Physician Urology  1/8/16     Phone: [phone] Fax: [phone]         [address] [facility] [address]    Kimberly Zabala MD MD Urology  1/8/16     Phone: [phone] Fax: [phone]         [address] [facility] [address] [facility] [address]    Amanda Other (see comments)   1/8/16     Comment:  Axis     Phone: [phone]         Evaristo Hayes MD MD Urology  4/19/16     Phone: [phone] Fax: [phone]         [address] [facility] [address] [facility] [address]    Jenny Ames, RN Registered Nurse Urology  4/19/16     Marta Adames, RN Clinic Care Coordinator Urology Abnormal results only, Admissions 11/3/16     Phone: [phone] Pager: [phone]        Rosemary Thomas, RN Nurse Coordinator Physical Medicine and Rehab  3/30/17     Phone: [phone] Pager: [phone]                My Care Plans  Self Management and Treatment Plan  Goals and (Comments)  Goals        General    #1 Improve chronic symptoms (pt-stated)     Notes - Note created  6/28/2018  2:09 PM by Robbin Andino, RN    Goal Statement: I will try using the marijuana vapor on a scheduled basis to see if it will control the chronic symptoms of substernal pain as evidenced by patient verbalization.  Measure of Success: patient verbalizes that the substernal pain is controlled for a specific amount of time.  Supportive Steps to Achieve: Try using the vapor 2-3 times per day for 2 weeks to see if a pattern of pain control is noticed  Barriers: none  Strengths: motivated  Date to Achieve By: ongoing               Action Plans on File:                       Advance Care Plans/Directives Type:   Type Advanced Care Plans/Directives: Advanced Directive - On File (n/a)    My Medical and Care Information  Problem List   Patient Active Problem List   Diagnosis     Vitamin D deficiency     Eosinophilic esophagitis     Neurogenic bladder     CARDIOVASCULAR SCREENING; LDL GOAL LESS THAN 160      Quadriplegia (H)     Chronic constipation     Internal hemorrhoids with other complication     Diagnostic skin and sensitization tests(aka ALLERGENS)     Anal or rectal pain     Allergic rhinitis due to animal dander     Allergy to mold spores     House dust mite allergy     Insomnia     Health Care Home     Feeling worried     Gallstones     Chronic midline thoracic back pain     Pulmonary nodules     ACP (advance care planning)      Current Medications and Allergies:  See printed Medication Report.    Care Coordination Start Date: 1/6/2016   Frequency of Care Coordination: monthly   Form Last Updated: 07/10/2018

## 2018-07-12 ENCOUNTER — OFFICE VISIT (OUTPATIENT)
Dept: FAMILY MEDICINE | Facility: CLINIC | Age: 43
End: 2018-07-12
Payer: MEDICARE

## 2018-07-12 VITALS
RESPIRATION RATE: 22 BRPM | HEIGHT: 72 IN | SYSTOLIC BLOOD PRESSURE: 100 MMHG | HEART RATE: 97 BPM | DIASTOLIC BLOOD PRESSURE: 60 MMHG | OXYGEN SATURATION: 94 % | TEMPERATURE: 97.9 F

## 2018-07-12 DIAGNOSIS — R07.89 ATYPICAL CHEST PAIN: ICD-10-CM

## 2018-07-12 DIAGNOSIS — K20.0 EOSINOPHILIC ESOPHAGITIS: Primary | ICD-10-CM

## 2018-07-12 DIAGNOSIS — Z13.220 SCREENING FOR HYPERLIPIDEMIA: ICD-10-CM

## 2018-07-12 LAB
BASOPHILS # BLD AUTO: 0.1 10E9/L (ref 0–0.2)
BASOPHILS NFR BLD AUTO: 1 %
DIFFERENTIAL METHOD BLD: ABNORMAL
EOSINOPHIL # BLD AUTO: 4.5 10E9/L (ref 0–0.7)
EOSINOPHIL NFR BLD AUTO: 46.1 %
ERYTHROCYTE [DISTWIDTH] IN BLOOD BY AUTOMATED COUNT: 12.5 % (ref 10–15)
HCT VFR BLD AUTO: 42.9 % (ref 40–53)
HGB BLD-MCNC: 14.3 G/DL (ref 13.3–17.7)
LYMPHOCYTES # BLD AUTO: 1.7 10E9/L (ref 0.8–5.3)
LYMPHOCYTES NFR BLD AUTO: 17.1 %
MCH RBC QN AUTO: 31.6 PG (ref 26.5–33)
MCHC RBC AUTO-ENTMCNC: 33.3 G/DL (ref 31.5–36.5)
MCV RBC AUTO: 95 FL (ref 78–100)
MONOCYTES # BLD AUTO: 0.5 10E9/L (ref 0–1.3)
MONOCYTES NFR BLD AUTO: 5.3 %
NEUTROPHILS # BLD AUTO: 3 10E9/L (ref 1.6–8.3)
NEUTROPHILS NFR BLD AUTO: 30.5 %
PLATELET # BLD AUTO: 251 10E9/L (ref 150–450)
RBC # BLD AUTO: 4.52 10E12/L (ref 4.4–5.9)
WBC # BLD AUTO: 9.8 10E9/L (ref 4–11)

## 2018-07-12 PROCEDURE — 80053 COMPREHEN METABOLIC PANEL: CPT | Performed by: NURSE PRACTITIONER

## 2018-07-12 PROCEDURE — 99214 OFFICE O/P EST MOD 30 MIN: CPT | Performed by: NURSE PRACTITIONER

## 2018-07-12 PROCEDURE — 84443 ASSAY THYROID STIM HORMONE: CPT | Performed by: NURSE PRACTITIONER

## 2018-07-12 PROCEDURE — 85025 COMPLETE CBC W/AUTO DIFF WBC: CPT | Performed by: NURSE PRACTITIONER

## 2018-07-12 PROCEDURE — 36415 COLL VENOUS BLD VENIPUNCTURE: CPT | Performed by: NURSE PRACTITIONER

## 2018-07-12 PROCEDURE — 80061 LIPID PANEL: CPT | Performed by: NURSE PRACTITIONER

## 2018-07-12 PROCEDURE — 93000 ELECTROCARDIOGRAM COMPLETE: CPT | Performed by: NURSE PRACTITIONER

## 2018-07-12 ASSESSMENT — PAIN SCALES - GENERAL: PAINLEVEL: MILD PAIN (2)

## 2018-07-12 NOTE — MR AVS SNAPSHOT
After Visit Summary   7/12/2018    Riley Gifford    MRN: [id number]           Patient Information     Date Of Birth          1975        Visit Information        Provider Department      7/12/2018 10:20 AM Sinai Hernandez NP [facility]'s Diagnoses     Atypical chest pain    -  1    Eosinophilic esophagitis        Screening for hyperlipidemia        Chronic constipation          Care Instructions    Labs today    Call to schedule visit with the heart doctor    Call to schedule visit with your GI doctor     [facility]   Discharged by : Ammy JONES CMA ([facility])    Paper scripts provided to patient : none      If you have any questions regarding your visit please contact your care team:     Team Gold                Clinic Hours Telephone Number     Dr. Isarua Hernandez, CNP 7am-7pm  Monday - Thursday   7am-5pm  Fridays  [phone]   (Appointment scheduling available 24/7)     RN Line  [phone] option 2     Urgent Care - [place] and [place] - 11am-9pm Monday-Friday Saturday-Sunday- 9am-5pm     [place] -   5pm-9pm Monday-Friday Saturday-Sunday- 9am-5pm    [phone] - [place]    [phone] - [place]       For a Price Quote for your services, please call our Consumer Price Line at [phone].     What options do I have for visits at the clinic other than the traditional office visit?     To expand how we care for you, many of our providers are utilizing electronic visits (e-visits) and telephone visits, when medically appropriate, for interactions with their patients rather than a visit in the clinic. We also offer nurse visits for many medical concerns. Just like any other service, we will bill your insurance company for this type of visit based on time spent on the phone with your provider. Not all insurance companies cover these visits. Please check  with your medical insurance if this type of visit is covered. You will be responsible for any charges that are not paid by your insurance.   E-visits via Clustrixhart: generally incur a $35.00 fee.     Telephone visits:  Time spent on the phone: *charged based on time that is spent on the phone in increments of 10 minutes. Estimated cost:   5-10 mins $30.00   11-20 mins. $59.00   21-30 mins. $85.00       Use CoreOS (secure email communication and access to your chart) to send your primary care provider a message or make an appointment. Ask someone on your Team how to sign up for CoreOS.     As always, Thank you for trusting us with your health care needs!      [facility]:    Scheduling Appointments  [place]  Call: [phone]    [facility]  Call: [phone]    [facility]  Call: [phone]    For Gastroenterology referrals   [facility]   Clinics and Surgery Center, 4th Floor   [address] [place] [address]   Appointments: [phone]    WHERE TO GO FOR CARE?  Clinic    Make an appointment if you:       Are sick (cold, cough, flu, sore throat, earache or in pain).       Have a small injury (sprain, small cut, burn or broken bone).       Need a physical exam, Pap smear, vaccine or prescription refill.       Have questions about your health or medicines.    To reach us:      Call [phone] ([phone]). Open 24 hours every day. (For counseling services, call [phone].)    Log into CoreOS at [email or web address]. (Visit [email or web address] to create an account.) Hospital emergency room    An emergency is a serious or life- threatening problem that must be treated right away.    Call 855 or get to the hospital if you have:      Very bad or sudden:            - Chest pain or pressure         - Bleeding         - Head or belly pain         - Dizziness or trouble seeing, walking or                           Speaking      Problems breathing      Blood in your vomit or you are coughing up blood      A major injury (knocked out, loss of a finger or limb, rape, broken bone protruding from skin)    A mental health crisis. (Or call the Mental Health Crisis line at 1-358.762.6024 or Suicide Prevention Hotline at 1-644.911.2173.)    Open 24 hours every day. You don't need an appointment.     Urgent care    Visit urgent care for sickness or small injuries when the clinic is closed. You don't need an appointment. To check hours or find an urgent care near you, visit [email or web address]. Online care    Get online care from [employer] for more than 70 common problems, like colds, allergies and infections. Open 24 hours every day at:   [email or web address]   Need help deciding?    For advice about where to be seen, you may call your clinic and ask to speak with a nurse. We're here for you 24 hours every day.         If you are deaf or hard of hearing, please let us know. We provide many free services including sign language interpreters, oral interpreters, TTYs, telephone amplifiers, note takers and written materials.                   Follow-ups after your visit        Additional Services     CARDIOLOGY EVAL ADULT REFERRAL       Preferred location:  [facility] [phone]   [email or web address]    Please be aware that coverage of these services is subject to the terms and limitations of your health insurance plan.  Call member services at your health plan with any benefit or coverage questions.      Please bring the following to your appointment:  Any x-rays, CTs or MRIs which have been performed. Contact the facility where they were done to arrange for  prior to your scheduled appointment.    List of current medications  This referral request   Any documents/labs given to you for this referral            GASTROENTEROLOGY ADULT REF CONSULT ONLY        Preferred Location: [place] [facility] [phone] [place] location      Please be aware that coverage of these services is subject to the terms and limitations of your health insurance plan.  Call member services at your health plan with any benefit or coverage questions.  Any procedures must be performed at a [place] facility OR coordinated by your clinic's referral office.    Please bring the following with you to your appointment:    (1) Any X-Rays, CTs or MRIs which have been performed.  Contact the facility where they were done to arrange for  prior to your scheduled appointment.    (2) List of current medications   (3) This referral request   (4) Any documents/labs given to you for this referral                  Who to contact     If you have questions or need follow up information about today's clinic visit or your schedule please contact [facility] directly at [phone].  Normal or non-critical lab and imaging results will be communicated to you by MyChart, letter or phone within 4 business days after the clinic has received the results. If you do not hear from us within 7 days, please contact the clinic through [employer] or phone. If you have a critical or abnormal lab result, we will notify you by phone as soon as possible.  Submit refill requests through Fluency or call your pharmacy and they will forward the refill request to us. Please allow 3 business days for your refill to be completed.          Additional Information About Your Visit        MyChart Information     Fluency gives you secure access to your electronic health record. If you see a primary care provider, you can also send messages to your care team and make appointments. If you have questions, please call your primary care clinic.  If you do not have a primary care provider, please call [phone] and they will assist you.        Care EveryWhere ID     This is your Care EveryWhere ID. This could be used by other  organizations to access your [facility] medical records  [id number]        Your Vitals Were     Pulse Temperature Respirations Height Pulse Oximetry       97 97.9  F (36.6  C) (Oral) 22 6' (1.829 m) 94%        Blood Pressure from Last 3 Encounters:   07/12/18 100/60   06/05/18 92/66   05/10/18 (!) 87/61    Weight from Last 3 Encounters:   02/12/18 130 lb (59 kg)   01/04/18 143 lb 11.2 oz (65.2 kg)   01/02/18 (!) 507 lb (230 kg)              We Performed the Following     CARDIOLOGY EVAL ADULT REFERRAL     CBC with platelets and differential     Comprehensive metabolic panel (BMP + Alb, Alk Phos, ALT, AST, Total. Bili, TP)     EKG 12-lead complete w/read - Clinics     GASTROENTEROLOGY ADULT REF CONSULT ONLY     Lipid panel reflex to direct LDL Fasting     TSH with free T4 reflex        Primary Care Provider Office Phone # Fax #    Raffy Harris PA-C [phone] [address] [facility] [address]        Goals        General    #1 Improve chronic symptoms (pt-stated)     Notes - Note created  6/28/2018  2:09 PM by Robbin Andino, RN    Goal Statement: I will try using the marijuana vapor on a scheduled basis to see if it will control the chronic symptoms of substernal pain as evidenced by patient verbalization.  Measure of Success: patient verbalizes that the substernal pain is controlled for a specific amount of time.  Supportive Steps to Achieve: Try using the vapor 2-3 times per day for 2 weeks to see if a pattern of pain control is noticed  Barriers: none  Strengths: motivated  Date to Achieve By: ongoing          Equal Access to Services     [facility] : Hadlenora ford Soalaina, waaxda luqadaha, qaybta kaalmada ademonae stanley. So [facility] [phone].    ATENCIÓN: Si habla español, tiene a bodren disposición servicios gratuitos de asistencia lingüística. Llame al [phone].    We comply with applicable federal civil rights laws and  [place] laws. We do not discriminate on the basis of race, color, national origin, age, disability, sex, sexual orientation, or gender identity.            Thank you!     Thank you for choosing [facility]  for your care. Our goal is always to provide you with excellent care. Hearing back from our patients is one way we can continue to improve our services. Please take a few minutes to complete the written survey that you may receive in the mail after your visit with us. Thank you!             Your Updated Medication List - Protect others around you: Learn how to safely use, store and throw away your medicines at www.disposemymeds.org.          This list is accurate as of 7/12/18 11:44 AM.  Always use your most recent med list.                   Brand Name Dispense Instructions for use Diagnosis    albuterol 108 (90 Base) MCG/ACT Inhaler    PROAIR HFA/PROVENTIL HFA/VENTOLIN HFA     Inhale 2 puffs into the lungs as needed        alum & mag hydroxide-simethicone 200-200-20 MG/5ML Susp suspension    MYLANTA/MAALOX     Take 30 mLs by mouth    Esophageal reflux       baclofen 20 MG tablet    LIORESAL    360 tablet    Take 1 tablet (20 mg) by mouth 4 times daily    Quadriplegia (H)       budesonide 0.5 MG/2ML neb solution    PULMICORT    1080 mL    USE 2 VIALS TWICE DAILY. MIX WITH HONEY AND SWALLOW    Eosinophilic esophagitis       cetirizine 10 MG tablet    zyrTEC     Take 10 mg by mouth daily        clotrimazole 10 MG maya     70 Maya    Place 1 Maya (10 mg) inside cheek 5 times daily        COMPRESSION STOCKINGS      Tens unit and electrodes        [employer] BACITRACIN ZINC ointment   Generic drug:  bacitracin      Apply topically 3 times daily as needed for wound care        diphenhydrAMINE 25 MG tablet    BENADRYL     Take 25 mg by mouth every 8 hours as needed for itching or allergies Reported on 2/15/2017        docusate sodium 283 MG enema    ENEMEEZ MINI    30 enema    Use one every other day  and prn per patients's request.    Chronic constipation       EPINEPHrine 0.3 MG/0.3ML injection 2-pack    EPIPEN/ADRENACLICK/or ANY BX GENERIC EQUIV    0.6 mL    Inject 0.3 mLs (0.3 mg) into the muscle as needed for anaphylaxis    Reaction to food, initial encounter       guaiFENesin 100 MG/5ML Liqd    ROBITUSSIN    560 mL    4-5 TSP twice a day as needed    Cough       hydrocortisone 2.5 % cream    ANUSOL-HC     Place rectally 2 times daily        ibuprofen 200 MG capsule     120 capsule    Take 400 mg by mouth every 4 hours as needed for fever        lidocaine 5 % ointment    XYLOCAINE     Apply topically as needed for moderate pain        loperamide 2 MG tablet    IMODIUM A-D    30 tablet    Take 2 tabs (4 mg) after first loose stool, and then take one tab (2 mg) after each diarrheal stool.  Max of 8 tabs (16 mg) per day.        MILK OF MAGNESIA 400 MG/5ML suspension   Generic drug:  magnesium hydroxide     360 mL    Take 30 mLs by mouth daily as needed for constipation or heartburn        NEW MED     500 mL    Gentamycin 240mg in 500mL 0.9% Normal Saline. Instill 30mL into empty bladder at bedtime. Leave in bladder overnight and drain in the morning    Recurrent UTI       nystatin 720990 UNIT/GM Powd    MYCOSTATIN    30 g    Apply topically daily as needed        Omeprazole-Sodium Bicarbonate  MG Pack     90 each    TAKE 1 PACKET BY MOUTH DISSOLVED IN LIQUID DAILY    Eosinophilic esophagitis, Gastroesophageal reflux disease without esophagitis       ondansetron 4 MG ODT tab    ZOFRAN ODT    20 tablet    Take 1-2 tablets (4-8 mg) by mouth every 8 hours as needed for nausea    Abdominal pain, generalized       phenylephrine-cocoa butter 0.25-88.44 % per suppository    PREPARATION H    48 suppository    Insert one suppository rectally twice daily as needed.    External hemorrhoids       polyethylene glycol powder    MIRALAX    510 g    Take 17 g (1 capful) by mouth daily as needed for constipation     Constipation, unspecified constipation type       Simethicone 125 MG Caps     28 capsule         sodium phosphate 7-19 GM/118ML rectal enema     1 Bottle    Place 1 Bottle (1 enema) rectally daily as needed for constipation        sterile water (bottle) irrigation     1000 mL    Irrigate with 60 mLs as directed daily    Neurogenic bladder       sulfamethoxazole-trimethoprim 800-160 MG per tablet    BACTRIM DS/SEPTRA DS    20 tablet    Take 1 tablet by mouth 2 times daily    Dysuria, Urinary tract infection associated with catheterization of urinary tract, unspecified indwelling urinary catheter type, initial encounter (H)       tolterodine 2 MG tablet    DETROL    180 tablet    Take 1 tablet (2 mg) by mouth 2 times daily    Neurogenic bladder       TYLENOL PO      Take 500 mg by mouth as needed for mild pain or fever Reported on 2/15/2017        zinc oxide 10 % Oint      Externally apply topically 3 times daily        zolpidem 10 MG tablet    AMBIEN    30 tablet    Take 0.5-1 tablets (5-10 mg) by mouth nightly as needed for sleep    Primary insomnia

## 2018-07-12 NOTE — PROGRESS NOTES
SUBJECTIVE:   Riley Gifford is a 42 year old male who presents to clinic today for the following health issues:    Patient is new to this provider.    Joint Pain    Onset: couple weeks    Description:   Location: left wrist, right wrist and sternum   Character: Dull ache    Intensity: mild    Progression of Symptoms: same    Accompanying Signs & Symptoms:  Other symptoms: none    History:   Previous similar pain: YES- in chest / could be esophagus       Precipitating factors:   Trauma or overuse: no     Alleviating factors:  Improved by: nothing- other than prescriptions     Therapies Tried and outcome: omeprazole    Chest Pain:  Has h/o eosinophilia esophagitis as well as chest pain.  He has had EGD and dilation in the past.  During the day feels pain in the chest.  Burning in the sternum.  Notices after he eats, sometimes spontaneously.  This has been worsening.  He last went to [facility] [place] 2/27/18.  His eosinophilia esophagitis is managed with Budesonide and Omeprazole-Sodium Bicarbonate.    Had previous h/o abnormal EKG 3/12/18 and had gone to the ED for this with symptoms.  Cardiac work-up at that time was negative.  He had an echo back in 7/21/18 that was normal except the exam was not able to evaluate the upper chambers of his heart due to it was done with him in a wheelchair.    Problem list and histories reviewed & adjusted, as indicated.  Additional history: as documented    Patient Active Problem List   Diagnosis     Vitamin D deficiency     Eosinophilic esophagitis     Neurogenic bladder     CARDIOVASCULAR SCREENING; LDL GOAL LESS THAN 160     Quadriplegia (H)     Chronic constipation     Internal hemorrhoids with other complication     Diagnostic skin and sensitization tests(aka ALLERGENS)     Anal or rectal pain     Allergic rhinitis due to animal dander     Allergy to mold spores     House dust mite allergy     Insomnia     Health Care Home     Feeling worried     Gallstones     Chronic  midline thoracic back pain     Pulmonary nodules     ACP (advance care planning)     Past Surgical History:   Procedure Laterality Date     BACK SURGERY       C NONSPECIFIC PROCEDURE      C5-6 Fusion     C NONSPECIFIC PROCEDURE      Pressure Ulcer     COLONOSCOPY       CYSTOSCOPY N/A 6/23/2017    Procedure: CYSTOSCOPY;  Cystoscopy and Botox Injection Into the Bladder  ;  Surgeon: Evaristo Hayes MD;  [facility]     CYSTOSCOPY, INTRAVESICAL INJECTION N/A 5/12/2016    Procedure: CYSTOSCOPY, INTRAVESICAL INJECTION;  Surgeon: Evaristo Hayes MD;  Location: [facility]     CYSTOSCOPY, INTRAVESICAL INJECTION N/A 11/11/2016    Procedure: CYSTOSCOPY, INTRAVESICAL INJECTION;  Surgeon: Evaristo Hayes MD;  [facility]     CYSTOSCOPY, INTRAVESICAL INJECTION N/A 1/4/2018    Procedure: CYSTOSCOPY, INTRAVESICAL INJECTION;  Cystoscopy Botox Injection Into The Bladder;  Surgeon: Evaristo Hayes MD;  Location: [facility] OR     GI SURGERY      endoscopy x2     INJECT BOTOX N/A 6/23/2017    Procedure: INJECT BOTOX;;  Surgeon: Evaristo Hayes MD;  [facility]       Social History   Substance Use Topics     Smoking status: Never Smoker     Smokeless tobacco: Never Used     Alcohol use 0.0 oz/week     0 Standard drinks or equivalent per week      Comment: 1-2 drinks per month     Family History   Problem Relation Age of Onset     Breast Cancer Mother      Prostate Cancer Father      Breast Cancer Maternal Grandmother      Cancer Maternal Grandfather      Glaucoma No family hx of      Macular Degeneration No family hx of      Diabetes No family hx of      Hypertension No family hx of          Current Outpatient Prescriptions   Medication Sig Dispense Refill     Acetaminophen (TYLENOL PO) Take 500 mg by mouth as needed for mild pain or fever Reported on 2/15/2017       albuterol (PROAIR HFA/PROVENTIL HFA/VENTOLIN HFA) 108 (90 BASE) MCG/ACT Inhaler Inhale 2 puffs into the lungs as needed       Alum & Mag  Hydroxide-Simeth (ALUMINUM-MAGNESIUM-SIMETHICONE) 200-200-20 MG/5ML SUSP Take 15 mLs by mouth once for 1 dose 15 mL 0     bacitracin ([employer] BACITRACIN ZINC) ointment Apply topically 3 times daily as needed for wound care       baclofen (LIORESAL) 20 MG tablet Take 1 tablet (20 mg) by mouth 4 times daily 360 tablet 3     budesonide (PULMICORT) 0.5 MG/2ML neb solution USE 2 VIALS TWICE DAILY. MIX WITH HONEY AND SWALLOW 1080 mL 0     cetirizine (ZYRTEC) 10 MG tablet Take 10 mg by mouth daily       clotrimazole 10 MG maya Place 1 Maya (10 mg) inside cheek 5 times daily 70 Maya 0     COMPRESSION STOCKINGS Tens unit and electrodes       diphenhydrAMINE (BENADRYL) 25 MG tablet Take 25 mg by mouth every 8 hours as needed for itching or allergies Reported on 2/15/2017       docusate sodium (ENEMEEZ MINI) 283 MG enema Use one every other day and prn per patients's request. 30 enema 3     EPINEPHrine (EPIPEN/ADRENACLICK/OR ANY BX GENERIC EQUIV) 0.3 MG/0.3ML injection 2-pack Inject 0.3 mLs (0.3 mg) into the muscle as needed for anaphylaxis 0.6 mL 3     guaiFENesin (ROBITUSSIN) 100 MG/5ML LIQD 4-5 TSP twice a day as needed 560 mL 1     hydrocortisone (ANUSOL-HC) 2.5 % cream Place rectally 2 times daily              lidocaine, viscous, (XYLOCAINE) 2 % solution Take 15 mLs by mouth once for 1 dose swish and spit every 3-8 hours as needed; max 8 doses/24 hour period 15 mL 0     loperamide (IMODIUM A-D) 2 MG tablet Take 2 tabs (4 mg) after first loose stool, and then take one tab (2 mg) after each diarrheal stool.  Max of 8 tabs (16 mg) per day. 30 tablet 0     magnesium hydroxide (MILK OF MAGNESIA) 400 MG/5ML suspension Take 30 mLs by mouth daily as needed for constipation or heartburn 360 mL 1     NEW MED Gentamycin 240mg in 500mL 0.9% Normal Saline.  Instill 30mL into empty bladder at bedtime.  Leave in bladder overnight and drain in the morning 500 mL 3     nystatin (MYCOSTATIN) 403869 UNIT/GM POWD Apply topically daily as  needed 30 g 1     Omeprazole-Sodium Bicarbonate  MG PACK TAKE 1 PACKET BY MOUTH DISSOLVED IN LIQUID DAILY 90 each 0     ondansetron (ZOFRAN ODT) 4 MG ODT tab Take 1-2 tablets (4-8 mg) by mouth every 8 hours as needed for nausea 20 tablet 1     phenylephrine-cocoa butter (PREPARATION H) 0.25-88.44 % suppository Insert one suppository rectally twice daily as needed. 48 suppository 3     polyethylene glycol (MIRALAX) powder Take 17 g (1 capful) by mouth daily as needed for constipation 510 g 1     Simethicone 125 MG CAPS  28 capsule      sodium phosphate (FLEET ENEMA) 7-19 GM/118ML rectal enema Place 1 Bottle (1 enema) rectally daily as needed for constipation 1 Bottle 0     sterile water, bottle, irrigation Irrigate with 60 mLs as directed daily 1000 mL 0     sulfamethoxazole-trimethoprim (BACTRIM DS/SEPTRA DS) 800-160 MG per tablet Take 1 tablet by mouth 2 times daily 20 tablet 0     tolterodine (DETROL) 2 MG tablet Take 1 tablet (2 mg) by mouth 2 times daily 180 tablet 3     zinc oxide 10 % OINT Externally apply topically 3 times daily       zolpidem (AMBIEN) 10 MG tablet Take 0.5-1 tablets (5-10 mg) by mouth nightly as needed for sleep 30 tablet 5            ibuprofen 200 MG capsule Take 400 mg by mouth every 4 hours as needed for fever 120 capsule      Allergies   Allergen Reactions     Banana Hives     Other reaction(s): GI Upset     Egg/Pro [Chicken-Derived Products (Egg)]      Fish      Soybean Oil      Other reaction(s): *Unknown  Discovered on allergy testing. Has never had any reaction to his knowledge     Wheat      BP Readings from Last 3 Encounters:   07/12/18 100/60   06/05/18 92/66   05/10/18 (!) 87/61    Wt Readings from Last 3 Encounters:   02/12/18 130 lb (59 kg)   01/04/18 143 lb 11.2 oz (65.2 kg)   01/02/18 (!) 507 lb (230 kg)           Reviewed and updated as needed this visit by clinical staff  Tobacco  Allergies  Meds  Problems  Soc Hx      Reviewed and updated as needed this visit by  Provider  Allergies  Meds  Problems         ROS:  Constitutional, HEENT, cardiovascular, pulmonary, gi and gu systems are negative, except as otherwise noted.    OBJECTIVE:     /60 (BP Location: Right arm, Patient Position: Chair, Cuff Size: Adult Regular)  Pulse 97  Temp 97.9  F (36.6  C) (Oral)  Resp 22  Ht 6' (1.829 m)  SpO2 94%  There is no height or weight on file to calculate BMI.  GENERAL: healthy, alert and no distress  EYES: Eyes grossly normal to inspection, PERRL and conjunctivae and sclerae normal  NECK: no adenopathy and no asymmetry, masses, or scars  RESP: lungs clear to auscultation - no rales, rhonchi or wheezes  CV: regular rate and rhythm, normal S1 S2, no S3 or S4, no murmur, click or rub, no peripheral edema and peripheral pulses strong  ABDOMEN: soft, nontender, no hepatosplenomegaly, no masses and bowel sounds normal  MS: spasms present    Diagnostic Test Results:  Results for orders placed or performed in visit on 07/12/18   Lipid panel reflex to direct LDL Fasting   Result Value Ref Range    Cholesterol 137 <200 mg/dL    Triglycerides 72 <150 mg/dL    HDL Cholesterol 35 (L) >39 mg/dL    LDL Cholesterol Calculated 88 <100 mg/dL    Non HDL Cholesterol 102 <130 mg/dL   CBC with platelets and differential   Result Value Ref Range    WBC 9.8 4.0 - 11.0 10e9/L    RBC Count 4.52 4.4 - 5.9 10e12/L    Hemoglobin 14.3 13.3 - 17.7 g/dL    Hematocrit 42.9 40.0 - 53.0 %    MCV 95 78 - 100 fl    MCH 31.6 26.5 - 33.0 pg    MCHC 33.3 31.5 - 36.5 g/dL    RDW 12.5 10.0 - 15.0 %    Platelet Count 251 150 - 450 10e9/L    Diff Method Automated Method     % Neutrophils 30.5 %    % Lymphocytes 17.1 %    % Monocytes 5.3 %    % Eosinophils 46.1 %    % Basophils 1.0 %    Absolute Neutrophil 3.0 1.6 - 8.3 10e9/L    Absolute Lymphocytes 1.7 0.8 - 5.3 10e9/L    Absolute Monocytes 0.5 0.0 - 1.3 10e9/L    Absolute Eosinophils 4.5 (H) 0.0 - 0.7 10e9/L    Absolute Basophils 0.1 0.0 - 0.2 10e9/L   Comprehensive  metabolic panel (BMP + Alb, Alk Phos, ALT, AST, Total. Bili, TP)   Result Value Ref Range    Sodium 137 133 - 144 mmol/L    Potassium 4.2 3.4 - 5.3 mmol/L    Chloride 106 94 - 109 mmol/L    Carbon Dioxide 24 20 - 32 mmol/L    Anion Gap 7 3 - 14 mmol/L    Glucose 75 70 - 99 mg/dL    Urea Nitrogen 8 7 - 30 mg/dL    Creatinine 0.42 (L) 0.66 - 1.25 mg/dL    GFR Estimate >90 >60 mL/min/1.7m2    GFR Estimate If Black >90 >60 mL/min/1.7m2    Calcium 8.3 (L) 8.5 - 10.1 mg/dL    Bilirubin Total 0.5 0.2 - 1.3 mg/dL    Albumin 3.5 3.4 - 5.0 g/dL    Protein Total 6.5 (L) 6.8 - 8.8 g/dL    Alkaline Phosphatase 64 40 - 150 U/L    ALT 14 0 - 70 U/L    AST 8 0 - 45 U/L   TSH with free T4 reflex   Result Value Ref Range    TSH 1.68 0.40 - 4.00 mU/L       EKG - abnormal EKG, essentially unchanged from previous tracing 3/12/18    ASSESSMENT/PLAN:     1. Eosinophilic esophagitis  I suspect his symptoms are related to his eosinophilic esophagitis.  Absolute eosinophils level is elevated today.  Patient suggested a GI cocktail as this had worked for symptom relief in the past, and this was ordered.  - Refer back to [facility] ADULT REF CONSULT ONLY    2. Atypical chest pain  I suspect his symptoms are related to his eosinophilic esophagitis.  Absolute eosinophils level is elevated today.  Patient suggested a GI cocktail as this had worked for symptom relief in the past, and this was ordered.  However, given h/o abnormal EKG it would be reasonable to do a Cardiology consult.    - EKG 12-lead complete w/read - Clinics  - CARDIOLOGY EVAL ADULT REFERRAL  - CBC with platelets and differential  - Comprehensive metabolic panel (BMP + Alb, Alk Phos, ALT, AST, Total. Bili, TP)  - TSH with free T4 reflex  - Alum & Mag Hydroxide-Simeth (ALUMINUM-MAGNESIUM-SIMETHICONE) 200-200-20 MG/5ML SUSP; Take 15 mLs by mouth once for 1 dose  Dispense: 15 mL; Refill: 0  - lidocaine, viscous, (XYLOCAINE) 2 % solution; Take 15 mLs by mouth once for 1  dose swish and spit every 3-8 hours as needed; max 8 doses/24 hour period  Dispense: 15 mL; Refill: 0    3. Screening for hyperlipidemia    - Lipid panel reflex to direct LDL Fasting    CONSULTATION/REFERRAL to GI and Cardiology    Sinai Hernandez NP  [facility]

## 2018-07-12 NOTE — PATIENT INSTRUCTIONS
Labs today    Call to schedule visit with the heart doctor    Call to schedule visit with your GI doctor     [facility]   Discharged by : Ammy JONES CMA ([facility])    Paper scripts provided to patient : none      If you have any questions regarding your visit please contact your care team:     Team Gold                Clinic Hours Telephone Number     Dr. Isaura Ortega  Sinai Espositokirsten, CNP 7am-7pm  Monday - Thursday   7am-5pm  Fridays  [phone]   (Appointment scheduling available 24/7)     RN Line  [phone] option 2     Urgent Care - [place] - 11am-9pm Monday-Friday Saturday-Sunday- 9am-5pm     [place] -   5pm-9pm Monday-Friday Saturday-Sunday- 9am-5pm    [phone] - [place]    [phone] - [place]       For a Price Quote for your services, please call our Consumer Price Line at [phone].     What options do I have for visits at the clinic other than the traditional office visit?     To expand how we care for you, many of our providers are utilizing electronic visits (e-visits) and telephone visits, when medically appropriate, for interactions with their patients rather than a visit in the clinic. We also offer nurse visits for many medical concerns. Just like any other service, we will bill your insurance company for this type of visit based on time spent on the phone with your provider. Not all insurance companies cover these visits. Please check with your medical insurance if this type of visit is covered. You will be responsible for any charges that are not paid by your insurance.   E-visits via [employer]: generally incur a $35.00 fee.     Telephone visits:  Time spent on the phone: *charged based on time that is spent on the phone in increments of 10 minutes. Estimated cost:   5-10 mins $30.00   11-20 mins. $59.00   21-30 mins. $85.00       Use [employer] (secure email communication and access to  your chart) to send your primary care provider a message or make an appointment. Ask someone on your Team how to sign up for [employer].     As always, Thank you for trusting us with your health care needs!      [facility]:    Scheduling Appointments  [place]  Call: [phone]    [place], [facility]  Call: [phone]    [facility]  Call: [phone]    For Gastroenterology referrals   [facility] Gastroenterology   Clinics and Surgery [place], 4th Floor   [address] [place] [address]   Appointments: [phone]    WHERE TO GO FOR CARE?  Clinic    Make an appointment if you:       Are sick (cold, cough, flu, sore throat, earache or in pain).       Have a small injury (sprain, small cut, burn or broken bone).       Need a physical exam, Pap smear, vaccine or prescription refill.       Have questions about your health or medicines.    To reach us:      Call [phone] ([phone]). Open 24 hours every day. (For counseling services, call [phone].)    Log into [employer] at [email or web address]. (Visit [email or web address] to create an account.) Hospital emergency room    An emergency is a serious or life- threatening problem that must be treated right away.    Call [phone] or get to the hospital if you have:      Very bad or sudden:            - Chest pain or pressure         - Bleeding         - Head or belly pain         - Dizziness or trouble seeing, walking or                          Speaking      Problems breathing      Blood in your vomit or you are coughing up blood      A major injury (knocked out, loss of a finger or limb, rape, broken bone protruding from skin)    A mental health crisis. (Or call the Mental Health Crisis line at 1-354.239.7276 or Suicide Prevention Hotline at 1-180.642.4287.)    Open 24 hours every day. You don't need an appointment.     Urgent care    Visit urgent care for sickness or  small injuries when the clinic is closed. You don't need an appointment. To check hours or find an urgent care near you, visit [email or web address]. Online care    Get online care from [facility] for more than 70 common problems, like colds, allergies and infections. Open 24 hours every day at:   [email or web address]   Need help deciding?    For advice about where to be seen, you may call your clinic and ask to speak with a nurse. We're here for you 24 hours every day.         If you are deaf or hard of hearing, please let us know. We provide many free services including sign language interpreters, oral interpreters, TTYs, telephone amplifiers, note takers and written materials.

## 2018-07-13 ENCOUNTER — TELEPHONE (OUTPATIENT)
Dept: CARDIOLOGY | Facility: CLINIC | Age: 43
End: 2018-07-13

## 2018-07-13 LAB
ALBUMIN SERPL-MCNC: 3.5 G/DL (ref 3.4–5)
ALP SERPL-CCNC: 64 U/L (ref 40–150)
ALT SERPL W P-5'-P-CCNC: 14 U/L (ref 0–70)
ANION GAP SERPL CALCULATED.3IONS-SCNC: 7 MMOL/L (ref 3–14)
AST SERPL W P-5'-P-CCNC: 8 U/L (ref 0–45)
BILIRUB SERPL-MCNC: 0.5 MG/DL (ref 0.2–1.3)
BUN SERPL-MCNC: 8 MG/DL (ref 7–30)
CALCIUM SERPL-MCNC: 8.3 MG/DL (ref 8.5–10.1)
CHLORIDE SERPL-SCNC: 106 MMOL/L (ref 94–109)
CHOLEST SERPL-MCNC: 137 MG/DL
CO2 SERPL-SCNC: 24 MMOL/L (ref 20–32)
CREAT SERPL-MCNC: 0.42 MG/DL (ref 0.66–1.25)
GFR SERPL CREATININE-BSD FRML MDRD: >90 ML/MIN/1.7M2
GLUCOSE SERPL-MCNC: 75 MG/DL (ref 70–99)
HDLC SERPL-MCNC: 35 MG/DL
LDLC SERPL CALC-MCNC: 88 MG/DL
NONHDLC SERPL-MCNC: 102 MG/DL
POTASSIUM SERPL-SCNC: 4.2 MMOL/L (ref 3.4–5.3)
PROT SERPL-MCNC: 6.5 G/DL (ref 6.8–8.8)
SODIUM SERPL-SCNC: 137 MMOL/L (ref 133–144)
TRIGL SERPL-MCNC: 72 MG/DL
TSH SERPL DL<=0.005 MIU/L-ACNC: 1.68 MU/L (ref 0.4–4)

## 2018-07-13 NOTE — TELEPHONE ENCOUNTER
[facility] Call Center    Phone Message    May a detailed message be left on voicemail: yes    Reason for Call: Other: Patient has a referral in chart from Sinai Hernandez NP for Atypical chest pain. Patient states it is not sudden or onset, has no shortness of breath, or arm pain. The guidelines state to have a patient with chest pain to be seen in two days but there is no availability that soon. Pt has appt with Dr. Hicks on the 31st but wanting to be seen sooner. Please call if we can get him in sooner.   Can be seen at [facility] or [place].    Action Taken: Message routed to:  [facility] ([facility]): Cardiology

## 2018-07-14 RX ORDER — ALUMINUM HYDROXIDE, MAGNESIUM HYDROXIDE, SIMETHICONE 400; 400; 40 MG/10ML; MG/10ML; MG/10ML
15 SUSPENSION ORAL ONCE
Qty: 15 ML | Refills: 0 | Status: SHIPPED | OUTPATIENT
Start: 2018-07-14 | End: 2018-07-14

## 2018-07-18 ENCOUNTER — OFFICE VISIT (OUTPATIENT)
Dept: CARDIOLOGY | Facility: CLINIC | Age: 43
End: 2018-07-18
Payer: MEDICARE

## 2018-07-18 ENCOUNTER — TELEPHONE (OUTPATIENT)
Dept: FAMILY MEDICINE | Facility: CLINIC | Age: 43
End: 2018-07-18

## 2018-07-18 VITALS
SYSTOLIC BLOOD PRESSURE: 118 MMHG | BODY MASS INDEX: 17.63 KG/M2 | DIASTOLIC BLOOD PRESSURE: 82 MMHG | WEIGHT: 130 LBS | OXYGEN SATURATION: 94 % | HEART RATE: 92 BPM

## 2018-07-18 DIAGNOSIS — K20.0 EOSINOPHILIC ESOPHAGITIS: ICD-10-CM

## 2018-07-18 DIAGNOSIS — R07.89 ATYPICAL CHEST PAIN: Primary | ICD-10-CM

## 2018-07-18 PROCEDURE — 99204 OFFICE O/P NEW MOD 45 MIN: CPT | Performed by: INTERNAL MEDICINE

## 2018-07-18 NOTE — PATIENT INSTRUCTIONS
Thank you for coming to the [facility] @ [facility]; please note the following instructions:    1. Dr. Anai Hicks is recommending to see you on an as needed basis regarding your heart; please follow up with your primary care provider.  It was a pleasuring seeing you today at the [facility] Care @ the [facility].        If you have any questions regarding your visit please contact your care team:     Cardiology  Telephone Number   Rosenda H., RN  Brent MANCINI, RN   Vanda GALLO, AMANDA ALFREDO MA   [phone]    *After hours: [phone]   For scheduling appts:     [phone] or    [phone] (select option 1)    *After hours: [phone]     For the Device Clinic (Pacemakers and ICD's)  RN's :  Yessy Sánchez   During business hours: [phone]    *After business hours:  [phone] (select option 4)      Normal test result notifications will be released via [employer] or mailed within 7 business days.  All other test results, will be communicated via telephone once reviewed by your cardiologist.    If you need a medication refill please contact your pharmacy.  Please allow 3 business days for your refill to be completed.    As always, thank you for trusting us with your health care needs!
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
## 2018-07-18 NOTE — TELEPHONE ENCOUNTER
Med reconciliation form from Eliane Londono for cert period 7/16/2018-7/16/2018 received and given to Team Enzo ALBERTS to review.  Ammy Zelaya CMA ([facility])

## 2018-07-18 NOTE — NURSING NOTE
Chief Complaint   Patient presents with     Chest Pain     New - Atypical chest pain referral. Chest pains for a couple of weeks.       Initial /82 (BP Location: Right arm, Patient Position: Chair, Cuff Size: Adult Regular)  Pulse 92  Wt 59 kg (130 lb)  SpO2 94%  BMI 17.63 kg/m2 Estimated body mass index is 17.63 kg/(m^2) as calculated from the following:    Height as of 7/12/18: 1.829 m (6').    Weight as of this encounter: 59 kg (130 lb)..  BP completed using cuff size: regular    Alice Watts MA

## 2018-07-18 NOTE — MR AVS SNAPSHOT
After Visit Summary   7/18/2018    Riley Gifford    MRN: [id number]           Patient Information     Date Of Birth          1975        Visit Information        Provider Department      7/18/2018 9:00 AM Anai Hicks MD [facility]        Care Instructions    Thank you for coming to the [facility] @ [facility]; please note the following instructions:    1. Dr. Anai Hicks is recommending to see you on an as needed basis regarding your heart; please follow up with your primary care provider.  It was a pleasuring seeing you today at the [facility] Care @ the [facility].        If you have any questions regarding your visit please contact your care team:     Cardiology  Telephone Number   Rosenda REDDING, RN  Brent MANCINI, ARISTEO GALLO, AMANDA ALFREDO MA   [phone]    *After hours: [phone]   For scheduling appts:     [phone] or    [phone] (select option 1)    *After hours: [phone]     For the Device Clinic (Pacemakers and ICD's)  RN's :  Yessy Sánchez   During business hours: [phone]    *After business hours:  [phone] (select option 4)      Normal test result notifications will be released via [employer] or mailed within 7 business days.  All other test results, will be communicated via telephone once reviewed by your cardiologist.    If you need a medication refill please contact your pharmacy.  Please allow 3 business days for your refill to be completed.    As always, thank you for trusting us with your health care needs!            Follow-ups after your visit        Who to contact     If you have questions or need follow up information about today's clinic visit or your schedule please contact [facility] directly at [phone].  Normal or non-critical lab and imaging results will be communicated to you by [employer],  letter or phone within 4 business days after the clinic has received the results. If you do not hear from us within 7 days, please contact the clinic through [employer] or phone. If you have a critical or abnormal lab result, we will notify you by phone as soon as possible.  Submit refill requests through [employer] or call your pharmacy and they will forward the refill request to us. Please allow 3 business days for your refill to be completed.          Additional Information About Your Visit        [employer] Medical Information     [employer] gives you secure access to your electronic health record. If you see a primary care provider, you can also send messages to your care team and make appointments. If you have questions, please call your primary care clinic.  If you do not have a primary care provider, please call [phone] and they will assist you.        Care EveryWhere ID     This is your Care EveryWhere ID. This could be used by other organizations to access your [place] medical records  [id number]        Your Vitals Were     Pulse Pulse Oximetry BMI (Body Mass Index)             92 94% 17.63 kg/m2          Blood Pressure from Last 3 Encounters:   07/18/18 118/82   07/12/18 100/60   06/05/18 92/66    Weight from Last 3 Encounters:   07/18/18 59 kg (130 lb)   02/12/18 59 kg (130 lb)   01/04/18 65.2 kg (143 lb 11.2 oz)              Today, you had the following     No orders found for display       Primary Care Provider Office Phone # Fax #    Raffy Harris PA-C [phone]       [facility] [address]        Goals        General    #1 Improve chronic symptoms (pt-stated)     Notes - Note created  6/28/2018  2:09 PM by Robbin Andino, RN    Goal Statement: I will try using the marijuana vapor on a scheduled basis to see if it will control the chronic symptoms of substernal pain as evidenced by patient verbalization.  Measure of Success: patient verbalizes that the substernal pain is  controlled for a specific amount of time.  Supportive Steps to Achieve: Try using the vapor 2-3 times per day for 2 weeks to see if a pattern of pain control is noticed  Barriers: none  Strengths: motivated  Date to Achieve By: ongoing          Equal Access to Services     THERON DIAZ: Juhi Vides, wagetda luqadaha, qaybta kaalmada petra, monae cherie clancyjordonyeni diaz. So [facility] [phone].    ATENCIÓN: Si habla español, tiene a borden disposición servicios gratuitos de asistencia lingüística. Llame al [phone].    We comply with applicable federal civil rights laws and [place] laws. We do not discriminate on the basis of race, color, national origin, age, disability, sex, sexual orientation, or gender identity.            Thank you!     Thank you for choosing [facility] PHYSICI[facility] HEART AT [facility]  for your care. Our goal is always to provide you with excellent care. Hearing back from our patients is one way we can continue to improve our services. Please take a few minutes to complete the written survey that you may receive in the mail after your visit with us. Thank you!             Your Updated Medication List - Protect others around you: Learn how to safely use, store and throw away your medicines at www.disposemymeds.org.          This list is accurate as of 7/18/18  9:55 AM.  Always use your most recent med list.                   Brand Name Dispense Instructions for use Diagnosis    albuterol 108 (90 Base) MCG/ACT Inhaler    PROAIR HFA/PROVENTIL HFA/VENTOLIN HFA     Inhale 2 puffs into the lungs as needed        alum & mag hydroxide-simethicone 200-200-20 MG/5ML Susp suspension    MYLANTA/MAALOX     Take 30 mLs by mouth    Esophageal reflux       baclofen 20 MG tablet    LIORESAL    360 tablet    Take 1 tablet (20 mg) by mouth 4 times daily    Quadriplegia (H)       budesonide 0.5 MG/2ML neb solution    PULMICORT    1080 mL    USE 2 VIALS TWICE DAILY.  MIX WITH HONEY AND SWALLOW    Eosinophilic esophagitis       cetirizine 10 MG tablet    zyrTEC     Take 10 mg by mouth daily        clotrimazole 10 MG maya     70 Maya    Place 1 Maya (10 mg) inside cheek 5 times daily        COMPRESSION STOCKINGS      Tens unit and electrodes        CVS BACITRACIN ZINC ointment   Generic drug:  bacitracin      Apply topically 3 times daily as needed for wound care        diphenhydrAMINE 25 MG tablet    BENADRYL     Take 25 mg by mouth every 8 hours as needed for itching or allergies Reported on 2/15/2017        docusate sodium 283 MG enema    ENEMEEZ MINI    30 enema    Use one every other day and prn per patients's request.    Chronic constipation       EPINEPHrine 0.3 MG/0.3ML injection 2-pack    EPIPEN/ADRENACLICK/or ANY BX GENERIC EQUIV    0.6 mL    Inject 0.3 mLs (0.3 mg) into the muscle as needed for anaphylaxis    Reaction to food, initial encounter       guaiFENesin 100 MG/5ML Liqd    ROBITUSSIN    560 mL    4-5 TSP twice a day as needed    Cough       hydrocortisone 2.5 % cream    ANUSOL-HC     Place rectally 2 times daily        ibuprofen 200 MG capsule     120 capsule    Take 400 mg by mouth every 4 hours as needed for fever        lidocaine 5 % ointment    XYLOCAINE     Apply topically as needed for moderate pain        loperamide 2 MG tablet    IMODIUM A-D    30 tablet    Take 2 tabs (4 mg) after first loose stool, and then take one tab (2 mg) after each diarrheal stool.  Max of 8 tabs (16 mg) per day.        MILK OF MAGNESIA 400 MG/5ML suspension   Generic drug:  magnesium hydroxide     360 mL    Take 30 mLs by mouth daily as needed for constipation or heartburn        NEW MED     500 mL    Gentamycin 240mg in 500mL 0.9% Normal Saline. Instill 30mL into empty bladder at bedtime. Leave in bladder overnight and drain in the morning    Recurrent UTI       nystatin 269318 UNIT/GM Powd    MYCOSTATIN    30 g    Apply topically daily as needed        Omeprazole-Sodium  Bicarbonate  MG Pack     90 each    TAKE 1 PACKET BY MOUTH DISSOLVED IN LIQUID DAILY    Eosinophilic esophagitis, Gastroesophageal reflux disease without esophagitis       ondansetron 4 MG ODT tab    ZOFRAN ODT    20 tablet    Take 1-2 tablets (4-8 mg) by mouth every 8 hours as needed for nausea    Abdominal pain, generalized       phenylephrine-cocoa butter 0.25-88.44 % per suppository    PREPARATION H    48 suppository    Insert one suppository rectally twice daily as needed.    External hemorrhoids       polyethylene glycol powder    MIRALAX    510 g    Take 17 g (1 capful) by mouth daily as needed for constipation    Constipation, unspecified constipation type       Simethicone 125 MG Caps     28 capsule         sodium phosphate 7-19 GM/118ML rectal enema     1 Bottle    Place 1 Bottle (1 enema) rectally daily as needed for constipation        sterile water (bottle) irrigation     1000 mL    Irrigate with 60 mLs as directed daily    Neurogenic bladder       sulfamethoxazole-trimethoprim 800-160 MG per tablet    BACTRIM DS/SEPTRA DS    20 tablet    Take 1 tablet by mouth 2 times daily    Dysuria, Urinary tract infection associated with catheterization of urinary tract, unspecified indwelling urinary catheter type, initial encounter (H)       tolterodine 2 MG tablet    DETROL    180 tablet    Take 1 tablet (2 mg) by mouth 2 times daily    Neurogenic bladder       TYLENOL PO      Take 500 mg by mouth as needed for mild pain or fever Reported on 2/15/2017        zinc oxide 10 % Oint      Externally apply topically 3 times daily        zolpidem 10 MG tablet    AMBIEN    30 tablet    Take 0.5-1 tablets (5-10 mg) by mouth nightly as needed for sleep    Primary insomnia
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
## 2018-07-25 ENCOUNTER — TRANSFERRED RECORDS (OUTPATIENT)
Dept: HEALTH INFORMATION MANAGEMENT | Facility: CLINIC | Age: 43
End: 2018-07-25

## 2018-07-26 RX ORDER — ALBUTEROL SULFATE 90 UG/1
AEROSOL, METERED RESPIRATORY (INHALATION)
Qty: 18 G | Refills: 0 | OUTPATIENT
Start: 2018-07-26

## 2018-07-26 NOTE — TELEPHONE ENCOUNTER
Med hasn't been prescribed within our system. Sent denied with note to send to previous prescriber.  Neelima Yeung RN

## 2018-07-26 NOTE — TELEPHONE ENCOUNTER
"Requested Prescriptions   Pending Prescriptions Disp Refills     VENTOLIN  (90 Base) MCG/ACT Inhaler [Pharmacy Med Name: VENTOLIN HFA INH W/DOS CTR 200PUFFS]  (HISTORICAL)        Last Written Prescription Date:  3/12/2018  Last Fill Quantity: na,   # refills: na  Last Office Visit: 7/12/2018 w/ LAYLA Hernandez    Future Office visit:       Routing refill request to provider for review/approval because:  Medication is reported/historical   18 g 0     Sig: INHALE 2 PUFFS BY MOUTH FOUR TIMES DAILY AS NEEDED    Asthma Maintenance Inhalers - Anticholinergics Passed    7/26/2018  8:19 AM       Passed - Patient is age 12 years or older       Passed - Recent (12 mo) or future (30 days) visit within the authorizing provider's specialty    Patient had office visit in the last 12 months or has a visit in the next 30 days with authorizing provider or within the authorizing provider's specialty.  See \"Patient Info\" tab in inbasket, or \"Choose Columns\" in Meds & Orders section of the refill encounter.              "

## 2018-08-03 ENCOUNTER — TELEPHONE (OUTPATIENT)
Dept: FAMILY MEDICINE | Facility: CLINIC | Age: 43
End: 2018-08-03

## 2018-08-03 DIAGNOSIS — R05.9 COUGH: Primary | ICD-10-CM

## 2018-08-03 RX ORDER — ALBUTEROL SULFATE 90 UG/1
AEROSOL, METERED RESPIRATORY (INHALATION)
Qty: 18 G | Refills: 2 | Status: SHIPPED | OUTPATIENT
Start: 2018-08-03 | End: 2019-06-07

## 2018-08-03 NOTE — TELEPHONE ENCOUNTER
Route refill request for Ventolin inhaler to PCP because it is historical.  Spoke with patient, and he had this prescribed by [facility] ED back in March when he was having dyspnea which had resolved with a neb.  Patient reports using this medication as needed on occasion for SOB/wheezing due to allergies to pollen, pet dander, etc.  He denies any acute illness or exacerbation at this time.     Kelley Anthony RN

## 2018-08-03 NOTE — TELEPHONE ENCOUNTER
Forms received from [employer]/Prescription-Incontinence Belted Extra Absorbant (08/01/18) for Raffy Harris.  Forms placed in provider 'sign me' folder.  Please Fax forms to [phone] after completion.    Luli Correa  Patient Representative

## 2018-08-03 NOTE — TELEPHONE ENCOUNTER
"Requested Prescriptions   Pending Prescriptions Disp Refills     VENTOLIN  (90 Base) MCG/ACT Inhaler [Pharmacy Med Name: VENTOLIN HFA INH W/DOS CTR 200PUFFS]  (HISTORICAL)        Last Written Prescription Date:  na  Last Fill Quantity: na,   # refills: na  Last Office Visit: 7/12/2018    Future Office visit:       Routing refill request to provider for review/approval because:  Medication is reported/historical     18 g 0     Sig: INHALE 2 PUFFS BY MOUTH FOUR TIMES DAILY AS NEEDED    Asthma Maintenance Inhalers - Anticholinergics Passed    8/3/2018 10:33 AM       Passed - Patient is age 12 years or older       Passed - Recent (12 mo) or future (30 days) visit within the authorizing provider's specialty    Patient had office visit in the last 12 months or has a visit in the next 30 days with authorizing provider or within the authorizing provider's specialty.  See \"Patient Info\" tab in inbasket, or \"Choose Columns\" in Meds & Orders section of the refill encounter.              "

## 2018-08-07 ENCOUNTER — TELEPHONE (OUTPATIENT)
Dept: UROLOGY | Facility: CLINIC | Age: 43
End: 2018-08-07

## 2018-08-07 DIAGNOSIS — N39.0 URINARY TRACT INFECTION WITHOUT HEMATURIA, SITE UNSPECIFIED: Primary | ICD-10-CM

## 2018-08-07 DIAGNOSIS — N39.0 URINARY TRACT INFECTION WITHOUT HEMATURIA, SITE UNSPECIFIED: ICD-10-CM

## 2018-08-07 PROCEDURE — 87086 URINE CULTURE/COLONY COUNT: CPT | Performed by: UROLOGY

## 2018-08-07 PROCEDURE — 81001 URINALYSIS AUTO W/SCOPE: CPT | Performed by: UROLOGY

## 2018-08-07 NOTE — TELEPHONE ENCOUNTER
Forms faxed back to [facility] , fax # [phone]    Lety Yeung CMA ([facility])

## 2018-08-07 NOTE — TELEPHONE ENCOUNTER
[facility] Call Center    Phone Message    May a detailed message be left on voicemail: yes    Reason for Call: Other: possible UTI, wants to know if he can drop off a specimen, please call to advise.      Action Taken: Message routed to:  [facility] uro

## 2018-08-07 NOTE — TELEPHONE ENCOUNTER
Called patient to let him know that I placed orders for UA/UC. He states that he will go to [facility] to leave his specimen.      Raegan Le MA

## 2018-08-08 DIAGNOSIS — N39.0 URINARY TRACT INFECTION: Primary | ICD-10-CM

## 2018-08-08 LAB
BACTERIA SPEC CULT: NO GROWTH
SPECIMEN SOURCE: NORMAL

## 2018-08-23 ENCOUNTER — PATIENT OUTREACH (OUTPATIENT)
Dept: CARE COORDINATION | Facility: CLINIC | Age: 43
End: 2018-08-23

## 2018-08-23 ASSESSMENT — ACTIVITIES OF DAILY LIVING (ADL): DEPENDENT_IADLS:: CLEANING;COOKING;LAUNDRY;SHOPPING;MEAL PREPARATION;MEDICATION MANAGEMENT

## 2018-08-23 NOTE — PROGRESS NOTES
"Clinic Care Coordination Contact    Clinic Care Coordination Contact  OUTREACH    Referral Information:  Referral Source: PCP    Primary Diagnosis: Genitourinary Disorders (wound care)    Chief Complaint   Patient presents with     Clinic Care Coordination - Follow-up     RN        [place] Utilization:   Clinic Utilization  Difficulty keeping appointments:: No  Utilization    Last refreshed: 8/16/2018  3:33 AM:  No Show Count (past year) 0       Last refreshed: 8/16/2018  3:33 AM:  ED visits 0       Last refreshed: 8/16/2018  3:33 AM:  Hospital admissions 0          Current as of: 8/16/2018  3:33 AM             Clinical Concerns:  Current Medical Concerns:  Patient reports things have been going very well for him.  His chronic back pain is rated 3-4/10 at worst.  He reports his substernal pain has no longer been bothering him.  He is using her medical cannabis vaporizer 1-2 times/day, \"but I try not to do too much because it makes me sleepy.\"  Patient denies any other questions or concerns at this time.  Patient Active Problem List   Diagnosis     Vitamin D deficiency     Eosinophilic esophagitis     Neurogenic bladder     CARDIOVASCULAR SCREENING; LDL GOAL LESS THAN 160     Quadriplegia (H)     Chronic constipation     Internal hemorrhoids with other complication     Diagnostic skin and sensitization tests(aka ALLERGENS)     Anal or rectal pain     Allergic rhinitis due to animal dander     Allergy to mold spores     House dust mite allergy     Insomnia     Health Care Home     Feeling worried     Gallstones     Chronic midline thoracic back pain     Pulmonary nodules     ACP (advance care planning)        Current Behavioral Concerns: n/a    Education Provided to patient: RN CC reviewed current plan of care, overdue health care items.   Pain  Pain (GOAL):: Yes  Location of chronic pain:: back and sacral area  Chronic pain severity:: 4  Limitation of routine activities due to chronic pain:: Yes  Description: " Unable to perform most daily activities (chores, hobbies, social activities, driving)  Alleviating Factors: Pain Medication  Health Maintenance Reviewed: Due/Overdue   Health Maintenance Due   Topic Date Due     EYE EXAM Q1 YEAR  04/26/2018      Clinical Pathway: None    Medication Management:  Patient independent in medication management and verbalizes adherence and understanding of medication regimen.       Functional Status:  Dependent ADLs:: Wheelchair-independent, Bathing, Dressing, Grooming, Positioning, Transfers  Dependent IADLs:: Cleaning, Cooking, Laundry, Shopping, Meal Preparation, Medication Management  Bed or wheelchair confined:: No  Mobility Status: Dependent/Assisted by Another    Living Situation:  Current living arrangement:: I live in assisted living  Type of residence:: Assisted living    Diet/Exercise/Sleep:  Diet:: Regular  Inadequate nutrition (GOAL):: No  Food Insecurity: No  Tube Feeding: No  Exercise:: Unable to exercise  Inadequate activity/exercise (GOAL):: No  Significant changes in sleep pattern (GOAL): No    Transportation:  Transportation concerns (GOAL):: No  Transportation means:: Accessible car     Psychosocial:  Jainism or spiritual beliefs that impact treatment:: No  Mental health DX:: No  Mental health management concern (GOAL):: No  Informal Support system:: Family, Friends     Financial/Insurance:   Financial/Insurance concerns (GOAL):: No       Resources and Interventions:  Current Resources:   List of home care services:: Skilled Nursing;         Equipment Currently Used at Home: commode, grab bar, shower chair, transfer board, wheelchair, power    Advance Care Plan/Directive  Advanced Care Plans/Directives on file:: Yes  Type Advanced Care Plans/Directives: Advanced Directive - On File (n/a)  Advanced Care Plan/Directive Status: Not Applicable    Referrals Placed:  (unknown)     Goals:   Goals        General    #1 Improve chronic symptoms (pt-stated)     Notes - Note  created  6/28/2018  2:09 PM by Robbin Andino, RN    Goal Statement: I will try using the marijuana vapor on a scheduled basis to see if it will control the chronic symptoms of substernal pain as evidenced by patient verbalization.  Measure of Success: patient verbalizes that the substernal pain is controlled for a specific amount of time.  Supportive Steps to Achieve: Try using the vapor 2-3 times per day for 2 weeks to see if a pattern of pain control is noticed  Barriers: none  Strengths: motivated  Date to Achieve By: ongoing                Patient/Caregiver understanding: Patient has a good understanding of his current plan of care and disease process.    Outreach Frequency: monthly  Future Appointments              In 2 weeks Evaristo Hayes MD [facility] and [facility], [facility]          Plan:   1. Patient will continue to follow treatment plan as directed and follow up with PCP and specialists with concerns ongoing.   2. Patient will continue to monitor response to medical cannabis for his pain control.  3. RN CC to follow up in 1 month.    Melissa Behl BSN, RN, PHN  [facility] Care Coordinator  [phone]

## 2018-08-24 DIAGNOSIS — N31.9 NEUROGENIC BLADDER: Primary | ICD-10-CM

## 2018-09-04 ENCOUNTER — PRE VISIT (OUTPATIENT)
Dept: UROLOGY | Facility: CLINIC | Age: 43
End: 2018-09-04

## 2018-09-04 DIAGNOSIS — N39.0 URINARY TRACT INFECTION: ICD-10-CM

## 2018-09-04 DIAGNOSIS — N31.9 NEUROGENIC BLADDER: Primary | ICD-10-CM

## 2018-09-04 LAB
ALBUMIN UR-MCNC: NEGATIVE MG/DL
APPEARANCE UR: CLEAR
BACTERIA #/AREA URNS HPF: ABNORMAL /HPF
BILIRUB UR QL STRIP: NEGATIVE
COLOR UR AUTO: YELLOW
GLUCOSE UR STRIP-MCNC: NEGATIVE MG/DL
HGB UR QL STRIP: NEGATIVE
KETONES UR STRIP-MCNC: NEGATIVE MG/DL
LEUKOCYTE ESTERASE UR QL STRIP: ABNORMAL
NITRATE UR QL: NEGATIVE
NON-SQ EPI CELLS #/AREA URNS LPF: ABNORMAL /LPF
PH UR STRIP: 7 PH (ref 5–7)
RBC #/AREA URNS AUTO: ABNORMAL /HPF
SOURCE: ABNORMAL
SP GR UR STRIP: 1.02 (ref 1–1.03)
UROBILINOGEN UR STRIP-ACNC: 0.2 EU/DL (ref 0.2–1)
WBC #/AREA URNS AUTO: ABNORMAL /HPF

## 2018-09-04 PROCEDURE — 87088 URINE BACTERIA CULTURE: CPT | Performed by: UROLOGY

## 2018-09-04 PROCEDURE — 87186 SC STD MICRODIL/AGAR DIL: CPT | Performed by: UROLOGY

## 2018-09-04 PROCEDURE — 87086 URINE CULTURE/COLONY COUNT: CPT | Performed by: UROLOGY

## 2018-09-04 PROCEDURE — 81001 URINALYSIS AUTO W/SCOPE: CPT | Performed by: UROLOGY

## 2018-09-04 RX ORDER — CIPROFLOXACIN 500 MG/1
500 TABLET, FILM COATED ORAL 2 TIMES DAILY
Qty: 3 TABLET | Refills: 0 | Status: SHIPPED | OUTPATIENT
Start: 2018-09-04 | End: 2018-10-01

## 2018-09-04 NOTE — TELEPHONE ENCOUNTER
Reason for visit: Cystoscopy with botox injection     Relevant information: Neurogenic bladder, pt dropping of a urine sample for culture today 9/4/18.     Records/imaging/labs: all records available    Pt called: Yes    Rooming: dip

## 2018-09-06 ENCOUNTER — TELEPHONE (OUTPATIENT)
Dept: UROLOGY | Facility: CLINIC | Age: 43
End: 2018-09-06

## 2018-09-06 DIAGNOSIS — N39.0 URINARY TRACT INFECTION: Primary | ICD-10-CM

## 2018-09-06 DIAGNOSIS — K64.9 HEMORRHOIDS, UNSPECIFIED HEMORRHOID TYPE: ICD-10-CM

## 2018-09-06 LAB
BACTERIA SPEC CULT: ABNORMAL
SPECIMEN SOURCE: ABNORMAL

## 2018-09-06 RX ORDER — SULFAMETHOXAZOLE/TRIMETHOPRIM 800-160 MG
1 TABLET ORAL 2 TIMES DAILY
Qty: 10 TABLET | Refills: 0 | Status: SHIPPED | OUTPATIENT
Start: 2018-09-06 | End: 2019-01-22

## 2018-09-06 NOTE — TELEPHONE ENCOUNTER
[facility] Call Center    Phone Message    May a detailed message be left on voicemail: yes    Reason for Call: Requesting Results   Name/type of test: UA and labs  Date of test: 9/4/18  Was test done at a location other than [facility] (Please fill in the location if not [facility])?: No      OTHER: Pt needs to know lab results to start his antibiotics before his botox on Monday.  Please call this Pt back asap.  TY.    Action Taken: Other: Uro

## 2018-09-06 NOTE — TELEPHONE ENCOUNTER
"Requested Prescriptions   Pending Prescriptions Disp Refills     PROCTOZONE-HC 2.5 % cream [Pharmacy Med Name: PROCTOZONE-HC 2.5% CREAM]  (HISTORICAL)        Last Written Prescription Date:  na  Last Fill Quantity: na,   # refills: na  Last Office Visit: 3/20/2018  w/ KASEY Harris    Future Office visit:       Routing refill request to provider for review/approval because:  Medication is reported/historical   30 g 0     Sig: USE RECTALLY TWICE DAILY    Topical Steroids and Nonsteroidals Protocol Passed    9/6/2018  2:10 PM       Passed - Patient is age 6 or older       Passed - Authorizing prescriber's most recent note related to this medication read.    If refill request is for ophthalmic use, please forward request to provider for approval.         Passed - High potency steroid not ordered       Passed - Recent (12 mo) or future (30 days) visit within the authorizing provider's specialty    Patient had office visit in the last 12 months or has a visit in the next 30 days with authorizing provider or within the authorizing provider's specialty.  See \"Patient Info\" tab in inbasket, or \"Choose Columns\" in Meds & Orders section of the refill encounter.              "

## 2018-09-07 NOTE — TELEPHONE ENCOUNTER
Routing refill request to provider for review/approval because:  Medication is reported/historical    Kem Joya RN

## 2018-09-10 ENCOUNTER — TELEPHONE (OUTPATIENT)
Dept: UROLOGY | Facility: CLINIC | Age: 43
End: 2018-09-10

## 2018-09-10 NOTE — TELEPHONE ENCOUNTER
[facility] Call Center    Phone Message    May a detailed message be left on voicemail: yes    Reason for Call: Other: Pt calling not feeling well asking for a call back to see if he should still come in for botox today. Per pt he may still have infection and his stomach is bothering him. Please return pt call.      Action Taken: Message routed to:  [facility] nickolas uro

## 2018-09-10 NOTE — TELEPHONE ENCOUNTER
Spoke with patient, he's still very nauseous since he's had a UTI.  Patient is being treated with Bactrim DS BID x 5 days.  Will repeat UA/UC if he continues to be nauseous after he's done with his Bactrim DS.  Cancelled botox in clinic today and will reschedule for a later date.    Amina Doe RN  Care Coordinator- Re[facility]ive Urology

## 2018-09-24 ENCOUNTER — TELEPHONE (OUTPATIENT)
Dept: FAMILY MEDICINE | Facility: CLINIC | Age: 43
End: 2018-09-24

## 2018-09-24 ENCOUNTER — PATIENT OUTREACH (OUTPATIENT)
Dept: CARE COORDINATION | Facility: CLINIC | Age: 43
End: 2018-09-24

## 2018-09-24 ASSESSMENT — ACTIVITIES OF DAILY LIVING (ADL): DEPENDENT_IADLS:: CLEANING;COOKING;LAUNDRY;SHOPPING;MEAL PREPARATION;MEDICATION MANAGEMENT

## 2018-09-24 NOTE — PROGRESS NOTES
Clinic Care Coordination Contact    Clinic Care Coordination Contact  OUTREACH    Referral Information:  Referral Source: PCP    Primary Diagnosis: Genitourinary Disorders (wound care)    Chief Complaint   Patient presents with     Clinic Care Coordination - Follow-up     RN CC       [place] Utilization:   Clinic Utilization  Difficulty keeping appointments:: No  Compliance Concerns: No  No-Show Concerns: No  No PCP office visit in Past Year: No  Utilization    Last refreshed: 9/21/2018  4:05 PM:  No Show Count (past year) 0       Last refreshed: 9/21/2018  4:05 PM:  ED visits 0       Last refreshed: 9/21/2018  4:05 PM:  Hospital admissions 0          Current as of: 9/21/2018  4:05 PM             Clinical Concerns:  Current Medical Concerns:  The patient has abdominal pain recurring.  And his urine is looking very cloudy.  He is concerned for a UTI and he is scheduled for botox next Monday.    Current Behavioral Concerns: none noted currently    Education Provided to patient: suggestion of a visit with the PCP    Pain  Pain (GOAL):: Yes  Type: Chronic (>3mo)  Location of chronic pain:: back and sacral area  Radiating: Yes  Progression: Unchanged  Description of pain: Stabbing, Shooting  Chronic pain severity:: 4  Limitation of routine activities due to chronic pain:: Yes  Description: Unable to perform most daily activities (chores, hobbies, social activities, driving)  Alleviating Factors: Pain Medication  Aggravating Factors: Positioning, Breathing  Health Maintenance Reviewed: Due/Overdue   Clinical Pathway: None    Medication Management:  Patient is knowledgeable on medications and is adherent.  No financial concerns reported at this time.       Functional Status:  Dependent ADLs:: Wheelchair-independent, Bathing, Dressing, Grooming, Positioning, Transfers  Dependent IADLs:: Cleaning, Cooking, Laundry, Shopping, Meal Preparation, Medication Management  Bed or wheelchair confined:: No  Mobility Status:  Dependent/Assisted by Another  Fallen 2 or more times in the past year?: No  Any fall with injury in the past year?: No    Living Situation:  Current living arrangement:: I live in assisted living  Type of residence:: Assisted living    Diet/Exercise/Sleep:  Diet:: Regular  Inadequate nutrition (GOAL):: No  Food Insecurity: No  Tube Feeding: No  Exercise:: Unable to exercise  Inadequate activity/exercise (GOAL):: No  Significant changes in sleep pattern (GOAL): No    Transportation:  Transportation concerns (GOAL):: No  Transportation means:: Accessible car     Psychosocial:  Uatsdin or spiritual beliefs that impact treatment:: No  Mental health DX:: No  Mental health management concern (GOAL):: No  Informal Support system:: Family, Friends     Financial/Insurance:   Financial/Insurance concerns (GOAL):: No       Resources and Interventions:  Current Resources:   List of home care services:: Skilled Nursing;   Community Resources: Other (see comment) ([place] and ILS workers)  Supplies used at home:: Other (self cath supplies)  Equipment Currently Used at Home: commode, grab bar, shower chair, transfer board, wheelchair, power    Advance Care Plan/Directive  Advanced Care Plans/Directives on file:: Yes  Type Advanced Care Plans/Directives: Advanced Directive - On File (n/a)  Advanced Care Plan/Directive Status: Not Applicable    Referrals Placed: None (unknown)     Goals:   Goals        General    Healthy Eating (pt-stated)     Notes - Note created  9/24/2018 11:50 AM by Robbin Andino RN    Goal Statement: I will work with the ILS person to make more palatable meals for me to eat.  Measure of Success: The patient is gaining weight  Supportive Steps to Achieve: weigh the patient routinely.  Barriers: food allergies  Strengths: motivated to gain weight  Date to Achieve By: ongoing  Patient expressed understanding of goal: yes                  Patient/Caregiver understanding: The patient has excellent understanding of  the disease process    Outreach Frequency: monthly  Future Appointments              Tomorrow Raffy Harris PA-C [place], [place]    In 1 week Evaristo Hayes MD [facility] Prostate and Urologic Cancers, [facility]          Plan: 1).  The patient will make an appointment with a provider to check for a UTI.  2).  The patient will work with the ILS worker on menus that he likes.  3).  Care Coordination to remain available for the patient to contact in the event of future needs.        Robbin Vanegas, MSN, RN, PHN I Clinic Care Coordinator  [facility]  Phone: [phone]  [email or web address]

## 2018-09-24 NOTE — LETTER
[facility] Home  Complex Care Plan  About Me  Patient Name:  Riley Marcos    YOB: 1975  Age:     42 year old   [place] MRN:   [id number] Telephone Information:    Home Phone [phone]   Mobile NONE   Mobile [phone]       Address:    [address] [place] [address] [address]  [place] [address] Email address:  [email or web address]      Emergency Contact(s)  Name Relationship Lgl Grd Work Phone Home Phone Mobile Phone   1. TREY MARCOS ([place]* Relative No noen none [phone]   2. HODA MARCOS Brother No  [phone]            Primary language:  English     needed? No   [facility]:  [phone] op. 1  Other communication barriers: Glasses  Preferred Method of Communication:  Lisa  Current living arrangement: I live in assisted living  Mobility Status/ Medical Equipment: Dependent/Assisted by Another    Health Maintenance  Health Maintenance Reviewed: Due/Overdue     My Access Plan  Medical Emergency 911   Primary Clinic Line [facility] - [phone]   24 Hour Appointment Line [phone] or  [phone]) (toll-free)   24 Hour Nurse Line [phone] (toll-free)   Preferred Urgent Care [facility] [phone]   Preferred Hospital [facility]  [phone]   Preferred Pharmacy [facility] [address] [place] [address] [facility] [address] [place] [address]     Behavioral Health Crisis Line The National Suicide Prevention Lifeline at 1-180.622.5223 or 911     My Care Team Members    Patient Care Team       Relationship Specialty Notifications Start End    Raffy Harris PA-C PCP - General Physician Assistant  3/2/18     Phone: [phone] Fax: [phone]         [address] [facility] [address]    Robbin Andino, RN Lead Care Coordinator Nurse Admissions 1/6/16     Comment:  phone:  [phone]    Phone: [phone] Fax: [phone]        Rober Leo MD  Referring Physician Urology  1/8/16     Phone: [phone] Fax: [phone]         [address] [facility] [address]    Kimberly Zabala MD MD Urology  1/8/16     Phone: [phone] Fax: [phone]         [address] [facility] [address] [facility] [address]    Amanda Other (see comments)   1/8/16     Comment:  [employer]     Phone: [phone]         Evaristo Hayes MD MD Urology  4/19/16     Phone: [phone] Fax: [phone]         [address] [facility] [address] [facility] [address]    Jenny Ames, RN Registered Nurse Urology  4/19/16     Marta Adames, RN Clinic Care Coordinator Urology Abnormal results only, Admissions 11/3/16     Phone: [phone] Pager: [phone]        Rosemary Thomas, RN Nurse Coordinator Physical Medicine and Rehab  3/30/17     Phone: [phone] Pager: [phone]                My Care Plans  Self Management and Treatment Plan  Goals and (Comments)  Goals        General    Healthy Eating (pt-stated)     Notes - Note created  9/24/2018 11:50 AM by Robbin Andino, RN    Goal Statement: I will work with the ILS person to make more palatable meals for me to eat.  Measure of Success: The patient is gaining weight  Supportive Steps to Achieve: weigh the patient routinely.  Barriers: food allergies  Strengths: motivated to gain weight  Date to Achieve By: ongoing  Patient expressed understanding of goal: yes                 Action Plans on File:                       Advance Care Plans/Directives Type:   Type Advanced Care Plans/Directives: Advanced Directive - On File (n/a)    My Medical and Care Information  Problem List   Patient Active Problem List   Diagnosis     Vitamin D deficiency     Eosinophilic esophagitis     Neurogenic bladder     CARDIOVASCULAR SCREENING; LDL GOAL LESS THAN 160     Quadriplegia (H)     Chronic constipation     Internal hemorrhoids with other complication     Diagnostic skin and sensitization tests(aka ALLERGENS)     Anal  or rectal pain     Allergic rhinitis due to animal dander     Allergy to mold spores     House dust mite allergy     Insomnia     Health Care Home     Feeling worried     Gallstones     Chronic midline thoracic back pain     Pulmonary nodules     ACP (advance care planning)      Current Medications and Allergies:  See printed Medication Report.    Care Coordination Start Date: 1/6/2016   Frequency of Care Coordination: monthly   Form Last Updated: 09/24/2018

## 2018-09-24 NOTE — TELEPHONE ENCOUNTER
Care coordinator calls trying to get patient in tomorrow for a UTI. Scheduled and left a detailed VM scheduling appt at 11am tomorrow. Will call back this afternoon if no response.   Neelima Yeung RN

## 2018-09-25 ENCOUNTER — OFFICE VISIT (OUTPATIENT)
Dept: FAMILY MEDICINE | Facility: CLINIC | Age: 43
End: 2018-09-25
Payer: MEDICARE

## 2018-09-25 ENCOUNTER — PRE VISIT (OUTPATIENT)
Dept: UROLOGY | Facility: CLINIC | Age: 43
End: 2018-09-25

## 2018-09-25 VITALS
HEART RATE: 74 BPM | DIASTOLIC BLOOD PRESSURE: 86 MMHG | SYSTOLIC BLOOD PRESSURE: 119 MMHG | BODY MASS INDEX: 17.63 KG/M2 | TEMPERATURE: 98.2 F | HEIGHT: 72 IN

## 2018-09-25 DIAGNOSIS — R30.0 DYSURIA: ICD-10-CM

## 2018-09-25 DIAGNOSIS — N39.0 URINARY TRACT INFECTION WITH HEMATURIA, SITE UNSPECIFIED: Primary | ICD-10-CM

## 2018-09-25 DIAGNOSIS — G82.50 QUADRIPLEGIA (H): ICD-10-CM

## 2018-09-25 DIAGNOSIS — R82.90 NONSPECIFIC FINDING ON EXAMINATION OF URINE: ICD-10-CM

## 2018-09-25 DIAGNOSIS — N31.9 NEUROGENIC BLADDER: ICD-10-CM

## 2018-09-25 DIAGNOSIS — N30.00 ACUTE CYSTITIS WITHOUT HEMATURIA: ICD-10-CM

## 2018-09-25 DIAGNOSIS — D22.9 ATYPICAL MOLE: ICD-10-CM

## 2018-09-25 DIAGNOSIS — R31.9 URINARY TRACT INFECTION WITH HEMATURIA, SITE UNSPECIFIED: Primary | ICD-10-CM

## 2018-09-25 DIAGNOSIS — N31.9 NEUROGENIC BLADDER: Primary | ICD-10-CM

## 2018-09-25 LAB

## 2018-09-25 PROCEDURE — 99214 OFFICE O/P EST MOD 30 MIN: CPT | Performed by: PHYSICIAN ASSISTANT

## 2018-09-25 PROCEDURE — 81001 URINALYSIS AUTO W/SCOPE: CPT | Performed by: PHYSICIAN ASSISTANT

## 2018-09-25 PROCEDURE — 87086 URINE CULTURE/COLONY COUNT: CPT | Performed by: PHYSICIAN ASSISTANT

## 2018-09-25 PROCEDURE — 87088 URINE BACTERIA CULTURE: CPT | Performed by: PHYSICIAN ASSISTANT

## 2018-09-25 PROCEDURE — 87186 SC STD MICRODIL/AGAR DIL: CPT | Performed by: PHYSICIAN ASSISTANT

## 2018-09-25 RX ORDER — SULFAMETHOXAZOLE AND TRIMETHOPRIM 200; 40 MG/5ML; MG/5ML
20 SUSPENSION ORAL 2 TIMES DAILY
Qty: 400 ML | Refills: 1 | Status: SHIPPED | OUTPATIENT
Start: 2018-09-25 | End: 2020-02-20

## 2018-09-25 RX ORDER — CIPROFLOXACIN 500 MG/1
500 TABLET, FILM COATED ORAL 2 TIMES DAILY
Qty: 3 TABLET | Refills: 0 | Status: SHIPPED | OUTPATIENT
Start: 2018-10-02 | End: 2018-10-15

## 2018-09-25 RX ORDER — SULFAMETHOXAZOLE/TRIMETHOPRIM 800-160 MG
1 TABLET ORAL 2 TIMES DAILY
Qty: 20 TABLET | Refills: 1 | Status: SHIPPED | OUTPATIENT
Start: 2018-09-25 | End: 2018-09-25

## 2018-09-25 NOTE — TELEPHONE ENCOUNTER
Reason for visit: Cystoscopy with botox injection                     Relevant information: Neurogenic bladder     Records/imaging/labs: all records available     Pt called: Yes     Rooming: dip

## 2018-09-25 NOTE — MR AVS SNAPSHOT
After Visit Summary   9/25/2018    Riley Gifford    MRN: [id number]           Patient Information     Date Of Birth          1975        Visit Information        Provider Department      9/25/2018 4:00 PM Raffy Harris PA-C [facility]        Today's Diagnoses     Urinary tract infection with hematuria, site unspecified    -  1    Dysuria        Nonspecific finding on examination of urine        Neurogenic bladder        Quadriplegia (H)        Atypical mole        Acute cystitis without hematuria           Follow-ups after your visit        Additional Services     DERMATOLOGY REFERRAL       Your provider has referred you to: [facility]: Dermatology Clinic [facility] [phone]   [email or web address]    Please be aware that coverage of these services is subject to the terms and limitations of your health insurance plan.  Call member services at your health plan with any benefit or coverage questions.      Please bring the following with you to your appointment:    (1) Any X-Rays, CTs or MRIs which have been performed.  Contact the facility where they were done to arrange for  prior to your scheduled appointment.    (2) List of current medications  (3) This referral request   (4) Any documents/labs given to you for this referral                  Your next 10 appointments already scheduled     Oct 01, 2018  2:00 PM CDT   (Arrive by 1:45 PM)   Cystoscopy with Evaristo Hayes MD   [facility] for Prostate and Urologic Cancers ([facility])    [address] [place] [address] [phone]              Who to contact     If you have questions or need follow up information about today's clinic visit or your schedule please contact [facility] directly at [phone].  Normal or non-critical lab and imaging results will be communicated to you by  MyChart, letter or phone within 4 business days after the clinic has received the results. If you do not hear from us within 7 days, please contact the clinic through [employer] or phone. If you have a critical or abnormal lab result, we will notify you by phone as soon as possible.  Submit refill requests through [employer] or call your pharmacy and they will forward the refill request to us. Please allow 3 business days for your refill to be completed.          Additional Information About Your Visit        [employer] Information     [employer] gives you secure access to your electronic health record. If you see a primary care provider, you can also send messages to your care team and make appointments. If you have questions, please call your primary care clinic.  If you do not have a primary care provider, please call [phone] and they will assist you.        Care EveryWhere ID     This is your Care EveryWhere ID. This could be used by other organizations to access your [facility] medical records  [id number]        Your Vitals Were     Pulse Temperature Height BMI (Body Mass Index)          74 98.2  F (36.8  C) (Oral) 6' (1.829 m) 17.63 kg/m2         Blood Pressure from Last 3 Encounters:   09/25/18 119/86   07/18/18 118/82   07/12/18 100/60    Weight from Last 3 Encounters:   07/18/18 130 lb (59 kg)   02/12/18 130 lb (59 kg)   01/04/18 143 lb 11.2 oz (65.2 kg)              We Performed the Following     *UA reflex to Microscopic and Culture ([place] and [facility] (except [place] and [place])     DERMATOLOGY REFERRAL     Urine Culture Aerobic Bacterial     Urine Microscopic          Today's Medication Changes          These changes are accurate as of 9/25/18  4:46 PM.  If you have any questions, ask your nurse or doctor.               Start taking these medicines.        Dose/Directions    sulfamethoxazole-trimethoprim suspension   Commonly known as:  BACTRIM/SEPTRA   Used for:  Acute cystitis without hematuria    Started by:  Raffy Harris PA-C        Dose:  20 mL   Take 20 mLs (160 mg) by mouth 2 times daily for 10 days Dose based on TMP component. (Cancel the tablets - change to this liquid)   Quantity:  400 mL   Refills:  1         These medicines have changed or have updated prescriptions.        Dose/Directions    * ciprofloxacin 500 MG tablet   Commonly known as:  CIPRO   This may have changed:  Another medication with the same name was added. Make sure you understand how and when to take each.   Used for:  Neurogenic bladder   Changed by:  Evaristo Hayes MD        Dose:  500 mg   Take 1 tablet (500 mg) by mouth 2 times daily   Quantity:  3 tablet   Refills:  0       * ciprofloxacin 500 MG tablet   Commonly known as:  CIPRO   This may have changed:  You were already taking a medication with the same name, and this prescription was added. Make sure you understand how and when to take each.   Used for:  Neurogenic bladder   Changed by:  Evaristo Hayes MD        Dose:  500 mg   Start taking on:  10/2/2018   Take 1 tablet (500 mg) by mouth 2 times daily   Quantity:  3 tablet   Refills:  0       * Notice:  This list has 2 medication(s) that are the same as other medications prescribed for you. Read the directions carefully, and ask your doctor or other care provider to review them with you.         Where to get your medicines      These medications were sent to [id number], [place] [address] AT [place] [address], [facility] [address]     Phone:  [phone]     ciprofloxacin 500 MG tablet    sulfamethoxazole-trimethoprim suspension                Primary Care Provider Office Phone # Fax #    Raffy Harris PA-C [phone]       [address] [place] [address]        Goals        General    Healthy Eating (pt-stated)     Notes - Note created  9/24/2018 11:50 AM by Robbin Andino, RN    Goal Statement: I will  work with the ILS person to make more palatable meals for me to eat.  Measure of Success: The patient is gaining weight  Supportive Steps to Achieve: weigh the patient routinely.  Barriers: food allergies  Strengths: motivated to gain weight  Date to Achieve By: ongoing  Patient expressed understanding of goal: yes            Equal Access to Services     THERON REEVES : Hadii aad ku hadlenorejusta Vides, waaxda luqadaha, qaybta kaalmada adekimlillian, monae clancyjordonyeni diaz. So [facility] [phone].    ATENCIÓN: Si habla español, tiene a borden disposición servicios gratuitos de asistencia lingüística. Llame al [phone].    We comply with applicable federal civil rights laws and [place] laws. We do not discriminate on the basis of race, color, national origin, age, disability, sex, sexual orientation, or gender identity.            Thank you!     Thank you for choosing [facility]  for your care. Our goal is always to provide you with excellent care. Hearing back from our patients is one way we can continue to improve our services. Please take a few minutes to complete the written survey that you may receive in the mail after your visit with us. Thank you!             Your Updated Medication List - Protect others around you: Learn how to safely use, store and throw away your medicines at www.disposemymeds.org.          This list is accurate as of 9/25/18  4:46 PM.  Always use your most recent med list.                   Brand Name Dispense Instructions for use Diagnosis    alum & mag hydroxide-simethicone 200-200-20 MG/5ML Susp suspension    MYLANTA/MAALOX     Take 30 mLs by mouth    Esophageal reflux       baclofen 20 MG tablet    LIORESAL    360 tablet    Take 1 tablet (20 mg) by mouth 4 times daily    Quadriplegia (H)       budesonide 0.5 MG/2ML neb solution    PULMICORT    1080 mL    USE 2 VIALS TWICE DAILY. MIX WITH HONEY AND SWALLOW    Eosinophilic esophagitis       cetirizine 10 MG  tablet    zyrTEC     Take 10 mg by mouth daily        * ciprofloxacin 500 MG tablet    CIPRO    3 tablet    Take 1 tablet (500 mg) by mouth 2 times daily    Neurogenic bladder       * ciprofloxacin 500 MG tablet   Start taking on:  10/2/2018    CIPRO    3 tablet    Take 1 tablet (500 mg) by mouth 2 times daily    Neurogenic bladder       clotrimazole 10 MG maya     70 Maya    Place 1 Amya (10 mg) inside cheek 5 times daily        COMPRESSION STOCKINGS      Tens unit and electrodes        [employer] BACITRACIN ZINC ointment   Generic drug:  bacitracin      Apply topically 3 times daily as needed for wound care        diphenhydrAMINE 25 MG tablet    BENADRYL     Take 25 mg by mouth every 8 hours as needed for itching or allergies Reported on 2/15/2017        docusate sodium 283 MG enema    ENEMEEZ MINI    30 enema    Use one every other day and prn per patients's request.    Chronic constipation       EPINEPHrine 0.3 MG/0.3ML injection 2-pack    EPIPEN/ADRENACLICK/or ANY BX GENERIC EQUIV    0.6 mL    Inject 0.3 mLs (0.3 mg) into the muscle as needed for anaphylaxis    Reaction to food, initial encounter       guaiFENesin 100 MG/5ML Liqd    ROBITUSSIN    560 mL    4-5 TSP twice a day as needed    Cough       * hydrocortisone 2.5 % cream    ANUSOL-HC     Place rectally 2 times daily        * PROCTOZONE-HC 2.5 % cream   Generic drug:  hydrocortisone     30 g    USE RECTALLY TWICE DAILY    Hemorrhoids, unspecified hemorrhoid type       ibuprofen 200 MG capsule     120 capsule    Take 400 mg by mouth every 4 hours as needed for fever        lidocaine 5 % ointment    XYLOCAINE     Apply topically as needed for moderate pain        loperamide 2 MG tablet    IMODIUM A-D    30 tablet    Take 2 tabs (4 mg) after first loose stool, and then take one tab (2 mg) after each diarrheal stool.  Max of 8 tabs (16 mg) per day.        MILK OF MAGNESIA 400 MG/5ML suspension   Generic drug:  magnesium hydroxide     360 mL    Take 30 mLs by  mouth daily as needed for constipation or heartburn        NEW MED     500 mL    Gentamycin 240mg in 500mL 0.9% Normal Saline. Instill 30mL into empty bladder at bedtime. Leave in bladder overnight and drain in the morning    Recurrent UTI       nystatin 236655 UNIT/GM Powd    MYCOSTATIN    30 g    Apply topically daily as needed        Omeprazole-Sodium Bicarbonate  MG Pack     90 each    TAKE 1 PACKET BY MOUTH DISSOLVED IN LIQUID DAILY    Eosinophilic esophagitis, Gastroesophageal reflux disease without esophagitis       ondansetron 4 MG ODT tab    ZOFRAN ODT    20 tablet    Take 1-2 tablets (4-8 mg) by mouth every 8 hours as needed for nausea    Abdominal pain, generalized       phenylephrine-cocoa butter 0.25-88.44 % per suppository    PREPARATION H    48 suppository    Insert one suppository rectally twice daily as needed.    External hemorrhoids       polyethylene glycol powder    MIRALAX    510 g    Take 17 g (1 capful) by mouth daily as needed for constipation    Constipation, unspecified constipation type       Simethicone 125 MG Caps     28 capsule         sodium phosphate 7-19 GM/118ML rectal enema     1 Bottle    Place 1 Bottle (1 enema) rectally daily as needed for constipation        sterile water (bottle) irrigation     1000 mL    Irrigate with 60 mLs as directed daily    Neurogenic bladder       sulfamethoxazole-trimethoprim suspension    BACTRIM/SEPTRA    400 mL    Take 20 mLs (160 mg) by mouth 2 times daily for 10 days Dose based on TMP component. (Cancel the tablets - change to this liquid)    Acute cystitis without hematuria       tolterodine 2 MG tablet    DETROL    180 tablet    Take 1 tablet (2 mg) by mouth 2 times daily    Neurogenic bladder       TYLENOL PO      Take 500 mg by mouth as needed for mild pain or fever Reported on 2/15/2017        VENTOLIN  (90 Base) MCG/ACT inhaler   Generic drug:  albuterol     18 g    INHALE 2 PUFFS BY MOUTH FOUR TIMES DAILY AS NEEDED    Cough        zinc oxide 10 % Oint      Externally apply topically 3 times daily        zolpidem 10 MG tablet    AMBIEN    30 tablet    TAKE 1/2-1 TABLET BY MOUTH AT NIGHT AS NEEDED FOR SLEEP    Primary insomnia       * Notice:  This list has 4 medication(s) that are the same as other medications prescribed for you. Read the directions carefully, and ask your doctor or other care provider to review them with you.

## 2018-09-25 NOTE — PROGRESS NOTES
SUBJECTIVE:   Riley Gifford is a 42 year old male who presents to clinic today for the following health issues:    Genitourinary - Male  Onset: 5 days     Description:   Dysuria (painful urination): no   Hematuria (blood in urine): no - cloudy urine   Frequency: no   Are you urinating at night : no   Hesitancy (delay in urine): no   Retention (unable to empty): no   Decrease in urinary flow: no   Incontinence: no     Progression of Symptoms:  same    Accompanying Signs & Symptoms:  Fever: no   Back/Flank pain: YES  Urethral discharge: no   Testicle lumps/masses/pain: no   Nausea and/or vomiting: YES- nausea and low appetite   Abdominal pain: YES    History:   History of frequent UTI's: YES  History of kidney stones: no   History of hernias: no   Personal or Family history of Prostate problems: no  Sexually active: no     Precipitating factors:   None     Alleviating factors:  Took 3 CIPRO 500mg tablets over the weekend, did not see any improvement.     Problem list and histories reviewed & adjusted, as indicated.  Additional history: as documented    Labs reviewed in EPIC    Reviewed and updated as needed this visit by clinical staff       Reviewed and updated as needed this visit by Provider         ROS:  Constitutional, HEENT, cardiovascular, pulmonary, gi and gu systems are negative, except as otherwise noted.    OBJECTIVE:     /86 (BP Location: Left arm, Cuff Size: Adult Large)  Pulse 74  Temp 98.2  F (36.8  C) (Oral)  Ht 6' (1.829 m)  BMI 17.63 kg/m2  Body mass index is 17.63 kg/(m^2).  GENERAL: healthy, alert and no distress  SKIN: He has a number of dark, irregular moles on his back  : No CVA tenderness.       ASSESSMENT/PLAN:         (N39.0,  R31.9) Urinary tract infection with hematuria, site unspecified  (primary encounter diagnosis)  Comment:   Plan: DISCONTINUED: sulfamethoxazole-trimethoprim         (BACTRIM DS/SEPTRA DS) 800-160 MG per tablet        Will treat. This prescription is given  with a discussion of side effects, risks and proper use.  Instructions are given to follow up if not improving or symptoms change or worsen as discussed.     (R30.0) Dysuria  Comment:   Plan: *UA reflex to Microscopic and Culture ([place]         and [facility] (except [place] and         [place]), Urine Microscopic, DISCONTINUED:         sulfamethoxazole-trimethoprim (BACTRIM         DS/SEPTRA DS) 800-160 MG per tablet            (R82.90) Nonspecific finding on examination of urine  Comment:   Plan: Urine Culture Aerobic Bacterial, DISCONTINUED:         sulfamethoxazole-trimethoprim (BACTRIM         DS/SEPTRA DS) 800-160 MG per tablet        Is seeing his Urologist next week for Botox     (N31.9) Neurogenic bladder  Comment:   Plan: As noted     (G82.50) Quadriplegia (H)  Comment:   Plan: He bumped his right toe - the exam appeared pretty reassuring     (D22.9) Atypical mole  Comment:   Plan: DERMATOLOGY REFERRAL        On his back a number of dark and irregular moles - recommend derm eval    (N30.00) Acute cystitis without hematuria  Comment:   Plan: sulfamethoxazole-trimethoprim (BACTRIM/SEPTRA)         suspension       As noted     JUANITO MARTIN PA-C  [facility]

## 2018-09-27 LAB
BACTERIA SPEC CULT: ABNORMAL
SPECIMEN SOURCE: ABNORMAL

## 2018-09-28 ENCOUNTER — TELEPHONE (OUTPATIENT)
Dept: CARE COORDINATION | Facility: CLINIC | Age: 43
End: 2018-09-28

## 2018-09-28 NOTE — TELEPHONE ENCOUNTER
Called and spoke with patient. He states he has abdominal and back pain, rates it 6/10. It's been going on for a couple of days. He denies any other symptoms such as dizziness, blood in his urine, fever, vomiting etc. He does not have sensation of urination because he is paraplegic. Huddled with PCP who recommended ER. Patient verbalized understanding.    Kem Joya RN

## 2018-09-28 NOTE — TELEPHONE ENCOUNTER
The patient called the Primary Care Coordination RN stating that he is not feeling any better.  The nausea and back pain are getting worse.  The antibiotic given for the UTI has not changed any of the symptoms.  The patient is requesting a call back from the team to discuss the results of his urine culture and to see if the medication may need to be changed.    Robbin Vanegas, MSN, RN, PHN I Clinic Care Coordinator  [facility]  Phone: [phone]  [email or web address]

## 2018-09-28 NOTE — TELEPHONE ENCOUNTER
Noted.  His urine culture results were actually pretty non worrisome and not even showing a particular / specific infection - small amounts of E-Coli only.     I'm not sure what his symptoms are suggesting. He is paraplegic so it can be challenging to know what is causing the symptoms as his sensation is not fully intact.    Changing meds probably won't help right now because of the low risk infection and the good coverage that he had on the antibiotics we did.     Let me know what level of pain and discomfort he's having and other systemic symptoms - we can consider another work up or consider ED visit if it has progressed to that level of concern.    Thanks.  Raffy Harris, MPAS, PA-C

## 2018-10-01 ENCOUNTER — OFFICE VISIT (OUTPATIENT)
Dept: UROLOGY | Facility: CLINIC | Age: 43
End: 2018-10-01
Payer: MEDICARE

## 2018-10-01 VITALS
HEART RATE: 80 BPM | WEIGHT: 130 LBS | HEIGHT: 72 IN | DIASTOLIC BLOOD PRESSURE: 79 MMHG | BODY MASS INDEX: 17.61 KG/M2 | SYSTOLIC BLOOD PRESSURE: 116 MMHG

## 2018-10-01 DIAGNOSIS — S14.109S CERVICAL SPINAL CORD INJURY, SEQUELA (H): ICD-10-CM

## 2018-10-01 DIAGNOSIS — N31.9 NEUROGENIC BLADDER: Primary | ICD-10-CM

## 2018-10-01 ASSESSMENT — PAIN SCALES - GENERAL
PAINLEVEL: NO PAIN (0)
PAINLEVEL: NO PAIN (0)

## 2018-10-01 NOTE — PROGRESS NOTES
SURGEON:  Evaristo Hayes MD     ANESTHESIA:  local     ESTIMATED BLOOD LOSS:  <5 mL     DRAINS:  None     SIGNIFICANT FINDINGS: High bladder neck.  Small capacity bladder. No tumors or stones.     INDICATIONS:  Riley Gifford is a 42 year old male with neurogenic bladder due to C-5 spinal cord injury. His last botox injection was Jan 2018 and was done in OR. We are attempting one in clinic. He gets AutDys but we will leave bladder not too full.  He has been getting more UTIs the last few months as the Botox is overdue. 4 UTIs since April. No stones or hydro on CT A/P June 2018.    PROCEDURE: A flexible cystoscope was advanced into the urethra and into the bladder.  The urethra was unremarkable. The bladder neck was high. The bladder mucosa appeared to be normal without stones, tumors or diverticulum on 360 degree inspection. The ureteral orifices were in the normal orthotopic position bilaterally.  We mixed 2 vials of 100 U botulinum toxin A into 20 mL of injectable saline for a total of 200 units.  We performed a template injection procedure with 5 columns of 4 injections. All injections were placed deep to the mucosa into the detrusor muscle.  We then emptied the bladder to re-inspect our injection sites and found that there was a minimal amount of blood. The bladder was then emptied again with a catheter. He had no symptoms of AutDys.    RTC 6 months for Botox 200U. If persistent symptoms of recurrent UTIs the he should get UDS and see me after.

## 2018-10-01 NOTE — PATIENT INSTRUCTIONS
"Schedule Botox in six months      AFTER YOUR CYSTOSCOPY        You have just completed a cystoscopy, or \"cysto\", which allowed your physician to learn more about your bladder (or to remove a stent placed after surgery). We suggest that you continue to avoid caffeine, fruit juice, and alcohol for the next 24 hours, however, you are encouraged to return to your normal activities.         A few things that are considered normal after your cystoscopy:     * Small amount of bleeding (or spotting) that clears within the next 24 hours     * Slight burning sensation with urination     * Sensation to of needing to avoid more frequently     * The feeling of \"air\" in your urine     * Mild discomfort that is relieved with Tylenol        Please contact our office promptly if you:     * Develop a fever above 101 degrees     * Are unable to urinate     * Develop bright red blood that does not stop     * Severe pain or swelling         Please contact our office with any concerns or questions @[email or web address].  "

## 2018-10-01 NOTE — LETTER
10/1/2018       RE: Riley Gifford  [address]  [place] [address]     Dear Colleague,    Thank you for referring your patient, Riley Gifford, to the [facility] UROLOGIC CANCERS at [facility]. Please see a copy of my visit note below.    SURGEON:  Evaristo Hayes MD     ANESTHESIA:   local     ESTIMATED BLOOD LOSS:  <5 mL     DRAINS:  None     SIGNIFICANT FINDINGS: High bladder neck.  Small capacity bladder. No tumors or stones.     INDICATIONS:  iRley Gifford is a 42 year old male with neurogenic bladder due to C-5 spinal cord injury. His last botox injection was Jan 2018 and was done in OR. We are attempting one in clinic. He gets AutDys but we will leave bladder not too full.  He has been getting more UTIs the last few months as the Botox is overdue. 4 UTIs since April. No stones or hydro on CT A/P June 2018.    PROCEDURE: A flexible cystoscope was advanced into the urethra and into the bladder.  The urethra was unremarkable. The bladder neck was high. The bladder mucosa appeared to be normal without stones, tumors or diverticulum on 360 degree inspection. The ureteral orifices were in the normal orthotopic position bilaterally.  We mixed 2 vials of 100 U botulinum toxin A into 20 mL of injectable saline for a total of 200 units.  We performed a template injection procedure with 5 columns of 4 injections. All injections were placed deep to the mucosa into the detrusor muscle.  We then emptied the bladder to re-inspect our injection sites and found that there was a minimal amount of blood. The bladder was then emptied again with a catheter. He had no symptoms of AutDys.    RTC 6 months for Botox 200U. If persistent symptoms of recurrent UTIs the he should get UDS and see me after.       Again, thank you for allowing me to participate in the care of your patient.      Sincerely,    Evaristo Hayes MD

## 2018-10-01 NOTE — NURSING NOTE
Chief Complaint   Patient presents with     Cystoscopy     botox       Blood pressure 116/79, pulse 80, height 1.829 m (6'), weight 59 kg (130 lb). Body mass index is 17.63 kg/(m^2).    Patient Active Problem List   Diagnosis     Vitamin D deficiency     Eosinophilic esophagitis     Neurogenic bladder     CARDIOVASCULAR SCREENING; LDL GOAL LESS THAN 160     Quadriplegia (H)     Chronic constipation     Internal hemorrhoids with other complication     Diagnostic skin and sensitization tests(aka ALLERGENS)     Anal or rectal pain     Allergic rhinitis due to animal dander     Allergy to mold spores     House dust mite allergy     Insomnia     Health Care Home     Feeling worried     Gallstones     Chronic midline thoracic back pain     Pulmonary nodules     ACP (advance care planning)       Allergies   Allergen Reactions     Banana Hives     Other reaction(s): GI Upset     Egg/Pro [Chicken-Derived Products (Egg)]      Fish      Soybean Oil      Other reaction(s): *Unknown  Discovered on allergy testing. Has never had any reaction to his knowledge     Wheat        Current Outpatient Prescriptions   Medication Sig Dispense Refill     Acetaminophen (TYLENOL PO) Take 500 mg by mouth as needed for mild pain or fever Reported on 2/15/2017       alum & mag hydroxide-simethicone (MYLANTA/MAALOX) 200-200-20 MG/5ML SUSP suspension Take 30 mLs by mouth  0     bacitracin ([employer] BACITRACIN ZINC) ointment Apply topically 3 times daily as needed for wound care       baclofen (LIORESAL) 20 MG tablet Take 1 tablet (20 mg) by mouth 4 times daily 360 tablet 3     budesonide (PULMICORT) 0.5 MG/2ML neb solution USE 2 VIALS TWICE DAILY. MIX WITH HONEY AND SWALLOW 1080 mL 0     cetirizine (ZYRTEC) 10 MG tablet Take 10 mg by mouth daily       COMPRESSION STOCKINGS Tens unit and electrodes       docusate sodium (ENEMEEZ MINI) 283 MG enema Use one every other day and prn per patients's request. 30 enema 3     lidocaine (XYLOCAINE) 5 % ointment  Apply topically as needed for moderate pain       loperamide (IMODIUM A-D) 2 MG tablet Take 2 tabs (4 mg) after first loose stool, and then take one tab (2 mg) after each diarrheal stool.  Max of 8 tabs (16 mg) per day. 30 tablet 0     NEW MED Gentamycin 240mg in 500mL 0.9% Normal Saline.  Instill 30mL into empty bladder at bedtime.  Leave in bladder overnight and drain in the morning 500 mL 3     NITROFURANTOIN MACROCRYSTAL PO Take 100 mg by mouth       nystatin (MYCOSTATIN) 786201 UNIT/GM POWD Apply topically daily as needed 30 g 1     Omeprazole-Sodium Bicarbonate  MG PACK TAKE 1 PACKET BY MOUTH DISSOLVED IN LIQUID DAILY 90 each 0     ondansetron (ZOFRAN ODT) 4 MG ODT tab Take 1-2 tablets (4-8 mg) by mouth every 8 hours as needed for nausea 20 tablet 1     phenylephrine-cocoa butter (PREPARATION H) 0.25-88.44 % suppository Insert one suppository rectally twice daily as needed. 48 suppository 3     polyethylene glycol (MIRALAX) powder Take 17 g (1 capful) by mouth daily as needed for constipation 510 g 1     PROCTOZONE-HC 2.5 % cream USE RECTALLY TWICE DAILY 30 g 11     sodium phosphate (FLEET ENEMA) 7-19 GM/118ML rectal enema Place 1 Bottle (1 enema) rectally daily as needed for constipation 1 Bottle 0     sterile water, bottle, irrigation Irrigate with 60 mLs as directed daily 1000 mL 0     tolterodine (DETROL) 2 MG tablet Take 1 tablet (2 mg) by mouth 2 times daily 180 tablet 3     VENTOLIN  (90 Base) MCG/ACT Inhaler INHALE 2 PUFFS BY MOUTH FOUR TIMES DAILY AS NEEDED 18 g 2     zinc oxide 10 % OINT Externally apply topically 3 times daily       zolpidem (AMBIEN) 10 MG tablet TAKE 1/2-1 TABLET BY MOUTH AT NIGHT AS NEEDED FOR SLEEP 30 tablet 5     [START ON 10/2/2018] ciprofloxacin (CIPRO) 500 MG tablet Take 1 tablet (500 mg) by mouth 2 times daily (Patient not taking: Reported on 10/1/2018) 3 tablet 0     clotrimazole 10 MG maya Place 1 Maya (10 mg) inside cheek 5 times daily 70 Maya 0      diphenhydrAMINE (BENADRYL) 25 MG tablet Take 25 mg by mouth every 8 hours as needed for itching or allergies Reported on 2/15/2017       EPINEPHrine (EPIPEN/ADRENACLICK/OR ANY BX GENERIC EQUIV) 0.3 MG/0.3ML injection 2-pack Inject 0.3 mLs (0.3 mg) into the muscle as needed for anaphylaxis (Patient not taking: Reported on 10/1/2018) 0.6 mL 3     guaiFENesin (ROBITUSSIN) 100 MG/5ML LIQD 4-5 TSP twice a day as needed (Patient not taking: Reported on 10/1/2018) 560 mL 1     hydrocortisone (ANUSOL-HC) 2.5 % cream Place rectally 2 times daily       ibuprofen 200 MG capsule Take 400 mg by mouth every 4 hours as needed for fever 120 capsule      magnesium hydroxide (MILK OF MAGNESIA) 400 MG/5ML suspension Take 30 mLs by mouth daily as needed for constipation or heartburn 360 mL 1     Simethicone 125 MG CAPS  28 capsule      sulfamethoxazole-trimethoprim (BACTRIM/SEPTRA) suspension Take 20 mLs (160 mg) by mouth 2 times daily for 10 days Dose based on TMP component. (Cancel the tablets - change to this liquid) (Patient not taking: Reported on 10/1/2018) 400 mL 1       Social History   Substance Use Topics     Smoking status: Never Smoker     Smokeless tobacco: Never Used     Alcohol use 0.0 oz/week     0 Standard drinks or equivalent per week      Comment: 1-2 drinks per month       AMANDA Marrufo  10/1/2018  1:54 PM     Invasive Procedure Safety Checklist:    Procedure:     Action: Complete sections and checkboxes as appropriate.    Pre-procedure:  1. Patient ID Verified with 2 identifiers (Stella and  or MRN) : YES    2. Procedure and site verified with patient/designee (when able) : YES    3. Accurate consent documentation in medical record : YES    4. H&P (or appropriate assessment) documented in medical record : YES  H&P must be up to 30 days prior to procedure an updated within 24 hours of                 Procedure as applicable.     5. Relevant diagnostic and radiology test results appropriately labeled and  displayed as applicable : YES    6. Blood products, implants, devices, and/or special equipment available for the procedure as applicable : YES    7. Procedure site(s) marked with provider initials [Exclusions: None] : NO    8. Marking not required. Reason : Yes  Procedure does not require site marking    Time Out:     Time-Out performed immediately prior to starting procedure, including verbal and active participation of all team members addressing: YES    1. Correct patient identity.  2. Confirmed that the correct side and site are marked.  3. An accurate procedure to be done.  4. Agreement on the procedure to be done.  5. Correct patient position.  6. Relevant images and results are properly labeled and appropriately displayed.  7. The need to administer antibiotics or fluids for irrigation purposes during the procedure as applicable.  8. Safety precautions based on patient history or medication use.    During Procedure: Verification of correct person, site, and procedure occurs any time the responsibility for care of the patient is transferred to another member of the care team.

## 2018-10-01 NOTE — MR AVS SNAPSHOT
"              After Visit Summary   10/1/2018    Riley Gifford    MRN: [id number]           Patient Information     Date Of Birth          1975        Visit Information        Provider Department      10/1/2018 2:00 PM Evaristo Hayes MD [facility] and [facility] for Prostate and Urologic Cancers        Today's Diagnoses     Neurogenic bladder    -  1    Cervical spinal cord injury, sequela (H)          Care Instructions    Schedule Botox in six months      AFTER YOUR CYSTOSCOPY        You have just completed a cystoscopy, or \"cysto\", which allowed your physician to learn more about your bladder (or to remove a stent placed after surgery). We suggest that you continue to avoid caffeine, fruit juice, and alcohol for the next 24 hours, however, you are encouraged to return to your normal activities.         A few things that are considered normal after your cystoscopy:     * Small amount of bleeding (or spotting) that clears within the next 24 hours     * Slight burning sensation with urination     * Sensation to of needing to avoid more frequently     * The feeling of \"air\" in your urine     * Mild discomfort that is relieved with Tylenol        Please contact our office promptly if you:     * Develop a fever above 101 degrees     * Are unable to urinate     * Develop bright red blood that does not stop     * Severe pain or swelling         Please contact our office with any concerns or questions @[facility].          Follow-ups after your visit        Follow-up notes from your care team     Return in 12 months (on 10/1/2019).      Who to contact     Please call your clinic at [phone] to:    Ask questions about your health    Make or cancel appointments    Discuss your medicines    Learn about your test results    Speak to your doctor            Additional Information About Your Visit        MyChart Information     [employer] gives you secure access to your electronic health record. If you see a primary care " provider, you can also send messages to your care team and make appointments. If you have questions, please call your primary care clinic.  If you do not have a primary care provider, please call [phone] and they will assist you.      [employer] is an electronic gateway that provides easy, online access to your medical records. With [employer], you can request a clinic appointment, read your test results, renew a prescription or communicate with your care team.     To access your existing account, please contact your [facility] or call [phone] for assistance.        Care EveryWhere ID     This is your Care EveryWhere ID. This could be used by other organizations to access your [place] medical records  [id number]        Your Vitals Were     Pulse Height BMI (Body Mass Index)             80 1.829 m (6') 17.63 kg/m2          Blood Pressure from Last 3 Encounters:   10/01/18 116/79   09/25/18 119/86   07/18/18 118/82    Weight from Last 3 Encounters:   10/01/18 59 kg (130 lb)   07/18/18 59 kg (130 lb)   02/12/18 59 kg (130 lb)              We Performed the Following     HC CYSTOURETHROSCOPY INJ CHEMODENERVATION BLADDER        Primary Care Provider Office Phone # Fax #    Raffy Harris PA-C [phone]       [facility] [address]        Goals        General    Healthy Eating (pt-stated)     Notes - Note created  9/24/2018 11:50 AM by Robbin Andino, RN    Goal Statement: I will work with the ILS person to make more palatable meals for me to eat.  Measure of Success: The patient is gaining weight  Supportive Steps to Achieve: weigh the patient routinely.  Barriers: food allergies  Strengths: motivated to gain weight  Date to Achieve By: ongoing  Patient expressed understanding of goal: yes            Equal Access to Services     THERON REEVES AH: tom De La Cruz, monae stafford  astonjordonyeni matuteAdrianaalthea ah. So [facility] [phone].    ATENCIÓN: Si karishma boogie, tiene a obrden disposición servicios gratuitos de asistencia lingüística. Juhi mcmahon [phone].    We comply with applicable federal civil rights laws and [place] laws. We do not discriminate on the basis of race, color, national origin, age, disability, sex, sexual orientation, or gender identity.            Thank you!     Thank you for choosing [facility] AND [facility] FOR PROSTATE AND UROLOGIC CANCERS  for your care. Our goal is always to provide you with excellent care. Hearing back from our patients is one way we can continue to improve our services. Please take a few minutes to complete the written survey that you may receive in the mail after your visit with us. Thank you!             Your Updated Medication List - Protect others around you: Learn how to safely use, store and throw away your medicines at www.disposemymeds.org.          This list is accurate as of 10/1/18  3:01 PM.  Always use your most recent med list.                   Brand Name Dispense Instructions for use Diagnosis    alum & mag hydroxide-simethicone 200-200-20 MG/5ML Susp suspension    MYLANTA/MAALOX     Take 30 mLs by mouth    Esophageal reflux       baclofen 20 MG tablet    LIORESAL    360 tablet    Take 1 tablet (20 mg) by mouth 4 times daily    Quadriplegia (H)       budesonide 0.5 MG/2ML neb solution    PULMICORT    1080 mL    USE 2 VIALS TWICE DAILY. MIX WITH HONEY AND SWALLOW    Eosinophilic esophagitis       cetirizine 10 MG tablet    zyrTEC     Take 10 mg by mouth daily        ciprofloxacin 500 MG tablet   Start taking on:  10/2/2018    CIPRO    3 tablet    Take 1 tablet (500 mg) by mouth 2 times daily    Neurogenic bladder       clotrimazole 10 MG meryl     70 Meryl    Place 1 Meryl (10 mg) inside cheek 5 times daily        COMPRESSION STOCKINGS      Tens unit and electrodes        [employer] BACITRACIN ZINC ointment   Generic drug:  bacitracin      Apply  topically 3 times daily as needed for wound care        diphenhydrAMINE 25 MG tablet    BENADRYL     Take 25 mg by mouth every 8 hours as needed for itching or allergies Reported on 2/15/2017        docusate sodium 283 MG enema    ENEMEEZ MINI    30 enema    Use one every other day and prn per patients's request.    Chronic constipation       EPINEPHrine 0.3 MG/0.3ML injection 2-pack    EPIPEN/ADRENACLICK/or ANY BX GENERIC EQUIV    0.6 mL    Inject 0.3 mLs (0.3 mg) into the muscle as needed for anaphylaxis    Reaction to food, initial encounter       guaiFENesin 100 MG/5ML Liqd    ROBITUSSIN    560 mL    4-5 TSP twice a day as needed    Cough       * hydrocortisone 2.5 % cream    ANUSOL-HC     Place rectally 2 times daily        * PROCTOZONE-HC 2.5 % cream   Generic drug:  hydrocortisone     30 g    USE RECTALLY TWICE DAILY    Hemorrhoids, unspecified hemorrhoid type       ibuprofen 200 MG capsule     120 capsule    Take 400 mg by mouth every 4 hours as needed for fever        lidocaine 5 % ointment    XYLOCAINE     Apply topically as needed for moderate pain        loperamide 2 MG tablet    IMODIUM A-D    30 tablet    Take 2 tabs (4 mg) after first loose stool, and then take one tab (2 mg) after each diarrheal stool.  Max of 8 tabs (16 mg) per day.        MILK OF MAGNESIA 400 MG/5ML suspension   Generic drug:  magnesium hydroxide     360 mL    Take 30 mLs by mouth daily as needed for constipation or heartburn        NEW MED     500 mL    Gentamycin 240mg in 500mL 0.9% Normal Saline. Instill 30mL into empty bladder at bedtime. Leave in bladder overnight and drain in the morning    Recurrent UTI       NITROFURANTOIN MACROCRYSTAL PO      Take 100 mg by mouth        nystatin [id number] UNIT/GM Powd    MYCOSTATIN    30 g    Apply topically daily as needed        Omeprazole-Sodium Bicarbonate  MG Pack     90 each    TAKE 1 PACKET BY MOUTH DISSOLVED IN LIQUID DAILY    Eosinophilic esophagitis, Gastroesophageal reflux  disease without esophagitis       ondansetron 4 MG ODT tab    ZOFRAN ODT    20 tablet    Take 1-2 tablets (4-8 mg) by mouth every 8 hours as needed for nausea    Abdominal pain, generalized       phenylephrine-cocoa butter 0.25-88.44 % per suppository    PREPARATION H    48 suppository    Insert one suppository rectally twice daily as needed.    External hemorrhoids       polyethylene glycol powder    MIRALAX    510 g    Take 17 g (1 capful) by mouth daily as needed for constipation    Constipation, unspecified constipation type       Simethicone 125 MG Caps     28 capsule         sodium phosphate 7-19 GM/118ML rectal enema     1 Bottle    Place 1 Bottle (1 enema) rectally daily as needed for constipation        sterile water (bottle) irrigation     1000 mL    Irrigate with 60 mLs as directed daily    Neurogenic bladder       sulfamethoxazole-trimethoprim suspension    BACTRIM/SEPTRA    400 mL    Take 20 mLs (160 mg) by mouth 2 times daily for 10 days Dose based on TMP component. (Cancel the tablets - change to this liquid)    Acute cystitis without hematuria       tolterodine 2 MG tablet    DETROL    180 tablet    Take 1 tablet (2 mg) by mouth 2 times daily    Neurogenic bladder       TYLENOL PO      Take 500 mg by mouth as needed for mild pain or fever Reported on 2/15/2017        VENTOLIN  (90 Base) MCG/ACT inhaler   Generic drug:  albuterol     18 g    INHALE 2 PUFFS BY MOUTH FOUR TIMES DAILY AS NEEDED    Cough       zinc oxide 10 % Oint      Externally apply topically 3 times daily        zolpidem 10 MG tablet    AMBIEN    30 tablet    TAKE 1/2-1 TABLET BY MOUTH AT NIGHT AS NEEDED FOR SLEEP    Primary insomnia       * Notice:  This list has 2 medication(s) that are the same as other medications prescribed for you. Read the directions carefully, and ask your doctor or other care provider to review them with you.

## 2018-10-02 ENCOUNTER — TELEPHONE (OUTPATIENT)
Dept: FAMILY MEDICINE | Facility: CLINIC | Age: 43
End: 2018-10-02

## 2018-10-02 NOTE — TELEPHONE ENCOUNTER
Forms received from [employer]: Prescription for Incontinence supplies for Jan Harris PA-C.  Forms placed in provider 'sign me' folder.  Please fax forms to [phone] after completion.    Thanks!  Kem Peters

## 2018-10-12 ENCOUNTER — TELEPHONE (OUTPATIENT)
Dept: CARE COORDINATION | Facility: CLINIC | Age: 43
End: 2018-10-12

## 2018-10-12 NOTE — TELEPHONE ENCOUNTER
ELISA to PCP, scheduled with you on Monday.     Called and spoke with patient, he states it's all in his abdomen, denies issues will swallowing or getting food stuck. I gave him advise below, he opted to schedule with PCP. He will call his GI office and if pain gets worse he will go in over the weekend. Denies vomiting, bloody/black stools, abdominal distention, urinary symptoms.    Kem Joya RN

## 2018-10-12 NOTE — TELEPHONE ENCOUNTER
Reviewed-sorry to hear he's feeling lousy.  It sounds like his symptoms are upper GI? Gastric?  I would suggest he call his gastroenterologist to update them and see if they want him seen. He has known eosinophilic esophagitis. Is he having difficulty swallowing or food stuck? Or, does this feel lower GI than that?    If he's getting worse this weekend, suggest the ED / UC.    Thanks.  Raffy Harris, MPAS, PA-C

## 2018-10-12 NOTE — TELEPHONE ENCOUNTER
The patient called this morning with increasing abdominal and back pain again.  This has been gradually increasing over the last week.  The patient states that the urine looks clear and that he had a good bowel movement yesterday.  The pain is causing the patient to have no appetite and some nausea.  The patient denies fever, or pain during the night.   He is using Zofran for nausea and Tylenol every 6 hours which doesn't cover the pain.  He did receive the botox injection on Monday.  The patient gets stomach upset from NSAID's/  Please call the patient with any orders or changes. The patient has used narcotics in very small amounts in the past due to the constipating effect.        Robbin Vanegas, MSN, RN, PHN I Clinic Care Coordinator  [facility]    Phone:  [phone]

## 2018-10-15 ENCOUNTER — OFFICE VISIT (OUTPATIENT)
Dept: FAMILY MEDICINE | Facility: CLINIC | Age: 43
End: 2018-10-15
Payer: MEDICARE

## 2018-10-15 VITALS
HEIGHT: 72 IN | SYSTOLIC BLOOD PRESSURE: 122 MMHG | TEMPERATURE: 98 F | BODY MASS INDEX: 17.63 KG/M2 | HEART RATE: 68 BPM | DIASTOLIC BLOOD PRESSURE: 78 MMHG

## 2018-10-15 DIAGNOSIS — K20.0 EOSINOPHILIC ESOPHAGITIS: ICD-10-CM

## 2018-10-15 DIAGNOSIS — R10.30 LOWER ABDOMINAL PAIN: Primary | ICD-10-CM

## 2018-10-15 DIAGNOSIS — K59.09 CHRONIC CONSTIPATION: ICD-10-CM

## 2018-10-15 DIAGNOSIS — G82.50 QUADRIPLEGIA (H): ICD-10-CM

## 2018-10-15 LAB
ALBUMIN UR-MCNC: ABNORMAL MG/DL
APPEARANCE UR: CLEAR
BACTERIA #/AREA URNS HPF: ABNORMAL /HPF
BILIRUB UR QL STRIP: NEGATIVE
COLOR UR AUTO: YELLOW
GLUCOSE UR STRIP-MCNC: NEGATIVE MG/DL
HGB UR QL STRIP: NEGATIVE
KETONES UR STRIP-MCNC: NEGATIVE MG/DL
LEUKOCYTE ESTERASE UR QL STRIP: ABNORMAL
LIPASE SERPL-CCNC: 130 U/L (ref 73–393)
NITRATE UR QL: NEGATIVE
NON-SQ EPI CELLS #/AREA URNS LPF: ABNORMAL /LPF
PH UR STRIP: 7 PH (ref 5–7)
RBC #/AREA URNS AUTO: ABNORMAL /HPF
SOURCE: ABNORMAL
SP GR UR STRIP: 1.02 (ref 1–1.03)
UROBILINOGEN UR STRIP-ACNC: 0.2 EU/DL (ref 0.2–1)
WBC #/AREA URNS AUTO: ABNORMAL /HPF

## 2018-10-15 PROCEDURE — 81001 URINALYSIS AUTO W/SCOPE: CPT | Performed by: PHYSICIAN ASSISTANT

## 2018-10-15 PROCEDURE — 36415 COLL VENOUS BLD VENIPUNCTURE: CPT | Performed by: PHYSICIAN ASSISTANT

## 2018-10-15 PROCEDURE — 87088 URINE BACTERIA CULTURE: CPT | Performed by: PHYSICIAN ASSISTANT

## 2018-10-15 PROCEDURE — 99214 OFFICE O/P EST MOD 30 MIN: CPT | Performed by: PHYSICIAN ASSISTANT

## 2018-10-15 PROCEDURE — 83690 ASSAY OF LIPASE: CPT | Performed by: PHYSICIAN ASSISTANT

## 2018-10-15 PROCEDURE — 82150 ASSAY OF AMYLASE: CPT | Performed by: PHYSICIAN ASSISTANT

## 2018-10-15 PROCEDURE — 87186 SC STD MICRODIL/AGAR DIL: CPT | Performed by: PHYSICIAN ASSISTANT

## 2018-10-15 PROCEDURE — 80076 HEPATIC FUNCTION PANEL: CPT | Performed by: PHYSICIAN ASSISTANT

## 2018-10-15 PROCEDURE — 87086 URINE CULTURE/COLONY COUNT: CPT | Performed by: PHYSICIAN ASSISTANT

## 2018-10-15 NOTE — MR AVS SNAPSHOT
After Visit Summary   10/15/2018    Riley Gifford    MRN: [id number]           Patient Information     Date Of Birth          1975        Visit Information        Provider Department      10/15/2018 3:00 PM Raffy Harris PA-C [facility]        Today's Diagnoses     Lower abdominal pain    -  1    Quadriplegia (H)        Chronic constipation        Eosinophilic esophagitis           Follow-ups after your visit        Your next 10 appointments already scheduled     Oct 18, 2018  6:45 PM CDT   (Arrive by 6:30 PM)   New Patient Visit with Bravo Rabago MD   [facility] ([facility])    [address] [place] [address]  3rd [facility] [id number] [phone]            Apr 01, 2019 12:30 PM CDT   (Arrive by 12:15 PM)   Cystoscopy with Evaristo Hayes MD   [facility]rologic C[facility] ([facility])    [address] [place] [address]  4th [facility] [address] [phone]              Who to contact     If you have questions or need follow up information about today's clinic visit or your schedule please contact [facility] directly at [phone].  Normal or non-critical lab and imaging results will be communicated to you by MyChart, letter or phone within 4 business days after the clinic has received the results. If you do not hear from us within 7 days, please contact the clinic through MyChart or phone. If you have a critical or abnormal lab result, we will notify you by phone as soon as possible.  Submit refill requests through [employer] or call your pharmacy and they will forward the refill request to us. Please allow 3 business days for your refill to be completed.          Additional Information About Your Visit        [employer] Information     [employer] gives you secure access to your electronic health record. If you see a primary care provider, you can also  send messages to your care team and make appointments. If you have questions, please call your primary care clinic.  If you do not have a primary care provider, please call [phone] and they will assist you.        Care EveryWhere ID     This is your Care EveryWhere ID. This could be used by other organizations to access your [place] medical records  [id number]        Your Vitals Were     Pulse Temperature Height BMI (Body Mass Index)          68 98  F (36.7  C) (Oral) 6' (1.829 m) 17.63 kg/m2         Blood Pressure from Last 3 Encounters:   10/15/18 122/78   10/01/18 116/79   09/25/18 119/86    Weight from Last 3 Encounters:   10/01/18 130 lb (59 kg)   07/18/18 130 lb (59 kg)   02/12/18 130 lb (59 kg)              We Performed the Following     *UA reflex to Microscopic and Culture ([place] and [place] [facility] (except [place] and [place])     Amylase     Hepatic panel (Albumin, ALT, AST, Bili, Alk Phos, TP)     Lipase        Primary Care Provider Office Phone # Fax #    Raffy Harris PA-C [phone]       [facility] [address]        Goals        General    Healthy Eating (pt-stated)     Notes - Note created  9/24/2018 11:50 AM by Robbin Andino, RN    Goal Statement: I will work with the ILS person to make more palatable meals for me to eat.  Measure of Success: The patient is gaining weight  Supportive Steps to Achieve: weigh the patient routinely.  Barriers: food allergies  Strengths: motivated to gain weight  Date to Achieve By: ongoing  Patient expressed understanding of goal: yes            Equal Access to Services     [facility] AH: Hadii aad ku hadashjusta Soalaina, waaxda luqadaha, qaybta kaalmada monae lambert. So [facility] [phone].    ATENCIÓN: Si habla español, tiene a borden disposición servicios gratuitos de asistencia lingüística. Llame al [phone].    We comply with applicable federal civil rights laws and  [place] laws. We do not discriminate on the basis of race, color, national origin, age, disability, sex, sexual orientation, or gender identity.            Thank you!     Thank you for choosing [facility]  for your care. Our goal is always to provide you with excellent care. Hearing back from our patients is one way we can continue to improve our services. Please take a few minutes to complete the written survey that you may receive in the mail after your visit with us. Thank you!             Your Updated Medication List - Protect others around you: Learn how to safely use, store and throw away your medicines at www.disposemymeds.org.          This list is accurate as of 10/15/18  3:41 PM.  Always use your most recent med list.                   Brand Name Dispense Instructions for use Diagnosis    alum & mag hydroxide-simethicone 200-200-20 MG/5ML Susp suspension    MYLANTA/MAALOX     Take 30 mLs by mouth    Esophageal reflux       baclofen 20 MG tablet    LIORESAL    360 tablet    Take 1 tablet (20 mg) by mouth 4 times daily    Quadriplegia (H)       budesonide 0.5 MG/2ML neb solution    PULMICORT    1080 mL    USE 2 VIALS TWICE DAILY. MIX WITH HONEY AND SWALLOW    Eosinophilic esophagitis       cetirizine 10 MG tablet    zyrTEC     Take 10 mg by mouth daily        clotrimazole 10 MG maya     70 Maya    Place 1 Maya (10 mg) inside cheek 5 times daily        COMPRESSION STOCKINGS      Tens unit and electrodes        [employer] BACITRACIN ZINC ointment   Generic drug:  bacitracin      Apply topically 3 times daily as needed for wound care        diphenhydrAMINE 25 MG tablet    BENADRYL     Take 25 mg by mouth every 8 hours as needed for itching or allergies Reported on 2/15/2017        docusate sodium 283 MG enema    ENEMEEZ MINI    30 enema    Use one every other day and prn per patients's request.    Chronic constipation       EPINEPHrine 0.3 MG/0.3ML injection 2-pack    EPIPEN/ADRENACLICK/or  ANY BX GENERIC EQUIV    0.6 mL    Inject 0.3 mLs (0.3 mg) into the muscle as needed for anaphylaxis    Reaction to food, initial encounter       guaiFENesin 100 MG/5ML Liqd    ROBITUSSIN    560 mL    4-5 TSP twice a day as needed    Cough       * hydrocortisone 2.5 % cream    ANUSOL-HC     Place rectally 2 times daily        * PROCTOZONE-HC 2.5 % cream   Generic drug:  hydrocortisone     30 g    USE RECTALLY TWICE DAILY    Hemorrhoids, unspecified hemorrhoid type       ibuprofen 200 MG capsule     120 capsule    Take 400 mg by mouth every 4 hours as needed for fever        lidocaine 5 % ointment    XYLOCAINE     Apply topically as needed for moderate pain        loperamide 2 MG tablet    IMODIUM A-D    30 tablet    Take 2 tabs (4 mg) after first loose stool, and then take one tab (2 mg) after each diarrheal stool.  Max of 8 tabs (16 mg) per day.        MILK OF MAGNESIA 400 MG/5ML suspension   Generic drug:  magnesium hydroxide     360 mL    Take 30 mLs by mouth daily as needed for constipation or heartburn        NEW MED     500 mL    Gentamycin 240mg in 500mL 0.9% Normal Saline. Instill 30mL into empty bladder at bedtime. Leave in bladder overnight and drain in the morning    Recurrent UTI       NITROFURANTOIN MACROCRYSTAL PO      Take 100 mg by mouth        nystatin [id number] UNIT/GM Powd    MYCOSTATIN    30 g    Apply topically daily as needed        Omeprazole-Sodium Bicarbonate  MG Pack     90 each    TAKE 1 PACKET BY MOUTH DISSOLVED IN LIQUID DAILY    Eosinophilic esophagitis, Gastroesophageal reflux disease without esophagitis       ondansetron 4 MG ODT tab    ZOFRAN ODT    20 tablet    Take 1-2 tablets (4-8 mg) by mouth every 8 hours as needed for nausea    Abdominal pain, generalized       phenylephrine-cocoa butter 0.25-88.44 % per suppository    PREPARATION H    48 suppository    Insert one suppository rectally twice daily as needed.    External hemorrhoids       polyethylene glycol powder    MIRALAX     510 g    Take 17 g (1 capful) by mouth daily as needed for constipation    Constipation, unspecified constipation type       Simethicone 125 MG Caps     28 capsule         sodium phosphate 7-19 GM/118ML rectal enema     1 Bottle    Place 1 Bottle (1 enema) rectally daily as needed for constipation        sterile water (bottle) irrigation     1000 mL    Irrigate with 60 mLs as directed daily    Neurogenic bladder       tolterodine 2 MG tablet    DETROL    180 tablet    Take 1 tablet (2 mg) by mouth 2 times daily    Neurogenic bladder       TYLENOL PO      Take 500 mg by mouth as needed for mild pain or fever Reported on 2/15/2017        VENTOLIN  (90 Base) MCG/ACT inhaler   Generic drug:  albuterol     18 g    INHALE 2 PUFFS BY MOUTH FOUR TIMES DAILY AS NEEDED    Cough       zinc oxide 10 % Oint      Externally apply topically 3 times daily        zolpidem 10 MG tablet    AMBIEN    30 tablet    TAKE 1/2-1 TABLET BY MOUTH AT NIGHT AS NEEDED FOR SLEEP    Primary insomnia       * Notice:  This list has 2 medication(s) that are the same as other medications prescribed for you. Read the directions carefully, and ask your doctor or other care provider to review them with you.

## 2018-10-15 NOTE — PROGRESS NOTES
SUBJECTIVE:   Riley Gifford is a 42 year old male who presents to clinic today for the following health issues:    Lower abdominal pain / pain across abdomen and lower area of abdomen and radiates into the sides      Onset: 1 week - but this is a chronic, ongoing problem he's had multiple times that has been worked up a number of times including CT abdomen pelvix and renal ultrasound.     Description:   Character: Constant dull ache  Location: Side and and lower abdominal area   Radiation: None and Back    Intensity: moderate    Progression of Symptoms:  worsening    Accompanying Signs & Symptoms:  Fever/Chills?: no   Gas/Bloating: no   Nausea: YES  Vomitting: no   Diarrhea?: no   Constipation:no   Dysuria or Hematuria: no    History:   Trauma: no   Previous similar pain: YES   Previous tests done: Upper Endoscopy 5/2017    Precipitating factors:   Does the pain change with:     Food: no      BM: no     Urination: no     Alleviating factors:  None     Therapies Tried and outcome: Tylenol     LMP:  not applicable     Seems worse later in the afternoon. Seems better in the evenings. Maybe better in the morning. Appetite is better.     He has known low back arthritis and some mild scoliosis.     He has chronic constipation but he says that he's had good bowel movements the past few days and things are not better.     He hasn't had a recent colonoscopy.     Patient Active Problem List   Diagnosis     Vitamin D deficiency     Eosinophilic esophagitis     Neurogenic bladder     CARDIOVASCULAR SCREENING; LDL GOAL LESS THAN 160     Quadriplegia (H)     Chronic constipation     Internal hemorrhoids with other complication     Diagnostic skin and sensitization tests(aka ALLERGENS)     Anal or rectal pain     Allergic rhinitis due to animal dander     Allergy to mold spores     House dust mite allergy     Insomnia     Health Care Home     Gallstones     Chronic midline thoracic back pain     Pulmonary nodules     ACP (advance  care planning)     Cervical spinal cord injury, sequela (H)      Current Outpatient Prescriptions   Medication     Acetaminophen (TYLENOL PO)     bacitracin ([employer]S BACITRACIN ZINC) ointment     baclofen (LIORESAL) 20 MG tablet     budesonide (PULMICORT) 0.5 MG/2ML neb solution     cetirizine (ZYRTEC) 10 MG tablet     clotrimazole 10 MG meryl     COMPRESSION STOCKINGS     diphenhydrAMINE (BENADRYL) 25 MG tablet     docusate sodium (ENEMEEZ MINI) 283 MG enema     hydrocortisone (ANUSOL-HC) 2.5 % cream     ibuprofen 200 MG capsule     lidocaine (XYLOCAINE) 5 % ointment     loperamide (IMODIUM A-D) 2 MG tablet     NEW MED     NITROFURANTOIN MACROCRYSTAL PO     nystatin (MYCOSTATIN) 840648 UNIT/GM POWD     Omeprazole-Sodium Bicarbonate  MG PACK     ondansetron (ZOFRAN ODT) 4 MG ODT tab     phenylephrine-cocoa butter (PREPARATION H) 0.25-88.44 % suppository     polyethylene glycol (MIRALAX) powder     PROCTOZONE-HC 2.5 % cream     Simethicone 125 MG CAPS     sodium phosphate (FLEET ENEMA) 7-19 GM/118ML rectal enema     sterile water, bottle, irrigation     tolterodine (DETROL) 2 MG tablet     VENTOLIN  (90 Base) MCG/ACT Inhaler     zinc oxide 10 % OINT     zolpidem (AMBIEN) 10 MG tablet     alum & mag hydroxide-simethicone (MYLANTA/MAALOX) 200-200-20 MG/5ML SUSP suspension     EPINEPHrine (EPIPEN/ADRENACLICK/OR ANY BX GENERIC EQUIV) 0.3 MG/0.3ML injection 2-pack     guaiFENesin (ROBITUSSIN) 100 MG/5ML LIQD     magnesium hydroxide (MILK OF MAGNESIA) 400 MG/5ML suspension     No current facility-administered medications for this visit.         Problem list and histories reviewed & adjusted, as indicated.  Additional history: as documented    Labs reviewed in EPIC    Reviewed and updated as needed this visit by clinical staff       Reviewed and updated as needed this visit by Provider         ROS:  Constitutional, HEENT, cardiovascular, pulmonary, GI, , musculoskeletal, neuro, skin, endocrine and psych systems  are negative, except as otherwise noted.    OBJECTIVE:     /78 (Cuff Size: Adult Large)  Pulse 68  Temp 98  F (36.7  C) (Oral)  Ht 6' (1.829 m)  BMI 17.63 kg/m2  Body mass index is 17.63 kg/(m^2).  GENERAL: healthy, alert and no distress  RESP: lungs clear to auscultation - no rales, rhonchi or wheezes  ABDOMEN: soft, nontender, no hepatosplenomegaly, no masses and bowel sounds normal   (male): No CVA Tenderness   MS: Non tender exam.     CT from 6/8/18 reviewed - normal    ASSESSMENT/PLAN:     (R10.30) Lower abdominal pain  (primary encounter diagnosis)  Comment: Not clear  Plan: Amylase, Lipase, Hepatic panel (Albumin, ALT,         AST, Bili, Alk Phos, TP), *UA reflex to         Microscopic and Culture ([place] [facility] (except [place] and [place]), Urine        Culture Aerobic Bacterial, Urine Microscopic        This causes him a good deal of anxiety. It is hard to tell what his level of pain and cause of discomfort are as he is quadriplegic with minimal upper body strength so some of this pain might be coming from his back. He's had a thorough work up with CT scan. Will check labs. Have him work on his constipation / bowel regimen and see him back if this isn't improving. Warning symptoms of worsening condition discussed and patient shows good understanding.     (G82.50) Quadriplegia (H)  Comment:   Plan: Urine Culture Aerobic Bacterial        As noted     (K59.09) Chronic constipation  Comment:   Plan: Stable per his recollection and in fact improved.     (K20.0) Eosinophilic esophagitis  Comment:   Plan: Seeing GI for this.         JUANITO MARTIN PA-C  [facility]

## 2018-10-16 LAB
ALBUMIN SERPL-MCNC: 3.4 G/DL (ref 3.4–5)
ALP SERPL-CCNC: 57 U/L (ref 40–150)
ALT SERPL W P-5'-P-CCNC: 21 U/L (ref 0–70)
AMYLASE SERPL-CCNC: 48 U/L (ref 30–110)
AST SERPL W P-5'-P-CCNC: 10 U/L (ref 0–45)
BILIRUB DIRECT SERPL-MCNC: 0.1 MG/DL (ref 0–0.2)
BILIRUB SERPL-MCNC: 0.3 MG/DL (ref 0.2–1.3)
PROT SERPL-MCNC: 6.6 G/DL (ref 6.8–8.8)

## 2018-10-17 LAB
BACTERIA SPEC CULT: ABNORMAL
SPECIMEN SOURCE: ABNORMAL

## 2018-10-18 ENCOUNTER — OFFICE VISIT (OUTPATIENT)
Dept: DERMATOLOGY | Facility: CLINIC | Age: 43
End: 2018-10-18
Payer: MEDICARE

## 2018-10-18 DIAGNOSIS — D22.5 MELANOCYTIC NEVUS OF TRUNK: Primary | ICD-10-CM

## 2018-10-18 ASSESSMENT — PAIN SCALES - GENERAL: PAINLEVEL: NO PAIN (0)

## 2018-10-18 NOTE — NURSING NOTE
Dermatology Rooming Note    Riley Gifford's goals for this visit include:   Chief Complaint   Patient presents with     Derm Problem     Riley is here regarding some moles on his back .      Betsy Jennings LPN

## 2018-10-18 NOTE — MR AVS SNAPSHOT
After Visit Summary   10/18/2018    Riley Gifford    MRN: [id number]           Patient Information     Date Of Birth          1975        Visit Information        Provider Department      10/18/2018 6:45 PM Bravo Rabago MD [facility] Dermatology        Today's Diagnoses     Melanocytic nevus of trunk    -  1      Care Instructions    The ABCDEs of Melanoma    Skin cancer can develop anywhere on the skin. Ask someone for help when checking your skin, especially in hard to see places. If you notice a mole different from others, or that changes, enlarges, itches, or bleeds (even if it is small), you should see a dermatologist.                  Follow-ups after your visit        Your next 10 appointments already scheduled     Apr 01, 2019 12:30 PM CDT   (Arrive by 12:15 PM)   Cystoscopy with Evaristo Hayes MD   [facility] and [facility] Pro[facility] Urologic C[facility] ([facility])    [address] [place] [id number] [phone]              Who to contact     Please call your clinic at [phone] to:    Ask questions about your health    Make or cancel appointments    Discuss your medicines    Learn about your test results    Speak to your doctor            Additional Information About Your Visit        Herotainmenthart Information     Intacct gives you secure access to your electronic health record. If you see a primary care provider, you can also send messages to your care team and make appointments. If you have questions, please call your primary care clinic.  If you do not have a primary care provider, please call [phone] and they will assist you.      Intacct is an electronic gateway that provides easy, online access to your medical records. With Intacct, you can request a clinic appointment, read your test results, renew a prescription or communicate with your care team.     To access your existing account, please contact your  [facility] or call [phone] for assistance.        Care EveryWhere ID     This is your Care EveryWhere ID. This could be used by other organizations to access your [facility] medical records  [id number]         Blood Pressure from Last 3 Encounters:   10/15/18 122/78   10/01/18 116/79   09/25/18 119/86    Weight from Last 3 Encounters:   10/01/18 59 kg (130 lb)   07/18/18 59 kg (130 lb)   02/12/18 59 kg (130 lb)              Today, you had the following     No orders found for display       Primary Care Provider Office Phone # Fax #    Raffy Jorden Harris PA-C [phone]       [address] [facility] [address]        Goals        General    Healthy Eating (pt-stated)     Notes - Note created  9/24/2018 11:50 AM by Robbin Andino RN    Goal Statement: I will work with the ILS person to make more palatable meals for me to eat.  Measure of Success: The patient is gaining weight  Supportive Steps to Achieve: weigh the patient routinely.  Barriers: food allergies  Strengths: motivated to gain weight  Date to Achieve By: ongoing  Patient expressed understanding of goal: yes            Equal Access to Services     THERON REEVES AH: Hadii alan Vides, waaxda lukenanadaha, qaybta kaalmada adeanncieyada, monae diaz. So [facility] [phone].    ATENCIÓN: Si habla español, tiene a borden disposición servicios gratuitos de asistencia lingüística. Juhi al [phone].    We comply with applicable federal civil rights laws and [place] laws. We do not discriminate on the basis of race, color, national origin, age, disability, sex, sexual orientation, or gender identity.            Thank you!     Thank you for choosing [facility]  for your care. Our goal is always to provide you with excellent care. Hearing back from our patients is one way we can continue to improve our services. Please take a few minutes to complete the written survey  that you may receive in the mail after your visit with us. Thank you!             Your Updated Medication List - Protect others around you: Learn how to safely use, store and throw away your medicines at www.disposemymeds.org.          This list is accurate as of 10/18/18  7:18 PM.  Always use your most recent med list.                   Brand Name Dispense Instructions for use Diagnosis    alum & mag hydroxide-simethicone 200-200-20 MG/5ML Susp suspension    MYLANTA/MAALOX     Take 30 mLs by mouth    Esophageal reflux       baclofen 20 MG tablet    LIORESAL    360 tablet    Take 1 tablet (20 mg) by mouth 4 times daily    Quadriplegia (H)       budesonide 0.5 MG/2ML neb solution    PULMICORT    1080 mL    USE 2 VIALS TWICE DAILY. MIX WITH HONEY AND SWALLOW    Eosinophilic esophagitis       cetirizine 10 MG tablet    zyrTEC     Take 10 mg by mouth daily        clotrimazole 10 MG maya     70 Maya    Place 1 Maya (10 mg) inside cheek 5 times daily        COMPRESSION STOCKINGS      Tens unit and electrodes        CVS BACITRACIN ZINC ointment   Generic drug:  bacitracin      Apply topically 3 times daily as needed for wound care        diphenhydrAMINE 25 MG tablet    BENADRYL     Take 25 mg by mouth every 8 hours as needed for itching or allergies Reported on 2/15/2017        docusate sodium 283 MG enema    ENEMEEZ MINI    30 enema    Use one every other day and prn per patients's request.    Chronic constipation       EPINEPHrine 0.3 MG/0.3ML injection 2-pack    EPIPEN/ADRENACLICK/or ANY BX GENERIC EQUIV    0.6 mL    Inject 0.3 mLs (0.3 mg) into the muscle as needed for anaphylaxis    Reaction to food, initial encounter       guaiFENesin 100 MG/5ML Liqd    ROBITUSSIN    560 mL    4-5 TSP twice a day as needed    Cough       * hydrocortisone 2.5 % cream    ANUSOL-HC     Place rectally 2 times daily        * PROCTOZONE-HC 2.5 % cream   Generic drug:  hydrocortisone     30 g    USE RECTALLY TWICE DAILY     Hemorrhoids, unspecified hemorrhoid type       ibuprofen 200 MG capsule     120 capsule    Take 400 mg by mouth every 4 hours as needed for fever        lidocaine 5 % ointment    XYLOCAINE     Apply topically as needed for moderate pain        loperamide 2 MG tablet    IMODIUM A-D    30 tablet    Take 2 tabs (4 mg) after first loose stool, and then take one tab (2 mg) after each diarrheal stool.  Max of 8 tabs (16 mg) per day.        MILK OF MAGNESIA 400 MG/5ML suspension   Generic drug:  magnesium hydroxide     360 mL    Take 30 mLs by mouth daily as needed for constipation or heartburn        NEW MED     500 mL    Gentamycin 240mg in 500mL 0.9% Normal Saline. Instill 30mL into empty bladder at bedtime. Leave in bladder overnight and drain in the morning    Recurrent UTI       NITROFURANTOIN MACROCRYSTAL PO      Take 100 mg by mouth        nystatin 018020 UNIT/GM Powd    MYCOSTATIN    30 g    Apply topically daily as needed        Omeprazole-Sodium Bicarbonate  MG Pack     90 each    TAKE 1 PACKET BY MOUTH DISSOLVED IN LIQUID DAILY    Eosinophilic esophagitis, Gastroesophageal reflux disease without esophagitis       ondansetron 4 MG ODT tab    ZOFRAN ODT    20 tablet    Take 1-2 tablets (4-8 mg) by mouth every 8 hours as needed for nausea    Abdominal pain, generalized       phenylephrine-cocoa butter 0.25-88.44 % per suppository    PREPARATION H    48 suppository    Insert one suppository rectally twice daily as needed.    External hemorrhoids       polyethylene glycol powder    MIRALAX    510 g    Take 17 g (1 capful) by mouth daily as needed for constipation    Constipation, unspecified constipation type       Simethicone 125 MG Caps     28 capsule         sodium phosphate 7-19 GM/118ML rectal enema     1 Bottle    Place 1 Bottle (1 enema) rectally daily as needed for constipation        sterile water (bottle) irrigation     1000 mL    Irrigate with 60 mLs as directed daily    Neurogenic bladder        tolterodine 2 MG tablet    DETROL    180 tablet    Take 1 tablet (2 mg) by mouth 2 times daily    Neurogenic bladder       TYLENOL PO      Take 500 mg by mouth as needed for mild pain or fever Reported on 2/15/2017        VENTOLIN  (90 Base) MCG/ACT inhaler   Generic drug:  albuterol     18 g    INHALE 2 PUFFS BY MOUTH FOUR TIMES DAILY AS NEEDED    Cough       zinc oxide 10 % Oint      Externally apply topically 3 times daily        zolpidem 10 MG tablet    AMBIEN    30 tablet    TAKE 1/2-1 TABLET BY MOUTH AT NIGHT AS NEEDED FOR SLEEP    Primary insomnia       * Notice:  This list has 2 medication(s) that are the same as other medications prescribed for you. Read the directions carefully, and ask your doctor or other care provider to review them with you.
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
## 2018-10-25 ENCOUNTER — TRANSFERRED RECORDS (OUTPATIENT)
Dept: HEALTH INFORMATION MANAGEMENT | Facility: CLINIC | Age: 43
End: 2018-10-25

## 2018-11-02 ENCOUNTER — PATIENT OUTREACH (OUTPATIENT)
Dept: CARE COORDINATION | Facility: CLINIC | Age: 43
End: 2018-11-02

## 2018-11-02 DIAGNOSIS — K20.0 EOSINOPHILIC ESOPHAGITIS: ICD-10-CM

## 2018-11-02 RX ORDER — BUDESONIDE 0.5 MG/2ML
INHALANT ORAL
Qty: 1080 ML | Refills: 0 | OUTPATIENT
Start: 2018-11-02

## 2018-11-02 NOTE — TELEPHONE ENCOUNTER
"10/15/18 OV note says he is seeing GI for this. 8/3 [facility] does state to continue this. Sent denied with note to call [facility].   Neelima Yeung, RN    Requested Prescriptions   Pending Prescriptions Disp Refills     budesonide (PULMICORT) 0.5 MG/2ML neb solution [Pharmacy Med Name: BUDESONIDE 0.5MG/2ML VIALS 30X2ML] 1080 mL 0     Sig: USE 2 VIALS TWICE DAILY. MIX WITH HONEY AND SWALLOW    Inhaled Steroids Protocol Passed    11/2/2018 11:45 AM       Passed - Patient is age 12 or older       Passed - Recent (12 mo) or future (30 days) visit within the authorizing provider's specialty    Patient had office visit in the last 12 months or has a visit in the next 30 days with authorizing provider or within the authorizing provider's specialty.  See \"Patient Info\" tab in inbasket, or \"Choose Columns\" in Meds & Orders section of the refill encounter.                "

## 2018-11-02 NOTE — PROGRESS NOTES
Clinic Care Coordination Contact    Situation: Patient chart reviewed by care coordinator.    Background: The patient is a quadraplegic, with EE and recurring substernal chest discomfort.     Assessment: The patient has been seen by the pain clinic at [facility] recently.  He is also receiving botox from the urology provider. The patient still has occasional substernal chest discomfort.  There has not been a cause determined for the chest discomfort.    Plan/Recommendations: 1).  The patient will take all the medications as prescribed by the providers.  2).  The patient will attend the therapy sessions at [facility].  3).  Care Coordination to remain available for the patient to contact in the event of future needs.          Robbin Vanegas, MSN, RN, PHN I Clinic Care Coordinator  [facility]  Phone: [phone]  [email or web address]

## 2018-11-15 ENCOUNTER — TELEPHONE (OUTPATIENT)
Dept: UROLOGY | Facility: CLINIC | Age: 43
End: 2018-11-15

## 2018-11-15 NOTE — TELEPHONE ENCOUNTER
Refilled at compound pharmacy genamicin 480 mg in 1 liter of normal saline with 5 refills  Patient should put into empty bladder 30cc and leave overnight. Blanka White LPN Staff Nurse

## 2018-12-03 ENCOUNTER — TRANSFERRED RECORDS (OUTPATIENT)
Dept: HEALTH INFORMATION MANAGEMENT | Facility: CLINIC | Age: 43
End: 2018-12-03

## 2018-12-05 ENCOUNTER — PATIENT OUTREACH (OUTPATIENT)
Dept: CARE COORDINATION | Facility: CLINIC | Age: 43
End: 2018-12-05

## 2018-12-05 ASSESSMENT — ACTIVITIES OF DAILY LIVING (ADL): DEPENDENT_IADLS:: CLEANING;COOKING;LAUNDRY;SHOPPING;MEAL PREPARATION;MEDICATION MANAGEMENT

## 2018-12-05 NOTE — LETTER
[facility]  Complex Care Plan  About Me  Patient Name:  Riley Marcos    YOB: 1975  Age:     43 year old   [place] MRN:   [id number] Telephone Information:  Home Phone [phone]   Mobile NONE   Mobile [phone]       Address:    [address] [place] [address] [address]  [place] [address] Email address:  [email or web address]      Emergency Contact(s)  Name Relationship Lgl Grd Work Phone Home Phone Mobile Phone   1. TREY MARCOS (NE* Relative No noen none [phone]   2. HODA MARCOS Brother No  [phone]            Primary language:  English     needed? No   [facility] [employer]:  [phone] op. 1  Other communication barriers: Glasses  Preferred Method of Communication:  Lisa  Current living arrangement: I live in assisted living  Mobility Status/ Medical Equipment: Dependent/Assisted by Another    Health Maintenance  Health Maintenance Reviewed: Due/Overdue     My Access Plan  Medical Emergency 911   Primary Clinic Line [facility] - [phone]   24 Hour Appointment Line [phone] or  [phone]) (toll-free)   24 Hour Nurse Line [phone] (toll-free)   Preferred Urgent Care [facility] [phone]   Preferred Hospital [facility]  [phone]   Preferred Pharmacy [facility] [address] [place] [address] [facility] [address] [place] [address]     Behavioral Health Crisis Line The National Suicide Prevention Lifeline at 1-775.450.4435 or 911     My Care Team Members    Patient Care Team       Relationship Specialty Notifications Start End    Raffy Harris PA-C PCP - General Physician Assistant  3/2/18     Phone: [phone] Fax: [phone]         [address] [facility] [address]    Robbin Andino, RN Lead Care Coordinator Nurse Admissions 1/6/16     phone:  [phone]    Phone: [phone] Fax: [phone]        Rober Leo MD Referring  Physician Urology  1/8/16     Phone: [phone] Fax: [phone]         [address] [facility] [address]    Kimberly Zabala MD MD Urology  1/8/16     Phone: [phone] Fax: [phone]         [address] [facility] [address] [facility] [address] [facility] [address]    Amanda Other (see comments)   1/8/16     [facility]     Phone: [phone]         Evaristo Hayes MD MD Urology  4/19/16     Phone: [phone] Fax: [phone]         [address] [facility] [address] [facility] [address]    Jenny Ames, RN Registered Nurse Urology  4/19/16     Marta Adames, RN Clinic Care Coordinator Urology Abnormal results only, Admissions 11/3/16     Phone: [phone] Pager: [phone]        Rosemary Thomas, RN Nurse Coordinator Physical Medicine and Rehab  3/30/17     Phone: [phone] Pager: [phone]                My Care Plans  Self Management and Treatment Plan  Goals and (Comments)  Goals        General    Healthy Eating (pt-stated)     Notes - Note created  9/24/2018 11:50 AM by Robbin Andino, RN    Goal Statement: I will work with the ILS person to make more palatable meals for me to eat.  Measure of Success: The patient is gaining weight  Supportive Steps to Achieve: weigh the patient routinely.  Barriers: food allergies  Strengths: motivated to gain weight  Date to Achieve By: ongoing  Patient expressed understanding of goal: yes          Pain Management (pt-stated)     Notes - Note edited  12/11/2018  2:54 PM by Robbin Andino, RN    Goal Statement: I will work with the physical therapists at [facility] to decrease the amount of pain in my neck, back and sacral area.  Measure of Success: Patient states less pain.  Supportive Steps to Achieve: physical therapy from [facility]  Barriers: quadriplegic   Strengths: motivated  Date to Achieve By: ongoing  Patient expressed understanding of goal: yes                 Action Plans on File:                       Advance Care Plans/Directives  Type:   Type Advanced Care Plans/Directives: Advanced Directive - On File(n/a)    My Medical and Care Information  Problem List   Patient Active Problem List   Diagnosis     Vitamin D deficiency     Eosinophilic esophagitis     Neurogenic bladder     CARDIOVASCULAR SCREENING; LDL GOAL LESS THAN 160     Quadriplegia (H)     Chronic constipation     Internal hemorrhoids with other complication     Diagnostic skin and sensitization tests(aka ALLERGENS)     Anal or rectal pain     Allergic rhinitis due to animal dander     Allergy to mold spores     House dust mite allergy     Insomnia     Health Care Home     Gallstones     Chronic midline thoracic back pain     Pulmonary nodules     ACP (advance care planning)     Cervical spinal cord injury, sequela (H)      Current Medications and Allergies:  See printed Medication Report.    Care Coordination Start Date: 1/6/2016   Frequency of Care Coordination: monthly   Form Last Updated: 12/11/2018

## 2018-12-05 NOTE — PROGRESS NOTES
Clinic Care Coordination Contact  [facility]/Voicemail    Referral Source: PCP  Clinical Data: Care Coordinator Outreach  Outreach attempted x 1.  Left message on voicemail with call back information and requested return call.  Plan: Care Coordinator mailed out care coordination introduction letter on 9-24-18. Care Coordinator will try to reach patient again in 3-5 business days.        Robbin Vanegas, MSN, RN, PHN I Clinic Care Coordinator  [facility]  Phone: [phone]  [email or web address]

## 2018-12-11 ASSESSMENT — ACTIVITIES OF DAILY LIVING (ADL): DEPENDENT_IADLS:: CLEANING;COOKING;LAUNDRY;SHOPPING;MEAL PREPARATION;MEDICATION MANAGEMENT

## 2018-12-11 NOTE — PROGRESS NOTES
Clinic Care Coordination Contact    Clinic Care Coordination Contact  OUTREACH    Referral Information:  Referral Source: PCP    Primary Diagnosis: Genitourinary Disorders(wound care)    Chief Complaint   Patient presents with     Clinic Care Coordination - Follow-up     RN CC        [place] Utilization:   Clinic Utilization  Difficulty keeping appointments:: No  Compliance Concerns: No  No-Show Concerns: No  No PCP office visit in Past Year: No  Utilization    Last refreshed: 12/11/2018  2:07 AM:  Hospital Admissions 0           Last refreshed: 12/11/2018  2:07 AM:  ED Visits 0           Last refreshed: 12/11/2018  2:07 AM:  No Show Count (past year) 0              Current as of: 12/11/2018  2:07 AM              Clinical Concerns:  Current Medical Concerns:  The patient is a quadriplegic with chronic pain in the neck back and sacral area.  He states that with continued physical therapy at [facility] he is doing much better in being able to tolerate the pain.  The patient states that the EE is stable with the medications that he is taking and the diet that he follows.  No new concerns at this time.   Patient Active Problem List   Diagnosis     Vitamin D deficiency     Eosinophilic esophagitis     Neurogenic bladder     CARDIOVASCULAR SCREENING; LDL GOAL LESS THAN 160     Quadriplegia (H)     Chronic constipation     Internal hemorrhoids with other complication     Diagnostic skin and sensitization tests(aka ALLERGENS)     Anal or rectal pain     Allergic rhinitis due to animal dander     Allergy to mold spores     House dust mite allergy     Insomnia     Health Care Home     Gallstones     Chronic midline thoracic back pain     Pulmonary nodules     ACP (advance care planning)     Cervical spinal cord injury, sequela (H)        Current Behavioral Concerns: none noted currently    Education Provided to patient: none   Pain  Pain (GOAL):: Yes  Type: Chronic (>3mo)  Location of chronic pain:: back and sacral area,  neck area  Radiating: Yes  Progression: Unchanged  Description of pain: Stabbing, Shooting  Chronic pain severity:: 4  Limitation of routine activities due to chronic pain:: Yes  Description: Unable to perform most daily activities (chores, hobbies, social activities, driving)  Alleviating Factors: Pain Medication  Aggravating Factors: Positioning, Breathing  Health Maintenance Reviewed: Due/Overdue   Health Maintenance Due   Topic Date Due     DTAP/TDAP/TD IMMUNIZATION (1 - Tdap) 12/07/1983     PNEUMOVAX IMMUNIZATION 19-64 HIGHEST RISK (1 of 3 - PCV13) 12/07/1995     EYE EXAM Q1 YEAR  04/26/2018     INFLUENZA VACCINE (1) 09/01/2018     Clinical Pathway: None    Medication Management:  Patient is knowledgeable on medications and is adherent.  No financial concerns reported at this time.       Functional Status:  Dependent ADLs:: Wheelchair-independent, Bathing, Dressing, Grooming, Positioning, Transfers  Dependent IADLs:: Cleaning, Cooking, Laundry, Shopping, Meal Preparation, Medication Management  Bed or wheelchair confined:: No  Mobility Status: Dependent/Assisted by Another  Fallen 2 or more times in the past year?: No  Any fall with injury in the past year?: No    Living Situation:  Current living arrangement:: I live in assisted living  Type of residence:: Assisted living    Diet/Exercise/Sleep:  Diet:: Regular  Inadequate nutrition (GOAL):: No  Food Insecurity: No  Tube Feeding: No  Exercise:: Unable to exercise  Inadequate activity/exercise (GOAL):: No  Significant changes in sleep pattern (GOAL): No    Transportation:  Transportation concerns (GOAL):: No  Transportation means:: Accessible car     Psychosocial:  Jainism or spiritual beliefs that impact treatment:: No  Mental health DX:: No  Mental health management concern (GOAL):: No  Informal Support system:: Family, Friends     Financial/Insurance:   Financial/Insurance concerns (GOAL):: No     Resources and Interventions:  Current Resources:   List of  home care services:: Skilled Nursing;   Community Resources: Other (see comment)([place] and ILS workers)  Supplies used at home:: Other(self cath supplies)  Equipment Currently Used at Home: commode, grab bar, shower chair, transfer board, wheelchair, power    Advance Care Plan/Directive  Advanced Care Plans/Directives on file:: Yes  Type Advanced Care Plans/Directives: Advanced Directive - On File(n/a)  Advanced Care Plan/Directive Status: Not Applicable    Referrals Placed: None(unknown)     Goals:   Goals        General    Healthy Eating (pt-stated)     Notes - Note created  9/24/2018 11:50 AM by Robbin Andino, RN    Goal Statement: I will work with the ILS person to make more palatable meals for me to eat.  Measure of Success: The patient is gaining weight  Supportive Steps to Achieve: weigh the patient routinely.  Barriers: food allergies  Strengths: motivated to gain weight  Date to Achieve By: ongoing  Patient expressed understanding of goal: yes          Pain Management (pt-stated)     Notes - Note edited  12/11/2018  2:54 PM by Robbin Andino RN    Goal Statement: I will work with the physical therapists at [facility] to decrease the amount of pain in my neck, back and sacral area.  Measure of Success: Patient states less pain.  Supportive Steps to Achieve: physical therapy from [facility]  Barriers: quadriplegic   Strengths: motivated  Date to Achieve By: ongoing  Patient expressed understanding of goal: yes                  Patient/Caregiver understanding: The patient has an excellent understanding of the disease process.    Outreach Frequency: monthly  Future Appointments              In 3 months Evaristo Hayes MD [facility] Urology and [facility], [facility]          Plan: 1).  The patient will attend appointments with physical therapy at [facility].  2).  The patient will monitor his EE so that he is able to eat and keep his weight up.  3).  Care Coordination to  remain available for the patient to contact in the event of future needs.        Robbin Vanegas, MSN, RN, PHN I Clinic Care Coordinator  [facility]  Phone: [phone]  [email or web address]

## 2018-12-29 DIAGNOSIS — N31.9 NEUROGENIC BLADDER: ICD-10-CM

## 2018-12-31 DIAGNOSIS — K20.0 EOSINOPHILIC ESOPHAGITIS: ICD-10-CM

## 2018-12-31 RX ORDER — BUDESONIDE 0.5 MG/2ML
INHALANT ORAL
Qty: 360 ML | Refills: 0 | Status: SHIPPED | OUTPATIENT
Start: 2018-12-31 | End: 2022-12-19

## 2018-12-31 RX ORDER — TOLTERODINE TARTRATE 2 MG/1
TABLET, EXTENDED RELEASE ORAL
Qty: 180 TABLET | Refills: 0 | Status: SHIPPED | OUTPATIENT
Start: 2018-12-31 | End: 2019-04-11

## 2018-12-31 NOTE — TELEPHONE ENCOUNTER
"Requested Prescriptions   Pending Prescriptions Disp Refills     budesonide (PULMICORT) 0.5 MG/2ML neb solution [Pharmacy Med Name: BUDESONIDE 0.5MG/2ML VIALS 30X2ML]  Last Written Prescription Date:  11/8/2018  Last Fill Quantity: 360 ml,  # refills: 0   Last office visit: 10/15/2018 with prescribing provider:  Steven   Future Office Visit:     360 mL 0     Sig: BREAK 2 CAPSULES INTO 1 TEASPOON OF HONEY TWICE DAILY FOR 6 WEEKS    Inhaled Steroids Protocol Passed - 12/31/2018  2:42 PM       Passed - Patient is age 12 or older       Passed - Recent (12 mo) or future (30 days) visit within the authorizing provider's specialty    Patient had office visit in the last 12 months or has a visit in the next 30 days with authorizing provider or within the authorizing provider's specialty.  See \"Patient Info\" tab in inbasket, or \"Choose Columns\" in Meds & Orders section of the refill encounter.                "

## 2018-12-31 NOTE — TELEPHONE ENCOUNTER
"Requested Prescriptions   Pending Prescriptions Disp Refills     tolterodine (DETROL) 2 MG tablet [Pharmacy Med Name: TOLTERODINE TARTRATE 2MG TABLETS]  Last Written Prescription Date:  1/19/2018  Last Fill Quantity: 180 tablet,  # refills: 3   Last office visit: 12/1/2017 with prescribing provider:  NORMAN Yao   Future Office Visit:     180 tablet 0     Sig: TAKE 1 TABLET(2 MG) BY MOUTH TWICE DAILY    Muscarinic Antagonists (Urinary Incontinence Agents) Failed - 12/29/2018 11:46 AM       Failed - Recent (12 mo) or future (30 days) visit within the authorizing provider's specialty    Patient had office visit in the last 12 months or has a visit in the next 30 days with authorizing provider or within the authorizing provider's specialty.  See \"Patient Info\" tab in inbasket, or \"Choose Columns\" in Meds & Orders section of the refill encounter.             Passed - Patient does not have a diagnosis of glaucoma on the problem list    If glaucoma diagnosis is new, refer refill to physician.         Passed - Patient is 18 years of age or older          "

## 2019-01-08 ENCOUNTER — TELEPHONE (OUTPATIENT)
Dept: CARE COORDINATION | Facility: CLINIC | Age: 44
End: 2019-01-08

## 2019-01-08 ENCOUNTER — PATIENT OUTREACH (OUTPATIENT)
Dept: CARE COORDINATION | Facility: CLINIC | Age: 44
End: 2019-01-08

## 2019-01-08 DIAGNOSIS — S14.109S CERVICAL SPINAL CORD INJURY, SEQUELA (H): Primary | ICD-10-CM

## 2019-01-08 ASSESSMENT — ACTIVITIES OF DAILY LIVING (ADL): DEPENDENT_IADLS:: CLEANING;COOKING;LAUNDRY;SHOPPING;MEAL PREPARATION;MEDICATION MANAGEMENT

## 2019-01-08 NOTE — PROGRESS NOTES
Clinic Care Coordination Contact    Clinic Care Coordination Contact  OUTREACH    Referral Information:  Referral Source: PCP    Primary Diagnosis: Chronic Pain(wound care)    Chief Complaint   Patient presents with     Clinic Care Coordination - Follow-up     RN CC        [place] Utilization: The patient uses [place] [facility] urgent [facility] and hospital.  For therapy he has found the most success with [facility].  The patient recently requested a referral for a seating assessment to be done at [facility]  Clinic Utilization  Difficulty keeping appointments:: No  Compliance Concerns: No  No-Show Concerns: No  No PCP office visit in Past Year: No  Utilization    Last refreshed: 12/31/2018  5:41 PM:  Hospital Admissions 0           Last refreshed: 12/31/2018  5:41 PM:  ED Visits 0           Last refreshed: 12/31/2018  5:41 PM:  No Show Count (past year) 0              Current as of: 12/31/2018  5:41 PM              Clinical Concerns:  Current Medical Concerns: The patient is a quadriplegic with chronic pain and suffers from EE.  Due to the pain the patient is often not able to eat as much as he should to maintain his strength.  He is successful with the [facility] program and is continuing to attend the appointments.  The patient recently requested a referral for a seating assessment to be done at the [facility].  The patient states that he is very uncomfortable in his wheelchair and is hoping that an adjustment to the seating will help that.  The patient states that at this time things are stable.  The pain he is having is much as before.  He vocalized again that the [facility] program is really helping.  At the request for the referral for a seating assessment was sent to his PCP on a telephone encounter.  Patient Active Problem List   Diagnosis     Vitamin D deficiency     Eosinophilic esophagitis     Neurogenic bladder     CARDIOVASCULAR SCREENING; LDL GOAL LESS THAN  160     Quadriplegia (H)     Chronic constipation     Internal hemorrhoids with other complication     Diagnostic skin and sensitization tests(aka ALLERGENS)     Anal or rectal pain     Allergic rhinitis due to animal dander     Allergy to mold spores     House dust mite allergy     Insomnia     Health Care Home     Gallstones     Chronic midline thoracic back pain     Pulmonary nodules     ACP (advance care planning)     Cervical spinal cord injury, sequela (H)     Current Behavioral Concerns: None noted currently.  Education Provided to patient: Explained the referral system again to the patient.  Pain  Pain (GOAL):: Yes  Type: Chronic (>3mo)  Location of chronic pain:: back and sacral area, neck area  Radiating: Yes  Progression: Unchanged  Description of pain: Stabbing, Shooting  Chronic pain severity:: 4  Limitation of routine activities due to chronic pain:: Yes  Description: Unable to perform most daily activities (chores, hobbies, social activities, driving)  Alleviating Factors: Pain Medication  Aggravating Factors: Positioning, Breathing  Health Maintenance Reviewed: Due/Overdue   Health Maintenance Due   Topic Date Due     EYE EXAM Q1 YEAR  04/26/2018     INFLUENZA VACCINE (1) 09/01/2018     DTAP/TDAP/TD IMMUNIZATION (2 - Td) 10/29/2018     BMP Q6 MOS  01/12/2019     Clinical Pathway: None    Medication Management:  Patient is knowledgeable on medications and is adherent.  No financial concerns reported at this time.       Functional Status:  Dependent ADLs:: Wheelchair-independent, Bathing, Dressing, Grooming, Positioning, Transfers, Toileting  Dependent IADLs:: Cleaning, Cooking, Laundry, Shopping, Meal Preparation, Medication Management  Bed or wheelchair confined:: Yes  Mobility Status: Dependent/Assisted by Another  Fallen 2 or more times in the past year?: No  Any fall with injury in the past year?: No    Living Situation:  Current living arrangement:: I live in assisted living  Type of residence::  Assisted living    Diet/Exercise/Sleep:  Diet:: Regular  Inadequate nutrition (GOAL):: No  Food Insecurity: No  Tube Feeding: No  Exercise:: Unable to exercise  Inadequate activity/exercise (GOAL):: No  Significant changes in sleep pattern (GOAL): No    Transportation:  Transportation concerns (GOAL):: No  Transportation means:: Accessible car     Psychosocial:  Church or spiritual beliefs that impact treatment:: No  Mental health DX:: No  Mental health management concern (GOAL):: No  Informal Support system:: Family, Friends     Financial/Insurance:   Financial/Insurance concerns (GOAL):: No     Resources and Interventions:  Current Resources:   List of home care services:: Skilled Nursing;   Community Resources: Other (see comment)(Dorothy and ILS workers)  Supplies used at home:: Other(self cath supplies)  Equipment Currently Used at Home: commode, grab bar, toilet, shower chair, transfer board, wheelchair, power    Advance Care Plan/Directive  Advanced Care Plans/Directives on file:: Yes  Type Advanced Care Plans/Directives: Advanced Directive - On File(n/a)  Advanced Care Plan/Directive Status: Not Applicable    Referrals Placed: None(unknown)     Goals:   Goals        General    Healthy Eating (pt-stated)     Notes - Note created  9/24/2018 11:50 AM by Robbin Andino, RN    Goal Statement: I will work with the ILS person to make more palatable meals for me to eat.  Measure of Success: The patient is gaining weight  Supportive Steps to Achieve: weigh the patient routinely.  Barriers: food allergies  Strengths: motivated to gain weight  Date to Achieve By: ongoing  Patient expressed understanding of goal: yes          Pain Management (pt-stated)     Notes - Note edited  12/11/2018  2:54 PM by Robbin Andino, RN    Goal Statement: I will work with the physical therapists at [facility] to decrease the amount of pain in my neck, back and sacral area.  Measure of Success: Patient states less pain.  Supportive Steps  to Achieve: physical therapy from [facility]  Barriers: quadriplegic   Strengths: motivated  Date to Achieve By: ongoing  Patient expressed understanding of goal: yes                  Patient/Caregiver understanding: The patient has an excellent understanding of the disease process, his questions are always appropriate    Outreach Frequency: monthly  Future Appointments              In 2 months Evaristo Hayes MD [facility] and [facility], [facility]          Plan: 1.  The patient will attend the scheduled therapy sessions at [facility].  2.  The patient will make and attend all recommended follow-up appointments.  3.  Care Coordination to remain available for the patient to contact in the event of future needs.          Robbin Vanegas, MSN, RN, PHN I Clinic Care Coordinator  [facility]  Phone: [phone]  [email or web address]

## 2019-01-08 NOTE — TELEPHONE ENCOUNTER
The patient called requesting a referral to [facility] for a seating assessment.  The fax number is [phone].  The patient states that he is very uncomfortable in his chair at this point in time.  He has had seating assessments done in the past and has not been pleased with the results.  Therefore it has been recommended that he try with the [facility] for his seating assessment this time.  Please contact the patient once this has been completed.  If there are any further questions please contact the patient.      Robbin Vanegas, MSN, RN, PHN I Clinic Care Coordinator  [facility]  Phone: [phone]  [email or web address]

## 2019-01-11 NOTE — TELEPHONE ENCOUNTER
I placed a referral for OT / Seating Assessment with [facility]. He will have to confirm with his insurance that the visit is covered.     Thanks.  Raffy Harris, MPAS, PA-C

## 2019-01-11 NOTE — TELEPHONE ENCOUNTER
Faxed as requested along with patient's demographics. Spoke with patient and informed that this has been done.    Thanks!  Kem Peters

## 2019-01-16 ENCOUNTER — TELEPHONE (OUTPATIENT)
Dept: FAMILY MEDICINE | Facility: CLINIC | Age: 44
End: 2019-01-16

## 2019-01-16 NOTE — TELEPHONE ENCOUNTER
Forms received from [facility]'s: rehab therapies provider order for new wheelchair for Jan Harris PA-C.  Forms placed in provider 'sign me' folder.  Please fax forms to [phone] after completion.    Thanks!  Kem Peters

## 2019-01-21 ENCOUNTER — TELEPHONE (OUTPATIENT)
Dept: CARE COORDINATION | Facility: CLINIC | Age: 44
End: 2019-01-21

## 2019-01-21 DIAGNOSIS — N39.0 URINARY TRACT INFECTION: ICD-10-CM

## 2019-01-21 DIAGNOSIS — N39.0 URINARY TRACT INFECTION WITHOUT HEMATURIA, SITE UNSPECIFIED: Primary | ICD-10-CM

## 2019-01-21 NOTE — TELEPHONE ENCOUNTER
The patient called today with a history X 1 week of cloudy, foul smelling urine, back ache, nausea, no appetite.  Symptoms that he usually has with UTI.  He is also constipated.  Due to the lack of available appointments with you this week could he bring in a sample?  Or would you prefer that he be seen by someone else at this time?  Please contact the patient with the outcome.  He denies a fever at this time, although he usually does not have a fever with UTI.    Robbin Vanegas, MSN, RN, PHN, CCM  I Clinic Care Coordinator  [facility]  Phone: [phone]  [email or web address]

## 2019-01-22 DIAGNOSIS — N39.0 URINARY TRACT INFECTION WITHOUT HEMATURIA, SITE UNSPECIFIED: ICD-10-CM

## 2019-01-22 LAB
ALBUMIN UR-MCNC: NEGATIVE MG/DL
APPEARANCE UR: CLEAR
BACTERIA #/AREA URNS HPF: ABNORMAL /HPF
BILIRUB UR QL STRIP: NEGATIVE
COLOR UR AUTO: YELLOW
GLUCOSE UR STRIP-MCNC: NEGATIVE MG/DL
HGB UR QL STRIP: ABNORMAL
KETONES UR STRIP-MCNC: ABNORMAL MG/DL
LEUKOCYTE ESTERASE UR QL STRIP: ABNORMAL
NITRATE UR QL: NEGATIVE
NON-SQ EPI CELLS #/AREA URNS LPF: ABNORMAL /LPF
PH UR STRIP: 7 PH (ref 5–7)
RBC #/AREA URNS AUTO: ABNORMAL /HPF
SOURCE: ABNORMAL
SP GR UR STRIP: 1.02 (ref 1–1.03)
UROBILINOGEN UR STRIP-ACNC: 0.2 EU/DL (ref 0.2–1)
WBC #/AREA URNS AUTO: ABNORMAL /HPF

## 2019-01-22 PROCEDURE — 81001 URINALYSIS AUTO W/SCOPE: CPT | Performed by: PHYSICIAN ASSISTANT

## 2019-01-22 PROCEDURE — 87086 URINE CULTURE/COLONY COUNT: CPT | Performed by: PHYSICIAN ASSISTANT

## 2019-01-22 PROCEDURE — 87186 SC STD MICRODIL/AGAR DIL: CPT | Performed by: PHYSICIAN ASSISTANT

## 2019-01-22 PROCEDURE — 87088 URINE BACTERIA CULTURE: CPT | Performed by: PHYSICIAN ASSISTANT

## 2019-01-22 RX ORDER — SULFAMETHOXAZOLE/TRIMETHOPRIM 800-160 MG
1 TABLET ORAL 2 TIMES DAILY
Qty: 20 TABLET | Refills: 0 | Status: SHIPPED | OUTPATIENT
Start: 2019-01-22 | End: 2019-05-31

## 2019-01-22 NOTE — TELEPHONE ENCOUNTER
Reviewed. I can treat. I will put orders in for a UA with culture if he is able to come in to leave a sample.    I sent in Bactrim, 1 pill twice daily x 10 days to [facility].    As we've talked to Riley a few times before - he has a complex UTI and bladder risk history because of his paraplegia and self catheterizing. I'd really like him to have a clear plan with his urologist on when these issues arise because of the complexity of his condition.     Let me know if he has questions.  Thanks.  Raffy Harris, MPAS, PA-C

## 2019-01-22 NOTE — TELEPHONE ENCOUNTER
"Called and gave patient message below. He said that he is already taking an abx, \"I think it might be the same one that is prescribed.\" He's been taking it for about 10 days and it initially improved but then got worse again. I encouraged him to provide a UA so that we can get sensitivities, got him scheduled for lab today. Route to PCP as ELISA.    Kem Joya RN    "

## 2019-01-24 DIAGNOSIS — K59.09 CHRONIC CONSTIPATION: ICD-10-CM

## 2019-01-24 LAB
BACTERIA SPEC CULT: ABNORMAL
SPECIMEN SOURCE: ABNORMAL

## 2019-01-24 RX ORDER — CEPHALEXIN 500 MG/1
500 CAPSULE ORAL 2 TIMES DAILY
Qty: 20 CAPSULE | Refills: 0 | Status: SHIPPED | OUTPATIENT
Start: 2019-01-24 | End: 2019-05-31

## 2019-01-24 NOTE — TELEPHONE ENCOUNTER
"Requested Prescriptions   Pending Prescriptions Disp Refills     docusate sodium (ENEMEEZ MINI) 283 MG enema [Pharmacy Med Name: ENEMEEZ MINI ENEMA]  Last Written Prescription Date:  5/16/2018  Last Fill Quantity: 30 enema,  # refills: 3   Last office visit: 6/8/2018 with prescribing provider:  Steven   Future Office Visit:      0     Sig: USE 1 EVERY OTHER DAY AND AS NEEDED    Laxatives Protocol Passed - 1/24/2019 10:11 AM       Passed - Patient is age 6 or older       Passed - Recent (12 mo) or future (30 days) visit within the authorizing provider's specialty    Patient had office visit in the last 12 months or has a visit in the next 30 days with authorizing provider or within the authorizing provider's specialty.  See \"Patient Info\" tab in inbasket, or \"Choose Columns\" in Meds & Orders section of the refill encounter.             Passed - Medication is active on med list          "

## 2019-01-24 NOTE — TELEPHONE ENCOUNTER
Patient/family was instructed to return call to [facility] RN directly on the RN Call back line at [phone].     Kem Joya RN

## 2019-01-24 NOTE — TELEPHONE ENCOUNTER
Culture results back. Resistant to the bactrim.  Advise chance to Keflex. New RX faxed. Stop Bactrim.    Let me know if he has questions.  Thanks.  JOE Gibson, PA-C

## 2019-01-25 NOTE — TELEPHONE ENCOUNTER
Prescription approved per [facility] Refill Protocol.  Kimberly Doll, RN CPC Triage.

## 2019-02-08 ENCOUNTER — PATIENT OUTREACH (OUTPATIENT)
Dept: CARE COORDINATION | Facility: CLINIC | Age: 44
End: 2019-02-08

## 2019-02-08 ASSESSMENT — ACTIVITIES OF DAILY LIVING (ADL): DEPENDENT_IADLS:: CLEANING;COOKING;LAUNDRY;SHOPPING;MEAL PREPARATION;MEDICATION MANAGEMENT

## 2019-02-08 NOTE — PROGRESS NOTES
Clinic Care Coordination Contact    Situation: Patient chart reviewed by care coordinator.    Background: The patient is a quadriplegic with frequent recurrent UTIs.    Assessment: The patient has been doing therapy at [facility] for pain and finding it very successful.  It looks like during this time of very very cold weather he has not attended.  The patient recently has had another UTI and is scheduled for a cystoscopy in April with the urologist.  A request was placed for the patient to be measured for a new wheelchair.    Plan/Recommendations: 1.  The patient will attend therapy sessions at [facility] for pain control.  2.  The patient will attend the appointment with urology for the cystoscopy in April.  3.  Care Coordination to remain available for the patient to contact in the event of future needs.          Robbin Vanegas MSN, RN, PHN, [facility]   Clinical RN Care Coordinator  [facility]   [email or web address]  Office: [phone]

## 2019-02-11 ENCOUNTER — PATIENT OUTREACH (OUTPATIENT)
Dept: CARE COORDINATION | Facility: CLINIC | Age: 44
End: 2019-02-11

## 2019-02-11 ENCOUNTER — TELEPHONE (OUTPATIENT)
Dept: UROLOGY | Facility: CLINIC | Age: 44
End: 2019-02-11

## 2019-02-11 DIAGNOSIS — N39.0 URINARY TRACT INFECTION: Primary | ICD-10-CM

## 2019-02-11 DIAGNOSIS — N39.0 URINARY TRACT INFECTION: ICD-10-CM

## 2019-02-11 LAB
ALBUMIN UR-MCNC: NEGATIVE MG/DL
APPEARANCE UR: CLEAR
BACTERIA #/AREA URNS HPF: ABNORMAL /HPF
BILIRUB UR QL STRIP: NEGATIVE
COLOR UR AUTO: YELLOW
GLUCOSE UR STRIP-MCNC: NEGATIVE MG/DL
HGB UR QL STRIP: NEGATIVE
KETONES UR STRIP-MCNC: NEGATIVE MG/DL
LEUKOCYTE ESTERASE UR QL STRIP: NEGATIVE
NITRATE UR QL: NEGATIVE
PH UR STRIP: 7.5 PH (ref 5–7)
RBC #/AREA URNS AUTO: ABNORMAL /HPF
SOURCE: ABNORMAL
SP GR UR STRIP: 1.02 (ref 1–1.03)
UROBILINOGEN UR STRIP-ACNC: 0.2 EU/DL (ref 0.2–1)
WBC #/AREA URNS AUTO: ABNORMAL /HPF

## 2019-02-11 PROCEDURE — 87086 URINE CULTURE/COLONY COUNT: CPT | Performed by: UROLOGY

## 2019-02-11 PROCEDURE — 81001 URINALYSIS AUTO W/SCOPE: CPT | Performed by: UROLOGY

## 2019-02-11 NOTE — TELEPHONE ENCOUNTER
Orders placed for uauc   Abdominal pain  Sick to his stomach and cloudy urine Blanka White, LPN Staff Nurse

## 2019-02-11 NOTE — PROGRESS NOTES
Clinic Care Coordination Contact  Care Team Conversations    The patient called today with symptoms of being ill.  The symptoms that he is presenting with are nausea, abdominal pain, and cloudy urine.  He denies a fever, or constipation.  The patient states that the PCP would prefer he contact the urology department for any symptoms of a possible urinary tract infection.  The clinical RN care coordinator encouraged him to leave a message for the urology department and his urologist, to get orders for a UA and urine cultures.  Once the orders are in his chart the patient can just come into the primary care clinic to have that taking care of.  The contact information for the urology department at the  was verified with the patient.  The PCP will be updated.      Care Coordination to remain available for the patient to contact in the event of future needs.        Robbin Vanegas MSN, RN, PHN, CCM   Clinical RN Care Coordinator  [facility]   [email or web address]  Office: [phone]

## 2019-02-11 NOTE — TELEPHONE ENCOUNTER
[facility] Call Center    Phone Message    May a detailed message be left on voicemail: yes    Reason for Call: Other: Per call from PT is having UTI symptoms and is requesting to have urine done at a local [place] clinic.  PT is requesting for a call back to discuss it     Action Taken: Message routed to:  [facility] Urology

## 2019-02-12 LAB
BACTERIA SPEC CULT: NORMAL
SPECIMEN SOURCE: NORMAL

## 2019-02-14 ENCOUNTER — TELEPHONE (OUTPATIENT)
Dept: UROLOGY | Facility: CLINIC | Age: 44
End: 2019-02-14

## 2019-02-14 NOTE — TELEPHONE ENCOUNTER
Patient called and given the negative results for his recent uauc  He is scheduled for botox in April with jessa in the office. Blanka White LPN Staff Nurse

## 2019-02-25 ENCOUNTER — TELEPHONE (OUTPATIENT)
Dept: FAMILY MEDICINE | Facility: CLINIC | Age: 44
End: 2019-02-25

## 2019-02-25 NOTE — TELEPHONE ENCOUNTER
Forms received from [facility]: doctor order request for shower chair repair for Jan Harris PA-C.  Forms placed in provider 'sign me' folder.  Please fax forms to [phone] ATTN: Abi Rajan after completion.    Thanks!  Kem Peters

## 2019-03-11 ENCOUNTER — OFFICE VISIT (OUTPATIENT)
Dept: FAMILY MEDICINE | Facility: CLINIC | Age: 44
End: 2019-03-11
Payer: MEDICARE

## 2019-03-11 VITALS — DIASTOLIC BLOOD PRESSURE: 68 MMHG | TEMPERATURE: 96.5 F | SYSTOLIC BLOOD PRESSURE: 102 MMHG | HEART RATE: 76 BPM

## 2019-03-11 DIAGNOSIS — G82.50 QUADRIPLEGIA (H): ICD-10-CM

## 2019-03-11 DIAGNOSIS — N31.9 NEUROGENIC BLADDER: ICD-10-CM

## 2019-03-11 DIAGNOSIS — K59.09 CHRONIC CONSTIPATION: ICD-10-CM

## 2019-03-11 DIAGNOSIS — R14.0 BLOATED ABDOMEN: ICD-10-CM

## 2019-03-11 DIAGNOSIS — K20.0 EOSINOPHILIC ESOPHAGITIS: Primary | ICD-10-CM

## 2019-03-11 PROCEDURE — 99213 OFFICE O/P EST LOW 20 MIN: CPT | Performed by: FAMILY MEDICINE

## 2019-03-11 ASSESSMENT — PAIN SCALES - GENERAL: PAINLEVEL: MODERATE PAIN (4)

## 2019-03-11 NOTE — PATIENT INSTRUCTIONS
Rhinocort nasal spray is budesonide, and you can buy this over the counter.     Talk to your [facility] doctor to see if there are some body positions or stretches you can do to help even abdominal muscles.     Call GI and work on getting budesonide.     Passive spinal twist both sides          If you can, it is helpful if you fill out a survey about your clinic visit if it is mailed to your house in a few weeks.
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
## 2019-03-13 ENCOUNTER — PATIENT OUTREACH (OUTPATIENT)
Dept: CARE COORDINATION | Facility: CLINIC | Age: 44
End: 2019-03-13

## 2019-03-13 ASSESSMENT — ACTIVITIES OF DAILY LIVING (ADL): DEPENDENT_IADLS:: CLEANING;COOKING;LAUNDRY;SHOPPING;MEAL PREPARATION;MEDICATION MANAGEMENT
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
## 2019-03-19 ENCOUNTER — PRE VISIT (OUTPATIENT)
Dept: UROLOGY | Facility: CLINIC | Age: 44
End: 2019-03-19

## 2019-03-19 DIAGNOSIS — S14.109S CERVICAL SPINAL CORD INJURY, SEQUELA (H): ICD-10-CM

## 2019-03-19 DIAGNOSIS — N31.9 NEUROGENIC BLADDER: Primary | ICD-10-CM

## 2019-03-19 RX ORDER — CIPROFLOXACIN 500 MG/1
500 TABLET, FILM COATED ORAL DAILY
Qty: 3 TABLET | Refills: 0 | Status: SHIPPED | OUTPATIENT
Start: 2019-03-19 | End: 2019-05-31

## 2019-03-19 NOTE — TELEPHONE ENCOUNTER
Reason for visit: Cystoscopy with botox     Relevant information: Neurogenic bladder due to spinal cord injury. 200 units    Records/imaging/labs/orders: all records available, message sent to benefits team, cipro orders placed.    Pt called: Yes    At Rooming: regular

## 2019-03-27 ENCOUNTER — TRANSFERRED RECORDS (OUTPATIENT)
Dept: HEALTH INFORMATION MANAGEMENT | Facility: CLINIC | Age: 44
End: 2019-03-27

## 2019-03-29 DIAGNOSIS — K59.09 CHRONIC CONSTIPATION: ICD-10-CM

## 2019-03-29 NOTE — TELEPHONE ENCOUNTER
"Requested Prescriptions   Pending Prescriptions Disp Refills     docusate sodium (ENEMEEZ MINI) 283 MG enema [Pharmacy Med Name: ENEMEEZ MINI ENEMA]  Last Written Prescription Date:  1/25/2019  Last Fill Quantity: 30 each,  # refills: 0   Last office visit: 10/15/2018 with prescribing provider:  KASEY Harris   Future Office Visit:      0     Sig: USE EVERY OTHER DAY AND AS NEEDED    Laxatives Protocol Passed - 3/29/2019 10:57 AM       Passed - Patient is age 6 or older       Passed - Recent (12 mo) or future (30 days) visit within the authorizing provider's specialty    Patient had office visit in the last 12 months or has a visit in the next 30 days with authorizing provider or within the authorizing provider's specialty.  See \"Patient Info\" tab in inbasket, or \"Choose Columns\" in Meds & Orders section of the refill encounter.             Passed - Medication is active on med list          "

## 2019-04-01 ENCOUNTER — OFFICE VISIT (OUTPATIENT)
Dept: UROLOGY | Facility: CLINIC | Age: 44
End: 2019-04-01
Payer: MEDICARE

## 2019-04-01 ENCOUNTER — ALLIED HEALTH/NURSE VISIT (OUTPATIENT)
Dept: UROLOGY | Facility: CLINIC | Age: 44
End: 2019-04-01
Payer: MEDICARE

## 2019-04-01 VITALS
HEART RATE: 79 BPM | DIASTOLIC BLOOD PRESSURE: 110 MMHG | WEIGHT: 130 LBS | BODY MASS INDEX: 17.61 KG/M2 | SYSTOLIC BLOOD PRESSURE: 143 MMHG | HEIGHT: 72 IN

## 2019-04-01 DIAGNOSIS — S14.109S CERVICAL SPINAL CORD INJURY, SEQUELA (H): ICD-10-CM

## 2019-04-01 DIAGNOSIS — N31.9 NEUROGENIC BLADDER: Primary | ICD-10-CM

## 2019-04-01 RX ORDER — CEFAZOLIN SODIUM 1 G/50ML
1 INJECTION, SOLUTION INTRAVENOUS SEE ADMIN INSTRUCTIONS
Status: CANCELLED | OUTPATIENT
Start: 2019-04-01

## 2019-04-01 RX ORDER — CEFAZOLIN SODIUM 2 G/50ML
2 SOLUTION INTRAVENOUS
Status: CANCELLED | OUTPATIENT
Start: 2019-04-01

## 2019-04-01 ASSESSMENT — MIFFLIN-ST. JEOR: SCORE: 1527.68

## 2019-04-01 ASSESSMENT — PAIN SCALES - GENERAL
PAINLEVEL: NO PAIN (0)
PAINLEVEL: NO PAIN (0)

## 2019-04-01 NOTE — TELEPHONE ENCOUNTER
Due to high volume in refills and request from [facility], routing 10 refills to each provider.      Leobardo Persaud RN

## 2019-04-01 NOTE — NURSING NOTE
Chief Complaint   Patient presents with     Cystoscopy     Botox bladder injections for neurogenic bladder       Blood pressure (!) 133/93, pulse 80, height 1.829 m (6'), weight 59 kg (130 lb). Body mass index is 17.63 kg/m .    Patient Active Problem List   Diagnosis     Vitamin D deficiency     Eosinophilic esophagitis     Neurogenic bladder     CARDIOVASCULAR SCREENING; LDL GOAL LESS THAN 160     Quadriplegia (H)     Chronic constipation     Internal hemorrhoids with other complication     Diagnostic skin and sensitization tests(aka ALLERGENS)     Anal or rectal pain     Allergic rhinitis due to animal dander     Allergy to mold spores     House dust mite allergy     Insomnia     Health Care Home     Gallstones     Chronic midline thoracic back pain     Pulmonary nodules     ACP (advance care planning)     Cervical spinal cord injury, sequela (H)       Allergies   Allergen Reactions     Banana Hives     Other reaction(s): GI Upset     Egg/Pro [Chicken-Derived Products (Egg)]      Fish      Soybean Oil      Other reaction(s): *Unknown  Discovered on allergy testing. Has never had any reaction to his knowledge     Wheat        Current Outpatient Medications   Medication Sig Dispense Refill     Acetaminophen (TYLENOL PO) Take 500 mg by mouth as needed for mild pain or fever Reported on 2/15/2017       alum & mag hydroxide-simethicone (MYLANTA/MAALOX) 200-200-20 MG/5ML SUSP suspension Take 30 mLs by mouth  0     bacitracin ([employer] BACITRACIN ZINC) ointment Apply topically 3 times daily as needed for wound care       baclofen (LIORESAL) 20 MG tablet Take 1 tablet (20 mg) by mouth 4 times daily 360 tablet 3     cetirizine (ZYRTEC) 10 MG tablet Take 10 mg by mouth daily       ciprofloxacin (CIPRO) 500 MG tablet Take 1 tablet (500 mg) by mouth daily 3 tablet 0     COMPRESSION STOCKINGS Tens unit and electrodes       diphenhydrAMINE (BENADRYL) 25 MG tablet Take 25 mg by mouth every 8 hours as needed for itching or allergies  Reported on 2/15/2017       docusate sodium (ENEMEEZ MINI) 283 MG enema USE 1 EVERY OTHER DAY AND AS NEEDED 30 each 0     EPINEPHrine (EPIPEN/ADRENACLICK/OR ANY BX GENERIC EQUIV) 0.3 MG/0.3ML injection 2-pack Inject 0.3 mLs (0.3 mg) into the muscle as needed for anaphylaxis 0.6 mL 3     guaiFENesin (ROBITUSSIN) 100 MG/5ML LIQD 4-5 TSP twice a day as needed 560 mL 1     hydrocortisone (ANUSOL-HC) 2.5 % cream Place rectally 2 times daily       ibuprofen 200 MG capsule Take 400 mg by mouth every 4 hours as needed for fever 120 capsule      lidocaine (XYLOCAINE) 5 % ointment Apply topically as needed for moderate pain       loperamide (IMODIUM A-D) 2 MG tablet Take 2 tabs (4 mg) after first loose stool, and then take one tab (2 mg) after each diarrheal stool.  Max of 8 tabs (16 mg) per day. 30 tablet 0     magnesium hydroxide (MILK OF MAGNESIA) 400 MG/5ML suspension Take 30 mLs by mouth daily as needed for constipation or heartburn 360 mL 1     NEW MED Gentamycin 240mg in 500mL 0.9% Normal Saline.  Instill 30mL into empty bladder at bedtime.  Leave in bladder overnight and drain in the morning 500 mL 3     nystatin (MYCOSTATIN) 304224 UNIT/GM POWD Apply topically daily as needed 30 g 1     Omeprazole-Sodium Bicarbonate  MG PACK TAKE 1 PACKET BY MOUTH DISSOLVED IN LIQUID DAILY 90 each 0     ondansetron (ZOFRAN ODT) 4 MG ODT tab Take 1-2 tablets (4-8 mg) by mouth every 8 hours as needed for nausea 20 tablet 1     phenylephrine-cocoa butter (PREPARATION H) 0.25-88.44 % suppository Insert one suppository rectally twice daily as needed. 48 suppository 3     polyethylene glycol (MIRALAX) powder Take 17 g (1 capful) by mouth daily as needed for constipation 510 g 1     PROCTOZONE-HC 2.5 % cream USE RECTALLY TWICE DAILY 30 g 11     Simethicone 125 MG CAPS  28 capsule      sodium phosphate (FLEET ENEMA) 7-19 GM/118ML rectal enema Place 1 Bottle (1 enema) rectally daily as needed for constipation 1 Bottle 0     sterile  water, bottle, irrigation Irrigate with 60 mLs as directed daily 1000 mL 0     tolterodine (DETROL) 2 MG tablet TAKE 1 TABLET(2 MG) BY MOUTH TWICE DAILY 180 tablet 0     VENTOLIN  (90 Base) MCG/ACT Inhaler INHALE 2 PUFFS BY MOUTH FOUR TIMES DAILY AS NEEDED 18 g 2     zinc oxide 10 % OINT Externally apply topically 3 times daily       zolpidem (AMBIEN) 10 MG tablet TAKE 1/2-1 TABLET BY MOUTH AT NIGHT AS NEEDED FOR SLEEP 30 tablet 5     budesonide (PULMICORT) 0.5 MG/2ML neb solution BREAK 2 CAPSULES INTO 1 TEASPOON OF HONEY TWICE DAILY FOR 6 WEEKS (Patient not taking: Reported on 2019) 360 mL 0     budesonide (PULMICORT) 0.5 MG/2ML neb solution USE 2 VIALS TWICE DAILY. MIX WITH HONEY AND SWALLOW (Patient not taking: Reported on 2019) 1080 mL 0     clotrimazole 10 MG maya Place 1 Maya (10 mg) inside cheek 5 times daily 70 Maya 0     NITROFURANTOIN MACROCRYSTAL PO Take 100 mg by mouth         Social History     Tobacco Use     Smoking status: Never Smoker     Smokeless tobacco: Never Used   Substance Use Topics     Alcohol use: Yes     Alcohol/week: 0.0 oz     Comment: 1-2 drinks per month     Drug use: No       AMANDA Marrufo  2019  1:35 PM     Invasive Procedure Safety Checklist:    Procedure: cystoscopy    Action: Complete sections and checkboxes as appropriate.    Pre-procedure:  1. Patient ID Verified with 2 identifiers (Stella and  or MRN) : YES    2. Procedure and site verified with patient/designee (when able) : YES    3. Accurate consent documentation in medical record : YES    4. H&P (or appropriate assessment) documented in medical record : YES  H&P must be up to 30 days prior to procedure an updated within 24 hours of                 Procedure as applicable.     5. Relevant diagnostic and radiology test results appropriately labeled and displayed as applicable : YES    6. Blood products, implants, devices, and/or special equipment available for the procedure as applicable :  YES    7. Procedure site(s) marked with provider initials [Exclusions: None] : NO    8. Marking not required. Reason : Yes  Procedure does not require site marking    Time Out:     Time-Out performed immediately prior to starting procedure, including verbal and active participation of all team members addressing: YES    1. Correct patient identity.  2. Confirmed that the correct side and site are marked.  3. An accurate procedure to be done.  4. Agreement on the procedure to be done.  5. Correct patient position.  6. Relevant images and results are properly labeled and appropriately displayed.  7. The need to administer antibiotics or fluids for irrigation purposes during the procedure as applicable.  8. Safety precautions based on patient history or medication use.    During Procedure: Verification of correct person, site, and procedure occurs any time the responsibility for care of the patient is transferred to another member of the care team.  Lidocaine not administered, pt has a spinal cord injury and no bladder sensation.    Corin Le CMA  April 1, 2019

## 2019-04-01 NOTE — LETTER
2019       RE: Riley Gifford  [address]  [place] [address]     Dear Colleague,    Thank you for referring your patient, Riley Gifford, to the [facility] UROLOGY AND INST FOR PROSTATE AND UROLOGIC CANCERS at [facility]. Please see a copy of my visit note below.      Evaristo Hayes MD  Date of Service: 2019     Name: Riley Gifford  MRN: [id number]  Age: 43 year old  : 1975  Referring provider: Referred Self     Assessment and Plan:  Assessment:  Riley Gifford  is a 43 year old male with neurogenic bladder due to C-5 spinal cord injury; procedure canceled.     Plan:  Botox under anesthesia    ______________________________________________________________________    HPI:   Riley Gifford is a 42 year old male with neurogenic bladder due to C-5 spinal cord injury. His last botox injection was 2018 and was done in OR. We are attempting one in clinic. He gets [employer] but we will leave bladder not too full.  He has been getting more UTIs the last few months as the Botox is overdue. No stones or hydro on CT A/P 2018.     Procedure:   A flexible cystoscope was advanced into the bladder and the blood pressure cuff was cycled. The bladder was empty. The blood pressure was very elevated with diastolic >>110 and so the scope was removed and the cuff cycled again. The diastolics never went below 100 so the procedure was canceled without any injection. We will reschedule in OR under general.      Scribe Disclosure:  I, Stephanie Jameson, served as a scribe preparing the chart for the clinic encounter through chart review for the provider team.        Again, thank you for allowing me to participate in the care of your patient.      Sincerely,    Evaristo Hayes MD

## 2019-04-01 NOTE — PROGRESS NOTES
Urology Clinic    Evaristo Hayes MD  Date of Service: 2019     Name: Riley Gifford  MRN: [id number]  Age: 43 year old  : 1975  Referring provider: Referred Self     Assessment and Plan:  Assessment:  Riley Gifford  is a 43 year old male with neurogenic bladder due to C-5 spinal cord injury; procedure canceled.     Plan:  Botox under anesthesia    ______________________________________________________________________    HPI:   Riley Gifford is a 42 year old male with neurogenic bladder due to C-5 spinal cord injury. His last botox injection was 2018 and was done in OR. We are attempting one in clinic. He gets [employer] but we will leave bladder not too full.  He has been getting more UTIs the last few months as the Botox is overdue. No stones or hydro on CT A/P 2018.     Procedure:   A flexible cystoscope was advanced into the bladder and the blood pressure cuff was cycled. The bladder was empty. The blood pressure was very elevated with diastolic >>110 and so the scope was removed and the cuff cycled again. The diastolics never went below 100 so the procedure was canceled without any injection. We will reschedule in OR under general.      Scribe Disclosure:  I, Stephanie Jameson, served as a scribe preparing the chart for the clinic encounter through chart review for the provider team.

## 2019-04-01 NOTE — PROGRESS NOTES
Pre Op Teaching Flowsheet       Pre and Post op Patient Education  Relevant Diagnosis:  Neurogenic bladder and cervical spinal cord injury  Teaching Topic:  Pre and post op teaching for Cystoscopy and Botox injection into the bladder  Person Involved in teaching:  Riley Gifford      Motivation Level:  Asks Questions: Yes  Eager to Learn:  Yes  Cooperative: Yes  Receptive (willing/able to accept information):  Yes  Patient demonstrates understanding of the following:  Date and time of surgery:  4/5/19  [facility] of surgery: [facility]- 5th Floor  History and Physical and any other testing necessary prior to surgery: Yes, Dr. Hayes will do pre op DOS.  Required time line for completion of History and Physical and any pre-op testing: Yes    NPO Guidelines: NPO per Anesthesia Guidelines    Patient demonstrates understanding of the following:  Patient understands the need for a responsible adult to drive them home and someone to stay with them for the first 24 hours post-operatively: YES   Pre-op bowel prep: No, explain  Pre-op showering/scrub information with Hibiclens Soap: Yes  Medications to take the day of surgery:  Per PCP  Blood thinner medications discussed and when to stop (if applicable):  Yes  Diabetes medication management (if applicable):  N/A  Discussed pain control after surgery: pain scale, pain medications and pain management techniques  Infection Prevention: Patient demonstrates understanding of the following:  Patient instructed on hand hygiene:  Yes  Surgical procedure site care taught: Yes  Signs and symptoms of infection taught:  Yes  Wound care will be taught at the time of discharge.  Central venous catheter care will be taught at the time of discharge (if applicable).    Post-op follow-up:  Discussed how to contact the hospital, nurse, and clinic scheduling staff if necessary.    Instructional materials used/given/mailed:  [facility] Booklet, post op  teaching sheet, Map, Soap, and arrival/location information.    Surgical instructions given to patient in clinic: Yes.    Instructional Materials given:  Before your surgery packet , Medications to avoid before surgery , Showering or Bathing instructions before surgery  and What to expect after surgery    Post-op appointment/testing scheduled per MD orders: No    Total time with patient: 10 minutes    Amina Doe RN, BSN  Urology Care Coordinator

## 2019-04-03 ENCOUNTER — TRANSFERRED RECORDS (OUTPATIENT)
Dept: HEALTH INFORMATION MANAGEMENT | Facility: CLINIC | Age: 44
End: 2019-04-03

## 2019-04-03 ENCOUNTER — PATIENT OUTREACH (OUTPATIENT)
Dept: CARE COORDINATION | Facility: CLINIC | Age: 44
End: 2019-04-03

## 2019-04-04 ENCOUNTER — ANESTHESIA EVENT (OUTPATIENT)
Dept: SURGERY | Facility: AMBULATORY SURGERY CENTER | Age: 44
End: 2019-04-04

## 2019-04-04 ENCOUNTER — TELEPHONE (OUTPATIENT)
Dept: CARE COORDINATION | Facility: CLINIC | Age: 44
End: 2019-04-04

## 2019-04-04 DIAGNOSIS — K20.0 EOSINOPHILIC ESOPHAGITIS: Primary | ICD-10-CM

## 2019-04-04 RX ORDER — PREDNISONE 20 MG/1
TABLET ORAL
Qty: 10 TABLET | Refills: 0 | Status: SHIPPED | OUTPATIENT
Start: 2019-04-04 | End: 2019-05-31

## 2019-04-04 ASSESSMENT — ACTIVITIES OF DAILY LIVING (ADL): DEPENDENT_IADLS:: CLEANING;COOKING;LAUNDRY;SHOPPING;MEAL PREPARATION;MEDICATION MANAGEMENT

## 2019-04-04 NOTE — TELEPHONE ENCOUNTER
Reviewed - I think a steroid treatment is fine. I will send in oral prednisone to trial for a week to see if it calms things.    Please have him let me know if things are worse or not improving.  Thanks.  JOE Gibson, PA-C

## 2019-04-04 NOTE — TELEPHONE ENCOUNTER
The RN CC received a call from the patient stating that for the last week and a half he has had an increase in the symptoms.  The increase in his symptoms include abdominal pain, lack of appetite, irritated feeling of the esophagus, and increase in overall allergy symptoms.  The patient has seen [facility] the current provider is Tricia Perez.  They did put in a request for his liquid budesonide that he swallows, and it was denied.  An appeal cannot be made until next week due to the fact that the provider is out of the office until than.  The patient is taking all other medications as prescribed and is attending physical therapy/rehab sessions at [facility].  Would be appropriate for a burst of prednisone to get the these symptoms back under control?  And is there another liquid steroid that would be available to the patient to swallow to keep the symptoms under control.  If there are any other medications available to the patient his pharmacy is [facility] in [facility].    Please notify the patient of anything that he may try or if any prescriptions are called in.      Robbin Vanegas MSN, RN, PHN, [facility]   Primary Care Clinical RN Care Coordinator  [facility]   [email or web address]  Office: [phone]

## 2019-04-04 NOTE — PROGRESS NOTES
Clinic Care Coordination Contact    Clinic Care Coordination Contact  OUTREACH    Referral Information:  Referral Source: PCP    Primary Diagnosis: Chronic Pain(wound care)    Chief Complaint   Patient presents with     Clinic Care Coordination - Follow-up     primary care RN CC        [place] Utilization: The patient uses the [facility] system and the [facility] primarily [facility].  Clinic Utilization  Difficulty keeping appointments:: No  Compliance Concerns: No  No-Show Concerns: No  No PCP office visit in Past Year: No  Utilization    Last refreshed: 4/3/2019 10:43 AM:  Hospital Admissions 0           Last refreshed: 4/3/2019 10:43 AM:  ED Visits 0           Last refreshed: 4/3/2019 10:43 AM:  No Show Count (past year) 0              Current as of: 4/3/2019 10:43 AM              Clinical Concerns:  Current Medical Concerns: The patient suffers from esophageal esophagitis.  He is having an increase in symptoms such as abdominal pain, decrease in appetite, and allergy symptoms have recently increased.  His insurance company will no longer cover the liquid budesonide that he has been using to assist in controlling his symptoms.  The provider at [facility] did put in a prior authorization request which was denied.  An appeal to this cannot be put into place until the provider returns next week.  In the meantime the patient has the symptoms that are getting worse instead of better so a request has been placed with his PCP.  Patient Active Problem List   Diagnosis     Vitamin D deficiency     Eosinophilic esophagitis     Neurogenic bladder     CARDIOVASCULAR SCREENING; LDL GOAL LESS THAN 160     Quadriplegia (H)     Chronic constipation     Internal hemorrhoids with other complication     Diagnostic skin and sensitization tests(aka ALLERGENS)     Anal or rectal pain     Allergic rhinitis due to animal dander     Allergy to mold spores     House dust mite allergy     Insomnia     Health Care Home      Gallstones     Chronic midline thoracic back pain     Pulmonary nodules     ACP (advance care planning)     Cervical spinal cord injury, sequela (H)     Current Behavioral Concerns: Anxiety.  Education Provided to patient: Review of medications especially those related to treatments of his allergies.  Pain  Pain (GOAL):: Yes  Type: Chronic (>3mo)  Location of chronic pain:: back and sacral area, neck area  Radiating: Yes  Progression: Unchanged  Description of pain: Stabbing, Shooting  Chronic pain severity:: 4  Limitation of routine activities due to chronic pain:: Yes  Description: Unable to perform most daily activities (chores, hobbies, social activities, driving)  Alleviating Factors: Pain Medication  Aggravating Factors: Positioning, Breathing  Health Maintenance Reviewed: Due/Overdue   Health Maintenance Due   Topic Date Due     EYE EXAM Q1 YEAR  04/26/2018     DTAP/TDAP/TD IMMUNIZATION (2 - Td) 10/29/2018     BMP Q6 MOS  01/12/2019     JUANA QUESTIONNAIRE 1 YEAR  03/01/2019     PHQ-9 Q1YR  03/01/2019     Clinical Pathway: None    Medication Management:  Patient is knowledgeable on medications and is adherent.  No financial concerns reported at this time.  Patient has no questions regarding current medications that he takes.  The only questions are if there is a replacement possible for his budesonide and that has been forwarded to the PCP.    Functional Status:  Dependent ADLs:: Wheelchair-independent, Bathing, Dressing, Grooming, Positioning, Transfers, Toileting  Dependent IADLs:: Cleaning, Cooking, Laundry, Shopping, Meal Preparation, Medication Management  Bed or wheelchair confined:: Yes  Mobility Status: Dependent/Assisted by Another  Fallen 2 or more times in the past year?: No  Any fall with injury in the past year?: No    Living Situation:  Current living arrangement:: I live in assisted living  Type of residence:: Assisted living    Diet/Exercise/Sleep:  Diet:: Regular  Inadequate nutrition (GOAL)::  No  Food Insecurity: No  Tube Feeding: No  Exercise:: Unable to exercise  Inadequate activity/exercise (GOAL):: No  Significant changes in sleep pattern (GOAL): No    Transportation:  Transportation concerns (GOAL):: No  Transportation means:: Accessible car     Psychosocial:  Mormonism or spiritual beliefs that impact treatment:: No  Mental health DX:: No  Mental health management concern (GOAL):: No  Informal Support system:: Family, Friends     Financial/Insurance:   Financial/Insurance concerns (GOAL):: No     Resources and Interventions:  Current Resources:   List of home care services:: Skilled Nursing;   Community Resources: Other (see comment)([facility] and ILS workers)  Supplies used at home:: Other(self cath supplies)  Equipment Currently Used at Home: commode, grab bar, toilet, shower chair, transfer board, wheelchair, power    Advance Care Plan/Directive  Advanced Care Plans/Directives on file:: Yes  Type Advanced Care Plans/Directives: Advanced Directive - On File(n/a)  Advanced Care Plan/Directive Status: Not Applicable    Referrals Placed: None(unknown)     Goals:   Goals        General    Healthy Eating (pt-stated)     Notes - Note created  9/24/2018 11:50 AM by Robbin Andino, RN    Goal Statement: I will work with the ILS person to make more palatable meals for me to eat.  Measure of Success: The patient is gaining weight  Supportive Steps to Achieve: weigh the patient routinely.  Barriers: food allergies  Strengths: motivated to gain weight  Date to Achieve By: ongoing  Patient expressed understanding of goal: yes          Pain Management (pt-stated)     Notes - Note edited  12/11/2018  2:54 PM by Robbin Andino, RN    Goal Statement: I will work with the physical therapists at [facility] to decrease the amount of pain in my neck, back and sacral area.  Measure of Success: Patient states less pain.  Supportive Steps to Achieve: physical therapy from [facility]  Barriers: quadriplegic   Strengths:  motivated  Date to Achieve By: ongoing  Patient expressed understanding of goal: yes                  Patient/Caregiver understanding: The patient has an excellent understanding of the disease process.  He is often frustrated with the symptoms that are not easily controlled.    Outreach Frequency: monthly      Plan: 1.  The patient will take all medications as prescribed by the providers.  2.  The patient will follow up on the appeal for his budesonide unless another comparable medication is found by the PCP.  3.  The patient will attend all of his rehab appointments with Jasvir Payton.  4.  Care Coordination to remain available for the patient to contact in the event of future needs.        Robbin Vanegas MSN, RN, PHN, [facility]   Primary Care Clinical RN Care Coordinator  [facility]   [email or web address]  Office: [phone]

## 2019-04-05 ENCOUNTER — HOSPITAL ENCOUNTER (OUTPATIENT)
Facility: AMBULATORY SURGERY CENTER | Age: 44
End: 2019-04-05
Attending: UROLOGY
Payer: MEDICARE

## 2019-04-05 ENCOUNTER — ANESTHESIA (OUTPATIENT)
Dept: SURGERY | Facility: AMBULATORY SURGERY CENTER | Age: 44
End: 2019-04-05

## 2019-04-05 VITALS
HEIGHT: 72 IN | BODY MASS INDEX: 18.28 KG/M2 | TEMPERATURE: 97.8 F | HEART RATE: 99 BPM | SYSTOLIC BLOOD PRESSURE: 102 MMHG | RESPIRATION RATE: 14 BRPM | WEIGHT: 135 LBS | DIASTOLIC BLOOD PRESSURE: 70 MMHG | OXYGEN SATURATION: 93 %

## 2019-04-05 RX ORDER — CEFAZOLIN SODIUM 2 G/50ML
2 SOLUTION INTRAVENOUS
Status: COMPLETED | OUTPATIENT
Start: 2019-04-05 | End: 2019-04-05

## 2019-04-05 RX ORDER — ACETAMINOPHEN 325 MG/1
975 TABLET ORAL ONCE
Status: DISCONTINUED | OUTPATIENT
Start: 2019-04-05 | End: 2019-04-05 | Stop reason: HOSPADM

## 2019-04-05 RX ORDER — SODIUM CHLORIDE, SODIUM LACTATE, POTASSIUM CHLORIDE, CALCIUM CHLORIDE 600; 310; 30; 20 MG/100ML; MG/100ML; MG/100ML; MG/100ML
INJECTION, SOLUTION INTRAVENOUS CONTINUOUS
Status: DISCONTINUED | OUTPATIENT
Start: 2019-04-05 | End: 2019-04-05 | Stop reason: HOSPADM

## 2019-04-05 RX ORDER — PROPOFOL 10 MG/ML
INJECTION, EMULSION INTRAVENOUS CONTINUOUS PRN
Status: DISCONTINUED | OUTPATIENT
Start: 2019-04-05 | End: 2019-04-05

## 2019-04-05 RX ORDER — ONDANSETRON 2 MG/ML
4 INJECTION INTRAMUSCULAR; INTRAVENOUS EVERY 30 MIN PRN
Status: DISCONTINUED | OUTPATIENT
Start: 2019-04-05 | End: 2019-04-06 | Stop reason: HOSPADM

## 2019-04-05 RX ORDER — OXYCODONE HYDROCHLORIDE 5 MG/1
5 TABLET ORAL EVERY 4 HOURS PRN
Status: DISCONTINUED | OUTPATIENT
Start: 2019-04-05 | End: 2019-04-06 | Stop reason: HOSPADM

## 2019-04-05 RX ORDER — CEFAZOLIN SODIUM 1 G/50ML
1 SOLUTION INTRAVENOUS SEE ADMIN INSTRUCTIONS
Status: DISCONTINUED | OUTPATIENT
Start: 2019-04-05 | End: 2019-04-05 | Stop reason: HOSPADM

## 2019-04-05 RX ORDER — FENTANYL CITRATE 50 UG/ML
25-50 INJECTION, SOLUTION INTRAMUSCULAR; INTRAVENOUS
Status: DISCONTINUED | OUTPATIENT
Start: 2019-04-05 | End: 2019-04-05 | Stop reason: HOSPADM

## 2019-04-05 RX ORDER — ONDANSETRON 4 MG/1
4 TABLET, ORALLY DISINTEGRATING ORAL EVERY 30 MIN PRN
Status: DISCONTINUED | OUTPATIENT
Start: 2019-04-05 | End: 2019-04-06 | Stop reason: HOSPADM

## 2019-04-05 RX ORDER — NALOXONE HYDROCHLORIDE 0.4 MG/ML
.1-.4 INJECTION, SOLUTION INTRAMUSCULAR; INTRAVENOUS; SUBCUTANEOUS
Status: DISCONTINUED | OUTPATIENT
Start: 2019-04-05 | End: 2019-04-06 | Stop reason: HOSPADM

## 2019-04-05 RX ORDER — KETOROLAC TROMETHAMINE 30 MG/ML
30 INJECTION, SOLUTION INTRAMUSCULAR; INTRAVENOUS EVERY 6 HOURS PRN
Status: DISCONTINUED | OUTPATIENT
Start: 2019-04-05 | End: 2019-04-06 | Stop reason: HOSPADM

## 2019-04-05 RX ORDER — MEPERIDINE HYDROCHLORIDE 25 MG/ML
12.5 INJECTION INTRAMUSCULAR; INTRAVENOUS; SUBCUTANEOUS
Status: DISCONTINUED | OUTPATIENT
Start: 2019-04-05 | End: 2019-04-06 | Stop reason: HOSPADM

## 2019-04-05 RX ORDER — SODIUM CHLORIDE, SODIUM LACTATE, POTASSIUM CHLORIDE, CALCIUM CHLORIDE 600; 310; 30; 20 MG/100ML; MG/100ML; MG/100ML; MG/100ML
INJECTION, SOLUTION INTRAVENOUS CONTINUOUS
Status: DISCONTINUED | OUTPATIENT
Start: 2019-04-05 | End: 2019-04-06 | Stop reason: HOSPADM

## 2019-04-05 RX ORDER — FENTANYL CITRATE 50 UG/ML
INJECTION, SOLUTION INTRAMUSCULAR; INTRAVENOUS PRN
Status: DISCONTINUED | OUTPATIENT
Start: 2019-04-05 | End: 2019-04-05

## 2019-04-05 RX ADMIN — SODIUM CHLORIDE, SODIUM LACTATE, POTASSIUM CHLORIDE, CALCIUM CHLORIDE: 600; 310; 30; 20 INJECTION, SOLUTION INTRAVENOUS at 11:38

## 2019-04-05 RX ADMIN — FENTANYL CITRATE 50 MCG: 50 INJECTION, SOLUTION INTRAMUSCULAR; INTRAVENOUS at 12:39

## 2019-04-05 RX ADMIN — PROPOFOL 100 MCG/KG/MIN: 10 INJECTION, EMULSION INTRAVENOUS at 12:37

## 2019-04-05 RX ADMIN — CEFAZOLIN SODIUM 2 G: 2 SOLUTION INTRAVENOUS at 12:35

## 2019-04-05 ASSESSMENT — MIFFLIN-ST. JEOR: SCORE: 1550.36

## 2019-04-05 NOTE — ANESTHESIA CARE TRANSFER NOTE
Patient: Riley Gifford    Procedure(s):  Cystoscopy  Botox Injection Into The Bladder    Diagnosis: Neurogenic Bladder  Diagnosis Additional Information: No value filed.    Anesthesia Type:   MAC     Note:  Airway :Room Air  Patient transferred to:Phase II  Handoff Report: Identifed the Patient, Identified the Reponsible Provider, Reviewed the pertinent medical history, Discussed the surgical course, Reviewed Intra-OP anesthesia mangement and issues during anesthesia, Set expectations for post-procedure period and Allowed opportunity for questions and acknowledgement of understanding      Vitals: (Last set prior to Anesthesia Care Transfer)    CRNA VITALS  4/5/2019 1233 - 4/5/2019 1308      4/5/2019             Resp Rate (set):  10                Electronically Signed By: SUJATA Caal CRNA  April 5, 2019  1:08 PM

## 2019-04-05 NOTE — OP NOTE
OPERATIVE REPORT    PREOPERATIVE DIAGNOSIS:  Neurogenic bladder    POSTOPERATIVE DIAGNOSIS: Same    PROCEDURES PERFORMED:   1. Cystourethroscopy with injection of botulinum toxin A 200units in 20cc sterile saline    STAFF SURGEON: Dr. Evaristo Hayes MD  FELLOW: Sean Rizzo MD  RESIDENT(S):  Maxx Gotti MD and Austyn Rodriguez MD  ANESTHESIA: General    ESTIMATED BLOOD LOSS: 2 mL.     DRAINS: none    SIGNIFICANT FINDINGS:  1. High bladder neck as previously noted  2. Unremarkable template injection of 200 Units Botox    OPERATIVE INDICATIONS:   Riley Gifford is a(n) 43 year old male with a history of neurogenic bladder secondary to spinal cord injury, currently managed with regular CIC q3-4 hours. The patient was counseled on the alternatives, risks, and benefits and elected to proceed with the above stated procedure.    DESCRIPTION OF PROCEDURE:   After verification of informed consent was obtained, the patient was brought to the operating room, and moved to the operating table. After adequate anesthesia was induced, the patient was repositioned in the lithotomy position and prepped and draped in the usual sterile fashion. A formal timeout was performed to confirm the correct patient, procedure and operative site.     A 21-German De La Garza injection cystoscope was placed into the bladder.  The bladder mucosa appeared to be normal without stones, tumors or diverticulum on 360 degree inspection. The ureteral orifices were in the normal orthotopic position bilaterally.  We mixed 2 vials of 100 U botulinum toxin A into 20 mL of injectable saline for a total of (10 units/mL).  We performed a template injection procedure with 5 columns of 4 injections. All injections were placed deep to the mucosa into the detrusor muscle.  We then emptied her bladder to re-inspect our injection sites and found that there was a minimal amount of blood. The bladder was then emptied again. The patient was returned to supine position and transported  to recovery in stable condition.    The procedure was then concluded. The patient was transferred to the postanesthesia care unit in stable condition and tolerated the procedure well.    POSTOP PLAN:  1. May discharge home from PACU when criteria met  2. Follow up as needed for next q6 month injection
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
## 2019-04-05 NOTE — BRIEF OP NOTE
[facility]    Brief Operative Note    Pre-operative diagnosis: Neurogenic Bladder  Post-operative diagnosis * No post-op diagnosis entered *  Procedure: Procedure(s):  Cystoscopy  Botox Injection Into The Bladder  Surgeon: Surgeon(s) and Role:     * Evaristo Hayes MD - Primary     * Sean Rizzo MD - Fellow - Assisting      * Maxx Sanders - Assisting     * Austyn Rodriguez - Assisting  Anesthesia: General   Estimated blood loss: Minimal  Drains: None  Specimens: * No specimens in log *  Findings:   high bladder neck as previously noted, unremarkable template injection of 200 Units Botox.  Complications: None.  Implants: None.

## 2019-04-05 NOTE — OP NOTE
PRE-OPERATIVE DIAGNOSIS:   1.  Neurogenic bladder    POST-OPERATIVE DIAGNOSIS:  1.  Neurogenic bladder    PROCEDURE:    1. Cystourethroscopy.   2. injection of botulinum toxin A 200units in 20cc sterile saline      SURGEON:  Evaristo Hayes MD  FELLOW: Sean Rizzo MD  RESIDENT:  Maxx Gotti MD; Austyn Rodriguez MD  ANESTHESIA:  MAC    ESTIMATED BLOOD LOSS:  5 mL    DRAINS:  None    SIGNIFICANT FINDINGS: None    OPERATIVE INDICATIONS:  Riley Gifford is a 43 year old male with neurogenic bladder. He elects to proceed with botulinum toxin injection understanding the risks for urinary retention, infection, pain, bleeding, need for future procedures and risks of anesthesia.     OPERATIVE DETAILS:  The patient was taken to the operating room in her usual state of health.   He was positioned in modified dorsal lithotomy with yellowfin stirrups.  His genitalia were prepped and draped in the standard fashion. A time-out was performed to ensure proper patient, procedure and positioning.  The patient received appropriate IV antibiotics prior to the procedure.    A 21-Iraqi De La Garza injection cystoscope was placed into the bladder.  The bladder mucosa appeared to be normal without stones, tumors or diverticulum on 360 degree inspection. The ureteral orifices were in the normal orthotopic position bilaterally.  We mixed 2 vials of 100 U botulinum toxin A into 2 mL of injectable saline for a total of (10 units/mL).  We performed a template injection procedure with 5 columns of 4 injections. All injections were placed deep to the mucosa into the detrusor muscle.  We then emptied her bladder to re-inspect our injection sites and found that there was a minimal amount of blood. Her bladder was then emptied again. The patient was returned to supine position and transported to recovery in stable condition.      PLAN: Continue routine botox    As the attending surgeon I, Evaristo Hayes, was present for avalos portions of the procedure and available  throughout the procedure.

## 2019-04-05 NOTE — ANESTHESIA PREPROCEDURE EVALUATION
"  Anesthesia Evaluation     . Pt has had prior anesthetic. Type: MAC    No history of anesthetic complications          ROS/MED HX    ENT/Pulmonary:  - neg pulmonary ROS     Neurologic:     (+)Spinal cord injury year sustained: 1995 level of injury: Incomplete C5-6 injury with autonomic hyperflexia symptoms,     Cardiovascular:  - neg cardiovascular ROS       METS/Exercise Tolerance:     Hematologic:         Musculoskeletal:  - neg musculoskeletal ROS       GI/Hepatic: Comment: Eosinophillic Esophagitis    (+) GERD       Renal/Genitourinary:     (+) Other Renal/ Genitourinary, neurogenic bladder      Endo:  - neg endo ROS       Psychiatric:         Infectious Disease:  - neg infectious disease ROS       Malignancy:      - no malignancy   Other:                     Physical Exam  Normal systems: cardiovascular, pulmonary and dental    Airway   Mallampati: III  TM distance: >3 FB  Neck ROM: full    Dental     Cardiovascular       Pulmonary                     Anesthesia Plan      History & Physical Review  History and physical reviewed and following examination; no interval change.    ASA Status:  3 .    NPO Status:  > 8 hours    Plan for MAC with Intravenous induction. Maintenance will be TIVA.  Reason for MAC:  Deep or markedly invasive procedure (G8)  PONV prophylaxis:  Ondansetron (or other 5HT-3)  Patient with noted egg \"allergy\".  Patient states eggs upset his stomach. He has tolerated Propofol in past.  Plan for MAC with Versed, Fentanyl, Propofol.      Postoperative Care  Postoperative pain management:  Multi-modal analgesia.      Consents  Anesthetic plan, risks, benefits and alternatives discussed with:  Patient..                          .        PHYSICAL EXAM:   Mental Status/Neuro:    Airway:   Mallampati: II  Mouth/Opening: Full   Respiratory: Auscultation: CTAB      CV: Rhythm: Regular   Comments:                    Assessment:   ASA SCORE: 3    NPO Status: > 6 hours since completed [employer]    "     Tobacco Use:  NO Active use of Tobacco/UNKNOWN Tobacco use status     Plan:   Anes. Type:  MAC      Induction:  IV (Standard)   Airway: Native Airway   Access/Monitoring: PIV   Maintenance: Propofol; IV   Emergence: Procedure Site   Logistics: Same Day Surgery     Postop Pain/Sedation Strategy:  Standard-Options: Opioids PRN     PONV Management:  Adult Risk Factors:, Non-Smoker, Postop Opioids  Prevention: Propofol Infusion; Ondansetron     CONSENT: Direct conversation   Plan and risks discussed with: Patient   Blood Products: Consent Deferred (Minimal Blood Loss)

## 2019-04-05 NOTE — ANESTHESIA POSTPROCEDURE EVALUATION
Anesthesia POST Procedure Evaluation    Patient: Riley Gifford   MRN:     [id number] Gender:   male   Age:    43 year old :      1975        Preoperative Diagnosis: Neurogenic Bladder   Procedure(s):  Cystoscopy  Botox Injection Into The Bladder   Postop Comments: No value filed.       Anesthesia Type:  MAC    Reportable Event: NO     PAIN: Uncomplicated   Sign Out status: Comfortable, Well controlled pain     PONV: No PONV   Sign Out status:  No Nausea or Vomiting     Neuro/Psych: Uneventful perioperative course   Sign Out Status: Preoperative baseline; Age appropriate mentation     Airway/Resp.: Uneventful perioperative course   Sign Out Status: Non labored breathing, age appropriate RR; Resp. Status within EXPECTED Parameters     CV: Uneventful perioperative course   Sign Out status: Appropriate BP and perfusion indices; Appropriate HR/Rhythm     Disposition:   Sign Out in:  Phase II  Recovery Course: Uneventful  Follow-Up: Not required           Last Anesthesia Record Vitals:  CRNA VITALS  2019 1233 - 2019 1333      2019             Resp Rate (set):  10          Last PACU/Preop Vitals:  Vitals:    19 1315 19 1330 19 1345   BP: 101/72 103/68 102/70   Pulse: 110 105 99   Resp: 14 14 14   Temp:   36.6  C (97.8  F)   SpO2: 93% 93% 93%         Electronically Signed By: Joseluis Luther MD, 2019, 2:56 PM

## 2019-04-05 NOTE — DISCHARGE INSTRUCTIONS
[facility]  Home Care Following Anesthesia  For 24 hours after surgery:  1. Get plenty of rest.  A responsible adult must stay with you for at least 24 hours after you leave the surgery center.  2. Do not drive or use heavy equipment.  If you have weakness or tingling, don't drive or use heavy equipment until this feeling goes away.   3. Do not drink alcohol.   4. Avoid strenuous or risky activities.  Ask for help when climbing stairs.  5. You may feel lightheaded.  IF so, sit for a few minutes before standing.  Have someone help you get up.   6. If you have nausea (feel sick to your stomach): Drink only clear liquids such as apple juice, ginger ale, broth or 7-Up.  Rest may also help.  Be sure to drink enough fluids.  Move to a regular diet as you feel able.   7. You may have a slight fever.  Call the doctor if your fever is over 100 F (37.7 C) (taken under the tongue) or lasts longer than 24 hours.  8. You may have a dry mouth, a sore throat, muscle aches or trouble sleeping. These should go away after 24 hours.  9. Do not make important or legal decisions.               Tips for taking pain medications  To get the best pain relief possible, remember these points:    Take pain medications as directed, before pain becomes severe.    Pain medication can upset your stomach: taking it with food may help.    Constipation is a common side effect of pain medication. Drink plenty of  fluids.    Eat foods high in fiber. Take a stool softener if recommended by your doctor or pharmacist.    Do not drink alcohol, drive or operate machinery while taking pain medications.    Ask about other ways to control pain, such as with heat, ice or relaxation.    Tylenol/Acetaminophen Consumption  To help encourage the safe use of acetaminophen, the makers of TYLENOL  have lowered the maximum daily dose for single-ingredient Extra Strength TYLENOL  (acetaminophen) products sold in the [place] from 8  pills per day (4,000 mg) to 6 pills per day (3,000 mg). The dosing interval has also changed from 2 pills every 4-6 hours to 2 pills every 6 hours.    If you feel your pain relief is insufficient, you may take Tylenol/Acetaminophen in addition to your narcotic pain medication.     Be careful not to exceed 3,000 mg of Tylenol/Acetaminophen in a 24 hour period from all sources.    If you are taking extra strength Tylenol/acetaminophen (500 mg), the maximum dose is 6 tablets in 24 hours.    If you are taking regular strength acetaminophen (325 mg), the maximum dose is 9 tablets in 24 hours.    Call a doctor for any of the followin. Signs of infection (fever, growing tenderness at the surgery site, a large amount of drainage or bleeding, severe pain, foul-smelling drainage, redness, swelling).  2. It has been over 8 to 10 hours since surgery and you are still not able to urinate (pass water).  3. Headache for over 24 hours.  Your doctor is:  Dr. Evaristo Giraldo, Prostate and Urology: [phone]                    Or dial [phone] and ask for the resident on call for:  Prostate Urology  For emergency care, call the:  [place] [facility]:  [phone] (TTY for hearing impaired: [phone])

## 2019-04-08 ENCOUNTER — CARE COORDINATION (OUTPATIENT)
Dept: UROLOGY | Facility: CLINIC | Age: 44
End: 2019-04-08

## 2019-04-10 DIAGNOSIS — F51.01 PRIMARY INSOMNIA: ICD-10-CM

## 2019-04-10 NOTE — TELEPHONE ENCOUNTER
zolpidem (AMBIEN) 10 MG tablet      Last Written Prescription Date:  9/21/2018  Last Fill Quantity: 30 tablet,   # refills: 5  Last Office Visit: 10/15/2018  KASEY Wilder    Future Office visit:       Routing refill request to provider for review/approval because:  Drug not on the [facility], [facility] or [facility] refill protocol or controlled substance

## 2019-04-11 DIAGNOSIS — N31.9 NEUROGENIC BLADDER: ICD-10-CM

## 2019-04-11 RX ORDER — TOLTERODINE TARTRATE 2 MG/1
TABLET, EXTENDED RELEASE ORAL
Qty: 180 TABLET | Refills: 0 | Status: SHIPPED | OUTPATIENT
Start: 2019-04-11 | End: 2019-07-10

## 2019-04-11 NOTE — TELEPHONE ENCOUNTER
"Requested Prescriptions   Pending Prescriptions Disp Refills     tolterodine (DETROL) 2 MG tablet  Last Written Prescription Date:  12/31/2018  Last Fill Quantity: 180 tabs,  # refills: 0   Last office visit: 3/11/2019 with prescribing provider:  Magali   Future Office Visit:     180 tablet 0       Muscarinic Antagonists (Urinary Incontinence Agents) Passed - 4/11/2019  3:19 PM        Passed - Recent (12 mo) or future (30 days) visit within the authorizing provider's specialty     Patient had office visit in the last 12 months or has a visit in the next 30 days with authorizing provider or within the authorizing provider's specialty.  See \"Patient Info\" tab in inbasket, or \"Choose Columns\" in Meds & Orders section of the refill encounter.              Passed - Patient does not have a diagnosis of glaucoma on the problem list     If glaucoma diagnosis is new, refer refill to physician.          Passed - Medication is active on med list        Passed - Patient is 18 years of age or older          "

## 2019-04-11 NOTE — TELEPHONE ENCOUNTER
Prescription approved per [facility] Refill Protocol.  Michelle Núñez,Clinic Rn  [facility] [place]

## 2019-04-12 RX ORDER — ZOLPIDEM TARTRATE 10 MG/1
TABLET ORAL
Qty: 90 TABLET | Refills: 1 | Status: SHIPPED | OUTPATIENT
Start: 2019-04-12 | End: 2019-11-24

## 2019-04-15 ENCOUNTER — TELEPHONE (OUTPATIENT)
Dept: FAMILY MEDICINE | Facility: CLINIC | Age: 44
End: 2019-04-15

## 2019-04-19 ENCOUNTER — TELEPHONE (OUTPATIENT)
Dept: FAMILY MEDICINE | Facility: CLINIC | Age: 44
End: 2019-04-19

## 2019-04-19 NOTE — TELEPHONE ENCOUNTER
Forms received from [facility]/ Physician's Statement of Medical Necessity  for Jan Harris PA-C.  Forms placed in provider 'sign me' folder.  Please fax forms to [phone] after completion.    Luli Correa  Patient Representative

## 2019-04-30 ENCOUNTER — PATIENT OUTREACH (OUTPATIENT)
Dept: CARE COORDINATION | Facility: CLINIC | Age: 44
End: 2019-04-30

## 2019-04-30 ASSESSMENT — ACTIVITIES OF DAILY LIVING (ADL): DEPENDENT_IADLS:: CLEANING;COOKING;LAUNDRY;SHOPPING;MEAL PREPARATION;MEDICATION MANAGEMENT

## 2019-04-30 NOTE — PROGRESS NOTES
Clinic Care Coordination Contact      The patient called today and spoke with the RN CC.  The patient is still having the abdominal and back pain.  He endorses that the prednisone helped especially with the appetite.  Per the patient the request for the budesonide for his eosinophilic esophagitis was once again denied by the insurance company.  The RN CC contacted [facility] in an attempt to find out what happened with the prior authorization and what could possibly be done differently to get it honored.  As of the end of the day today there had been no call back from [facility].  The RN CC will attempt another call tomorrow.      Robbin Vanegas MSN, RN, PHN, [facility]   Primary Care Clinical RN Care Coordinator  [facility]   [email or web address]  Office: [phone]

## 2019-05-01 ASSESSMENT — ACTIVITIES OF DAILY LIVING (ADL): DEPENDENT_IADLS:: CLEANING;COOKING;LAUNDRY;SHOPPING;MEAL PREPARATION;MEDICATION MANAGEMENT

## 2019-05-01 NOTE — PROGRESS NOTES
Clinic Care Coordination Contact    Clinic Care Coordination Contact  OUTREACH    Referral Information:  Referral Source: PCP    Primary Diagnosis: Chronic Pain(wound care)    Chief Complaint   Patient presents with     Clinic Care Coordination - Follow-up     primary care RN CC        Universal Utilization: Patient uses the [facility] system, the [facility], and [facility].  Clinic Utilization  Difficulty keeping appointments:: No  Compliance Concerns: No  No-Show Concerns: No  No PCP office visit in Past Year: No  Utilization    Last refreshed: 5/1/2019  7:54 AM:  Hospital Admissions 0           Last refreshed: 5/1/2019  7:54 AM:  ED Visits 0           Last refreshed: 5/1/2019  7:54 AM:  No Show Count (past year) 0              Current as of: 5/1/2019  7:54 AM              Clinical Concerns:  Current Medical Concerns: The patient has continued problems with his eosinophilic esophagitis, with abdominal pain and back pain epigastric discomfort and very little appetite.  The patient called stating that [employer] is unable to get his budesonide as it needs prior authorization and it has been denied twice.  The RN CC contacted [place] [facility] to ask about this prior authorization and what needed to be done as the budesonide is the recommended treatment for EE.  The RN CC received a call back stating that they would figure it out and get treatment for the patient.  This information was given to the patient by the RN CC with reassurance that if it is not in place by early next week I will call again.  Patient will give the call next week if necessary otherwise he should have the medication in his hand.  Current Behavioral Concerns: None a current problem.  History of anxiety and worry.  Education Provided to patient: Education on how the prior authorization process works.  Pain  Pain (GOAL):: Yes  Type: Chronic (>3mo)  Location of chronic pain:: back and sacral area, neck area  Radiating: Yes  Progression:  Unchanged  Description of pain: Stabbing, Shooting  Chronic pain severity:: 5  Limitation of routine activities due to chronic pain:: Yes  Description: Unable to perform most daily activities (chores, hobbies, social activities, driving)  Alleviating Factors: Pain Medication  Aggravating Factors: Positioning, Breathing  Health Maintenance Reviewed: Due/Overdue   Health Maintenance Due   Topic Date Due     EYE EXAM Q1 YEAR  04/26/2018     DTAP/TDAP/TD IMMUNIZATION (2 - Td) 10/29/2018     BMP Q6 MOS  01/12/2019     JUANA QUESTIONNAIRE 1 YEAR  03/01/2019     PHQ-9 Q1YR  03/01/2019     Clinical Pathway: None    Medication Management:  Patient is knowledgeable on medications and is adherent.  No financial concerns reported at this time.  The big concern and problem for the patient as far as medications goes is trying to get the budesonide authorized by the insurance company.    Functional Status:  Dependent ADLs:: Wheelchair-independent, Bathing, Dressing, Grooming, Positioning, Transfers, Toileting  Dependent IADLs:: Cleaning, Cooking, Laundry, Shopping, Meal Preparation, Medication Management  Bed or wheelchair confined:: Yes  Mobility Status: Dependent/Assisted by Another  Fallen 2 or more times in the past year?: No  Any fall with injury in the past year?: No    Living Situation:  Current living arrangement:: I live in assisted living  Type of residence:: Assisted living    Diet/Exercise/Sleep:  Diet:: Regular  Inadequate nutrition (GOAL):: No  Food Insecurity: No  Tube Feeding: No  Exercise:: Unable to exercise  Inadequate activity/exercise (GOAL):: No  Significant changes in sleep pattern (GOAL): No    Transportation:  Transportation concerns (GOAL):: No  Transportation means:: Accessible car     Psychosocial:  Spiritism or spiritual beliefs that impact treatment:: No  Mental health DX:: No  Mental health management concern (GOAL):: No  Informal Support system:: Family, Friends     Financial/Insurance:    Financial/Insurance concerns (GOAL):: No     Resources and Interventions:  Current Resources:   List of home care services:: Skilled Nursing;   Community Resources: Other (see comment)([place] and ILS workers)  Supplies used at home:: Other(self cath supplies)  Equipment Currently Used at Home: commode, grab bar, toilet, shower chair, transfer board, wheelchair, power    Advance Care Plan/Directive  Advanced Care Plans/Directives on file:: Yes  Type Advanced Care Plans/Directives: Advanced Directive - On File(n/a)  Advanced Care Plan/Directive Status: Not Applicable    Referrals Placed: None(unknown)     Goals:   Goals        General    Healthy Eating (pt-stated)     Notes - Note created  9/24/2018 11:50 AM by Robbin Andino, RN    Goal Statement: I will work with the ILS person to make more palatable meals for me to eat.  Measure of Success: The patient is gaining weight  Supportive Steps to Achieve: weigh the patient routinely.  Barriers: food allergies  Strengths: motivated to gain weight  Date to Achieve By: ongoing  Patient expressed understanding of goal: yes          Pain Management (pt-stated)     Notes - Note edited  12/11/2018  2:54 PM by Robbin Andino, RN    Goal Statement: I will work with the physical therapists at [facility] to decrease the amount of pain in my neck, back and sacral area.  Measure of Success: Patient states less pain.  Supportive Steps to Achieve: physical therapy from [facility]  Barriers: quadriplegic   Strengths: motivated  Date to Achieve By: ongoing  Patient expressed understanding of goal: yes                  Patient/Caregiver understanding: Patient has a very good understanding of the disease process that he battles and a very good understanding of the process to get the medications that he needs.    Outreach Frequency: monthly      Plan: 1.  The patient will contact the RN CC should he not hear from [facility] regarding the prior authorization on his budesonide.    2.  The  patient will monitor his pain and appetite, notifying the RN CC should it get any worse.  3.  The patient will continue his therapy sessions at [facility].  4.  Care Coordination to remain available for the patient to contact in the event of future needs.          Robbin Vanegas MSN, RN, PHN, [facility]   Primary Care Clinical RN Care Coordinator  [facility]   [email or web address]  Office: [phone]

## 2019-05-07 ENCOUNTER — TELEPHONE (OUTPATIENT)
Dept: CARE COORDINATION | Facility: CLINIC | Age: 44
End: 2019-05-07

## 2019-05-07 NOTE — TELEPHONE ENCOUNTER
The patient called today stating that [place] [facility] has been trying to help him with getting his budesonide covered.  So far everything they have done will cost him between $100-300.  The RN CC would like to assist the PCP in placing a prior authorization to the insurance company to get the blood desonide covered again.  It has been covered in the past so it may just be a matter of finding out why they are not covering it at this time and what information they would need to change that.  This request will be sent to the PCP, please contact the RN CC so that this process could be started immediately.      Robbin Vanegas MSN, RN, PHN, [facility]   Primary Care Clinical RN Care Coordinator  [facility]   [email or web address]  Office: [phone]

## 2019-05-07 NOTE — TELEPHONE ENCOUNTER
The RN CC spoke with Kyle at [facility] ([phone] ext [phone]).  With the call from the RN CC he was made aware of the issue with the medication and the cost for the patient.  He will notify Tricia the provider, and the patient regarding the next steps in the attempt to get the medication covered.  His excuse for the extensive delay in getting this done is that he is busy.  Per Kyle he did reach out to the patient to update him.  The RN CC will remain available to the patient for any questions should they arise.      Robbin Vanegas MSN, RN, PHN, [facility]   Primary Care Clinical RN Care Coordinator  [facility]   [email or web address]  Office: [phone]

## 2019-05-07 NOTE — TELEPHONE ENCOUNTER
Reviewed. I am sorry to hear he's having such a difficult time getting this covered.    Because [facility] has been the prescriber and because [facility] manages this complex and frustrating condition - they need to be the ones to do authorization requests and/or to change the medication to find an alternative that will help him as much and be less costly.    The burden of work should not be placed onto us simply because [facility] isn't willing to do the work to help the patient.    I do think a prior authorization / appeal request may help him - but that needs to come from [facility]. I would suggest reaching out to his RN at [facility] to see why they aren't willing to do this work as it would be a standard for them to do so. Or, perhaps there is a misunderstanding on Riley's part?    Thank you for the help.  JOE Gibson, PA-C

## 2019-05-09 DIAGNOSIS — G82.50 QUADRIPLEGIA (H): ICD-10-CM

## 2019-05-09 NOTE — TELEPHONE ENCOUNTER
baclofen (LIORESAL) 20 MG tablet 360 tablet 3 4/23/2018  No   Sig - Route: Take 1 tablet (20 mg) by mouth 4 times daily - Oral   Sent to pharmacy as: baclofen (LIORESAL) 20 MG tablet   Class: E-Prescribe   Order: [id number]   E-Prescribing Status: Receipt confirmed by pharmacy (4/23/2018 11:46 AM CDT)       Last Office Visit: 3/11/2019  Future Office visit:       Routing refill request to provider for review/approval because:  Drug not on the [facility], [facility] or [facility] refill protocol or controlled substance

## 2019-05-10 RX ORDER — BACLOFEN 20 MG/1
20 TABLET ORAL 3 TIMES DAILY
Qty: 360 TABLET | Refills: 0 | Status: SHIPPED | OUTPATIENT
Start: 2019-05-10 | End: 2019-06-26

## 2019-05-10 NOTE — TELEPHONE ENCOUNTER
Routing refill request to provider for review/approval because:  Drug not on the [facility] refill protocol   Yuki Cox RN  [facility]

## 2019-05-14 ENCOUNTER — PATIENT OUTREACH (OUTPATIENT)
Dept: CARE COORDINATION | Facility: CLINIC | Age: 44
End: 2019-05-14

## 2019-05-14 ASSESSMENT — ACTIVITIES OF DAILY LIVING (ADL): DEPENDENT_IADLS:: CLEANING;COOKING;LAUNDRY;SHOPPING;MEAL PREPARATION;MEDICATION MANAGEMENT

## 2019-05-14 NOTE — PROGRESS NOTES
Clinic Care Coordination Contact    The RN CC received a call from the [place] care coordinator Amanda.  There are possibilities for getting this medication covered for the patient although first the writer will work through [facility].    Through multiple calls to [facility] the RN CC was able to find out that a prior authorization was never sent to the second insurance the patient has which is [employer].  It was explained to Tom the supervisor at [facility] that the form on [employer] website must be filled out and the denial from Medicare must be included to have this all faxed over to Medicare to review and possibly cover the medication.  The RN CC will work with her Amanda the access care coordinator to get this result as soon as possible and proceed from there.    The patient was updated as to where things stand right now and what the next steps are that are being taken.    Robbin Vanegas MSN, RN, PHN, [facility]   Primary Care Clinical RN Care Coordinator  [facility]   [email or web address]  Office: [phone]

## 2019-05-14 NOTE — PROGRESS NOTES
Clinic Care Coordination Contact      The RN CC received a call from the patient today stating that he still had not heard from [place] [facility] regarding his medications and the prior authorization to try and get them covered.  The patient states that he is using the the Flovent and getting minimal relief.  The patient states that he is still having abdominal pain and lack of appetite as well as epigastric pain.  The patient also increased his Miralax as directed by Tricia CROW at [facility].  This is helping with the regularity of the stools so constipation is not a part of the abdominal pain.      The RN CC called the [facility] office and spoke with Tom the supervisor due to the fact that the Kyle the nurse asked us not to call him back he is too busy.  Tom was very helpful in going back through the chart to let the writer know that a prior authorization was sent to [employer] and they denied it because it had not been approved for oral use in .  The denial was appealed by [facility] and was denied again.  The only option that [facility] was able to give was the suggestion that the patient contact a foundation or a jojo from a fund to pay for his medication.  The RN CC made a call to the patient's access care coordinator and left a message to see if there were other options that could tried as well.    The RN CC will await a call from the [employer] care coordinator.      Robbin Vanegas MSN, RN, PHN, [facility]   Primary Care Clinical RN Care Coordinator  [facility]   [email or web address]  Office: [phone]

## 2019-05-16 ENCOUNTER — PATIENT OUTREACH (OUTPATIENT)
Dept: CARE COORDINATION | Facility: CLINIC | Age: 44
End: 2019-05-16

## 2019-05-16 ASSESSMENT — ACTIVITIES OF DAILY LIVING (ADL): DEPENDENT_IADLS:: CLEANING;COOKING;LAUNDRY;SHOPPING;MEAL PREPARATION;MEDICATION MANAGEMENT

## 2019-05-16 NOTE — PROGRESS NOTES
Clinic Care Coordination Contact    The RN CC received a call back from Tom at the [facility].  He contacted the prior authorization department at [facility] and was re-assured that both insurances had been contacted and they had received a denial on covering the pulmicort.  The RN CC contacted the [place] care coordinator with the updated information.  There are a couple of potential opportunities for obtaining coverage for the medication.  The patient and the [place] care coordinator will review the options available and make a decision.  The RN CC will remain available to both parties for any needed information or assistance with the next steps.  The RN CC will contact the patient again in the near future for an update on symptoms.  The patient still has the option of purchasing the medication from the compounding pharmacy for $100/ 1 month supply.      Plan:  1.  The patient will obtain the information needed to make a decision on any potential changes for his insurance coverage.  2.  The patient will contact the RN CC with any questions that he may have or any information that may need to be obtained in the course of trying to get the budesonide covered.  3.  Care Coordination to remain available for the patient to contact in the event of future needs.          Robbin Vanegas MSN, RN, PHN, CCM   Primary Care Clinical RN Care Coordinator  [facility]   [email or web address]  Office: [phone]

## 2019-05-23 ENCOUNTER — TELEPHONE (OUTPATIENT)
Dept: UROLOGY | Facility: CLINIC | Age: 44
End: 2019-05-23

## 2019-05-23 DIAGNOSIS — N31.9 NEUROGENIC BLADDER: ICD-10-CM

## 2019-05-25 NOTE — TELEPHONE ENCOUNTER
sodium chloride with gentamicin  Last Written Prescription Date:  unknownLast Fill Quantity: unknown,   # refills: unknown  Last Office Visit : 4/1/19  Future Office visit:  None    Routing refill request to provider for review/approval because: not found on med list with gentamicin

## 2019-05-28 ENCOUNTER — TELEPHONE (OUTPATIENT)
Dept: UROLOGY | Facility: CLINIC | Age: 44
End: 2019-05-28

## 2019-05-29 ENCOUNTER — TELEPHONE (OUTPATIENT)
Dept: FAMILY MEDICINE | Facility: CLINIC | Age: 44
End: 2019-05-29

## 2019-05-29 NOTE — TELEPHONE ENCOUNTER
"Riley Gifford is a 43 year old male who calls with abdomen pain.     NURSING ASSESSMENT:    Description:  Patient reports having abdomen pain, which radiates to low back x a \"few\" months.  He rates pain a 5/10.  Denies fever, nausea/vomiting,  or other symptoms.  Has history of chronic constipation and quadriplegia.      He takes Tylenol for pain with some relief.      Allergies:   Allergies   Allergen Reactions     Banana Hives     Other reaction(s): GI Upset     Egg/Pro [Chicken-Derived Products (Egg)]      Fish      Soybean Oil      Other reaction(s): *Unknown  Discovered on allergy testing. Has never had any reaction to his knowledge     Wheat          NURSING PLAN:   F/U with appointment on 5/31 as already scheduled.  TO ED for severe abdominal pain, vomiting, fever, or worsening of current symptoms.      RECOMMENDED DISPOSITION:      Will comply with recommendation: Yes.    If further questions/concerns or if symptoms do not improve, worsen or new symptoms develop, call your PCP or [facility] [facility] as soon as possible.      Guideline used:  Telephone Triage Protocols for Nurses, Fifth Edition, Yessy Persaud RN       "

## 2019-05-29 NOTE — TELEPHONE ENCOUNTER
[facility] Call Center    Phone Message    May a detailed message be left on voicemail: yes    Reason for Call: Other: Pt called to follow up on this refill request. He is hoping to get this refilled soon. If it will be further delayed for any reason, please give him a call to let him know.     Action Taken: Message routed to:  [facility] ([facility] Urology

## 2019-05-29 NOTE — TELEPHONE ENCOUNTER
Patient notified that his Gentamicin prescription was called into [facility]specialty pharmacy by Blanka White LPN. Patient states that [facility] specialty pharmacy called and let him know that the Gentamicin prescription is ready.        Raegan Le MA

## 2019-05-29 NOTE — TELEPHONE ENCOUNTER
Reason for call:  Patient reporting a symptom    Symptom or request: Abdominal pain and back pain.    Duration (how long have symptoms been present): Unsure    Have you been treated for this before? No    Additional comments: Pt is already scheduled for an appt on 5/31/19 with Barbara Kenny. He also spoke with Robbin, Nurse Care Coordinator prior to scheduling this appointment.     Pt did not specifically request a call back, but I was asked to send an encounter from Robbin.    Phone Number patient can be reached at:  Home number on file [phone] (home)    Best Time:  Any time.    Can we leave a detailed message on this number:  YES    Call taken on 5/29/2019 at 10:00 AM by Timoteo Hinkle

## 2019-05-31 ENCOUNTER — OFFICE VISIT (OUTPATIENT)
Dept: FAMILY MEDICINE | Facility: CLINIC | Age: 44
End: 2019-05-31
Payer: MEDICARE

## 2019-05-31 VITALS
OXYGEN SATURATION: 98 % | DIASTOLIC BLOOD PRESSURE: 68 MMHG | TEMPERATURE: 97.2 F | SYSTOLIC BLOOD PRESSURE: 108 MMHG | HEIGHT: 72 IN | HEART RATE: 64 BPM | BODY MASS INDEX: 18.31 KG/M2

## 2019-05-31 DIAGNOSIS — N31.9 NEUROGENIC BLADDER: ICD-10-CM

## 2019-05-31 DIAGNOSIS — S14.109S CERVICAL SPINAL CORD INJURY, SEQUELA (H): ICD-10-CM

## 2019-05-31 DIAGNOSIS — G82.50 QUADRIPLEGIA (H): ICD-10-CM

## 2019-05-31 DIAGNOSIS — G89.29 CHRONIC ABDOMINAL PAIN: ICD-10-CM

## 2019-05-31 DIAGNOSIS — R10.9 CHRONIC ABDOMINAL PAIN: ICD-10-CM

## 2019-05-31 DIAGNOSIS — R10.31 BILATERAL LOWER ABDOMINAL PAIN: Primary | ICD-10-CM

## 2019-05-31 DIAGNOSIS — R10.32 BILATERAL LOWER ABDOMINAL PAIN: Primary | ICD-10-CM

## 2019-05-31 LAB
ALBUMIN UR-MCNC: 100 MG/DL
AMORPH CRY #/AREA URNS HPF: ABNORMAL /HPF
APPEARANCE UR: CLEAR
BACTERIA #/AREA URNS HPF: ABNORMAL /HPF
BASOPHILS # BLD AUTO: 0.1 10E9/L (ref 0–0.2)
BASOPHILS NFR BLD AUTO: 1.3 %
BILIRUB UR QL STRIP: NEGATIVE
COLOR UR AUTO: YELLOW
DIFFERENTIAL METHOD BLD: ABNORMAL
EOSINOPHIL # BLD AUTO: 2.2 10E9/L (ref 0–0.7)
EOSINOPHIL NFR BLD AUTO: 27 %
ERYTHROCYTE [DISTWIDTH] IN BLOOD BY AUTOMATED COUNT: 12.6 % (ref 10–15)
GLUCOSE UR STRIP-MCNC: NEGATIVE MG/DL
HCT VFR BLD AUTO: 41.5 % (ref 40–53)
HGB BLD-MCNC: 13.6 G/DL (ref 13.3–17.7)
HGB UR QL STRIP: NEGATIVE
KETONES UR STRIP-MCNC: NEGATIVE MG/DL
LEUKOCYTE ESTERASE UR QL STRIP: ABNORMAL
LYMPHOCYTES # BLD AUTO: 1.6 10E9/L (ref 0.8–5.3)
LYMPHOCYTES NFR BLD AUTO: 20.6 %
MCH RBC QN AUTO: 30.9 PG (ref 26.5–33)
MCHC RBC AUTO-ENTMCNC: 32.8 G/DL (ref 31.5–36.5)
MCV RBC AUTO: 94 FL (ref 78–100)
MONOCYTES # BLD AUTO: 0.5 10E9/L (ref 0–1.3)
MONOCYTES NFR BLD AUTO: 6.4 %
NEUTROPHILS # BLD AUTO: 3.6 10E9/L (ref 1.6–8.3)
NEUTROPHILS NFR BLD AUTO: 44.7 %
NITRATE UR QL: NEGATIVE
NON-SQ EPI CELLS #/AREA URNS LPF: ABNORMAL /LPF
PH UR STRIP: >=9 PH (ref 5–7)
PLATELET # BLD AUTO: 262 10E9/L (ref 150–450)
RBC # BLD AUTO: 4.4 10E12/L (ref 4.4–5.9)
RBC #/AREA URNS AUTO: ABNORMAL /HPF
SOURCE: ABNORMAL
SP GR UR STRIP: 1.01 (ref 1–1.03)
UROBILINOGEN UR STRIP-ACNC: 0.2 EU/DL (ref 0.2–1)
WBC # BLD AUTO: 8 10E9/L (ref 4–11)
WBC #/AREA URNS AUTO: ABNORMAL /HPF
WBC CLUMPS #/AREA URNS HPF: PRESENT /HPF

## 2019-05-31 PROCEDURE — 85025 COMPLETE CBC W/AUTO DIFF WBC: CPT | Performed by: NURSE PRACTITIONER

## 2019-05-31 PROCEDURE — 36415 COLL VENOUS BLD VENIPUNCTURE: CPT | Performed by: NURSE PRACTITIONER

## 2019-05-31 PROCEDURE — 99215 OFFICE O/P EST HI 40 MIN: CPT | Performed by: NURSE PRACTITIONER

## 2019-05-31 PROCEDURE — 81001 URINALYSIS AUTO W/SCOPE: CPT | Performed by: NURSE PRACTITIONER

## 2019-05-31 NOTE — PATIENT INSTRUCTIONS
[facility]   Discharged by : Ammy Lambert MA    Paper scripts provided to patient : no     If you have any questions regarding your visit please contact your care team:     Team Gold                Clinic Hours Telephone Number     Dr. Isaura Kenny, CNP 7am-7pm  Monday - Thursday   7am-5pm  Fridays  [phone]   (Appointment scheduling available 24/7)     RN Line  [phone] option 2     Urgent Care - [place] and [place] - 11am-9pm Monday-Friday Saturday-Sunday- 9am-5pm     [place] -   5pm-9pm Monday-Friday Saturday-Sunday- 9am-5pm    [phone] - [place]    [phone] - [place]     For a Price Quote for your services, please call our [facility] Price Line at [phone].     What options do I have for visits at the clinic other than the traditional office visit?     To expand how we care for you, many of our providers are utilizing electronic visits (e-visits) and telephone visits, when medically appropriate, for interactions with their patients rather than a visit in the clinic. We also offer nurse visits for many medical concerns. Just like any other service, we will bill your insurance company for this type of visit based on time spent on the phone with your provider. Not all insurance companies cover these visits. Please check with your medical insurance if this type of visit is covered. You will be responsible for any charges that are not paid by your insurance.   E-visits via [employer]: generally incur a $45.00 fee.     Telephone visits:  Time spent on the phone: *charged based on time that is spent on the phone in increments of 10 minutes. Estimated cost:   5-10 mins $30.00   11-20 mins. $59.00   21-30 mins. $85.00     Use [facility] (secure email communication and access to your chart) to send your primary care provider a message or make an appointment. Ask someone on your Team how to sign up for [employer].     As  always, Thank you for trusting us with your health care needs!    [facility]:    Scheduling Appointments  Danielle Camarena [facility]  Call: [phone]    Cali Huddleston [facility]  Call: [phone]    [facility]  Call: [phone]    For Gastroenterology referrals   [facility]   Clinics and Surgery Center, 4th Floor   [address] [place] [address]   Appointments: [phone]    WHERE TO GO FOR CARE?  Clinic    Make an appointment if you:       Are sick (cold, cough, flu, sore throat, earache or in pain).       Have a small injury (sprain, small cut, burn or broken bone).       Need a physical exam, Pap smear, vaccine or prescription refill.       Have questions about your health or medicines.    To reach us:      Call [phone] ([phone]). Open 24 hours every day. (For counseling services, call [phone].)    Log into [facility]. (Visit [email or web address] to create an account.) Hospital emergency room    An emergency is a serious or life- threatening problem that must be treated right away.    Call 267 or get to the hospital if you have:      Very bad or sudden:            - Chest pain or pressure         - Bleeding         - Head or belly pain         - Dizziness or trouble seeing, walking or                          Speaking      Problems breathing      Blood in your vomit or you are coughing up blood      A major injury (knocked out, loss of a finger or limb, rape, broken bone protruding from skin)    A mental health crisis. (Or call the Mental Health Crisis line at 1-510.662.2639 or Suicide Prevention Hotline at 1-438.327.7953.)    Open 24 hours every day. You don't need an appointment.     Urgent care    Visit urgent care for sickness or small injuries when the clinic is closed. You don't need an appointment. To check hours or find an urgent care near you, visit  [email or web address]. Online care    Get online care from [facility] for more than 70 common problems, like colds, allergies and infections. Open 24 hours every day at:   [email or web address]   Need help deciding?    For advice about where to be seen, you may call your clinic and ask to speak with a nurse. We're here for you 24 hours every day.         If you are deaf or hard of hearing, please let us know. We provide many free services including sign language interpreters, oral interpreters, TTYs, telephone amplifiers, note takers and written materials.
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
## 2019-06-03 ENCOUNTER — ANCILLARY PROCEDURE (OUTPATIENT)
Dept: GENERAL RADIOLOGY | Facility: CLINIC | Age: 44
End: 2019-06-03
Attending: PHYSICIAN ASSISTANT
Payer: COMMERCIAL

## 2019-06-03 ENCOUNTER — TELEPHONE (OUTPATIENT)
Dept: FAMILY MEDICINE | Facility: CLINIC | Age: 44
End: 2019-06-03

## 2019-06-03 DIAGNOSIS — G89.29 CHRONIC ABDOMINAL PAIN: Primary | ICD-10-CM

## 2019-06-03 DIAGNOSIS — G89.29 CHRONIC ABDOMINAL PAIN: ICD-10-CM

## 2019-06-03 DIAGNOSIS — R10.9 CHRONIC ABDOMINAL PAIN: Primary | ICD-10-CM

## 2019-06-03 DIAGNOSIS — G82.50 QUADRIPLEGIA (H): ICD-10-CM

## 2019-06-03 DIAGNOSIS — R10.9 CHRONIC ABDOMINAL PAIN: ICD-10-CM

## 2019-06-03 PROCEDURE — 74018 RADEX ABDOMEN 1 VIEW: CPT | Performed by: RADIOLOGY

## 2019-06-03 NOTE — TELEPHONE ENCOUNTER
Patient notified and provided with scheduling number with understanding voiced.    Kelley Anthony RN

## 2019-06-03 NOTE — TELEPHONE ENCOUNTER
Routing request for X-ray to PCP. Patient saw Barbara Kenny for abdominal pain on 5/31/19, and she had recommended x-ray, but patient decided to forgo at the time because he was worried about having a lift for the test.  Note pasted below.  He spoke with Robbin, Care Coordinator today and believes he is constipated or possibly obstructed, would now like an order for x-ray.  As PCP, could you sign in Barbara's absence?  Order pended, and if okay, please route back to team so we can contact patient and provide scheduling contact information, etc.     Thanks,  Kelley Anthony, RN      5. Chronic abdominal pain R10.9 INTERNAL MEDICINE REFERRAL     G89.29     Unknown origin.   Patient presents to clinic with acute on chronic lower abdominal pain.  He is in no acute distress in clinic.  He is afebrile.  Differentials are numerous given his complex past medical history and include but are not limited to UTI, bowel obstruction, nephrolithiasis, cholelithiasis among others.  Discussed sending patient for abdominal x-ray and lab work he preferred to forego the abdominal x-ray.  CBC returned normal other than elevated eosinophils. UA/UC returned with WBC 5-10 and trace leukocytes in the setting of self catheterizations I think this is his baseline and given he is asymptomatic and afebrile I will not treat him.   He is followed by GI but he reports they have been unable to find an etiology for his symptoms. He requests a referral to IM. In the absence of an organic cause, we also discussed PMR, acupuncture, and massage.      CONSULTATION/REFERRAL to IM  See Patient Instructions     No follow-ups on file.     SUJATA Fernandez CNP

## 2019-06-03 NOTE — TELEPHONE ENCOUNTER
Forms received from [employer]: detailed prescription for catheter and supplies for Jan Harris PA-C.  Forms placed in provider 'sign me' folder.  Please fax forms to [phone] after completion.    Thanks!  Kem Peters

## 2019-06-06 ENCOUNTER — PATIENT OUTREACH (OUTPATIENT)
Dept: CARE COORDINATION | Facility: CLINIC | Age: 44
End: 2019-06-06

## 2019-06-06 ASSESSMENT — ACTIVITIES OF DAILY LIVING (ADL): DEPENDENT_IADLS:: CLEANING;COOKING;LAUNDRY;SHOPPING;MEAL PREPARATION;MEDICATION MANAGEMENT

## 2019-06-06 NOTE — PROGRESS NOTES
Clinic Care Coordination Contact    Clinic Care Coordination Contact  OUTREACH    Referral Information:  Referral Source: PCP    Primary Diagnosis: Chronic Pain(wound care)    Chief Complaint   Patient presents with     Clinic Care Coordination - Follow-up     primary care RN CC        Universal Utilization: Patient uses the [facility] system and the [facility] system specifically [facility].  Clinic Utilization  Difficulty keeping appointments:: No  Compliance Concerns: No  No-Show Concerns: No  No PCP office visit in Past Year: No  Utilization    Last refreshed: 6/6/2019 11:31 AM:  Hospital Admissions 0           Last refreshed: 6/6/2019 11:31 AM:  ED Visits 0           Last refreshed: 6/6/2019 11:31 AM:  No Show Count (past year) 0              Current as of: 6/6/2019 11:31 AM              Clinical Concerns:  Current Medical Concerns: Patient is a quadriplegic who suffers from frequent UTIs and constant back and abdominal pain.  Recently the back pain has gotten much worse and the patient was seeing and due to an excessive amount of stool in the colon it was suggested that he use over-the-counter products at home to try and clean it out.  Otherwise he could go to the ER and have it done there.  The RN CC had spoken to the patient previously about options that he had for cleaning out the colon.  So he used some magnesium citrate which seemed to really do the trick per the patient.  The patient states that he is not certain if he got entirely cleaned out so he will try one more dose of the magnesium citrate to see if that does not help.  The RN CC question the patient regarding the level of pain that he has today and he said it has decreased with moving the stool out.  The RN CC will continue to monitor the situation and follow-up with the patient and his pain level.  Patient Active Problem List   Diagnosis     Vitamin D deficiency     Eosinophilic esophagitis     Neurogenic bladder     CARDIOVASCULAR  SCREENING; LDL GOAL LESS THAN 160     Quadriplegia (H)     Chronic constipation     Internal hemorrhoids with other complication     Diagnostic skin and sensitization tests(aka ALLERGENS)     Anal or rectal pain     Allergic rhinitis due to animal dander     Allergy to mold spores     House dust mite allergy     Insomnia     Health Care Home     Gallstones     Chronic midline thoracic back pain     Pulmonary nodules     ACP (advance care planning)     Cervical spinal cord injury, sequela (H)     Current Behavioral Concerns: None currently noted.  Education Provided to patient: Reviewed the amount of pain that can be associated with constipation.  Pain  Pain (GOAL):: Yes  Type: Chronic (>3mo)  Location of chronic pain:: back and sacral area, neck area  Radiating: Yes  Progression: Unchanged  Description of pain: Stabbing, Shooting  Chronic pain severity:: 5  Limitation of routine activities due to chronic pain:: Yes  Description: Unable to perform most daily activities (chores, hobbies, social activities, driving)  Alleviating Factors: Pain Medication  Aggravating Factors: Positioning, Breathing  Health Maintenance Reviewed: Due/Overdue   Health Maintenance Due   Topic Date Due     EYE EXAM  04/26/2018     DTAP/TDAP/TD IMMUNIZATION (2 - Td) 10/29/2018     JUANA ASSESSMENT  03/01/2019     PHQ-9  03/01/2019     BMP  03/28/2019     Clinical Pathway: None    Medication Management:  Patient is knowledgeable on medications and is adherent.  No financial concerns reported at this time.  The patient has PCA assistance as well as care with his medications.  He is very aware of everything that he is taking dosage and what it is for.    Functional Status:  Dependent ADLs:: Wheelchair-independent, Bathing, Dressing, Grooming, Positioning, Transfers, Toileting  Dependent IADLs:: Cleaning, Cooking, Laundry, Shopping, Meal Preparation, Medication Management  Bed or wheelchair confined:: Yes  Mobility Status: Dependent/Assisted by  Another  Fallen 2 or more times in the past year?: No  Any fall with injury in the past year?: No    Living Situation:  Current living arrangement:: I live in assisted living  Type of residence:: Assisted living    Diet/Exercise/Sleep:  Diet:: Regular  Inadequate nutrition (GOAL):: No  Food Insecurity: No  Tube Feeding: No  Exercise:: Unable to exercise  Inadequate activity/exercise (GOAL):: No  Significant changes in sleep pattern (GOAL): No    Transportation:  Transportation concerns (GOAL):: No  Transportation means:: Accessible car     Psychosocial:  Quaker or spiritual beliefs that impact treatment:: No  Mental health DX:: No  Mental health management concern (GOAL):: No  Informal Support system:: Family, Friends     Financial/Insurance:   Financial/Insurance concerns (GOAL):: No     Resources and Interventions:  Current Resources:   List of home care services:: Skilled Nursing;   Community Resources: Other (see comment)([place] and ILS workers)  Supplies used at home:: Other(self cath supplies)  Equipment Currently Used at Home: commode, grab bar, toilet, shower chair, transfer board, wheelchair, power    Advance Care Plan/Directive  Advanced Care Plans/Directives on file:: Yes  Type Advanced Care Plans/Directives: Advanced Directive - On File(n/a)  Advanced Care Plan/Directive Status: Not Applicable    Referrals Placed: None(unknown)     Goals:   Goals        General    Healthy Eating (pt-stated)     Notes - Note edited  6/6/2019  3:42 PM by Robbin Andino RN    Goal Statement: I will work with the ILS person to make more palatable meals for me to eat.  Measure of Success: The patient is gaining weight  Supportive Steps to Achieve: weigh the patient routinely.  Barriers: food allergies  Strengths: motivated to gain weight  Date to Achieve By: 6/5/2020  Patient expressed understanding of goal: yes          Pain Management (pt-stated)     Notes - Note edited  6/6/2019  3:43 PM by Robbin Andino RN    Goal  Statement: I will work with the physical therapists at [facility] to decrease the amount of pain in my neck, back and sacral area.  Measure of Success: Patient states less pain.  Supportive Steps to Achieve: physical therapy from [facility]  Barriers: quadriplegic   Strengths: motivated  Date to Achieve By: 6/5/2020  Patient expressed understanding of goal: yes                  Patient/Caregiver understanding: The patient has an excellent understanding of the disease process.  He asks very pertinent questions and they were answered to his satisfaction.    Outreach Frequency: monthly  Future Appointments              In 2 weeks Giovanny Grant MD [facility] [place]          Plan: 1.  The patient will follow up with all providers as recommended.  The patient will make and keep an appointment with internal medicine.  2.  The patient will try to continue the cleanout from the constipation.  And monitor closely the signs that he may be coming constipated again.  3.  The patient will take all medications as prescribed by the providers.  4.  Care Coordination to remain available for the patient to contact in the event of future needs.          Robbin Vanegas MSN, RN, PHN, [facility]   Primary Care Clinical RN Care Coordinator  [facility]   [email or web address]  Office: [phone]

## 2019-06-07 DIAGNOSIS — R05.9 COUGH: ICD-10-CM

## 2019-06-07 RX ORDER — ALBUTEROL SULFATE 90 UG/1
AEROSOL, METERED RESPIRATORY (INHALATION)
Qty: 18 G | Refills: 0 | Status: SHIPPED | OUTPATIENT
Start: 2019-06-07 | End: 2019-10-18

## 2019-06-07 NOTE — TELEPHONE ENCOUNTER
"Requested Prescriptions   Pending Prescriptions Disp Refills     albuterol (PROAIR HFA/PROVENTIL HFA/VENTOLIN HFA) 108 (90 Base) MCG/ACT inhaler [Pharmacy Med Name: ALBUTEROL HFA INH (200 PUFFS) 18GM]  Last Written Prescription Date:  8/3/2018  Last Fill Quantity: 18 g,  # refills: 2   Last office visit: 10/15/2018 with prescribing provider:  KASEY Harris   Future Office Visit:   Next 5 appointments (look out 90 days)    Jun 26, 2019  3:30 PM CDT  Office Visit with Giovanny Grant MD  [facility] ([facility] [id number] [facility] [address]  [phone]          18 g 0     Sig: INHALE 2 PUFFS BY MOUTH FOUR TIMES DAILY AS NEEDED       Asthma Maintenance Inhalers - Anticholinergics Passed - 6/7/2019  2:17 PM        Passed - Patient is age 12 years or older        Passed - Recent (12 mo) or future (30 days) visit within the authorizing provider's specialty     Patient had office visit in the last 12 months or has a visit in the next 30 days with authorizing provider or within the authorizing provider's specialty.  See \"Patient Info\" tab in inbasket, or \"Choose Columns\" in Meds & Orders section of the refill encounter.              Passed - Medication is active on med list          "

## 2019-06-07 NOTE — TELEPHONE ENCOUNTER
Prescription approved per [facility] Refill Protocol.  Lety El RN

## 2019-06-13 ENCOUNTER — TELEPHONE (OUTPATIENT)
Dept: CARE COORDINATION | Facility: CLINIC | Age: 44
End: 2019-06-13

## 2019-06-13 DIAGNOSIS — K20.0 EOSINOPHILIC ESOPHAGITIS: ICD-10-CM

## 2019-06-13 NOTE — TELEPHONE ENCOUNTER
Prescription request for the Zofran ODT PRN.  Patient last received in the ED.  He has also used liquid Tylenol #3 in the past for the pain and that has worked well.   The patient wants to know if you would be willing to fill this again.  The pain, nausea and loss of appetite are flaring again and the patient is very frustrated at not being able to control it.  He is willing to come and  the script for the Tylenol #3.  Send the Zofran ODT to the [facility] in [place] as before.    Please update the patient.    Robbin Vanegas MSN, RN, PHN, [facility]   Primary Care Clinical RN Care Coordinator  [facility]   [email or web address]  Office: [phone]

## 2019-06-14 RX ORDER — PREDNISONE 20 MG/1
TABLET ORAL
Qty: 12 TABLET | Refills: 0 | Status: SHIPPED | OUTPATIENT
Start: 2019-06-14 | End: 2019-06-26

## 2019-06-14 RX ORDER — PROCHLORPERAZINE MALEATE 10 MG
10 TABLET ORAL EVERY 6 HOURS PRN
Qty: 30 TABLET | Refills: 2 | Status: SHIPPED | OUTPATIENT
Start: 2019-06-14 | End: 2021-01-14

## 2019-06-14 NOTE — TELEPHONE ENCOUNTER
Routing to PCP to please advise.     Reached out to patient who states that his symptoms are upper GI, mostly feelings of nausea. Patient also reports pain in his sides, back and abdominal area, 5/10. Patient states that the pain is mostly constant and nothing seems to help. Patient states that he has had a bowel clean out.   Patient understands that Zofran and Tylenol 3 can causes constipation.   Patient also states that he does see a GI provider out of [place] who he was happy with but that provider has been out on leave. He has seen another provider since then and wasn't very happy with them.     Cony Oshea RN

## 2019-06-14 NOTE — TELEPHONE ENCOUNTER
Reached out to patient to replay below message from PCP with understanding voiced. Instructed patient that he should complete bowel regimen / clean out and if he isn't have a good clean out or bowel movement in 24 hours, he might consider going to the ER for a formal enema treatment, per PCP recommendations. Patient also instructed to call his GI clinic to follow up as well.  Patient has no other questions or concerns at this time.     Cony Oshea RN

## 2019-06-14 NOTE — TELEPHONE ENCOUNTER
Reviewed. I am sorry to hear he's not feeling well. Can we get more details as to what his symptoms are? It sounds like it is more of his GI issues related to chronic constipation but I'm not clear based on the note if things are worse / progressive or not. Has he tried a bowel clean out? Are his symptoms upper GI or lower? Or just his general sense of nausea, fullness that he's been dealing with so much?     1. Zofran causes constipation  2. Acetaminophen / Tylenol 3 will cause constipation.    His issues have been primarily constipation and issues with his upper GI/gastro tract due to his eosinophilic esophagitis. Treating both of those with Zofran and T3 will just make the constipation worse, so I can't medically recommend them.    Does he have a Gastro doc? I've tried to impress upon him the absolute importance of regularly seeing and being managed by GI for his chronic constipation and his upper eosinophilic esophagitis but he continues to return to us for his recurrent / chronic / complex GI issues. I don't know how else to explain to him to closely follow with his GI specialists for these issues. Does he want a new GI doctor? We can find him one that is more available if needed.    Please let me know what specific symptoms he's talking about, explain the constipation issues with zofran and pain meds and clarify his relationship with a GI doctor.    Thanks!     JOE Gibson, PAALLYSSA

## 2019-06-14 NOTE — TELEPHONE ENCOUNTER
Noted.   He has done well on prednisone if I recall for his upper GI symptoms.. I will send in another script. This may calm his upper GI tract down.   This could be constipation however. If he hasn't done his bowel regimen / clean out - he should do so. If he isn't have a good clean out or bowel movement in 24 hours, he might consider going to the ER for a formal enema treatment.     He should call his gastro today and let them know he's not doing well and that he needs to see someone in the next week or two.     Compazine works for nausea - I can send that in as well. It has the rare side effect of muscle spasms and twitching. Keep me posted if that happens.    Good luck to him.  Jan

## 2019-06-19 ENCOUNTER — TELEPHONE (OUTPATIENT)
Dept: FAMILY MEDICINE | Facility: CLINIC | Age: 44
End: 2019-06-19

## 2019-06-19 ENCOUNTER — TELEPHONE (OUTPATIENT)
Dept: CARE COORDINATION | Facility: CLINIC | Age: 44
End: 2019-06-19

## 2019-06-19 DIAGNOSIS — M54.6 CHRONIC BILATERAL THORACIC BACK PAIN: Primary | ICD-10-CM

## 2019-06-19 DIAGNOSIS — G89.29 CHRONIC BILATERAL THORACIC BACK PAIN: Primary | ICD-10-CM

## 2019-06-19 RX ORDER — LIDOCAINE 50 MG/G
1 PATCH TOPICAL EVERY 24 HOURS
Qty: 30 PATCH | Refills: 2 | Status: SHIPPED | OUTPATIENT
Start: 2019-06-19 | End: 2019-06-26

## 2019-06-19 NOTE — TELEPHONE ENCOUNTER
Reviewed. I'm glad his Gi issues are improving.    I put in the order for Lidocaine patches. Hard to get covered sometimes.    If pain is getting worse, let us know - this seems like more pain than I would expect but it may be related to his wheelchair use. Has he seen a Physical Med / Rehab physician recently? Might benefit him to have his chair looked at again.     RX faxed.  Thanks.  Jan

## 2019-06-19 NOTE — TELEPHONE ENCOUNTER
The patient called stating that the abdominal pain is lessening.  He was able to get the budesonide covered by his insurance again, and that seems to be helping.  The back pain is the biggest issue at this time the pain ranked as 8/10.  In the past he had tried the lidocaine patches 5% and they were no longer covered by the insurance.  He would like to try them again if it would be possible to have them ordered.  The patient uses the pharmacy of [facility] in [place] on [address].  Please contact the patient if any additional information is needed.      Robbin Vanegas MSN, RN, PHN, [facility]   Primary Care Clinical RN Care Coordinator  [facility]   [email or web address]  Office: [phone]

## 2019-06-19 NOTE — TELEPHONE ENCOUNTER
Please reach out to the patient and let him know that re-certification would require a visit.  It can be done at the time of the visit.    Thanks,  Rober Ortega MD

## 2019-06-19 NOTE — TELEPHONE ENCOUNTER
Called patient and provided message below as per Jan Harris PA-C.  Patient verbalized understanding.    Reports he did have a seat evaluation for his wheelchair back in April and they did make a couple of adjustments which were helpful but did not completely resolve his pain issues.    He will give the lidocaine patches a try and call if pain is worsening.    Rolo Marie RN

## 2019-06-19 NOTE — TELEPHONE ENCOUNTER
Reason for Call:  Other call back    Detailed comments: Patient called and stated that he was on the Cannabis program about a year ago with Dr. Ortega and he would like to get recertified and wanted to know what he needs to do    Phone Number Patient can be reached at: Home number on file [phone] (home)    Best Time: Any time    Can we leave a detailed message on this number? YES     Thank you!  Brenda REDDING  Patient Representative  [facility]        Call taken on 6/19/2019 at 1:13 PM by Brenda Rothman

## 2019-06-19 NOTE — TELEPHONE ENCOUNTER
Prior Authorization Retail Medication Request    Medication/Dose: lidocaine (LIDODERM) 5 % patch  ICD code (if different than what is on RX):  na  Previously Tried and Failed:  jean  Rationale:  na    Insurance Name:  jean  Insurance ID:  na      Pharmacy Information (if different than what is on RX)  Name:  [facility]  Phone:  jean

## 2019-06-20 NOTE — TELEPHONE ENCOUNTER
Central Prior Authorization Team   Phone: [phone]    PA Initiation    Medication: lidocaine (LIDODERM) 5 % patch  Insurance Company: [employer] - Phone [phone] Fax [phone]  Pharmacy Filling the Rx: [facility] [address] [place] [address] AT [place] [address]  Filling Pharmacy Phone: [phone]  Filling Pharmacy Fax: [phone]  Start Date: 6/20/2019    INITIATED VIA PHONE [phone].

## 2019-06-21 NOTE — TELEPHONE ENCOUNTER
PRIOR AUTHORIZATION DENIED    Medication: lidocaine (LIDODERM) 5 % patch    Denial Date: 6/20/2019    Denial Rational: Lidoderm patches are only covered with the diagnosis of post-herpetic neuralgia, diabetic neuropathy, or cancer related pain. It will not be covered for the associated diagnosis.         Appeal Information:

## 2019-06-24 ENCOUNTER — OFFICE VISIT (OUTPATIENT)
Dept: FAMILY MEDICINE | Facility: CLINIC | Age: 44
End: 2019-06-24
Payer: COMMERCIAL

## 2019-06-24 VITALS
TEMPERATURE: 97.9 F | OXYGEN SATURATION: 94 % | SYSTOLIC BLOOD PRESSURE: 98 MMHG | DIASTOLIC BLOOD PRESSURE: 73 MMHG | HEART RATE: 81 BPM

## 2019-06-24 DIAGNOSIS — F51.01 PRIMARY INSOMNIA: ICD-10-CM

## 2019-06-24 DIAGNOSIS — M54.6 CHRONIC MIDLINE THORACIC BACK PAIN: Primary | ICD-10-CM

## 2019-06-24 DIAGNOSIS — G89.29 CHRONIC MIDLINE THORACIC BACK PAIN: Primary | ICD-10-CM

## 2019-06-24 PROBLEM — R25.2 SPASTICITY: Status: ACTIVE | Noted: 2018-11-09

## 2019-06-24 PROCEDURE — 99213 OFFICE O/P EST LOW 20 MIN: CPT | Performed by: FAMILY MEDICINE

## 2019-06-24 ASSESSMENT — PATIENT HEALTH QUESTIONNAIRE - PHQ9
5. POOR APPETITE OR OVEREATING: SEVERAL DAYS
SUM OF ALL RESPONSES TO PHQ QUESTIONS 1-9: 10

## 2019-06-24 ASSESSMENT — ANXIETY QUESTIONNAIRES
5. BEING SO RESTLESS THAT IT IS HARD TO SIT STILL: SEVERAL DAYS
7. FEELING AFRAID AS IF SOMETHING AWFUL MIGHT HAPPEN: NOT AT ALL
GAD7 TOTAL SCORE: 3
3. WORRYING TOO MUCH ABOUT DIFFERENT THINGS: SEVERAL DAYS
2. NOT BEING ABLE TO STOP OR CONTROL WORRYING: NOT AT ALL
1. FEELING NERVOUS, ANXIOUS, OR ON EDGE: NOT AT ALL
IF YOU CHECKED OFF ANY PROBLEMS ON THIS QUESTIONNAIRE, HOW DIFFICULT HAVE THESE PROBLEMS MADE IT FOR YOU TO DO YOUR WORK, TAKE CARE OF THINGS AT HOME, OR GET ALONG WITH OTHER PEOPLE: SOMEWHAT DIFFICULT
6. BECOMING EASILY ANNOYED OR IRRITABLE: NOT AT ALL

## 2019-06-24 ASSESSMENT — PAIN SCALES - GENERAL: PAINLEVEL: MILD PAIN (3)

## 2019-06-24 NOTE — PROGRESS NOTES
Subjective     Riley Gifford is a 43 year old male who presents to clinic today for the following health issues:    HPI     Patient is here for re certification of medical marijuana.    Pastora Bashir MA    Patient is here today for recertification of medical cannabis.  He has used it intermittently over the last year and found it helpful with some issues around insomnia and some muscle spasticity.  There has been an expense issue and he has not been able to afford to take it as often as the pharmacy has suggested.  Overall he would like to continue it at this time and does feel that it is providing some benefit for his chronic midline thoracic spine pain and muscle spasticity related to previous spinal cord injury.  [employer] health over the last year was reviewed and he has been working with his primary care providers on a number of issues regarding some abdominal discomfort and recurrent urinary tract infections.        Patient Active Problem List   Diagnosis     Vitamin D deficiency     Eosinophilic esophagitis     Neurogenic bladder     CARDIOVASCULAR SCREENING; LDL GOAL LESS THAN 160     Quadriplegia (H)     Chronic constipation     Internal hemorrhoids with other complication     Diagnostic skin and sensitization tests(aka ALLERGENS)     Anal or rectal pain     Allergic rhinitis due to animal dander     Allergy to mold spores     House dust mite allergy     Insomnia     Health Care Home     Gallstones     Chronic midline thoracic back pain     Pulmonary nodules     ACP (advance care planning)     Cervical spinal cord injury, sequela (H)     Spasticity     Past Surgical History:   Procedure Laterality Date     BACK SURGERY       C NONSPECIFIC PROCEDURE      C5-6 Fusion     C NONSPECIFIC PROCEDURE      Pressure Ulcer     COLONOSCOPY       CYSTOSCOPY N/A 6/23/2017    Procedure: CYSTOSCOPY;  Cystoscopy and Botox Injection Into the Bladder  ;  Surgeon: Evaristo Hayes MD;  [facility]     CYSTOSCOPY N/A  4/5/2019    Procedure: Cystoscopy;  Surgeon: Evaristo Hayes MD;  [facility]     CYSTOSCOPY, INTRAVESICAL INJECTION N/A 5/12/2016    Procedure: CYSTOSCOPY, INTRAVESICAL INJECTION;  Surgeon: Evaristo Hayes MD;  [facility]     CYSTOSCOPY, INTRAVESICAL INJECTION N/A 11/11/2016    Procedure: CYSTOSCOPY, INTRAVESICAL INJECTION;  Surgeon: Evaristo Hayes MD;  [facility]     CYSTOSCOPY, INTRAVESICAL INJECTION N/A 1/4/2018    Procedure: CYSTOSCOPY, INTRAVESICAL INJECTION;  Cystoscopy Botox Injection Into The Bladder;  Surgeon: Evaristo Hayes MD;  Location[facility] OR     GI SURGERY      endoscopy x2     INJECT BOTOX N/A 6/23/2017    Procedure: INJECT BOTOX;;  Surgeon: Evaristo Hayes MD;  [facility]     INJECT BOTOX N/A 4/5/2019    Procedure: Botox Injection Into The Bladder;  Surgeon: Evaristo Hayes MD;  Location: [facility]       Social History     Tobacco Use     Smoking status: Never Smoker     Smokeless tobacco: Never Used   Substance Use Topics     Alcohol use: Yes     Alcohol/week: 0.0 oz     Comment: 1-2 drinks per month     Family History   Problem Relation Age of Onset     Breast Cancer Mother      Prostate Cancer Father      Skin Cancer Father      Breast Cancer Maternal Grandmother      Cancer Maternal Grandfather      Glaucoma No family hx of      Macular Degeneration No family hx of      Diabetes No family hx of      Hypertension No family hx of      Melanoma No family hx of          Current Outpatient Medications   Medication Sig Dispense Refill     Acetaminophen (TYLENOL PO) Take 500 mg by mouth as needed for mild pain or fever Reported on 2/15/2017       albuterol (PROAIR HFA/PROVENTIL HFA/VENTOLIN HFA) 108 (90 Base) MCG/ACT inhaler INHALE 2 PUFFS BY MOUTH FOUR TIMES DAILY AS NEEDED 18 g 0     bacitracin ([employer] BACITRACIN ZINC) ointment Apply topically 3 times daily as needed for wound care       baclofen (LIORESAL) 20 MG tablet Take 1 tablet (20 mg) by  mouth 3 times daily 360 tablet 0     budesonide (PULMICORT) 0.5 MG/2ML neb solution BREAK 2 CAPSULES INTO 1 TEASPOON OF HONEY TWICE DAILY FOR 6 WEEKS 360 mL 0     cetirizine (ZYRTEC) 10 MG tablet Take 10 mg by mouth daily       COMPRESSION STOCKINGS Tens unit and electrodes       diphenhydrAMINE (BENADRYL) 25 MG tablet Take 25 mg by mouth every 8 hours as needed for itching or allergies Reported on 2/15/2017       docusate sodium (ENEMEEZ MINI) 283 MG enema Use one every other day and prn per patients's request. 30 enema 3     EPINEPHrine (EPIPEN/ADRENACLICK/OR ANY BX GENERIC EQUIV) 0.3 MG/0.3ML injection 2-pack Inject 0.3 mLs (0.3 mg) into the muscle as needed for anaphylaxis 0.6 mL 3     hydrocortisone (ANUSOL-HC) 2.5 % cream Place rectally 2 times daily       ibuprofen 200 MG capsule Take 400 mg by mouth every 4 hours as needed for fever 120 capsule      lidocaine (XYLOCAINE) 5 % ointment Apply topically as needed for moderate pain       magnesium hydroxide (MILK OF MAGNESIA) 400 MG/5ML suspension Take 30 mLs by mouth daily as needed for constipation or heartburn 360 mL 1     NEW MED Gentamycin 240mg in 500mL 0.9% Normal Saline.  Instill 30mL into empty bladder at bedtime.  Leave in bladder overnight and drain in the morning 500 mL 3     Omeprazole-Sodium Bicarbonate  MG PACK TAKE 1 PACKET BY MOUTH DISSOLVED IN LIQUID DAILY 90 each 0     ondansetron (ZOFRAN ODT) 4 MG ODT tab Take 1-2 tablets (4-8 mg) by mouth every 8 hours as needed for nausea 20 tablet 1     phenylephrine-cocoa butter (PREPARATION H) 0.25-88.44 % suppository Insert one suppository rectally twice daily as needed. 48 suppository 3     polyethylene glycol (MIRALAX) powder Take 17 g (1 capful) by mouth daily as needed for constipation 510 g 1     predniSONE (DELTASONE) 20 MG tablet 2 pills daily for 3 days, then 1 pills daily for 6 days 12 tablet 0     prochlorperazine (COMPAZINE) 10 MG tablet Take 1 tablet (10 mg) by mouth every 6 hours as  needed for nausea or vomiting 30 tablet 2     PROCTOZONE-HC 2.5 % cream USE RECTALLY TWICE DAILY 30 g 11     sodium phosphate (FLEET ENEMA) 7-19 GM/118ML rectal enema Place 1 Bottle (1 enema) rectally daily as needed for constipation 1 Bottle 0     tolterodine (DETROL) 2 MG tablet TAKE 1 TABLET(2 MG) BY MOUTH TWICE DAILY 180 tablet 0     zolpidem (AMBIEN) 10 MG tablet TAKE 1/2 TO 1 TABLET BY MOUTH AT BEDTIME AS NEEDED FOR SLEEP 90 tablet 1     alum & mag hydroxide-simethicone (MYLANTA/MAALOX) 200-200-20 MG/5ML SUSP suspension Take 30 mLs by mouth  0     clotrimazole 10 MG maya Place 1 Maya (10 mg) inside cheek 5 times daily 70 Maya 0     guaiFENesin (ROBITUSSIN) 100 MG/5ML LIQD 4-5 TSP twice a day as needed (Patient not taking: Reported on 6/24/2019) 560 mL 1     lidocaine (LIDODERM) 5 % patch Place 1 patch onto the skin every 24 hours (Patient not taking: Reported on 6/24/2019) 30 patch 2     loperamide (IMODIUM A-D) 2 MG tablet Take 2 tabs (4 mg) after first loose stool, and then take one tab (2 mg) after each diarrheal stool.  Max of 8 tabs (16 mg) per day. 30 tablet 0     NITROFURANTOIN MACROCRYSTAL PO Take 100 mg by mouth       nystatin (MYCOSTATIN) 698110 UNIT/GM POWD Apply topically daily as needed 30 g 1     Simethicone 125 MG CAPS  28 capsule      sterile water, bottle, irrigation Irrigate with 60 mLs as directed daily (Patient not taking: Reported on 6/24/2019) 1000 mL 0     zinc oxide 10 % OINT Externally apply topically 3 times daily       Allergies   Allergen Reactions     Banana Hives     Other reaction(s): GI Upset     Egg/Pro [Chicken-Derived Products (Egg)]      Fish      Soybean Oil      Other reaction(s): *Unknown  Discovered on allergy testing. Has never had any reaction to his knowledge     Wheat        Reviewed and updated as needed this visit by Provider  Tobacco  Med Hx  Surg Hx  Fam Hx  Soc Hx        Review of Systems   ROS COMP: Constitutional, HEENT, cardiovascular, pulmonary, gi  and gu systems are negative, except as otherwise noted.      Objective    BP 98/73 (BP Location: Left arm, Patient Position: Chair, Cuff Size: Adult Regular)   Pulse 81   Temp 97.9  F (36.6  C) (Oral)   SpO2 94%   There is no height or weight on file to calculate BMI.  Physical Exam   GENERAL: healthy, alert and no distress  EYES: Eyes grossly normal to inspection, PERRL and conjunctivae and sclerae normal  PSYCH: mentation appears normal, affect normal/bright    Diagnostic Test Results:  Labs reviewed in Epic        Assessment & Plan     1. Chronic midline thoracic back pain  We went on to the website and recertified today.  He continues to be an appropriate candidate based on a certified diagnosis and some partial relief of symptoms with use of the medical cannabis.    2. Primary insomnia  As above.             Return in about 1 year (around 6/24/2020) for Routine Visit.    Rober Ortega MD  [facility]

## 2019-06-25 ASSESSMENT — ANXIETY QUESTIONNAIRES: GAD7 TOTAL SCORE: 3

## 2019-06-25 NOTE — TELEPHONE ENCOUNTER
LMOM notifying patient that patches are not covered.    Salome Allen, Certified Medical Assistant (AAMA)

## 2019-06-25 NOTE — TELEPHONE ENCOUNTER
ERAN Montoya was denied.  Please review and advise.    Salome Allen, Certified Medical Assistant (AAMA)

## 2019-06-25 NOTE — TELEPHONE ENCOUNTER
Please let patient know that his insurance will not cover lidocaine patches even with a prior auth request.  Thanks.  Raffy Harris, MPAS, PA-C

## 2019-06-26 ENCOUNTER — OFFICE VISIT (OUTPATIENT)
Dept: INTERNAL MEDICINE | Facility: CLINIC | Age: 44
End: 2019-06-26
Payer: COMMERCIAL

## 2019-06-26 VITALS
RESPIRATION RATE: 16 BRPM | SYSTOLIC BLOOD PRESSURE: 110 MMHG | HEART RATE: 85 BPM | TEMPERATURE: 98.1 F | DIASTOLIC BLOOD PRESSURE: 75 MMHG

## 2019-06-26 DIAGNOSIS — R25.2 SPASTICITY: ICD-10-CM

## 2019-06-26 DIAGNOSIS — F51.04 PSYCHOPHYSIOLOGICAL INSOMNIA: ICD-10-CM

## 2019-06-26 DIAGNOSIS — M54.50 CHRONIC BILATERAL LOW BACK PAIN WITHOUT SCIATICA: ICD-10-CM

## 2019-06-26 DIAGNOSIS — G82.50 QUADRIPLEGIA (H): Primary | ICD-10-CM

## 2019-06-26 DIAGNOSIS — K20.0 EOSINOPHILIC ESOPHAGITIS: ICD-10-CM

## 2019-06-26 DIAGNOSIS — G89.29 CHRONIC BILATERAL LOW BACK PAIN WITHOUT SCIATICA: ICD-10-CM

## 2019-06-26 DIAGNOSIS — K59.09 CHRONIC CONSTIPATION: ICD-10-CM

## 2019-06-26 DIAGNOSIS — N31.9 NEUROGENIC BLADDER: ICD-10-CM

## 2019-06-26 PROCEDURE — 99214 OFFICE O/P EST MOD 30 MIN: CPT | Performed by: INTERNAL MEDICINE

## 2019-06-26 RX ORDER — BACLOFEN 20 MG/1
20 TABLET ORAL 4 TIMES DAILY
Qty: 360 TABLET | Refills: 3 | COMMUNITY
Start: 2019-06-26 | End: 2019-07-31

## 2019-06-26 RX ORDER — LIDOCAINE 50 MG/G
OINTMENT TOPICAL PRN
Qty: 2500 G | Refills: 1 | Status: SHIPPED | OUTPATIENT
Start: 2019-06-26 | End: 2020-06-19

## 2019-06-26 NOTE — PROGRESS NOTES
Subjective     Riley Gifford is a 43 year old male who presents to clinic today for the following health issues:    HPI   New Patient/Transfer of Care  Back Pain       Duration: 3 years        Specific cause: chair bound    Description:   Location of pain: middle of back both  Character of pain: sharp, dull ache and constant  Pain radiation:into sides of abdomen  New numbness or weakness in legs, not attributed to pain:  no     Intensity: Currently 5/10, At its worst 7/10    History:   Pain interferes with job: YES  History of back problems: no prior back problems  Any previous MRI or X-rays: Yes- at [facility].  Date XR 6/3/19 and CT 6/8   Sees a specialist for back pain:  Jasvir gonzalez has seen pain specialist  Therapies tried without relief: Tens unit, PT    Alleviating factors:   Improved by: nothing      Precipitating factors:  Worsened by: Nothing    Has attempted medical cannabis but insufficient benefit relative to cost.  Is unsure he's used various other muscle relaxants.  Follows with at [facility] at .  By chart review and discussion with patient he is well cared for overall.    Social History     Social History Narrative    [place] area native    Lives in [place] now        Assisted living.  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     Family lives in [place].          September 21, 2017    ENVIRONMENTAL HISTORY: The family lives in a 15 year old apartment [place]. The home is heated with a boiler heat. They do not have central air conditioning. The patient's bedroom is furnished with carpeting in bedroom. No pets inside the house. There is not history of cockroach or mice infestation. There are no smokers in the house.  The house does not have a basement.      1. Quadriplegia (H)    2. Eosinophilic esophagitis    3. Chronic constipation    4.  Spasticity    5. Neurogenic bladder    6. Psychophysiological insomnia    7. Chronic bilateral low back pain without sciatica        PMH: Updated and/or reviewed in chart.    ROS:  Constitutional, neuro, endocrine, pulmonary, cardiac, gastrointestinal, genitourinary, musculoskeletal, integument and psychiatric systems are otherwise negative.    Objective   OBJECTIVE:                                                    /75   Pulse 85   Temp 98.1  F (36.7  C) (Tympanic)   Resp 16     GEN: No acute distress, thin gentleman in wheelchair with significantly atrophic right arm  EYES: No conjunctival injection or icterus, EOMI grossly intact  RESP: Unlabored, regular  PSYCH: Normal mood and affect        Assessment & Plan   ASSESSMENT/PLAN:                                                    1. Quadriplegia (H)  4. Spasticity  7. Chronic bilateral low back pain without sciatica  Unfortunately, we discussed I think there's very little I have to add to his care.  He is well plugged into the appropriate specialties, including PM&R and GI, and has had good primary care coordination/evaluation.  We agreed to trial topical lidocaine to help with back pain.  I encouraged him to experiment somewhat with medical cannabis but it appears financially this is not feasible in [place].   - lidocaine (XYLOCAINE) 5 % external ointment; Apply topically as needed for moderate pain  Dispense: 2500 g; Refill: 1  - baclofen (LIORESAL) 20 MG tablet; Take 1 tablet (20 mg) by mouth 4 times daily  Dispense: 360 tablet; Refill: 3    2. Eosinophilic esophagitis  Continue corticosteroid inhaler PO    3. Chronic constipation  5. Neurogenic bladder  Does well with bowel regimen and ISC    6. Psychophysiological insomnia  OK to continue Z-drug and medical cannabis qHS        Orders Placed This Encounter     baclofen (LIORESAL) 20 MG tablet     lidocaine (XYLOCAINE) 5 % external ointment        Return in about 6 months (around 12/26/2019) for  Follow-up.    Giovanny Grant MD

## 2019-07-10 ENCOUNTER — TRANSFERRED RECORDS (OUTPATIENT)
Dept: HEALTH INFORMATION MANAGEMENT | Facility: CLINIC | Age: 44
End: 2019-07-10

## 2019-07-10 DIAGNOSIS — N31.9 NEUROGENIC BLADDER: ICD-10-CM

## 2019-07-10 NOTE — TELEPHONE ENCOUNTER
"Requested Prescriptions   Pending Prescriptions Disp Refills     tolterodine (DETROL) 2 MG tablet [Pharmacy Med Name: TOLTERODINE TARTRATE 2MG TABLETS] 180 tablet 0     Sig: TAKE 1 TABLET(2 MG) BY MOUTH TWICE DAILY       Muscarinic Antagonists (Urinary Incontinence Agents) Passed - 7/10/2019  5:41 PM        Passed - Recent (12 mo) or future (30 days) visit within the authorizing provider's specialty     Patient had office visit in the last 12 months or has a visit in the next 30 days with authorizing provider or within the authorizing provider's specialty.  See \"Patient Info\" tab in inbasket, or \"Choose Columns\" in Meds & Orders section of the refill encounter.              Passed - Patient does not have a diagnosis of glaucoma on the problem list     If glaucoma diagnosis is new, refer refill to physician.          Passed - Medication is active on med list        Passed - Patient is 18 years of age or older        Last Written Prescription Date:  04/11/19  Last Fill Quantity: 180,  # refills: 0   Last office visit: 6/24/2019 with prescribing provider:     Future Office Visit:      "

## 2019-07-11 ENCOUNTER — TELEPHONE (OUTPATIENT)
Dept: FAMILY MEDICINE | Facility: CLINIC | Age: 44
End: 2019-07-11

## 2019-07-11 RX ORDER — TOLTERODINE TARTRATE 2 MG/1
TABLET, EXTENDED RELEASE ORAL
Qty: 180 TABLET | Refills: 0 | Status: SHIPPED | OUTPATIENT
Start: 2019-07-11 | End: 2019-09-30

## 2019-07-11 NOTE — TELEPHONE ENCOUNTER
Eliane Londono: provider orders, 7/17/19-7/17/20 received.  Given to Team Enzo ALBERTS for med rec.  Please give to provider for review and signature upon completion.    Please fax forms to [phone] after completion.    Thanks!  Kem Peters

## 2019-07-11 NOTE — TELEPHONE ENCOUNTER
Prescription approved per [facility] Refill Protocol.  Michelle Núñez,Clinic Rn  [place] Whately

## 2019-07-13 ENCOUNTER — TRANSFERRED RECORDS (OUTPATIENT)
Dept: HEALTH INFORMATION MANAGEMENT | Facility: CLINIC | Age: 44
End: 2019-07-13

## 2019-07-13 LAB
ALT SERPL-CCNC: 11 IU/L (ref 8–45)
AST SERPL-CCNC: 12 IU/L (ref 2–40)
CREAT SERPL-MCNC: 0.64 MG/DL (ref 0.72–1.25)
GFR SERPL CREATININE-BSD FRML MDRD: >60 ML/MIN/1.73M2
GLUCOSE SERPL-MCNC: 98 MG/DL (ref 65–100)
POTASSIUM SERPL-SCNC: 3.9 MMOL/L (ref 3.5–5)

## 2019-07-15 ENCOUNTER — PATIENT OUTREACH (OUTPATIENT)
Dept: CARE COORDINATION | Facility: CLINIC | Age: 44
End: 2019-07-15

## 2019-07-15 ENCOUNTER — TELEPHONE (OUTPATIENT)
Dept: UROLOGY | Facility: CLINIC | Age: 44
End: 2019-07-15

## 2019-07-15 NOTE — TELEPHONE ENCOUNTER
Health Call Center    Phone Message    May a detailed message be left on voicemail: yes    Reason for Call: Symptoms or Concerns     If patient has red-flag symptoms, warm transfer to triage line    Current symptom or concern: Cloudy, low back pain and nasuea. Pt ws in hospital on 7/13 where a UA was done.     Symptoms have been present for:  2-3 day(s)    Has patient previously been seen for this? Yes    By : Dr Hayes     Date: 4/5/2019    Are there any new or worsening symptoms? No - Pt wanted to let Dr Hayes know what happened to get his opinion on what to do or next step. Please call pt to further discuss.        Action Taken: Message routed to:  [facility]

## 2019-07-15 NOTE — PROGRESS NOTES
Clinic Care Coordination Contact      Follow Up Progress Note      Assessment: The patient was recently seen in the ED with a diagnosis of abdominal pain, back pain and nausea.  He has been in contact with his provider at [facility].  Directions from the patient's provider at [facility] was to perform a more thorough clean out of the colon and then begin the medication Linsess.  The patient wanted direction if this doesn't take care of the pain and wondering if he should return to the ED.  The RN CC directed the patient to contact the [facility] provider first before resorting to the ED again.  The patient is in agreement with this.  The patient's provider has returned to work from a medical leave at [facility].         Goals:   Goals        General    Healthy Eating (pt-stated)     Notes - Note edited  6/6/2019  3:42 PM by Robbin Andino, RN    Goal Statement: I will work with the ILS person to make more palatable meals for me to eat.  Measure of Success: The patient is gaining weight  Supportive Steps to Achieve: weigh the patient routinely.  Barriers: food allergies  Strengths: motivated to gain weight  Date to Achieve By: 6/5/2020  Patient expressed understanding of goal: yes          Pain Management (pt-stated)     Notes - Note edited  6/6/2019  3:43 PM by Robbin Andino, RN    Goal Statement: I will work with the physical therapists at [facility] to decrease the amount of pain in my neck, back and sacral area.  Measure of Success: Patient states less pain.  Supportive Steps to Achieve: physical therapy from [facility]  Barriers: quadriplegic   Strengths: motivated  Date to Achieve By: 6/5/2020  Patient expressed understanding of goal: yes                  Goal Progression: As of today's date 7/15/2019 goal is met at 26 - 50%.   Goal Status:  Active      Intervention/Education provided during outreach: Reviewed the medication Linsess with the patient and who to contact in case the symptoms do not lessen in nature.     Outreach  Frequency: monthly      Plan:   The patient will perform a complete bowel clean out and then begin Linsess.  Contact the [place] [facility] if symptoms do not subside.   Care Coordinator will follow up in 1 month.          Robbin Vanegas MSN, RN, PHN, [facility]   Primary Care Clinical RN Care Coordinator  [facility]   [email or web address]  Office: [phone]

## 2019-07-15 NOTE — TELEPHONE ENCOUNTER
Contacted patient to discuss UA. Patient wants to know if he has an infection. They did put him on antibiotics. In Care Everywhere, we can see a UA, but no UC. Asked patient if they did a UC, as UA is not conclusive for UTI. Patient stated that he will call and see, and if he wants to make sure he doesn't have an infection, he will get a UC done.  Daniella Perez LPN

## 2019-07-15 NOTE — LETTER
Critical access hospital  Complex Care Plan  About Me:    Patient Name:  Riley Marcos    YOB: 1975  Age:         43 year old   [place] MRN:    [id number] Telephone Information:  Home Phone [phone]   Mobile NONE   Mobile [phone]       Address:  [address] [place] [address] [address]  [place] [address] Email address:  [email or web address]      Emergency Contact(s)    Name Relationship Lgl Grd Work Phone Home Phone Mobile Phone   1. TREY MARCOS (NE* Relative No noen none [phone]   2. HODA MARCOS Brother No  [phone]            Primary language:  English     needed? No   [place] [facility]:  [phone] op. 1  Other communication barriers:    Preferred Method of Communication:  Lisa  Current living arrangement:    Mobility Status/ Medical Equipment:      Health Maintenance  Health Maintenance Reviewed:      My Access Plan  Medical Emergency 911   Primary Clinic Line [facility] - [phone]   24 Hour Appointment Line [phone] or  [phone]) (toll-free)   24 Hour Nurse Line [phone] (toll-free)   Preferred Urgent Care     Preferred Hospital     Preferred Pharmacy [facility] [address] [place] [address] [address] AT [facility] [address]     Behavioral Health Crisis Line The National Suicide Prevention Lifeline at 1-614.903.2808 or 911             My Care Team Members  Patient Care Team       Relationship Specialty Notifications Start End    Raffy Harris PA-C PCP - General Physician Assistant  3/2/18     Phone: [phone] Fax: [phone]         [address] [facility] [address]    Robbin Andino, RN Lead Care Coordinator Nurse Admissions 1/6/16     phone:  [phone]    Phone: [phone] Fax: [phone]        Rober Leo MD Referring Physician Urology  1/8/16     Phone: [phone] Fax: [phone] [address] [facility] [place] [address]    Kimberly Zabala  MD [facility]  1/8/16     Phone: [phone] Fax: [phone]         [address] [facility] [address] [facility] [address]    Amanda Other (see comments)   1/8/16     [facility]     Phone: [phone]         Evaristo Hayes MD MD Urology  4/19/16     Phone: [phone] Fax: [phone]         [address] [address]    Jenny Wright, RN Registered Nurse Urology  4/19/16     Marta Adames, RN Clinic Care Coordinator Urology Abnormal results only, Admissions 11/3/16     Phone: [phone] Pager: [phone]        Rosemary Thomas, RN Nurse Coordinator Physical Medicine and Rehab  3/30/17     Phone: [phone] Pager: [phone]        Raffy Harris PA-C Assigned PCP   3/25/18     Phone: [phone] Fax: [phone]         [address] [facility] [address]            My Care Plans  Self Management and Treatment Plan  Goals and (Comments)  Goals        General    Healthy Eating (pt-stated)     Notes - Note edited  6/6/2019  3:42 PM by Robbin Andino, RN    Goal Statement: I will work with the ILS person to make more palatable meals for me to eat.  Measure of Success: The patient is gaining weight  Supportive Steps to Achieve: weigh the patient routinely.  Barriers: food allergies  Strengths: motivated to gain weight  Date to Achieve By: 6/5/2020  Patient expressed understanding of goal: yes          Pain Management (pt-stated)     Notes - Note edited  6/6/2019  3:43 PM by Robbin Andino, RN    Goal Statement: I will work with the physical therapists at [facility] to decrease the amount of pain in my neck, back and sacral area.  Measure of Success: Patient states less pain.  Supportive Steps to Achieve: physical therapy from [facility]  Barriers: quadriplegic   Strengths: motivated  Date to Achieve By: 6/5/2020  Patient expressed understanding of goal: yes                 Action Plans on File:                       Advance Care  Plans/Directives Type:        My Medical and Care Information  Problem List   Patient Active Problem List   Diagnosis     Vitamin D deficiency     Eosinophilic esophagitis     Neurogenic bladder     CARDIOVASCULAR SCREENING; LDL GOAL LESS THAN 160     Quadriplegia (H)     Chronic constipation     Internal hemorrhoids with other complication     Diagnostic skin and sensitization tests(aka ALLERGENS)     Anal or rectal pain     Allergic rhinitis due to animal dander     Allergy to mold spores     House dust mite allergy     Insomnia     Health Care Home     Gallstones     Chronic midline thoracic back pain     Pulmonary nodules     ACP (advance care planning)     Cervical spinal cord injury, sequela (H)     Spasticity      Current Medications and Allergies:  See printed Medication Report.    Care Coordination Start Date: 1/6/2016   Frequency of Care Coordination: monthly   Form Last Updated: 07/15/2019

## 2019-07-26 ENCOUNTER — APPOINTMENT (OUTPATIENT)
Dept: CT IMAGING | Facility: CLINIC | Age: 44
End: 2019-07-26
Attending: EMERGENCY MEDICINE
Payer: COMMERCIAL

## 2019-07-26 ENCOUNTER — HOSPITAL ENCOUNTER (EMERGENCY)
Facility: CLINIC | Age: 44
Discharge: HOME | End: 2019-07-26
Attending: EMERGENCY MEDICINE | Admitting: EMERGENCY MEDICINE
Payer: COMMERCIAL

## 2019-07-26 VITALS
HEART RATE: 64 BPM | DIASTOLIC BLOOD PRESSURE: 89 MMHG | BODY MASS INDEX: 17.61 KG/M2 | TEMPERATURE: 98.1 F | SYSTOLIC BLOOD PRESSURE: 120 MMHG | WEIGHT: 130 LBS | HEIGHT: 72 IN | RESPIRATION RATE: 18 BRPM | OXYGEN SATURATION: 98 %

## 2019-07-26 DIAGNOSIS — M54.50 ACUTE BILATERAL LOW BACK PAIN WITHOUT SCIATICA: ICD-10-CM

## 2019-07-26 DIAGNOSIS — R82.71 BACTERIURIA: ICD-10-CM

## 2019-07-26 LAB
ALBUMIN UR-MCNC: NEGATIVE MG/DL
AMORPH CRY #/AREA URNS HPF: ABNORMAL /HPF
APPEARANCE UR: ABNORMAL
BACTERIA #/AREA URNS HPF: ABNORMAL /HPF
BILIRUB UR QL STRIP: NEGATIVE
COLOR UR AUTO: YELLOW
GLUCOSE UR STRIP-MCNC: NEGATIVE MG/DL
HGB UR QL STRIP: NEGATIVE
KETONES UR STRIP-MCNC: NEGATIVE MG/DL
LEUKOCYTE ESTERASE UR QL STRIP: ABNORMAL
NITRATE UR QL: POSITIVE
PH UR STRIP: 8 PH (ref 5–7)
RBC #/AREA URNS AUTO: <1 /HPF (ref 0–2)
SOURCE: ABNORMAL
SP GR UR STRIP: 1.01 (ref 1–1.03)
UROBILINOGEN UR STRIP-MCNC: NORMAL MG/DL (ref 0–2)
WBC #/AREA URNS AUTO: 3 /HPF (ref 0–5)

## 2019-07-26 PROCEDURE — 87186 SC STD MICRODIL/AGAR DIL: CPT | Performed by: EMERGENCY MEDICINE

## 2019-07-26 PROCEDURE — 81001 URINALYSIS AUTO W/SCOPE: CPT | Performed by: EMERGENCY MEDICINE

## 2019-07-26 PROCEDURE — 72131 CT LUMBAR SPINE W/O DYE: CPT

## 2019-07-26 PROCEDURE — 87086 URINE CULTURE/COLONY COUNT: CPT | Performed by: EMERGENCY MEDICINE

## 2019-07-26 PROCEDURE — 99285 EMERGENCY DEPT VISIT HI MDM: CPT | Mod: 25

## 2019-07-26 PROCEDURE — 99285 EMERGENCY DEPT VISIT HI MDM: CPT | Mod: Z6 | Performed by: EMERGENCY MEDICINE

## 2019-07-26 PROCEDURE — 96372 THER/PROPH/DIAG INJ SC/IM: CPT

## 2019-07-26 PROCEDURE — 87088 URINE BACTERIA CULTURE: CPT | Performed by: EMERGENCY MEDICINE

## 2019-07-26 PROCEDURE — 25000128 H RX IP 250 OP 636: Performed by: EMERGENCY MEDICINE

## 2019-07-26 RX ORDER — CYCLOBENZAPRINE HCL 5 MG
10 TABLET ORAL ONCE
Status: DISCONTINUED | OUTPATIENT
Start: 2019-07-26 | End: 2019-07-26

## 2019-07-26 RX ORDER — CYCLOBENZAPRINE HCL 10 MG
10 TABLET ORAL 2 TIMES DAILY PRN
Qty: 30 TABLET | Refills: 0 | Status: SHIPPED | OUTPATIENT
Start: 2019-07-26 | End: 2019-07-26

## 2019-07-26 RX ORDER — KETOROLAC TROMETHAMINE 15 MG/ML
15 INJECTION, SOLUTION INTRAMUSCULAR; INTRAVENOUS ONCE
Status: COMPLETED | OUTPATIENT
Start: 2019-07-26 | End: 2019-07-26

## 2019-07-26 RX ORDER — LORAZEPAM 1 MG/1
1 TABLET ORAL EVERY 8 HOURS PRN
Qty: 10 TABLET | Refills: 0 | Status: SHIPPED | OUTPATIENT
Start: 2019-07-26 | End: 2021-08-13

## 2019-07-26 RX ORDER — CEPHALEXIN 500 MG/1
500 CAPSULE ORAL 4 TIMES DAILY
Qty: 40 CAPSULE | Refills: 0 | Status: SHIPPED | OUTPATIENT
Start: 2019-07-26 | End: 2019-10-15

## 2019-07-26 RX ADMIN — HYDROMORPHONE HYDROCHLORIDE 1 MG: 1 INJECTION, SOLUTION INTRAMUSCULAR; INTRAVENOUS; SUBCUTANEOUS at 20:05

## 2019-07-26 RX ADMIN — KETOROLAC TROMETHAMINE 15 MG: 15 INJECTION, SOLUTION INTRAMUSCULAR; INTRAVENOUS at 20:05

## 2019-07-26 ASSESSMENT — MIFFLIN-ST. JEOR: SCORE: 1527.68

## 2019-07-26 ASSESSMENT — ENCOUNTER SYMPTOMS: BACK PAIN: 1

## 2019-07-26 NOTE — ED AVS SNAPSHOT
[facility], [place], Emergency Department  [address] [facility] [address]  Phone:  [phone]                                    Riley Gifford   MRN: [id number]    Department:  [facility], Emergency Department   Date of Visit:  7/26/2019           After Visit Summary Signature Page    I have received my discharge instructions, and my questions have been answered. I have discussed any challenges I see with this plan with the nurse or doctor.    ..........................................................................................................................................  Patient/Patient Representative Signature      ..........................................................................................................................................  Patient Representative Print Name and Relationship to Patient    ..................................................               ................................................  Date                                   Time    ..........................................................................................................................................  Reviewed by Signature/Title    ...................................................              ..............................................  Date                                               Time          22EPIC Rev 08/18

## 2019-07-27 NOTE — DISCHARGE INSTRUCTIONS
No fever nausea vomiting or symptoms or if any alcohol develop to return to emerge department possible.  Otherwise please make sure to follow-up with your primary care doctor to make sure that you are getting better.  If fevers nausea vomiting or symptoms or if any other concerns develop please return to emergency department soon as possible      Please make an appointment to follow up with Primary Care Center (phone: [phone] in 2 days even if entirely better.
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
## 2019-07-27 NOTE — ED TRIAGE NOTES
Pt BIBA from [facility] for worsening bilateral flank pain. Seen at [facility] about 2 weeks ago for UTI and was started on Bactrim, last dose was today. Pt reports slight improvement in UTI symptoms but back and flank pain has gotten worse. VSS, afebrile.

## 2019-07-28 LAB
BACTERIA SPEC CULT: ABNORMAL
Lab: ABNORMAL
SPECIMEN SOURCE: ABNORMAL

## 2019-07-31 ENCOUNTER — OFFICE VISIT (OUTPATIENT)
Dept: FAMILY MEDICINE | Facility: CLINIC | Age: 44
End: 2019-07-31
Payer: COMMERCIAL

## 2019-07-31 VITALS
OXYGEN SATURATION: 95 % | TEMPERATURE: 98.2 F | SYSTOLIC BLOOD PRESSURE: 112 MMHG | DIASTOLIC BLOOD PRESSURE: 60 MMHG | HEART RATE: 89 BPM

## 2019-07-31 DIAGNOSIS — M54.6 CHRONIC MIDLINE THORACIC BACK PAIN: ICD-10-CM

## 2019-07-31 DIAGNOSIS — G89.29 CHRONIC MIDLINE THORACIC BACK PAIN: ICD-10-CM

## 2019-07-31 DIAGNOSIS — G82.50 QUADRIPLEGIA (H): ICD-10-CM

## 2019-07-31 DIAGNOSIS — M54.6 CHRONIC MIDLINE THORACIC BACK PAIN: Primary | ICD-10-CM

## 2019-07-31 DIAGNOSIS — G89.29 CHRONIC MIDLINE THORACIC BACK PAIN: Primary | ICD-10-CM

## 2019-07-31 PROCEDURE — 99214 OFFICE O/P EST MOD 30 MIN: CPT | Performed by: FAMILY MEDICINE

## 2019-07-31 RX ORDER — BACLOFEN 20 MG/1
20 TABLET ORAL 4 TIMES DAILY
Qty: 360 TABLET | Refills: 3 | Status: SHIPPED | OUTPATIENT
Start: 2019-07-31 | End: 2020-08-04

## 2019-07-31 NOTE — PROGRESS NOTES
Subjective     Riley Gifford is a 43 year old male who presents to clinic today for the following health issues:    HPI     ED/UC Followup:    Facility:  [facility]  Date of visit: 7/26/19  Reason for visit: low back pain  Current Status: Doing about the same     ER summary:  This is a 43-year-old male with CD 5 and 6 quadriplegia history of chronic pain that comes in for evaluation of back pain patient has been trying to get rid of narcotics due to the fact that he is getting constipation however he is having a hard time due to pain.  On physical examination it appears that the pain is chronic the patient appears to be stable in no acute distress and there is no UTI.  Due to the fact that he does have problems with constipation I plan to send him home with Flexeril as an alternative for his pain treatment patient agrees.  Patient noted to have an equivocal UTI on catheter specimen that is because there is only 3 white cells but there is positive nitrites I will treat as outpatient with p.o. Keflex     Patient has chronic back pain and has been evaluated by Dr. Terry in [facility], Dr. Benitez at PM&R clinic, and most recently DR. Jackman at [facility].  He has tried epidural steroid injections, medical marijuana, multiple medications, and TENS unit without relief.      Patient Active Problem List   Diagnosis     Vitamin D deficiency     Eosinophilic esophagitis     Neurogenic bladder     CARDIOVASCULAR SCREENING; LDL GOAL LESS THAN 160     Quadriplegia (H)     Chronic constipation     Internal hemorrhoids with other complication     Diagnostic skin and sensitization tests(aka ALLERGENS)     Anal or rectal pain     Allergic rhinitis due to animal dander     Allergy to mold spores     House dust mite allergy     Insomnia     Health Care Home     Gallstones     Chronic midline thoracic back pain     Pulmonary nodules     ACP (advance care planning)     Cervical spinal cord injury, sequela (H)     Spasticity     Past  Surgical History:   Procedure Laterality Date     BACK SURGERY       C NONSPECIFIC PROCEDURE      C5-6 Fusion     C NONSPECIFIC PROCEDURE      Pressure Ulcer     COLONOSCOPY       CYSTOSCOPY N/A 6/23/2017    Procedure: CYSTOSCOPY;  Cystoscopy and Botox Injection Into the Bladder  ;  Surgeon: Evaristo Hayes MD;  [facility]     CYSTOSCOPY N/A 4/5/2019    Procedure: Cystoscopy;  Surgeon: Evaristo Hayes MD;  [facility]     CYSTOSCOPY, INTRAVESICAL INJECTION N/A 5/12/2016    Procedure: CYSTOSCOPY, INTRAVESICAL INJECTION;  Surgeon: Evaristo Hayes MD;  [facility]     CYSTOSCOPY, INTRAVESICAL INJECTION N/A 11/11/2016    Procedure: CYSTOSCOPY, INTRAVESICAL INJECTION;  Surgeon: Evaristo Hayes MD;  [facility]     CYSTOSCOPY, INTRAVESICAL INJECTION N/A 1/4/2018    Procedure: CYSTOSCOPY, INTRAVESICAL INJECTION;  Cystoscopy Botox Injection Into The Bladder;  Surgeon: Evaristo Hayes MD;  [facility]     GI SURGERY      endoscopy x2     INJECT BOTOX N/A 6/23/2017    Procedure: INJECT BOTOX;;  Surgeon: Evaristo Hayes MD;  [facility]     INJECT BOTOX N/A 4/5/2019    Procedure: Botox Injection Into The Bladder;  Surgeon: Evaristo Hayes MD;  [facility]       Social History     Tobacco Use     Smoking status: Never Smoker     Smokeless tobacco: Never Used   Substance Use Topics     Alcohol use: Not Currently     Alcohol/week: 0.0 oz     Family History   Problem Relation Age of Onset     Breast Cancer Mother      Prostate Cancer Father      Skin Cancer Father      Breast Cancer Maternal Grandmother      Cancer Maternal Grandfather      Glaucoma No family hx of      Macular Degeneration No family hx of      Diabetes No family hx of      Hypertension No family hx of      Melanoma No family hx of            Reviewed and updated as needed this visit by Provider         Review of Systems   ROS COMP: Constitutional, HEENT, cardiovascular, pulmonary, gi  and gu systems are negative, except as otherwise noted.      Objective    /60 (BP Location: Right arm, Patient Position: Sitting, Cuff Size: Adult Regular)   Pulse 89   Temp 98.2  F (36.8  C) (Oral)   SpO2 95%   There is no height or weight on file to calculate BMI.  Physical Exam   GENERAL: healthy, alert and no distress  RESP: lungs clear to auscultation - no rales, rhonchi or wheezes  CV: regular rates and rhythm, normal S1 S2, no S3 or S4 and no murmur, click or rub  MS: Patient uses a wheelchair for ambulation.  Quadriplegic.  Mild TTP along paraspinal muscles in the thoracic and low back         Assessment & Plan       ICD-10-CM    1. Chronic midline thoracic back pain M54.6 DISCONTINUED: amitriptyline (ELAVIL) 25 MG tablet    G89.29    2. Quadriplegia (H) G82.50 baclofen (LIORESAL) 20 MG tablet     Patient will continue to follow with his PM&R doctor at [facility].  Advised him to consider restarting physical therapy and/or chiropractic care.  Patient will try amitriptyline at bedtime for chronic pain control.  Continue to avoid NSAIDs.      Return in about 4 weeks (around 8/28/2019).    Bel Chong DO  [facility]

## 2019-08-08 ENCOUNTER — TELEPHONE (OUTPATIENT)
Dept: UROLOGY | Facility: CLINIC | Age: 44
End: 2019-08-08

## 2019-08-08 DIAGNOSIS — N31.9 NEUROGENIC BLADDER: Primary | ICD-10-CM

## 2019-08-08 RX ORDER — CEFAZOLIN SODIUM 1 G/50ML
1 INJECTION, SOLUTION INTRAVENOUS SEE ADMIN INSTRUCTIONS
Status: CANCELLED | OUTPATIENT
Start: 2019-08-08

## 2019-08-08 RX ORDER — CEFAZOLIN SODIUM 2 G/50ML
2 SOLUTION INTRAVENOUS
Status: CANCELLED | OUTPATIENT
Start: 2019-08-08

## 2019-08-08 NOTE — TELEPHONE ENCOUNTER
Patient is scheduled for surgery with Dr. Hayes      Spoke or left message with: Riley    Date of Surgery: 10/02/19    [facility]    Informed patient they will need an adult  yes    Pre-op with surgeon (if applicable): n/a    H&P: Scheduled with pcp    Additional imaging/appointments: n/a    Surgery packet: mailed 8/8/19     Additional comments: n/a

## 2019-08-12 ENCOUNTER — TELEPHONE (OUTPATIENT)
Dept: FAMILY MEDICINE | Facility: CLINIC | Age: 44
End: 2019-08-12

## 2019-08-12 NOTE — TELEPHONE ENCOUNTER
Forms received from: [facility]    Fax listed: [phone]  Date received: August 12, 2019  Form description: PT outpatient Certification Letter 07/01  Once forms are completed, please return to [facility] via fax.  Form placed: Jan Harris PA-C sign me folder.      Dulce JACOBS Team [place]

## 2019-08-19 ENCOUNTER — PATIENT OUTREACH (OUTPATIENT)
Dept: CARE COORDINATION | Facility: CLINIC | Age: 44
End: 2019-08-19

## 2019-08-19 NOTE — PROGRESS NOTES
Clinic Care Coordination Contact  [facility]/Voicemail       Clinical Data: Care Coordinator Outreach  Outreach attempted x 1.  Left message on voicemail with call back information and requested return call.  Plan: Care Coordinator mailed out care coordination introduction letter on 7/15/19. Care Coordinator will try to reach patient again in 3-5 business days.        Robbin Vanegas MSN, RN, PHN, CCM   Primary Care Clinical RN Care Coordinator  [facility]   [email or web address]  Office: [phone]

## 2019-08-20 NOTE — TELEPHONE ENCOUNTER
#30 with 1 refill sent to pharmacy today.   Now requesting 90 day supply.     Please advise.   Yamilet Chacon RN, BSN, PHN     Psych/Behavioral

## 2019-08-26 NOTE — PROGRESS NOTES
Clinic Care Coordination Contact    Follow Up Progress Note      Assessment: The RN CC spoke with the patient who states that the back pain and nausea continues at this time.  A week and a half ago the patient states that he received an order for UA/UC and he wondered if it was still okay to bring it in.  The RN CC reassured him that he could still bring it in.  The patient is also scheduled for a cystoscopy the beginning of October and an EGD the end of October.  He needs to be sedated for both of them so he will be checking to make sure that he can do them both that close together.  He also needs a preop prior to the first procedure.    Goals addressed this encounter:   Goals Addressed                 This Visit's Progress      Healthy Eating (pt-stated)   On track     Goal Statement: I will work with the ILS person to make more palatable meals for me to eat.  Measure of Success: The patient is gaining weight  Supportive Steps to Achieve: weigh the patient routinely.  Barriers: food allergies  Strengths: motivated to gain weight  Date to Achieve By: 6/5/2020  Patient expressed understanding of goal: yes            Pain Management (pt-stated)   On track     Goal Statement: I will work with the physical therapists at [facility] to decrease the amount of pain in my neck, back and sacral area.  Measure of Success: Patient states less pain.  Supportive Steps to Achieve: physical therapy from [facility]  Barriers: quadriplegic   Strengths: motivated  Date to Achieve By: 6/5/2020  Patient expressed understanding of goal: yes                 Intervention/Education provided during outreach: Reviewed the orders for the UA UC that are in the chart.  Encouraged the patient to contact the providers about the timing of the procedures.     Outreach Frequency: monthly    Plan:   1.  Patient will call urology for timing of the procedures and if they can be done that close together.  2.  The patient will bring in a sample of  his urine should he feel that he is dealing with a UTI.  3.  Patient will call with for his preop appointment with the PCP.  4.  Care Coordination to remain available for the patient to contact in the event of future needs.      Care Coordinator will follow up in 4 weeks.          Robbin Vanegas MSN, RN, PHN, [facility]   Primary Care Clinical RN Care Coordinator  [facility]   [email or web address]  Office: [phone]

## 2019-08-28 DIAGNOSIS — N39.0 URINARY TRACT INFECTION WITHOUT HEMATURIA, SITE UNSPECIFIED: ICD-10-CM

## 2019-08-28 LAB
ALBUMIN UR-MCNC: NEGATIVE MG/DL
AMORPH CRY #/AREA URNS HPF: ABNORMAL /HPF
APPEARANCE UR: ABNORMAL
BACTERIA #/AREA URNS HPF: ABNORMAL /HPF
BILIRUB UR QL STRIP: NEGATIVE
COLOR UR AUTO: YELLOW
GLUCOSE UR STRIP-MCNC: NEGATIVE MG/DL
HGB UR QL STRIP: NEGATIVE
KETONES UR STRIP-MCNC: NEGATIVE MG/DL
LEUKOCYTE ESTERASE UR QL STRIP: ABNORMAL
NITRATE UR QL: NEGATIVE
PH UR STRIP: 7.5 PH (ref 5–7)
RBC #/AREA URNS AUTO: ABNORMAL /HPF
SOURCE: ABNORMAL
SP GR UR STRIP: 1.01 (ref 1–1.03)
UROBILINOGEN UR STRIP-ACNC: 0.2 EU/DL (ref 0.2–1)
WBC #/AREA URNS AUTO: ABNORMAL /HPF

## 2019-08-28 PROCEDURE — 87086 URINE CULTURE/COLONY COUNT: CPT | Performed by: UROLOGY

## 2019-08-28 PROCEDURE — 81001 URINALYSIS AUTO W/SCOPE: CPT | Performed by: UROLOGY

## 2019-08-28 PROCEDURE — 87186 SC STD MICRODIL/AGAR DIL: CPT | Performed by: UROLOGY

## 2019-08-28 PROCEDURE — 87088 URINE BACTERIA CULTURE: CPT | Performed by: UROLOGY

## 2019-08-30 ENCOUNTER — TELEPHONE (OUTPATIENT)
Dept: UROLOGY | Facility: CLINIC | Age: 44
End: 2019-08-30

## 2019-08-30 DIAGNOSIS — N39.0 URINARY TRACT INFECTION: Primary | ICD-10-CM

## 2019-08-30 LAB
BACTERIA SPEC CULT: ABNORMAL
SPECIMEN SOURCE: ABNORMAL

## 2019-08-30 RX ORDER — NITROFURANTOIN MACROCRYSTAL 100 MG
100 CAPSULE ORAL 4 TIMES DAILY
Qty: 10 CAPSULE | Refills: 0 | Status: SHIPPED | OUTPATIENT
Start: 2019-08-30 | End: 2019-10-15

## 2019-08-30 NOTE — TELEPHONE ENCOUNTER
[facility] Call Center    Phone Message    May a detailed message be left on voicemail: yes    Reason for Call: Other: pt returning call to [place] to discuss his UA results     Action Taken: Message routed to:  [facility] Urology

## 2019-08-30 NOTE — TELEPHONE ENCOUNTER
[facility] Call Center    Phone Message    May a detailed message be left on voicemail: yes    Reason for Call: Requesting Results   Name/type of test: UA with Microscopic & Urine Culture Aerobic Bacterial   Date of test: 8/28/2019  Was test done at a location other than [facility] (Please fill in the location if not [facility])?: No          Action Taken: Message routed to:  [facility] URO

## 2019-09-04 ENCOUNTER — OFFICE VISIT (OUTPATIENT)
Dept: FAMILY MEDICINE | Facility: CLINIC | Age: 44
End: 2019-09-04
Payer: COMMERCIAL

## 2019-09-04 VITALS
SYSTOLIC BLOOD PRESSURE: 100 MMHG | TEMPERATURE: 98 F | HEART RATE: 98 BPM | DIASTOLIC BLOOD PRESSURE: 69 MMHG | OXYGEN SATURATION: 93 %

## 2019-09-04 DIAGNOSIS — N31.9 NEUROGENIC BLADDER: ICD-10-CM

## 2019-09-04 DIAGNOSIS — K20.0 EOSINOPHILIC ESOPHAGITIS: ICD-10-CM

## 2019-09-04 DIAGNOSIS — Z01.818 PREOP GENERAL PHYSICAL EXAM: Primary | ICD-10-CM

## 2019-09-04 DIAGNOSIS — G82.50 QUADRIPLEGIA (H): ICD-10-CM

## 2019-09-04 PROCEDURE — 99214 OFFICE O/P EST MOD 30 MIN: CPT | Performed by: NURSE PRACTITIONER
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
## 2019-09-11 ENCOUNTER — TELEPHONE (OUTPATIENT)
Dept: UROLOGY | Facility: CLINIC | Age: 44
End: 2019-09-11

## 2019-09-11 NOTE — TELEPHONE ENCOUNTER
[facility] Call Center    Phone Message    May a detailed message be left on voicemail: yes    Reason for Call: Other: Pt called and would like to speak with a nurse to get clarification on an upocming procedure. Pt would like to know if pt will be sedated for it. Please call back pt. Thanks.     Action Taken: Message routed to:  [facility] U[facility]

## 2019-09-11 NOTE — TELEPHONE ENCOUNTER
Spoke to patient to inform him that he will get MAC sedation for his upcoming procedure on 10/2/19.      Amina Doe, RN, BSN  Care Coordinator- Reconstructive Urology

## 2019-09-17 DIAGNOSIS — K64.9 HEMORRHOIDS, UNSPECIFIED HEMORRHOID TYPE: ICD-10-CM

## 2019-09-17 DIAGNOSIS — Z01.818 PREOP GENERAL PHYSICAL EXAM: ICD-10-CM

## 2019-09-17 LAB
ALBUMIN UR-MCNC: NEGATIVE MG/DL
APPEARANCE UR: CLEAR
BACTERIA #/AREA URNS HPF: ABNORMAL /HPF
BILIRUB UR QL STRIP: NEGATIVE
COLOR UR AUTO: YELLOW
GLUCOSE UR STRIP-MCNC: NEGATIVE MG/DL
HGB UR QL STRIP: NEGATIVE
KETONES UR STRIP-MCNC: NEGATIVE MG/DL
LEUKOCYTE ESTERASE UR QL STRIP: ABNORMAL
NITRATE UR QL: NEGATIVE
NON-SQ EPI CELLS #/AREA URNS LPF: ABNORMAL /LPF
PH UR STRIP: 8 PH (ref 5–7)
RBC #/AREA URNS AUTO: ABNORMAL /HPF
SOURCE: ABNORMAL
SP GR UR STRIP: 1.02 (ref 1–1.03)
UROBILINOGEN UR STRIP-ACNC: 0.2 EU/DL (ref 0.2–1)
WBC #/AREA URNS AUTO: ABNORMAL /HPF

## 2019-09-17 PROCEDURE — 81001 URINALYSIS AUTO W/SCOPE: CPT | Performed by: NURSE PRACTITIONER

## 2019-09-17 NOTE — TELEPHONE ENCOUNTER
[place] [facility] [place]  Leobardo Persaud RN

## 2019-09-17 NOTE — TELEPHONE ENCOUNTER
"Requested Prescriptions   Pending Prescriptions Disp Refills     PROCTOZONE-HC 2.5 % cream [Pharmacy Med Name: PROCTOZONE-HC 2.5% CREAM]  Last Written Prescription Date:  9/7/18  Last Fill Quantity: 30g,  # refills: 11   Last office visit: 9/4/2019 with prescribing provider:  Efraín   Future Office Visit:     30 g 0     Sig: USE RECTALLY TWICE DAILY       Topical Steroids and Nonsteroidals Protocol Passed - 9/17/2019  9:53 AM        Passed - Patient is age 6 or older        Passed - Authorizing prescriber's most recent note related to this medication read.     If refill request is for ophthalmic use, please forward request to provider for approval.          Passed - High potency steroid not ordered        Passed - Recent (12 mo) or future (30 days) visit within the authorizing provider's specialty     Patient had office visit in the last 12 months or has a visit in the next 30 days with authorizing provider or within the authorizing provider's specialty.  See \"Patient Info\" tab in inbasket, or \"Choose Columns\" in Meds & Orders section of the refill encounter.              Passed - Medication is active on med list          "

## 2019-09-19 ENCOUNTER — PATIENT OUTREACH (OUTPATIENT)
Dept: CARE COORDINATION | Facility: CLINIC | Age: 44
End: 2019-09-19

## 2019-09-19 NOTE — PROGRESS NOTES
Clinic Care Coordination Contact    Follow Up Progress Note      Assessment: The patient called requesting assistance in understanding the results of his UA/UC that he received from the provider.  The RN CC answered all of the questions to the satisfaction of the patient.  The patient states that he is still having abdominal and back pain as before.   The patient was seen by pain and palliative care yesterday without any further options.  An EGD is scheduled for next week and a botox injection for the following week.  The patient denies any fever, foul smell to his urine or chills.  The patient endorses that he doesn't feel well, and that his urine is a little cloudy.  The RN CC encouraged the patient to call if he needs to be seen by a provider.  The patient states understanding.     Goals addressed this encounter:   Goals Addressed                 This Visit's Progress      Healthy Eating (pt-stated)   On track     Goal Statement: I will work with the ILS person to make more palatable meals for me to eat.  Measure of Success: The patient is gaining weight  Supportive Steps to Achieve: weigh the patient routinely.  Barriers: food allergies  Strengths: motivated to gain weight  Date to Achieve By: 6/5/2020  Patient expressed understanding of goal: yes            Pain Management (pt-stated)   On track     Goal Statement: I will work with the physical therapists at [facility] to decrease the amount of pain in my neck, back and sacral area.  Measure of Success: Patient states less pain.  Supportive Steps to Achieve: physical therapy from [facility]  Barriers: quadriplegic   Strengths: motivated  Date to Achieve By: 6/5/2020  Patient expressed understanding of goal: yes                 Intervention/Education provided during outreach: The signs and symptoms of infection and to call if this happens and needs to be addressed.     Outreach Frequency: monthly    Plan:   1.  The patient will monitor his symptoms for  signs of infection and will call if he needs to be seen by a provider.    2.  The patient will work with the [facility] and the pain and palliative department for assistance on the persistent pain that he has.  3.  The patient will take all medications as prescribed by the providers.  Using medications for pain as indicated.  4.  Care Coordination to remain available for the patient to contact in the event of future needs.      Care Coordinator will follow up in 4 weeks        Robbin Vanegas MSN, RN, PHN, [facility]   Primary Care Clinical RN Care Coordinator  [facility]   [email or web address]  Office: [phone]

## 2019-09-30 DIAGNOSIS — N31.9 NEUROGENIC BLADDER: ICD-10-CM

## 2019-10-01 ENCOUNTER — TELEPHONE (OUTPATIENT)
Dept: UROLOGY | Facility: CLINIC | Age: 44
End: 2019-10-01

## 2019-10-01 ENCOUNTER — ANESTHESIA EVENT (OUTPATIENT)
Dept: SURGERY | Facility: AMBULATORY SURGERY CENTER | Age: 44
End: 2019-10-01

## 2019-10-01 NOTE — TELEPHONE ENCOUNTER
Patient called to reschedule surgery with Dr Hayes @ [facility] OR from 10/2/19 to 10/30/19.

## 2019-10-01 NOTE — TELEPHONE ENCOUNTER
"Requested Prescriptions   Pending Prescriptions Disp Refills     tolterodine (DETROL) 2 MG tablet [Pharmacy Med Name: TOLTERODINE TARTRATE 2MG TABLETS]  Last Written Prescription Date:  7/11/2019  Last Fill Quantity: 180 tablet,  # refills: 0   Last office visit: 3/11/2019 with prescribing provider:  KASEY Harris   Future Office Visit:     180 tablet 0     Sig: TAKE 1 TABLET(2 MG) BY MOUTH TWICE DAILY       Muscarinic Antagonists (Urinary Incontinence Agents) Passed - 9/30/2019  3:57 PM        Passed - Recent (12 mo) or future (30 days) visit within the authorizing provider's specialty     Patient has had an office visit with the authorizing provider or a provider within the authorizing providers department within the previous 12 mos or has a future within next 30 days. See \"Patient Info\" tab in inbasket, or \"Choose Columns\" in Meds & Orders section of the refill encounter.              Passed - Patient does not have a diagnosis of glaucoma on the problem list     If glaucoma diagnosis is new, refer refill to physician.          Passed - Medication is active on med list        Passed - Patient is 18 years of age or older          "

## 2019-10-02 ENCOUNTER — ANESTHESIA (OUTPATIENT)
Dept: SURGERY | Facility: AMBULATORY SURGERY CENTER | Age: 44
End: 2019-10-02

## 2019-10-02 ENCOUNTER — TELEPHONE (OUTPATIENT)
Dept: FAMILY MEDICINE | Facility: CLINIC | Age: 44
End: 2019-10-02

## 2019-10-02 RX ORDER — TOLTERODINE TARTRATE 2 MG/1
TABLET, EXTENDED RELEASE ORAL
Qty: 180 TABLET | Refills: 0 | Status: SHIPPED | OUTPATIENT
Start: 2019-10-02 | End: 2019-12-31

## 2019-10-02 NOTE — TELEPHONE ENCOUNTER
Prescription approved per [facility] Refill Protocol.    Mary Jane Cotto RN

## 2019-10-02 NOTE — TELEPHONE ENCOUNTER
Addended by: ALYSE SMILEY on: 10/2/2019 01:47 PM     Modules accepted: Level of Service, SmartSet     Faxed script to Pharmacy at [phone].    Luli Correa

## 2019-10-02 NOTE — TELEPHONE ENCOUNTER
Forms received from [facility]: detailed prescription for incontinence supplies for Jan Harris PA-C.  Forms placed in provider 'sign me' folder.  Please fax forms to [phone] after completion.    Thanks!  Kem Peters

## 2019-10-03 ENCOUNTER — PATIENT OUTREACH (OUTPATIENT)
Dept: CARE COORDINATION | Facility: CLINIC | Age: 44
End: 2019-10-03

## 2019-10-03 ENCOUNTER — TELEPHONE (OUTPATIENT)
Dept: UROLOGY | Facility: CLINIC | Age: 44
End: 2019-10-03

## 2019-10-03 DIAGNOSIS — N31.9 NEUROGENIC BLADDER: ICD-10-CM

## 2019-10-03 DIAGNOSIS — R31.9 HEMATURIA: ICD-10-CM

## 2019-10-03 DIAGNOSIS — N31.9 NEUROGENIC BLADDER: Primary | ICD-10-CM

## 2019-10-03 DIAGNOSIS — N39.0 RECURRENT UTI: ICD-10-CM

## 2019-10-03 LAB
ALBUMIN UR-MCNC: NEGATIVE MG/DL
APPEARANCE UR: CLEAR
BILIRUB UR QL STRIP: NEGATIVE
COLOR UR AUTO: YELLOW
GLUCOSE UR STRIP-MCNC: NEGATIVE MG/DL
HGB UR QL STRIP: NEGATIVE
KETONES UR STRIP-MCNC: NEGATIVE MG/DL
LEUKOCYTE ESTERASE UR QL STRIP: NEGATIVE
NITRATE UR QL: NEGATIVE
PH UR STRIP: 8.5 PH (ref 5–7)
SOURCE: ABNORMAL
SP GR UR STRIP: 1.01 (ref 1–1.03)
UROBILINOGEN UR STRIP-ACNC: 0.2 EU/DL (ref 0.2–1)

## 2019-10-03 PROCEDURE — 81003 URINALYSIS AUTO W/O SCOPE: CPT | Performed by: UROLOGY

## 2019-10-03 NOTE — TELEPHONE ENCOUNTER
Health Call Center    Phone Message    May a detailed message be left on voicemail: yes    Reason for Call: Other: [facility] called again to let us know that the initial message should have included the information that the Pt was also seeing blood in his discharge.  Please call Robbin at [facility] [phone] to get any further information.  Robbin also has charting records.      Action Taken: Message routed to:  [facility] urology

## 2019-10-03 NOTE — PROGRESS NOTES
Clinic Care Coordination Contact  Care Team Conversations    The patient called requesting a UA/UC for cloudy urine that contains blood this morning.  The patient has abdominal pain, and does not feel well.  The patient has sterile cups at home and will properly label it and bring to the [facility] lab.  The RN CC contacted urology for an order to have the UA/UC done.      The RN CC received a call from urology and the orders have been placed.  A message was left for the patient reminding him of bringing it in within 1 hour per the urology nurse.         Robbin Vanegas MSN, RN, PHN, [facility]   Primary Care Clinical RN Care Coordinator  [facility]   [email or web address]  Office: [phone]

## 2019-10-03 NOTE — TELEPHONE ENCOUNTER
[facility] Call Center    Phone Message    May a detailed message be left on voicemail: yes    Reason for Call: Order(s): Other:   Reason for requested: UA for cloudy urine  Date needed: asap  Provider name: Freddy      Action Taken: Message routed to:  [facility] urology

## 2019-10-04 ENCOUNTER — TRANSFERRED RECORDS (OUTPATIENT)
Dept: HEALTH INFORMATION MANAGEMENT | Facility: CLINIC | Age: 44
End: 2019-10-04

## 2019-10-04 NOTE — TELEPHONE ENCOUNTER
Pt called and notified that his UA results were within his normal limits and did not require a culture.

## 2019-10-05 ENCOUNTER — HEALTH MAINTENANCE LETTER (OUTPATIENT)
Age: 44
End: 2019-10-05

## 2019-10-15 ENCOUNTER — OFFICE VISIT (OUTPATIENT)
Dept: FAMILY MEDICINE | Facility: CLINIC | Age: 44
End: 2019-10-15
Payer: COMMERCIAL

## 2019-10-15 VITALS — TEMPERATURE: 98 F | DIASTOLIC BLOOD PRESSURE: 64 MMHG | SYSTOLIC BLOOD PRESSURE: 92 MMHG

## 2019-10-15 DIAGNOSIS — N31.9 NEUROGENIC BLADDER: ICD-10-CM

## 2019-10-15 DIAGNOSIS — Z01.818 PREOP GENERAL PHYSICAL EXAM: Primary | ICD-10-CM

## 2019-10-15 PROCEDURE — 99214 OFFICE O/P EST MOD 30 MIN: CPT | Performed by: NURSE PRACTITIONER
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
## 2019-10-17 ENCOUNTER — TELEPHONE (OUTPATIENT)
Dept: FAMILY MEDICINE | Facility: CLINIC | Age: 44
End: 2019-10-17

## 2019-10-17 NOTE — TELEPHONE ENCOUNTER
Forms received from "[employer] Supply: detailed prescription for gloves for Jan Harris PA-C.  Forms placed in provider 'sign me' folder.  Please fax forms to [phone] after completion.    Thanks!  Kem Peters

## 2019-10-18 DIAGNOSIS — R05.9 COUGH: ICD-10-CM

## 2019-10-18 NOTE — TELEPHONE ENCOUNTER
"Requested Prescriptions   Pending Prescriptions Disp Refills     albuterol (PROAIR HFA/PROVENTIL HFA/VENTOLIN HFA) 108 (90 Base) MCG/ACT inhaler [Pharmacy Med Name: ALBUTEROL HFA INH (200 PUFFS) 18GM]  Last Written Prescription Date:  6/7/2019  Last Fill Quantity: 18 g,  # refills: 0   Last office visit: 10/15/2019 with prescribing provider:  KASEY Harris   Future Office Visit:   18 g 0     Sig: INHALE 2 PUFFS BY MOUTH FOUR TIMES DAILY AS NEEDED       Asthma Maintenance Inhalers - Anticholinergics Passed - 10/18/2019 11:27 AM        Passed - Patient is age 12 years or older        Passed - Recent (12 mo) or future (30 days) visit within the authorizing provider's specialty     Patient has had an office visit with the authorizing provider or a provider within the authorizing providers department within the previous 12 mos or has a future within next 30 days. See \"Patient Info\" tab in inbasket, or \"Choose Columns\" in Meds & Orders section of the refill encounter.              Passed - Medication is active on med list        "

## 2019-10-21 ENCOUNTER — TELEPHONE (OUTPATIENT)
Dept: CARE COORDINATION | Facility: CLINIC | Age: 44
End: 2019-10-21

## 2019-10-21 RX ORDER — ALBUTEROL SULFATE 90 UG/1
AEROSOL, METERED RESPIRATORY (INHALATION)
Qty: 18 G | Refills: 0 | Status: SHIPPED | OUTPATIENT
Start: 2019-10-21 | End: 2020-02-06

## 2019-10-21 NOTE — TELEPHONE ENCOUNTER
Prescription approved per [facility] Refill Protocol.    Yuki Cox RN  [facility]

## 2019-10-21 NOTE — TELEPHONE ENCOUNTER
"Situation: Patient contacted RN CC covering for Robbin Rodríguezjody Lead RN CC.  Patient reports an increase in chronic abdominal and back pain and nausea since 10/17/19.      Background: Patient last seen for pre-op 10/15/19 and he states he discussed his chronic abdominal and back pain and inquired about labs and kidney imaging, but states he was told he didn't need one yet.    Assessment: Patient states pain is 7/10, constant in his bilateral lower abdomen radiating across his abdomen.  Back pain is in the \"middle and on the sides.\"  Patient describes the pain as an ache.  Patient denies fever, hematuria, malodorous urine, blood in stool, dizziness or any other new symptoms related to his chronic pain and nausea.  Patient reports occasional cloudy urine with intermittent self-cathing that has not changed since 10/17/19, normal bowel movements (last 10/20/19).  Patient has been taking Tylenol, lidocaine patches and Compazine with no little to no relief.    Plan/Recommendations: Patient inquires if PCP would want to see patient and/or order labs/imaging.  Please advise.    Melissa Behl BSN, RN, PHN, CCM  Primary Care Clinical RN Care Coordinator  [facility]   [phone]     "

## 2019-10-21 NOTE — TELEPHONE ENCOUNTER
Patient called, having 7/10 chronic pain in abdomen and back that has worsened.  Patient reluctant to go to urgent care, would prefer to be seen in clinic.  Schedule with Dr De Los Santos 10/22 at 3:40pm.    Patient advised that if symptoms worsen, or if pain becomes unbearable to go to urgent care/ ER right away.  He verbalized understanding and will follow through.    Mary Jane Cotto RN

## 2019-10-21 NOTE — TELEPHONE ENCOUNTER
Reviewed. Sorry to hear he's feeling bad. Yes, he should probably be seen. I don't have openings today - perhaps tomorrow or Thursday. However, if this sounds more concerning than that - if openings at another clinic or Urgent Care today, probably preferable.    Thank you.  Raffy Harris, JOE, PA-C

## 2019-10-22 ENCOUNTER — OFFICE VISIT (OUTPATIENT)
Dept: FAMILY MEDICINE | Facility: CLINIC | Age: 44
End: 2019-10-22
Payer: COMMERCIAL

## 2019-10-22 VITALS — HEART RATE: 79 BPM | SYSTOLIC BLOOD PRESSURE: 101 MMHG | TEMPERATURE: 97.7 F | DIASTOLIC BLOOD PRESSURE: 71 MMHG

## 2019-10-22 DIAGNOSIS — G89.29 CHRONIC MIDLINE THORACIC BACK PAIN: Primary | ICD-10-CM

## 2019-10-22 DIAGNOSIS — Z23 NEED FOR PROPHYLACTIC VACCINATION AND INOCULATION AGAINST INFLUENZA: ICD-10-CM

## 2019-10-22 DIAGNOSIS — M54.6 CHRONIC MIDLINE THORACIC BACK PAIN: Primary | ICD-10-CM

## 2019-10-22 DIAGNOSIS — K20.0 EOSINOPHILIC ESOPHAGITIS: ICD-10-CM

## 2019-10-22 DIAGNOSIS — N31.9 NEUROGENIC BLADDER: ICD-10-CM

## 2019-10-22 DIAGNOSIS — R11.0 NAUSEA: ICD-10-CM

## 2019-10-22 DIAGNOSIS — K59.09 CHRONIC CONSTIPATION: ICD-10-CM

## 2019-10-22 DIAGNOSIS — G82.50 QUADRIPLEGIA (H): ICD-10-CM

## 2019-10-22 LAB
ALBUMIN UR-MCNC: NEGATIVE MG/DL
APPEARANCE UR: CLEAR
BACTERIA #/AREA URNS HPF: ABNORMAL /HPF
BILIRUB UR QL STRIP: NEGATIVE
COLOR UR AUTO: YELLOW
GLUCOSE UR STRIP-MCNC: NEGATIVE MG/DL
HGB UR QL STRIP: NEGATIVE
KETONES UR STRIP-MCNC: NEGATIVE MG/DL
LEUKOCYTE ESTERASE UR QL STRIP: ABNORMAL
NITRATE UR QL: NEGATIVE
PH UR STRIP: 7.5 PH (ref 5–7)
RBC #/AREA URNS AUTO: ABNORMAL /HPF
SOURCE: ABNORMAL
SP GR UR STRIP: 1.01 (ref 1–1.03)
UROBILINOGEN UR STRIP-ACNC: 0.2 EU/DL (ref 0.2–1)
WBC #/AREA URNS AUTO: ABNORMAL /HPF

## 2019-10-22 PROCEDURE — 36415 COLL VENOUS BLD VENIPUNCTURE: CPT | Performed by: FAMILY MEDICINE

## 2019-10-22 PROCEDURE — 90686 IIV4 VACC NO PRSV 0.5 ML IM: CPT | Performed by: FAMILY MEDICINE

## 2019-10-22 PROCEDURE — G0008 ADMIN INFLUENZA VIRUS VAC: HCPCS | Performed by: FAMILY MEDICINE

## 2019-10-22 PROCEDURE — 99214 OFFICE O/P EST MOD 30 MIN: CPT | Mod: 25 | Performed by: FAMILY MEDICINE

## 2019-10-22 PROCEDURE — 80048 BASIC METABOLIC PNL TOTAL CA: CPT | Performed by: FAMILY MEDICINE

## 2019-10-22 PROCEDURE — 81001 URINALYSIS AUTO W/SCOPE: CPT | Performed by: FAMILY MEDICINE

## 2019-10-22 RX ORDER — METOCLOPRAMIDE HYDROCHLORIDE 5 MG/5ML
SOLUTION ORAL
Qty: 120 ML | Refills: 1 | Status: SHIPPED | OUTPATIENT
Start: 2019-10-22 | End: 2021-02-02

## 2019-10-22 NOTE — PROGRESS NOTES
Subjective     Riley Gifford is a 43 year old male who presents to clinic today for the following health issues:    HPI   ABDOMINAL and FLANK   PAIN     Onset: getting worse in the past few days     Description:   Character: Sharp and Dull ache  Location: right flank left flank  Radiation: Back    Intensity: moderate    Progression of Symptoms:  worsening    Accompanying Signs & Symptoms:  Fever/Chills?: no   Gas/Bloating: no   Nausea: YES  Vomitting: no   Diarrhea?: no   Constipation:no   Dysuria or Hematuria: no    History:   Trauma: no   Previous similar pain: YES- not as bad    Previous tests done: none    Precipitating factors:   Does the pain change with:     Food: no      BM: no     Urination: no     Alleviating factors:      Therapies Tried and outcome: Tylenol is not helping with symptoms     LMP:  not applicable     Patient has been having worsening chronic intermittent abdominal and back pain. The pain starts at his back and radiates to his abdomen. His abdominal pain is exacerbates when he leans forward. He has been having intermittent nausea. He reports having no appetite. He denies any pain swallowing or having a fever or chills. His last bowel movement was this morning and he reports that it looked normal. His urine was checked about 4 weeks ago and it was normal.     Patient goes to [facility] for pain management, where he has done physical therapy, acupuncture, tens unit, tried one steroid injection (no relief), Gabapentin (caused constipation), Amitriptyline (no relief), medical marijuana (no relief or increase in appetite).    He is worried about his kidneys. On 10/03/2019 he did a urine specimen with normal findings. Back on 10/03/2019 he was still having back pain but it has since worsened. On 07/2019 he was checked for infection via CBC, blood work showed kidneys were fine    BP Readings from Last 3 Encounters:   10/22/19 101/71   10/15/19 92/64   09/04/19 100/69    Wt Readings from Last 3  Encounters:   07/26/19 59 kg (130 lb)   04/05/19 61.2 kg (135 lb)   04/01/19 59 kg (130 lb)         Reviewed and updated as needed this visit by Provider       Review of Systems   ROS COMP: Constitutional, HEENT, cardiovascular, pulmonary, gi and gu systems are negative, except as otherwise noted.    This document serves as a record of the services and decisions personally performed and made by Belkys De Los Santos DO. It was created on her behalf by Charmaine Bello, a trained medical scribe. The creation of this document is based on the provider's statements to the medical scribe.  Charmaine Bello 4:02 PM October 22, 2019      Objective    /71   Pulse 79   Temp 97.7  F (36.5  C) (Oral)   There is no height or weight on file to calculate BMI.     Physical Exam   GENERAL: healthy, alert and no distress, quadriplegic sitting in wheelchair  HENT: mouth without ulcers or lesions  NECK: no adenopathy, no asymmetry, masses, or scars and thyroid normal to palpation  RESP: lungs clear to auscultation - no rales, rhonchi or wheezes  CV: regular rate and rhythm, normal S1 S2, no S3 or S4, no murmur, click or rub, no peripheral edema and peripheral pulses strong  MS: muscular atrophy over his torso and upper and lower extremities, some muscle spasms in his paraspinal muscles, no edema  SKIN: no suspicious lesions or rashes to visible skin   NEURO: Normal strength and tone, mentation intact and speech normal  PSYCH: mentation appears normal, affect normal/bright    Diagnostic Test Results:  Labs reviewed in Epic  Urinalysis - negative        Assessment & Plan       ICD-10-CM    1. Chronic midline thoracic back pain M54.6 Urine Microscopic    G89.29    2. Nausea R11.0 BASIC METABOLIC PANEL     metoclopramide (REGLAN) 5 MG/5ML solution     *UA reflex to Microscopic and Culture ([facility] (except [place] and [place])   3. Quadriplegia (H) G82.50    4. Chronic constipation K59.09    5. Eosinophilic  esophagitis K20.0    6. Neurogenic bladder N31.9    7. Need for prophylactic vaccination and inoculation against influenza Z23 INFLUENZA VACCINE IM > 6 MONTHS VALENT IIV4 [06067]     Vaccine Administration, Initial [73975]      A urinalysis was ordered with normal findings.     This appears to be exacerbation of his chronic thoracic back pain.  I am not finding any other causes for increased pain such as constipation, bladder infection related to his neurogenic bladder, heartburn related to his eosinophilic esophagitis, or skin infection such as shingles.  I have suggested he try massage therapy see below.  He will continue to get his pain management through specialty clinics.  We will try Reglan for his ongoing nausea.  Patient Instructions   Start Reglan for nausea.     Look into [facility], a massage therapy center.   -Do the neuromuscular massage   Address: [address] [place] [address] [address], [place] [address]   Phone: [phone]   Hours: Monday - Fridays 9am-7pm              Saturday 10am-4pm              Sunday 12pm-6pm      No follow-ups on file.     The information in this document, created by the medical scribe, Charmaine Rossi, for me, accurately reflects the services I personally performed and the decisions made by me. I have reviewed and approved this document for accuracy.  October 22, 2019 5:11 PM    Belkys De Los Santos DO  [facility]

## 2019-10-22 NOTE — PATIENT INSTRUCTIONS
Start Reglan for nausea.     Look into [facility], a massage therapy center.   -Do the neuromuscular massage   Address: [address] [place] [address] [address], [place] [address]   Phone: [phone]   Hours: Monday - Fridays 9am-7pm              Saturday 10am-4pm              Sunday 12pm-6pm

## 2019-10-23 LAB
ANION GAP SERPL CALCULATED.3IONS-SCNC: 6 MMOL/L (ref 3–14)
BUN SERPL-MCNC: 9 MG/DL (ref 7–30)
CALCIUM SERPL-MCNC: 8 MG/DL (ref 8.5–10.1)
CHLORIDE SERPL-SCNC: 107 MMOL/L (ref 94–109)
CO2 SERPL-SCNC: 22 MMOL/L (ref 20–32)
CREAT SERPL-MCNC: 0.59 MG/DL (ref 0.66–1.25)
GFR SERPL CREATININE-BSD FRML MDRD: >90 ML/MIN/{1.73_M2}
GLUCOSE SERPL-MCNC: 89 MG/DL (ref 70–99)
POTASSIUM SERPL-SCNC: 4 MMOL/L (ref 3.4–5.3)
SODIUM SERPL-SCNC: 135 MMOL/L (ref 133–144)

## 2019-10-24 ENCOUNTER — PATIENT OUTREACH (OUTPATIENT)
Dept: CARE COORDINATION | Facility: CLINIC | Age: 44
End: 2019-10-24

## 2019-10-24 NOTE — LETTER
[facility]  Complex Care Plan  About Me:    Patient Name:  Riley Marcos    YOB: 1975  Age:         43 year old   [place] MRN:    [id number] Telephone Information:  Home Phone [phone]   Mobile NONE   Mobile [phone]       Address:  [address] [place] [address] [address]  [place] [address] Email address:  [email or web address]      Emergency Contact(s)    Name Relationship Lgl Grd Work Phone Home Phone Mobile Phone   1. TREY MARCOS (NE* Relative No noen none [phone]   2. DIRKAGUSTO Sister   [phone]    3. HODA MARCOS Brother No  [phone]            Primary language:  English     needed? No   [place] [facility]:  [phone] op. 1  Other communication barriers:    Preferred Method of Communication:  Lisa  Current living arrangement:    Mobility Status/ Medical Equipment:      Health Maintenance  Health Maintenance Reviewed:      My Access Plan  Medical Emergency 911   Primary Clinic Line [facility] - [phone]   24 Hour Appointment Line [phone] or  [phone]) (toll-free)   24 Hour Nurse Line [phone] (toll-free)   Preferred Urgent Care     Preferred Hospital     Preferred Pharmacy [id number] - [place] [address] AT [place] [address]     Behavioral Health Crisis Line The National Suicide Prevention Lifeline at 1-355.742.4150 or 911             My Care Team Members  Patient Care Team       Relationship Specialty Notifications Start End    Raffy Harris PA-C PCP - General Physician Assistant  3/2/18     Phone: [phone] Fax: [phone]         [address] [facility] [address]    Robbin Andino, RN Lead Care Coordinator Nurse Admissions 1/6/16     phone:  [phone]    Phone: [phone] Fax: [phone]        Rober Leo MD Referring Physician Urology  1/8/16     Phone: [phone] Fax: [phone] [address] [facility]  [facility] [address]    Kimberly Zabala MD MD Urology  1/8/16     Phone: [phone] Fax: [phone]         [address] [facility] [address] [facility] [address]    Amanda Other (see comments)   1/8/16     [facility]     Phone: [phone]         Evaristo Hayes MD MD Urology  4/19/16     Phone: [phone] Fax: [phone]         [address] [address]    Jenny Wright, RN Registered Nurse Urology  4/19/16     Marta Adames, RN Clinic Care Coordinator Urology Abnormal results only, Admissions 11/3/16     Phone: [phone] Pager: [phone]        Rosemary Thomas, RN Nurse Coordinator Physical Medicine and Rehab  3/30/17     Phone: [phone] Pager: [phone]        Barbara Kenny APRN CNP Assigned PCP   9/29/19     Phone: [phone] Fax: [phone]         [address] [facility] [address]            My Care Plans  Self Management and Treatment Plan  Goals and (Comments)  Goals        General    Healthy Eating (pt-stated)     Notes - Note edited  6/6/2019  3:42 PM by Robbin Andino, RN    Goal Statement: I will work with the ILS person to make more palatable meals for me to eat.  Measure of Success: The patient is gaining weight  Supportive Steps to Achieve: weigh the patient routinely.  Barriers: food allergies  Strengths: motivated to gain weight  Date to Achieve By: 6/5/2020  Patient expressed understanding of goal: yes          Pain Management (pt-stated)     Notes - Note edited  6/6/2019  3:43 PM by Robbin Andino, RN    Goal Statement: I will work with the physical therapists at [facility] to decrease the amount of pain in my neck, back and sacral area.  Measure of Success: Patient states less pain.  Supportive Steps to Achieve: physical therapy from [facility]  Barriers: quadriplegic   Strengths: motivated  Date to Achieve By: 6/5/2020  Patient expressed understanding of goal: yes                 Action Plans on File:                        Advance Care Plans/Directives Type:        My Medical and Care Information  Problem List   Patient Active Problem List   Diagnosis     Vitamin D deficiency     Eosinophilic esophagitis     Neurogenic bladder     CARDIOVASCULAR SCREENING; LDL GOAL LESS THAN 160     Quadriplegia (H)     Chronic constipation     Internal hemorrhoids with other complication     Diagnostic skin and sensitization tests(aka ALLERGENS)     Anal or rectal pain     Allergic rhinitis due to animal dander     Allergy to mold spores     House dust mite allergy     Insomnia     Health Care Home     Gallstones     Chronic midline thoracic back pain     Pulmonary nodules     ACP (advance care planning)     Cervical spinal cord injury, sequela (H)     Spasticity      Current Medications and Allergies:  See printed Medication Report.    Care Coordination Start Date: 1/6/2016   Frequency of Care Coordination: monthly   Form Last Updated: 10/24/2019

## 2019-10-24 NOTE — PROGRESS NOTES
Clinic Care Coordination Contact    Follow Up Progress Note      Assessment: The patient was seen by the provider and given Reglan, which is helping a little bit with the nausea.  The pain is unchanged at this time.  The patient had a recent EGD done by [facility] which showed normal biopsy results.  The patient will be having additional dilation of the eshophogas 3 additional times in the next 3 months.  The patient will also have a botox treatment for his bladder next week as well.      Medication reconciliation was completed.      Goals addressed this encounter:   Goals Addressed                 This Visit's Progress      Healthy Eating (pt-stated)   On track     Goal Statement: I will work with the ILS person to make more palatable meals for me to eat.  Measure of Success: The patient is gaining weight  Supportive Steps to Achieve: weigh the patient routinely.  Barriers: food allergies  Strengths: motivated to gain weight  Date to Achieve By: 6/5/2020  Patient expressed understanding of goal: yes            Pain Management (pt-stated)   On track     Goal Statement: I will work with the physical therapists at [facility] to decrease the amount of pain in my neck, back and sacral area.  Measure of Success: Patient states less pain.  Supportive Steps to Achieve: physical therapy from [facility]  Barriers: quadriplegic   Strengths: motivated  Date to Achieve By: 6/5/2020  Patient expressed understanding of goal: yes                 Intervention/Education provided during outreach: Continue the trial of reglan and report the results back to the provider.     Outreach Frequency: monthly    Plan:   1.  The patient will continue to trial the reglan and report the results back to the provider.  2.  The patient will attend all of the EGD appointments to report the results on how this affects the nausea and pain.  3.  The patient will monitor all signs and symptoms and report back to the provider.  4.  Care Coordination to  remain available for the patient to contact in the event of future needs.      RN Care Coordinator will follow up in 4 weeks.        Robbin Vanegas MSN, RN, PHN, CCM   Primary Care Clinical RN Care Coordinator  [facility]  Employee of [employer] [employer] [facility] [employer]   [email or web address]  Office: [phone]

## 2019-10-30 ENCOUNTER — HOSPITAL ENCOUNTER (OUTPATIENT)
Facility: AMBULATORY SURGERY CENTER | Age: 44
End: 2019-10-30
Attending: UROLOGY
Payer: COMMERCIAL

## 2019-10-30 VITALS
SYSTOLIC BLOOD PRESSURE: 92 MMHG | OXYGEN SATURATION: 92 % | RESPIRATION RATE: 16 BRPM | HEART RATE: 87 BPM | DIASTOLIC BLOOD PRESSURE: 76 MMHG | TEMPERATURE: 97.6 F | BODY MASS INDEX: 17.61 KG/M2 | WEIGHT: 130 LBS | HEIGHT: 72 IN

## 2019-10-30 DIAGNOSIS — N31.9 NEUROGENIC BLADDER: ICD-10-CM

## 2019-10-30 RX ORDER — NALOXONE HYDROCHLORIDE 0.4 MG/ML
.1-.4 INJECTION, SOLUTION INTRAMUSCULAR; INTRAVENOUS; SUBCUTANEOUS
Status: DISCONTINUED | OUTPATIENT
Start: 2019-10-30 | End: 2019-10-31 | Stop reason: HOSPADM

## 2019-10-30 RX ORDER — GABAPENTIN 300 MG/1
300 CAPSULE ORAL ONCE
Status: DISCONTINUED | OUTPATIENT
Start: 2019-10-30 | End: 2019-10-30 | Stop reason: HOSPADM

## 2019-10-30 RX ORDER — LIDOCAINE HYDROCHLORIDE 20 MG/ML
INJECTION, SOLUTION INFILTRATION; PERINEURAL PRN
Status: DISCONTINUED | OUTPATIENT
Start: 2019-10-30 | End: 2019-10-30

## 2019-10-30 RX ORDER — FENTANYL CITRATE 50 UG/ML
INJECTION, SOLUTION INTRAMUSCULAR; INTRAVENOUS PRN
Status: DISCONTINUED | OUTPATIENT
Start: 2019-10-30 | End: 2019-10-30

## 2019-10-30 RX ORDER — ONDANSETRON 4 MG/1
4 TABLET, ORALLY DISINTEGRATING ORAL EVERY 30 MIN PRN
Status: DISCONTINUED | OUTPATIENT
Start: 2019-10-30 | End: 2019-10-31 | Stop reason: HOSPADM

## 2019-10-30 RX ORDER — PROPOFOL 10 MG/ML
INJECTION, EMULSION INTRAVENOUS CONTINUOUS PRN
Status: DISCONTINUED | OUTPATIENT
Start: 2019-10-30 | End: 2019-10-30

## 2019-10-30 RX ORDER — ONDANSETRON 2 MG/ML
INJECTION INTRAMUSCULAR; INTRAVENOUS PRN
Status: DISCONTINUED | OUTPATIENT
Start: 2019-10-30 | End: 2019-10-30

## 2019-10-30 RX ORDER — MEPERIDINE HYDROCHLORIDE 25 MG/ML
12.5 INJECTION INTRAMUSCULAR; INTRAVENOUS; SUBCUTANEOUS
Status: DISCONTINUED | OUTPATIENT
Start: 2019-10-30 | End: 2019-10-31 | Stop reason: HOSPADM

## 2019-10-30 RX ORDER — CEFAZOLIN SODIUM 1 G/50ML
1 SOLUTION INTRAVENOUS SEE ADMIN INSTRUCTIONS
Status: DISCONTINUED | OUTPATIENT
Start: 2019-10-30 | End: 2019-10-30 | Stop reason: HOSPADM

## 2019-10-30 RX ORDER — ACETAMINOPHEN 325 MG/1
975 TABLET ORAL ONCE
Status: DISCONTINUED | OUTPATIENT
Start: 2019-10-30 | End: 2019-10-30 | Stop reason: HOSPADM

## 2019-10-30 RX ORDER — SODIUM CHLORIDE, SODIUM LACTATE, POTASSIUM CHLORIDE, CALCIUM CHLORIDE 600; 310; 30; 20 MG/100ML; MG/100ML; MG/100ML; MG/100ML
INJECTION, SOLUTION INTRAVENOUS CONTINUOUS
Status: DISCONTINUED | OUTPATIENT
Start: 2019-10-30 | End: 2019-10-30 | Stop reason: HOSPADM

## 2019-10-30 RX ORDER — LIDOCAINE 40 MG/G
CREAM TOPICAL
Status: DISCONTINUED | OUTPATIENT
Start: 2019-10-30 | End: 2019-10-30 | Stop reason: HOSPADM

## 2019-10-30 RX ORDER — ONDANSETRON 2 MG/ML
4 INJECTION INTRAMUSCULAR; INTRAVENOUS EVERY 30 MIN PRN
Status: DISCONTINUED | OUTPATIENT
Start: 2019-10-30 | End: 2019-10-31 | Stop reason: HOSPADM

## 2019-10-30 RX ORDER — CEFAZOLIN SODIUM 2 G/50ML
2 SOLUTION INTRAVENOUS
Status: COMPLETED | OUTPATIENT
Start: 2019-10-30 | End: 2019-10-30

## 2019-10-30 RX ORDER — SODIUM CHLORIDE, SODIUM LACTATE, POTASSIUM CHLORIDE, CALCIUM CHLORIDE 600; 310; 30; 20 MG/100ML; MG/100ML; MG/100ML; MG/100ML
INJECTION, SOLUTION INTRAVENOUS CONTINUOUS
Status: DISCONTINUED | OUTPATIENT
Start: 2019-10-30 | End: 2019-10-31 | Stop reason: HOSPADM

## 2019-10-30 RX ADMIN — CEFAZOLIN SODIUM 2 G: 2 SOLUTION INTRAVENOUS at 13:50

## 2019-10-30 RX ADMIN — LIDOCAINE HYDROCHLORIDE 60 MG: 20 INJECTION, SOLUTION INFILTRATION; PERINEURAL at 13:39

## 2019-10-30 RX ADMIN — ONDANSETRON 4 MG: 2 INJECTION INTRAMUSCULAR; INTRAVENOUS at 13:39

## 2019-10-30 RX ADMIN — FENTANYL CITRATE 50 MCG: 50 INJECTION, SOLUTION INTRAMUSCULAR; INTRAVENOUS at 13:39

## 2019-10-30 RX ADMIN — PROPOFOL 100 MCG/KG/MIN: 10 INJECTION, EMULSION INTRAVENOUS at 13:39

## 2019-10-30 RX ADMIN — SODIUM CHLORIDE, SODIUM LACTATE, POTASSIUM CHLORIDE, CALCIUM CHLORIDE: 600; 310; 30; 20 INJECTION, SOLUTION INTRAVENOUS at 13:31

## 2019-10-30 ASSESSMENT — MIFFLIN-ST. JEOR: SCORE: 1527.68
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
## 2019-10-30 NOTE — ANESTHESIA CARE TRANSFER NOTE
Patient: Riley Gifford    Procedure(s):  Bladder Botox Injection    Diagnosis: Neurogenic Bladder  Diagnosis Additional Information: No value filed.    Anesthesia Type:   MAC     Note:  Airway :Room Air  Patient transferred to:Phase II  Comments: VSS and WNL, comfortable, no PONV, report to Cat RNHandoff Report: Identifed the Patient, Identified the Reponsible Provider, Reviewed the pertinent medical history, Discussed the surgical course, Reviewed Intra-OP anesthesia mangement and issues during anesthesia, Set expectations for post-procedure period and Allowed opportunity for questions and acknowledgement of understanding      Vitals: (Last set prior to Anesthesia Care Transfer)    CRNA VITALS  10/30/2019 1333 - 10/30/2019 1408      10/30/2019             Pulse:  86    SpO2:  95 %    Resp Rate (set):  10                Electronically Signed By: SUJATA Nguyễn CRNA  October 30, 2019  2:08 PM

## 2019-10-30 NOTE — DISCHARGE INSTRUCTIONS
[facility]  Home Care Following Anesthesia  For 24 hours after surgery:  1. Get plenty of rest.  A responsible adult must stay with you for at least 24 hours after you leave the surgery center.  2. Do not drive or use heavy equipment.  If you have weakness or tingling, don't drive or use heavy equipment until this feeling goes away.   3. Do not drink alcohol.   4. Avoid strenuous or risky activities.  Ask for help when climbing stairs.  5. You may feel lightheaded.  IF so, sit for a few minutes before standing.  Have someone help you get up.   6. If you have nausea (feel sick to your stomach): Drink only clear liquids such as apple juice, ginger ale, broth or 7-Up.  Rest may also help.  Be sure to drink enough fluids.  Move to a regular diet as you feel able.   7. You may have a slight fever.  Call the doctor if your fever is over 100 F (37.7 C) (taken under the tongue) or lasts longer than 24 hours.  8. You may have a dry mouth, a sore throat, muscle aches or trouble sleeping. These should go away after 24 hours.  9. Do not make important or legal decisions.               Tips for taking pain medications  To get the best pain relief possible, remember these points:    Take pain medications as directed, before pain becomes severe.    Pain medication can upset your stomach: taking it with food may help.    Constipation is a common side effect of pain medication. Drink plenty of  fluids.    Eat foods high in fiber. Take a stool softener if recommended by your doctor or pharmacist.    Do not drink alcohol, drive or operate machinery while taking pain medications.    Ask about other ways to control pain, such as with heat, ice or relaxation.    Tylenol/Acetaminophen Consumption  To help encourage the safe use of acetaminophen, the makers of TYLENOL  have lowered the maximum daily dose for single-ingredient Extra Strength TYLENOL  (acetaminophen) products sold in the [place] from 8  pills per day (4,000 mg) to 6 pills per day (3,000 mg). The dosing interval has also changed from 2 pills every 4-6 hours to 2 pills every 6 hours.    If you feel your pain relief is insufficient, you may take Tylenol/Acetaminophen in addition to your narcotic pain medication.     Be careful not to exceed 3,000 mg of Tylenol/Acetaminophen in a 24 hour period from all sources.    If you are taking extra strength Tylenol/acetaminophen (500 mg), the maximum dose is 6 tablets in 24 hours.    If you are taking regular strength acetaminophen (325 mg), the maximum dose is 9 tablets in 24 hours.    Call a doctor for any of the followin. Signs of infection (fever, growing tenderness at the surgery site, a large amount of drainage or bleeding, severe pain, foul-smelling drainage, redness, swelling).  2. It has been over 8 to 10 hours since surgery and you are still not able to urinate (pass water).  3. Headache for over 24 hours.    Your doctor is:  Dr. Evaristo Giraldo, Prostate and Urology: [phone]                  Or dial [phone] and ask for the resident on call for:  Prostate Urology  For emergency care, call the:  [facility]:  [phone] (TTY for hearing impaired: [phone])      Discharge Instructions Following a Cystoscopy, Stent and/or Ureteroscopy    Drainage/Urination:    Urine may be slightly bloody but should taper off in a few days.    You may have some frequency and urgency for a few days.    Comfort:    A burning sensation when urinating is common. Drinking extra fluids helps decrease this burning feeling and also helps to clear the bloody urine.    Mild abdominal cramps may occur for a few days.    Baths:    Daily tub baths aide in passing urine.    Frequent use of bubble bath is discouraged since it encourages urinary tract infections.    Report to your doctor at once if you experience:    Inability to pass your urine    Continuous or heavy bleeding    Severe  Pain    Elevated temperature > than 101.5 for 24 hours    Home Activity:    On the day of surgery spend a quiet evening at home.    Increase activity as tolerated.

## 2019-10-30 NOTE — OP NOTE
OPERATIVE REPORT    PREOPERATIVE DIAGNOSIS:  Neurogenic bladder    POSTOPERATIVE DIAGNOSIS: Same    PROCEDURES PERFORMED:   1. Cystourethroscopy with injection of botulinum toxin A 200 units in 10cc sterile saline    STAFF SURGEON: Dr. Evaristo Hayes MD    RESIDENT(S):  Kely Castrejon MD    ANESTHESIA: MAC    ESTIMATED BLOOD LOSS: 1 mL.     DRAINS: none    SIGNIFICANT FINDINGS:  1. 10 injections in templated fashion    OPERATIVE INDICATIONS:   Riley Gifford is a(n) 43 year old male with a history of neurogenic bladder. The patient was counseled on the alternatives, risks, and benefits and elected to proceed with the above stated procedure.    DESCRIPTION OF PROCEDURE:   After verification of informed consent was obtained, the patient was brought to the operating room, and moved to the operating table. After adequate anesthesia was induced, the patient was repositioned in the lithotomy position and prepped and draped in the usual sterile fashion. A formal timeout was performed to confirm the correct patient, procedure and operative site.     A 21-Beninese De La Garza injection cystoscope was placed into the bladder.  The bladder mucosa appeared to be normal without stones, tumors or diverticulum on 360 degree inspection. The ureteral orifices were in the normal orthotopic position bilaterally.  We mixed 2 vials of 100 U botulinum toxin A into 10 mL of injectable saline for a total of (20 units/mL).  We performed a template injection procedure with 2 columns of 5 injections. All injections were placed deep to the mucosa into the detrusor muscle.  We then emptied his bladder to re-inspect our injection sites and found that there was a minimal amount of blood. His bladder was then emptied again. The patient was returned to supine position and transported to recovery in stable condition.    The procedure was then concluded. The patient was transferred to the postanesthesia care unit in stable condition and tolerated the procedure  well.    POSTOP PLAN:  1. 6months for next botox

## 2019-10-30 NOTE — ANESTHESIA POSTPROCEDURE EVALUATION
Anesthesia POST Procedure Evaluation    Patient: Riley Gifford   MRN:     [id number] Gender:   male   Age:    43 year old :      1975        Preoperative Diagnosis: Neurogenic Bladder   Procedure(s):  Bladder Botox Injection   Postop Comments: No value filed.       Anesthesia Type:  Not documented  MAC    Reportable Event: NO     PAIN: Uncomplicated   Sign Out status: Comfortable, Well controlled pain     PONV: No PONV   Sign Out status:  No Nausea or Vomiting     Neuro/Psych: Uneventful perioperative course   Sign Out Status: Preoperative baseline; Age appropriate mentation     Airway/Resp.: Uneventful perioperative course   Sign Out Status: Non labored breathing, age appropriate RR; Resp. Status within EXPECTED Parameters     CV: Uneventful perioperative course   Sign Out status: Appropriate BP and perfusion indices; Appropriate HR/Rhythm     Disposition:   Sign Out in:  PACU  Disposition:  Phase II; Home  Recovery Course: Uneventful  Follow-Up: Not required           Last Anesthesia Record Vitals:  CRNA VITALS  10/30/2019 1333 - 10/30/2019 1423      10/30/2019             Pulse:  86    SpO2:  95 %    Resp Rate (set):  10          Last PACU Vitals:  Vitals Value Taken Time   BP     Temp     Pulse     Resp     SpO2     Temp src Available 10/30/2019  2:00 PM   NIBP 145/98 10/30/2019  2:01 PM   Pulse 86 10/30/2019  2:03 PM   SpO2 95 % 10/30/2019  2:03 PM   Resp     Temp     Ht Rate 71 10/30/2019  2:01 PM   Temp 2           Electronically Signed By: Rober Lira MD, 2019, 2:23 PM

## 2019-10-31 ENCOUNTER — PATIENT OUTREACH (OUTPATIENT)
Dept: UROLOGY | Facility: CLINIC | Age: 44
End: 2019-10-31

## 2019-11-07 ENCOUNTER — TELEPHONE (OUTPATIENT)
Dept: FAMILY MEDICINE | Facility: CLINIC | Age: 44
End: 2019-11-07

## 2019-11-07 DIAGNOSIS — K59.09 CHRONIC CONSTIPATION: ICD-10-CM

## 2019-11-07 NOTE — TELEPHONE ENCOUNTER
"Requested Prescriptions   Pending Prescriptions Disp Refills     docusate sodium (ENEMEEZ MINI) 283 MG enema [Pharmacy Med Name: ENEMEEZ MINI ENEMA]  Last Written Prescription Date:  4/1/2019  Last Fill Quantity: 30 enema,  # refills: 3   Last office visit: 10/15/2018 with prescribing provider:  KASEY Harris   Future Office Visit:    0     Sig: USE ONE EVERY OTHER DAY PER PATIENT'S REQUEST       Laxatives Protocol Passed - 11/7/2019  2:11 PM        Passed - Patient is age 6 or older        Passed - Recent (12 mo) or future (30 days) visit within the authorizing provider's specialty     Patient has had an office visit with the authorizing provider or a provider within the authorizing providers department within the previous 12 mos or has a future within next 30 days. See \"Patient Info\" tab in inbasket, or \"Choose Columns\" in Meds & Orders section of the refill encounter.              Passed - Medication is active on med list        "

## 2019-11-07 NOTE — TELEPHONE ENCOUNTER
Forms received from [facility]: detailed prescription for urinary incontinence supplies for Jan Harris PA-C.  Forms placed in provider 'sign me' folder.  Please fax forms to [phone] after completion.    Thanks!  Kem Peters

## 2019-11-08 NOTE — TELEPHONE ENCOUNTER
Prescription approved per [facility].    Jasmin Sommer, RN, BSN, PHN  [facility]: [place]

## 2019-11-20 ENCOUNTER — TRANSFERRED RECORDS (OUTPATIENT)
Dept: HEALTH INFORMATION MANAGEMENT | Facility: CLINIC | Age: 44
End: 2019-11-20

## 2019-11-20 LAB
ALT SERPL-CCNC: 18 U/L (ref 0–78)
AST SERPL-CCNC: 9 U/L (ref 0–37)

## 2019-11-24 DIAGNOSIS — F51.01 PRIMARY INSOMNIA: ICD-10-CM

## 2019-11-25 ENCOUNTER — PATIENT OUTREACH (OUTPATIENT)
Dept: CARE COORDINATION | Facility: CLINIC | Age: 44
End: 2019-11-25

## 2019-11-25 RX ORDER — ZOLPIDEM TARTRATE 10 MG/1
10 TABLET ORAL
Qty: 90 TABLET | Refills: 1 | Status: SHIPPED | OUTPATIENT
Start: 2019-11-25 | End: 2020-07-09

## 2019-11-25 NOTE — PROGRESS NOTES
Clinic Care Coordination Contact    Follow Up Progress Note      Assessment: The patient was seen last week by GLEN DIALLO.  An ultrasound of the gallbladder was ordered and the patient is awaiting the results.  He is not feeling any better and was hoping that they may have some ideas as to what is causing that continued pain and nausea.  The goals were reviewed.  The patient agrees that the RN CC may call again in a couple of weeks to see what the results are of the ultrasound and the recommended next steps.  The patient was getting ready to eat lunch so medication reconciliation was not performed at this time.    Goals addressed this encounter:   Goals Addressed                 This Visit's Progress      Healthy Eating (pt-stated)   On track     Goal Statement: I will work with the ILS person to make more palatable meals for me to eat.  Measure of Success: The patient is gaining weight  Supportive Steps to Achieve: weigh the patient routinely.  Barriers: food allergies  Strengths: motivated to gain weight  Date to Achieve By: 6/5/2020  Patient expressed understanding of goal: yes            Pain Management (pt-stated)   On track     Goal Statement: I will work with the physical therapists at [facility] to decrease the amount of pain in my neck, back and sacral area.  Measure of Success: Patient states less pain.  Supportive Steps to Achieve: physical therapy from [facility]  Barriers: quadriplegic   Strengths: motivated  Date to Achieve By: 6/5/2020  Patient expressed understanding of goal: yes                 Intervention/Education provided during outreach: continue to follow up with [facility] to get the test results.     Outreach Frequency: monthly    Plan:   1.  The RN CC will contact the patient again in 2 weeks for the follow up on the Gallbladder US.  2.  The patient will follow up with [facility] for the next steps and the US results.  3.  Care Coordination to remain available for the patient to contact in the event  of future needs.      RN Care Coordinator will follow up in 2 weeks          Robbin Vanegas MSN, RN, PHN, CCM   Primary Care Clinical RN Care Coordinator  [facility]  11/25/2019   1:07 PM  [email or web address]  Office: [phone]

## 2019-11-25 NOTE — TELEPHONE ENCOUNTER
Requested Prescriptions   Pending Prescriptions Disp Refills     zolpidem (AMBIEN) 10 MG tablet [Pharmacy Med Name: ZOLPIDEM 10MG TABLETS]        Last Written Prescription Date:  4/12/2019  Last Fill Quantity: 90 tablet,   # refills: 1  Last Office Visit: 10/22/2019  GEO [place]  Prescribing Provider's NPI: [id number] JUANITO MARTIN    Future Office visit:       Routing refill request to provider for review/approval because:  Drug not on the [facility], UMP or  Health refill protocol or controlled substance 90 tablet 0     Sig: TAKE 1/2 TO 1 TABLET BY MOUTH AT BEDTIME AS NEEDED FOR SLEEP       There is no refill protocol information for this order

## 2019-12-03 ENCOUNTER — TELEPHONE (OUTPATIENT)
Dept: FAMILY MEDICINE | Facility: CLINIC | Age: 44
End: 2019-12-03

## 2019-12-03 NOTE — TELEPHONE ENCOUNTER
Forms received from  [employer]/ Detailed Prescription for catheter supplies for Jan Harris PA-C.  Forms placed in provider 'sign me' folder.  Please fax forms to [phone] after completion.    Luli Correa  Patient Representative

## 2019-12-05 NOTE — TELEPHONE ENCOUNTER
Forms completed, signed, office notes from 10/22 attached, and faxed as requested.    Kem Peters

## 2019-12-06 ENCOUNTER — TELEPHONE (OUTPATIENT)
Dept: FAMILY MEDICINE | Facility: CLINIC | Age: 44
End: 2019-12-06

## 2019-12-06 NOTE — TELEPHONE ENCOUNTER
[employer] is calling to request an addendum to patient's chart notes from 10/22 stating that patient self caths and what the diagnosis is that requires this.    Please fax the addended note to [phone].    Kem Peters

## 2019-12-10 ENCOUNTER — TELEPHONE (OUTPATIENT)
Dept: FAMILY MEDICINE | Facility: CLINIC | Age: 44
End: 2019-12-10

## 2019-12-10 NOTE — TELEPHONE ENCOUNTER
"Jan, are you able to write a note of medical necessity for the below supplies, including patient's diagnosis codes?    \"ongoing need for incontinence supplies, continued catheter use, please include that patient self caths due to medical need\"      Kem Peters      "

## 2019-12-10 NOTE — TELEPHONE ENCOUNTER
I didn't see him on this date - Dr. De Los Santos did and I can't addend that note, unfortunately. Would need to have Magali review for now.    Thanks.  Raffy Harris, HOPES, PA-C

## 2019-12-11 NOTE — TELEPHONE ENCOUNTER
Routing to Dr. De Los Santos,    Will you update the office note from 10/22 to include the necessity for patient's incontinence supplies.    Kem Peters

## 2019-12-11 NOTE — TELEPHONE ENCOUNTER
I spoke with [employer] and they have sufficient documentation - disregard this encounter.    Kem Peters

## 2019-12-16 ENCOUNTER — PATIENT OUTREACH (OUTPATIENT)
Dept: CARE COORDINATION | Facility: CLINIC | Age: 44
End: 2019-12-16

## 2019-12-16 NOTE — PROGRESS NOTES
Clinic Care Coordination Contact    Follow Up Progress Note      Assessment: The RN CC contacted the patient by phone and symptoms are pretty much unchanged.  Patient still has back and side pain that is unchanged at all with the dilation of the esophagus.  Patient is challenged with will return next and it was discussed that going back to pain management would probably be the best.  Medication reconciliation was completed with the patient and there were no questions or concerns.  Patient states that his appetite still is not great and remains unchanged.  Patient is scheduled for an EGD and dilation coming up the end of this week although he states he is not having any problems with swallowing and therefore may postpone.    Goals addressed this encounter:   Goals Addressed                 This Visit's Progress      Healthy Eating (pt-stated)   On track     Goal Statement: I will work with the ILS person to make more palatable meals for me to eat.  Measure of Success: The patient is gaining weight  Supportive Steps to Achieve: weigh the patient routinely.  Barriers: food allergies  Strengths: motivated to gain weight  Date to Achieve By: 6/5/2020  Patient expressed understanding of goal: yes            Pain Management (pt-stated)   On track     Goal Statement: I will work with the physical therapists at [facility] to decrease the amount of pain in my neck, back and sacral area.  Measure of Success: Patient states less pain.  Supportive Steps to Achieve: physical therapy from [facility]  Barriers: quadriplegic   Strengths: motivated  Date to Achieve By: 6/5/2020  Patient expressed understanding of goal: yes                 Intervention/Education provided during outreach: Encouraged to contact pain management to see if they have any other options for the patient.     Outreach Frequency: monthly    Plan:   1.  Patient will make an appointment for a follow-up with pain management.  2.  Patient will complete  follow-up appointments with gastroenterology.  3.  Patient will take all medications as prescribed by the providers.  4.  Care Coordination to remain available for the patient to contact in the event of future needs.      RN Care Coordinator will follow up in 4 weeks.          Robbin Vanegas MSN, RN, PHN, [facility]   Primary Care Clinical RN Care Coordinator  [facility]  12/16/2019   12:41 PM  [email or web address]  Office: [phone]

## 2019-12-30 DIAGNOSIS — N31.9 NEUROGENIC BLADDER: ICD-10-CM

## 2019-12-30 DIAGNOSIS — K64.9 HEMORRHOIDS, UNSPECIFIED HEMORRHOID TYPE: ICD-10-CM

## 2019-12-30 NOTE — TELEPHONE ENCOUNTER
"Requested Prescriptions   Pending Prescriptions Disp Refills     tolterodine (DETROL) 2 MG tablet [Pharmacy Med Name: TOLTERODINE TARTRATE 2MG TABLETS]  Last Written Prescription Date:  10/2/2019  Last Fill Quantity: 180 tablet,  # refills: 0   Last office visit: 10/15/2018 with prescribing provider:  KASEY Harris   Future Office Visit:   180 tablet 0     Sig: TAKE 1 TABLET(2 MG) BY MOUTH TWICE DAILY       Muscarinic Antagonists (Urinary Incontinence Agents) Passed - 12/30/2019  2:20 PM        Passed - Recent (12 mo) or future (30 days) visit within the authorizing provider's specialty     Patient has had an office visit with the authorizing provider or a provider within the authorizing providers department within the previous 12 mos or has a future within next 30 days. See \"Patient Info\" tab in inbasket, or \"Choose Columns\" in Meds & Orders section of the refill encounter.              Passed - Patient does not have a diagnosis of glaucoma on the problem list     If glaucoma diagnosis is new, refer refill to physician.          Passed - Medication is active on med list        Passed - Patient is 18 years of age or older                PROCTOZONE-HC 2.5 % cream [Pharmacy Med Name: PROCTOZONE-HC 2.5% CREAM]  Last Written Prescription Date:  9/17/2019  Last Fill Quantity: 30 g,  # refills: 0   Last office visit: 10/15/2018 with prescribing provider:  KASEY Harris   Future Office Visit:   30 g 0     Sig: USE RECTALLY TWICE DAILY       Topical Steroids and Nonsteroidals Protocol Passed - 12/30/2019  2:20 PM        Passed - Patient is age 6 or older        Passed - Authorizing prescriber's most recent note related to this medication read.     If refill request is for ophthalmic use, please forward request to provider for approval.          Passed - High potency steroid not ordered        Passed - Recent (12 mo) or future (30 days) visit within the authorizing provider's specialty     Patient has had an office visit with the " "authorizing provider or a provider within the authorizing providers department within the previous 12 mos or has a future within next 30 days. See \"Patient Info\" tab in inbasket, or \"Choose Columns\" in Meds & Orders section of the refill encounter.              Passed - Medication is active on med list        "

## 2019-12-31 RX ORDER — TOLTERODINE TARTRATE 2 MG/1
TABLET, EXTENDED RELEASE ORAL
Qty: 180 TABLET | Refills: 0 | Status: SHIPPED | OUTPATIENT
Start: 2019-12-31 | End: 2020-03-24

## 2019-12-31 NOTE — TELEPHONE ENCOUNTER
Prescription approved per [facility] Refill Protocol.    Jasmin Sommer, RN, BSN, PHN  [facility] [place]

## 2020-01-14 ENCOUNTER — PATIENT OUTREACH (OUTPATIENT)
Dept: CARE COORDINATION | Facility: CLINIC | Age: 45
End: 2020-01-14

## 2020-01-14 NOTE — LETTER
[facility]  Complex Care Plan  About Me:    Patient Name:  Riley Marcos    YOB: 1975  Age:         44 year old   [place] MRN:    [id number] Telephone Information:  Home Phone [phone]   Mobile NONE   Mobile [phone]       Address:  [address] [place] [address] [address]  [place] [address] Email address:  [email or web address]      Emergency Contact(s)    Name Relationship Lgl Grd Work Phone Home Phone Mobile Phone   1. TREY MARCOS (NE* Relative No noen none [phone]   2. DIRKAGUSTO Sister   [phone]    3. HODA MARCOS Brother No  [phone]            Primary language:  English     needed? No   [place] [employer]:  [phone] op. 1  Other communication barriers:    Preferred Method of Communication:  Lisa  Current living arrangement:    Mobility Status/ Medical Equipment:      Health Maintenance  Health Maintenance Reviewed:      My Access Plan  Medical Emergency 911   Primary Clinic Line [facility] - [phone]   24 Hour Appointment Line [phone] or  [phone]) (toll-free)   24 Hour Nurse Line [phone] (toll-free)   Preferred Urgent Care     Preferred Hospital     Preferred Pharmacy [id number] - [place] [address] AT [place] [address]     Behavioral Health Crisis Line The National Suicide Prevention Lifeline at 1-928.576.8230 or 911             My Care Team Members  Patient Care Team       Relationship Specialty Notifications Start End    Raffy Harris PA-C PCP - General Physician Assistant  3/2/18     Phone: [phone] Fax: [phone]         [address] [facility] [address]    Robbin Andino, RN Lead Care Coordinator Nurse Admissions 1/6/16     phone:  [phone]    Phone: [phone] Fax: [phone]        Rober Leo MD Referring Physician Urology  1/8/16     Phone: [phone] Fax: [phone] [address] [facility]  [facility] [address]    Kimberly Zabala MD MD Urology  1/8/16     Phone: [phone] Fax: [phone]         [address] [facility] [address] [facility] [address]    Amanda Other (see comments)   1/8/16     [facility]     Phone: [phone]         Evaristo Hayes MD MD Urology  4/19/16     Phone: [phone] Fax: [phone]         [address] [address]    Jenny Wright, RN Registered Nurse Urology  4/19/16     Marta Adames, RN Clinic Care Coordinator Urology Abnormal results only, Admissions 11/3/16     Phone: [phone] Pager: [phone]        Rosemary Thomas, RN Nurse Coordinator Physical Medicine and Rehab  3/30/17     Phone: [phone] Pager: [phone]        Barbara Kenny APRN CNP Assigned PCP   9/29/19     Phone: [phone] Fax: [phone] [address] [facility] [address]            My Care Plans  Self Management and Treatment Plan  Goals and (Comments)  Goals        General    Healthy Eating (pt-stated)     Notes - Note edited  6/6/2019  3:42 PM by Robbin Andino, RN    Goal Statement: I will work with the ILS person to make more palatable meals for me to eat.  Measure of Success: The patient is gaining weight  Supportive Steps to Achieve: weigh the patient routinely.  Barriers: food allergies  Strengths: motivated to gain weight  Date to Achieve By: 6/5/2020  Patient expressed understanding of goal: yes          Pain Management (pt-stated)     Notes - Note edited  6/6/2019  3:43 PM by Robbin Andino, RN    Goal Statement: I will work with the physical therapists at [facility] to decrease the amount of pain in my neck, back and sacral area.  Measure of Success: Patient states less pain.  Supportive Steps to Achieve: physical therapy from [facility]  Barriers: quadriplegic   Strengths: motivated  Date to Achieve By: 6/5/2020  Patient expressed understanding of goal: yes                 Action Plans on File:                        Advance Care Plans/Directives Type:        My Medical and Care Information  Problem List   Patient Active Problem List   Diagnosis     Vitamin D deficiency     Eosinophilic esophagitis     Neurogenic bladder     CARDIOVASCULAR SCREENING; LDL GOAL LESS THAN 160     Quadriplegia (H)     Chronic constipation     Internal hemorrhoids with other complication     Diagnostic skin and sensitization tests(aka ALLERGENS)     Anal or rectal pain     Allergic rhinitis due to animal dander     Allergy to mold spores     House dust mite allergy     Insomnia     Health Care Home     Gallstones     Chronic midline thoracic back pain     Pulmonary nodules     ACP (advance care planning)     Cervical spinal cord injury, sequela (H)     Spasticity      Current Medications and Allergies:  See printed Medication Report.    Care Coordination Start Date: 1/6/2016   Frequency of Care Coordination: monthly   Form Last Updated: 01/14/2020

## 2020-01-14 NOTE — PROGRESS NOTES
Clinic Care Coordination Contact  [facility]/Voicemail       Clinical Data: Care Coordinator Outreach  Outreach attempted x 1.  Left message on patient's voicemail with call back information and requested return call.  Plan: Care Coordinator sent care coordination introduction letter on 10/24/2020 via [place]. Care Coordinator will try to reach patient again in 3-5 business days.          Robbin Vanegas MSN, RN, PHN, CCM   Primary Care Clinical RN Care Coordinator  [facility]  1/14/2020   12:23 PM  [email or web address]  Office: [phone]

## 2020-01-21 ASSESSMENT — ACTIVITIES OF DAILY LIVING (ADL): DEPENDENT_IADLS:: CLEANING;COOKING;LAUNDRY;SHOPPING;MEAL PREPARATION;MEDICATION MANAGEMENT

## 2020-01-21 NOTE — PROGRESS NOTES
Clinic Care Coordination Contact    Follow Up Progress Note      Assessment: The patient was recently seen by Alea DIALLO.  They changed his proton pump inhibitor to one that is covered by his insurance.  The patient has been complaining of the substernal chest pain as well as the back and side pain again.  GLEN DIALLO thinks that maybe it was from being off the medication that was not covered by the insurance and that getting back on it with now help.  The patient has only been taking it now for a day.  Patient states that he is taking the budesonide twice a day as prescribed.  Currently the patient has not been going to [facility] and would like to start going again, this in the past has really helped the patient.  The RN CC encouraged the patient to make a follow-up appointment with the PCP in order to be able to get orders for the therapy and to be able to have his medications filled.  Medication reconciliation was completed with the patient and marked as reviewed.  Health maintenance was reviewed with the patient and questions were answered.  No additional assessments were completed as the patient does not have those diagnoses.    Goals addressed this encounter:   Goals Addressed                 This Visit's Progress      Healthy Eating (pt-stated)   On track     Goal Statement: I will work with the ILS person to make more palatable meals for me to eat.  Measure of Success: The patient is gaining weight  Supportive Steps to Achieve: weigh the patient routinely.  Barriers: food allergies  Strengths: motivated to gain weight  Date to Achieve By: 6/5/2020  Patient expressed understanding of goal: yes            Pain Management (pt-stated)   On track     Goal Statement: I will work with the physical therapists at [facility] to decrease the amount of pain in my neck, back and sacral area.  Measure of Success: Patient states less pain.  Supportive Steps to Achieve: physical therapy from [facility]  Barriers:  quadriplegic   Strengths: motivated  Date to Achieve By: 6/5/2020  Patient expressed understanding of goal: yes                 Intervention/Education provided during outreach: Reviewed the possible benefits of returning to [facility] physical therapy.     Outreach Frequency: monthly    Plan:   1.  The patient will make an follow-up with the appointment with his provider to get orders for physical therapy again.  2.  Patient will take all medications as provided by the providers.  3.  The patient will follow-up with the specialists as recommended.  4.  Care Coordination to remain available for the patient to contact in the event of future needs.      RN Care Coordinator will follow up in 4 weeks.            Robbin Vanegas MSN, RN, PHN, CCM   Primary Care Clinical RN Care Coordinator  [facility]  1/21/2020   12:14 PM  [email or web address]  Office: [phone]

## 2020-01-22 ENCOUNTER — TELEPHONE (OUTPATIENT)
Dept: CARE COORDINATION | Facility: CLINIC | Age: 45
End: 2020-01-22

## 2020-01-22 NOTE — TELEPHONE ENCOUNTER
The patient called the RN CC due to an increase in his substernal chest pain.  Denies shortness of breath, or radiation of the pain.  The areas of pain include the sternal area, sides, back, and both wrists.  The wrists are a recent addition.  The patient has an appointment with the PCP for next week and does not feel that he should wait that long.  Denies fever or chills, cough or other signs of influenza, as well as no constipation.  Please contact the patient to possibly schedule sooner to rule out other problems.  The patient is taking all medications as prescribed.   Contact the patient at the home number.        Robbin BONILLA, RN, PHN, CCM   Primary Care Clinical RN Care Coordinator  [facility]  1/22/2020   1:49 PM  [email or web address]  Office: [phone]

## 2020-01-23 NOTE — TELEPHONE ENCOUNTER
Routing to PCP as ELISA re: upcoming appt on Friday.    Patient had appt with you next week for f/u, then was triaged by Robbin today for complaint of substernal and wrist pain, which apparently he's had chronically with no other concerning symptoms.  He requested earlier appointment, so I scheduled with you for Friday, but now see you've only seen him once this past year, sent to ED in the past for chest pain, but doesn't look like cardiac cause. If you think it's inappropriate or patient should go to ED, please route back to RN.    Reached out to patient to follow-up to message from RN CC below and get more information.  Patient reports this pain is not new, has been a somewhat chronic issue for him, continues to deny symptoms of radiating pain, sweating, palpitations, dizziness, SOB and other respiratory symptoms as outlined below.  Pain is generally intermittent, but more frequent/constant for the past week or so, is always kind of there, rates 5/10, a bit of shooting pain, sometimes dull ache. He did talk with [facility] about it at his appt on Monday, because he does have an esophagitis problem, and they thought it may be muscular in nature/possible spasms. He takes Tylenol fairly regularly, which doesn't help too much. Nothing else such as activity or positioning seems to make it better or worse.   Assisted with scheduling appt with Jan Harris for Friday and instructed to call back for any worsening symptoms, should present to ED if pain is worsening/more severe or accompanied by any of the symptoms previously denied above.  Understanding voiced.    Kelley Anthony RN

## 2020-01-24 ENCOUNTER — OFFICE VISIT (OUTPATIENT)
Dept: FAMILY MEDICINE | Facility: CLINIC | Age: 45
End: 2020-01-24
Payer: COMMERCIAL

## 2020-01-24 VITALS — DIASTOLIC BLOOD PRESSURE: 78 MMHG | TEMPERATURE: 98.3 F | SYSTOLIC BLOOD PRESSURE: 128 MMHG | HEART RATE: 96 BPM

## 2020-01-24 DIAGNOSIS — G82.50 QUADRIPLEGIA (H): ICD-10-CM

## 2020-01-24 DIAGNOSIS — L29.9 ITCHY SCALP: ICD-10-CM

## 2020-01-24 DIAGNOSIS — M54.6 CHRONIC MIDLINE THORACIC BACK PAIN: ICD-10-CM

## 2020-01-24 DIAGNOSIS — R07.89 ATYPICAL CHEST PAIN: Primary | ICD-10-CM

## 2020-01-24 DIAGNOSIS — G89.29 CHRONIC MIDLINE THORACIC BACK PAIN: ICD-10-CM

## 2020-01-24 DIAGNOSIS — K20.0 EOSINOPHILIC ESOPHAGITIS: ICD-10-CM

## 2020-01-24 PROCEDURE — 99214 OFFICE O/P EST MOD 30 MIN: CPT | Performed by: PHYSICIAN ASSISTANT

## 2020-01-24 RX ORDER — CELECOXIB 200 MG/1
200 CAPSULE ORAL DAILY
Qty: 30 CAPSULE | Refills: 1 | Status: SHIPPED | OUTPATIENT
Start: 2020-01-24 | End: 2021-05-24

## 2020-01-24 RX ORDER — GABAPENTIN 250 MG/5ML
125 SOLUTION ORAL 3 TIMES DAILY
Qty: 450 ML | Refills: 0 | Status: SHIPPED | OUTPATIENT
Start: 2020-01-24 | End: 2020-06-10

## 2020-01-24 RX ORDER — OMEPRAZOLE 40 MG/1
40 CAPSULE, DELAYED RELEASE ORAL DAILY
Qty: 90 CAPSULE | Refills: 3 | Status: SHIPPED | OUTPATIENT
Start: 2020-01-24 | End: 2021-01-27

## 2020-01-24 RX ORDER — KETOCONAZOLE 20 MG/ML
SHAMPOO TOPICAL DAILY PRN
Qty: 120 ML | Refills: 5 | Status: SHIPPED | OUTPATIENT
Start: 2020-01-24 | End: 2021-02-08

## 2020-01-24 NOTE — PATIENT INSTRUCTIONS
1. Powdered glutamine mixed with water, 5 gram scoop daily x 3-4 weeks to see if it helps, then reduce dose depending benefit

## 2020-01-24 NOTE — PROGRESS NOTES
Subjective     Riley Gifford is a 44 year old male who presents to clinic today for the following health issues:    HPI   CHEST PAIN     Onset: 1 week ago flared - but he's had chest pain for years on and off     Description:   Location:  Sternal   Character: sharp and achey, spasm  Radiation: No  Duration: Hours      Intensity: 5/10, at its worst 7/10    Progression of Symptoms:  worsening    Accompanying Signs & Symptoms:  Shortness of breath: no   Sweating: no   Nausea/vomiting: YES- nausea  Lightheadedness: no   Palpitations: no  Fever/Chills: no   Cough: no   Heartburn: no     History:   Family history of heart disease no   Tobacco use: no     Precipitating factors:   Worse with exertion: no   Worse with deep breaths :  no   Related to food: YES    Alleviating factors:  Laying down helps       Therapies Tried and outcome: nothing    He has known ongoing chronic issues with chest pain. This seems worse when off PPI. However, he has known eosinophilic esophagitis. No one treatment has helped resolve this completely. Reports that he's back on the PPI 20 mg Omeprazole for a few days and seems to be getting better. Lidocaine swish and swallow mixes tend to help a little as well. Reports that he has some tender areas along the front sternum as well - though that's not new AND he has known quadriplegia which makes this hard to fully feel. His baseline chronic constipation is not an issue.     Patient is concerned about wrist pain that has been coming and going for 1 week.     Itchy, scaling areas on scalp.     Chronic middle thoracic pain. As noted, quadriplegic - he has some sensation from about T4 on up.     Patient Active Problem List   Diagnosis     Vitamin D deficiency     Eosinophilic esophagitis     Neurogenic bladder     CARDIOVASCULAR SCREENING; LDL GOAL LESS THAN 160     Quadriplegia (H)     Chronic constipation     Internal hemorrhoids with other complication     Diagnostic skin and sensitization tests(aka  ALLERGENS)     Anal or rectal pain     Allergic rhinitis due to animal dander     Allergy to mold spores     House dust mite allergy     Insomnia     Health Care Home     Gallstones     Chronic midline thoracic back pain     Pulmonary nodules     ACP (advance care planning)     Cervical spinal cord injury, sequela (H)     Spasticity      Current Outpatient Medications   Medication     Acetaminophen (TYLENOL PO)     albuterol (PROAIR HFA/PROVENTIL HFA/VENTOLIN HFA) 108 (90 Base) MCG/ACT inhaler     alum & mag hydroxide-simethicone (MYLANTA/MAALOX) 200-200-20 MG/5ML SUSP suspension     amitriptyline (ELAVIL) 25 MG tablet     bacitracin (CVS BACITRACIN ZINC) ointment     baclofen (LIORESAL) 20 MG tablet     budesonide (PULMICORT) 0.5 MG/2ML neb solution     celecoxib (CELEBREX) 200 MG capsule     cetirizine (ZYRTEC) 10 MG tablet     clotrimazole 10 MG meryl     COMPRESSION STOCKINGS     diphenhydrAMINE (BENADRYL) 25 MG tablet     docusate sodium (ENEMEEZ MINI) 283 MG enema     EPINEPHrine (EPIPEN/ADRENACLICK/OR ANY BX GENERIC EQUIV) 0.3 MG/0.3ML injection 2-pack     gabapentin (NEURONTIN) 250 MG/5ML solution     hydrocortisone (ANUSOL-HC) 2.5 % cream     ketoconazole (NIZORAL) 2 % external shampoo     lidocaine (XYLOCAINE) 5 % external ointment     lidocaine (XYLOCAINE) 5 % ointment     linaclotide (LINZESS) 72 MCG capsule     loperamide (IMODIUM A-D) 2 MG tablet     magic mouthwash (ENTER INGREDIENTS IN COMMENTS) suspension     magnesium hydroxide (MILK OF MAGNESIA) 400 MG/5ML suspension     metoclopramide (REGLAN) 5 MG/5ML solution     nystatin (MYCOSTATIN) 094644 UNIT/GM POWD     omeprazole (PRILOSEC) 20 MG DR capsule     omeprazole (PRILOSEC) 40 MG DR capsule     ondansetron (ZOFRAN ODT) 4 MG ODT tab     phenylephrine-cocoa butter (PREPARATION H) 0.25-88.44 % suppository     polyethylene glycol (MIRALAX) powder     prochlorperazine (COMPAZINE) 10 MG tablet     PROCTOZONE-HC 2.5 % cream     Simethicone 125 MG CAPS      sodium phosphate (FLEET ENEMA) 7-19 GM/118ML rectal enema     sterile water, bottle, irrigation     tolterodine (DETROL) 2 MG tablet     zinc oxide 10 % OINT     zolpidem (AMBIEN) 10 MG tablet     LORazepam (ATIVAN) 1 MG tablet     NEW MED     Current Facility-Administered Medications   Medication     Botulinum Toxin Type A (BOTOX) 200 units injection 200 Units          Patient Active Problem List   Diagnosis     Vitamin D deficiency     Eosinophilic esophagitis     Neurogenic bladder     CARDIOVASCULAR SCREENING; LDL GOAL LESS THAN 160     Quadriplegia (H)     Chronic constipation     Internal hemorrhoids with other complication     Diagnostic skin and sensitization tests(aka ALLERGENS)     Anal or rectal pain     Allergic rhinitis due to animal dander     Allergy to mold spores     House dust mite allergy     Insomnia     Health Care Home     Gallstones     Chronic midline thoracic back pain     Pulmonary nodules     ACP (advance care planning)     Cervical spinal cord injury, sequela (H)     Spasticity     Past Surgical History:   Procedure Laterality Date     BACK SURGERY       C NONSPECIFIC PROCEDURE      C5-6 Fusion     C NONSPECIFIC PROCEDURE      Pressure Ulcer     COLONOSCOPY       CYSTOSCOPY N/A 6/23/2017    Procedure: CYSTOSCOPY;  Cystoscopy and Botox Injection Into the Bladder  ;  Surgeon: Evaristo Hayes MD;  [facility]     CYSTOSCOPY N/A 4/5/2019    Procedure: Cystoscopy;  Surgeon: Evaristo Hayes MD;  [facility]     CYSTOSCOPY, INTRAVESICAL INJECTION N/A 5/12/2016    Procedure: CYSTOSCOPY, INTRAVESICAL INJECTION;  Surgeon: Evaristo Hayes MD;  [facility]     CYSTOSCOPY, INTRAVESICAL INJECTION N/A 11/11/2016    Procedure: CYSTOSCOPY, INTRAVESICAL INJECTION;  Surgeon: Evaristo Hayes MD;  [facility]     CYSTOSCOPY, INTRAVESICAL INJECTION N/A 1/4/2018    Procedure: CYSTOSCOPY, INTRAVESICAL INJECTION;  Cystoscopy Botox Injection Into The Bladder;   Surgeon: Evaristo Hayes MD;  [facility]     GI SURGERY      endoscopy x2     INJECT BOTOX N/A 6/23/2017    Procedure: INJECT BOTOX;;  Surgeon: Evaristo Hayes MD;  [facility]     INJECT BOTOX N/A 4/5/2019    Procedure: Botox Injection Into The Bladder;  Surgeon: Evaristo Hayes MD;  [facility]     INJECT BOTOX N/A 10/30/2019    Procedure: Bladder Botox Injection;  Surgeon: Evaristo Hayes MD;  [facility]       Social History     Tobacco Use     Smoking status: Never Smoker     Smokeless tobacco: Never Used   Substance Use Topics     Alcohol use: Yes     Alcohol/week: 0.0 standard drinks     Comment: Rare     Family History   Problem Relation Age of Onset     Breast Cancer Mother      Prostate Cancer Father      Skin Cancer Father      Breast Cancer Maternal Grandmother      Cancer Maternal Grandfather      Glaucoma No family hx of      Macular Degeneration No family hx of      Diabetes No family hx of      Hypertension No family hx of      Melanoma No family hx of          Reviewed and updated as needed this visit by Provider         Review of Systems   ROS COMP: Constitutional, HEENT, cardiovascular, pulmonary, gi and gu systems are negative, except as otherwise noted.      Objective    /78 (BP Location: Right arm, Patient Position: Chair, Cuff Size: Adult Regular)   Pulse 96   Temp 98.3  F (36.8  C) (Oral)   There is no height or weight on file to calculate BMI.  Physical Exam   GENERAL: healthy, alert and no distress  NECK: no adenopathy, no asymmetry, masses, or scars and thyroid normal to palpation  RESP: lungs clear to auscultation - no rales, rhonchi or wheezes  CV: regular rate and rhythm, normal S1 S2, no S3 or S4, no murmur, click or rub, no peripheral edema and peripheral pulses strong  ABDOMEN: soft, nontender, no hepatosplenomegaly, no masses and bowel sounds normal  MS: no gross musculoskeletal defects noted, no edema    Diagnostic Test  Results:  Labs reviewed in Epic        Assessment & Plan     (R07.89) Atypical chest pain  (primary encounter diagnosis)  Comment: Chronic. Cause not clear   Plan: omeprazole (PRILOSEC) 40 MG DR capsule, magic         mouthwash (ENTER INGREDIENTS IN COMMENTS)         suspension, celecoxib (CELEBREX) 200 MG capsule        He improves on PPI a little. He improves with GI cocktails a little. He does have spasms and visibly jerks himself up a lot to straighten his back. His chest wall twists a lot when he does this - I imagine his sternum may be inflamed more than anything. Advised a trial of NSAID - will do a non GI adverse option. This prescription is given with a discussion of side effects, risks and proper use.  Instructions are given to follow up if not improving or symptoms change or worsen as discussed. Recheck in a few weeks. He is seeing his PMR physician in a week.     (G82.50) Quadriplegia (H)  Comment:   Plan: As noted     (K20.0) Eosinophilic esophagitis  Comment:   Plan: omeprazole (PRILOSEC) 40 MG DR capsule, magic         mouthwash (ENTER INGREDIENTS IN COMMENTS)         suspension        Seeing GI. Is back on 40 mg Omeprazole.     (L29.9) Itchy scalp  Comment:   Plan: ketoconazole (NIZORAL) 2 % external shampoo        Will treat for probable seborrheic dermatitis.     (M54.6,  G89.29) Chronic midline thoracic back pain  Comment:   Plan: gabapentin (NEURONTIN) 250 MG/5ML solution        Retrial Gabapentin - he's had ongoing issues with mid thoracic discomfort which could also be contributing to his pain    Overall - this is a challenging diagnostic work up - he's had years of this anterior chest wall pain from an unknown cause. I suggested we monitor this closely and it may be related to his chronic spasm / movement type things he does. Recommend a recheck in a few weeks.     No follow-ups on file.    JUANITO MARTIN PA-C  [facility]

## 2020-02-05 DIAGNOSIS — R05.9 COUGH: ICD-10-CM

## 2020-02-05 NOTE — TELEPHONE ENCOUNTER
"Requested Prescriptions   Pending Prescriptions Disp Refills     albuterol (PROAIR HFA/PROVENTIL HFA/VENTOLIN HFA) 108 (90 Base) MCG/ACT inhaler [Pharmacy Med Name: ALBUTEROL HFA INH (200 PUFFS) 18GM]  Last Written Prescription Date:  10/21/2019  Last Fill Quantity: 18 g,  # refills: 0   Last office visit: 1/24/2020 with prescribing provider:  KASEY Harris   Future Office Visit:   18 g 0     Sig: INHALE 2 PUFFS BY MOUTH FOUR TIMES DAILY AS NEEDED       Asthma Maintenance Inhalers - Anticholinergics Passed - 2/5/2020 12:38 PM        Passed - Patient is age 12 years or older        Passed - Recent (12 mo) or future (30 days) visit within the authorizing provider's specialty     Patient has had an office visit with the authorizing provider or a provider within the authorizing providers department within the previous 12 mos or has a future within next 30 days. See \"Patient Info\" tab in inbasket, or \"Choose Columns\" in Meds & Orders section of the refill encounter.              Passed - Medication is active on med list        "

## 2020-02-06 RX ORDER — ALBUTEROL SULFATE 90 UG/1
AEROSOL, METERED RESPIRATORY (INHALATION)
Qty: 18 G | Refills: 0 | Status: SHIPPED | OUTPATIENT
Start: 2020-02-06 | End: 2020-03-24

## 2020-02-06 NOTE — TELEPHONE ENCOUNTER
Prescription approved per [facility] Refill Protocol.    Yuki Cox RN  [facility]

## 2020-02-10 ENCOUNTER — HEALTH MAINTENANCE LETTER (OUTPATIENT)
Age: 45
End: 2020-02-10

## 2020-02-10 DIAGNOSIS — N31.9 NEUROGENIC BLADDER: Primary | ICD-10-CM

## 2020-02-10 RX ORDER — CEFAZOLIN SODIUM 1 G/50ML
1 INJECTION, SOLUTION INTRAVENOUS SEE ADMIN INSTRUCTIONS
Status: CANCELLED | OUTPATIENT
Start: 2020-02-10

## 2020-02-10 RX ORDER — CEFAZOLIN SODIUM 2 G/50ML
2 SOLUTION INTRAVENOUS
Status: CANCELLED | OUTPATIENT
Start: 2020-02-10

## 2020-02-11 ENCOUNTER — PATIENT OUTREACH (OUTPATIENT)
Dept: CARE COORDINATION | Facility: CLINIC | Age: 45
End: 2020-02-11

## 2020-02-11 NOTE — PROGRESS NOTES
Clinic Care Coordination Contact  Care Team Conversations    The patient called the RN CC with complaints of continued chest discomfort that has been treated recently with steroids with minimal relief.  The patient is concerned that this could be his EE.  He has placed a call to Dr. Sykes at [facility] although he missed the call.  The patient states that he has tried to call back without any response.  Therefore the RN CC contacted [facility] requesting that the care coordinator contact the patient with an update.  The RN CC will monitor the chart and contact the patient again in a few days.        Robbin Vanegas MSN, RN, PHN, [facility]   Primary Care Clinical RN Care Coordinator  [facility]  2/11/2020   2:46 PM  [email or web address]  Office: [phone]

## 2020-02-12 ENCOUNTER — OFFICE VISIT (OUTPATIENT)
Dept: FAMILY MEDICINE | Facility: CLINIC | Age: 45
End: 2020-02-12
Payer: COMMERCIAL

## 2020-02-12 ENCOUNTER — TRANSFERRED RECORDS (OUTPATIENT)
Dept: HEALTH INFORMATION MANAGEMENT | Facility: CLINIC | Age: 45
End: 2020-02-12

## 2020-02-12 VITALS
OXYGEN SATURATION: 96 % | DIASTOLIC BLOOD PRESSURE: 72 MMHG | RESPIRATION RATE: 14 BRPM | TEMPERATURE: 98.1 F | SYSTOLIC BLOOD PRESSURE: 122 MMHG | HEART RATE: 87 BPM

## 2020-02-12 DIAGNOSIS — R07.89 ATYPICAL CHEST PAIN: Primary | ICD-10-CM

## 2020-02-12 PROCEDURE — 93000 ELECTROCARDIOGRAM COMPLETE: CPT | Performed by: NURSE PRACTITIONER

## 2020-02-12 PROCEDURE — 99214 OFFICE O/P EST MOD 30 MIN: CPT | Performed by: NURSE PRACTITIONER
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
## 2020-02-13 ENCOUNTER — TELEPHONE (OUTPATIENT)
Dept: UROLOGY | Facility: CLINIC | Age: 45
End: 2020-02-13

## 2020-02-13 ENCOUNTER — HOSPITAL ENCOUNTER (OUTPATIENT)
Facility: AMBULATORY SURGERY CENTER | Age: 45
End: 2020-02-13
Attending: UROLOGY
Payer: COMMERCIAL

## 2020-02-13 NOTE — TELEPHONE ENCOUNTER
Patient is scheduled for surgery with Dr. Hayes      Spoke or left message with: Riley    Date of Surgery: 5/1/20    [facility]: [facility] OR    Informed patient they will need an adult  yes    Pre-op with surgeon (if applicable): n/a    H&P: Scheduled with pcp    Additional imaging/appointments: n/a    Surgery packet: mailed 2/13/20     Additional comments: n/a

## 2020-02-18 ENCOUNTER — PATIENT OUTREACH (OUTPATIENT)
Dept: CARE COORDINATION | Facility: CLINIC | Age: 45
End: 2020-02-18

## 2020-02-18 SDOH — ECONOMIC STABILITY: FOOD INSECURITY: WITHIN THE PAST 12 MONTHS, YOU WORRIED THAT YOUR FOOD WOULD RUN OUT BEFORE YOU GOT MONEY TO BUY MORE.: NEVER TRUE

## 2020-02-18 SDOH — ECONOMIC STABILITY: TRANSPORTATION INSECURITY
IN THE PAST 12 MONTHS, HAS THE LACK OF TRANSPORTATION KEPT YOU FROM MEDICAL APPOINTMENTS OR FROM GETTING MEDICATIONS?: NO

## 2020-02-18 SDOH — ECONOMIC STABILITY: FOOD INSECURITY: WITHIN THE PAST 12 MONTHS, THE FOOD YOU BOUGHT JUST DIDN'T LAST AND YOU DIDN'T HAVE MONEY TO GET MORE.: NEVER TRUE

## 2020-02-18 SDOH — ECONOMIC STABILITY: INCOME INSECURITY: HOW HARD IS IT FOR YOU TO PAY FOR THE VERY BASICS LIKE FOOD, HOUSING, MEDICAL CARE, AND HEATING?: NOT HARD AT ALL

## 2020-02-18 SDOH — ECONOMIC STABILITY: TRANSPORTATION INSECURITY
IN THE PAST 12 MONTHS, HAS LACK OF TRANSPORTATION KEPT YOU FROM MEETINGS, WORK, OR FROM GETTING THINGS NEEDED FOR DAILY LIVING?: NO

## 2020-02-18 ASSESSMENT — ACTIVITIES OF DAILY LIVING (ADL): DEPENDENT_IADLS:: CLEANING;COOKING;LAUNDRY;SHOPPING;MEAL PREPARATION;MEDICATION MANAGEMENT

## 2020-02-18 NOTE — PROGRESS NOTES
Clinic Care Coordination Contact    Follow Up Progress Note      Assessment: The patient was seen last week in the ER based upon the recommendation of the provider that the patient saw for continuing and ongoing chest pain.  In the ER he was given Carafate which he does not feel is working a lot.  He also was contacted by Dr. Sykes from [facility] who added on amitriptyline at bedtime.  The patient does not feel that this is making a great difference in his chest pain either although it does make him very drowsy at bedtime.  The RN CC suggested that the patient make an appointment with the PCP so that he is aware of the ongoing chest pain and what items have been have not worked in the past.  The patient said that he was also to update Dr. Sykes this week so he will leave him a message.  Patient states that he put in a refill request for the budesonide with Jan Harris.  Medication reconciliation was completed with the patient and marked reviewed.  Health maintenance was reviewed with the patient and questions were answered, and the social determinants of health were also reviewed and marked as reviewed.    Goals addressed this encounter:   Goals Addressed                 This Visit's Progress      Healthy Eating (pt-stated)   On track     Goal Statement: I will work with the ILS person to make more palatable meals for me to eat.  Measure of Success: The patient is gaining weight  Supportive Steps to Achieve: weigh the patient routinely.  Barriers: food allergies  Strengths: motivated to gain weight  Date to Achieve By: 6/5/2020  Patient expressed understanding of goal: yes            Pain Management (pt-stated)   On track     Goal Statement: I will work with the physical therapists at [facility] to decrease the amount of pain in my neck, back and sacral area.  Measure of Success: Patient states less pain.  Supportive Steps to Achieve: physical therapy from [facility]  Barriers: quadriplegic   Strengths:  motivated  Date to Achieve By: 6/5/2020  Patient expressed understanding of goal: yes                 Intervention/Education provided during outreach: The RN CC explained the importance of ordering the budesonide ahead of time due to the need for prior authorization and the time that takes.     Outreach Frequency: monthly    Plan:   1.  The patient will follow up with the PCP and the specialists as recommended.  2.  The patient will order all refills for his medications ahead of time so as to be able to complete prior authorization as required.  3.  The patient will work with natalio Payton on getting back into physical therapy to see if that will help with the recurring substernal chest pain.    RN  Care Coordinator will follow up in 4 weeks.      Robbin Vanegas MSN, RN, PHN, CCM   Primary Care Clinical RN Care Coordinator  [facility]  2/18/2020   12:26 PM  [email or web address]  Office: [phone]

## 2020-02-20 ENCOUNTER — OFFICE VISIT (OUTPATIENT)
Dept: FAMILY MEDICINE | Facility: CLINIC | Age: 45
End: 2020-02-20
Payer: COMMERCIAL

## 2020-02-20 VITALS — HEART RATE: 72 BPM | DIASTOLIC BLOOD PRESSURE: 70 MMHG | TEMPERATURE: 98 F | SYSTOLIC BLOOD PRESSURE: 102 MMHG

## 2020-02-20 DIAGNOSIS — R30.0 DYSURIA: ICD-10-CM

## 2020-02-20 DIAGNOSIS — R25.2 SPASTICITY: ICD-10-CM

## 2020-02-20 DIAGNOSIS — G82.50 QUADRIPLEGIA (H): ICD-10-CM

## 2020-02-20 DIAGNOSIS — R07.89 ATYPICAL CHEST PAIN: Primary | ICD-10-CM

## 2020-02-20 DIAGNOSIS — N30.00 ACUTE CYSTITIS WITHOUT HEMATURIA: ICD-10-CM

## 2020-02-20 PROCEDURE — 99214 OFFICE O/P EST MOD 30 MIN: CPT | Performed by: PHYSICIAN ASSISTANT

## 2020-02-20 PROCEDURE — 81001 URINALYSIS AUTO W/SCOPE: CPT | Performed by: PHYSICIAN ASSISTANT

## 2020-02-20 PROCEDURE — 87086 URINE CULTURE/COLONY COUNT: CPT | Performed by: PHYSICIAN ASSISTANT

## 2020-02-20 PROCEDURE — 87186 SC STD MICRODIL/AGAR DIL: CPT | Performed by: PHYSICIAN ASSISTANT

## 2020-02-20 PROCEDURE — 87088 URINE BACTERIA CULTURE: CPT | Performed by: PHYSICIAN ASSISTANT

## 2020-02-20 RX ORDER — SULFAMETHOXAZOLE AND TRIMETHOPRIM 200; 40 MG/5ML; MG/5ML
20 SUSPENSION ORAL 2 TIMES DAILY
Qty: 400 ML | Refills: 1 | Status: SHIPPED | OUTPATIENT
Start: 2020-02-20 | End: 2020-09-18

## 2020-02-20 RX ORDER — SUCRALFATE 1 G/10ML
SUSPENSION ORAL
COMMUNITY
Start: 2020-02-14

## 2020-02-20 ASSESSMENT — PAIN SCALES - GENERAL: PAINLEVEL: MODERATE PAIN (5)

## 2020-02-20 NOTE — PROGRESS NOTES
"Subjective     Riley Gifford is a 44 year old male who presents to clinic today for the following health issues:    HPI   ED/UC Followup:    Facility:  [facility]  Date of visit: 02/12/2020  Reason for visit: Chest pain  Current Status: Patient says he is still having chest pains during the day, the chest pain comes and goes. Patient say it may be due to his esophagitis      This is a very chronic and very thoroughly worked up issue for Riley. He has had chronic chest pain for years. He has had GI work ups, PM&R work ups, rheumatology work ups. He does have known eosinophilic esophagitis. When treated, his symptoms are not particularly better.    He has quadriplegia. He frequently gets spasms and needs to sit upright in a \"jerking\" type fashion. I've seen him do this a lot. His pain is anterior sternum and seems to be more palpable than GI. He's treated with oral NSAIDs and topicals which help a little bit.    Medical marijuana as well as Gabapentin, etc are all fairly non effective.    He also notes some foul smelling urine. He has a chronic history of UTIs.     Patient Active Problem List   Diagnosis     Vitamin D deficiency     Eosinophilic esophagitis     Neurogenic bladder     CARDIOVASCULAR SCREENING; LDL GOAL LESS THAN 160     Quadriplegia (H)     Chronic constipation     Internal hemorrhoids with other complication     Diagnostic skin and sensitization tests(aka ALLERGENS)     Anal or rectal pain     Allergic rhinitis due to animal dander     Allergy to mold spores     House dust mite allergy     Insomnia     Health Care Home     Gallstones     Chronic midline thoracic back pain     Pulmonary nodules     ACP (advance care planning)     Cervical spinal cord injury, sequela (H)     Spasticity      Current Outpatient Medications   Medication     Acetaminophen (TYLENOL PO)     albuterol (PROAIR HFA/PROVENTIL HFA/VENTOLIN HFA) 108 (90 Base) MCG/ACT inhaler     alum & mag hydroxide-simethicone " (MYLANTA/MAALOX) 200-200-20 MG/5ML SUSP suspension     amitriptyline (ELAVIL) 25 MG tablet     baclofen (LIORESAL) 20 MG tablet     budesonide (PULMICORT) 0.5 MG/2ML neb solution     CARAFATE 1 GM/10ML PO suspension     celecoxib (CELEBREX) 200 MG capsule     cetirizine (ZYRTEC) 10 MG tablet     COMPRESSION STOCKINGS     diphenhydrAMINE (BENADRYL) 25 MG tablet     docusate sodium (ENEMEEZ MINI) 283 MG enema     EPINEPHrine (EPIPEN/ADRENACLICK/OR ANY BX GENERIC EQUIV) 0.3 MG/0.3ML injection 2-pack     gabapentin (NEURONTIN) 250 MG/5ML solution     hydrocortisone (ANUSOL-HC) 2.5 % cream     linaclotide (LINZESS) 72 MCG capsule     loperamide (IMODIUM A-D) 2 MG tablet     LORazepam (ATIVAN) 1 MG tablet     NEW MED     omeprazole (PRILOSEC) 40 MG DR capsule     phenylephrine-cocoa butter (PREPARATION H) 0.25-88.44 % suppository     polyethylene glycol (MIRALAX) powder     PROCTOZONE-HC 2.5 % cream     sodium phosphate (FLEET ENEMA) 7-19 GM/118ML rectal enema     sulfamethoxazole-trimethoprim 8 mg/mL PO suspension     tolterodine (DETROL) 2 MG tablet     zinc oxide 10 % OINT     zolpidem (AMBIEN) 10 MG tablet     cefdinir (OMNICEF) 250 MG/5ML suspension     clotrimazole 10 MG meryl     ketoconazole (NIZORAL) 2 % external shampoo     lidocaine (XYLOCAINE) 5 % external ointment     magic mouthwash (ENTER INGREDIENTS IN COMMENTS) suspension     magnesium hydroxide (MILK OF MAGNESIA) 400 MG/5ML suspension     metoclopramide (REGLAN) 5 MG/5ML solution     nystatin (MYCOSTATIN) 029594 UNIT/GM POWD     ondansetron (ZOFRAN ODT) 4 MG ODT tab     prochlorperazine (COMPAZINE) 10 MG tablet     Simethicone 125 MG CAPS     Current Facility-Administered Medications   Medication     Botulinum Toxin Type A (BOTOX) 200 units injection 200 Units        Reviewed and updated as needed this visit by Provider         Review of Systems   ROS COMP: Constitutional, HEENT, cardiovascular, pulmonary, GI, , musculoskeletal, neuro, skin, endocrine  and psych systems are negative, except as otherwise noted.      Objective    /70 (BP Location: Right arm, Patient Position: Chair, Cuff Size: Adult Regular)   Pulse 72   Temp 98  F (36.7  C) (Oral)   There is no height or weight on file to calculate BMI.  Physical Exam   GENERAL: healthy, alert and no distress  EYES: Eyes grossly normal to inspection, PERRL and conjunctivae and sclerae normal  HENT: ear canals and TM's normal, nose and mouth without ulcers or lesions  NECK: no adenopathy, no asymmetry, masses, or scars and thyroid normal to palpation  RESP: lungs clear to auscultation - no rales, rhonchi or wheezes  CV: regular rate and rhythm, normal S1 S2, no S3 or S4, no murmur, click or rub, no peripheral edema and peripheral pulses strong  MS: no gross musculoskeletal defects noted, no edema    Diagnostic Test Results:  Labs reviewed in Epic        Assessment & Plan     (R07.89) Atypical chest pain  (primary encounter diagnosis)  Comment:   Plan: Urine Microscopic        This is incredibly chronic and very worked up. I reassured him at length. We've ruled out serious things. I don't think there's a serious cause of concern. I think his frequent sitting up right movements tend to cause his sternum to be inflamed. Along with his inability to fully sense his chest - it results in more pain. We've really exhausted work ups with this. Hopefully with continued self care this is a managed issue for him. It is unfortunate that he has such pain. Reassurance provided.    (R30.0) Dysuria  Comment:   Plan: *UA reflex to Microscopic and Culture ([facility] (except [place] and         [place]), Urine Culture Aerobic Bacterial        Checking UA    (N30.00) Acute cystitis without hematuria  Comment:   Plan: sulfamethoxazole-trimethoprim 8 mg/mL PO         suspension, *UA reflex to Microscopic and         Culture ([facility] (except         [place] and [place]), Urine  Culture Aerobic        Bacterial        Will treat. Awaiting culture    (G82.50) Quadriplegia (H)  Comment:   Plan: As noted     (R25.2) Spasticity  Comment:   Plan: As noted. Is on muscle relaxers      JUANITO MARTIN PA-C  [facility]

## 2020-02-21 ENCOUNTER — TELEPHONE (OUTPATIENT)
Dept: FAMILY MEDICINE | Facility: CLINIC | Age: 45
End: 2020-02-21

## 2020-02-21 NOTE — TELEPHONE ENCOUNTER
Prior Authorization Retail Medication Request    Medication/Dose: Sulfatrim Pediatric Susp([place])  ICD code (if different than what is on RX):    Previously Tried and Failed:    Rationale:      Insurance Name [employer] [facility]/ [employer]  Insurance ID:  [id number]      Pharmacy Information (if different than what is on RX)  Name:  [facility]  Phone:  [phone]    Pastora Bashir MA

## 2020-02-22 LAB
BACTERIA SPEC CULT: ABNORMAL
BACTERIA SPEC CULT: ABNORMAL
SPECIMEN SOURCE: ABNORMAL

## 2020-02-24 ENCOUNTER — TELEPHONE (OUTPATIENT)
Dept: FAMILY MEDICINE | Facility: CLINIC | Age: 45
End: 2020-02-24

## 2020-02-24 DIAGNOSIS — N39.0 URINARY TRACT INFECTION WITH HEMATURIA, SITE UNSPECIFIED: Primary | ICD-10-CM

## 2020-02-24 DIAGNOSIS — R31.9 URINARY TRACT INFECTION WITH HEMATURIA, SITE UNSPECIFIED: Primary | ICD-10-CM

## 2020-02-24 RX ORDER — CEFDINIR 250 MG/5ML
300 POWDER, FOR SUSPENSION ORAL 2 TIMES DAILY
Qty: 120 ML | Refills: 0 | Status: SHIPPED | OUTPATIENT
Start: 2020-02-24 | End: 2020-03-05

## 2020-02-24 NOTE — TELEPHONE ENCOUNTER
Is PA still needed for this medication? It appear a different antibiotic was sent to the pharmacy. Please clarify and route back to me if PA is still needed for sulfatrim.

## 2020-02-24 NOTE — TELEPHONE ENCOUNTER
Team RN  Please call Riley  His urine culture results showed that the antibiotics he was given do not cover the infection he has.    I am sending a new prescription for him to his [facility].    He should stop the other one and start this one immediately.    Thanks.  Raffy Harris, JOE, PA-C

## 2020-03-13 ENCOUNTER — MYC MEDICAL ADVICE (OUTPATIENT)
Dept: FAMILY MEDICINE | Facility: CLINIC | Age: 45
End: 2020-03-13

## 2020-03-13 NOTE — LETTER
[facility]  [address] [place] [id number] [phone]          March 17, 2020    RE:  Riley Gifford                                                                                                                                                       [address]   [place] [address]            To whom it may concern:    Riley has quadriplegia and a spastic bladder.  He self catheterizes up to 8-9 times a day, he requires 250 catheters a month so that he can perform this necessary self-care action.  It is medically necessary that he get 250 catheters a month.  If he is unable to catheterize this often, this will result in increased urinary tract infections which he is at great risk of getting.  Please let me know if there are any questions regarding the medical necessity of him having 250 catheters a month.    Sincerely,        Raffy Harris

## 2020-03-16 ENCOUNTER — TELEPHONE (OUTPATIENT)
Dept: UROLOGY | Facility: CLINIC | Age: 45
End: 2020-03-16

## 2020-03-16 DIAGNOSIS — N39.0 URINARY TRACT INFECTION: Primary | ICD-10-CM

## 2020-03-16 DIAGNOSIS — N39.0 URINARY TRACT INFECTION: ICD-10-CM

## 2020-03-16 LAB
ALBUMIN UR-MCNC: NEGATIVE MG/DL
APPEARANCE UR: CLEAR
BACTERIA #/AREA URNS HPF: ABNORMAL /HPF
BILIRUB UR QL STRIP: NEGATIVE
COLOR UR AUTO: YELLOW
GLUCOSE UR STRIP-MCNC: NEGATIVE MG/DL
HGB UR QL STRIP: ABNORMAL
KETONES UR STRIP-MCNC: NEGATIVE MG/DL
LEUKOCYTE ESTERASE UR QL STRIP: ABNORMAL
NITRATE UR QL: NEGATIVE
NON-SQ EPI CELLS #/AREA URNS LPF: ABNORMAL /LPF
PH UR STRIP: 7.5 PH (ref 5–7)
RBC #/AREA URNS AUTO: ABNORMAL /HPF
SOURCE: ABNORMAL
SP GR UR STRIP: 1.02 (ref 1–1.03)
UROBILINOGEN UR STRIP-ACNC: 0.2 EU/DL (ref 0.2–1)
WBC #/AREA URNS AUTO: ABNORMAL /HPF

## 2020-03-16 PROCEDURE — 87186 SC STD MICRODIL/AGAR DIL: CPT | Performed by: UROLOGY

## 2020-03-16 PROCEDURE — 81001 URINALYSIS AUTO W/SCOPE: CPT | Performed by: UROLOGY

## 2020-03-16 PROCEDURE — 87086 URINE CULTURE/COLONY COUNT: CPT | Performed by: UROLOGY

## 2020-03-16 PROCEDURE — 87088 URINE BACTERIA CULTURE: CPT | Performed by: UROLOGY

## 2020-03-16 NOTE — TELEPHONE ENCOUNTER
Patient called and having symptoms of low back pain and nausea and cloudy urine  He had a positive urine on feb 22nd  Orders were placed. Blanka White LPN Staff Nurse

## 2020-03-16 NOTE — TELEPHONE ENCOUNTER
[facility] Call Center    Phone Message    May a detailed message be left on voicemail: yes     Reason for Call: Order(s): Other:   Reason for requested: lab Urine test (per patient possible urine infection   Date needed: asap  Provider name: Evaristo Hayes       Action Taken: Message routed to:  [facility] ([facility]): [facility] and [place] [facility]    Travel Screening: Negative

## 2020-03-17 ENCOUNTER — TELEPHONE (OUTPATIENT)
Dept: UROLOGY | Facility: CLINIC | Age: 45
End: 2020-03-17

## 2020-03-17 NOTE — TELEPHONE ENCOUNTER
[facility] Call Center    Phone Message    May a detailed message be left on voicemail: yes     Reason for Call: Requesting Results   Name/type of test: UA  Date of test: 03/16/2020  Was test done at a location other than [facility] (Please fill in the location if not [facility])?: Yes: [facility] [place]      Action Taken: Message routed to:  [facility] ([facility]): Uro    Travel Screening: Not Applicable

## 2020-03-17 NOTE — TELEPHONE ENCOUNTER
Pt called and notified that the results are still in process. Pt went to Urgent care and was put on Cipro.    Pt states he is feeling a little, but urine is still really cloudy.    He will call back Thursday if he doesn't hear from us tomorrow.

## 2020-03-18 ENCOUNTER — TELEPHONE (OUTPATIENT)
Dept: UROLOGY | Facility: CLINIC | Age: 45
End: 2020-03-18

## 2020-03-18 DIAGNOSIS — N39.0 RECURRENT UTI: Primary | ICD-10-CM

## 2020-03-18 LAB
BACTERIA SPEC CULT: ABNORMAL
SPECIMEN SOURCE: ABNORMAL

## 2020-03-18 RX ORDER — NITROFURANTOIN 25; 75 MG/1; MG/1
100 CAPSULE ORAL 2 TIMES DAILY
Qty: 10 CAPSULE | Refills: 0 | Status: SHIPPED | OUTPATIENT
Start: 2020-03-18 | End: 2020-03-23

## 2020-03-18 NOTE — TELEPHONE ENCOUNTER
3/18/20- Called pharmacy, it was never picked up by patient.Septra was sent  in tablet form instead.     Pastora Bashir MA

## 2020-03-18 NOTE — TELEPHONE ENCOUNTER
Pt reports he is still symptomatic. UC shows resistance to Ciprofloxacin and Bactrim. Macrobid 100 mg x 5 days sent per protocol.

## 2020-03-18 NOTE — TELEPHONE ENCOUNTER
Letter printed and faxed to [facility]: [phone].    Attempted to reach Nidia with [employer], but her voicemail stated she was out and returning yesterday.    Kem Peters

## 2020-03-18 NOTE — TELEPHONE ENCOUNTER
[facility] Call Center    Phone Message    May a detailed message be left on voicemail: yes     Reason for Call: Other: Riley calling to request a call back from the care team to go over the urine analysis results that he saw were posted to [facility]. Riley also wanted to ask the team if the antibiotic he is taking is the correct one. Currently, Riley is on Ciprofloxacin. Please give Riley a call back at your earliest convenience to discuss.     Action Taken: Message routed to:  [facility] ([facility]): [facility]    Travel Screening: Not Applicable

## 2020-03-23 DIAGNOSIS — N31.9 NEUROGENIC BLADDER: ICD-10-CM

## 2020-03-23 DIAGNOSIS — R05.9 COUGH: ICD-10-CM

## 2020-03-24 ENCOUNTER — TELEPHONE (OUTPATIENT)
Dept: FAMILY MEDICINE | Facility: CLINIC | Age: 45
End: 2020-03-24

## 2020-03-24 ENCOUNTER — PATIENT OUTREACH (OUTPATIENT)
Dept: CARE COORDINATION | Facility: CLINIC | Age: 45
End: 2020-03-24

## 2020-03-24 RX ORDER — TOLTERODINE TARTRATE 2 MG/1
TABLET, EXTENDED RELEASE ORAL
Qty: 180 TABLET | Refills: 0 | Status: SHIPPED | OUTPATIENT
Start: 2020-03-24 | End: 2020-06-25

## 2020-03-24 RX ORDER — ALBUTEROL SULFATE 90 UG/1
AEROSOL, METERED RESPIRATORY (INHALATION)
Qty: 18 G | Refills: 0 | Status: SHIPPED | OUTPATIENT
Start: 2020-03-24 | End: 2020-06-25

## 2020-03-24 SDOH — HEALTH STABILITY: PHYSICAL HEALTH: ON AVERAGE, HOW MANY DAYS PER WEEK DO YOU ENGAGE IN MODERATE TO STRENUOUS EXERCISE (LIKE A BRISK WALK)?: 0 DAYS

## 2020-03-24 SDOH — HEALTH STABILITY: PHYSICAL HEALTH: ON AVERAGE, HOW MANY MINUTES DO YOU ENGAGE IN EXERCISE AT THIS LEVEL?: 0 MIN

## 2020-03-24 ASSESSMENT — ACTIVITIES OF DAILY LIVING (ADL): DEPENDENT_IADLS:: CLEANING;COOKING;LAUNDRY;SHOPPING;MEAL PREPARATION;MEDICATION MANAGEMENT

## 2020-03-24 NOTE — PROGRESS NOTES
Clinic Care Coordination Contact    Follow Up Progress Note      Assessment: The patient was recently diagnosed with a UTI that was treated with antibiotics.  The patient states that he is feeling better although still having the back pain and sternum pain with lack of appetite.  The patient was questioning the letter for the increase in the number of catheters per month.  In the patient chart there is a response from Nelli at [facility] that even with the letter that the extra catheters would need to come from the waivered services to pay for them.  The patient will be calling the  for his waivered services to have that taken care of.  Medication reconciliation was completed with the patient and marked as reviewed.  Health maintenance was reviewed and questions were answered with the patient.  The social determinants of health were reviewed with the patient and they were marked as reviewed.       Goals addressed this encounter:   Goals Addressed                 This Visit's Progress      Healthy Eating (pt-stated)   On track     Goal Statement: I will work with the ILS person to make more palatable meals for me to eat.  Measure of Success: The patient is gaining weight  Supportive Steps to Achieve: weigh the patient routinely.  Barriers: food allergies  Strengths: motivated to gain weight  Date to Achieve By: 6/5/2020  Patient expressed understanding of goal: yes            Pain Management (pt-stated)   On track     Goal Statement: I will work with the physical therapists at [facility] to decrease the amount of pain in my neck, back and sacral area.  Measure of Success: Patient states less pain.  Supportive Steps to Achieve: physical therapy from [facility]  Barriers: quadriplegic   Strengths: motivated  Date to Achieve By: 6/5/2020  Patient expressed understanding of goal: yes                 Intervention/Education provided during outreach: Reviewed safety measures with the patient for  being safe from the COVID 19 virus.     Outreach Frequency: monthly    Plan:   1.  The patient will work with the  for the waivered services to pay for the other catheters needed each month.  2.  The patient will work with Jasvir rodriguez on relieving the pain in his back.  3.   The patient will work with the RN CC for any problems that arise.    RN  Care Coordinator will follow up in 4 weeks.            Robbin Vanegas MSN, RN, PHN, [facility]   Primary Care Clinical RN Care Coordinator  [facility]  3/24/2020   1:14 PM  [email or web address]  Office: [phone]

## 2020-03-24 NOTE — TELEPHONE ENCOUNTER
Called and spoke with Sissy - Re-faxed letter and chart notes from 2/20 to her at [phone].    Kem Peters

## 2020-03-24 NOTE — TELEPHONE ENCOUNTER
"Requested Prescriptions   Pending Prescriptions Disp Refills     albuterol (PROAIR HFA/PROVENTIL HFA/VENTOLIN HFA) 108 (90 Base) MCG/ACT inhaler [Pharmacy Med Name: ALBUTEROL HFA INH (200 PUFFS) 18GM] 18 g 0     Sig: INHALE 2 PUFFS BY MOUTH FOUR TIMES DAILY AS NEEDED       Asthma Maintenance Inhalers - Anticholinergics Passed - 3/23/2020  4:19 PM        Passed - Patient is age 12 years or older        Passed - Recent (12 mo) or future (30 days) visit within the authorizing provider's specialty     Patient has had an office visit with the authorizing provider or a provider within the authorizing providers department within the previous 12 mos or has a future within next 30 days. See \"Patient Info\" tab in inbasket, or \"Choose Columns\" in Meds & Orders section of the refill encounter.              Passed - Medication is active on med list       Short-Acting Beta Agonist Inhalers Protocol  Passed - 3/23/2020  4:19 PM        Passed - Patient is age 12 or older        Passed - Recent (12 mo) or future (30 days) visit within the authorizing provider's specialty     Patient has had an office visit with the authorizing provider or a provider within the authorizing providers department within the previous 12 mos or has a future within next 30 days. See \"Patient Info\" tab in inbasket, or \"Choose Columns\" in Meds & Orders section of the refill encounter.              Passed - Medication is active on med list           tolterodine (DETROL) 2 MG tablet [Pharmacy Med Name: TOLTERODINE TARTRATE 2MG TABLETS] 180 tablet 0     Sig: TAKE 1 TABLET(2 MG) BY MOUTH TWICE DAILY       Muscarinic Antagonists (Urinary Incontinence Agents) Passed - 3/23/2020  4:19 PM        Passed - Recent (12 mo) or future (30 days) visit within the authorizing provider's specialty     Patient has had an office visit with the authorizing provider or a provider within the authorizing providers department within the previous 12 mos or has a future within next 30 " "days. See \"Patient Info\" tab in inbasket, or \"Choose Columns\" in Meds & Orders section of the refill encounter.              Passed - Patient does not have a diagnosis of glaucoma on the problem list     If glaucoma diagnosis is new, refer refill to physician.          Passed - Medication is active on med list        Passed - Patient is 18 years of age or older           Rxs approved per [facility] protocol.        Valery Naranjo RN BSN, Pap Tracking    "

## 2020-03-24 NOTE — PROGRESS NOTES
Clinic Care Coordination Contact  Care Team Conversations    Spoke with Amanda from [employer] and Nelli from [facility] regarding the additional catheters that the patient needs each month.  Nelli will take it to the insurance company and notify us if she still needs any additional documentation.        Robbin Vanegas MSN, RN, PHN, [facility]   Primary Care Clinical RN Care Coordinator  [facility]  3/24/2020   3:03 PM  [email or web address]  Office: [phone]

## 2020-03-24 NOTE — TELEPHONE ENCOUNTER
Reason for Call:  Other     Detailed comments: Sissy with [employer] requesting for nurse to call regarding extra catheters that patient is requiring. Please advise.     Phone Number Patient can be reached at: Other phone number:  [phone]    Best Time: Anytime    Can we leave a detailed message on this number? YES    Call taken on 3/24/2020 at 2:38 PM by Soledad Vargas

## 2020-03-25 ENCOUNTER — TELEPHONE (OUTPATIENT)
Dept: UROLOGY | Facility: CLINIC | Age: 45
End: 2020-03-25

## 2020-03-25 NOTE — TELEPHONE ENCOUNTER
Reached out to patient to discuss the need to post pone surgery. Patient stated understanding and will await our call to reschedule once we get the ok to reschedule.

## 2020-03-27 NOTE — TELEPHONE ENCOUNTER
Faxed patient's 5 most recent positive urine culture results to Nelli [facility] to [phone].    Kem Peters

## 2020-03-30 ENCOUNTER — PATIENT OUTREACH (OUTPATIENT)
Dept: CARE COORDINATION | Facility: CLINIC | Age: 45
End: 2020-03-30

## 2020-03-30 NOTE — PROGRESS NOTES
Clinic Care Coordination Contact  [facility]/Voicemail       Clinical Data: Care Coordinator Outreach  Outreach attempted x 1.  Left message on patient's voicemail with call back information and requested return call.  Plan: Care Coordinator sent care coordination introduction letter on 1/14/2020 via mail. Care Coordinator will try to reach patient again in 1-2 business days.          Robbin Vanegas MSN, RN, PHN, CCM   Primary Care Clinical RN Care Coordinator  [facility]  3/30/2020   9:42 AM  [email or web address]  Office: [phone]

## 2020-04-02 ASSESSMENT — ACTIVITIES OF DAILY LIVING (ADL): DEPENDENT_IADLS:: CLEANING;COOKING;LAUNDRY;SHOPPING;MEAL PREPARATION;MEDICATION MANAGEMENT

## 2020-04-02 NOTE — PROGRESS NOTES
Clinic Care Coordination Contact    Follow Up Progress Note      Assessment: The patient is stating that he is stable.  Per the patient he did receive the additional catheters from [facility], as well as notification that future deliveries will include 250 catheters per month.  The patient states that this is the amount that he needs per month.  There is currently no change in the chronic pain that the patient is suffering from affecting his back and sub-sternal area.      Medication reconciliation was completed with the patient and marked as reviewed.  Health maintenance was reviewed and questions were answered with the patient.  The social determinants of health were reviewed with the patient and they were marked as reviewed.      Goals addressed this encounter:   Goals Addressed                 This Visit's Progress      Healthy Eating (pt-stated)   On track     Goal Statement: I will work with the ILS person to make more palatable meals for me to eat.  Measure of Success: The patient is gaining weight  Supportive Steps to Achieve: weigh the patient routinely.  Barriers: food allergies  Strengths: motivated to gain weight  Date to Achieve By: 6/5/2020  Patient expressed understanding of goal: yes            Pain Management (pt-stated)   On track     Goal Statement: I will work with the physical therapists at [facility] to decrease the amount of pain in my neck, back and sacral area.  Measure of Success: Patient states less pain.  Supportive Steps to Achieve: physical therapy from [facility]  Barriers: quadriplegic   Strengths: motivated  Date to Achieve By: 6/5/2020  Patient expressed understanding of goal: yes                 Intervention/Education provided during outreach: Reviewed the safety of having enough catheters and equipment on hand.       Outreach Frequency: monthly    Plan:   1.  The patient will work with the PT department for exercises to reduce the chronic pain that the patient has in  multiple sites of his body.  2.  The patient will use the catheters as instructed by the urology department.  3.  The patient will work on increasing his caloric intake including working for more palatable meals.     RN  Care Coordinator will follow up in 6 weeks.          Robbin Vanegas MSN, RN, PHN, [facility]   Primary Care Clinical RN Care Coordinator  [facility]  4/2/2020   3:07 PM  [email or web address]  Office: [phone]

## 2020-04-24 DIAGNOSIS — K64.9 HEMORRHOIDS, UNSPECIFIED HEMORRHOID TYPE: ICD-10-CM

## 2020-04-25 NOTE — TELEPHONE ENCOUNTER
"Requested Prescriptions   Pending Prescriptions Disp Refills     hydrocortisone, Perianal, (ANUSOL-HC) 2.5 % cream [Pharmacy Med Name: HYDROCORTISONE 2.5% RECTAL CREAM]  Medication is historical  Last Written Prescription Date:  3/27/2018  Last Fill Quantity: ,  # refills:    Last office visit: 2/20/2020 with prescribing provider:  Steven   Future Office Visit:   30 g      Sig: USE RECTALLY TWICE DAILY       Miscellaneous Gastrointestinal Agents Passed - 4/24/2020 12:45 PM        Passed - Recent (12 mo) or future (30 days) visit within the authorizing provider's specialty     Patient has had an office visit with the authorizing provider or a provider within the authorizing providers department within the previous 12 mos or has a future within next 30 days. See \"Patient Info\" tab in inbasket, or \"Choose Columns\" in Meds & Orders section of the refill encounter.              Passed - Medication is active on med list        Passed - Patient is 18 years of age or older            "

## 2020-04-27 RX ORDER — HYDROCORTISONE 25 MG/G
CREAM TOPICAL
Qty: 30 G | Refills: 11 | Status: SHIPPED | OUTPATIENT
Start: 2020-04-27 | End: 2021-06-28

## 2020-04-27 NOTE — TELEPHONE ENCOUNTER
Routing refill request to provider for review/approval because:  Medication is listed as historical  Sarah Tao RN.

## 2020-05-07 DIAGNOSIS — Z11.59 ENCOUNTER FOR SCREENING FOR OTHER VIRAL DISEASES: Primary | ICD-10-CM

## 2020-05-08 ENCOUNTER — TELEPHONE (OUTPATIENT)
Dept: UROLOGY | Facility: CLINIC | Age: 45
End: 2020-05-08

## 2020-05-08 NOTE — TELEPHONE ENCOUNTER
Patient is scheduled for surgery with Dr. Hayes      Spoke or left message with: Riley    Date of Surgery: 06/12/20    Location: [facility] OR    Informed patient they will need an adult  yes    Pre-op with surgeon (if applicable): n/a    H&P: Scheduled with pcp    Additional imaging/appointments: n/a    Surgery packet: mailed 5/8/20    Additional comments: n/a

## 2020-05-22 ENCOUNTER — PATIENT OUTREACH (OUTPATIENT)
Dept: CARE COORDINATION | Facility: CLINIC | Age: 45
End: 2020-05-22

## 2020-05-22 ASSESSMENT — ACTIVITIES OF DAILY LIVING (ADL): DEPENDENT_IADLS:: CLEANING;COOKING;LAUNDRY;SHOPPING;MEAL PREPARATION;MEDICATION MANAGEMENT

## 2020-05-22 NOTE — PROGRESS NOTES
Clinic Care Coordination Contact    Follow Up Progress Note      Assessment: The patient called with questions regarding the upcoming surgery for botox placement in the bladder.  The RN CC completed the answers to all of the questions that the patient had.  The RN CC was able to update all information regarding the patient and his current state.  The patient states that his pain is unchanged, and he is unable to work with the PT department at this time due to COVID.  The patient does not have very much of an appetite although he is working on eating at all meals.      Medication reconciliation was completed with the patient and marked as reviewed.  Health maintenance was reviewed and questions were answered with the patient.  The social determinants of health were reviewed and they were marked as reviewed.        Goals addressed this encounter:   Goals Addressed                 This Visit's Progress      Healthy Eating (pt-stated)   On track     Goal Statement: I will work with the ILS person to make more palatable meals for me to eat.  Measure of Success: The patient is gaining weight  Supportive Steps to Achieve: weigh the patient routinely.  Barriers: food allergies  Strengths: motivated to gain weight  Date to Achieve By: 6/5/2020  Patient expressed understanding of goal: yes            Pain Management (pt-stated)   On track     Goal Statement: I will work with the physical therapists at [facility] to decrease the amount of pain in my neck, back and sacral area.  Measure of Success: Patient states less pain.  Supportive Steps to Achieve: physical therapy from [facility]  Barriers: quadriplegic   Strengths: motivated  Date to Achieve By: 6/5/2020  Patient expressed understanding of goal: yes                 Intervention/Education provided during outreach: reminded the patient to schedule the pre-op appointment as that will most likely need to be a face to face appointment.     Outreach Frequency:  monthly    Plan:   1.  The patient will work with PT again when it is save to do so with the COVID 19 virus.  2.  The patient will make and attend the appointment for the pre-op to the botox placement procedure.  3.  The patient will try to eat at every meal and snacks if possible.  RN  Care Coordinator will follow up in 4 weeks.          Robbin Vanegas MSN, RN, PHN, [facility]   Primary Care Clinical RN Care Coordinator  [facility]  5/22/2020   3:29 PM  [email or web address]  Office: [phone]

## 2020-05-22 NOTE — LETTER
[facility]  Complex Care Plan  About Me:    Patient Name:  Riley Marcos    YOB: 1975  Age:         44 year old   [place] MRN:    [id number] Telephone Information:  Home Phone [phone]   Mobile NONE   Mobile [phone]       Address:  [address] [place] [address] [address]  [place] [address] Email address:  [email or web address]      Emergency Contact(s)    Name Relationship Lgl Grd Work Phone Home Phone Mobile Phone   1. TREY MARCOS (NE* Relative No noen none [phone]   2. DIRKAGUSTO Sister   [phone]    3. HODA MARCOS Brother No  [phone]            Primary language:  English     needed? No   [place] [facility]:  [phone] op. 1  Other communication barriers:    Preferred Method of Communication:  Lisa  Current living arrangement:    Mobility Status/ Medical Equipment:      Health Maintenance  Health Maintenance Reviewed:      My Access Plan  Medical Emergency 911   Primary Clinic Line [facility] - [phone]   24 Hour Appointment Line [phone] or  [phone]) (toll-free)   24 Hour Nurse Line [phone] (toll-free)   Preferred Urgent Care     Preferred Hospital     Preferred Pharmacy [id number] - [place] [address] AT [place] [address]     Behavioral Health Crisis Line The National Suicide Prevention Lifeline at 1-896.827.4819 or 911             My Care Team Members  Patient Care Team       Relationship Specialty Notifications Start End    Raffy Harris PA-C PCP - General Physician Assistant  3/2/18     Phone: [phone] Fax: [phone]         [address] [facility] [address]    Robbin Andino, RN Lead Care Coordinator Nurse Admissions 1/6/16     phone:  [phone]    Phone: [phone] Fax: [phone]        Rober Leo MD Referring Physician Urology  1/8/16     Phone: [phone] Fax: [phone] [address] [facility] [address]    Kimberly Zabala MD MD Urology  1/8/16     Phone: [phone] Fax: [phone]         [address] [facility] [address] [facility] [address]    Amanda Other (see comments)   1/8/16     [facility]     Phone: [phone]         Evaristo Hayes MD MD Urology  4/19/16     Phone: [phone] Fax: [phone]         [address] [address]    Jenny Wright, RN Registered Nurse Urology  4/19/16     Marta Adames, RN Clinic Care Coordinator Urology Abnormal results only, Admissions 11/3/16     Phone: [phone] Pager: [phone]        Rosemary Thomas, RN Nurse Coordinator Physical Medicine and Rehab  3/30/17     Phone: [phone] Pager: [phone]        Barbara Kenny APRN CNP Assigned PCP   9/29/19     Phone: [phone] Fax: [phone] [address] [facility] [address]            My Care Plans  Self Management and Treatment Plan  Goals and (Comments)  Goals        General    Healthy Eating (pt-stated)     Notes - Note edited  6/6/2019  3:42 PM by Robbin Andino, RN    Goal Statement: I will work with the ILS person to make more palatable meals for me to eat.  Measure of Success: The patient is gaining weight  Supportive Steps to Achieve: weigh the patient routinely.  Barriers: food allergies  Strengths: motivated to gain weight  Date to Achieve By: 6/5/2020  Patient expressed understanding of goal: yes          Pain Management (pt-stated)     Notes - Note edited  6/6/2019  3:43 PM by Robbin Andino, RN    Goal Statement: I will work with the physical therapists at [facility] to decrease the amount of pain in my neck, back and sacral area.  Measure of Success: Patient states less pain.  Supportive Steps to Achieve: physical therapy from [facility]  Barriers: quadriplegic   Strengths: motivated  Date to Achieve By: 6/5/2020  Patient expressed understanding of goal: yes                 Action Plans on File:                        Advance Care Plans/Directives Type:        My Medical and Care Information  Problem List   Patient Active Problem List   Diagnosis     Vitamin D deficiency     Eosinophilic esophagitis     Neurogenic bladder     CARDIOVASCULAR SCREENING; LDL GOAL LESS THAN 160     Quadriplegia (H)     Chronic constipation     Internal hemorrhoids with other complication     Diagnostic skin and sensitization tests(aka ALLERGENS)     Anal or rectal pain     Allergic rhinitis due to animal dander     Allergy to mold spores     House dust mite allergy     Insomnia     Health Care Home     Gallstones     Chronic midline thoracic back pain     Pulmonary nodules     ACP (advance care planning)     Cervical spinal cord injury, sequela (H)     Spasticity      Current Medications and Allergies:  See printed Medication Report.    Care Coordination Start Date: No linked episodes   Frequency of Care Coordination:     Form Last Updated: 05/22/2020

## 2020-06-01 ENCOUNTER — OFFICE VISIT (OUTPATIENT)
Dept: FAMILY MEDICINE | Facility: CLINIC | Age: 45
End: 2020-06-01
Payer: COMMERCIAL

## 2020-06-01 VITALS
DIASTOLIC BLOOD PRESSURE: 72 MMHG | RESPIRATION RATE: 16 BRPM | OXYGEN SATURATION: 95 % | SYSTOLIC BLOOD PRESSURE: 122 MMHG | TEMPERATURE: 98.6 F | HEART RATE: 96 BPM

## 2020-06-01 DIAGNOSIS — N31.9 NEUROGENIC BLADDER: Primary | ICD-10-CM

## 2020-06-01 DIAGNOSIS — R10.9 FLANK PAIN: ICD-10-CM

## 2020-06-01 DIAGNOSIS — N30.00 ACUTE CYSTITIS WITHOUT HEMATURIA: Primary | ICD-10-CM

## 2020-06-01 DIAGNOSIS — G82.50 QUADRIPLEGIA (H): ICD-10-CM

## 2020-06-01 DIAGNOSIS — Z01.818 PREOP GENERAL PHYSICAL EXAM: ICD-10-CM

## 2020-06-01 DIAGNOSIS — S14.109S CERVICAL SPINAL CORD INJURY, SEQUELA (H): ICD-10-CM

## 2020-06-01 LAB
ALBUMIN UR-MCNC: NEGATIVE MG/DL
ANION GAP SERPL CALCULATED.3IONS-SCNC: 7 MMOL/L (ref 3–14)
APPEARANCE UR: CLEAR
BACTERIA #/AREA URNS HPF: ABNORMAL /HPF
BASOPHILS # BLD AUTO: 0.2 10E9/L (ref 0–0.2)
BASOPHILS NFR BLD AUTO: 2 %
BILIRUB UR QL STRIP: NEGATIVE
BUN SERPL-MCNC: 9 MG/DL (ref 7–30)
CALCIUM SERPL-MCNC: 8.8 MG/DL (ref 8.5–10.1)
CHLORIDE SERPL-SCNC: 107 MMOL/L (ref 94–109)
CO2 SERPL-SCNC: 23 MMOL/L (ref 20–32)
COLOR UR AUTO: YELLOW
CREAT SERPL-MCNC: 0.54 MG/DL (ref 0.66–1.25)
DIFFERENTIAL METHOD BLD: ABNORMAL
EOSINOPHIL # BLD AUTO: 2.2 10E9/L (ref 0–0.7)
EOSINOPHIL NFR BLD AUTO: 25 %
ERYTHROCYTE [DISTWIDTH] IN BLOOD BY AUTOMATED COUNT: 12.8 % (ref 10–15)
GFR SERPL CREATININE-BSD FRML MDRD: >90 ML/MIN/{1.73_M2}
GLUCOSE SERPL-MCNC: 87 MG/DL (ref 70–99)
GLUCOSE UR STRIP-MCNC: NEGATIVE MG/DL
HCT VFR BLD AUTO: 43.8 % (ref 40–53)
HGB BLD-MCNC: 14.7 G/DL (ref 13.3–17.7)
HGB UR QL STRIP: NEGATIVE
KETONES UR STRIP-MCNC: NEGATIVE MG/DL
LEUKOCYTE ESTERASE UR QL STRIP: ABNORMAL
LYMPHOCYTES # BLD AUTO: 1.9 10E9/L (ref 0.8–5.3)
LYMPHOCYTES NFR BLD AUTO: 21 %
MCH RBC QN AUTO: 32.3 PG (ref 26.5–33)
MCHC RBC AUTO-ENTMCNC: 33.6 G/DL (ref 31.5–36.5)
MCV RBC AUTO: 96 FL (ref 78–100)
MONOCYTES # BLD AUTO: 0.4 10E9/L (ref 0–1.3)
MONOCYTES NFR BLD AUTO: 4 %
NEUTROPHILS # BLD AUTO: 4.2 10E9/L (ref 1.6–8.3)
NEUTROPHILS NFR BLD AUTO: 48 %
NITRATE UR QL: NEGATIVE
PH UR STRIP: 7.5 PH (ref 5–7)
PLATELET # BLD AUTO: 276 10E9/L (ref 150–450)
PLATELET # BLD EST: ABNORMAL 10*3/UL
POTASSIUM SERPL-SCNC: 4 MMOL/L (ref 3.4–5.3)
RBC # BLD AUTO: 4.55 10E12/L (ref 4.4–5.9)
RBC #/AREA URNS AUTO: ABNORMAL /HPF
RBC MORPH BLD: NORMAL
SODIUM SERPL-SCNC: 137 MMOL/L (ref 133–144)
SOURCE: ABNORMAL
SP GR UR STRIP: 1.01 (ref 1–1.03)
UROBILINOGEN UR STRIP-ACNC: 0.2 EU/DL (ref 0.2–1)
VARIANT LYMPHS BLD QL SMEAR: PRESENT
WBC # BLD AUTO: 8.9 10E9/L (ref 4–11)
WBC #/AREA URNS AUTO: ABNORMAL /HPF

## 2020-06-01 PROCEDURE — 85025 COMPLETE CBC W/AUTO DIFF WBC: CPT | Performed by: NURSE PRACTITIONER

## 2020-06-01 PROCEDURE — 99214 OFFICE O/P EST MOD 30 MIN: CPT | Performed by: NURSE PRACTITIONER

## 2020-06-01 PROCEDURE — 87086 URINE CULTURE/COLONY COUNT: CPT | Performed by: NURSE PRACTITIONER

## 2020-06-01 PROCEDURE — 87088 URINE BACTERIA CULTURE: CPT | Performed by: NURSE PRACTITIONER

## 2020-06-01 PROCEDURE — 36415 COLL VENOUS BLD VENIPUNCTURE: CPT | Performed by: NURSE PRACTITIONER

## 2020-06-01 PROCEDURE — 80048 BASIC METABOLIC PNL TOTAL CA: CPT | Performed by: NURSE PRACTITIONER

## 2020-06-01 PROCEDURE — 81001 URINALYSIS AUTO W/SCOPE: CPT | Performed by: NURSE PRACTITIONER

## 2020-06-01 RX ORDER — NITROFURANTOIN 25; 75 MG/1; MG/1
100 CAPSULE ORAL 2 TIMES DAILY
Qty: 10 CAPSULE | Refills: 0 | Status: SHIPPED | OUTPATIENT
Start: 2020-06-01 | End: 2020-06-06

## 2020-06-01 ASSESSMENT — PATIENT HEALTH QUESTIONNAIRE - PHQ9: SUM OF ALL RESPONSES TO PHQ QUESTIONS 1-9: 9
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
## 2020-06-02 DIAGNOSIS — N30.00 ACUTE CYSTITIS WITHOUT HEMATURIA: Primary | ICD-10-CM

## 2020-06-02 LAB
BACTERIA SPEC CULT: ABNORMAL
SPECIMEN SOURCE: ABNORMAL

## 2020-06-02 RX ORDER — CEPHALEXIN 500 MG/1
500 CAPSULE ORAL 2 TIMES DAILY
Qty: 14 CAPSULE | Refills: 0 | Status: SHIPPED | OUTPATIENT
Start: 2020-06-02 | End: 2020-06-12

## 2020-06-09 DIAGNOSIS — N39.0 URINARY TRACT INFECTION: Primary | ICD-10-CM

## 2020-06-10 DIAGNOSIS — Z11.59 ENCOUNTER FOR SCREENING FOR OTHER VIRAL DISEASES: ICD-10-CM

## 2020-06-10 DIAGNOSIS — N39.0 URINARY TRACT INFECTION: ICD-10-CM

## 2020-06-10 DIAGNOSIS — N30.00 ACUTE CYSTITIS WITHOUT HEMATURIA: ICD-10-CM

## 2020-06-10 LAB
ALBUMIN UR-MCNC: NEGATIVE MG/DL
APPEARANCE UR: CLEAR
BILIRUB UR QL STRIP: NEGATIVE
COLOR UR AUTO: YELLOW
GLUCOSE UR STRIP-MCNC: NEGATIVE MG/DL
HGB UR QL STRIP: NEGATIVE
KETONES UR STRIP-MCNC: NEGATIVE MG/DL
LEUKOCYTE ESTERASE UR QL STRIP: NEGATIVE
NITRATE UR QL: NEGATIVE
PH UR STRIP: 7.5 PH (ref 5–7)
SOURCE: ABNORMAL
SP GR UR STRIP: 1.02 (ref 1–1.03)
UROBILINOGEN UR STRIP-ACNC: 0.2 EU/DL (ref 0.2–1)

## 2020-06-10 PROCEDURE — 81003 URINALYSIS AUTO W/O SCOPE: CPT | Performed by: NURSE PRACTITIONER

## 2020-06-10 PROCEDURE — 99000 SPECIMEN HANDLING OFFICE-LAB: CPT | Performed by: UROLOGY

## 2020-06-10 PROCEDURE — 87086 URINE CULTURE/COLONY COUNT: CPT | Performed by: UROLOGY

## 2020-06-10 PROCEDURE — U0003 INFECTIOUS AGENT DETECTION BY NUCLEIC ACID (DNA OR RNA); SEVERE ACUTE RESPIRATORY SYNDROME CORONAVIRUS 2 (SARS-COV-2) (CORONAVIRUS DISEASE [COVID-19]), AMPLIFIED PROBE TECHNIQUE, MAKING USE OF HIGH THROUGHPUT TECHNOLOGIES AS DESCRIBED BY CMS-2020-01-R: HCPCS | Performed by: UROLOGY

## 2020-06-11 ENCOUNTER — ANESTHESIA EVENT (OUTPATIENT)
Dept: SURGERY | Facility: AMBULATORY SURGERY CENTER | Age: 45
End: 2020-06-11

## 2020-06-11 LAB
BACTERIA SPEC CULT: NO GROWTH
SARS-COV-2 RNA SPEC QL NAA+PROBE: NOT DETECTED
SPECIMEN SOURCE: NORMAL
SPECIMEN SOURCE: NORMAL

## 2020-06-12 ENCOUNTER — ANESTHESIA (OUTPATIENT)
Dept: SURGERY | Facility: AMBULATORY SURGERY CENTER | Age: 45
End: 2020-06-12

## 2020-06-12 ENCOUNTER — HOSPITAL ENCOUNTER (OUTPATIENT)
Facility: AMBULATORY SURGERY CENTER | Age: 45
End: 2020-06-12
Attending: UROLOGY
Payer: COMMERCIAL

## 2020-06-12 VITALS
OXYGEN SATURATION: 94 % | RESPIRATION RATE: 18 BRPM | TEMPERATURE: 98 F | DIASTOLIC BLOOD PRESSURE: 88 MMHG | SYSTOLIC BLOOD PRESSURE: 122 MMHG

## 2020-06-12 DIAGNOSIS — N31.9 NEUROGENIC BLADDER: ICD-10-CM

## 2020-06-12 RX ORDER — CEFAZOLIN SODIUM 1 G/50ML
1 SOLUTION INTRAVENOUS SEE ADMIN INSTRUCTIONS
Status: DISCONTINUED | OUTPATIENT
Start: 2020-06-12 | End: 2020-06-13 | Stop reason: HOSPADM

## 2020-06-12 RX ORDER — FENTANYL CITRATE 50 UG/ML
INJECTION, SOLUTION INTRAMUSCULAR; INTRAVENOUS PRN
Status: DISCONTINUED | OUTPATIENT
Start: 2020-06-12 | End: 2020-06-12

## 2020-06-12 RX ORDER — CEFAZOLIN SODIUM 2 G/50ML
2 SOLUTION INTRAVENOUS
Status: COMPLETED | OUTPATIENT
Start: 2020-06-12 | End: 2020-06-12

## 2020-06-12 RX ORDER — PROPOFOL 10 MG/ML
INJECTION, EMULSION INTRAVENOUS CONTINUOUS PRN
Status: DISCONTINUED | OUTPATIENT
Start: 2020-06-12 | End: 2020-06-12

## 2020-06-12 RX ORDER — SODIUM CHLORIDE, SODIUM LACTATE, POTASSIUM CHLORIDE, CALCIUM CHLORIDE 600; 310; 30; 20 MG/100ML; MG/100ML; MG/100ML; MG/100ML
INJECTION, SOLUTION INTRAVENOUS CONTINUOUS
Status: DISCONTINUED | OUTPATIENT
Start: 2020-06-12 | End: 2020-06-13 | Stop reason: HOSPADM

## 2020-06-12 RX ORDER — ONDANSETRON 2 MG/ML
INJECTION INTRAMUSCULAR; INTRAVENOUS PRN
Status: DISCONTINUED | OUTPATIENT
Start: 2020-06-12 | End: 2020-06-12

## 2020-06-12 RX ORDER — NALOXONE HYDROCHLORIDE 0.4 MG/ML
.1-.4 INJECTION, SOLUTION INTRAMUSCULAR; INTRAVENOUS; SUBCUTANEOUS
Status: DISCONTINUED | OUTPATIENT
Start: 2020-06-12 | End: 2020-06-13 | Stop reason: HOSPADM

## 2020-06-12 RX ORDER — ACETAMINOPHEN 325 MG/1
975 TABLET ORAL ONCE
Status: DISCONTINUED | OUTPATIENT
Start: 2020-06-12 | End: 2020-06-13 | Stop reason: HOSPADM

## 2020-06-12 RX ORDER — OXYCODONE HYDROCHLORIDE 5 MG/1
5 TABLET ORAL EVERY 4 HOURS PRN
Status: DISCONTINUED | OUTPATIENT
Start: 2020-06-12 | End: 2020-06-13 | Stop reason: HOSPADM

## 2020-06-12 RX ORDER — FENTANYL CITRATE 50 UG/ML
25-50 INJECTION, SOLUTION INTRAMUSCULAR; INTRAVENOUS EVERY 5 MIN PRN
Status: DISCONTINUED | OUTPATIENT
Start: 2020-06-12 | End: 2020-06-13 | Stop reason: HOSPADM

## 2020-06-12 RX ORDER — ONDANSETRON 2 MG/ML
4 INJECTION INTRAMUSCULAR; INTRAVENOUS EVERY 30 MIN PRN
Status: DISCONTINUED | OUTPATIENT
Start: 2020-06-12 | End: 2020-06-13 | Stop reason: HOSPADM

## 2020-06-12 RX ORDER — LIDOCAINE HYDROCHLORIDE 20 MG/ML
INJECTION, SOLUTION INFILTRATION; PERINEURAL PRN
Status: DISCONTINUED | OUTPATIENT
Start: 2020-06-12 | End: 2020-06-12

## 2020-06-12 RX ORDER — SODIUM CHLORIDE, SODIUM LACTATE, POTASSIUM CHLORIDE, CALCIUM CHLORIDE 600; 310; 30; 20 MG/100ML; MG/100ML; MG/100ML; MG/100ML
INJECTION, SOLUTION INTRAVENOUS CONTINUOUS PRN
Status: DISCONTINUED | OUTPATIENT
Start: 2020-06-12 | End: 2020-06-12

## 2020-06-12 RX ORDER — GABAPENTIN 300 MG/1
300 CAPSULE ORAL ONCE
Status: DISCONTINUED | OUTPATIENT
Start: 2020-06-12 | End: 2020-06-13 | Stop reason: HOSPADM

## 2020-06-12 RX ORDER — ONDANSETRON 4 MG/1
4 TABLET, ORALLY DISINTEGRATING ORAL EVERY 30 MIN PRN
Status: DISCONTINUED | OUTPATIENT
Start: 2020-06-12 | End: 2020-06-13 | Stop reason: HOSPADM

## 2020-06-12 RX ORDER — LIDOCAINE 40 MG/G
CREAM TOPICAL
Status: DISCONTINUED | OUTPATIENT
Start: 2020-06-12 | End: 2020-06-13 | Stop reason: HOSPADM

## 2020-06-12 RX ORDER — MEPERIDINE HYDROCHLORIDE 25 MG/ML
12.5 INJECTION INTRAMUSCULAR; INTRAVENOUS; SUBCUTANEOUS
Status: DISCONTINUED | OUTPATIENT
Start: 2020-06-12 | End: 2020-06-13 | Stop reason: HOSPADM

## 2020-06-12 RX ADMIN — PROPOFOL 100 MCG/KG/MIN: 10 INJECTION, EMULSION INTRAVENOUS at 08:29

## 2020-06-12 RX ADMIN — FENTANYL CITRATE 50 MCG: 50 INJECTION, SOLUTION INTRAMUSCULAR; INTRAVENOUS at 08:31

## 2020-06-12 RX ADMIN — CEFAZOLIN SODIUM 2 G: 2 SOLUTION INTRAVENOUS at 08:35

## 2020-06-12 RX ADMIN — SODIUM CHLORIDE, SODIUM LACTATE, POTASSIUM CHLORIDE, CALCIUM CHLORIDE: 600; 310; 30; 20 INJECTION, SOLUTION INTRAVENOUS at 08:26

## 2020-06-12 RX ADMIN — ONDANSETRON 4 MG: 2 INJECTION INTRAMUSCULAR; INTRAVENOUS at 08:31

## 2020-06-12 RX ADMIN — SODIUM CHLORIDE, SODIUM LACTATE, POTASSIUM CHLORIDE, CALCIUM CHLORIDE: 600; 310; 30; 20 INJECTION, SOLUTION INTRAVENOUS at 08:04

## 2020-06-12 RX ADMIN — LIDOCAINE HYDROCHLORIDE 40 MG: 20 INJECTION, SOLUTION INFILTRATION; PERINEURAL at 08:31

## 2020-06-12 NOTE — ANESTHESIA CARE TRANSFER NOTE
Patient: Riley Gifford    Procedure(s):  Cystoscopy, bladder botox injection    Diagnosis: Neurogenic bladder [N31.9]  Diagnosis Additional Information: No value filed.    Anesthesia Type:   No value filed.     Note:  Airway :Room Air  Patient transferred to:Phase II  Comments: VSS and WNL, comfortable, no PONV, report to Mayi ALBERTSHandoff Report: Identifed the Patient, Identified the Reponsible Provider, Reviewed the pertinent medical history, Discussed the surgical course, Reviewed Intra-OP anesthesia mangement and issues during anesthesia, Set expectations for post-procedure period and Allowed opportunity for questions and acknowledgement of understanding      Vitals: (Last set prior to Anesthesia Care Transfer)    CRNA VITALS  6/12/2020 0823 - 6/12/2020 0858      6/12/2020             Resp Rate (observed):  (!) 2    Resp Rate (set):  10                Electronically Signed By: SUJATA Wu CRNA  June 12, 2020  8:58 AM

## 2020-06-12 NOTE — DISCHARGE INSTRUCTIONS
You should continue to manage your bladder as prior to surgery.   You are due for an ultrasound of your kidneys - this has been ordered please obtain this in the next month. If you need help scheduling this please contact our office and the nurses will be able to help you.     Please call us once the ultrasound is done and we will review the results with you.     We will schedule your next Botox in 6 months.   [facility]  Home Care Following Anesthesia  For 24 hours after surgery:  1. Get plenty of rest.  A responsible adult must stay with you for at least 24 hours after you leave the surgery center.  2. Do not drive or use heavy equipment.  If you have weakness or tingling, don't drive or use heavy equipment until this feeling goes away.   3. Do not drink alcohol.   4. Avoid strenuous or risky activities.  Ask for help when climbing stairs.  5. You may feel lightheaded.  IF so, sit for a few minutes before standing.  Have someone help you get up.   6. If you have nausea (feel sick to your stomach): Drink only clear liquids such as apple juice, ginger ale, broth or 7-Up.  Rest may also help.  Be sure to drink enough fluids.  Move to a regular diet as you feel able.   7. You may have a slight fever.  Call the doctor if your fever is over 100 F (37.7 C) (taken under the tongue) or lasts longer than 24 hours.  8. You may have a dry mouth, a sore throat, muscle aches or trouble sleeping. These should go away after 24 hours.  9. Do not make important or legal decisions.               Tips for taking pain medications  To get the best pain relief possible, remember these points:    Take pain medications as directed, before pain becomes severe.    Pain medication can upset your stomach: taking it with food may help.    Constipation is a common side effect of pain medication. Drink plenty of  fluids.    Eat foods high in fiber. Take a stool softener if recommended by your doctor or  pharmacist.    Do not drink alcohol, drive or operate machinery while taking pain medications.    Ask about other ways to control pain, such as with heat, ice or relaxation.    Tylenol/Acetaminophen Consumption  To help encourage the safe use of acetaminophen, the makers of TYLENOL  have lowered the maximum daily dose for single-ingredient Extra Strength TYLENOL  (acetaminophen) products sold in the [place] from 8 pills per day (4,000 mg) to 6 pills per day (3,000 mg). The dosing interval has also changed from 2 pills every 4-6 hours to 2 pills every 6 hours.    If you feel your pain relief is insufficient, you may take Tylenol/Acetaminophen in addition to your narcotic pain medication.     Be careful not to exceed 3,000 mg of Tylenol/Acetaminophen in a 24 hour period from all sources.    If you are taking extra strength Tylenol/acetaminophen (500 mg), the maximum dose is 6 tablets in 24 hours.    If you are taking regular strength acetaminophen (325 mg), the maximum dose is 9 tablets in 24 hours.    Call a doctor for any of the followin. Signs of infection (fever, growing tenderness at the surgery site, a large amount of drainage or bleeding, severe pain, foul-smelling drainage, redness, swelling).  2. It has been over 8 to 10 hours since surgery and you are still not able to urinate (pass water).  3. Headache for over 24 hours.  4. Numbness, tingling or weakness the day after surgery (if you had spinal anesthesia).  5. Signs of Covid-19 infection (temperature over 100 degrees, shortness of breath, cough, loss of taste/smell, generalized body aches, persistent headache, chills, sore throat, nausea/vomiting/diarrhea)  Your doctor is:  Dr. Evaristo Giraldo, Prostate and Urology: [phone]                    Or dial [phone] and ask for the resident on call for:  Prostate Urology  For emergency care, call the:  [facility]:  [phone] (TTY for hearing impaired: [phone])
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
## 2020-06-12 NOTE — OP NOTE
Urology Operative Summary    Pre-operative diagnosis: Neurogenic bladder   Post-operative diagnosis: Neurogenic bladder   Procedure: Cystourethroscopy with injection of botulinum toxin A - 300 units to bladder   Surgeon: Evaristo Hayes MD   Assistant(s): Lacey Duncan MD      Anesthesia: MAC       Estimated blood loss: Less than 10 ml     Complications: None     Condition: Patient taken to recovery in stable condition.     Indications: Riley Gifford is a 44 year old male with neurogenic bladder refractory to anti-cholinergics due to C-5 spinal cord injury. His last botox injection was October 2019 and was done in OR. He gets [employer]. He understands the risks and benefits of the various options and elects to proceed with botulinum toxin injection understanding the risks for urinary obstruction, infection, pain, bleeding, need for future procedures and risks of anesthesia.    Procedure: Riley Gifford was taken to the operating room in his  usual state of health. He was positioned in Patient Positioning: Lithotomy.  His genitalia were prepped and draped in the standard fashion. A time-out was performed to ensure proper patient, procedure and positioning.  The patient received appropriate IV antibiotics prior to the procedure based on pre-operative urine culture.    The procedure was initiated with insertion of a rigid De La Garza cystoscope. The urethra was slightly narrow and he may benefit from using a 19Fr rigid scope with rigid needle in the future. The bladder mucosa appeared to be normal without stones, tumors or diverticulum on 360 degree inspection.     We mixed 3 vials of 100 U botulinum toxin A into 20 cc's of injectable saline for a total of 300 U.  Bladder injection: We performed a template injection procedure into the bladder wall with a total of 20 injection sites.    We then emptied the bladder to re-inspect our injection sites and found that there was a minimal amount of blood. The patient was returned to  supine position and transported to recovery in stable condition.    Plan: Schedule another Botox injection for 6 months. Recommend rigid 19Fr scope with rigid needle.  -- Due for upper tract imaging, will order renal ultrasound to get done in near future    Lacey Duncan MD  June 12, 2020

## 2020-06-12 NOTE — ANESTHESIA POSTPROCEDURE EVALUATION
Anesthesia POST Procedure Evaluation    Patient: Riley Gifford   MRN:     [id number] Gender:   male   Age:    44 year old :      1975        Preoperative Diagnosis: Neurogenic bladder [N31.9]   Procedure(s):  Cystoscopy, bladder botox injection   Postop Comments: No value filed.     Anesthesia Type: No value filed.       Disposition: Outpatient   Postop Pain Control: Uneventful            Sign Out: Well controlled pain   PONV: No   Neuro/Psych: Uneventful            Sign Out: Acceptable/Baseline neuro status   Airway/Respiratory: Uneventful            Sign Out: Acceptable/Baseline resp. status   CV/Hemodynamics: Uneventful            Sign Out: Acceptable CV status   Other NRE: NONE   DID A NON-ROUTINE EVENT OCCUR? No         Last Anesthesia Record Vitals:  CRNA VITALS  2020 0823 - 2020 0923      2020             Resp Rate (observed):  (!) 2    Resp Rate (set):  10          Last PACU Vitals:  Vitals Value Taken Time   BP     Temp     Pulse     Resp     SpO2     Temp src Available 2020  8:45 AM   NIBP 143/98 2020  8:49 AM   Pulse 82 2020  8:52 AM   SpO2 96 % 2020  8:52 AM   Resp     Temp     Ht Rate 85 2020  8:51 AM   Temp 2           Electronically Signed By: Miguel Ruvalcaba MD, MD, 2020, 9:50 AM

## 2020-06-19 ENCOUNTER — MYC REFILL (OUTPATIENT)
Dept: INTERNAL MEDICINE | Facility: CLINIC | Age: 45
End: 2020-06-19

## 2020-06-19 ENCOUNTER — PATIENT OUTREACH (OUTPATIENT)
Dept: CARE COORDINATION | Facility: CLINIC | Age: 45
End: 2020-06-19

## 2020-06-19 DIAGNOSIS — M54.50 CHRONIC BILATERAL LOW BACK PAIN WITHOUT SCIATICA: ICD-10-CM

## 2020-06-19 DIAGNOSIS — G89.29 CHRONIC BILATERAL LOW BACK PAIN WITHOUT SCIATICA: ICD-10-CM

## 2020-06-19 RX ORDER — LIDOCAINE 50 MG/G
OINTMENT TOPICAL PRN
Qty: 2500 G | Refills: 1 | Status: SHIPPED | OUTPATIENT
Start: 2020-06-19 | End: 2021-06-17

## 2020-06-19 ASSESSMENT — ACTIVITIES OF DAILY LIVING (ADL): DEPENDENT_IADLS:: CLEANING;COOKING;LAUNDRY;SHOPPING;MEAL PREPARATION;MEDICATION MANAGEMENT

## 2020-06-19 NOTE — PROGRESS NOTES
Clinic Care Coordination Contact    Follow Up Progress Note      Assessment: The patient called with questions regarding control of the pain in his back and abdomin.  The patient is having trouble with the pain and controlling it with medications.  The patient is unable to use the narcotics due to the complication of constipation.  Per the patient he would prefer to be drowsy during the day to having any med that causes an increase in constipation.  The medications were reviewed with the patient and marked as reviewed.  The medications of celebrex was reviewed with the patient and he stated that he is willing to try it again.  The patient would like to try the lidocaine patches again and was advised to request the generic brand.  The patient will also contact the PMR doctor for follow up.       Goals addressed this encounter:   Goals Addressed                 This Visit's Progress      Healthy Eating (pt-stated)   On track     Goal Statement: I will work with the ILS person to make more palatable meals for me to eat.  Measure of Success: The patient is gaining weight  Supportive Steps to Achieve: weigh the patient routinely.  Barriers: food allergies  Strengths: motivated to gain weight  Date to Achieve By: 12/5/2020  Patient expressed understanding of goal: yes            Pain Management (pt-stated)   On track     Goal Statement: I will work with the physical therapists at [facility] to decrease the amount of pain in my neck, back and sacral area.  Measure of Success: Patient states less pain.  Supportive Steps to Achieve: physical therapy from [facility]  Barriers: quadriplegic   Strengths: motivated  Date to Achieve By: 12/5/2020  Patient expressed understanding of goal: yes                 Intervention/Education provided during outreach: Reviewed safety measures for COVID 19 avoidance.     Outreach Frequency: monthly    Plan:   1.  The patient will check on medications and what is available to him  especially with the generic lidocaine patches.  2.  The patient will contact the PMR doctor for some other ideas for pain management.  3.  The patient will make follow-up appointments as recommended and attend either by virtual or in person.    RN  Care Coordinator will follow up in 4 weeks.          Robbin Vanegas MSN, RN, PHN, [facility]   Primary Care Clinical RN Care Coordinator  [facility]  6/19/2020   3:28 PM  [email or web address]  Office: [phone]

## 2020-06-21 DIAGNOSIS — N31.9 NEUROGENIC BLADDER: ICD-10-CM

## 2020-06-21 DIAGNOSIS — R05.9 COUGH: ICD-10-CM

## 2020-06-22 ENCOUNTER — TELEPHONE (OUTPATIENT)
Dept: UROLOGY | Facility: CLINIC | Age: 45
End: 2020-06-22

## 2020-06-22 DIAGNOSIS — N39.0 RECURRENT UTI: Primary | ICD-10-CM

## 2020-06-22 NOTE — TELEPHONE ENCOUNTER
Health Call Center    Phone Message    May a detailed message be left on voicemail: no     Reason for Call: Medication Refill Request    Has the patient contacted the pharmacy for the refill? Yes   Name of medication being requested: Gentomyacin Irrigation Solution for Cathing, (not on med list)  Provider who prescribed the medication: Dr. Hayes  Pharmacy:  [facility]  Date medication is needed: Pt has just enough for a couple days, Pt says pharmacy has sent requests for a week, but nothing documented.      Action Taken: Message routed to:  [facility]    Travel Screening: Not Applicable

## 2020-06-22 NOTE — TELEPHONE ENCOUNTER
Message left on the patient' voicemail stating that we called his refill request to [facility] pharmacy at [phone] for his [address].    Raegan Le MA

## 2020-06-25 RX ORDER — ALBUTEROL SULFATE 90 UG/1
AEROSOL, METERED RESPIRATORY (INHALATION)
Qty: 18 G | Refills: 0 | Status: SHIPPED | OUTPATIENT
Start: 2020-06-25 | End: 2020-07-31

## 2020-06-25 RX ORDER — TOLTERODINE TARTRATE 2 MG/1
TABLET, EXTENDED RELEASE ORAL
Qty: 180 TABLET | Refills: 0 | Status: SHIPPED | OUTPATIENT
Start: 2020-06-25 | End: 2020-09-22

## 2020-07-17 ENCOUNTER — PATIENT OUTREACH (OUTPATIENT)
Dept: NURSING | Facility: CLINIC | Age: 45
End: 2020-07-17
Payer: COMMERCIAL

## 2020-07-17 ASSESSMENT — ACTIVITIES OF DAILY LIVING (ADL): DEPENDENT_IADLS:: CLEANING;COOKING;LAUNDRY;SHOPPING;MEAL PREPARATION;MEDICATION MANAGEMENT

## 2020-07-17 NOTE — PROGRESS NOTES
Clinic Care Coordination Contact    Follow Up Progress Note      Assessment: The RN CC contacted the patient by phone for a follow up visit.  The patient states that his pain remains the same without any relief from the medications.  The patient was recently seen by his pain management provider who referred him to another provider from [facility] for another opinion.  The patient denies any chest pain, SOB, fever, or headache.  He is using the Tylenol for the pain limiting the daily dose to 3000 mg.      Medication reconciliation was completed with the patient and marked as reviewed.  Health maintenance was reviewed and questions were answered with the patient.  The social determinants of health were reviewed and they were marked as reviewed.      Goals addressed this encounter:   Goals Addressed                 This Visit's Progress      Healthy Eating (pt-stated)   On track     Goal Statement: I will work with the ILS person to make more palatable meals for me to eat.  Measure of Success: The patient is gaining weight  Supportive Steps to Achieve: weigh the patient routinely.  Barriers: food allergies  Strengths: motivated to gain weight  Date to Achieve By: 12/5/2020  Patient expressed understanding of goal: yes            Pain Management (pt-stated)   On track     Goal Statement: I will work with the physical therapists at [facility] to decrease the amount of pain in my neck, back and sacral area.  Measure of Success: Patient states less pain.  Supportive Steps to Achieve: physical therapy from [facility]  Barriers: quadriplegic   Strengths: motivated  Date to Achieve By: 12/5/2020  Patient expressed understanding of goal: yes                 Intervention/Education provided during outreach: Reviewed the safety measures for the prevention of COVID.      Outreach Frequency: monthly    Plan:   1.  The patient will make and attend all recommended follow up appointments and the new appointment with pain management  from [facility].  2.  The patient will use the Tylenol as pain management making sure to limit the total taken during the 24 hour period to 3000 mg.  3.  The patient will work with physical therapy for pain management.    RN  Care Coordinator will follow up in 4 weeks.        Robbin Vanegas MSN, RN, PHN, [facility]   Primary Care Clinical RN Care Coordinator  [facility]  7/17/2020   1:27 PM  [email or web address]  Office: [phone]

## 2020-07-23 ENCOUNTER — TELEPHONE (OUTPATIENT)
Dept: FAMILY MEDICINE | Facility: CLINIC | Age: 45
End: 2020-07-23

## 2020-07-23 NOTE — TELEPHONE ENCOUNTER
Fax received from [employer] supply stating per patient's insurance they are needing chart notes documenting the continued need for power wheelchair and mattress (group 2 support surface).    Routing to pcp to addend the most recent chart note to add a note regarding the medical necessity for patient to have a power wheelchair and a mattress.      Route back to team and TC will fax addended chart note to [phone]. Call Abi Trujillo, [phone] with any questions.    Kem Peters

## 2020-07-23 NOTE — TELEPHONE ENCOUNTER
Clarify if his Physical Med and Rehab physician orders these, I think they do. Records would come from them, preferably. If I ordered (Not sure I did) let me know.    Thanks.  JOE Gibson, PA-C

## 2020-07-23 NOTE — TELEPHONE ENCOUNTER
Forms received from: [employer]     Fax listed: [phone]  Date received: July 23, 2020  Form description: MD Orders-Meds  Once forms are completed, please return to [employer] via fax.  Form placed: Jan Harris PA-C sign me folder.    Thank you,  Dulce REDDING  [facility]  Team [place] Coordinator

## 2020-07-27 NOTE — TELEPHONE ENCOUNTER
Called Abi from [facility] at [phone] and left a message regarding the message below.    Asked Abi to forward request to the ordering providers, and to call us to let pcp know if he had been the original ordering provider for the power wheelchair and mattress.    Kem Peters

## 2020-07-29 ENCOUNTER — TELEPHONE (OUTPATIENT)
Dept: CARE COORDINATION | Facility: CLINIC | Age: 45
End: 2020-07-29

## 2020-07-29 NOTE — TELEPHONE ENCOUNTER
The patient contacted the RN CC with a concern regarding his hemorrhoids.  In the last week he has been having increased bleeding, inability for the enema to be easily placed, as well as occasional stabbing discomfort.  The patient is unable to feel much at all at this level so he describes this as a stabbing discomfort.  For the bowel process that he uses it includes an enema every other day.  Can you recommend something to assist him with this issue?    Please contact the patient on his home number.      Robbin Vanegas MSN, RN, PHN, CCM   Primary Care Clinical RN Care Coordinator  [facility]  7/29/2020   10:20 AM  [email or web address]  Office: [phone]

## 2020-07-29 NOTE — TELEPHONE ENCOUNTER
Reviewed. He probably needs a formal exam in that area. Could consider some of the topical meds available.     He has a gastroenterologist and they generally specialize in these kinds of concerns. I'd recommend he reach out to them.    Thanks.  Raffy Harris, MPAS, PA-C

## 2020-08-04 DIAGNOSIS — G82.50 QUADRIPLEGIA (H): ICD-10-CM

## 2020-08-04 RX ORDER — BACLOFEN 20 MG/1
TABLET ORAL
Qty: 360 TABLET | Refills: 3 | Status: SHIPPED | OUTPATIENT
Start: 2020-08-04 | End: 2021-11-09

## 2020-08-04 NOTE — TELEPHONE ENCOUNTER
Routing refill request to provider for review/approval because:  Drug not on the [facility] refill protocol     Jasmin Sommer RN, BSN, PHN  [facility]: [place]

## 2020-08-18 ENCOUNTER — PATIENT OUTREACH (OUTPATIENT)
Dept: NURSING | Facility: CLINIC | Age: 45
End: 2020-08-18
Payer: COMMERCIAL

## 2020-08-18 ASSESSMENT — ACTIVITIES OF DAILY LIVING (ADL): DEPENDENT_IADLS:: CLEANING;COOKING;LAUNDRY;SHOPPING;MEAL PREPARATION;MEDICATION MANAGEMENT

## 2020-08-18 NOTE — PROGRESS NOTES
Clinic Care Coordination Contact    Follow Up Progress Note      Assessment: The RN CC contacted the patient by telephone for a follow up call.  The patient states that the pain is unchanged in the abdomin and back and sternal areas.  The patient will be undergoing a series of injections to determine if this pain is nerve in origin.  If the pain is nerve in origin there will be an additional procedure to treat the nerve.  The patient is scheduled for the first injection upcoming.  The RN CC will monitor the chart and contact the patient following the injections for the outcome.      Medication reconciliation was completed with the patient and marked as reviewed.  Health maintenance was reviewed and questions were answered with the patient.  The social determinants of health were reviewed and they were marked as reviewed.      Goals addressed this encounter:   Goals Addressed                 This Visit's Progress      Healthy Eating (pt-stated)   30%     Goal Statement: I will work with the ILS person to make more palatable meals for me to eat.  Measure of Success: The patient is gaining weight  Supportive Steps to Achieve: weigh the patient routinely.  Barriers: food allergies  Strengths: motivated to gain weight  Date to Achieve By: 12/5/2020  Patient expressed understanding of goal: yes            Pain Management (pt-stated)   50%     Goal Statement: I will work with the physical therapists at [facility] to decrease the amount of pain in my neck, back and sacral area.  Measure of Success: Patient states less pain.  Supportive Steps to Achieve: physical therapy from [facility]  Barriers: quadriplegic   Strengths: motivated  Date to Achieve By: 12/5/2020  Patient expressed understanding of goal: yes                 Intervention/Education provided during outreach: Reviewed the work of the injections and how the determination can be made of being nerve pain in origin.     Outreach Frequency: monthly    Plan:    1.  The patient will make and attend all appointments for the injections and the follow up information.  2.  The patient will monitor the results based on the directions from the office.  3.  The patient will continue to follow up as recommended by the providers.    RN  Care Coordinator will follow up in 4 weeks.          Robbin Vanegas MSN, RN, PHN, [facility]   Primary Care Clinical RN Care Coordinator  [facility]  8/18/2020   4:47 PM  [email or web address]  Office: [phone]

## 2020-08-26 ENCOUNTER — TELEPHONE (OUTPATIENT)
Dept: CARE COORDINATION | Facility: CLINIC | Age: 45
End: 2020-08-26

## 2020-08-26 ENCOUNTER — PATIENT OUTREACH (OUTPATIENT)
Dept: NURSING | Facility: CLINIC | Age: 45
End: 2020-08-26
Payer: COMMERCIAL

## 2020-08-26 DIAGNOSIS — K64.9 ACUTE HEMORRHOID: Primary | ICD-10-CM

## 2020-08-26 ASSESSMENT — ACTIVITIES OF DAILY LIVING (ADL): DEPENDENT_IADLS:: CLEANING;COOKING;LAUNDRY;SHOPPING;MEAL PREPARATION;MEDICATION MANAGEMENT

## 2020-08-26 NOTE — LETTER
"Subjective     Ayo Buckner is a 71 year old male who presents to clinic today for the following health issues:    HPI       Concern - burn  Onset: 4 days  Description: burn on lower L leg, upper ankle area  Intensity: severe, no pain  Progression of Symptoms:  Unknown, \"I didn't look at it today\"  Accompanying Signs & Symptoms: redness, blistering  Previous history of similar problem: n/a  Precipitating factors:        Worsened by: n/a,   Alleviating factors:        Improved by: na  Therapies tried and outcome: neosporin 2x      Objective    BP 96/64   Pulse 61   Temp 98.5  F (36.9  C) (Temporal)   Ht 1.753 m (5' 9\")   Wt 60.7 kg (133 lb 12.8 oz)   SpO2 98%   BMI 19.76 kg/m    Body mass index is 19.76 kg/m .  Physical Exam   GENERAL: healthy, alert and no distress  SKIN: left lower calf with irregular shaped dry red burn, with few small blisters, and one small area of superficial skin breakdown that is minimally weepy, no surround erythema or warmth             Assessment & Plan     Burn, first degree  - superficial - superficial partial thickness burn   - dressing applied in clinic: antibiotic ointment, non-stick tefla, gauze and then colband  - reviewed daily dressing changes  - should heal within 1-2 weeks  - reviewed signs and symptoms that should prompt follow up, if routine healing doesn't need to follow up unless concerns       No follow-ups on file.    Mattie Ybarra PA-C  [facility]      " [facility]  Complex Care Plan  About Me  Patient Name:  Riley Gifford    YOB: 1975  Age:     43 year old   Rut MRN:   [id number] Telephone Information:  Home Phone [phone]   Mobile NONE   Mobile [phone]       Address:    [address] [place] [address] [address]  [place] [address] Email address:  [email or web address]      Emergency Contact(s)  Name Relationship Lgl Grd Work Phone Home Phone Mobile Phone   1. TREY GIFFORD (NE* Relative No noen none [phone]   2. HODA GIFFORD Brother No  [phone]            Primary language:  English     needed? No   [employer]:  [phone] op. 1  Other communication barriers: Glasses  Preferred Method of Communication:  Lisa  Current living arrangement: I live in assisted living  Mobility Status/ Medical Equipment: Dependent/Assisted by Another    Health Maintenance  Health Maintenance Reviewed: Due/Overdue   Health Maintenance Due   Topic Date Due     EYE EXAM Q1 YEAR  04/26/2018     DTAP/TDAP/TD IMMUNIZATION (2 - Td) 10/29/2018     BMP Q6 MOS  01/12/2019     JUANA QUESTIONNAIRE 1 YEAR  03/01/2019     PHQ-9 Q1YR  03/01/2019         My Access Plan  Medical Emergency 911   Primary Clinic Line [facility] - [phone]   24 Hour Appointment Line [phone] or  [phone]) (toll-free)   24 Hour Nurse Line [phone] (toll-free)   Preferred Urgent Care [facility] [phone]   Preferred Hospital [facility]  [phone]   Preferred Pharmacy [facility] [address] [address] [address] AT [facility] [address]     Behavioral Health Crisis Line The National Suicide Prevention Lifeline at 1-550.571.5968 or 911     My Care Team Members    Patient Care Team       Relationship Specialty Notifications Start End    Raffy Harris PA-C PCP - General Physician Assistant  3/2/18     Phone: [phone] Fax: [phone]          [address] [facility] [address]    Robbin Andino, RN Lead Care Coordinator Nurse Admissions 1/6/16     phone:  [phone]    Phone: [phone] Fax: [phone]        Rober Leo MD Referring Physician Urology  1/8/16     Phone: [phone] Fax: [phone]         [address] [facility] [address]    Kimberly Zabala MD MD Urology  1/8/16     Phone: [phone] Fax: [phone]         [address] [facility] [address] [facility] [address]    mAanda Montoya (see comments)   1/8/16     [facility]     Phone: [phone]         Evaristo Hayes MD MD Urology  4/19/16     Phone: [phone] Fax: [phone]         [address] [address]    Jenny Ames, RN Registered Nurse Urology  4/19/16     Marta Adames, RN Clinic Care Coordinator Urology Abnormal results only, Admissions 11/3/16     Phone: [phone] Pager: [phone]        Rosemary Thomas, RN Nurse Coordinator Physical Medicine and Rehab  3/30/17     Phone: [phone] Pager: [phone]        Raffy Harris PA-C Assigned PCP   3/25/18     Phone: [phone] Fax: [phone]         [address] [facility] [address]            My Care Plans  Self Management and Treatment Plan  Goals and (Comments)  Goals        General    Healthy Eating (pt-stated)     Notes - Note created  9/24/2018 11:50 AM by Robbin Andino, RN    Goal Statement: I will work with the ILS person to make more palatable meals for me to eat.  Measure of Success: The patient is gaining weight  Supportive Steps to Achieve: weigh the patient routinely.  Barriers: food allergies  Strengths: motivated to gain weight  Date to Achieve By: ongoing  Patient expressed understanding of goal: yes          Pain Management (pt-stated)     Notes - Note edited  12/11/2018  2:54 PM by Robbin Andino, RN    Goal Statement: I will work with the physical therapists at [facility] to decrease the amount of pain  in my neck, back and sacral area.  Measure of Success: Patient states less pain.  Supportive Steps to Achieve: physical therapy from [facility]  Barriers: quadriplegic   Strengths: motivated  Date to Achieve By: ongoing  Patient expressed understanding of goal: yes                 Action Plans on File:                       Advance Care Plans/Directives Type:   Type Advanced Care Plans/Directives: Advanced Directive - On File(n/a)    My Medical and Care Information  Problem List   Patient Active Problem List   Diagnosis     Vitamin D deficiency     Eosinophilic esophagitis     Neurogenic bladder     CARDIOVASCULAR SCREENING; LDL GOAL LESS THAN 160     Quadriplegia (H)     Chronic constipation     Internal hemorrhoids with other complication     Diagnostic skin and sensitization tests(aka ALLERGENS)     Anal or rectal pain     Allergic rhinitis due to animal dander     Allergy to mold spores     House dust mite allergy     Insomnia     Health Care Home     Gallstones     Chronic midline thoracic back pain     Pulmonary nodules     ACP (advance care planning)     Cervical spinal cord injury, sequela (H)      Current Medications and Allergies:  See printed Medication Report.    Care Coordination Start Date: 1/6/2016   Frequency of Care Coordination: monthly   Form Last Updated: 03/13/2019

## 2020-08-26 NOTE — TELEPHONE ENCOUNTER
RN called patient and relayed provider's message. Patient verbalized understanding.     Jasmin Sommer RN, BSN, PHN  [facility]: [place]

## 2020-08-26 NOTE — PROGRESS NOTES
Clinic Care Coordination Contact      Patient contacted the RN CC by telephone today with a concern as follows.  Patient is having a great deal of problems with his hemorrhoids and he was referred back to [facility] and he said they are unable to take care of him as they have no Isaac lifts and he is unable to transfer to their tables.  Therefore he needs a referral down to the  to either gastroenterology or colorectal surgery to see if he could be seen there.  Per the patient he was seen there a couple of years ago and they did have the Isaac lifts and tables that moved up and down to accommodate his needs.  The patient is in discomfort to a great degree as this is an area he can feel.  Therefore the RN CC will send a message to the PCP requesting a referral to either or both departments down at the  to see who would be the best to take on the request of checking out his hemorrhoids and coming up with a treatment plan.    Plan: The RN CC will send a message to the PCP regarding this request.  The RN CC will also remain available for the patient should he have additional questions or problems.  The RN CC will plan on a follow-up contact with the patient in 4 weeks.    Robbin BONILLA, RN, PHN, [facility]   Primary Care Clinical RN Care Coordinator  [facility]  8/26/2020   1:39 PM  [email or web address]  Office: [phone]          Robbin BONILLA, RN, PHN, [facility]   Primary Care Clinical RN Care Coordinator  [facility]  8/26/2020   1:34 PM  [email or web address]  Office: [phone]

## 2020-08-26 NOTE — TELEPHONE ENCOUNTER
Patient contacted the RN CC by telephone today with a concern as follows.  Patient is having a great deal of problems with his hemorrhoids and he was referred back to [facility] and he said they are unable to take care of him as they have no Isaac lifts and he is unable to transfer to their tables.  Therefore he needs a referral down to the  to either gastroenterology or colorectal surgery to see if he could be seen there.  Per the patient he was seen there a couple of years ago and they did have the Isaac lifts and tables that moved up and down to accommodate his needs.  The patient is in discomfort to a great degree as this is an area he can feel.     Please contact the patient once the the referrals have been made so that he is aware if they are contacting him back.      Robbin Vanegas MSN, RN, PHN, CCM   Primary Care Clinical RN Care Coordinator  [facility]  8/26/2020   1:37 PM  [email or web address]  Office: [phone]

## 2020-08-26 NOTE — LETTER
[facility]  Complex Care Plan  About Me:    Patient Name:  Riley Gifford    YOB: 1975  Age:         44 year old   Rut MRN:    [id number] Telephone Information:  Home Phone [phone]   Mobile NONE   Mobile [phone]       Address:  [address] [place] [address] [address]  [place] [address] Email address:  [email or web address]      Emergency Contact(s)    Name Relationship Lgl Grd Work Phone Home Phone Mobile Phone   1. TREY GIFFORD (NE* Relative No noen none [phone]   2. DIRK AGUSTO Sister   [phone]    3. HODA GIFFORD Brother No  [phone]            Primary language:  English     needed? No   [employer]:  [phone] op. 1  Other communication barriers: Glasses  Preferred Method of Communication:  Lisa  Current living arrangement: I live in assisted living  Mobility Status/ Medical Equipment: Dependent/Assisted by Another    Health Maintenance  Health Maintenance Reviewed: Due/Overdue   Health Maintenance Due   Topic Date Due     MEDICARE ANNUAL WELLNESS VISIT  06/02/2015     EYE EXAM  04/26/2018     DTAP/TDAP/TD IMMUNIZATION (2 - Td) 10/29/2018     INFLUENZA VACCINE (1) 09/01/2020         My Access Plan  Medical Emergency 911   Primary Clinic Line [facility] [phone]   24 Hour Appointment Line [phone] or  [phone] (toll-free)   24 Hour Nurse Line [phone] (toll-free)   Preferred Urgent Care [facility] [phone]   Preferred Hospital [facility]  [phone]   Preferred Pharmacy [id number] [address] AT [facility]     Behavioral Health Crisis Line The National Suicide Prevention Lifeline at 1-329.895.9547 or 911             My Care Team Members  Patient Care Team       Relationship Specialty Notifications Start End    Raffy Harris PA-C PCP - General Physician Assistant  3/2/18      Phone: [phone] Fax: [phone] [address] [facility] [address]    Robbin Andino, RN Lead Care Coordinator Nurse Admissions 1/6/16     phone:  [phone]    Phone: [phone] Fax: [phone]        Rober Leo MD Referring Physician Urology  1/8/16     Phone: [phone] Fax: [phone] [address] [facility] [address]    Kimberly Zabala MD MD Urology  1/8/16     Phone: [phone] Fax: [phone]         [address] [place] [address]    Amanda Montoya (see comments)   1/8/16     [facility]     Phone: [phone]         Evarsito Hayes MD MD Urology  4/19/16     Phone: [phone] Fax: [phone]         [address] [place] [address]    Jenny Wright, RN Registered Nurse Urology  4/19/16     Marta Adames, RN Clinic Care Coordinator Urology Abnormal results only, Admissions 11/3/16     Phone: [phone] Pager: [phone]        Rosemary Thomas, RN Nurse Coordinator Physical Medicine and Rehab  3/30/17     Phone: [phone] Pager: [phone]        Raffy Harris PA-C Assigned PCP   6/7/20     Phone: [phone] Fax: [phone] [address] [facility] [address]            My Care Plans  Self Management and Treatment Plan  Goals and (Comments)  Goals        General    Healthy Eating (pt-stated)     Notes - Note edited  6/19/2020  1:20 PM by Robbin Andino, RN    Goal Statement: I will work with the ILS person to make more palatable meals for me to eat.  Measure of Success: The patient is gaining weight  Supportive Steps to Achieve: weigh the patient routinely.  Barriers: food allergies  Strengths: motivated to gain weight  Date to Achieve By: 12/5/2020  Patient expressed understanding of goal: yes          Pain Management (pt-stated)     Notes - Note edited  6/19/2020  1:20 PM by Robbin Andino RN    Goal Statement: I will work with the physical therapists at [facility] to decrease the amount of pain in my neck, back and sacral area.  Measure of Success: Patient states less pain.  Supportive Steps to Achieve: physical therapy from [facility]  Barriers: quadriplegic   Strengths: motivated  Date to Achieve By: 12/5/2020  Patient expressed understanding of goal: yes                 Action Plans on File:                       Advance Care Plans/Directives Type:   Type Advanced Care Plans/Directives: Advanced Directive - On File(n/a)    My Medical and Care Information  Problem List   Patient Active Problem List   Diagnosis     Vitamin D deficiency     Eosinophilic esophagitis     Neurogenic bladder     CARDIOVASCULAR SCREENING; LDL GOAL LESS THAN 160     Quadriplegia (H)     Chronic constipation     Internal hemorrhoids with other complication     Diagnostic skin and sensitization tests(aka ALLERGENS)     Anal or rectal pain     Allergic rhinitis due to animal dander     Allergy to mold spores     House dust mite allergy     Insomnia     Health Care Home     Gallstones     Chronic midline thoracic back pain     Pulmonary nodules     ACP (advance care planning)     Cervical spinal cord injury, sequela (H)     Spasticity      Current Medications and Allergies:  See printed Medication Report.    Care Coordination Start Date: 1/6/2016   Frequency of Care Coordination: monthly   Form Last Updated: 08/26/2020

## 2020-08-31 NOTE — TELEPHONE ENCOUNTER
RECORDS RECEIVED FROM: Robbin Andino RN    DATE RECEIVED: 10/8/2020   NOTES STATUS DETAILS   OFFICE NOTE from referring provider  Internal 8/26/2020 Referral    OFFICE NOTE from other specialist   Received 11/20/19 Office visit with ERAN Sanders ([facility])     10/4/19 Office visit with Dr. Nick Patton ([facility])     7/10/19 Office visit with Barbara Mai PA-C ([facility])     6/26/19 Office visit with Dr. Giovanny Grant ([facility])    DISCHARGE SUMMARY from hospital  N/A    DISCHARGE REPORT from the ER N/A    OPERATIVE REPORT  Received    MEDICATION LIST Internal    LABS     PFC TESTING N/A    ANAL PAP N/A    BIOPSIES/PATHOLOGY RELATED TO DIAGNOSIS Received 5/26/17   DIAGNOSTIC PROCEDURES     COLONOSCOPY N/A    UPPER ENDOSCOPY (EGD) Received/ Care Everywhere 5/26/17 ([facility])  11/1/19, 9/25/19 ([facility])     FLEX SIGMOIDOSCOPY  N/A    ERCP N/A    IMAGING (DISC & REPORT)      CT  Internal CT Chest Abdomen Pelvis: 6/8/18, 6/9/17  CT Abdomen Pelvis: 11/9/17   MRI N/A    XRAY N/A    ULTRASOUND (ENDOANAL/ENDORECTAL) N/A

## 2020-09-02 ENCOUNTER — TELEPHONE (OUTPATIENT)
Dept: FAMILY MEDICINE | Facility: CLINIC | Age: 45
End: 2020-09-02

## 2020-09-02 NOTE — TELEPHONE ENCOUNTER
Forms received from: [employer]    Fax listed: [phone]  Date received: September 2, 2020  Form description: Detailed Prescription-incontinence supplies  Once forms are completed, please return to [employer] via fax.  Form placed: Jan Harris PA-C sign me folder.    Thank you,  Dulce REDDING  [facility]  Team [place] Coordinator

## 2020-09-15 ENCOUNTER — TELEPHONE (OUTPATIENT)
Dept: FAMILY MEDICINE | Facility: CLINIC | Age: 45
End: 2020-09-15

## 2020-09-15 ENCOUNTER — PATIENT OUTREACH (OUTPATIENT)
Dept: NURSING | Facility: CLINIC | Age: 45
End: 2020-09-15
Payer: COMMERCIAL

## 2020-09-15 ASSESSMENT — ACTIVITIES OF DAILY LIVING (ADL): DEPENDENT_IADLS:: CLEANING;COOKING;LAUNDRY;SHOPPING;MEAL PREPARATION;MEDICATION MANAGEMENT

## 2020-09-15 NOTE — PROGRESS NOTES
Clinic Care Coordination Contact    Follow Up Progress Note      Assessment: The patient reached out as he had been seen at an [facility] urgent care and is being treated for a UTI.  The antibiotic was changed for one that is specific for the organism.  The patient received the new antibiotic and is not doing any better.  Therefore it was recommended to the patient that he call into the clinic to speak with a triage nurse to be seen in the clinic.  The patient agreed with this recommendation and will call the clinic for assistance.      Medication reconciliation was completed with the patient and marked as reviewed.  Health maintenance was reviewed and questions were answered with the patient.  The social determinants of health were reviewed and they were marked as reviewed.      Goals addressed this encounter:   Goals Addressed                 This Visit's Progress      Healthy Eating (pt-stated)   40%     Goal Statement: I will work with the ILS person to make more palatable meals for me to eat.  Measure of Success: The patient is gaining weight  Supportive Steps to Achieve: weigh the patient routinely.  Barriers: food allergies  Strengths: motivated to gain weight  Date to Achieve By: 12/5/2020  Patient expressed understanding of goal: yes            Pain Management (pt-stated)   50%     Goal Statement: I will work with the physical therapists at [facility] to decrease the amount of pain in my neck, back and sacral area.  Measure of Success: Patient states less pain.  Supportive Steps to Achieve: physical therapy from [facility]  Barriers: quadriplegic   Strengths: motivated  Date to Achieve By: 12/5/2020  Patient expressed understanding of goal: yes                 Intervention/Education provided during outreach: Reviewed the max dose of Tylenol and the reason for maintaining a schedule with this drug.       Outreach Frequency: monthly    Plan:   1.  The patient will take the Tylenol dose on a scheduled basis  to control and prevent pain.  2.  The patient will work on eating despite not having any appetite at this time.  3.  The patient will contact the clinic regarding a possible appointment for a follow up on his UTI.    RN CC Care Coordinator will follow up in 4 weeks.            Robbin Vanegas MSN, RN, PHN, CCM   Primary Care Clinical RN Care Coordinator  [facility]  9/15/2020   12:34 PM  [email or web address]  Office: [phone]

## 2020-09-15 NOTE — TELEPHONE ENCOUNTER
Patient was seen at the [facility] Urgent care 9-11,12,15, was given 2 doses of Rocephen injection, started on Cipro, urine culture came back     States had a good Bowel movement yesterday. Hasn't been eating super great, no fever present today. He is emptying bladder about every 3 hours, outputting 10-12 ounces, this is a normal amount of output for him.  He has had this pain for a while but it has gotten worse last night and this morning.  Patient has Amitriptyline  for pain that he started taking again.     His muscle spasms are improved, no fever 98.3 , urine color is light yellow in color, bladder is emptying completely.    He states his pain is about a 6 on a scale of 1-10.   Feels a little better today since starting keflex.    Encouraged him to continue his pain regimen, take antibiotics as indicated, he should start seeing increased improvement over the next day.  Encouraged him to also call his urologist.   He can call the clinic back if he is not seeing improvement, has continued level of pain or increased pain, decreased urine out, or fever.    Patient agreeable.    Mary Jane Cotto RN

## 2020-09-15 NOTE — TELEPHONE ENCOUNTER
Reason for call:  Patient reporting a symptom    Symptom or request: back pain/side pain    Duration (how long have symptoms been present): 9/11/20    Have you been treated for this before? No    Additional comments: patient says he was seen in a urgent care for a UTI and now is having back pain and side pain     Phone Number patient can be reached at:  Home number on file [phone] (home)    Best Time:  any    Can we leave a detailed message on this number:  YES    Call taken on 9/15/2020 at 11:43 AM by Cyndi Magaña

## 2020-09-16 ENCOUNTER — TELEPHONE (OUTPATIENT)
Dept: FAMILY MEDICINE | Facility: CLINIC | Age: 45
End: 2020-09-16

## 2020-09-16 ENCOUNTER — VIRTUAL VISIT (OUTPATIENT)
Dept: FAMILY MEDICINE | Facility: CLINIC | Age: 45
End: 2020-09-16
Payer: COMMERCIAL

## 2020-09-16 DIAGNOSIS — S14.109S CERVICAL SPINAL CORD INJURY, SEQUELA (H): ICD-10-CM

## 2020-09-16 DIAGNOSIS — G82.50 QUADRIPLEGIA (H): Primary | ICD-10-CM

## 2020-09-16 DIAGNOSIS — K59.09 CHRONIC CONSTIPATION: ICD-10-CM

## 2020-09-16 DIAGNOSIS — N30.00 ACUTE CYSTITIS WITHOUT HEMATURIA: ICD-10-CM

## 2020-09-16 DIAGNOSIS — R10.9 FLANK PAIN: ICD-10-CM

## 2020-09-16 DIAGNOSIS — N31.9 NEUROGENIC BLADDER: ICD-10-CM

## 2020-09-16 PROCEDURE — 99214 OFFICE O/P EST MOD 30 MIN: CPT | Mod: TEL | Performed by: FAMILY MEDICINE

## 2020-09-16 RX ORDER — CEPHALEXIN 250 MG/5ML
10 POWDER, FOR SUSPENSION ORAL 2 TIMES DAILY
COMMUNITY
Start: 2020-06-02 | End: 2020-09-18

## 2020-09-16 NOTE — TELEPHONE ENCOUNTER
Reason for Call: Request for an order:    Order being requested: Urgent order needed for a Isaac lift    Date needed: as soon as possible    Has the patient been seen by the PCP for this problem? YES    Additional comments: Patient has quadriplegia and his isaac lift stopped working today.  He uses it for all transfers.  Patient has a telephone visit today at 1:20 pm with Dr Casanova, to discuss an ongoing bladder infection and  Isaac Lift.   Please fax an order to [facility] ([phone]).  Medical Necessity and office notes will be needed as well    Phone number Patient can be reached at:   [phone] (home)    Best Time:  Any    Can we leave a detailed message on this number?  YES    Call taken on 9/16/2020 at 11:20 AM by Mary Ramos

## 2020-09-16 NOTE — TELEPHONE ENCOUNTER
Forwarding request for order to provider-patient has telephone visit with you this afternoon, but I thought it would be good to start process for order.  DME pended.    Returned call to patient for more information in order to fill out DME order.  Patient provided information about Isaac Lift and order pended for provider approval.    Kelley Anthony RN

## 2020-09-16 NOTE — PROGRESS NOTES
"Riley Gifford is a 44 year old male who is being evaluated via a billable telephone visit.      The patient has been notified of following:     \"This telephone visit will be conducted via a call between you and your physician/provider. We have found that certain health care needs can be provided without the need for a physical exam.  This service lets us provide the care you need with a short phone conversation.  If a prescription is necessary we can send it directly to your pharmacy.  If lab work is needed we can place an order for that and you can then stop by our lab to have the test done at a later time.    Telephone visits are billed at different rates depending on your insurance coverage. During this emergency period, for some insurers they may be billed the same as an in-person visit.  Please reach out to your insurance provider with any questions.    If during the course of the call the physician/provider feels a telephone visit is not appropriate, you will not be charged for this service.\"    Patient has given verbal consent for Telephone visit?  Yes    What phone number would you like to be contacted at? [phone]    How would you like to obtain your AVS? Jesushart    Subjective     Riley Gifford is a 44 year old male who presents via phone visit today for the following health issues:    HPI     Orders needed for Isaac Lift.   See previous encounter for further information gathered by Kelley ALBERTS.    ED/UC Followup:    Facility:  [facility]  Date of visit: 9/11/2020  Reason for visit: UTI  Current Status:   Nurse Triage from Yesterday:  Patient was seen at the [facility] 9-11,12,15, was given 2 doses of Rocephen injection, started on Cipro, urine culture came back    States had a good Bowel movement yesterday. Hasn't been eating super great, no fever present today. He is emptying bladder about every 3 hours, outputting 10-12 ounces, this is a normal amount of output for him.  He has " "had this pain for a while but it has gotten worse last night and this morning.  Patient has Amitriptyline  for pain that he started taking again.    His muscle spasms are improved, no fever 98.3 , urine color is light yellow in color, bladder is emptying completely.   He states his pain is about a 6 on a scale of 1-10.   Feels a little better today since starting keflex    Today Riley reports feeling \"SO-SO\" and still taking Keflex as prescribed.      Notify patient UC showed e. Coli. Resistant to Cipro so I sent over a prescription for Keflex. Stop Cipro and start new antibiotic. F/u as discussed at visit.     Gem Gutierrez, CRISTOBAL .................... 9/14/2020 8:09 AM      Switched to keflex 2-3 days ago, no fever, pain persisting  No new pain, no nausea, no vomiting  Bowel mvt every other day  Plenty of fluids  Self caths , gets UTI every few 3-4 months  Has a urologist   Flush  With gentamycin intermittent cath every 3-4 hours    He does not have severe abdominal pain with his chronic UTI's  His temp is normal now    Patient is quad and needs shirin lift to function, has used it for over 13 years and it broke this AM  He needs an order asap        Review of Systems   Constitutional, HEENT, cardiovascular, pulmonary, GI, , musculoskeletal, neuro, skin, endocrine and psych systems are negative, except as otherwise noted.       Objective          Vitals:  No vitals were obtained today due to virtual visit.    healthy, alert and no distress  PSYCH: Alert and oriented times 3; coherent speech, normal   rate and volume, able to articulate logical thoughts, able   to abstract reason, no tangential thoughts, no hallucinations   or delusions  His affect is normal  RESP: No cough, no audible wheezing, able to talk in full sentences  Remainder of exam unable to be completed due to telephone visits    No results found. However, due to the size of the patient record, not all encounters were searched. Please check Results " Review for a complete set of results.        Assessment/Plan:    ICD-10-CM    1. Quadriplegia (H)  G82.50 Isaac Lift Order for DME - ONLY FOR DME   2. Neurogenic bladder  N31.9 Isaac Lift Order for DME - ONLY FOR DME   3. Acute cystitis without hematuria  N30.00    4. Flank pain  R10.9    5. Chronic constipation  K59.09      New patient to this provider with history of quadriplegic who  needs isaac lift for his ADLs/all transfers and this morning his old one broke and needs a new order ASAP.patient's weight is 130 lbs and height 6 feet from last vitals..   He needs a Isaac sling. With  mesh sling with hole for commode and no head support.   Patient also needs a power chair and group II mattress as well.  Order placed today    History of neurogenic bladder and self-caths -who gets UTI every 3-4 months.  He does have a urologist who he will contact today.  He was diagnosed with possible kidney infection on last week and initially was on cipro but switched to keflex(monday) now due to resistance.  He is reporting resolved fever now, and mild improvement of flank/abdominal pain, he does have chronic constipation so he is not sure if that is contributing to his symptoms.  Reviewed results in epic unable to see culture results, but saw WBC was elevated they did not do renal function.  He should be evaluated in clinic for exam and labs as well as a follow up.  He is also advised to follow up with his urologist.  He was able to get in with pcp on Friday for recheck.  He is instructed to go to urgent care/ED if worsening symptoms.           See Patient Instructions    No follow-ups on file.    Caitlin Casanova DO  [facility]    Phone call duration:  22 minutes

## 2020-09-16 NOTE — TELEPHONE ENCOUNTER
Forwarding request for visit note addendum to provider.   Please see below.    Received call from Robbin Andino, who was working on obtaining equipment for patient and had spoken with patient and Amanda from [employer].  They are needing further documentation for the Isaac Lift.  Request for Dr. Casanova to addend visit note from today to include patient's weight and height.  They state last values from recent urgent care visit on 9/11/20 can be used-weight 130lbs and height 6'.  They are also needing note addendum to include specifics regarding the Isaac sling.  Patient needs a mesh sling with hole for commode and no head support.   Patient also needs a power chair and group II mattress.   If provider added this documentation as well, they could supply these items also.  If not, patient does have appt coming up on Friday with PCP, so could be addressed then or TE sent to PCP at this time.    Addended visit notes can be faxed to [employer] Attn: Amanda or Abi at [phone].    Kelley Anthony RN

## 2020-09-18 ENCOUNTER — OFFICE VISIT (OUTPATIENT)
Dept: FAMILY MEDICINE | Facility: CLINIC | Age: 45
End: 2020-09-18
Payer: COMMERCIAL

## 2020-09-18 VITALS
HEART RATE: 106 BPM | DIASTOLIC BLOOD PRESSURE: 63 MMHG | OXYGEN SATURATION: 96 % | SYSTOLIC BLOOD PRESSURE: 94 MMHG | TEMPERATURE: 98.6 F

## 2020-09-18 DIAGNOSIS — R30.0 DYSURIA: Primary | ICD-10-CM

## 2020-09-18 DIAGNOSIS — G89.29 CHRONIC ABDOMINAL PAIN: ICD-10-CM

## 2020-09-18 DIAGNOSIS — R10.9 CHRONIC ABDOMINAL PAIN: ICD-10-CM

## 2020-09-18 LAB
ALBUMIN UR-MCNC: NEGATIVE MG/DL
APPEARANCE UR: CLEAR
BILIRUB UR QL STRIP: NEGATIVE
COLOR UR AUTO: YELLOW
GLUCOSE UR STRIP-MCNC: NEGATIVE MG/DL
HGB UR QL STRIP: NEGATIVE
KETONES UR STRIP-MCNC: NEGATIVE MG/DL
LEUKOCYTE ESTERASE UR QL STRIP: NEGATIVE
NITRATE UR QL: NEGATIVE
PH UR STRIP: 6 PH (ref 5–7)
SOURCE: NORMAL
SP GR UR STRIP: 1.01 (ref 1–1.03)
UROBILINOGEN UR STRIP-ACNC: 0.2 EU/DL (ref 0.2–1)

## 2020-09-18 PROCEDURE — 81003 URINALYSIS AUTO W/O SCOPE: CPT | Performed by: PHYSICIAN ASSISTANT

## 2020-09-18 PROCEDURE — 99213 OFFICE O/P EST LOW 20 MIN: CPT | Performed by: PHYSICIAN ASSISTANT

## 2020-09-18 RX ORDER — SUCRALFATE ORAL 1 G/10ML
1 SUSPENSION ORAL 4 TIMES DAILY
Qty: 420 ML | Refills: 2 | Status: SHIPPED | OUTPATIENT
Start: 2020-09-18 | End: 2021-12-21

## 2020-09-18 NOTE — PROGRESS NOTES
"Subjective     Riley Gifford is a 44 year old male who presents to clinic today for the following health issues:    HPI       Concern - Recheck UTI  Onset: 09/11/2020 UC visit, 09/16/2020 UC follow up with Dr. Casanova  Description: Patient had noticed changes in his urine and was seen in urgent care and prescribed antibiotics. Patient is currently still taking antibiotics.   Progression of Symptoms:  Improving, patient says he is sleeping a little better  Accompanying Signs & Symptoms: No appetite  Previous history of similar problem: Yes  Precipitating factors:        Worsened by: nothing  Alleviating factors:        Improved by: antibiotics  Therapies tried and outcome: antibiotics    Chronic epigastric pain. This is not new. He's had for years. He has had it evaluated by GI, neuro, PMR, had injections, done GI studies, etc. No cause found. He has known Eosinophlic esophagitis which he takes a swallowed steroid for which helps. Carafate helps a little. Medical marijuana helps a little. The supposed likely cause is GI upset but due to his paraplegia, a musculoskeletal cause is also likely. He tends to \"jerk\" his back up a lot due to     Review of Systems   Constitutional, HEENT, cardiovascular, pulmonary, gi and gu systems are negative, except as otherwise noted.      Objective    Temp 98.6  F (37  C) (Oral)   There is no height or weight on file to calculate BMI.  Physical Exam   GENERAL: healthy, alert and no distress  ABDOMEN: soft, nontender, no hepatosplenomegaly, no masses and bowel sounds normal            Assessment & Plan     Dysuria  UA clear. Dysuria resolving. Finish antibiotics.   - *UA reflex to Microscopic and Culture ([place] [facility] (except [place] and [place])    Chronic abdominal pain  Reviewed. Reassurance given. This is chronic but all concerning causes ruled out. He wants to get back on Carafate because it helped and he is interested in retrial of medical marijuana. He will have a " phone visit again with previous certifying physician.   - sucralfate (CARAFATE) 1 GM/10ML suspension; Take 10 mLs (1 g) by mouth 4 times daily       No follow-ups on file.    JUANITO MARTIN PA-C  [facility]

## 2020-10-01 ENCOUNTER — TELEPHONE (OUTPATIENT)
Dept: FAMILY MEDICINE | Facility: CLINIC | Age: 45
End: 2020-10-01

## 2020-10-01 DIAGNOSIS — N31.9 NEUROGENIC BLADDER: Primary | ICD-10-CM

## 2020-10-01 RX ORDER — CEFAZOLIN SODIUM 1 G/50ML
1 INJECTION, SOLUTION INTRAVENOUS SEE ADMIN INSTRUCTIONS
Status: CANCELLED | OUTPATIENT
Start: 2020-10-01

## 2020-10-01 RX ORDER — CEFAZOLIN SODIUM 2 G/50ML
2 SOLUTION INTRAVENOUS
Status: CANCELLED | OUTPATIENT
Start: 2020-10-01

## 2020-10-01 NOTE — TELEPHONE ENCOUNTER
Forms received from [employer]: wound eval and treat for Jan Harris PA-C.  Forms placed in provider 'sign me' folder.  Please fax forms to [phone] after completion.    Kem Peters

## 2020-10-08 ENCOUNTER — PRE VISIT (OUTPATIENT)
Dept: SURGERY | Facility: CLINIC | Age: 45
End: 2020-10-08

## 2020-10-08 ENCOUNTER — TELEPHONE (OUTPATIENT)
Dept: UROLOGY | Facility: CLINIC | Age: 45
End: 2020-10-08

## 2020-10-08 DIAGNOSIS — N39.0 RECURRENT UTI: ICD-10-CM

## 2020-10-08 DIAGNOSIS — N31.9 NEUROGENIC BLADDER: ICD-10-CM

## 2020-10-08 DIAGNOSIS — N31.9 NEUROGENIC BLADDER: Primary | ICD-10-CM

## 2020-10-08 PROCEDURE — 87086 URINE CULTURE/COLONY COUNT: CPT | Performed by: UROLOGY

## 2020-10-08 PROCEDURE — 81001 URINALYSIS AUTO W/SCOPE: CPT | Performed by: UROLOGY

## 2020-10-08 NOTE — TELEPHONE ENCOUNTER
Pt notified that UA shows no nitrates or blood so we will need to wait until the UC comes back to see if anything grows out.

## 2020-10-08 NOTE — TELEPHONE ENCOUNTER
Pt reports upset stomach, nausea, back pain, urine cloudy with odor. Pt states he's irrigating as directed, catheterizing approximately q 3 h, and pushing fluids.    Orders for UA/UC placed per protocol.

## 2020-10-08 NOTE — TELEPHONE ENCOUNTER
[facility]    Phone Message    May a detailed message be left on voicemail: yes     Reason for Call: Order(s): Other:   Reason for requested: UA and UC-to check for urinary tract infection  Date needed: 10/8/20  Provider name: Dr. Freddy Lin is hoping to have the tests done as soon as possible. Please give Riley a call back at your earliest convenience when orders have been placed.      Action Taken: Message routed to:  [facility] UC Uro    Travel Screening: Not Applicable

## 2020-10-09 LAB
BACTERIA SPEC CULT: NORMAL
Lab: NORMAL
SPECIMEN SOURCE: NORMAL

## 2020-10-12 ENCOUNTER — TELEPHONE (OUTPATIENT)
Dept: FAMILY MEDICINE | Facility: CLINIC | Age: 45
End: 2020-10-12

## 2020-10-12 ENCOUNTER — TELEPHONE (OUTPATIENT)
Dept: CARE COORDINATION | Facility: CLINIC | Age: 45
End: 2020-10-12

## 2020-10-12 ENCOUNTER — TELEPHONE (OUTPATIENT)
Dept: UROLOGY | Facility: CLINIC | Age: 45
End: 2020-10-12

## 2020-10-12 NOTE — TELEPHONE ENCOUNTER
Forms received from [facility]: physician's order for Jan Harris PA-C.  Forms placed in provider 'sign me' folder.  Please fax forms to [phone] after completion.    Kem Peters

## 2020-10-12 NOTE — TELEPHONE ENCOUNTER
The patient called today stating that he is having a flare of abdominal pain and severe back pain.  The patient has reached out to the urology dept and there is no evidence of any UTI.  Therefore the patient has obtained an appointment with [facility].  They asked if he has had any recent labs that would be significant for his appointment.  Is there anything that he should have done prior to his appointment?  The patient prefers to have any labs drawn at [place].      Please call and update the patient.      Robbin Vanegas MSN, RN, PHN, [facility]   Primary Care Clinical RN Care Coordinator  [facility]  10/12/2020   2:23 PM  [email or web address]  Office: [phone]

## 2020-10-15 ENCOUNTER — PATIENT OUTREACH (OUTPATIENT)
Dept: CARE COORDINATION | Facility: CLINIC | Age: 45
End: 2020-10-15

## 2020-10-15 ASSESSMENT — ACTIVITIES OF DAILY LIVING (ADL): DEPENDENT_IADLS:: CLEANING;COOKING;LAUNDRY;SHOPPING;MEAL PREPARATION;MEDICATION MANAGEMENT

## 2020-10-15 NOTE — PROGRESS NOTES
Clinic Care Coordination Contact    Follow Up Progress Note      Assessment: The patient was contacted by phone for a follow up visit.  The patient is still suffering from abdominal and back pain for which there has no reason been found.  The patient has had a recent UA/UC without any indication of a UTI.  The patient now has an appointment with [facility] tomorrow for follow up there.  Hopefully they will have some ideas for the cause.  Otherwise the patient is stable in all other areas.      Medication reconciliation was completed with the patient and marked as reviewed.  Health maintenance was reviewed and questions were answered with the patient.  The social determinants of health were reviewed and they were marked as reviewed.      Goals addressed this encounter:   Goals Addressed                 This Visit's Progress      Healthy Eating (pt-stated)   20%     Goal Statement: I will work with the ILS person to make more palatable meals for me to eat.  Measure of Success: The patient is gaining weight  Supportive Steps to Achieve: weigh the patient routinely.  Barriers: food allergies  Strengths: motivated to gain weight  Date to Achieve By: 12/5/2020  Patient expressed understanding of goal: yes            Pain Management (pt-stated)   20%     Goal Statement: I will work with the physical therapists at [facility] to decrease the amount of pain in my neck, back and sacral area.  Measure of Success: Patient states less pain.  Supportive Steps to Achieve: physical therapy from [facility]  Barriers: quadriplegic   Strengths: motivated  Date to Achieve By: 12/5/2020  Patient expressed understanding of goal: yes                 Intervention/Education provided during outreach: Reviewed with the patient resources that he has to possibly make a difference.     Outreach Frequency: monthly    Plan:   1.  The patient will take all medications as prescribed by the providers.  2.  The patient will make and attend all follow  up appointments.  3.  The patient will work on eating everyday and often to prevent the abdominal pain if possible.    RN CC Care Coordinator will follow up in 4 weeks.              Robbin Vanegas MSN, RN, PHN, CCM   Primary Care Clinical RN Care Coordinator  [facility]  10/15/2020   11:25 AM  [email or web address]  Office: [phone]

## 2020-10-16 ENCOUNTER — TRANSFERRED RECORDS (OUTPATIENT)
Dept: HEALTH INFORMATION MANAGEMENT | Facility: CLINIC | Age: 45
End: 2020-10-16

## 2020-10-22 ENCOUNTER — TELEPHONE (OUTPATIENT)
Dept: FAMILY MEDICINE | Facility: CLINIC | Age: 45
End: 2020-10-22

## 2020-10-22 NOTE — TELEPHONE ENCOUNTER
Forms received from [employer]/ Medication Change Authorization - Proair inhaler for Jan Harris PA-C.  Forms placed in provider 'sign me' folder.  Please fax forms to [phone] after completion.    Luli Correa  Patient Representative

## 2020-11-06 DIAGNOSIS — Z11.59 ENCOUNTER FOR SCREENING FOR OTHER VIRAL DISEASES: Primary | ICD-10-CM

## 2020-11-14 ENCOUNTER — HEALTH MAINTENANCE LETTER (OUTPATIENT)
Age: 45
End: 2020-11-14

## 2020-11-17 ENCOUNTER — PATIENT OUTREACH (OUTPATIENT)
Dept: NURSING | Facility: CLINIC | Age: 45
End: 2020-11-17
Payer: COMMERCIAL

## 2020-11-17 ASSESSMENT — ACTIVITIES OF DAILY LIVING (ADL): DEPENDENT_IADLS:: CLEANING;COOKING;LAUNDRY;SHOPPING;MEAL PREPARATION;MEDICATION MANAGEMENT

## 2020-11-17 NOTE — PROGRESS NOTES
Clinic Care Coordination Contact    Follow Up Progress Note      Assessment: The RN CC nurse care coordinator contacted the patient by phone for a follow up call.  The patient states that today is a much better day although over the weekend he had a great deal of pain.  The patient is scheduled for botox with a cystoscopy in the beginning of December.  The patient plans on changing the appointment for the ultrasound of his kidneys prior to the procedure as it is really too early for him to be ready and down to the [facility].      Goals addressed this encounter:   Goals Addressed                 This Visit's Progress      Healthy Eating (pt-stated)   20%     Goal Statement: I will work with the ILS person to make more palatable meals for me to eat.  Measure of Success: The patient is gaining weight  Supportive Steps to Achieve: weigh the patient routinely.  Barriers: food allergies  Strengths: motivated to gain weight  Date to Achieve By: 12/5/2020  Patient expressed understanding of goal: yes            Pain Management (pt-stated)   20%     Goal Statement: I will work with the physical therapists at [facility] to decrease the amount of pain in my neck, back and sacral area.  Measure of Success: Patient states less pain.  Supportive Steps to Achieve: physical therapy from [facility]  Barriers: quadriplegic   Strengths: motivated  Date to Achieve By: 12/5/2020  Patient expressed understanding of goal: yes                 Intervention/Education provided during outreach: Education regarding the COVID virus and how to keep himself safe especially with staff coming and going from his home.     Outreach Frequency: monthly    Plan:   1.  The patient will monitor for any signs and symptoms of COVID and go to the ED when appropriate.  2.  The patient will report any symptoms of COVID to the PCP incase testing is called for.  3.  The patient will schedule his pre op exam and the pre procedure COVID test.    RN CC Nurse Care  Coordinator will follow up in 4 weeks.          \  Robbin Vanegas MSN, RN, PHN, CCM   Primary Care Clinical RN Care Coordinator  [facility]  11/17/2020   3:40 PM  [email or web address]  Office: [phone]

## 2020-12-01 ENCOUNTER — OFFICE VISIT (OUTPATIENT)
Dept: FAMILY MEDICINE | Facility: CLINIC | Age: 45
End: 2020-12-01
Payer: COMMERCIAL

## 2020-12-01 VITALS
SYSTOLIC BLOOD PRESSURE: 92 MMHG | TEMPERATURE: 96.7 F | OXYGEN SATURATION: 91 % | HEART RATE: 92 BPM | DIASTOLIC BLOOD PRESSURE: 70 MMHG

## 2020-12-01 DIAGNOSIS — M54.6 CHRONIC MIDLINE THORACIC BACK PAIN: ICD-10-CM

## 2020-12-01 DIAGNOSIS — G89.29 CHRONIC MIDLINE THORACIC BACK PAIN: ICD-10-CM

## 2020-12-01 DIAGNOSIS — S14.109S CERVICAL SPINAL CORD INJURY, SEQUELA (H): ICD-10-CM

## 2020-12-01 DIAGNOSIS — R53.83 OTHER FATIGUE: ICD-10-CM

## 2020-12-01 DIAGNOSIS — R25.2 SPASTICITY: ICD-10-CM

## 2020-12-01 DIAGNOSIS — Z01.818 PREOP GENERAL PHYSICAL EXAM: Primary | ICD-10-CM

## 2020-12-01 DIAGNOSIS — G82.50 QUADRIPLEGIA (H): ICD-10-CM

## 2020-12-01 LAB
BASOPHILS # BLD AUTO: 0.1 10E9/L (ref 0–0.2)
BASOPHILS NFR BLD AUTO: 0.9 %
DIFFERENTIAL METHOD BLD: ABNORMAL
EOSINOPHIL # BLD AUTO: 2.2 10E9/L (ref 0–0.7)
EOSINOPHIL NFR BLD AUTO: 27.4 %
ERYTHROCYTE [DISTWIDTH] IN BLOOD BY AUTOMATED COUNT: 12.4 % (ref 10–15)
ERYTHROCYTE [SEDIMENTATION RATE] IN BLOOD BY WESTERGREN METHOD: 5 MM/H (ref 0–15)
HCT VFR BLD AUTO: 43.1 % (ref 40–53)
HGB BLD-MCNC: 14.5 G/DL (ref 13.3–17.7)
LYMPHOCYTES # BLD AUTO: 1.4 10E9/L (ref 0.8–5.3)
LYMPHOCYTES NFR BLD AUTO: 16.8 %
MCH RBC QN AUTO: 32.3 PG (ref 26.5–33)
MCHC RBC AUTO-ENTMCNC: 33.6 G/DL (ref 31.5–36.5)
MCV RBC AUTO: 96 FL (ref 78–100)
MONOCYTES # BLD AUTO: 0.4 10E9/L (ref 0–1.3)
MONOCYTES NFR BLD AUTO: 5.3 %
NEUTROPHILS # BLD AUTO: 4.1 10E9/L (ref 1.6–8.3)
NEUTROPHILS NFR BLD AUTO: 49.6 %
PLATELET # BLD AUTO: 286 10E9/L (ref 150–450)
RBC # BLD AUTO: 4.49 10E12/L (ref 4.4–5.9)
WBC # BLD AUTO: 8.2 10E9/L (ref 4–11)

## 2020-12-01 PROCEDURE — 85652 RBC SED RATE AUTOMATED: CPT | Performed by: PHYSICIAN ASSISTANT

## 2020-12-01 PROCEDURE — 84443 ASSAY THYROID STIM HORMONE: CPT | Performed by: PHYSICIAN ASSISTANT

## 2020-12-01 PROCEDURE — 36415 COLL VENOUS BLD VENIPUNCTURE: CPT | Performed by: PHYSICIAN ASSISTANT

## 2020-12-01 PROCEDURE — 85025 COMPLETE CBC W/AUTO DIFF WBC: CPT | Performed by: PHYSICIAN ASSISTANT

## 2020-12-01 PROCEDURE — 99214 OFFICE O/P EST MOD 30 MIN: CPT | Performed by: PHYSICIAN ASSISTANT

## 2020-12-01 RX ORDER — HYOSCYAMINE SULFATE 0.125 MG
TABLET,DISINTEGRATING ORAL
COMMUNITY
Start: 2020-11-24 | End: 2022-12-19
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
## 2020-12-02 LAB — TSH SERPL DL<=0.005 MIU/L-ACNC: 1.43 MU/L (ref 0.4–4)

## 2020-12-08 DIAGNOSIS — Z01.818 PREOP GENERAL PHYSICAL EXAM: ICD-10-CM

## 2020-12-08 DIAGNOSIS — Z11.59 ENCOUNTER FOR SCREENING FOR OTHER VIRAL DISEASES: ICD-10-CM

## 2020-12-08 DIAGNOSIS — R82.90 NONSPECIFIC FINDING ON EXAMINATION OF URINE: Primary | ICD-10-CM

## 2020-12-08 DIAGNOSIS — R53.83 OTHER FATIGUE: ICD-10-CM

## 2020-12-08 LAB
ALBUMIN UR-MCNC: NEGATIVE MG/DL
AMORPH CRY #/AREA URNS HPF: ABNORMAL /HPF
APPEARANCE UR: ABNORMAL
BACTERIA #/AREA URNS HPF: ABNORMAL /HPF
BILIRUB UR QL STRIP: NEGATIVE
COLOR UR AUTO: YELLOW
GLUCOSE UR STRIP-MCNC: NEGATIVE MG/DL
HGB UR QL STRIP: NEGATIVE
KETONES UR STRIP-MCNC: NEGATIVE MG/DL
LEUKOCYTE ESTERASE UR QL STRIP: ABNORMAL
NITRATE UR QL: NEGATIVE
PH UR STRIP: 7.5 PH (ref 5–7)
RBC #/AREA URNS AUTO: ABNORMAL /HPF
SOURCE: ABNORMAL
SP GR UR STRIP: 1.02 (ref 1–1.03)
UROBILINOGEN UR STRIP-ACNC: 0.2 EU/DL (ref 0.2–1)
WBC #/AREA URNS AUTO: ABNORMAL /HPF

## 2020-12-08 PROCEDURE — 87086 URINE CULTURE/COLONY COUNT: CPT | Performed by: PHYSICIAN ASSISTANT

## 2020-12-08 PROCEDURE — U0003 INFECTIOUS AGENT DETECTION BY NUCLEIC ACID (DNA OR RNA); SEVERE ACUTE RESPIRATORY SYNDROME CORONAVIRUS 2 (SARS-COV-2) (CORONAVIRUS DISEASE [COVID-19]), AMPLIFIED PROBE TECHNIQUE, MAKING USE OF HIGH THROUGHPUT TECHNOLOGIES AS DESCRIBED BY CMS-2020-01-R: HCPCS | Performed by: UROLOGY

## 2020-12-08 PROCEDURE — 81001 URINALYSIS AUTO W/SCOPE: CPT | Performed by: PHYSICIAN ASSISTANT

## 2020-12-08 PROCEDURE — 87088 URINE BACTERIA CULTURE: CPT | Performed by: PHYSICIAN ASSISTANT

## 2020-12-08 NOTE — ADDENDUM NOTE
Addended by: REMIGIO QUINONES on: 12/8/2020 03:20 PM     Modules accepted: Orders     [Initial Consultation] : an initial consultation for [Parents] : parents [FreeTextEntry2] : vomiting, atopic dermatitis

## 2020-12-09 LAB
SARS-COV-2 RNA SPEC QL NAA+PROBE: NOT DETECTED
SPECIMEN SOURCE: NORMAL

## 2020-12-10 ENCOUNTER — ANESTHESIA EVENT (OUTPATIENT)
Dept: SURGERY | Facility: AMBULATORY SURGERY CENTER | Age: 45
End: 2020-12-10

## 2020-12-10 DIAGNOSIS — N39.0 URINARY TRACT INFECTION: Primary | ICD-10-CM

## 2020-12-10 LAB
BACTERIA SPEC CULT: ABNORMAL
Lab: ABNORMAL
SPECIMEN SOURCE: ABNORMAL
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
## 2020-12-11 ENCOUNTER — ANESTHESIA (OUTPATIENT)
Dept: SURGERY | Facility: AMBULATORY SURGERY CENTER | Age: 45
End: 2020-12-11

## 2020-12-11 ENCOUNTER — HOSPITAL ENCOUNTER (OUTPATIENT)
Facility: AMBULATORY SURGERY CENTER | Age: 45
Discharge: HOME | End: 2020-12-11
Attending: UROLOGY | Admitting: UROLOGY
Payer: COMMERCIAL

## 2020-12-11 ENCOUNTER — ANCILLARY PROCEDURE (OUTPATIENT)
Dept: ULTRASOUND IMAGING | Facility: CLINIC | Age: 45
End: 2020-12-11
Attending: STUDENT IN AN ORGANIZED HEALTH CARE EDUCATION/TRAINING PROGRAM
Payer: COMMERCIAL

## 2020-12-11 VITALS
TEMPERATURE: 97 F | BODY MASS INDEX: 17.61 KG/M2 | HEART RATE: 91 BPM | HEIGHT: 72 IN | SYSTOLIC BLOOD PRESSURE: 135 MMHG | WEIGHT: 130 LBS | DIASTOLIC BLOOD PRESSURE: 82 MMHG | OXYGEN SATURATION: 94 % | RESPIRATION RATE: 16 BRPM

## 2020-12-11 DIAGNOSIS — N31.9 NEUROGENIC BLADDER: ICD-10-CM

## 2020-12-11 PROCEDURE — 52287 CYSTOSCOPY CHEMODENERVATION: CPT

## 2020-12-11 PROCEDURE — 76770 US EXAM ABDO BACK WALL COMP: CPT | Performed by: RADIOLOGY

## 2020-12-11 PROCEDURE — 52287 CYSTOSCOPY CHEMODENERVATION: CPT | Mod: GC | Performed by: UROLOGY

## 2020-12-11 RX ORDER — ONDANSETRON 4 MG/1
4 TABLET, ORALLY DISINTEGRATING ORAL EVERY 30 MIN PRN
Status: DISCONTINUED | OUTPATIENT
Start: 2020-12-11 | End: 2020-12-12 | Stop reason: HOSPADM

## 2020-12-11 RX ORDER — ONDANSETRON 2 MG/ML
4 INJECTION INTRAMUSCULAR; INTRAVENOUS EVERY 30 MIN PRN
Status: DISCONTINUED | OUTPATIENT
Start: 2020-12-11 | End: 2020-12-12 | Stop reason: HOSPADM

## 2020-12-11 RX ORDER — NALOXONE HYDROCHLORIDE 0.4 MG/ML
0.4 INJECTION, SOLUTION INTRAMUSCULAR; INTRAVENOUS; SUBCUTANEOUS
Status: DISCONTINUED | OUTPATIENT
Start: 2020-12-11 | End: 2020-12-12 | Stop reason: HOSPADM

## 2020-12-11 RX ORDER — GABAPENTIN 300 MG/1
300 CAPSULE ORAL ONCE
Status: DISCONTINUED | OUTPATIENT
Start: 2020-12-11 | End: 2020-12-11 | Stop reason: HOSPADM

## 2020-12-11 RX ORDER — DEXAMETHASONE SODIUM PHOSPHATE 4 MG/ML
INJECTION, SOLUTION INTRA-ARTICULAR; INTRALESIONAL; INTRAMUSCULAR; INTRAVENOUS; SOFT TISSUE PRN
Status: DISCONTINUED | OUTPATIENT
Start: 2020-12-11 | End: 2020-12-11

## 2020-12-11 RX ORDER — SODIUM CHLORIDE, SODIUM LACTATE, POTASSIUM CHLORIDE, CALCIUM CHLORIDE 600; 310; 30; 20 MG/100ML; MG/100ML; MG/100ML; MG/100ML
INJECTION, SOLUTION INTRAVENOUS CONTINUOUS
Status: DISCONTINUED | OUTPATIENT
Start: 2020-12-11 | End: 2020-12-12 | Stop reason: HOSPADM

## 2020-12-11 RX ORDER — PROPOFOL 10 MG/ML
INJECTION, EMULSION INTRAVENOUS CONTINUOUS PRN
Status: DISCONTINUED | OUTPATIENT
Start: 2020-12-11 | End: 2020-12-11

## 2020-12-11 RX ORDER — NALOXONE HYDROCHLORIDE 0.4 MG/ML
0.2 INJECTION, SOLUTION INTRAMUSCULAR; INTRAVENOUS; SUBCUTANEOUS
Status: DISCONTINUED | OUTPATIENT
Start: 2020-12-11 | End: 2020-12-12 | Stop reason: HOSPADM

## 2020-12-11 RX ORDER — LIDOCAINE 40 MG/G
CREAM TOPICAL
Status: DISCONTINUED | OUTPATIENT
Start: 2020-12-11 | End: 2020-12-11 | Stop reason: HOSPADM

## 2020-12-11 RX ORDER — CEFAZOLIN SODIUM 2 G/50ML
2 SOLUTION INTRAVENOUS
Status: COMPLETED | OUTPATIENT
Start: 2020-12-11 | End: 2020-12-11

## 2020-12-11 RX ORDER — SODIUM CHLORIDE, SODIUM LACTATE, POTASSIUM CHLORIDE, CALCIUM CHLORIDE 600; 310; 30; 20 MG/100ML; MG/100ML; MG/100ML; MG/100ML
INJECTION, SOLUTION INTRAVENOUS CONTINUOUS
Status: DISCONTINUED | OUTPATIENT
Start: 2020-12-11 | End: 2020-12-11 | Stop reason: HOSPADM

## 2020-12-11 RX ORDER — CEFAZOLIN SODIUM 1 G/50ML
1 SOLUTION INTRAVENOUS SEE ADMIN INSTRUCTIONS
Status: DISCONTINUED | OUTPATIENT
Start: 2020-12-11 | End: 2020-12-11 | Stop reason: HOSPADM

## 2020-12-11 RX ORDER — ONDANSETRON 2 MG/ML
INJECTION INTRAMUSCULAR; INTRAVENOUS PRN
Status: DISCONTINUED | OUTPATIENT
Start: 2020-12-11 | End: 2020-12-11

## 2020-12-11 RX ORDER — MEPERIDINE HYDROCHLORIDE 25 MG/ML
12.5 INJECTION INTRAMUSCULAR; INTRAVENOUS; SUBCUTANEOUS
Status: DISCONTINUED | OUTPATIENT
Start: 2020-12-11 | End: 2020-12-12 | Stop reason: HOSPADM

## 2020-12-11 RX ORDER — ACETAMINOPHEN 325 MG/1
975 TABLET ORAL ONCE
Status: DISCONTINUED | OUTPATIENT
Start: 2020-12-11 | End: 2020-12-11 | Stop reason: HOSPADM

## 2020-12-11 RX ADMIN — CEFAZOLIN SODIUM 2 G: 2 SOLUTION INTRAVENOUS at 10:28

## 2020-12-11 RX ADMIN — PROPOFOL 100 MCG/KG/MIN: 10 INJECTION, EMULSION INTRAVENOUS at 10:20

## 2020-12-11 RX ADMIN — ONDANSETRON 4 MG: 2 INJECTION INTRAMUSCULAR; INTRAVENOUS at 10:20

## 2020-12-11 RX ADMIN — SODIUM CHLORIDE, SODIUM LACTATE, POTASSIUM CHLORIDE, CALCIUM CHLORIDE: 600; 310; 30; 20 INJECTION, SOLUTION INTRAVENOUS at 10:11

## 2020-12-11 RX ADMIN — DEXAMETHASONE SODIUM PHOSPHATE 4 MG: 4 INJECTION, SOLUTION INTRA-ARTICULAR; INTRALESIONAL; INTRAMUSCULAR; INTRAVENOUS; SOFT TISSUE at 10:20

## 2020-12-11 ASSESSMENT — MIFFLIN-ST. JEOR: SCORE: 1517.68

## 2020-12-11 NOTE — ANESTHESIA POSTPROCEDURE EVALUATION
Anesthesia POST Procedure Evaluation    Patient: Riley Gifford   MRN:     [id number] Gender:   male   Age:    45 year old :      1975        Preoperative Diagnosis: Neurogenic bladder [N31.9]   Procedure(s):  CYSTOSCOPY, WITH BOTULINUM TOXIN INJECTION   Postop Comments: No value filed.     Anesthesia Type: MAC       Disposition: Outpatient   Postop Pain Control: Uneventful            Sign Out: Well controlled pain   PONV: No   Neuro/Psych: Uneventful            Sign Out: Acceptable/Baseline neuro status   Airway/Respiratory: Uneventful            Sign Out: Acceptable/Baseline resp. status   CV/Hemodynamics: Uneventful            Sign Out: Acceptable CV status   Other NRE: NONE   DID A NON-ROUTINE EVENT OCCUR? No         Last Anesthesia Record Vitals:  CRNA VITALS  2020 1019 - 2020 1119      2020             Resp Rate (observed):  (!) 2    Resp Rate (set):  10          Last PACU Vitals:  Vitals Value Taken Time   /95 20 1051   Temp 36  C (96.8  F) 20 1051   Pulse     Resp 16 20 1051   SpO2 96 % 20 1051   Temp src     NIBP     Pulse     SpO2     Resp     Temp     Ht Rate     Temp 2           Electronically Signed By: Parvin Mackey MD, 2020, 1:28 PM

## 2020-12-11 NOTE — DISCHARGE INSTRUCTIONS
[facility]  Home Care Following Anesthesia  For 24 hours after surgery:  1. Get plenty of rest.  A responsible adult must stay with you for at least 24 hours after you leave the surgery center.  2. Do not drive or use heavy equipment.  If you have weakness or tingling, don't drive or use heavy equipment until this feeling goes away.   3. Do not drink alcohol.   4. Avoid strenuous or risky activities.  Ask for help when climbing stairs.  5. You may feel lightheaded.  IF so, sit for a few minutes before standing.  Have someone help you get up.   6. If you have nausea (feel sick to your stomach): Drink only clear liquids such as apple juice, ginger ale, broth or 7-Up.  Rest may also help.  Be sure to drink enough fluids.  Move to a regular diet as you feel able.   7. You may have a slight fever.  Call the doctor if your fever is over 100 F (37.7 C) (taken under the tongue) or lasts longer than 24 hours.  8. You may have a dry mouth, a sore throat, muscle aches or trouble sleeping. These should go away after 24 hours.  9. Do not make important or legal decisions.               Tips for taking pain medications  To get the best pain relief possible, remember these points:    Take pain medications as directed, before pain becomes severe.    Pain medication can upset your stomach: taking it with food may help.    Constipation is a common side effect of pain medication. Drink plenty of  fluids.    Eat foods high in fiber. Take a stool softener if recommended by your doctor or pharmacist.    Do not drink alcohol, drive or operate machinery while taking pain medications.    Ask about other ways to control pain, such as with heat, ice or relaxation.    Tylenol/Acetaminophen Consumption  To help encourage the safe use of acetaminophen, the makers of TYLENOL  have lowered the maximum daily dose for single-ingredient Extra Strength TYLENOL  (acetaminophen) products sold in the [place] from 8  pills per day (4,000 mg) to 6 pills per day (3,000 mg). The dosing interval has also changed from 2 pills every 4-6 hours to 2 pills every 6 hours.    If you feel your pain relief is insufficient, you may take Tylenol/Acetaminophen in addition to your narcotic pain medication.     Be careful not to exceed 3,000 mg of Tylenol/Acetaminophen in a 24 hour period from all sources.    If you are taking extra strength Tylenol/acetaminophen (500 mg), the maximum dose is 6 tablets in 24 hours.    If you are taking regular strength acetaminophen (325 mg), the maximum dose is 9 tablets in 24 hours.    Call a doctor for any of the followin. Signs of infection (fever, growing tenderness at the surgery site, a large amount of drainage or bleeding, severe pain, foul-smelling drainage, redness, swelling).  2. It has been over 8 to 10 hours since surgery and you are still not able to urinate (pass water).  3. Headache for over 24 hours.  4. Signs of Covid-19 infection (temperature over 100 degrees, shortness of breath, cough, loss of taste/smell, generalized body aches, persistent headache, chills, sore throat, nausea/vomiting/diarrhea)  Your doctor is:  Dr. Evaristo Giraldo, Prostate and Urology: [phone]            Or dial [phone] and ask for the resident on call for:  Prostate Urology  For emergency care, call the:  [place] [facility]:  [phone] (TTY for hearing impaired: [phone])

## 2020-12-11 NOTE — OP NOTE
OPERATIVE REPORT    PREOPERATIVE DIAGNOSIS:  Neurogenic bladder    POSTOPERATIVE DIAGNOSIS: Same    PROCEDURES PERFORMED:   1. Cystourethroscopy with injection of botulinum toxin A 300units in 20cc sterile saline    STAFF SURGEON: Dr. Evaristo Hayes MD, available for entire case.     RESIDENT(S):  Andrés Dodge MD    ANESTHESIA: Monitor Anesthesia Care    ESTIMATED BLOOD LOSS: 2 mL.     DRAINS: None    SIGNIFICANT FINDINGS:  1. Narrow urethral able to accommodate 21 Fr calvillo scope but tight, consider 19 Fr rigid cystoscope at next injection    OPERATIVE INDICATIONS:   Riley Gifford is a(n) 45 year old male with a history of with neurogenic bladder refractory to anti-cholinergics due to C-5 spinal cord injury. His last botox injection was June 2020 and was done in OR. He gets [employer]. He understands the risks and benefits of the various options and elects to proceed with botulinum toxin injection understanding the risks for urinary obstruction, infection, pain, bleeding, need for future procedures and risks of anesthesia.    DESCRIPTION OF PROCEDURE:   After verification of informed consent was obtained, the patient was brought to the operating room, and moved to the operating table. After adequate anesthesia was induced, the patient was repositioned in the lithotomy position and prepped and draped in the usual sterile fashion. A formal timeout was performed to confirm the correct patient, procedure and operative site.     A 21-Pashto Calvillo injection cystoscope was placed into the bladder.  The bladder mucosa appeared to be normal without stones, tumors or diverticulum on 360 degree inspection. The ureteral orifices were in the normal orthotopic position bilaterally.  We mixed 3 vials of 100 U botulinum toxin A into 20 mL of injectable saline for a total of (300 units/mL).  We performed a template injection procedure with 5 columns of 4 injections. All injections were placed deep to the mucosa into the detrusor muscle.   We then emptied her bladder to re-inspect our injection sites and found that there was a minimal amount of blood. His bladder was then emptied again. The patient was returned to supine position and transported to recovery in stable condition.    The procedure was then concluded. The patient was transferred to the postanesthesia care unit in stable condition and tolerated the procedure well.    POSTOP PLAN:  1. Follow up in 8 months for repeat bladder botox injection as patient desires to attempt to space out injections farther

## 2020-12-11 NOTE — BRIEF OP NOTE
[facility]    Brief Operative Note    Pre-operative diagnosis: Neurogenic bladder [N31.9]  Post-operative diagnosis Same as pre-operative diagnosis    Procedure: Procedure(s):  CYSTOSCOPY, WITH BOTULINUM TOXIN INJECTION  Surgeon: Surgeon(s) and Role:     * Evaristo Hayes MD - Primary     * Andrés Dodge MD - Assisting  Anesthesia: Monitor Anesthesia Care   Estimated blood loss: 2 mL  Drains: None  Specimens: * No specimens in log *  Findings:   300 U of botox in 20 cc; narrow urethra for calvillo scope, consider 19 rigid cystoscope at next procedure.  Complications: None.  Implants: * No implants in log *      Post-op plan:  -LINNEA today with no hydronephrosis  -Discharge home once PACU criteria are met  -Follow up in 6 months for repeat botox

## 2020-12-11 NOTE — ANESTHESIA CARE TRANSFER NOTE
Patient: Riley Gifford    Procedure(s):  CYSTOSCOPY, WITH BOTULINUM TOXIN INJECTION    Diagnosis: Neurogenic bladder [N31.9]  Diagnosis Additional Information: No value filed.    Anesthesia Type:   MAC     Note:  Airway :Face Mask  Patient transferred to:Phase II  Comments: VSS and WNL, comfortable, no PONV, report to Mary Jane RNHandoff Report: Identifed the Patient, Identified the Reponsible Provider, Reviewed the pertinent medical history, Discussed the surgical course, Reviewed Intra-OP anesthesia mangement and issues during anesthesia, Set expectations for post-procedure period and Allowed opportunity for questions and acknowledgement of understanding      Vitals: (Last set prior to Anesthesia Care Transfer)    CRNA VITALS  12/11/2020 1019 - 12/11/2020 1054      12/11/2020             Resp Rate (observed):  (!) 2    Resp Rate (set):  10                Electronically Signed By: SUJATA Nguyễn CRNA  December 11, 2020  10:54 AM

## 2020-12-14 ENCOUNTER — PATIENT OUTREACH (OUTPATIENT)
Dept: UROLOGY | Facility: CLINIC | Age: 45
End: 2020-12-14

## 2020-12-22 ENCOUNTER — PATIENT OUTREACH (OUTPATIENT)
Dept: NURSING | Facility: CLINIC | Age: 45
End: 2020-12-22
Payer: COMMERCIAL

## 2020-12-22 DIAGNOSIS — N31.9 NEUROGENIC BLADDER: ICD-10-CM

## 2020-12-22 ASSESSMENT — ACTIVITIES OF DAILY LIVING (ADL): DEPENDENT_IADLS:: CLEANING;COOKING;LAUNDRY;SHOPPING;MEAL PREPARATION;MEDICATION MANAGEMENT

## 2020-12-22 NOTE — PROGRESS NOTES
Clinic Care Coordination Contact    Follow Up Progress Note      Assessment: The RN CC nurse care coordinator contacted the patient by phone for a follow-up call.  The patient recently received another Botox injection from urology.  At this time that is working well although the patient is complaining of pain from his rectal area and his lower back as well as his upper back.  The patient currently is taking Tylenol on a scheduled basis in order to keep on top of the pain.  The patient is planning on a quiet [place] without any exposure to other people.    Medication reconciliation was completed with the patient and marked as reviewed.  Health maintenance was reviewed and questions were answered with the patient.  The assessment for social determinants of health were reviewed and they were marked as reviewed.      Goals addressed this encounter:   Goals Addressed                 This Visit's Progress      Healthy Eating (pt-stated)   30%     Goal Statement: I will work with the ILS person to make more palatable meals for me to eat.  Measure of Success: The patient is gaining weight  Supportive Steps to Achieve: weigh the patient routinely.  Barriers: food allergies  Strengths: motivated to gain weight  Date to Achieve By: 12/5/2020  Patient expressed understanding of goal: yes            Pain Management (pt-stated)   20%     Goal Statement: I will work with the physical therapists at [facility] to decrease the amount of pain in my neck, back and sacral area.  Measure of Success: Patient states less pain.  Supportive Steps to Achieve: physical therapy from [facility]  Barriers: quadriplegic   Strengths: motivated  Date to Achieve By: 12/5/2020  Patient expressed understanding of goal: yes                 Intervention/Education provided during outreach: Extra education regarding the need to isolate and avoid the Covid 19 virus.     Outreach Frequency: monthly    Plan:   1.  The patient will continue to take his  Tylenol on a scheduled basis to keep the pain 2 a minimum.  2.  The patient will isolate over the holidays to minimize the exposure to COVID-19.  3.  The patient will continue to work on exercises from physical therapy.    RN CC Nurse Care Coordinator will follow up in 4 weeks.          Robbin Vanegas MSN, RN, PHN, CCM   Primary Care Clinical RN Care Coordinator  [facility]  12/22/2020   3:16 PM  [email or web address]  Office: [phone]

## 2020-12-22 NOTE — LETTER
[facility]  Complex Care Plan  About Me:    Patient Name:  Riley Gifford    YOB: 1975  Age:         45 year old   Rut MRN:    [id number] Telephone Information:  Home Phone [phone]   Mobile NONE   Mobile [phone]       Address:  [address] [place] [address] [address] [place] [address] Email address:  [email or web address]      Emergency Contact(s)    Name Relationship Lgl Grd Work Phone Home Phone Mobile Phone   1. TREY GIFFORD (NE* Relative No noen none [phone]   2. AGUSTO CAVAZOS Sister   [phone]    3. HODA GIFFORD Brother No  [phone]            Primary language:  English     needed? No   [employer]:  [phone] op. 1  Other communication barriers: Glasses  Preferred Method of Communication:  Lisa  Current living arrangement: I live in assisted living  Mobility Status/ Medical Equipment: Dependent/Assisted by Another    Health Maintenance  Health Maintenance Reviewed: Due/Overdue   Health Maintenance Due   Topic Date Due     HEPATITIS C SCREENING  12/07/1994     MEDICARE ANNUAL WELLNESS VISIT  06/02/2015     EYE EXAM  04/26/2018     DTAP/TDAP/TD IMMUNIZATION (2 - Td) 10/29/2018     INFLUENZA VACCINE (1) 09/01/2020     BMP  12/01/2020         My Access Plan  Medical Emergency 911   Primary Clinic Line [facility] - [phone]   24 Hour Appointment Line [phone] or  [phone] ([phone]) (toll-free)   24 Hour Nurse Line [phone] (toll-free)   Preferred Urgent Care [facility] [phone]   Preferred Hospital [facility]  [phone]   Preferred Pharmacy [id number] - [place] [address] AT [facility] [address]     Behavioral Health Crisis Line The National Suicide Prevention Lifeline at 1-864.242.6745 or 911             My Care Team Members  Patient Care Team       Relationship Specialty Notifications Start End    Steven,  Raffy Leonardo PA-C PCP - General Physician Assistant  3/2/18     Phone: [phone] Fax: [phone]         [address] [facility] [address]    Robbin Andino, RN Lead Care Coordinator Nurse Admissions 1/6/16     phone:  [phone]    Phone: [phone] Fax: [phone]        Rober Leo MD Referring Physician Urology  1/8/16     Phone: [phone] Fax: [phone] [address] [facility] [address]    Kimberly Zabala MD MD Urology  1/8/16     Phone: [phone] Fax: [phone]         [address] [facility] [address] [facility] [address]    Amanda Montoya (see comments)   1/8/16     [facility]     Phone: [phone]         Evaristo Hayes MD MD Urology  4/19/16     Phone: [phone] Fax: [phone]         [address] [facility] [address] [facility] [address]    Jenny Wright, RN Registered Nurse Urology  4/19/16     Marta Adames, RN Clinic Care Coordinator Urology Abnormal results only, Admissions 11/3/16     Phone: [phone] Pager: [phone]        Rosemary Thomas, RN Nurse Coordinator Physical Medicine and Rehabilitation  3/30/17     Phone: [phone] Pager: [phone]        Raffy Harris PA-C Assigned PCP   6/7/20     Phone: [phone] Fax: [phone]         [address] [facility] [address]    Lulú Reveles APRN CNP Nurse Practitioner Nurse Practitioner  8/28/20     Phone: [phone] Fax: [phone]         [address] [facility] [address] [facility] [address]    Raffy Harris PA-C Referring Physician Family Medicine  11/10/20     Phone: [phone] Fax: [phone] [address] [facility] [address]    Amina Doe, RN Specialty Care Coordinator Urology  11/10/20     Phone: [phone]                 My Care Plans  Self Management and Treatment Plan  Goals and (Comments)  Goals        General    Healthy Eating (pt-stated)     Notes - Note edited  6/19/2020  1:20 PM by Robbin Andino RN     Goal Statement: I will work with the ILS person to make more palatable meals for me to eat.  Measure of Success: The patient is gaining weight  Supportive Steps to Achieve: weigh the patient routinely.  Barriers: food allergies  Strengths: motivated to gain weight  Date to Achieve By: 12/5/2020  Patient expressed understanding of goal: yes          Pain Management (pt-stated)     Notes - Note edited  6/19/2020  1:20 PM by Robbin Andino RN    Goal Statement: I will work with the physical therapists at [facility] to decrease the amount of pain in my neck, back and sacral area.  Measure of Success: Patient states less pain.  Supportive Steps to Achieve: physical therapy from [facility]  Barriers: quadriplegic   Strengths: motivated  Date to Achieve By: 12/5/2020  Patient expressed understanding of goal: yes                 Action Plans on File:                       Advance Care Plans/Directives Type:   Type Advanced Care Plans/Directives: Advanced Directive - On File(n/a)    My Medical and Care Information  Problem List   Patient Active Problem List   Diagnosis     Vitamin D deficiency     Eosinophilic esophagitis     Neurogenic bladder     CARDIOVASCULAR SCREENING; LDL GOAL LESS THAN 160     Quadriplegia (H)     Chronic constipation     Internal hemorrhoids with other complication     Diagnostic skin and sensitization tests(aka ALLERGENS)     Anal or rectal pain     Allergic rhinitis due to animal dander     Allergy to mold spores     House dust mite allergy     Insomnia     Health Care Home     Gallstones     Chronic midline thoracic back pain     Pulmonary nodules     ACP (advance care planning)     Cervical spinal cord injury, sequela (H)     Spasticity      Current Medications and Allergies:  See printed Medication Report.    Care Coordination Start Date: 1/6/2016   Frequency of Care Coordination: monthly   Form Last Updated: 12/22/2020

## 2020-12-23 RX ORDER — TOLTERODINE TARTRATE 2 MG/1
TABLET, EXTENDED RELEASE ORAL
Qty: 180 TABLET | Refills: 1 | Status: SHIPPED | OUTPATIENT
Start: 2020-12-23 | End: 2021-07-02

## 2020-12-23 NOTE — TELEPHONE ENCOUNTER
Prescription approved per [facility] Refill Protocol.  Parvin Corona RN

## 2021-01-05 ENCOUNTER — TELEPHONE (OUTPATIENT)
Dept: FAMILY MEDICINE | Facility: CLINIC | Age: 46
End: 2021-01-05

## 2021-01-05 NOTE — TELEPHONE ENCOUNTER
Forms received from [employer]/Detailed Prescription/Unspecified Urinary Incontinence for Jan Harris PA-C.  Forms placed in provider 'sign me' folder.  Please fax forms to [phone] after completion.    GALE GAMEZ

## 2021-01-13 ENCOUNTER — TELEPHONE (OUTPATIENT)
Dept: CARE COORDINATION | Facility: CLINIC | Age: 46
End: 2021-01-13

## 2021-01-13 ENCOUNTER — PATIENT OUTREACH (OUTPATIENT)
Dept: SURGERY | Facility: CLINIC | Age: 46
End: 2021-01-13

## 2021-01-13 DIAGNOSIS — K20.0 EOSINOPHILIC ESOPHAGITIS: Primary | ICD-10-CM

## 2021-01-13 DIAGNOSIS — R10.84 ABDOMINAL PAIN, GENERALIZED: ICD-10-CM

## 2021-01-13 RX ORDER — LIDOCAINE HYDROCHLORIDE 20 MG/ML
5 SOLUTION OROPHARYNGEAL EVERY 4 HOURS PRN
Qty: 100 ML | Refills: 2 | Status: SHIPPED | OUTPATIENT
Start: 2021-01-13 | End: 2021-08-13

## 2021-01-13 RX ORDER — ONDANSETRON 4 MG/1
4-8 TABLET, ORALLY DISINTEGRATING ORAL EVERY 8 HOURS PRN
Qty: 20 TABLET | Refills: 1 | Status: SHIPPED | OUTPATIENT
Start: 2021-01-13 | End: 2021-01-14

## 2021-01-13 NOTE — TELEPHONE ENCOUNTER
The patient called stating that his abdominal pain and back pain are greatly increased today.  The patient no longer has anything for the nausea that comes with the pain.  The patient is very interested in a virtual visit if there is one available.  Please contact the patient on the phone listed.      Robbin Vanegas MSN, RN, PHN, [facility]   Primary Care Clinical RN Care Coordinator  [facility]  1/13/2021   10:09 AM  [email or web address]  Office: [phone]]

## 2021-01-13 NOTE — TELEPHONE ENCOUNTER
If patient having severe abdominal pain, and red flag symptoms such as fever, vomiting, nausea, diarrhea.  Then he should go to the urgent care.  My concerns, with his history he may has bladder infections/ ? Kidney infections.    Otherwise, if symptom  chronic he has no other symptoms and he could wait until he comes in the clinic  thanks

## 2021-01-13 NOTE — TELEPHONE ENCOUNTER
Routing to provider, Dr. Sutherland    PCP has no available appointment tomorrow.   has avaliablity    Would you be wiling to see in clinic tomorrow or advise he go to urgent care today?     Al Verdugo, RN, BSN, PHN  [facility]

## 2021-01-13 NOTE — TELEPHONE ENCOUNTER
Patient reports ongoing back and abdominal pain. Has chronic back pain and states that he has had ongoing abdominal pain x 1 year. Pain is constant. Rates 6/10 today.    Patient has history of chronic constipation and gallstones.    He states that he has had various work ups for abdominal pain, with no known source. States he had BM yesterday. Denies fever, diarrhea, or vomiting. Confirms nausea. Is taking Tylenol PRN for pain.     Patient requesting virtual visit with available provider today. Patient has quadriplegia and therefore has difficulty coming to clinic for evaluation (no in person appointments available today anyway).     Routing to Dr. Sutherland to review and advise if agreeable to telephone visit with patient today? Or advise UC/ED?     Jasmin Sommer, RN, BSN, PHN  [facility] [place]

## 2021-01-13 NOTE — TELEPHONE ENCOUNTER
RN left  for patient at [phone] requesting return call to clinic main number    RN called to discuss recommendations from PCP in previous note    Al Verdugo, RN, BSN, PHN  [facility]

## 2021-01-13 NOTE — TELEPHONE ENCOUNTER
This is really something that preferably would be seen. But, It is very hard for Riley to get around due to his quadriplegia - but it sounds like his symptoms have progressed a good deal if I'm reading this right. If this is severe, he may have to get to the urgent care or ER.     In the meantime, he has had GI lidocaine in the past for when his stomach acts up - I can send one in in the meantime if he wants. RX will be sent. I will also send the Zofran in. If those help, he can monitor at home. If they are not helping or if symptoms are severe and rapidly progressive, and acute he should get in somewhere to be seen if possible.     Also, he has a GI doc for his chronic GI issues and should contact them if possible. He frequently calls us for issues his specialists manages.     JOE Gibson, PA-C

## 2021-01-13 NOTE — TELEPHONE ENCOUNTER
I would recommend seen in person.  Many causes could lead into abd. Pain and is not easy to sort of a telephone visit  Either  Follow up with his PCP or go to UC  thanks

## 2021-01-14 ENCOUNTER — OFFICE VISIT (OUTPATIENT)
Dept: FAMILY MEDICINE | Facility: CLINIC | Age: 46
End: 2021-01-14
Payer: COMMERCIAL

## 2021-01-14 VITALS
TEMPERATURE: 97.9 F | HEART RATE: 90 BPM | DIASTOLIC BLOOD PRESSURE: 78 MMHG | SYSTOLIC BLOOD PRESSURE: 117 MMHG | OXYGEN SATURATION: 95 %

## 2021-01-14 DIAGNOSIS — R82.90 NONSPECIFIC FINDING ON EXAMINATION OF URINE: Primary | ICD-10-CM

## 2021-01-14 DIAGNOSIS — R10.84 ABDOMINAL PAIN, GENERALIZED: ICD-10-CM

## 2021-01-14 DIAGNOSIS — Z78.9 SELF-CATHETERIZES URINARY BLADDER: ICD-10-CM

## 2021-01-14 DIAGNOSIS — K20.0 EOSINOPHILIC ESOPHAGITIS: ICD-10-CM

## 2021-01-14 LAB
ALBUMIN UR-MCNC: NEGATIVE MG/DL
APPEARANCE UR: ABNORMAL
BACTERIA #/AREA URNS HPF: ABNORMAL /HPF
BILIRUB UR QL STRIP: NEGATIVE
COLOR UR AUTO: YELLOW
GLUCOSE UR STRIP-MCNC: NEGATIVE MG/DL
HGB UR QL STRIP: ABNORMAL
KETONES UR STRIP-MCNC: NEGATIVE MG/DL
LEUKOCYTE ESTERASE UR QL STRIP: ABNORMAL
NITRATE UR QL: NEGATIVE
NON-SQ EPI CELLS #/AREA URNS LPF: ABNORMAL /LPF
PH UR STRIP: 7 PH (ref 5–7)
RBC #/AREA URNS AUTO: ABNORMAL /HPF
SOURCE: ABNORMAL
SP GR UR STRIP: 1.02 (ref 1–1.03)
UROBILINOGEN UR STRIP-ACNC: 0.2 EU/DL (ref 0.2–1)
WBC #/AREA URNS AUTO: ABNORMAL /HPF

## 2021-01-14 PROCEDURE — 99213 OFFICE O/P EST LOW 20 MIN: CPT | Performed by: FAMILY MEDICINE

## 2021-01-14 PROCEDURE — 87088 URINE BACTERIA CULTURE: CPT | Performed by: FAMILY MEDICINE

## 2021-01-14 PROCEDURE — 81001 URINALYSIS AUTO W/SCOPE: CPT | Performed by: FAMILY MEDICINE

## 2021-01-14 PROCEDURE — 87086 URINE CULTURE/COLONY COUNT: CPT | Performed by: FAMILY MEDICINE

## 2021-01-14 RX ORDER — CEPHALEXIN 500 MG/1
500 CAPSULE ORAL 2 TIMES DAILY
Qty: 20 CAPSULE | Refills: 0 | Status: SHIPPED | OUTPATIENT
Start: 2021-01-14 | End: 2021-01-14 | Stop reason: ALTCHOICE

## 2021-01-14 RX ORDER — CEPHALEXIN 250 MG/5ML
25 POWDER, FOR SUSPENSION ORAL 2 TIMES DAILY
Qty: 296 ML | Refills: 0 | Status: SHIPPED | OUTPATIENT
Start: 2021-01-14 | End: 2021-11-05

## 2021-01-14 RX ORDER — PROCHLORPERAZINE MALEATE 10 MG
10 TABLET ORAL EVERY 6 HOURS PRN
Qty: 30 TABLET | Refills: 2 | Status: SHIPPED | OUTPATIENT
Start: 2021-01-14 | End: 2022-12-19

## 2021-01-14 ASSESSMENT — PAIN SCALES - GENERAL: PAINLEVEL: MODERATE PAIN (5)

## 2021-01-14 NOTE — PROGRESS NOTES
Assessment & Plan     Abdominal pain, generalized  Advised patient to increase fluid intake, fiber intake, avoid constipation.  Otherwise, he has a benign abdomen today  - *UA reflex to Microscopic and Culture ([place] and [facility] (except [place] and [place])  - Urine Microscopic    Nonspecific finding on examination of urine  Start Keflex, advised to increase fluid intake.  - Urine Culture Aerobic Bacterial  - cephALEXin (KEFLEX) 250 MG/5ML suspension; Take 14.8 mLs (739 mg) by mouth 2 times daily for 10 days    Eosinophilic esophagitis    - prochlorperazine (COMPAZINE) 10 MG tablet; Take 1 tablet (10 mg) by mouth every 6 hours as needed for nausea or vomiting           Patient Instructions   With PCP      No follow-ups on file.    Shalom Sutherland MD  [facility]    Kevyn Lin is a 45 year old who presents to clinic today for the following health issues   Patient history of cognitive quadriplegic, self caths.  Comes in today with diffuse abdominal pain for the past few days.  He has no vomiting, he has no fever, no chills.  He does have constipation.  Abdominal/Flank Pain  Onset/Duration: weekend  Description:   Character: Dull ache  Location: epigastric region left lower quadrant  Radiation: Back  Intensity: moderate  Progression of Symptoms:  worsening and constant  Accompanying Signs & Symptoms:  Fever/Chills: no  Gas/Bloating: YES- bloating  Nausea: yes, yesterday  Vomitting: no  Diarrhea: no  Constipation: no  Dysuria or Hematuria: no  History:   Trauma: no  Previous similar pain: YES  Previous tests done: none  Precipitating factors:   Does the pain change with:     Food: no    Bowel Movement: no    Urination: no   Other factors:  no  Therapies tried and outcome: None    Review of Systems   Constitutional, HEENT, cardiovascular, pulmonary, gi and gu systems are negative, except as otherwise noted.      Objective    There were no vitals taken for this  visit.  There is no height or weight on file to calculate BMI.  Physical Exam   GENERAL: healthy, alert and no distress  ABDOMEN: soft, nontender, no hepatosplenomegaly, no masses and bowel sounds normal  BACK: no CVA tenderness, no paralumbar tenderness    UA RESULTS:  Recent Labs   Lab Test 01/14/21  1434   COLOR Yellow   APPEARANCE Slightly Cloudy   URINEGLC Negative   URINEBILI Negative   URINEKETONE Negative   SG 1.020   UBLD Trace*   URINEPH 7.0   PROTEIN Negative   UROBILINOGEN 0.2   NITRITE Negative   LEUKEST Moderate*   RBCU O - 2   WBCU 5-10*       Shalom Sutherland MD

## 2021-01-15 ENCOUNTER — TELEPHONE (OUTPATIENT)
Dept: SURGERY | Facility: CLINIC | Age: 46
End: 2021-01-15

## 2021-01-15 LAB
BACTERIA SPEC CULT: ABNORMAL
Lab: ABNORMAL
SPECIMEN SOURCE: ABNORMAL

## 2021-01-18 NOTE — TELEPHONE ENCOUNTER
Patient had visit on 1/14/21. RN to close encounter.     Jasmin Sommer, RN, BSN, PHN  [facility] [place]

## 2021-01-20 NOTE — PROGRESS NOTES
Clinic Care Coordination Contact  Care Team Conversations    Since the patient was seen and given an antibiotic the RN CC moved the follow up call to after it was completed.      Robbin Vanegas MSN, RN, PHN, CCM   Primary Care Clinical RN Care Coordinator  [facility]  1/20/2021   12:49 PM  [email or web address]  Office: [phone]

## 2021-01-22 DIAGNOSIS — N39.0 RECURRENT UTI: Primary | ICD-10-CM

## 2021-01-27 NOTE — TELEPHONE ENCOUNTER
We've never managed or prescribed the sodium chloride. His urologist manages - please defer to them.    JOE Gibson, PA-C

## 2021-01-27 NOTE — TELEPHONE ENCOUNTER
Routing refill request to provider for review/approval because:  Drug not on the [facility] refill protocol     Jasmin Sommer RN, BSN, PHN  [facility] [place]

## 2021-01-28 ENCOUNTER — TELEPHONE (OUTPATIENT)
Dept: CARE COORDINATION | Facility: CLINIC | Age: 46
End: 2021-01-28

## 2021-01-28 NOTE — TELEPHONE ENCOUNTER
Called patient and scheduled for soonest available office visit with pcp: 2/2 at 11:20.    Kem Peters

## 2021-01-28 NOTE — TELEPHONE ENCOUNTER
The patient contacted the RN CC with a myriad of symptoms that are currently becoming a major problem.  Starting with the stomach and esophogas becoming painful whenever he eats.  This was referred to [facility] for follow up and Riley will call for the appointment.  The patient also has a very painful bottom and he is concerned about sores opening.  Riley is calling for a new measurement of his chair.  A referral during a visit from PCP to home care could have a nurse assess this area for possible sores.      Therefore please contact Riley by phone for setting up an appointment with PCP.    Robbin Vanegas MSN, RN, PHN, CCM   Primary Care Clinical RN Care Coordinator  [facility]  1/28/2021   12:03 PM  [email or web address]  Office: [phone]

## 2021-01-29 ENCOUNTER — TELEPHONE (OUTPATIENT)
Dept: FAMILY MEDICINE | Facility: CLINIC | Age: 46
End: 2021-01-29

## 2021-01-29 DIAGNOSIS — N39.0 RECURRENT UTI: ICD-10-CM

## 2021-01-29 DIAGNOSIS — L89.90 PRESSURE INJURY OF SKIN, UNSPECIFIED INJURY STAGE, UNSPECIFIED LOCATION: Primary | ICD-10-CM

## 2021-01-29 NOTE — TELEPHONE ENCOUNTER
I called patient today to assist with scheduling    No open sores present, just painful in in some areas around the left coccyx. He denies redness to that area.  He has been moving and making adjustments so he's not sitting on that area throughout the day. Also having his aides check the area    Do you still want the appointment today?  He is comfortable with waiting, didn't seem to know why he needed the additional appt.      Mary Jane Cotto RN

## 2021-01-29 NOTE — TELEPHONE ENCOUNTER
Call taken from [facility], they received the referral from [facility] and face sheet, orders, and 1/14/21 visit note.   However, that visit does not address the pressure sores; they need face-to-face encounter with the wounds and need for home care addressed.    Could be virtual visit.    Patient is being seen 2/2/21 but I see provider wanted home care to start before then.    No openings today, routed to PCP, any chance of adding patient on for video visit (assuming patient has this capability?)?    Please route back to NE RN triage (not this RN).    Yuki Cox, ARISTEO  [facility]

## 2021-01-29 NOTE — TELEPHONE ENCOUNTER
PT [facility] and their number is [phone]   Verified Results  XR SPINE SCOLIOSIS STUDY 2V [id number] 03:35PM STONEY AVILA   Ordering Provider: STONEY AVILA.    

## 2021-01-29 NOTE — TELEPHONE ENCOUNTER
Patient is looking to get a refill on their Gentamicin 480 ml/l Bladder irrigation (compound).  Please send approval to  [facility].        Thanks  Joshua Renee  Compounding Pharmacy Technician  [facility]   [address] [place] [id number]   Phone: [phone]  Fax: [phone]

## 2021-01-29 NOTE — TELEPHONE ENCOUNTER
Team  Please call Riley  He spoke with Robbin Vanegas RN regarding buttock pain. He is on my schedule for next Tuesday. This is too long to wait to have this evaluated.  I am placing a homecare order. Please let him know to expect them to call today and hopefully be evaluated before the weekend.  Thanks.  Raffy Harris, MPAS, PA-C

## 2021-02-01 ENCOUNTER — TELEPHONE (OUTPATIENT)
Dept: UROLOGY | Facility: CLINIC | Age: 46
End: 2021-02-01

## 2021-02-01 NOTE — TELEPHONE ENCOUNTER
[facility] Call Center    Phone Message    May a detailed message be left on voicemail: yes     Reason for Call: Medication Refill Request    Has the patient contacted the pharmacy for the refill? Yes   Name of medication being requested:Gentamicin 480 ml/l Bladder irrigation (compound)  Provider who prescribed the medication: Dr. Hayes  Pharmacy: [facility]  Date medication is needed: 02/01/2021       Action Taken: Message routed to:  [facility]    Travel Screening: Not Applicable

## 2021-02-01 NOTE — TELEPHONE ENCOUNTER
Called patient and pharmacy refilled the bladder instillation of gentamycin for 12 months Blanka White LPN Staff Nurse

## 2021-02-02 ENCOUNTER — OFFICE VISIT (OUTPATIENT)
Dept: FAMILY MEDICINE | Facility: CLINIC | Age: 46
End: 2021-02-02
Payer: COMMERCIAL

## 2021-02-02 ENCOUNTER — TELEPHONE (OUTPATIENT)
Dept: UROLOGY | Facility: CLINIC | Age: 46
End: 2021-02-02

## 2021-02-02 ENCOUNTER — DOCUMENTATION ONLY (OUTPATIENT)
Dept: CARE COORDINATION | Facility: CLINIC | Age: 46
End: 2021-02-02

## 2021-02-02 VITALS — HEART RATE: 95 BPM | TEMPERATURE: 97.2 F | SYSTOLIC BLOOD PRESSURE: 115 MMHG | DIASTOLIC BLOOD PRESSURE: 83 MMHG

## 2021-02-02 DIAGNOSIS — G82.50 QUADRIPLEGIA (H): Primary | ICD-10-CM

## 2021-02-02 DIAGNOSIS — G89.29 CHRONIC ABDOMINAL PAIN: ICD-10-CM

## 2021-02-02 DIAGNOSIS — K20.0 EOSINOPHILIC ESOPHAGITIS: ICD-10-CM

## 2021-02-02 DIAGNOSIS — M62.838 SPASM OF MUSCLE: ICD-10-CM

## 2021-02-02 DIAGNOSIS — N31.9 NEUROGENIC BLADDER: ICD-10-CM

## 2021-02-02 DIAGNOSIS — R10.9 CHRONIC ABDOMINAL PAIN: ICD-10-CM

## 2021-02-02 DIAGNOSIS — L89.306 PRESSURE INJURY OF DEEP TISSUE OF BUTTOCK, UNSPECIFIED LATERALITY: ICD-10-CM

## 2021-02-02 PROCEDURE — 99214 OFFICE O/P EST MOD 30 MIN: CPT | Performed by: PHYSICIAN ASSISTANT

## 2021-02-02 RX ORDER — CLONAZEPAM 0.5 MG/1
0.5 TABLET ORAL 2 TIMES DAILY PRN
Qty: 30 TABLET | Refills: 0 | Status: SHIPPED | OUTPATIENT
Start: 2021-02-02 | End: 2022-12-19

## 2021-02-02 RX ORDER — DULOXETIN HYDROCHLORIDE 30 MG/1
CAPSULE, DELAYED RELEASE ORAL
Qty: 180 CAPSULE | Refills: 0 | Status: CANCELLED | OUTPATIENT
Start: 2021-02-02

## 2021-02-02 NOTE — TELEPHONE ENCOUNTER
----- Message from Amina Doe RN sent at 1/29/2021  9:24 AM CST -----  Regarding: Video Visit with Dr. Freddy Turner,    Please schedule pt for video visit to discuss gentamicin instillations.  Patient hasn't been seen for over a year now    Thanks,  Amina

## 2021-02-02 NOTE — PATIENT INSTRUCTIONS
1. Spasms are not well relieved by standard meds - could something like Clonazepam daily be helpful?   2. Consider the high CBD, low THC marijuana blends  3. Duloxetine (AKA Cymbalta)? We could try that for pain, stomach (helps a little) - but not sure if you've been on this before.

## 2021-02-02 NOTE — PROGRESS NOTES
"  Assessment & Plan     Quadriplegia (H)  Unable to leave home or ambulate or transfer.  Requires an RN evaluation of his buttock area as I am not able to get him on a table today.  I will place a referral.   - HOME CARE NURSING REFERRAL    Pressure injury of deep tissue of buttock, unspecified laterality  Unclear. Will refer  - HOME CARE NURSING REFERRAL    Chronic abdominal pain  Chronic. His abdomen feels fine today but a bit difficult to feel as he has an abdominal binder on. In the past, constipation has proven a challenge and cause for increased GI symptoms. I advised he increase his miralax to daily. The discomfort, nausea and no appetite may be caused by this.   - HOME CARE NURSING REFERRAL    Eosinophilic esophagitis  Chronic. Managed by GI     Neurogenic bladder  Managed by Urology     Spasm of muscle  Chronic. He \"jerks\" himself up a lot in our visits. I think this spasm is a major cause for pain that he feels. His CT lumbar spine shoed bonespurring but no other findings. He's failed a lot of medications. I am fine trying something a bit more potent - trial Clonazepam. I explained the risks, side effects, benefits, of this. He agrees to follow up with his PMR physician today on this also. This prescription is given with a discussion of side effects, risks and proper use.  Instructions are given to follow up if not improving or symptoms change or worsen as discussed.   - clonazePAM (KLONOPIN) 0.5 MG tablet; Take 1 tablet (0.5 mg) by mouth 2 times daily as needed for muscle spasms    No follow-ups on file.    DANNI DAVIS [facility]    Kevyn Lin is a 45 year old who presents to clinic today for the following health issues     HPI       Abdominal/Flank Pain/Side pain   Onset/Duration: for a while/chronic/years  Description:   Character: Dull ache  Location: right flank left flank, all over abdomen  Radiation: Back  Intensity: 6-7/10  Progression of Symptoms:  " "Worsening, side and back pain  Accompanying Signs & Symptoms:  Fever/Chills: no  Gas/Bloating: no  Nausea: YES, in the morning  Vomitting: no  Diarrhea: no  Constipation: no  Dysuria or Hematuria: patient has a catheter   History:   Trauma: no  Previous similar pain: YES  Previous tests done: none  Precipitating factors:   Does the pain change with:     Food: no, sometimes worse after eating but not often    Bowel Movement: no    Urination: no   Other factors:  no  Therapies tried and outcome: omeprazole is helping a little       Concern - Possible Bottom sores  Onset: on/off for a while  Description: patient has some pain on his bottoms, patient says they haven't noticed any red spots  Intensity: moderate  Progression of Symptoms:  Same, a little worse at times  Accompanying Signs & Symptoms: none  Previous history of similar problem: Yes, it has been a while  Precipitating factors:        Worsened by: sitting for long periods  Alleviating factors:        Improved by: wound care in the past  Therapies tried and outcome: None    Quadraplegic. Uses a wheelchair. Has a lot of spasms and tends to \"jerk\" himself upright. Ongoing chronic pain in mid back, low back, stomach and sides. This is ongoing for years and has only gotten more prominent. He's had multiple GI, ortho, spine work ups which have not revealed a clear cause. Meds tried include narcotics, medical marijuana, TCA, NSAID, Gabapentin which he's had no major relief from. He's even had epidural injections which have not helped.     He's frustrated things have persisted.    His buttocks he says are red and sore. He is not able to get onto an exam table for me today.    Patient Active Problem List   Diagnosis     Vitamin D deficiency     Eosinophilic esophagitis     Neurogenic bladder     CARDIOVASCULAR SCREENING; LDL GOAL LESS THAN 160     Quadriplegia (H)     Chronic constipation     Internal hemorrhoids with other complication     Diagnostic skin and " sensitization tests(aka ALLERGENS)     Anal or rectal pain     Allergic rhinitis due to animal dander     Allergy to mold spores     House dust mite allergy     Insomnia     Health Care Home     Gallstones     Chronic midline thoracic back pain     Pulmonary nodules     ACP (advance care planning)     Cervical spinal cord injury, sequela (H)     Spasticity     Self-catheterizes urinary bladder      Current Outpatient Medications   Medication     Acetaminophen (TYLENOL PO)     albuterol (PROAIR HFA/PROVENTIL HFA/VENTOLIN HFA) 108 (90 Base) MCG/ACT inhaler     alum & mag hydroxide-simethicone (MYLANTA/MAALOX) 200-200-20 MG/5ML SUSP suspension     baclofen (LIORESAL) 20 MG tablet     budesonide (PULMICORT) 0.5 MG/2ML neb solution     CARAFATE 1 GM/10ML PO suspension     celecoxib (CELEBREX) 200 MG capsule     cetirizine (ZYRTEC) 10 MG tablet     clonazePAM (KLONOPIN) 0.5 MG tablet     clotrimazole 10 MG meryl     COMPOUNDED NON-CONTROLLED SUBSTANCE (CMPD RX) - PHARMACY TO MIX COMPOUNDED MEDICATION     COMPRESSION STOCKINGS     diphenhydrAMINE (BENADRYL) 25 MG tablet     docusate sodium (ENEMEEZ MINI) 283 MG enema     EPINEPHrine (EPIPEN/ADRENACLICK/OR ANY BX GENERIC EQUIV) 0.3 MG/0.3ML injection 2-pack     hydrocortisone (ANUSOL-HC) 2.5 % cream     hydrocortisone, Perianal, (ANUSOL-HC) 2.5 % cream     hyoscyamine 0.125 MG TBDP     lidocaine (XYLOCAINE) 2 % solution     lidocaine (XYLOCAINE) 5 % external ointment     linaclotide (LINZESS) 72 MCG capsule     loperamide (IMODIUM A-D) 2 MG tablet     LORazepam (ATIVAN) 1 MG tablet     magic mouthwash (ENTER INGREDIENTS IN COMMENTS) suspension     magnesium hydroxide (MILK OF MAGNESIA) 400 MG/5ML suspension     NEW MED     nystatin (MYCOSTATIN) 981842 UNIT/GM POWD     omeprazole (PRILOSEC) 40 MG DR capsule     phenylephrine-cocoa butter (PREPARATION H) 0.25-88.44 % suppository     polyethylene glycol (MIRALAX) powder     prochlorperazine (COMPAZINE) 10 MG tablet      Simethicone 125 MG CAPS     sodium chloride 0.9% (bottle) 1,000 mL with gentamicin 500 mg irrigation     sodium phosphate (FLEET ENEMA) 7-19 GM/118ML rectal enema     tolterodine (DETROL) 2 MG tablet     zinc oxide 10 % OINT     zolpidem (AMBIEN) 10 MG tablet     amitriptyline (ELAVIL) 25 MG tablet     ketoconazole (NIZORAL) 2 % external shampoo     sucralfate (CARAFATE) 1 GM/10ML suspension     Current Facility-Administered Medications   Medication     Botulinum Toxin Type A (BOTOX) 200 units injection 200 Units        Review of Systems   Constitutional, HEENT, cardiovascular, pulmonary, GI, , musculoskeletal, neuro, skin, endocrine and psych systems are negative, except as otherwise noted.      Objective    /83 (BP Location: Right arm, Patient Position: Chair, Cuff Size: Adult Regular)   Pulse 95   Temp 97.2  F (36.2  C) (Tympanic)   There is no height or weight on file to calculate BMI.  Physical Exam   GENERAL: healthy, alert and no distress  ABDOMEN: soft, nontender, no hepatosplenomegaly, no masses and bowel sounds normal  MS: Thoracic and lumbar paraspinal muscle group spasms are noted

## 2021-02-04 NOTE — TELEPHONE ENCOUNTER
Reginald pete [facility] is calling. She is requesting OV notes from 02/02/21 to be faxed. This has been done.

## 2021-02-05 ENCOUNTER — TELEPHONE (OUTPATIENT)
Dept: FAMILY MEDICINE | Facility: CLINIC | Age: 46
End: 2021-02-05

## 2021-02-05 ENCOUNTER — PRE VISIT (OUTPATIENT)
Dept: UROLOGY | Facility: CLINIC | Age: 46
End: 2021-02-05

## 2021-02-05 NOTE — TELEPHONE ENCOUNTER
Reason for visit: discuss gentamicin instillations     Relevant information: Neurogenic bladder, history of spinal cord injury, history of Botox    Records/imaging/labs/orders: records available    Pt called: no need for a call

## 2021-02-05 NOTE — TELEPHONE ENCOUNTER
Karon with [facility] calling, says they need approval to delay start of care per patient request, plan to start home care Tuesday, Feb 9th.    Routed to Jan Harris to address, I see he ordered home care at 2/2/21 visit.    Karon says call back [phone] is confidential voicemail, okay to leave a detailed message.    Yuki Cox RN  [facility]

## 2021-02-05 NOTE — TELEPHONE ENCOUNTER
Called Karon back from [facility], no answer, left detail message that per Jan Harris, PAC okay to delay start of pt care.    Encouraged her to call the clinic back with any further questions.    Leda Caraballo RN  [facility] [place]  Phone: [phone]  Fax:[phone]

## 2021-02-08 ENCOUNTER — VIRTUAL VISIT (OUTPATIENT)
Dept: UROLOGY | Facility: CLINIC | Age: 46
End: 2021-02-08
Payer: COMMERCIAL

## 2021-02-08 ENCOUNTER — TELEPHONE (OUTPATIENT)
Dept: FAMILY MEDICINE | Facility: CLINIC | Age: 46
End: 2021-02-08

## 2021-02-08 DIAGNOSIS — N31.9 NEUROGENIC BLADDER: Primary | ICD-10-CM

## 2021-02-08 PROCEDURE — 99213 OFFICE O/P EST LOW 20 MIN: CPT | Mod: 95 | Performed by: UROLOGY

## 2021-02-08 RX ORDER — CEFAZOLIN SODIUM 2 G/50ML
2 SOLUTION INTRAVENOUS
Status: CANCELLED | OUTPATIENT
Start: 2021-02-08

## 2021-02-08 RX ORDER — CEFAZOLIN SODIUM 1 G/50ML
1 INJECTION, SOLUTION INTRAVENOUS SEE ADMIN INSTRUCTIONS
Status: CANCELLED | OUTPATIENT
Start: 2021-02-08

## 2021-02-08 NOTE — PROGRESS NOTES
HISTORY: Riley Gifford is a 45 year old male with a history of neurogenic bladder secondary to cervical spinal cord injury. On [facility]    Previous Bladder Surgeries:  none    Current Bladder Medications:  botox injections in OR due to AUTONOMIC DYSREFLEXIA  Gent instillation qhs    Exam:  There were no vitals taken for this visit.  GENERAL: Healthy, alert and no distress  EYES: Eyes grossly normal to inspection.  No discharge or erythema, or obvious scleral/conjunctival abnormalities.  RESP: No audible wheeze, cough, or visible cyanosis.  No visible retractions or increased work of breathing.    SKIN: Visible skin clear. No significant rash, abnormal pigmentation or lesions.  NEURO: Cranial nerves grossly intact.  Mentation and speech appropriate for age.  PSYCH: Mentation appears normal, affect normal/bright, judgement and insight intact, normal speech and appearance well-groomed.  Neuro: Absent in lower limbs, Diminished on right and left upper limb    Review of Imaging:  The following imaging exams were independently viewed and interpreted by me and discussed with patient:  RUG/VCUG: Normal Dec 2020    Review of Labs:  none    Assessment & Plan     Neurogenic bladder due to spinal cord injury.  Doing well on Botox injections and gentamicin installations.  Renal bladder ultrasound shows no hydronephrosis or stones.  No change in management.  We will schedule next Botox injection for summer 2021. gentamicin installations were refilled      Evaristo Hayes MD  [facility] UROLOGY CLINIC [place]      ==========================    Additional Billing and Coding Information:      10 minutes spent on the date of the encounter doing chart review, history and exam, documentation and further activities as noted above    ==========================

## 2021-02-08 NOTE — TELEPHONE ENCOUNTER
Forms received from [facility] Came/ Physician Orders/Order Date 2/5/21 for Jan Harris PA-C.  Forms placed in provider 'sign me' folder.  Please fax forms to [phone] after completion.    GALE LOZADA (R)  Radiology Department

## 2021-02-08 NOTE — PROGRESS NOTES
Riley is a 45 year old who is being evaluated via a billable video visit.      Video Visit Technology for this patient: Gerardo Video Visit- Patient was left in waiting room      How would you like to obtain your AVS? MyChart  If the video visit is dropped, the invitation should be resent by: Send to e-mail at: [email or web address]  Will anyone else be joining your video visit? No    Video Start Time: 10:07'  Video-Visit Details    Type of service:  Video Visit    Video End Time:10:17 AM    Originating Location (pt. Location): Home    Distant Location (provider location):  [facility] UROLOGY [facility]     Platform used for Video Visit: Ace[employer]

## 2021-02-08 NOTE — PATIENT INSTRUCTIONS
Please contact our office to schedule botox injection with Dr. Hayes this summer.     It was a pleasure meeting with you today.  Thank you for allowing me and my team the privilege of caring for you today.  YOU are the reason we are here, and I truly hope we provided you with the excellent service you deserve.  Please let us know if there is anything else we can do for you so that we can be sure you are leaving completely satisfied with your care experience.

## 2021-02-08 NOTE — LETTER
2/8/2021       RE: Riley Gifford  [address]  [place] [address]     Dear Colleague,    Thank you for referring your patient, Riley Gifford, to the [facility] UROLOGY CLINIC [place] [facility]. Please see a copy of my visit note below.    Riley is a 45 year old who is being evaluated via a billable video visit.      Video Visit Technology for this patient: [facility] Video Visit- Patient was left in waiting room      How would you like to obtain your AVS? MyChart  If the video visit is dropped, the invitation should be resent by: Send to e-mail at: [email or web address]  Will anyone else be joining your video visit? No    Video Start Time: 10:07'  Video-Visit Details    Type of service:  Video Visit    Video End Time:10:17 AM    Originating Location (pt. Location): Home    Distant Location (provider location):  [facility] UROLOGY [facility]     Platform used for Video Visit: [employer]      HISTORY: Riley Gifford is a 45 year old male with a history of neurogenic bladder secondary to cervical spinal cord injury. On [facility]    Previous Bladder Surgeries:  none    Current Bladder Medications:  botox injections in OR due to AUTONOMIC DYSREFLEXIA  Gent instillation qhs    Exam:  There were no vitals taken for this visit.  GENERAL: Healthy, alert and no distress  EYES: Eyes grossly normal to inspection.  No discharge or erythema, or obvious scleral/conjunctival abnormalities.  RESP: No audible wheeze, cough, or visible cyanosis.  No visible retractions or increased work of breathing.    SKIN: Visible skin clear. No significant rash, abnormal pigmentation or lesions.  NEURO: Cranial nerves grossly intact.  Mentation and speech appropriate for age.  PSYCH: Mentation appears normal, affect normal/bright, judgement and insight intact, normal speech and appearance well-groomed.  Neuro: Absent in lower limbs, Diminished on right and left upper  limb    Review of Imaging:  The following imaging exams were independently viewed and interpreted by me and discussed with patient:  RUG/VCUG: Normal Dec 2020    Review of Labs:  none    Assessment & Plan     Neurogenic bladder due to spinal cord injury.  Doing well on Botox injections and gentamicin installations.  Renal bladder ultrasound shows no hydronephrosis or stones.  No change in management.  We will schedule next Botox injection for summer 2021. gentamicin installations were refilled      Evaristo Hayes MD  [facility] UROLOGY CLINIC [place]      ==========================    Additional Billing and Coding Information:      10 minutes spent on the date of the encounter doing chart review, history and exam, documentation and further activities as noted above    ==========================

## 2021-02-09 ENCOUNTER — TELEPHONE (OUTPATIENT)
Dept: UROLOGY | Facility: CLINIC | Age: 46
End: 2021-02-09

## 2021-02-09 NOTE — CONFIDENTIAL NOTE
Patient is scheduled for surgery with Dr. Hayes      Spoke with Riley    Date of Surgery: 6/25/21    Location: [facility] OR    Informed patient they will need an adult  yes    H&P: Scheduled with PCP    Additional imaging/appointments: Covid test 6/21/21    Surgery packet: to be mailed by 2/12/21     Additional comments: n/a

## 2021-02-11 ENCOUNTER — TRANSFERRED RECORDS (OUTPATIENT)
Dept: HEALTH INFORMATION MANAGEMENT | Facility: CLINIC | Age: 46
End: 2021-02-11

## 2021-02-11 ENCOUNTER — PATIENT OUTREACH (OUTPATIENT)
Dept: CARE COORDINATION | Facility: CLINIC | Age: 46
End: 2021-02-11

## 2021-02-11 ENCOUNTER — TELEPHONE (OUTPATIENT)
Dept: CARE COORDINATION | Facility: CLINIC | Age: 46
End: 2021-02-11

## 2021-02-11 DIAGNOSIS — K62.89 RECTUM INFLAMMATION: Primary | ICD-10-CM

## 2021-02-11 ASSESSMENT — ACTIVITIES OF DAILY LIVING (ADL): DEPENDENT_IADLS:: CLEANING;COOKING;LAUNDRY;SHOPPING;MEAL PREPARATION;MEDICATION MANAGEMENT

## 2021-02-11 NOTE — TELEPHONE ENCOUNTER
The patient called the RN CC nurse care coordinator questioning whether the provider had received any outbreak from the home care nurse.  The home care nurse had come out to examine the area that is painful on the patient's buttocks.  She told the patient that it was not an open area and with the amount of pain he was expressing she questioned whether a CT scan would be in order.  The patient is calling back to see if the home care nurses findings were sent to the provider.  And if a CT has been scheduled would they be calling him or does he need to reach out.    Please contact the patient with an update.        Robbin Vanegas MSN, RN, PHN, CCM   Primary Care Clinical RN Care Coordinator  [facility]  2/11/2021   8:37 AM  [email or web address]  Office: [phone]

## 2021-02-11 NOTE — TELEPHONE ENCOUNTER
Called patient:  He still denies visible sores on buttocks area but his hemorrhoids are getting worse. Called his home care nurse La.    La RN- [phone]  Anus has excoriated skin surrounding anus (looks like raw hamburger) Silver dollar in size (about 2 cm) surrounding anal opening.      RN is recommending wound clinic for managment.   Home care can monitor for healing process after wound visit.    Patient is currently on a bowel program.     So would you need a visit for referral to wound clinic?      Mary Jane Cotto, RN

## 2021-02-11 NOTE — TELEPHONE ENCOUNTER
I haven't seen anything from homecare regarding this. Can we please call Riley and get more details? He tends to get GI symptoms as well as pain in his groin/buttocks and with his quadriplegia is unable to fully feel the areas of discomfort.     Thanks.  Raffy Harris, MPAS, PA-C

## 2021-02-11 NOTE — PROGRESS NOTES
Clinic Care Coordination Contact    Follow Up Progress Note      Assessment: The RN CC nurse care coordinator contacted patient by phone for a follow-up visit.  The patient has recently followed up with his PCP where it was suggested to have home care come out and examined his bottom.  This examination is unable to be done in the clinic due to the patient being wheelchair-bound.  Per the patient he states that the home care nurse did not find any broken skin did find some reddened areas but most clear concern is the hemorrhoids.  The patient states that the home care nurse strongly suggested having a CT done of the area to see what is happening.  So the patient had called in to the RN CC to see if that information had been transferred to the PCP.  A telephone encounter with that information and it was sent to the PCP this a.m.    Goals addressed this encounter:   Goals Addressed                 This Visit's Progress      Healthy Eating (pt-stated)   40%     Goal Statement: I will work with the ILS person to make more palatable meals for me to eat.  Measure of Success: The patient is gaining weight  Supportive Steps to Achieve: weigh the patient routinely.  Barriers: food allergies  Strengths: motivated to gain weight  Date to Achieve By: 12/5/2020  Patient expressed understanding of goal: yes            Pain Management (pt-stated)   40%     Goal Statement: I will work with the physical therapists at [facility] to decrease the amount of pain in my neck, back and sacral area.  Measure of Success: Patient states less pain.  Supportive Steps to Achieve: physical therapy from [facility]  Barriers: quadriplegic   Strengths: motivated  Date to Achieve By: 12/5/2020  Patient expressed understanding of goal: yes                 Intervention/Education provided during outreach: Encouraged the patient to take all of the medications that he has according to the way they are prescribed by the providers.     Outreach  Frequency: monthly    Plan:   1.  The patient will use all medications as prescribed by the providers, especially the new antispasm medication.  2.  The patient will continue with physical therapy and the exercises between sessions.  3.  The patient will use ice and/or heat as appropriate to assist in pain management.    RN CC Nurse Care Coordinator will follow up in 4 weeks.        Robbin BONILLA, RN, PHN, CCM   Primary Care Clinical RN Care Coordinator  [facility]  2/11/2021   8:50 AM  [email or web address]  Office: [phone]

## 2021-02-11 NOTE — TELEPHONE ENCOUNTER
"Home care is using \"butt paste\" but she explained that it seems beyond the paste and it's not really doing anything.    Called Riley and let him know regarding the referral- also gave wound clinic referral line to call.    Called [facility] and left message informing her of wound clinic referral.  Also recommended per PCP aggressive application of a butt paste.     Mary Jane Cotto RN  "

## 2021-02-11 NOTE — TELEPHONE ENCOUNTER
I referred him to wound care. Is the visiting RN putting anything on it right now? Aggressive application of butt paste may be helpful / Desitin, etc.     Let me know.  Thanks.  Raffy Harris

## 2021-02-12 ENCOUNTER — TELEPHONE (OUTPATIENT)
Dept: WOUND CARE | Facility: CLINIC | Age: 46
End: 2021-02-12

## 2021-02-12 NOTE — TELEPHONE ENCOUNTER
Pt given number to [facility] wound clinic to schedule appointment for bottom wound. Also rescheduled patient's appointment with Colon and Rectal surgery with Lulú Weiss NP. Was cancelled in 11/2020 due to Covid.    Shamir Raya LPN

## 2021-02-12 NOTE — TELEPHONE ENCOUNTER
[facility] Call Center    Phone Message    May a detailed message be left on voicemail: yes     Reason for Call: Appointment Intake    Referring Provider Name: Raffy Harris PA-C  Diagnosis and/or Symptoms: Chronic rectal wound, quadriplegia    Patient's type of wound is not listed in the scheduling protocols. Please advise and contact patient directly to schedule. Referral/recs are in system. Thanks!    Action Taken: Message routed to:  [facility] Wound    Travel Screening: Not Applicable

## 2021-02-15 DIAGNOSIS — F51.01 PRIMARY INSOMNIA: ICD-10-CM

## 2021-02-15 RX ORDER — ZOLPIDEM TARTRATE 10 MG/1
10 TABLET ORAL
Qty: 90 TABLET | Refills: 1 | Status: SHIPPED | OUTPATIENT
Start: 2021-02-15 | End: 2021-10-04

## 2021-02-18 ENCOUNTER — TRANSFERRED RECORDS (OUTPATIENT)
Dept: HEALTH INFORMATION MANAGEMENT | Facility: CLINIC | Age: 46
End: 2021-02-18

## 2021-02-26 ENCOUNTER — TELEPHONE (OUTPATIENT)
Dept: WOUND CARE | Facility: CLINIC | Age: 46
End: 2021-02-26

## 2021-02-26 NOTE — TELEPHONE ENCOUNTER
Riley called to say that he has had a sore bottom and his anal area is red but he has no open wounds.  He states he was referred by ERAN Serrano and would like to make an appointment. I can see the referral in the patient's chart but not in our workqueue so I will route to the nurse for review.

## 2021-02-26 NOTE — TELEPHONE ENCOUNTER
Reviewed documentation. Will set up for evaluation with Neelima Mejia for buttocks wound.   Isaac lift transfer.

## 2021-03-03 ENCOUNTER — TELEPHONE (OUTPATIENT)
Dept: UROLOGY | Facility: CLINIC | Age: 46
End: 2021-03-03

## 2021-03-03 ENCOUNTER — HOSPITAL ENCOUNTER (OUTPATIENT)
Dept: WOUND CARE | Facility: CLINIC | Age: 46
Discharge: HOME | End: 2021-03-03
Attending: PHYSICIAN ASSISTANT | Admitting: PHYSICIAN ASSISTANT
Payer: COMMERCIAL

## 2021-03-03 VITALS
DIASTOLIC BLOOD PRESSURE: 63 MMHG | RESPIRATION RATE: 16 BRPM | SYSTOLIC BLOOD PRESSURE: 105 MMHG | TEMPERATURE: 98.1 F | HEART RATE: 108 BPM

## 2021-03-03 DIAGNOSIS — K62.89 RECTUM INFLAMMATION: ICD-10-CM

## 2021-03-03 DIAGNOSIS — K64.4 BLEEDING EXTERNAL HEMORRHOIDS: ICD-10-CM

## 2021-03-03 PROCEDURE — G0463 HOSPITAL OUTPT CLINIC VISIT: HCPCS

## 2021-03-03 PROCEDURE — 99203 OFFICE O/P NEW LOW 30 MIN: CPT | Performed by: PHYSICIAN ASSISTANT

## 2021-03-03 NOTE — DISCHARGE INSTRUCTIONS
[facility]  [address] [address]    Call us at [phone] if you have any questions about your wounds, have redness or swelling around your wound, have a fever of 101 or greater or if you have any other problems or concerns. We answer the phone Monday through Friday 8 am to 4 pm, please leave a message as we check the voicemail frequently throughout the day.     Riley Gifford      1975    Recommendations to anal irritation  Apply stoma powder first to open area followed by Triad paste. Perform daily and as needed. No need to aggressively wash off old powder and paste, wash gently.     Neelima Mejia PA-C. March 3, 2021    Wound Clinic follow up with Colorectal as scheduled    If you had a positive experience please indicate that on your patient satisfaction survey form that [facility] will be sending you.

## 2021-03-03 NOTE — TELEPHONE ENCOUNTER
Central Prior Authorization Team   Phone: [phone]      PA Initiation    Medication: FV-Gentamicin 480mg/L Bladder Irrigation (compounded)  Insurance Company: [employer] - Phone [phone] Fax [phone]  [facility] the Rx[facility] [place], [place] - [address]  Filling Pharmacy Phone:    Filling Pharmacy Fax:    Start Date: 3/3/2021

## 2021-03-03 NOTE — PROGRESS NOTES
[facility]    ASSESSMENT:   1. Hemorrhoids   2. Some degree of rectal prolapse?    PLAN/DISCUSSION:   1. Discussed with patient that he appears to be managing pressure well. I think what we are seeing is irritated hemorrhoids and some prolapsed tissue. He should discuss treatment options with colorectal team. I offered him stoma powder (to dry the area) and barrier cream to help with irritation but this is really just a bandaid solution until he addresses the excess tissue.     HISTORY OF PRESENT ILLNESS:   Riley Gifford is a 45 year old male with quadriplegia who presents today for perianal skin irritation. Has had issues with hemorrhoids for years and has appt upcoming with colorectal team to discuss. Uses enema for bowel program and has done this without issue for years. He has distant history of pressure ulcer requiring surgery and does not want to encounter that again.      VITALS: /63 (BP Location: Left arm)   Pulse 108   Temp 98.1  F (36.7  C) (Temporal)   Resp 16      PHYSICAL EXAM:  GENERAL: Patient is alert and oriented and in no acute distress  INTEGUMENTARY: hemorrhoids and raw and mucosal tissue present, no bruising or sign of pressure injury      MDM: 40 was spent on Mar 3, 2021 reviewing previous chart notes, labs, imaging, assessing the patient, developing the plan of care and education.    MERVIN DANIEL PA-C

## 2021-03-03 NOTE — PROGRESS NOTES
Patient arrived for wound care visit. Certified Wound Care Nurse time spent evaluating patient record, completed a full evaluation and documented wound(s) & elissa-wound skin; provided recommendation based on treatment plan. Applied dressing, reviewed discharge instructions, patient education, and discussed plan of care with appropriate medical team staff members and patient and/or family members.

## 2021-03-03 NOTE — TELEPHONE ENCOUNTER
A prior authorization is needed for the following medication prescribed.  Please complete a prior authorization with the information included below.    Medication: FV-Gentamicin 480mg/L Bladder Irrigation (compounded)    Ingredients: Gentamicin Sulfate 40mg/ml Soln [address]-68, Sodium Chloride 0.9% Soln [id number]    RX #: [id number]    Reason for Rejection: Prior auth Required    Pharmacy Insurance plan: [employer]  BIN #: [id number]  ID #: [id number]  PCN #: [id number]  Phone #: [phone]      Pharmacy NPI:[id number]      Please advise the pharmacy when the prior authorization is approved or denied.     Thank you for your time.     [facility]  [phone]

## 2021-03-05 NOTE — TELEPHONE ENCOUNTER
Prior Authorization Approval    Authorization Effective Date: 2/1/2021  Authorization Expiration Date: 6/3/2021  Medication: FV-Gentamicin 480mg/L Bladder Irrigation (compounded)  Approved Dose/Quantity:  Reference #: [id number]   Insurance Company: [employer] - Phone [phone] Fax [phone]  Expected CoPay:       Which Pharmacy is filling the prescription (Not needed for infusion/clinic administered): [facility] - [place], [place] - [address] [place] [address] [facility]  Pharmacy Notified: Yes - spoke to Joshua, their system is down. Faxed the auth to them to process when system is back up.  Patient Notified: No

## 2021-03-08 DIAGNOSIS — N39.0 RECURRENT UTI: Primary | ICD-10-CM

## 2021-03-11 ENCOUNTER — TELEPHONE (OUTPATIENT)
Dept: FAMILY MEDICINE | Facility: CLINIC | Age: 46
End: 2021-03-11

## 2021-03-11 DIAGNOSIS — K59.09 CHRONIC CONSTIPATION: ICD-10-CM

## 2021-03-12 RX ORDER — DOCUSATE SODIUM 283 MG/5ML
LIQUID RECTAL
Qty: 1 ENEMA | Refills: 11 | Status: SHIPPED | OUTPATIENT
Start: 2021-03-12 | End: 2022-05-02

## 2021-03-12 NOTE — TELEPHONE ENCOUNTER
RN spoke with patient.  Advised that PCP hadsent requested script to pharmacy.    Patient verbalized understanding    Al Verdugo RN, BSN, PHN  [facility]

## 2021-03-12 NOTE — TELEPHONE ENCOUNTER
The patient called this am.  There are no refills on his script at the pharmacy and he needs this today for his bowel management.  Please contact the patient as soon as it is filled.        Robbin Vanegas MSN, RN, PHN, CCM   Primary Care Clinical RN Care Coordinator  [facility]  3/12/2021   10:06 AM  [email or web address]  Office: [phone]

## 2021-03-24 ENCOUNTER — TELEPHONE (OUTPATIENT)
Dept: CARE COORDINATION | Facility: CLINIC | Age: 46
End: 2021-03-24

## 2021-03-24 ENCOUNTER — PATIENT OUTREACH (OUTPATIENT)
Dept: NURSING | Facility: CLINIC | Age: 46
End: 2021-03-24
Payer: COMMERCIAL

## 2021-03-24 DIAGNOSIS — L29.9 ITCHY SCALP: ICD-10-CM

## 2021-03-24 RX ORDER — KETOCONAZOLE 20 MG/ML
SHAMPOO TOPICAL DAILY PRN
Qty: 120 ML | Refills: 5 | Status: SHIPPED | OUTPATIENT
Start: 2021-03-24 | End: 2022-12-19

## 2021-03-24 ASSESSMENT — ACTIVITIES OF DAILY LIVING (ADL): DEPENDENT_IADLS:: CLEANING;COOKING;LAUNDRY;SHOPPING;MEAL PREPARATION;MEDICATION MANAGEMENT

## 2021-03-24 NOTE — TELEPHONE ENCOUNTER
The patient is questioning whether the refill for the Nizoral shampoo that he had requested was approved.  Please contact the patient.        Robbin Vanegas MSN, RN, PHN, [facility]   Primary Care Clinical RN Care Coordinator  [facility]  3/24/2021   2:18 PM  [email or web address]  Office: [phone]

## 2021-03-24 NOTE — PROGRESS NOTES
Clinic Care Coordination Contact    Clinic Care Coordination Contact  OUTREACH    Referral Information:  Referral Source: PCP    Primary Diagnosis: Chronic Pain(wound care)    Chief Complaint   Patient presents with     Clinic Care Coordination - Initial     RN CC nurse care coordinator annual assessment        Universal Utilization: The patient uses the [facility] system and the [facility].  Clinic Utilization  Difficulty keeping appointments:: No  Compliance Concerns: No  No-Show Concerns: No  No PCP office visit in Past Year: No  Utilization    Last refreshed: 3/22/2021 12:59 PM: Hospital Admissions 0           Last refreshed: 3/22/2021 12:59 PM: ED Visits 0           Last refreshed: 3/22/2021 12:59 PM: No Show Count (past year) 0              Current as of: 3/22/2021 12:59 PM              Clinical Concerns:  Current Medical Concerns:  The patient continues to have the back and rectal pain.  The patient is using medical marijuana capsule at night, which seems to be relaxing him enough to go to sleep.  The patient is going to see the rectal surgeons to evaluate his hemorrhoids.  The patient had been waiting on a refill of his Nizoral shampoo and had not heard, so the RN CC sent in a telephone message to the team.  The patient otherwise is doing well he is keeping up with his Botox injections through urology.    Medication reconciliation was completed with the patient and marked as reviewed.  Health maintenance was reviewed and questions were answered with the patient.  The social determinants of health were reviewed and they were marked as reviewed.    Patient Active Problem List   Diagnosis     Vitamin D deficiency     Eosinophilic esophagitis     Neurogenic bladder     CARDIOVASCULAR SCREENING; LDL GOAL LESS THAN 160     Quadriplegia (H)     Chronic constipation     Internal hemorrhoids with other complication     Diagnostic skin and sensitization tests(aka ALLERGENS)     Anal or rectal pain      Allergic rhinitis due to animal dander     Allergy to mold spores     House dust mite allergy     Insomnia     Health Care Home     Gallstones     Chronic midline thoracic back pain     Pulmonary nodules     ACP (advance care planning)     Cervical spinal cord injury, sequela (H)     Spasticity     Self-catheterizes urinary bladder     Bleeding external hemorrhoids     Current Behavioral Concerns: None currently noted.  Education Provided to patient: Reviewed with the patient the use of medical marijuana and the effects and side effects that he himself is seeing.  Pain  Pain (GOAL):: Yes  Type: Chronic (>3mo)  Location of chronic pain:: back and sacral area, neck area  Radiating: Yes  Progression: Unchanged  Description of pain: Stabbing, Shooting  Chronic pain severity:: 5  Limitation of routine activities due to chronic pain:: Yes  Description: Unable to perform most daily activities (chores, hobbies, social activities, driving)  Alleviating Factors: Pain Medication  Aggravating Factors: Positioning, Breathing  Health Maintenance Reviewed: Due/Overdue   Health Maintenance Due   Topic Date Due     HEPATITIS C SCREENING  Never done     MEDICARE ANNUAL WELLNESS VISIT  06/02/2015     EYE EXAM  04/26/2018     DTAP/TDAP/TD IMMUNIZATION (2 - Td) 10/29/2018     INFLUENZA VACCINE (1) 09/01/2020     BMP  12/01/2020       Clinical Pathway: None    Medication Management:  Patient is knowledgeable on medications and is adherent.  No financial concerns reported at this time.  Medication reconciliation was completed with the patient and there are no questions or concerns at this time.    Functional Status:  Dependent ADLs:: Wheelchair-independent, Bathing, Dressing, Grooming, Positioning, Transfers, Toileting  Dependent IADLs:: Cleaning, Cooking, Laundry, Shopping, Meal Preparation, Medication Management  Bed or wheelchair confined:: Yes  Mobility Status: Dependent/Assisted by Another  Fallen 2 or more times in the past  year?: No  Any fall with injury in the past year?: No    Living Situation:  Current living arrangement:: I live in assisted living  Type of residence:: Assisted living    Lifestyle & Psychosocial Needs:  Lifestyle     Physical activity     Days per week: 0 days     Minutes per session: 0 min     Stress: Not on file     Social Needs     Financial resource strain: Not hard at all     Food insecurity     Worry: Never true     Inability: Never true     Transportation needs     Medical: No     Non-medical: No     Diet:: Regular  Inadequate nutrition (GOAL):: No  Tube Feeding: No  Inadequate activity/exercise (GOAL):: No  Significant changes in sleep pattern (GOAL): No  Transportation means:: Accessible car  Financial/Insurance concerns (GOAL):: No  Latter day or spiritual beliefs that impact treatment:: No  Mental health DX:: No  Mental health management concern (GOAL):: No  Informal Support system:: Family, Friends   Socioeconomic History     Marital status: Single     Spouse name: Not on file     Number of children: Not on file     Years of education: Not on file     Highest education level: Not on file     Tobacco Use     Smoking status: Never Smoker     Smokeless tobacco: Never Used   Substance and Sexual Activity     Alcohol use: Yes     Alcohol/week: 0.0 standard drinks     Comment: Rare     Drug use: No     Sexual activity: Not Currently     Partners: Female               Resources and Interventions:  Current Resources:   Skilled Home Care Services: Skilled Nursing  Community Resources: Other (see comment)([facility] and ILS workers)  Supplies used at home:: Other(self cath supplies)  Equipment Currently Used at Home: wheelchair, power, other (see comments), commode chair, hospital bed, shower chair, transfer board, tub bench(shirin lift)  Employment Status: disabled)   )    Advance Care Plan/Directive  Advanced Care Plans/Directives on file:: Yes  Type Advanced Care Plans/Directives: Advanced Directive - On  File(n/a)    Referrals Placed: None(unknown)     Goals:   Goals        General    Healthy Eating (pt-stated)     Notes - Note edited  3/24/2021  2:34 PM by Robbin Vanegas, RN    Goal Statement: I will work with the ILS person to make more palatable meals for me to eat.  Measure of Success: The patient is gaining weight  Supportive Steps to Achieve: weigh the patient routinely.  Barriers: food allergies  Strengths: motivated to gain weight  Date to Achieve By: 12/5/2021  Patient expressed understanding of goal: yes          Pain Management (pt-stated)     Notes - Note edited  3/24/2021  2:35 PM by Robbin Vanegas, RN    Goal Statement: I will work with the physical therapists at [facility] to decrease the amount of pain in my neck, back and sacral area.  Measure of Success: Patient states less pain.  Supportive Steps to Achieve: physical therapy from [facility]  Barriers: quadriplegic   Strengths: motivated  Date to Achieve By: 12/5/2021  Patient expressed understanding of goal: yes                Patient/Caregiver understanding: The patient has a very good understanding of the disease process.    Outreach Frequency: monthly  Future Appointments              In 2 days Lulú Reveles APRN CNP [facility]    In 2 [place] [facility] [place]          Plan: 1.  The patient will attend the appointments with the colorectal department to evaluate his hemorrhoids.  2.  The patient will perform the physical therapy exercises that he was given by the [facility] department.  3.  The patient will work with his staff on the flavor of his food to entice him to eat more.          Robbin Vanegas MSN, RN, PHN, [facility]   Primary Care Clinical RN Care Coordinator  [facility]  3/24/2021   3:21 PM  [email or web address]  Office: [phone]

## 2021-03-26 ENCOUNTER — OFFICE VISIT (OUTPATIENT)
Dept: SURGERY | Facility: CLINIC | Age: 46
End: 2021-03-26
Payer: COMMERCIAL

## 2021-03-26 VITALS — OXYGEN SATURATION: 95 % | HEART RATE: 102 BPM | DIASTOLIC BLOOD PRESSURE: 78 MMHG | SYSTOLIC BLOOD PRESSURE: 116 MMHG

## 2021-03-26 DIAGNOSIS — K64.8 HEMORRHOID PROLAPSE: Primary | ICD-10-CM

## 2021-03-26 PROCEDURE — 46221 LIGATION OF HEMORRHOID(S): CPT | Performed by: NURSE PRACTITIONER

## 2021-03-26 ASSESSMENT — PAIN SCALES - GENERAL: PAINLEVEL: MODERATE PAIN (4)
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
## 2021-03-26 NOTE — NURSING NOTE
Chief Complaint   Patient presents with     Rectal Problem     acute hemorrhoid       Vitals:    03/26/21 1117   BP: 116/78   BP Location: Right arm   Patient Position: Sitting   Cuff Size: Adult Regular   Pulse: 102   SpO2: 95%       There is no height or weight on file to calculate BMI.    Jing Glynn MA

## 2021-04-02 ENCOUNTER — TELEPHONE (OUTPATIENT)
Dept: CARE COORDINATION | Facility: CLINIC | Age: 46
End: 2021-04-02

## 2021-04-02 NOTE — TELEPHONE ENCOUNTER
The patient called and requested another letter as was written last year to request the extra catheters that the patient needs.  This is due to the frequency that he needs to self cath.  The last letter was written on 3/17/2020 by Jan Harris.  The same letter can be used this year with date changes.  The letter then needs to be faxed to [phone] attn Amanda Root.  Any questions can be directed to the patient.      Robbin Vanegas MSN, RN, PHN, [facility]   Primary Care Clinical RN Care Coordinator  [facility]  4/2/2021   1:25 PM  [email or web address]  Office: [phone]

## 2021-04-02 NOTE — LETTER
[facility] CARE COORDINATION  [address] [facility] [address] [address] [phone]          April 4, 2021    RE:  Riley Gifford                                                                                                                                                       [address]   [place] [address]            To whom it may concern:    Riley has quadriplegia and a spastic bladder.  He self catheterizes up to 8-9 times a day, he requires 250 catheters a month so that he can perform this necessary self-care action.  It is medically necessary that he get 250 catheters a month.  If he is unable to catheterize this often, this will result in increased urinary tract infections which he is at great risk of getting.  Please let me know if there are any questions regarding the medical necessity of him having 250 catheters a month.    Sincerely,             
104

## 2021-04-03 ENCOUNTER — HEALTH MAINTENANCE LETTER (OUTPATIENT)
Age: 46
End: 2021-04-03

## 2021-04-07 NOTE — TELEPHONE ENCOUNTER
Spoke with Care Cathie Siegel PCP's letter from 4/4 and faxed UA results and corresponding office notes from 12/8 and 1/14 to [facility], Attn: Nidia Fax # [phone]    Kem Peters

## 2021-04-15 ENCOUNTER — TELEPHONE (OUTPATIENT)
Dept: FAMILY MEDICINE | Facility: CLINIC | Age: 46
End: 2021-04-15

## 2021-04-27 ENCOUNTER — PATIENT OUTREACH (OUTPATIENT)
Dept: CARE COORDINATION | Facility: CLINIC | Age: 46
End: 2021-04-27

## 2021-04-27 NOTE — LETTER
[facility]  Complex Care Plan  About Me:    Patient Name:  Riley Marcos    YOB: 1975  Age:         45 year old   [facility] MRN:    [id number] Telephone Information:  Home Phone [phone]   Mobile NONE   Mobile [phone]       Address:  [address] [place] [address] [address]  [place] [address] Email address:  [email or web address]      Emergency Contact(s)    Name Relationship Lgl Grd Work Phone Home Phone Mobile Phone   1. TREY MARCOS (NE* Relative No noen none [phone]   2. DIRKAGUSTO Sister   [phone]    3. HODA MARCOS Brother No  [phone]            Primary language:  English     needed? No   [facility]:  [phone] op. 1  Other communication barriers:    Preferred Method of Communication:  Lisa  Current living arrangement:    Mobility Status/ Medical Equipment:      Health Maintenance  Health Maintenance Reviewed:      My Access Plan  Medical Emergency 911   Primary Clinic Line [facility] - [phone]   24 Hour Appointment Line [phone] or  [phone]) (toll-free)   24 Hour Nurse Line [phone] (toll-free)   Preferred Urgent Care     Preferred Hospital     Preferred Pharmacy [id number] - [place] [address] AT [place] [address]     Behavioral Health Crisis Line The National Suicide Prevention Lifeline at 1-265.686.9703 or 911             My Care Team Members  Patient Care Team       Relationship Specialty Notifications Start End    Raffy Harris PA-C PCP - General Physician Assistant  3/2/18     Phone: [phone] Fax: [phone]         [address] [facility] [address]    Robbin Andino, RN Lead Care Coordinator Nurse Admissions 1/6/16     phone:  [phone]    Phone: [phone] Fax: [phone]        Rober Leo MD Referring Physician Urology  1/8/16     Phone: [phone] Fax: [phone]         [address] [place] [address]  ALLIE VILLALOBOS [facility] [address]    Kimberly Zabala MD MD Urology  1/8/16     Phone: [phone] Fax: [phone]         [address] [place] [address]    Amanda Lindsay (see comments)   1/8/16     [facility]     Phone: [phone]         Evaristo Hayes MD MD Urology  4/19/16     Phone: [phone] Fax: [phone]         [address] [place] [address]    Jenny Wright, RN Registered Nurse Urology  4/19/16     Rosemary Thomas, RN Nurse Coordinator Physical Medicine and Rehabilitation  3/30/17     Phone: [phone] Pager: [phone]        Raffy Harris PA-C Assigned PCP   6/7/20     Phone: [phone] Fax: [phone]         [facility] [address]    Lulú Reveles APRN CNP Nurse Practitioner Nurse Practitioner  8/28/20     Phone: [phone] Fax: [phone]         [address] [place] [address]    Raffy Harris PA-C Referring Physician Family Medicine  11/10/20     Phone: [phone] Fax: [phone]         [facility] [address]    Amina Doe RN Specialty Care Coordinator Urology  11/10/20     Phone: [phone]         Evaristo Hayes MD Assigned Surgical Provider   2/14/21     Phone: [phone] Fax: [phone]         [address] [place] [address]            My Care Plans  Self Management and Treatment Plan  Goals and (Comments)  Goals        General    Healthy Eating (pt-stated)     Notes - Note edited  3/24/2021  2:34 PM by Robbin Andino, RN    Goal Statement: I will work with the ILS person to make more palatable meals for me to eat.  Measure of Success: The patient is gaining weight  Supportive Steps to Achieve: weigh the patient routinely.  Barriers: food allergies  Strengths: motivated to gain weight  Date to Achieve By: 12/5/2021  Patient expressed understanding of goal: yes          Pain Management (pt-stated)     Notes - Note  edited  3/24/2021  2:35 PM by Robbin Andino RN    Goal Statement: I will work with the physical therapists at [facility] to decrease the amount of pain in my neck, back and sacral area.  Measure of Success: Patient states less pain.  Supportive Steps to Achieve: physical therapy from [facility]  Barriers: quadriplegic   Strengths: motivated  Date to Achieve By: 12/5/2021  Patient expressed understanding of goal: yes                 Action Plans on File:                       Advance Care Plans/Directives Type:        My Medical and Care Information  Problem List   Patient Active Problem List   Diagnosis     Vitamin D deficiency     Eosinophilic esophagitis     Neurogenic bladder     CARDIOVASCULAR SCREENING; LDL GOAL LESS THAN 160     Quadriplegia (H)     Chronic constipation     Internal hemorrhoids with other complication     Diagnostic skin and sensitization tests(aka ALLERGENS)     Anal or rectal pain     Allergic rhinitis due to animal dander     Allergy to mold spores     House dust mite allergy     Insomnia     Health Care Home     Gallstones     Chronic midline thoracic back pain     Pulmonary nodules     ACP (advance care planning)     Cervical spinal cord injury, sequela (H)     Spasticity     Self-catheterizes urinary bladder     Bleeding external hemorrhoids      Current Medications and Allergies:  See printed Medication Report.    Care Coordination Start Date: 1/6/2016   Frequency of Care Coordination: monthly   Form Last Updated: 04/27/2021

## 2021-04-27 NOTE — PROGRESS NOTES
Clinic Care Coordination Contact  [facility]/Voicemail       Clinical Data: Care Coordinator Outreach  Outreach attempted x 1.  Unable to leave a message on patient's voicemail with call back information and requested return call.  Plan: Care Coordinator sent care coordination introduction letter on 4/27/21 via [employer]. Care Coordinator will try to reach patient again in 10 business days.          Robbin Vanegas MSN, RN, PHN, CCM   Primary Care Clinical RN Care Coordinator  [facility]  4/27/2021   10:38 AM  [email or web address]  Office: [phone]

## 2021-05-10 ENCOUNTER — OFFICE VISIT (OUTPATIENT)
Dept: SURGERY | Facility: CLINIC | Age: 46
End: 2021-05-10
Payer: COMMERCIAL

## 2021-05-10 ENCOUNTER — PRE VISIT (OUTPATIENT)
Dept: SURGERY | Facility: CLINIC | Age: 46
End: 2021-05-10

## 2021-05-10 VITALS — HEART RATE: 89 BPM | OXYGEN SATURATION: 96 % | DIASTOLIC BLOOD PRESSURE: 81 MMHG | SYSTOLIC BLOOD PRESSURE: 110 MMHG

## 2021-05-10 DIAGNOSIS — K64.8 HEMORRHOID PROLAPSE: Primary | ICD-10-CM

## 2021-05-10 PROCEDURE — 46221 LIGATION OF HEMORRHOID(S): CPT | Performed by: NURSE PRACTITIONER

## 2021-05-10 ASSESSMENT — PAIN SCALES - GENERAL: PAINLEVEL: MODERATE PAIN (4)
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
## 2021-05-10 NOTE — NURSING NOTE
Chief Complaint   Patient presents with     RECHECK     Follow up on hemorrhoid banding       Vitals:    05/10/21 1409   BP: 110/81   BP Location: Right arm   Patient Position: Sitting   Cuff Size: Adult Regular   Pulse: 89   SpO2: 96%       There is no height or weight on file to calculate BMI.    Jing Glynn MA
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
## 2021-05-12 NOTE — TELEPHONE ENCOUNTER
follow up hemorrhoid banding  RECORDS RECEIVED FROM: [facility]/ [facility]   DATE RECEIVED:    NOTES STATUS DETAILS   OFFICE NOTE from referring provider  Internal Barbara Kenny APRN CNP   OFFICE NOTE from other specialist   Internal    DISCHARGE SUMMARY from hospital  Internal Cystoscopy Botox Injection into the bladder 1/4/2018    DISCHARGE REPORT from the ER N/A    OPERATIVE REPORT  Internal Cystoscopy Botox Injection into the bladder 1/4/2018    MEDICATION LIST Internal    LABS     PFC TESTING N/A    ANAL PAP N/A    BIOPSIES/PATHOLOGY RELATED TO DIAGNOSIS N/A [facility] Esophagus Biopsy    DIAGNOSTIC PROCEDURES     COLONOSCOPY Internal 11/6/2015 Colonoscopy    UPPER ENDOSCOPY (EGD) Internal 5/26/2017 Endoscopy    FLEX SIGMOIDOSCOPY  N/A    ERCP N/A    IMAGING (DISC & REPORT)      CT  Internal CT Chest Abdomen Pelvis 6/8/2018    MRI N/A    XRAY Internal Xray Abdomen 6/3/2019    ULTRASOUND (ENDOANAL/ENDORECTAL) Internal US Renal Complete 12/11/2020      .

## 2021-05-13 ENCOUNTER — PATIENT OUTREACH (OUTPATIENT)
Dept: NURSING | Facility: CLINIC | Age: 46
End: 2021-05-13
Payer: COMMERCIAL

## 2021-05-13 ASSESSMENT — ACTIVITIES OF DAILY LIVING (ADL): DEPENDENT_IADLS:: CLEANING;COOKING;LAUNDRY;SHOPPING;MEAL PREPARATION;MEDICATION MANAGEMENT

## 2021-05-13 NOTE — PROGRESS NOTES
Clinic Care Coordination Contact    Follow Up Progress Note      Assessment: The RN CC nurse care coordinator contacted the patient by phone for a follow up call.  The patient states that his allergies are difficult at this time as well as his EE.  The RN CC reviewed the use of the antihistamines to keep the symptoms to a minimum.  The patient is under the care of the colorectal surgery department for treatment of his hemorrhoids.  The patient states that he is still having pain in the cheek area of his buttocks.  The patient is following up with the providers as they request.      Medication reconciliation was completed with the patient and there are no questions or concerns at this time.  Social determinants of health were reviewed with the patient and marked accordingly.     Goals addressed this encounter:   Goals Addressed                 This Visit's Progress      Healthy Eating (pt-stated)   50%     Goal Statement: I will work with the ILS person to make more palatable meals for me to eat.  Measure of Success: The patient is gaining weight  Supportive Steps to Achieve: weigh the patient routinely.  Barriers: food allergies  Strengths: motivated to gain weight  Date to Achieve By: 12/5/2021  Patient expressed understanding of goal: yes            Pain Management (pt-stated)   50%     Goal Statement: I will work with the physical therapists at [facility] to decrease the amount of pain in my neck, back and sacral area.  Measure of Success: Patient states less pain.  Supportive Steps to Achieve: physical therapy from [facility]  Barriers: quadriplegic   Strengths: motivated  Date to Achieve By: 12/5/2021  Patient expressed understanding of goal: yes                 Intervention/Education provided during outreach: Reviewed with the patient the use of antihistamines at this time of year when he wants to be outside.     Outreach Frequency: monthly    Plan:   1.  The patient will take his antihistamines prior to  going outside to decrease his body's reaction.  2.  The patient will take all medications as prescribed by the providers.  3.  The patient will make and attend all recommended follow up appointments.    RN CC Nurse Care Coordinator will follow up in 4 weeks.            Robbin Vanegas MSN, RN, PHN, [facility]   Primary Care Clinical RN Care Coordinator  [facility]  5/13/2021   11:55 AM  [email or web address]  Office: [phone]

## 2021-05-17 ENCOUNTER — TELEPHONE (OUTPATIENT)
Dept: UROLOGY | Facility: CLINIC | Age: 46
End: 2021-05-17

## 2021-05-17 DIAGNOSIS — N39.0 RECURRENT UTI: Primary | ICD-10-CM

## 2021-05-17 NOTE — TELEPHONE ENCOUNTER
[facility] Call Center    Phone Message    May a detailed message be left on voicemail: yes     Reason for Call: Order(s): Other:   Reason for requested: UA/UC  Date needed: asap- pt stated he has UTI symptoms, nausea, spasms, cloudy urine, please call pt once order is placed, thank you  Provider name:       Action Taken: Message routed to:  [facility] uro    Travel Screening: Not Applicable

## 2021-05-18 ENCOUNTER — OFFICE VISIT (OUTPATIENT)
Dept: FAMILY MEDICINE | Facility: CLINIC | Age: 46
End: 2021-05-18
Payer: COMMERCIAL

## 2021-05-18 VITALS
DIASTOLIC BLOOD PRESSURE: 65 MMHG | OXYGEN SATURATION: 93 % | TEMPERATURE: 98.7 F | SYSTOLIC BLOOD PRESSURE: 113 MMHG | HEART RATE: 105 BPM

## 2021-05-18 DIAGNOSIS — R10.84 ABDOMINAL PAIN, GENERALIZED: Primary | ICD-10-CM

## 2021-05-18 DIAGNOSIS — Z78.9 SELF-CATHETERIZES URINARY BLADDER: ICD-10-CM

## 2021-05-18 LAB
ALBUMIN UR-MCNC: NEGATIVE MG/DL
AMORPH CRY #/AREA URNS HPF: ABNORMAL /HPF
APPEARANCE UR: NORMAL
BACTERIA #/AREA URNS HPF: ABNORMAL /HPF
BILIRUB UR QL STRIP: NEGATIVE
COLOR UR AUTO: YELLOW
ERYTHROCYTE [DISTWIDTH] IN BLOOD BY AUTOMATED COUNT: 12.5 % (ref 10–15)
GLUCOSE UR STRIP-MCNC: NEGATIVE MG/DL
HCT VFR BLD AUTO: 43.7 % (ref 40–53)
HGB BLD-MCNC: 14.8 G/DL (ref 13.3–17.7)
HGB UR QL STRIP: NEGATIVE
KETONES UR STRIP-MCNC: NEGATIVE MG/DL
LEUKOCYTE ESTERASE UR QL STRIP: NEGATIVE
MCH RBC QN AUTO: 31.6 PG (ref 26.5–33)
MCHC RBC AUTO-ENTMCNC: 33.9 G/DL (ref 31.5–36.5)
MCV RBC AUTO: 93 FL (ref 78–100)
NITRATE UR QL: NEGATIVE
NON-SQ EPI CELLS #/AREA URNS LPF: ABNORMAL /LPF
PH UR STRIP: 6.5 PH (ref 5–7)
PLATELET # BLD AUTO: 319 10E9/L (ref 150–450)
RBC # BLD AUTO: 4.69 10E12/L (ref 4.4–5.9)
RBC #/AREA URNS AUTO: ABNORMAL /HPF
SOURCE: NORMAL
SP GR UR STRIP: 1.02 (ref 1–1.03)
UROBILINOGEN UR STRIP-ACNC: 0.2 EU/DL (ref 0.2–1)
WBC # BLD AUTO: 10.8 10E9/L (ref 4–11)
WBC #/AREA URNS AUTO: ABNORMAL /HPF

## 2021-05-18 PROCEDURE — 85027 COMPLETE CBC AUTOMATED: CPT | Performed by: PHYSICIAN ASSISTANT

## 2021-05-18 PROCEDURE — 81001 URINALYSIS AUTO W/SCOPE: CPT | Performed by: PHYSICIAN ASSISTANT

## 2021-05-18 PROCEDURE — 99213 OFFICE O/P EST LOW 20 MIN: CPT | Performed by: PHYSICIAN ASSISTANT

## 2021-05-18 PROCEDURE — 36415 COLL VENOUS BLD VENIPUNCTURE: CPT | Performed by: PHYSICIAN ASSISTANT

## 2021-05-18 ASSESSMENT — PAIN SCALES - GENERAL: PAINLEVEL: SEVERE PAIN (6)

## 2021-05-18 NOTE — PROGRESS NOTES
Assisted patient with leaving urine sample. Patient was able to self catheterize without problems. Was able to leave sufficient sample. Sample labeled and put lab window.     Ammy Quiroga RN

## 2021-05-18 NOTE — PROGRESS NOTES
Assessment & Plan     Abdominal pain, generalized  Normal labs today. Reassured. Suggest follow up with GI if not improved in 1-2 weeks.   - UA reflex to Microscopic and Culture  - CBC with platelets  - Urine Microscopic    Self-catheterizes urinary bladder  - UA reflex to Microscopic and Culture         No follow-ups on file.    DANNI Gregory [facility] ELLIE Lin is a 45 year old who presents for the following health issues     HPI     Abdominal/Flank Pain  Onset/Duration: A couple of days ago  Description:   Character: Dull ache  Location: right lower quadrant left lower quadrant  Radiation: Back  Intensity: 6/10  Progression of Symptoms:  worsening  Accompanying Signs & Symptoms:  Fever/Chills: no  Gas/Bloating: no  Nausea: YES  Vomitting: no  Diarrhea: no  Constipation: YES  Dysuria or Hematuria: not applicable  History:   Trauma: no  Previous similar pain: YES  Previous tests done: none  Precipitating factors:   Does the pain change with:     Food: no, just more nauseated    Bowel Movement: no    Urination: not applicable   Other factors:  no  Therapies tried and outcome: None    Patient able to continue current diet without difficulty. Has had UTI's in the past which feel similar - but is not having discolored urine as he has in the past with UTIs. No change in stools. No fever, no chills. Pain is dull across lower abdomen.     Review of Systems   Constitutional, HEENT, cardiovascular, pulmonary, gi and gu systems are negative, except as otherwise noted.      Objective    /65 (BP Location: Left arm, Patient Position: Chair, Cuff Size: Adult Regular)   Pulse 105   Temp 98.7  F (37.1  C) (Oral)   SpO2 93%   There is no height or weight on file to calculate BMI.  Physical Exam   GENERAL: healthy, alert and no distress  NECK: no adenopathy, no asymmetry, masses, or scars and thyroid normal to palpation  RESP: lungs clear to auscultation - no rales, rhonchi or  wheezes  CV: regular rate and rhythm, normal S1 S2, no S3 or S4, no murmur, click or rub, no peripheral edema and peripheral pulses strong  ABDOMEN: soft, nontender and no organomegaly or masses  MS: no gross musculoskeletal defects noted, no edema    Results for orders placed or performed in visit on 05/18/21 (from the past 24 hour(s))   CBC with platelets   Result Value Ref Range    WBC 10.8 4.0 - 11.0 10e9/L    RBC Count 4.69 4.4 - 5.9 10e12/L    Hemoglobin 14.8 13.3 - 17.7 g/dL    Hematocrit 43.7 40.0 - 53.0 %    MCV 93 78 - 100 fl    MCH 31.6 26.5 - 33.0 pg    MCHC 33.9 31.5 - 36.5 g/dL    RDW 12.5 10.0 - 15.0 %    Platelet Count 319 150 - 450 10e9/L   UA reflex to Microscopic and Culture    Specimen: Catheter   Result Value Ref Range    Color Urine Yellow     Appearance Urine Slightly Cloudy     Glucose Urine Negative NEG^Negative mg/dL    Bilirubin Urine Negative NEG^Negative    Ketones Urine Negative NEG^Negative mg/dL    Specific Gravity Urine 1.025 1.003 - 1.035    Blood Urine Negative NEG^Negative    pH Urine 6.5 5.0 - 7.0 pH    Protein Albumin Urine Negative NEG^Negative mg/dL    Urobilinogen Urine 0.2 0.2 - 1.0 EU/dL    Nitrite Urine Negative NEG^Negative    Leukocyte Esterase Urine Negative NEG^Negative    Source Catheter    Urine Microscopic   Result Value Ref Range    WBC Urine 5-10 (A) OTO5^0 - 5 /HPF    RBC Urine O - 2 OTO2^O - 2 /HPF    Squamous Epithelial /LPF Urine Few FEW^Few /LPF    Bacteria Urine Few (A) NEG^Negative /HPF    Amorphous Crystals Few (A) NEG^Negative /HPF     *Note: Due to a large number of results and/or encounters for the requested time period, some results have not been displayed. A complete set of results can be found in Results Review.

## 2021-05-19 ENCOUNTER — TRANSFERRED RECORDS (OUTPATIENT)
Dept: HEALTH INFORMATION MANAGEMENT | Facility: CLINIC | Age: 46
End: 2021-05-19

## 2021-05-19 LAB
ALT SERPL-CCNC: 13 IU/L (ref 8–45)
AST SERPL-CCNC: 12 IU/L (ref 2–40)
CREAT SERPL-MCNC: 0.66 MG/DL (ref 0.72–0.12)
GFR SERPL CREATININE-BSD FRML MDRD: >60 ML/MIN/1.73M2
GLUCOSE SERPL-MCNC: 100 MG/DL (ref 65–100)
POTASSIUM SERPL-SCNC: 4.3 MMOL/L (ref 3.5–5)

## 2021-05-20 ENCOUNTER — TELEPHONE (OUTPATIENT)
Dept: FAMILY MEDICINE | Facility: CLINIC | Age: 46
End: 2021-05-20

## 2021-05-20 DIAGNOSIS — M62.830 BACK MUSCLE SPASM: Primary | ICD-10-CM

## 2021-05-20 RX ORDER — DIAZEPAM 5 MG
TABLET ORAL
Qty: 10 TABLET | Refills: 0 | Status: SHIPPED | OUTPATIENT
Start: 2021-05-20 | End: 2021-08-16

## 2021-05-20 NOTE — TELEPHONE ENCOUNTER
Routing to PCP    Patient would like to try Valium as discussed with RN      RN received call from Amanda, [facility]  and patient on the line.    Amanda stated patient was in the ER yesterday for abdominal pain and pain in continuing .  Patient stated his pain was 7-8/10.  In his abdomen and radiated to the side.    Patient had a small BM this AM and had just self catheterized about 1 hour ago.  Patient does his bowel program every other day    Patient had blood work and UA/UC.  Both unremarkable.    Patient given prescription of Zanaflex.  Patient took one last night and is planning on taking another this AM.    Amanda noted B/P in ER was 152/117.      RN spoke with Jan Harris.  Reviewed ER visit and above information.  PCP recommendations are    1.  Will send in a prescription of Valium  2.  Patient should do his bowel program daily for one week  3.  Patient should reach out to his gastroenterologist.    RN returned call to patient and reviewed providers recommendations.  Patient willing to try Valium.  He stated he had received it in the ER yesterday and it helped a little.  Patient stated he can have someone check his blood pressure in needed but currently he did not feel like it was high.    RN the scheduled patient for ER follow up appointment for Monday.    Al Verdugo, RN, BSN, PHN  [facility]

## 2021-05-20 NOTE — TELEPHONE ENCOUNTER
RN left VM for patient to return call to clinic.    RN calling to advise patient of taking valium at night    Diazepam sent. Can take during the day but can cause weakness so he may have to be careful transferring. Bedtime preferred.        Al Verdugo RN, BSN, PHN  [facility]

## 2021-05-20 NOTE — TELEPHONE ENCOUNTER
Reviewed  Diazepam sent. Can take during the day but can cause weakness so he may have to be careful transferring. Bedtime preferred.    Should do a bowel clean out every day for a few days to see if this helps.    Keep us posted. Could add to phone schedule tomorrow if he wants.  Thanks.  Raffy Harris, MPAS, PA-C

## 2021-05-24 ENCOUNTER — OFFICE VISIT (OUTPATIENT)
Dept: FAMILY MEDICINE | Facility: CLINIC | Age: 46
End: 2021-05-24
Payer: COMMERCIAL

## 2021-05-24 ENCOUNTER — TELEPHONE (OUTPATIENT)
Dept: FAMILY MEDICINE | Facility: CLINIC | Age: 46
End: 2021-05-24

## 2021-05-24 VITALS
SYSTOLIC BLOOD PRESSURE: 124 MMHG | TEMPERATURE: 99.8 F | OXYGEN SATURATION: 99 % | HEART RATE: 107 BPM | DIASTOLIC BLOOD PRESSURE: 82 MMHG

## 2021-05-24 DIAGNOSIS — M54.6 CHRONIC MIDLINE THORACIC BACK PAIN: ICD-10-CM

## 2021-05-24 DIAGNOSIS — M62.838 SPASM OF ABDOMINAL MUSCLES: Primary | ICD-10-CM

## 2021-05-24 DIAGNOSIS — R10.84 ABDOMINAL PAIN, GENERALIZED: ICD-10-CM

## 2021-05-24 DIAGNOSIS — G89.29 CHRONIC MIDLINE THORACIC BACK PAIN: ICD-10-CM

## 2021-05-24 DIAGNOSIS — F32.0 MILD MAJOR DEPRESSION (H): ICD-10-CM

## 2021-05-24 DIAGNOSIS — S14.109S CERVICAL SPINAL CORD INJURY, SEQUELA (H): ICD-10-CM

## 2021-05-24 DIAGNOSIS — G82.50 QUADRIPLEGIA (H): ICD-10-CM

## 2021-05-24 PROCEDURE — 99214 OFFICE O/P EST MOD 30 MIN: CPT | Performed by: PHYSICIAN ASSISTANT

## 2021-05-24 RX ORDER — DULOXETIN HYDROCHLORIDE 30 MG/1
30 CAPSULE, DELAYED RELEASE ORAL DAILY
Qty: 90 CAPSULE | Refills: 0 | Status: SHIPPED | OUTPATIENT
Start: 2021-05-24 | End: 2021-06-21

## 2021-05-24 ASSESSMENT — PAIN SCALES - GENERAL: PAINLEVEL: MODERATE PAIN (5)

## 2021-05-24 NOTE — PATIENT INSTRUCTIONS
1. CALM Magnesium - powdered magnesium / can do daily / tastes a bit sour. Can do daily. Sold most pharmacies.  2. Start Duloxetine (Cymbalta) 1 pill daily - can increase later. Takes 3-4 weeks to start seeing improvement   3. Therapy schedulers will call you

## 2021-05-24 NOTE — TELEPHONE ENCOUNTER
Josette faxed a Message to Prescriber re: DULoxetine (CYMBALTA) 30 MG capsule    Just to clarify: Is the patient to take amitryptyline along with duloxetine?     Thank you

## 2021-05-24 NOTE — PROGRESS NOTES
Assessment & Plan     Spasm of abdominal muscles  Chronic and recurrent issues. His abdomen muscles tend to spasm. Likely related to quadriplegia. Recommend treat with muscle relaxers, heat, PT, massage, etc. He will work on these things and get back to me. Follow up as needed     Quadriplegia (H)  Fairly independent. Has no immediate concerns other than the issues with recurrent spasms     He uses a wheelchair for mobility. He has a roho cushion for support and reduction of known history of pressure sores.     Cervical spinal cord injury, sequela (H)  Will see his PMR doc in a few weeks.     Mild major depression (H)  Notes that he's felt more down lately. Dealing with a lot of general frustration with his diagnosis and the ongoing things he has to do to manage his needs. Refer to therapy. See below.  - MENTAL HEALTH REFERRAL  - Adult; Outpatient Treatment; Individual/Couples/Family/Group Therapy/Health Psychology; [facility] – [place], [place]: [facility] [phone]; We will contact you to schedule the appointment or please call with any questions  - DULoxetine (CYMBALTA) 30 MG capsule; Take 1 capsule (30 mg) by mouth daily    Abdominal pain, generalized  Chronic discomfort issues related to his cervical spine injury. Recommend trial Duloxetine for pain and mood. This prescription is given with a discussion of side effects, risks and proper use.  Instructions are given to follow up if not improving or symptoms change or worsen as discussed.   - DULoxetine (CYMBALTA) 30 MG capsule; Take 1 capsule (30 mg) by mouth daily    Chronic midline thoracic back pain  Using at bedtime. Helps him sleep. This prescription is given with a discussion of side effects, risks and proper use.  Instructions are given to follow up if not improving or symptoms change or worsen as discussed. Reviewed Cymbalta and Amitriptyline together awareness.   - amitriptyline (ELAVIL) 25 MG tablet; Take 1 tablet (25 mg) by mouth At Bedtime    No  follow-ups on file.    DANNI DAVIS [facility] IRAIDA Lin is a 45 year old who presents for the following health issues     HPI     ED/UC Followup:    Facility:  [facility] Emergency Room  Date of visit: 05/19/2021  Reason for visit: Spasm of abdominal muscles of left side  Current Status: still having spasms but it is not as bad. All over abdominal pain is 5/10 today. Tizanidine is helping some.       Patient Active Problem List   Diagnosis     Vitamin D deficiency     Eosinophilic esophagitis     Neurogenic bladder     CARDIOVASCULAR SCREENING; LDL GOAL LESS THAN 160     Quadriplegia (H)     Chronic constipation     Internal hemorrhoids with other complication     Diagnostic skin and sensitization tests(aka ALLERGENS)     Anal or rectal pain     Allergic rhinitis due to animal dander     Allergy to mold spores     House dust mite allergy     Insomnia     Health Care Home     Gallstones     Chronic midline thoracic back pain     Pulmonary nodules     ACP (advance care planning)     Cervical spinal cord injury, sequela (H)     Spasticity     Self-catheterizes urinary bladder     Bleeding external hemorrhoids      Current Outpatient Medications   Medication     Acetaminophen (TYLENOL PO)     albuterol (PROAIR HFA/PROVENTIL HFA/VENTOLIN HFA) 108 (90 Base) MCG/ACT inhaler     alum & mag hydroxide-simethicone (MYLANTA/MAALOX) 200-200-20 MG/5ML SUSP suspension     amitriptyline (ELAVIL) 25 MG tablet     baclofen (LIORESAL) 20 MG tablet     budesonide (PULMICORT) 0.5 MG/2ML neb solution     CARAFATE 1 GM/10ML PO suspension     cetirizine (ZYRTEC) 10 MG tablet     clonazePAM (KLONOPIN) 0.5 MG tablet     clotrimazole 10 MG meryl     COMPOUNDED NON-CONTROLLED SUBSTANCE (CMPD RX) - PHARMACY TO MIX COMPOUNDED MEDICATION     COMPRESSION STOCKINGS     diazepam (VALIUM) 5 MG tablet     diphenhydrAMINE (BENADRYL) 25 MG tablet     docusate sodium (ENEMEEZ MINI) 283 MG  enema     DULoxetine (CYMBALTA) 30 MG capsule     EPINEPHrine (EPIPEN/ADRENACLICK/OR ANY BX GENERIC EQUIV) 0.3 MG/0.3ML injection 2-pack     hydrocortisone (ANUSOL-HC) 2.5 % cream     hydrocortisone, Perianal, (ANUSOL-HC) 2.5 % cream     hyoscyamine 0.125 MG TBDP     ketoconazole (NIZORAL) 2 % external shampoo     lidocaine (XYLOCAINE) 2 % solution     lidocaine (XYLOCAINE) 5 % external ointment     linaclotide (LINZESS) 72 MCG capsule     loperamide (IMODIUM A-D) 2 MG tablet     LORazepam (ATIVAN) 1 MG tablet     magic mouthwash (ENTER INGREDIENTS IN COMMENTS) suspension     magnesium hydroxide (MILK OF MAGNESIA) 400 MG/5ML suspension     NEW MED     NEW MED     nystatin (MYCOSTATIN) 878257 UNIT/GM POWD     omeprazole (PRILOSEC) 40 MG DR capsule     phenylephrine-cocoa butter (PREPARATION H) 0.25-88.44 % suppository     polyethylene glycol (MIRALAX) powder     prochlorperazine (COMPAZINE) 10 MG tablet     Simethicone 125 MG CAPS     sodium chloride 0.9% (bottle) 1,000 mL with gentamicin 500 mg irrigation     sodium phosphate (FLEET ENEMA) 7-19 GM/118ML rectal enema     sucralfate (CARAFATE) 1 GM/10ML suspension     tiZANidine (ZANAFLEX) 4 MG tablet     tolterodine (DETROL) 2 MG tablet     zinc oxide 10 % OINT     zolpidem (AMBIEN) 10 MG tablet     Current Facility-Administered Medications   Medication     Botulinum Toxin Type A (BOTOX) 200 units injection 200 Units        Review of Systems   Constitutional, HEENT, cardiovascular, pulmonary, GI, , musculoskeletal, neuro, skin, endocrine and psych systems are negative, except as otherwise noted.      Objective    /82 (BP Location: Right arm, Patient Position: Chair, Cuff Size: Adult Regular)   Pulse 107   Temp 99.8  F (37.7  C) (Tympanic)   SpO2 99%   There is no height or weight on file to calculate BMI.  Physical Exam   GENERAL: healthy, alert and no distress  NECK: no adenopathy, no asymmetry, masses, or scars and thyroid normal to palpation  RESP:  lungs clear to auscultation - no rales, rhonchi or wheezes  CV: regular rate and rhythm, normal S1 S2, no S3 or S4, no murmur, click or rub, no peripheral edema and peripheral pulses strong  ABDOMEN: soft, nontender, no hepatosplenomegaly, no masses and bowel sounds normal  SKIN: no suspicious lesions or rashes  NEURO: Normal strength and tone, mentation intact and speech normal  PSYCH: mentation appears normal, affect normal/bright

## 2021-05-25 NOTE — TELEPHONE ENCOUNTER
Called [facility] and notified them that the provider does want pt on both medications. The pharmacy verbalized an understanding.    Leda Caraballo RN

## 2021-05-25 NOTE — TELEPHONE ENCOUNTER
Routing to PCP for clarification      Josette faxed a Message to Prescriber re: DULoxetine (CYMBALTA) 30 MG capsule     Just to clarify: Is the patient to take amitryptyline along with duloxetine?      Thank you    Al Verdugo, RN, BSN, PHN  [facility]

## 2021-05-31 DIAGNOSIS — Z11.59 ENCOUNTER FOR SCREENING FOR OTHER VIRAL DISEASES: ICD-10-CM

## 2021-06-02 ENCOUNTER — PATIENT OUTREACH (OUTPATIENT)
Dept: CARE COORDINATION | Facility: CLINIC | Age: 46
End: 2021-06-02

## 2021-06-02 DIAGNOSIS — N39.0 RECURRENT UTI: ICD-10-CM

## 2021-06-02 LAB
ALBUMIN UR-MCNC: NEGATIVE MG/DL
AMORPH CRY #/AREA URNS HPF: ABNORMAL /HPF
APPEARANCE UR: CLEAR
BACTERIA #/AREA URNS HPF: ABNORMAL /HPF
BILIRUB UR QL STRIP: NEGATIVE
COLOR UR AUTO: ABNORMAL
GLUCOSE UR STRIP-MCNC: NEGATIVE MG/DL
HGB UR QL STRIP: NEGATIVE
KETONES UR STRIP-MCNC: ABNORMAL MG/DL
LEUKOCYTE ESTERASE UR QL STRIP: ABNORMAL
MUCOUS THREADS #/AREA URNS LPF: PRESENT /LPF
NITRATE UR QL: NEGATIVE
NON-SQ EPI CELLS #/AREA URNS LPF: ABNORMAL /LPF
PH UR STRIP: 7 PH (ref 5–7)
RBC #/AREA URNS AUTO: ABNORMAL /HPF
SOURCE: ABNORMAL
SP GR UR STRIP: 1.02 (ref 1–1.03)
UROBILINOGEN UR STRIP-ACNC: 0.2 EU/DL (ref 0.2–1)
WBC #/AREA URNS AUTO: ABNORMAL /HPF

## 2021-06-02 PROCEDURE — 81001 URINALYSIS AUTO W/SCOPE: CPT | Performed by: UROLOGY

## 2021-06-02 PROCEDURE — 87088 URINE BACTERIA CULTURE: CPT | Performed by: UROLOGY

## 2021-06-02 PROCEDURE — 87086 URINE CULTURE/COLONY COUNT: CPT | Performed by: UROLOGY

## 2021-06-02 NOTE — PROGRESS NOTES
Clinic Care Coordination Contact  Care Team Conversations    The patient called the RN CC stating that he feels he is having symptoms of another UTI.  Per the patient urine is cloudy, and noticed to be odiferous, and he is not been feeling very well for the last couple of days.  The patient is aware of the fact that he has orders for a UA/UC from the urology department.  So he called to get a lab appointment and he tried [place], [place], and [place] and they were all full.  The RN CC made a call and was able to catch an appointment just after it was canceled for [place] at 1:45 PM.  The patient was notified of the time and the place and he will go for his urine sample.      Robbin Vanegas MSN, RN, PHN, CCM   Primary Care Clinical RN Care Coordinator  [facility]  6/2/2021   1:48 PM  [email or web address]  Office: [phone]

## 2021-06-03 LAB
BACTERIA SPEC CULT: ABNORMAL
BACTERIA SPEC CULT: ABNORMAL
SPECIMEN SOURCE: ABNORMAL

## 2021-06-04 ENCOUNTER — TELEPHONE (OUTPATIENT)
Dept: UROLOGY | Facility: CLINIC | Age: 46
End: 2021-06-04

## 2021-06-04 DIAGNOSIS — N39.0 RECURRENT UTI: Primary | ICD-10-CM

## 2021-06-04 RX ORDER — CEPHALEXIN 500 MG/1
500 CAPSULE ORAL 2 TIMES DAILY
Qty: 10 CAPSULE | Refills: 0 | Status: SHIPPED | OUTPATIENT
Start: 2021-06-04 | End: 2021-08-13

## 2021-06-04 NOTE — TELEPHONE ENCOUNTER
[facility]    Phone Message    May a detailed message be left on voicemail: yes     Reason for Call: Requesting Results   Name/type of test: Urine Culture Aerobic Bacterial & UA with Microscopic  Date of test: 6/2/2021  Was test done at a location other than [facility] (Please fill in the location if not [facility])?: No      Action Taken: Message routed to:  [facility] ump uro    Travel Screening: Not Applicable

## 2021-06-04 NOTE — TELEPHONE ENCOUNTER
Please look at his recent urine culture --- do we treat  Nausea cloudy urine bladder spasms Blanka White, [facility] Staff Nurse

## 2021-06-18 ENCOUNTER — PATIENT OUTREACH (OUTPATIENT)
Dept: UROLOGY | Facility: CLINIC | Age: 46
End: 2021-06-18

## 2021-06-18 ENCOUNTER — PRE VISIT (OUTPATIENT)
Dept: SURGERY | Facility: CLINIC | Age: 46
End: 2021-06-18

## 2021-06-18 DIAGNOSIS — Z01.812 PRE-PROCEDURE LAB EXAM: Primary | ICD-10-CM

## 2021-06-18 DIAGNOSIS — N31.9 NEUROGENIC BLADDER: ICD-10-CM

## 2021-06-18 DIAGNOSIS — N39.0 URINARY TRACT INFECTION: ICD-10-CM

## 2021-06-18 NOTE — TELEPHONE ENCOUNTER
Pre Op Teaching Flowsheet                                        Pre and Post op Patient Education  Relevant Diagnosis: neurogenic bladder  Teaching Topic:  Pre and post op teaching for Cystoscopy, Botox injections  Person Involved in teaching: Patient        Motivation Level: High  Asks Questions: Yes  Eager to Learn:  Yes  Cooperative: Yes  Receptive (willing/able to accept information):  Yes  Patient demonstrates understanding of the following:  Date and time of surgery:  06/25/21  Location of surgery: [facility]  History and Physical and any other testing necessary prior to surgery Pre Op Physical with: PCP on 06/21/21  Required time line for completion of History and Physical and any pre-op testing: No more than 30 days prior to surgery date    NPO Guidelines: NPO per Anesthesia Guidelines : Reviewed (surgery packet for further information).    Patient demonstrates understanding of the following:  Patient understands the need for a responsible adult to drive them home and someone to stay with them for the first 24 hours post-operatively: FRANCISCO (medical)Transport  Pre-op bowel prep: N/A  Pre-op showering/scrub information with Hibiclens Soap: Yes  Medications to take the day of surgery: Pre op Physical for instruction.   Blood thinner medications discussed and when to stop (if applicable):  Yes  Diabetes medication management (if applicable):  Patient to discuss with Primary Care Provider  Discussed pain control after surgery: pain scale, pain medications and pain management techniques  Infection Prevention: Patient demonstrates understanding of the following:  Patient instructed on hand hygiene:  Yes  Surgical procedure site care taught: Yes  Signs and symptoms of infection taught:  Yes  Wound care will be taught at the time of discharge.    Urine anylisis and Urine Culture ordered on:06/21/21  Pre op Covid testing on:  Post-op follow-up: As needed  Discussed how to contact the hospital, nurse, and clinic scheduling  staff if necessary.     Surgical instructions given to patient in clinic: Via Phone.   Instructional materials used/given/mailed: Before your surgery packet , Medications to avoid before surgery , Showering or Bathing instructions before surgery  and What to expect after surgery      Total time with patient: 10 minutes   Yessy Tapia RN

## 2021-06-21 ENCOUNTER — OFFICE VISIT (OUTPATIENT)
Dept: FAMILY MEDICINE | Facility: CLINIC | Age: 46
End: 2021-06-21
Payer: COMMERCIAL

## 2021-06-21 VITALS
TEMPERATURE: 98.5 F | DIASTOLIC BLOOD PRESSURE: 72 MMHG | HEART RATE: 94 BPM | SYSTOLIC BLOOD PRESSURE: 104 MMHG | OXYGEN SATURATION: 97 %

## 2021-06-21 DIAGNOSIS — S14.109S CERVICAL SPINAL CORD INJURY, SEQUELA (H): ICD-10-CM

## 2021-06-21 DIAGNOSIS — N32.89 BLADDER SPASM: ICD-10-CM

## 2021-06-21 DIAGNOSIS — Z78.9 SELF-CATHETERIZES URINARY BLADDER: ICD-10-CM

## 2021-06-21 DIAGNOSIS — Z11.59 ENCOUNTER FOR SCREENING FOR OTHER VIRAL DISEASES: ICD-10-CM

## 2021-06-21 DIAGNOSIS — Z01.818 PREOP GENERAL PHYSICAL EXAM: Primary | ICD-10-CM

## 2021-06-21 DIAGNOSIS — T78.1XXA REACTION TO FOOD, INITIAL ENCOUNTER: ICD-10-CM

## 2021-06-21 LAB
ALBUMIN UR-MCNC: NEGATIVE MG/DL
APPEARANCE UR: CLEAR
BACTERIA #/AREA URNS HPF: ABNORMAL /HPF
BILIRUB UR QL STRIP: NEGATIVE
COLOR UR AUTO: YELLOW
GLUCOSE UR STRIP-MCNC: NEGATIVE MG/DL
HGB UR QL STRIP: NEGATIVE
KETONES UR STRIP-MCNC: NEGATIVE MG/DL
LEUKOCYTE ESTERASE UR QL STRIP: ABNORMAL
NITRATE UR QL: NEGATIVE
NON-SQ EPI CELLS #/AREA URNS LPF: ABNORMAL /LPF
PH UR STRIP: 7 PH (ref 5–7)
RBC #/AREA URNS AUTO: ABNORMAL /HPF
SARS-COV-2 RNA RESP QL NAA+PROBE: NORMAL
SOURCE: ABNORMAL
SP GR UR STRIP: 1.01 (ref 1–1.03)
SPECIMEN SOURCE: NORMAL
UROBILINOGEN UR STRIP-ACNC: 0.2 EU/DL (ref 0.2–1)
WBC #/AREA URNS AUTO: ABNORMAL /HPF

## 2021-06-21 PROCEDURE — U0005 INFEC AGEN DETEC AMPLI PROBE: HCPCS | Performed by: UROLOGY

## 2021-06-21 PROCEDURE — U0003 INFECTIOUS AGENT DETECTION BY NUCLEIC ACID (DNA OR RNA); SEVERE ACUTE RESPIRATORY SYNDROME CORONAVIRUS 2 (SARS-COV-2) (CORONAVIRUS DISEASE [COVID-19]), AMPLIFIED PROBE TECHNIQUE, MAKING USE OF HIGH THROUGHPUT TECHNOLOGIES AS DESCRIBED BY CMS-2020-01-R: HCPCS | Performed by: UROLOGY

## 2021-06-21 PROCEDURE — 81001 URINALYSIS AUTO W/SCOPE: CPT | Performed by: PHYSICIAN ASSISTANT

## 2021-06-21 PROCEDURE — 99214 OFFICE O/P EST MOD 30 MIN: CPT | Performed by: PHYSICIAN ASSISTANT

## 2021-06-21 RX ORDER — EPINEPHRINE 0.3 MG/.3ML
0.3 INJECTION SUBCUTANEOUS PRN
Qty: 0.6 ML | Refills: 3 | Status: SHIPPED | OUTPATIENT
Start: 2021-06-21 | End: 2021-08-13

## 2021-06-21 ASSESSMENT — PATIENT HEALTH QUESTIONNAIRE - PHQ9: SUM OF ALL RESPONSES TO PHQ QUESTIONS 1-9: 7

## 2021-06-21 NOTE — PATIENT INSTRUCTIONS
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
## 2021-06-22 ENCOUNTER — ANESTHESIA EVENT (OUTPATIENT)
Dept: SURGERY | Facility: AMBULATORY SURGERY CENTER | Age: 46
End: 2021-06-22
Payer: COMMERCIAL

## 2021-06-22 LAB
LABORATORY COMMENT REPORT: NORMAL
SARS-COV-2 RNA RESP QL NAA+PROBE: NEGATIVE
SPECIMEN SOURCE: NORMAL

## 2021-06-25 ENCOUNTER — HOSPITAL ENCOUNTER (OUTPATIENT)
Facility: AMBULATORY SURGERY CENTER | Age: 46
End: 2021-06-25
Attending: UROLOGY
Payer: COMMERCIAL

## 2021-06-25 ENCOUNTER — ANESTHESIA (OUTPATIENT)
Dept: SURGERY | Facility: AMBULATORY SURGERY CENTER | Age: 46
End: 2021-06-25
Payer: COMMERCIAL

## 2021-06-25 VITALS
DIASTOLIC BLOOD PRESSURE: 75 MMHG | TEMPERATURE: 98.3 F | SYSTOLIC BLOOD PRESSURE: 112 MMHG | HEART RATE: 93 BPM | RESPIRATION RATE: 16 BRPM | HEIGHT: 72 IN | OXYGEN SATURATION: 92 % | WEIGHT: 130 LBS | BODY MASS INDEX: 17.61 KG/M2

## 2021-06-25 DIAGNOSIS — N31.9 NEUROGENIC BLADDER: ICD-10-CM

## 2021-06-25 PROCEDURE — 52287 CYSTOSCOPY CHEMODENERVATION: CPT

## 2021-06-25 RX ORDER — ONDANSETRON 2 MG/ML
INJECTION INTRAMUSCULAR; INTRAVENOUS PRN
Status: DISCONTINUED | OUTPATIENT
Start: 2021-06-25 | End: 2021-06-25

## 2021-06-25 RX ORDER — ONDANSETRON 2 MG/ML
4 INJECTION INTRAMUSCULAR; INTRAVENOUS EVERY 30 MIN PRN
Status: DISCONTINUED | OUTPATIENT
Start: 2021-06-25 | End: 2021-06-26 | Stop reason: HOSPADM

## 2021-06-25 RX ORDER — GABAPENTIN 300 MG/1
300 CAPSULE ORAL ONCE
Status: DISCONTINUED | OUTPATIENT
Start: 2021-06-25 | End: 2021-06-25 | Stop reason: HOSPADM

## 2021-06-25 RX ORDER — MEPERIDINE HYDROCHLORIDE 25 MG/ML
12.5 INJECTION INTRAMUSCULAR; INTRAVENOUS; SUBCUTANEOUS
Status: DISCONTINUED | OUTPATIENT
Start: 2021-06-25 | End: 2021-06-26 | Stop reason: HOSPADM

## 2021-06-25 RX ORDER — LIDOCAINE HYDROCHLORIDE 10 MG/ML
INJECTION, SOLUTION INFILTRATION; PERINEURAL PRN
Status: DISCONTINUED | OUTPATIENT
Start: 2021-06-25 | End: 2021-06-25

## 2021-06-25 RX ORDER — DEXAMETHASONE SODIUM PHOSPHATE 4 MG/ML
INJECTION, SOLUTION INTRA-ARTICULAR; INTRALESIONAL; INTRAMUSCULAR; INTRAVENOUS; SOFT TISSUE PRN
Status: DISCONTINUED | OUTPATIENT
Start: 2021-06-25 | End: 2021-06-25

## 2021-06-25 RX ORDER — CEFAZOLIN SODIUM 2 G/50ML
2 SOLUTION INTRAVENOUS
Status: COMPLETED | OUTPATIENT
Start: 2021-06-25 | End: 2021-06-25

## 2021-06-25 RX ORDER — FENTANYL CITRATE 50 UG/ML
25-50 INJECTION, SOLUTION INTRAMUSCULAR; INTRAVENOUS
Status: DISCONTINUED | OUTPATIENT
Start: 2021-06-25 | End: 2021-06-25 | Stop reason: HOSPADM

## 2021-06-25 RX ORDER — PROPOFOL 10 MG/ML
INJECTION, EMULSION INTRAVENOUS CONTINUOUS PRN
Status: DISCONTINUED | OUTPATIENT
Start: 2021-06-25 | End: 2021-06-25

## 2021-06-25 RX ORDER — HYDRALAZINE HYDROCHLORIDE 20 MG/ML
INJECTION INTRAMUSCULAR; INTRAVENOUS PRN
Status: DISCONTINUED | OUTPATIENT
Start: 2021-06-25 | End: 2021-06-25

## 2021-06-25 RX ORDER — ACETAMINOPHEN 325 MG/1
975 TABLET ORAL ONCE
Status: DISCONTINUED | OUTPATIENT
Start: 2021-06-25 | End: 2021-06-25 | Stop reason: HOSPADM

## 2021-06-25 RX ORDER — NALOXONE HYDROCHLORIDE 0.4 MG/ML
0.4 INJECTION, SOLUTION INTRAMUSCULAR; INTRAVENOUS; SUBCUTANEOUS
Status: DISCONTINUED | OUTPATIENT
Start: 2021-06-25 | End: 2021-06-26 | Stop reason: HOSPADM

## 2021-06-25 RX ORDER — FENTANYL CITRATE 50 UG/ML
INJECTION, SOLUTION INTRAMUSCULAR; INTRAVENOUS PRN
Status: DISCONTINUED | OUTPATIENT
Start: 2021-06-25 | End: 2021-06-25

## 2021-06-25 RX ORDER — PROPOFOL 10 MG/ML
INJECTION, EMULSION INTRAVENOUS PRN
Status: DISCONTINUED | OUTPATIENT
Start: 2021-06-25 | End: 2021-06-25

## 2021-06-25 RX ORDER — KETOROLAC TROMETHAMINE 30 MG/ML
30 INJECTION, SOLUTION INTRAMUSCULAR; INTRAVENOUS EVERY 6 HOURS PRN
Status: DISCONTINUED | OUTPATIENT
Start: 2021-06-25 | End: 2021-06-26 | Stop reason: HOSPADM

## 2021-06-25 RX ORDER — NALOXONE HYDROCHLORIDE 0.4 MG/ML
0.2 INJECTION, SOLUTION INTRAMUSCULAR; INTRAVENOUS; SUBCUTANEOUS
Status: DISCONTINUED | OUTPATIENT
Start: 2021-06-25 | End: 2021-06-26 | Stop reason: HOSPADM

## 2021-06-25 RX ORDER — CEFAZOLIN SODIUM 1 G/3ML
1 INJECTION, POWDER, FOR SOLUTION INTRAMUSCULAR; INTRAVENOUS SEE ADMIN INSTRUCTIONS
Status: DISCONTINUED | OUTPATIENT
Start: 2021-06-25 | End: 2021-06-25 | Stop reason: HOSPADM

## 2021-06-25 RX ORDER — ALBUTEROL SULFATE 0.83 MG/ML
2.5 SOLUTION RESPIRATORY (INHALATION) EVERY 4 HOURS PRN
Status: DISCONTINUED | OUTPATIENT
Start: 2021-06-25 | End: 2021-06-25 | Stop reason: HOSPADM

## 2021-06-25 RX ORDER — OXYCODONE HYDROCHLORIDE 5 MG/1
5 TABLET ORAL EVERY 4 HOURS PRN
Status: DISCONTINUED | OUTPATIENT
Start: 2021-06-25 | End: 2021-06-26 | Stop reason: HOSPADM

## 2021-06-25 RX ORDER — ONDANSETRON 4 MG/1
4 TABLET, ORALLY DISINTEGRATING ORAL EVERY 30 MIN PRN
Status: DISCONTINUED | OUTPATIENT
Start: 2021-06-25 | End: 2021-06-26 | Stop reason: HOSPADM

## 2021-06-25 RX ORDER — SODIUM CHLORIDE, SODIUM LACTATE, POTASSIUM CHLORIDE, CALCIUM CHLORIDE 600; 310; 30; 20 MG/100ML; MG/100ML; MG/100ML; MG/100ML
INJECTION, SOLUTION INTRAVENOUS CONTINUOUS
Status: DISCONTINUED | OUTPATIENT
Start: 2021-06-25 | End: 2021-06-26 | Stop reason: HOSPADM

## 2021-06-25 RX ORDER — SODIUM CHLORIDE, SODIUM LACTATE, POTASSIUM CHLORIDE, CALCIUM CHLORIDE 600; 310; 30; 20 MG/100ML; MG/100ML; MG/100ML; MG/100ML
INJECTION, SOLUTION INTRAVENOUS CONTINUOUS PRN
Status: DISCONTINUED | OUTPATIENT
Start: 2021-06-25 | End: 2021-06-25

## 2021-06-25 RX ADMIN — PROPOFOL 20 MG: 10 INJECTION, EMULSION INTRAVENOUS at 14:02

## 2021-06-25 RX ADMIN — FENTANYL CITRATE 50 MCG: 50 INJECTION, SOLUTION INTRAMUSCULAR; INTRAVENOUS at 14:11

## 2021-06-25 RX ADMIN — SODIUM CHLORIDE, SODIUM LACTATE, POTASSIUM CHLORIDE, CALCIUM CHLORIDE: 600; 310; 30; 20 INJECTION, SOLUTION INTRAVENOUS at 13:51

## 2021-06-25 RX ADMIN — DEXAMETHASONE SODIUM PHOSPHATE 4 MG: 4 INJECTION, SOLUTION INTRA-ARTICULAR; INTRALESIONAL; INTRAMUSCULAR; INTRAVENOUS; SOFT TISSUE at 13:56

## 2021-06-25 RX ADMIN — LIDOCAINE HYDROCHLORIDE 80 MG: 10 INJECTION, SOLUTION INFILTRATION; PERINEURAL at 13:55

## 2021-06-25 RX ADMIN — ONDANSETRON 4 MG: 2 INJECTION INTRAMUSCULAR; INTRAVENOUS at 13:56

## 2021-06-25 RX ADMIN — HYDRALAZINE HYDROCHLORIDE 5 MG: 20 INJECTION INTRAMUSCULAR; INTRAVENOUS at 14:17

## 2021-06-25 RX ADMIN — PROPOFOL 150 MCG/KG/MIN: 10 INJECTION, EMULSION INTRAVENOUS at 13:56

## 2021-06-25 RX ADMIN — CEFAZOLIN SODIUM 2 G: 2 SOLUTION INTRAVENOUS at 13:34

## 2021-06-25 ASSESSMENT — MIFFLIN-ST. JEOR: SCORE: 1517.68

## 2021-06-25 NOTE — DISCHARGE INSTRUCTIONS
[facility]  Home Care Following Anesthesia  For 24 hours after surgery:  1. Get plenty of rest.  A responsible adult must stay with you for at least 24 hours after you leave the surgery center.  2. Do not drive or use heavy equipment.  If you have weakness or tingling, don't drive or use heavy equipment until this feeling goes away.   3. Do not drink alcohol.   4. Avoid strenuous or risky activities.  Ask for help when climbing stairs.  5. You may feel lightheaded.  IF so, sit for a few minutes before standing.  Have someone help you get up.   6. If you have nausea (feel sick to your stomach): Drink only clear liquids such as apple juice, ginger ale, broth or 7-Up.  Rest may also help.  Be sure to drink enough fluids.  Move to a regular diet as you feel able.   7. You may have a slight fever.  Call the doctor if your fever is over 100 F (37.7 C) (taken under the tongue) or lasts longer than 24 hours.  8. Do not make important or legal decisions.   9. It is recommended to avoid smoking.                 Tylenol/Acetaminophen Consumption  To help encourage the safe use of acetaminophen, the makers of TYLENOL  have lowered the maximum daily dose for single-ingredient Extra Strength TYLENOL  (acetaminophen) products sold in the [place] from 8 pills per day (4,000 mg) to 6 pills per day (3,000 mg). The dosing interval has also changed from 2 pills every 4-6 hours to 2 pills every 6 hours.    If you feel your pain relief is insufficient, you may take Tylenol/Acetaminophen in addition to your narcotic pain medication.     Be careful not to exceed 3,000 mg of Tylenol/Acetaminophen in a 24 hour period from all sources.    If you are taking extra strength Tylenol/acetaminophen (500 mg), the maximum dose is 6 tablets in 24 hours.    If you are taking regular strength acetaminophen (325 mg), the maximum dose is 9 tablets in 24 hours.    Call a doctor for any of the followin. Signs of  infection (fever, growing tenderness at the surgery site, a large amount of drainage or bleeding, severe pain, foul-smelling drainage, redness, swelling).  2. It has been over 8 to 10 hours since surgery and you are still not able to urinate (pass water).  3. Headache for over 24 hours.  4. Signs of Covid-19 infection (temperature over 100 degrees, shortness of breath, cough, loss of taste/smell, generalized body aches, persistent headache, chills, sore throat, nausea/vomiting/diarrhea)    Your doctor is:  Dr. Evaristo Giraldo, Prostate and Urology: [phone]  Or dial [phone] and ask for the resident on call for:  Prostate Urology  For emergency care, call the:  [facility]:  [phone] (TTY for hearing impaired: [phone])
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
## 2021-06-25 NOTE — ANESTHESIA CARE TRANSFER NOTE
Patient: Riley Gifford    Procedure(s):  CYSTOSCOPY, WITH BOTULINUM TOXIN INJECTION    Diagnosis: Neurogenic bladder [N31.9]  Diagnosis Additional Information: No value filed.    Anesthesia Type:   MAC     Note:      Level of Consciousness: awake  Oxygen Supplementation: room air    Independent Airway: airway patency satisfactory and stable  Dentition: dentition unchanged  Vital Signs Stable: post-procedure vital signs reviewed and stable  Report to RN Given: handoff report given  Patient transferred to: Phase II    Handoff Report: Identifed the Patient, Identified the Reponsible Provider, Reviewed the pertinent medical history, Discussed the surgical course, Reviewed Intra-OP anesthesia mangement and issues during anesthesia, Set expectations for post-procedure period and Allowed opportunity for questions and acknowledgement of understanding      Vitals: (Last set prior to Anesthesia Care Transfer)  CRNA VITALS  6/25/2021 1348 - 6/25/2021 1418      6/25/2021             Resp Rate (set):  10        Electronically Signed By: SUJATA Styles CRNA  June 25, 2021  2:18 PM

## 2021-06-25 NOTE — ANESTHESIA POSTPROCEDURE EVALUATION
Patient: Riley Gifford    Procedure(s):  CYSTOSCOPY, WITH BOTULINUM TOXIN INJECTION    Diagnosis:Neurogenic bladder [N31.9]  Diagnosis Additional Information: No value filed.    Anesthesia Type:  MAC    Note:  Disposition: Outpatient   Postop Pain Control: Uneventful            Sign Out: Well controlled pain   PONV: No   Neuro/Psych: Uneventful            Sign Out: Acceptable/Baseline neuro status   Airway/Respiratory: Uneventful            Sign Out: Acceptable/Baseline resp. status   CV/Hemodynamics: Uneventful            Sign Out: Acceptable CV status; No obvious hypovolemia; No obvious fluid overload   Other NRE: NONE   DID A NON-ROUTINE EVENT OCCUR? No           Last vitals:  Vitals:    06/25/21 1230 06/25/21 1420   BP: 106/84 97/54   Pulse: 92 98   Resp: 18 18   Temp: 36.7  C (98  F) 36.7  C (98  F)   SpO2: 93% 93%       Last vitals prior to Anesthesia Care Transfer:  CRNA VITALS  6/25/2021 1348 - 6/25/2021 1424      6/25/2021             Resp Rate (set):  10          Electronically Signed By: AIDAN JENKINS MD  June 25, 2021  2:24 PM

## 2021-06-25 NOTE — OP NOTE
OPERATIVE REPORT     PREOPERATIVE DIAGNOSIS:  Neurogenic bladder    POSTOPERATIVE DIAGNOSIS: Same    PROCEDURES PERFORMED:   1. Cystourethroscopy with injection of botulinum toxin A 300units in 20cc sterile saline    STAFF SURGEON: Dr. Evaristo Hayes MD  FELLOW: Enzo Spivey MD  RESIDENT(S):  Kely Castrejon MD    ANESTHESIA: Monitor Anesthesia Care    ESTIMATED BLOOD LOSS: 2 mL.     DRAINS: None    OPERATIVE INDICATIONS:   Riley Gifford is a(n) 45 year old male with a history of with neurogenic bladder refractory to anti-cholinergics due to C-5 spinal cord injury. His last botox injection was December 2020 and was done in OR. He gets [employer]. He understands the risks and benefits of the various options and elects to proceed with botulinum toxin injection understanding the risks for urinary obstruction, infection, pain, bleeding, need for future procedures and risks of anesthesia.    DESCRIPTION OF PROCEDURE:   After verification of informed consent was obtained, the patient was brought to the operating room, and moved to the operating table. After adequate anesthesia was induced, the patient was repositioned in the lithotomy position and prepped and draped in the usual sterile fashion. A formal timeout was performed to confirm the correct patient, procedure and operative site.     A 19Fr cystoscope was placed into the bladder.  The bladder mucosa appeared to be normal without stones, tumors or diverticulum on 360 degree inspection. The ureteral orifices were in the normal orthotopic position bilaterally.  We mixed 3 vials of 100 U botulinum toxin A into 20 mL of injectable saline for a total of (300 units/mL).  We performed a template injection procedure with 5 columns of 4 injections. All injections were placed deep to the mucosa into the detrusor muscle.  We then emptied his bladder to re-inspect our injection sites and found that there was a minimal amount of blood. His bladder was then emptied again. The patient  was returned to supine position and transported to recovery in stable condition.     The procedure was then concluded. The patient was transferred to the postanesthesia care unit in stable condition and tolerated the procedure well.     POSTOP PLAN:  1. Follow up in 8 months for repeat bladder botox injection

## 2021-07-02 ENCOUNTER — PATIENT OUTREACH (OUTPATIENT)
Dept: CARE COORDINATION | Facility: CLINIC | Age: 46
End: 2021-07-02

## 2021-07-02 NOTE — PROGRESS NOTES
Clinic Care Coordination Contact  [facility]/Voicemail       Clinical Data: Care Coordinator Outreach  Outreach attempted x 1.  Left message on patient's voicemail with call back information and requested return call.  Plan: Care Coordinator sent care coordination introduction letter on 3/24/21 via [employer]. Care Coordinator will try to reach patient again in 10 business days.          Robbin Vanegas MSN, RN, PHN, CCM   Primary Care Clinical RN Care Coordinator  [facility]  7/2/2021   10:39 AM  [email or web address]  Office: [phone]

## 2021-07-12 ENCOUNTER — TELEPHONE (OUTPATIENT)
Dept: FAMILY MEDICINE | Facility: CLINIC | Age: 46
End: 2021-07-12

## 2021-07-12 NOTE — TELEPHONE ENCOUNTER
Forms received from Eliane Austin MD Orders/ Date: 8/31/2020 for Jan Harris PA-C.  Forms placed in provider 'sign me' folder.  Please fax forms to [phone] after completion.    GALE LOZADA (R)  Radiology Department

## 2021-07-29 ENCOUNTER — PATIENT OUTREACH (OUTPATIENT)
Dept: CARE COORDINATION | Facility: CLINIC | Age: 46
End: 2021-07-29

## 2021-07-29 NOTE — LETTER
[facility] CARE COORDINATION  [address] [facility] [address]    July 29, 2021        Riley Marcos  [address]  [place] [address]          Dear Hellen Lin is an updated Complex Care Plan for your continued enrollment in Care Coordination. Please let us know if you have additional questions, concerns, or goals that we can assist with.    Sincerely,    Robbin Vanegas MSN, RN, PHN, CCM   Primary Care Clinical RN Care Coordinator  [facility]  7/29/2021   2:14 PM  [email or web address]  Office: [phone]              [facility]  Complex Care Plan  About Me:    Patient Name:  Riley Marcos    YOB: 1975  Age:         45 year old   [facility] MRN:    [id number] Telephone Information:  Home Phone [phone]   Mobile NONE   Mobile [phone]       Address:  [address] [place] [address] [address]  [place] [address] Email address:  [email or web address]      Emergency Contact(s)    Name Relationship Lgl Grd Work Phone Home Phone Mobile Phone   1. TREY MARCOS (NE* Relative No noen none [phone]   2. AGUSTO CAVAZOS Sister   [phone]    3. HODA MARCOS Brother No  [phone]            Primary language:  English     needed? No   [facility]:  [phone] op. 1  Other communication barriers:    Preferred Method of Communication:  MyChart  Current living arrangement:    Mobility Status/ Medical Equipment:      Health Maintenance  Health Maintenance Reviewed:      My Access Plan  Medical Emergency 911   Primary Clinic Line [facility] - [phone]   24 Hour Appointment Line [phone] or  [phone]) (toll-free)   24 Hour Nurse Line [phone] (toll-free)   Preferred Urgent Care     Preferred Hospital     Preferred [id number] [address] [place] [address]     Behavioral Health Crisis Line [place]  Suicide Prevention Lifeline at 1-704.729.3688 or 911             My Care Team Members  Patient Care Team       Relationship Specialty Notifications Start End    Raffy Harris PA-C PCP - General Physician Assistant  3/2/18     Phone: [phone] Fax: [phone]         [facility] [facility] [address]    Robbin Andino, RN Lead Care Coordinator Nurse Admissions 1/6/16     phone:  [phone]    Phone: [phone] Fax: [phone]        Rober Leo MD Referring Physician Urology  1/8/16     Phone: [phone] Fax: [phone] [address] [facility] [address]    Kimberly Zabala MD MD Urology  1/8/16     Phone: [phone] Fax: [phone]         [address] [facility] [address] [facility] [address]    Amanda Montoya (see comments)   1/8/16     [facility]     Phone: [phone]         Evaristo Hayes MD MD Urology  4/19/16     Phone: [phone] Fax: [phone]         [address] [place] [address] [address] [facility] [address]    Jenny Wright, RN Registered Nurse Urology  4/19/16     Rosemary Thomas, RN Nurse Coordinator Physical Medicine and Rehabilitation  3/30/17     Phone: [phone] Pager: [phone]        Raffy Harris PA-C Assigned PCP   6/7/20     Phone: [phone] Fax: [phone]         [address] [place] [address]    Lulú Reveles APRN CNP Nurse Practitioner Nurse Practitioner  8/28/20     Phone: [phone] Fax: [phone]         [address] [place] [address]    Raffy Harris PA-C Referring Physician Family Medicine  11/10/20     Phone: [phone] Fax: [phone]         [facility] [facility] [address]    Amina Doe, RN Specialty Care Coordinator Urology  11/10/20     Phone: [phone]         Lulú Reveles APRN CNP Assigned Surgical Provider   6/13/21     Phone: [phone] Fax: [phone]         [address] [place] [address]             My Care Plans  Self Management and Treatment Plan  Goals and (Comments)  Goals        General     Healthy Eating (pt-stated)      Notes - Note edited  3/24/2021  2:34 PM by Robbin Andino, RN     Goal Statement: I will work with the ILS person to make more palatable meals for me to eat.  Measure of Success: The patient is gaining weight  Supportive Steps to Achieve: weigh the patient routinely.  Barriers: food allergies  Strengths: motivated to gain weight  Date to Achieve By: 12/5/2021  Patient expressed understanding of goal: yes           Pain Management (pt-stated)      Notes - Note edited  3/24/2021  2:35 PM by Robbin Andino, RN     Goal Statement: I will work with the physical therapists at [facility] to decrease the amount of pain in my neck, back and sacral area.  Measure of Success: Patient states less pain.  Supportive Steps to Achieve: physical therapy from [facility]  Barriers: quadriplegic   Strengths: motivated  Date to Achieve By: 12/5/2021  Patient expressed understanding of goal: yes                 Action Plans on File:                       Advance Care Plans/Directives Type:        My Medical and Care Information  Problem List   Patient Active Problem List   Diagnosis     Vitamin D deficiency     Eosinophilic esophagitis     Neurogenic bladder     CARDIOVASCULAR SCREENING; LDL GOAL LESS THAN 160     Quadriplegia (H)     Chronic constipation     Internal hemorrhoids with other complication     Diagnostic skin and sensitization tests(aka ALLERGENS)     Anal or rectal pain     Allergic rhinitis due to animal dander     Allergy to mold spores     House dust mite allergy     Insomnia     Health Care Home     Gallstones     Chronic midline thoracic back pain     Pulmonary nodules     ACP (advance care planning)     Cervical spinal cord injury, sequela (H)     Spasticity     Self-catheterizes urinary bladder     Bleeding external hemorrhoids      Current Medications and Allergies:  See  printed Medication Report.    Care Coordination Start Date: 1/6/2016   Frequency of Care Coordination: monthly   Form Last Updated: 07/29/2021

## 2021-07-29 NOTE — PROGRESS NOTES
Clinic Care Coordination Contact  [facility]/Voicemail       Clinical Data: Care Coordinator Outreach  Outreach attempted x 1.  Left message on patient's voicemail with call back information and requested return call.  Plan: Care Coordinator sent care coordination introduction letter on 3/24/21 via [employer]. Care Coordinator will try to reach patient again in 10 business days.        Robbin Vanegas MSN, RN, PHN, [facility]   Primary Care Clinical RN Care Coordinator  [facility]  7/29/2021   2:15 PM  [email or web address]  Office: [phone]

## 2021-07-30 ENCOUNTER — MYC MEDICAL ADVICE (OUTPATIENT)
Dept: CARE COORDINATION | Facility: CLINIC | Age: 46
End: 2021-07-30

## 2021-07-30 ENCOUNTER — PATIENT OUTREACH (OUTPATIENT)
Dept: CARE COORDINATION | Facility: CLINIC | Age: 46
End: 2021-07-30

## 2021-07-30 ASSESSMENT — ACTIVITIES OF DAILY LIVING (ADL): DEPENDENT_IADLS:: CLEANING;COOKING;LAUNDRY;SHOPPING;MEAL PREPARATION;MEDICATION MANAGEMENT

## 2021-07-30 NOTE — PROGRESS NOTES
Clinic Care Coordination Contact    Follow Up Progress Note      Assessment: The RN CC contacted the patient by phone for a follow-up call.  The patient states that things are pretty stable without any improvement in the pain.  [facility].  The patient was certified for medical marijuana last year by Dr. Ortega who switched clinics this spring and therefore is now [place].  The patient is requesting a provider that could recertify him for medical marijuana that is closer to home.  The RN CC was able to obtain the information that Dr. Sultana is currently certifying medical marijuana patient's at the [facility].  This information was forwarded to the patient via [id number] so that he can reach out and make an appointment.    Goals addressed this encounter:   Goals Addressed                    This Visit's Progress       Healthy Eating (pt-stated)   60%      Goal Statement: I will work with the ILS person to make more palatable meals for me to eat.  Measure of Success: The patient is gaining weight  Supportive Steps to Achieve: weigh the patient routinely.  Barriers: food allergies  Strengths: motivated to gain weight  Date to Achieve By: 12/5/2021  Patient expressed understanding of goal: yes             Pain Management (pt-stated)   60%      Goal Statement: I will work with the physical therapists at [facility] to decrease the amount of pain in my neck, back and sacral area.  Measure of Success: Patient states less pain.  Supportive Steps to Achieve: physical therapy from [facility]  Barriers: quadriplegic   Strengths: motivated  Date to Achieve By: 12/5/2021  Patient expressed understanding of goal: yes                 Intervention/Education provided during outreach: Reviewed with the patient the use of medical marijuana as well as pain medications for his persistent and consistent pain.     Outreach Frequency: monthly    Plan:   1.  The patient will  attend all appointments with natalio hare for assistance with his back pain.  2.  The patient will make and attend the appointment to recertify his medical marijuana with Dr. Sultana.  3.  The patient will take all medications as prescribed by the providers.    RN CC Nurse Care Coordinator will follow up in 4 weeks.            Robbin Vanegas MSN, RN, PHN, [facility]   Primary Care Clinical RN Care Coordinator  [facility]  7/30/2021   1:38 PM  [email or web address]  Office: [phone]

## 2021-08-04 ENCOUNTER — LAB (OUTPATIENT)
Dept: LAB | Facility: CLINIC | Age: 46
End: 2021-08-04
Payer: COMMERCIAL

## 2021-08-04 DIAGNOSIS — N31.9 NEUROGENIC BLADDER: ICD-10-CM

## 2021-08-04 DIAGNOSIS — N39.0 URINARY TRACT INFECTION: ICD-10-CM

## 2021-08-04 DIAGNOSIS — Z01.812 PRE-PROCEDURE LAB EXAM: ICD-10-CM

## 2021-08-04 LAB
ALBUMIN UR-MCNC: NEGATIVE MG/DL
APPEARANCE UR: CLEAR
BILIRUB UR QL STRIP: NEGATIVE
COLOR UR AUTO: YELLOW
GLUCOSE UR STRIP-MCNC: NEGATIVE MG/DL
HGB UR QL STRIP: NEGATIVE
KETONES UR STRIP-MCNC: NEGATIVE MG/DL
LEUKOCYTE ESTERASE UR QL STRIP: ABNORMAL
NITRATE UR QL: NEGATIVE
PH UR STRIP: 7 [PH] (ref 5–7)
RBC #/AREA URNS AUTO: NORMAL /HPF
SP GR UR STRIP: 1.01 (ref 1–1.03)
UROBILINOGEN UR STRIP-ACNC: 0.2 E.U./DL
WBC #/AREA URNS AUTO: NORMAL /HPF

## 2021-08-04 PROCEDURE — 87086 URINE CULTURE/COLONY COUNT: CPT

## 2021-08-04 PROCEDURE — 87088 URINE BACTERIA CULTURE: CPT

## 2021-08-04 PROCEDURE — 81001 URINALYSIS AUTO W/SCOPE: CPT

## 2021-08-06 LAB — BACTERIA UR CULT: ABNORMAL

## 2021-08-09 ENCOUNTER — TELEPHONE (OUTPATIENT)
Dept: UROLOGY | Facility: CLINIC | Age: 46
End: 2021-08-09

## 2021-08-09 DIAGNOSIS — N39.0 URINARY TRACT INFECTION: Primary | ICD-10-CM

## 2021-08-09 RX ORDER — CEPHALEXIN 500 MG/1
500 CAPSULE ORAL 2 TIMES DAILY
Qty: 10 CAPSULE | Refills: 0 | Status: SHIPPED | OUTPATIENT
Start: 2021-08-09 | End: 2021-08-13

## 2021-08-09 NOTE — TELEPHONE ENCOUNTER
[facility]    Phone Message    May a detailed message be left on voicemail: yes     Reason for Call: Requesting Results   Name/type of test: Urine sample  Date of test: 8/4/2021  Was test done at a location other than [facility] (Please fill in the location if not [facility])?: No      Action Taken: Message routed to:  [facility]  Urology    Travel Screening: Not Applicable

## 2021-08-13 ENCOUNTER — OFFICE VISIT (OUTPATIENT)
Dept: FAMILY MEDICINE | Facility: CLINIC | Age: 46
End: 2021-08-13
Payer: COMMERCIAL

## 2021-08-13 VITALS
SYSTOLIC BLOOD PRESSURE: 90 MMHG | OXYGEN SATURATION: 96 % | HEART RATE: 90 BPM | RESPIRATION RATE: 22 BRPM | DIASTOLIC BLOOD PRESSURE: 60 MMHG | TEMPERATURE: 98 F

## 2021-08-13 DIAGNOSIS — Z01.818 PREOP GENERAL PHYSICAL EXAM: Primary | ICD-10-CM

## 2021-08-13 DIAGNOSIS — G82.50 QUADRIPLEGIA (H): ICD-10-CM

## 2021-08-13 DIAGNOSIS — G89.29 CHRONIC MIDLINE THORACIC BACK PAIN: ICD-10-CM

## 2021-08-13 DIAGNOSIS — R91.8 PULMONARY NODULES: ICD-10-CM

## 2021-08-13 DIAGNOSIS — K64.4 BLEEDING EXTERNAL HEMORRHOIDS: ICD-10-CM

## 2021-08-13 DIAGNOSIS — Z86.59 HISTORY OF CLAUSTROPHOBIA: ICD-10-CM

## 2021-08-13 DIAGNOSIS — M54.6 CHRONIC MIDLINE THORACIC BACK PAIN: ICD-10-CM

## 2021-08-13 DIAGNOSIS — S14.109S CERVICAL SPINAL CORD INJURY, SEQUELA (H): ICD-10-CM

## 2021-08-13 PROCEDURE — 99213 OFFICE O/P EST LOW 20 MIN: CPT | Performed by: FAMILY MEDICINE

## 2021-08-13 ASSESSMENT — PAIN SCALES - GENERAL: PAINLEVEL: NO PAIN (0)

## 2021-08-13 NOTE — PATIENT INSTRUCTIONS
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
## 2021-08-16 ENCOUNTER — TELEPHONE (OUTPATIENT)
Dept: CARE COORDINATION | Facility: CLINIC | Age: 46
End: 2021-08-16

## 2021-08-16 ENCOUNTER — OFFICE VISIT (OUTPATIENT)
Dept: FAMILY MEDICINE | Facility: CLINIC | Age: 46
End: 2021-08-16
Payer: COMMERCIAL

## 2021-08-16 VITALS
SYSTOLIC BLOOD PRESSURE: 130 MMHG | DIASTOLIC BLOOD PRESSURE: 88 MMHG | RESPIRATION RATE: 18 BRPM | HEART RATE: 95 BPM | TEMPERATURE: 98.2 F | OXYGEN SATURATION: 95 %

## 2021-08-16 DIAGNOSIS — G82.50 QUADRIPLEGIA (H): ICD-10-CM

## 2021-08-16 DIAGNOSIS — R25.2 SPASTICITY: ICD-10-CM

## 2021-08-16 DIAGNOSIS — M62.838 SPASM OF ABDOMINAL MUSCLES: Primary | ICD-10-CM

## 2021-08-16 DIAGNOSIS — M62.830 BACK MUSCLE SPASM: ICD-10-CM

## 2021-08-16 DIAGNOSIS — S14.109S CERVICAL SPINAL CORD INJURY, SEQUELA (H): ICD-10-CM

## 2021-08-16 PROCEDURE — 99213 OFFICE O/P EST LOW 20 MIN: CPT | Performed by: FAMILY MEDICINE

## 2021-08-16 RX ORDER — DIAZEPAM 5 MG
TABLET ORAL
Qty: 10 TABLET | Refills: 0 | Status: SHIPPED | OUTPATIENT
Start: 2021-08-16 | End: 2022-12-19

## 2021-08-16 ASSESSMENT — PAIN SCALES - GENERAL: PAINLEVEL: MODERATE PAIN (5)

## 2021-08-16 NOTE — TELEPHONE ENCOUNTER
"Pt last seen PCP 5/24/21 for abdominal spasms. Per clinic note - \" Spasm of abdominal muscles  Chronic and recurrent issues. His abdomen muscles tend to spasm. Likely related to quadriplegia. Recommend treat with muscle relaxers, heat, PT, massage, etc. He will work on these things and get back to me. Follow up as needed \"    Called and spoke with pt he is not feeling well d/t the abdominal spasms. Notified pt would recommend a f/u if prior treatment is not helping. Pt agreeable. First available appt here at [facility] with any provider is not until Wed. Offered [facility] to be seen in [facility] as [facility] or could transfer to schedulers to see if another [facility] has any openings today. Pt transferred to schedulers to see if there is an opening today at another clinic.    Leda Caraballo RN      "

## 2021-08-16 NOTE — TELEPHONE ENCOUNTER
The patient called the RN CC nurse care coordinator with abdominal spasms.    The patient has tried:    Amitriptyline  Baclofen  Tizanidine    The patient has also been constipated off and on over the weekend.      The RN CC suggested using only a partial pill of the Tizanidine due to the drowsiness, as well as some heat from a warm pack or heating pad.  Also a suggestion to add Milk of Magnesia or dulcolax.    The patient also requested to have a message sent to the team incase he needs to make an appointment to be seen.    Robbin Vanegas MSN, RN, PHN, CCM   Primary Care Clinical RN Care Coordinator  [facility]  8/16/2021   10:17 AM  [email or web address]  Office: [phone]

## 2021-08-27 ENCOUNTER — TELEPHONE (OUTPATIENT)
Dept: FAMILY MEDICINE | Facility: CLINIC | Age: 46
End: 2021-08-27

## 2021-08-27 NOTE — TELEPHONE ENCOUNTER
Forms received from [facility]/Doctor Order/ Power Wheelchair/8/26/2021 for Jan Harris PA-C.  Forms placed in provider 'sign me' folder.  Please fax forms to [phone] after completion.    GALE LOZADA (R)  Radiology Department

## 2021-09-02 ENCOUNTER — TELEPHONE (OUTPATIENT)
Dept: CARE COORDINATION | Facility: CLINIC | Age: 46
End: 2021-09-02

## 2021-09-02 NOTE — TELEPHONE ENCOUNTER
The RN CC nurse care coordinator for [employer] received a call from Amanda at [facility] for Michelle. The patient is due for the annual documentation of medical necessity for his power chair and his Roho cushion. The Roho cushion is needed to prevent skin breakdown and must be replaced frequently. The power chair that he has is 5 years old and we are requesting a replacement as this is his means of getting around.     This information can be placed as an addendum to the last visit that you had with the patient. It has to be very specific that he needs these items replaced and the rationale for replacement.    Then the notes for that day [facility] and [facility] fax number is [phone].    Any questions can be directed to Robbin russell RN [phone].      Robbin Vanegas MSN, RN, PHN, [facility]   Primary Care Clinical RN Care Coordinator  [facility]  9/2/2021   12:14 PM  [email or web address]  Office: [phone]

## 2021-09-03 ENCOUNTER — TELEPHONE (OUTPATIENT)
Dept: FAMILY MEDICINE | Facility: CLINIC | Age: 46
End: 2021-09-03

## 2021-09-03 NOTE — TELEPHONE ENCOUNTER
Forms received from [facility]/ Detailed Prescription/ Equipment/Services/ 9/2/2021 for Jan Harris PA-C.  Forms placed in provider 'sign me' folder.  Please fax forms to [phone] after completion.    GALE LOZADA (R)  Radiology Department

## 2021-09-03 NOTE — TELEPHONE ENCOUNTER
Forms received from [facility] [employer] supply/ Request for Medical Records/ Date: 9/2/2021 for Jan Harris PA-C.  Forms placed in provider 'sign me' folder.  Please fax forms to [phone] after completion.    GALE LOZADA (R)  Radiology Department

## 2021-09-07 ENCOUNTER — OFFICE VISIT (OUTPATIENT)
Dept: FAMILY MEDICINE | Facility: CLINIC | Age: 46
End: 2021-09-07
Payer: COMMERCIAL

## 2021-09-07 VITALS
OXYGEN SATURATION: 92 % | SYSTOLIC BLOOD PRESSURE: 116 MMHG | HEART RATE: 96 BPM | DIASTOLIC BLOOD PRESSURE: 83 MMHG | TEMPERATURE: 99.8 F

## 2021-09-07 DIAGNOSIS — S14.109S CERVICAL SPINAL CORD INJURY, SEQUELA (H): Primary | ICD-10-CM

## 2021-09-07 DIAGNOSIS — R53.83 OTHER FATIGUE: ICD-10-CM

## 2021-09-07 LAB
ALBUMIN UR-MCNC: NEGATIVE MG/DL
ANION GAP SERPL CALCULATED.3IONS-SCNC: 8 MMOL/L (ref 3–14)
APPEARANCE UR: CLEAR
BACTERIA #/AREA URNS HPF: ABNORMAL /HPF
BILIRUB UR QL STRIP: NEGATIVE
BUN SERPL-MCNC: 13 MG/DL (ref 7–30)
CALCIUM SERPL-MCNC: 8.6 MG/DL (ref 8.5–10.1)
CHLORIDE BLD-SCNC: 102 MMOL/L (ref 94–109)
CO2 SERPL-SCNC: 21 MMOL/L (ref 20–32)
COLOR UR AUTO: YELLOW
CREAT SERPL-MCNC: 0.56 MG/DL (ref 0.66–1.25)
ERYTHROCYTE [DISTWIDTH] IN BLOOD BY AUTOMATED COUNT: 11.7 % (ref 10–15)
GFR SERPL CREATININE-BSD FRML MDRD: >90 ML/MIN/1.73M2
GLUCOSE BLD-MCNC: 100 MG/DL (ref 70–99)
GLUCOSE UR STRIP-MCNC: NEGATIVE MG/DL
HCT VFR BLD AUTO: 41.2 % (ref 40–53)
HGB BLD-MCNC: 13.8 G/DL (ref 13.3–17.7)
HGB UR QL STRIP: NEGATIVE
KETONES UR STRIP-MCNC: NEGATIVE MG/DL
LEUKOCYTE ESTERASE UR QL STRIP: ABNORMAL
MCH RBC QN AUTO: 31.2 PG (ref 26.5–33)
MCHC RBC AUTO-ENTMCNC: 33.5 G/DL (ref 31.5–36.5)
MCV RBC AUTO: 93 FL (ref 78–100)
NITRATE UR QL: NEGATIVE
PH UR STRIP: 7 [PH] (ref 5–7)
PLATELET # BLD AUTO: 372 10E3/UL (ref 150–450)
POTASSIUM BLD-SCNC: 4.1 MMOL/L (ref 3.4–5.3)
RBC # BLD AUTO: 4.43 10E6/UL (ref 4.4–5.9)
RBC #/AREA URNS AUTO: ABNORMAL /HPF
SODIUM SERPL-SCNC: 131 MMOL/L (ref 133–144)
SP GR UR STRIP: 1.01 (ref 1–1.03)
SQUAMOUS #/AREA URNS AUTO: ABNORMAL /LPF
UROBILINOGEN UR STRIP-ACNC: 0.2 E.U./DL
WBC # BLD AUTO: 9.2 10E3/UL (ref 4–11)
WBC #/AREA URNS AUTO: ABNORMAL /HPF

## 2021-09-07 PROCEDURE — 80048 BASIC METABOLIC PNL TOTAL CA: CPT | Performed by: FAMILY MEDICINE

## 2021-09-07 PROCEDURE — 85027 COMPLETE CBC AUTOMATED: CPT | Performed by: FAMILY MEDICINE

## 2021-09-07 PROCEDURE — 36415 COLL VENOUS BLD VENIPUNCTURE: CPT | Performed by: FAMILY MEDICINE

## 2021-09-07 PROCEDURE — 99214 OFFICE O/P EST MOD 30 MIN: CPT | Performed by: FAMILY MEDICINE

## 2021-09-07 PROCEDURE — 81001 URINALYSIS AUTO W/SCOPE: CPT | Performed by: FAMILY MEDICINE

## 2021-09-07 NOTE — PATIENT INSTRUCTIONS
Jacqui Lin,    Thank you for allowing [facility] to manage your care.    I ordered some blood work, please go to the laboratory to get your laboratory studies.    For your convenience, test results are released as soon as they are available  Please allow 1-2 business days for me to send you a comment about your results.  If not done so, I encourage you to login into [email or web address] ([email or web address] to review your results in real time.     If you have any questions or concerns, please feel free to call us at [phone].    Sincerely,    Dr. Barrera    Did you know?      You can schedule a video visit for follow-up appointments as well as future appointments for certain conditions.  Please see the below link.     [email or web address]    If you have not already done so,  I encourage you to sign up for [employer] ([email or web address]).  This will allow you to review your results, securely communicate with a provider, and schedule virtual visits as well.

## 2021-09-07 NOTE — LETTER
My Depression Action Plan  Name: Riley Gifford   Date of Birth 1975  Date: 9/7/2021    My doctor: Raffy Harris   My clinic: [facility] [place]  [address]  [place] [id number] [phone]          GREEN    ZONE   Good Control    What it looks like:     Things are going generally well. You have normal ups and downs. You may even feel depressed from time to time, but bad moods usually last less than a day.   What you need to do:  1. Continue to care for yourself (see self care plan)  2. Check your depression survival kit and update it as needed  3. Follow your physician s recommendations including any medication.  4. Do not stop taking medication unless you consult with your physician first.           YELLOW         ZONE Getting Worse    What it looks like:     Depression is starting to interfere with your life.     It may be hard to get out of bed; you may be starting to isolate yourself from others.    Symptoms of depression are starting to last most all day and this has happened for several days.     You may have suicidal thoughts but they are not constant.   What you need to do:     1. Call your care team. Your response to treatment will improve if you keep your care team informed of your progress. Yellow periods are signs an adjustment may need to be made.     2. Continue your self-care.  Just get dressed and ready for the day.  Don't give yourself time to talk yourself out of it.    3. Talk to someone in your support network.    4. Open up your Depression Self-Care Plan/Wellness Kit.           RED    ZONE Medical Alert - Get Help    What it looks like:     Depression is seriously interfering with your life.     You may experience these or other symptoms: You can t get out of bed most days, can t work or engage in other necessary activities, you have trouble taking care of basic hygiene, or basic responsibilities, thoughts of suicide or death that will not go  away, self-injurious behavior.     What you need to do:  1. Call your care team and request a same-day appointment. If they are not available (weekends or after hours) call your local crisis line, emergency room or 911.          Depression Self-Care Plan / Wellness Kit    Many people find that medication and therapy are helpful treatments for managing depression. In addition, making small changes to your everyday life can help to boost your mood and improve your wellbeing. Below are some tips for you to consider. Be sure to talk with your medical provider and/or behavioral health consultant if your symptoms are worsening or not improving.     Sleep   Sleep hygiene  means all of the habits that support good, restful sleep. It includes maintaining a consistent bedtime and wake time, using your bedroom only for sleeping or sex, and keeping the bedroom dark and free of distractions like a computer, smartphone, or television.     Develop a Healthy Routine  Maintain good hygiene. Get out of bed in the morning, make your bed, brush your teeth, take a shower, and get dressed. Don t spend too much time viewing media that makes you feel stressed. Find time to relax each day.    Exercise  Get some form of exercise every day. This will help reduce pain and release endorphins, the  feel good  chemicals in your brain. It can be as simple as just going for a walk or doing some gardening, anything that will get you moving.      Diet  Strive to eat healthy foods, including fruits and vegetables. Drink plenty of water. Avoid excessive sugar, caffeine, alcohol, and other mood-altering substances.     Stay Connected with Others  Stay in touch with friends and family members.    Manage Your Mood  Try deep breathing, massage therapy, biofeedback, or meditation. Take part in fun activities when you can. Try to find something to smile about each day.     Psychotherapy  Be open to working with a therapist if your provider recommends it.      Medication  Be sure to take your medication as prescribed. Most anti-depressants need to be taken every day. It usually takes several weeks for medications to work. Not all medicines work for all people. It is important to follow-up with your provider to make sure you have a treatment plan that is working for you. Do not stop your medication abruptly without first discussing it with your provider.    Crisis Resources   These hotlines are for both adults and children. They and are open 24 hours a day, 7 days a week unless noted otherwise.      National Suicide Prevention Lifeline   2-067-125-TALK (7878)      Crisis Text Line    www.crisistextline.org  Text HOME to 596752 from anywhere in the [place], anytime, about any type of crisis. A live, trained crisis counselor will receive the text and respond quickly.      [employer] Lifeline for LGBTQ Youth  A national crisis intervention and suicide lifeline for LGBTQ youth under 25. Provides a safe place to talk without judgement. Call 1-576.258.8946; text START to 027970 or visit www.thetrevorproject.org to talk to a trained counselor.      For [facility] crisis numbers, visit the [facility] website at:  https://[place].gov/dhs/people-we-serve/adults/health-care/mental-health/resources/crisis-contacts.jsp
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
## 2021-09-08 ENCOUNTER — OFFICE VISIT (OUTPATIENT)
Dept: SURGERY | Facility: CLINIC | Age: 46
End: 2021-09-08
Payer: COMMERCIAL

## 2021-09-08 VITALS
BODY MASS INDEX: 17.63 KG/M2 | DIASTOLIC BLOOD PRESSURE: 88 MMHG | WEIGHT: 130 LBS | SYSTOLIC BLOOD PRESSURE: 118 MMHG | HEART RATE: 103 BPM | OXYGEN SATURATION: 97 %

## 2021-09-08 DIAGNOSIS — K64.8 HEMORRHOID PROLAPSE: Primary | ICD-10-CM

## 2021-09-08 DIAGNOSIS — E87.6 HYPOKALEMIA: Primary | ICD-10-CM

## 2021-09-08 PROCEDURE — 46221 LIGATION OF HEMORRHOID(S): CPT | Performed by: NURSE PRACTITIONER

## 2021-09-08 ASSESSMENT — ENCOUNTER SYMPTOMS
CHILLS: 0
HEADACHES: 0
NERVOUS/ANXIOUS: 0
FEVER: 0
PALPITATIONS: 0
MYALGIAS: 0
SORE THROAT: 0
PARESTHESIAS: 0
DIZZINESS: 0
SHORTNESS OF BREATH: 0
COUGH: 0
JOINT SWELLING: 0
WEAKNESS: 0
FATIGUE: 1
ARTHRALGIAS: 0

## 2021-09-08 ASSESSMENT — PAIN SCALES - GENERAL: PAINLEVEL: MODERATE PAIN (5)
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
## 2021-09-08 NOTE — NURSING NOTE
Chief Complaint   Patient presents with     RECHECK     Acute hemorrhoid.       Vitals:    09/08/21 1445   BP: 118/88   BP Location: Left arm   Patient Position: Sitting   Cuff Size: Adult Regular   Pulse: 103   SpO2: 97%   Weight: 59 kg (130 lb)       Body mass index is 17.63 kg/m .                          Kimberly Rahman EMT

## 2021-09-10 ENCOUNTER — PATIENT OUTREACH (OUTPATIENT)
Dept: NURSING | Facility: CLINIC | Age: 46
End: 2021-09-10
Payer: COMMERCIAL

## 2021-09-10 ASSESSMENT — ACTIVITIES OF DAILY LIVING (ADL): DEPENDENT_IADLS:: CLEANING;COOKING;LAUNDRY;SHOPPING;MEAL PREPARATION;MEDICATION MANAGEMENT

## 2021-09-10 NOTE — PROGRESS NOTES
Clinic Care Coordination Contact    Follow Up Progress Note      Assessment: The RN CC nurse care coordinator contacted the patient by phone for a follow-up call. Patient stated that he is working with his PMR provider on having a baclofen pump inserted. The patient will reach out as they have not called him to set up the appointment. Patient stated that he is also seeing the colorectal surgeons and had his hemorrhoids banded once again. He was able to be recertified for the use of medical marijuana.    Care Gaps:    Health Maintenance Due   Topic Date Due     HEPATITIS C SCREENING  Never done     MEDICARE ANNUAL WELLNESS VISIT  06/02/2015     EYE EXAM  04/26/2018     DTAP/TDAP/TD IMMUNIZATION (2 - Td or Tdap) 10/29/2018     INFLUENZA VACCINE (1) 09/01/2021       Care Gaps Last addressed on 9/10/21, Care Gap Goal set: Yes and Patient accepted scheduling phone number for [phone]  to schedule independently     Goals addressed this encounter:   Goals Addressed                    This Visit's Progress       #3  Other care gaps (pt-stated)         Goal Statement: I will work with my primary care providers on closing my care gaps including things like my Medicare annual wellness exam, eye exam and updates to my immunizations.  Date Goal set: 9/10/2021  Barriers: Some things are difficult for him to get to.  Strengths: Engaged in care coordination.  Date to Achieve By: 3/10/2022  Patient expressed understanding of goal: Yes  Action steps to achieve this goal:  1. I will call and make an appointment for my Medicare annual wellness exam.  2. I will call and make an appointment for my eye exam.  3. I will ask my primary care provider which immunizations are appropriate for me to update.           Healthy Eating (pt-stated)   60%      Goal Statement: I will work with the ILS person to make more palatable meals for me to eat.  Measure of Success: The patient is gaining weight  Supportive Steps to Achieve: weigh the patient  routinely.  Barriers: food allergies  Strengths: motivated to gain weight  Date to Achieve By: 12/5/2021  Patient expressed understanding of goal: yes             Pain Management (pt-stated)   60%      Goal Statement: I will work with the physical therapists at [facility] to decrease the amount of pain in my neck, back and sacral area.  Measure of Success: Patient states less pain.  Supportive Steps to Achieve: physical therapy from [facility]  Barriers: quadriplegic   Strengths: motivated  Date to Achieve By: 12/5/2021  Patient expressed understanding of goal: yes                Intervention/Education provided during outreach: The RN CC spoke with the patient regarding his low sodium level and how to correct that.     Outreach Frequency: monthly    Plan:   1. The patient will follow up with the PMR providers to facilitate the baclofen pump being implanted.  2. The patient will attend the appointment with Dr. Sultana that is coming up.  3. The patient will speak with his providers about the appropriateness of the immunizations that he needs to close his care gaps.    RN CC Nurse Care Coordinator will follow up in 4 weeks.            Robbin Vanegas MSN, RN, PHN, CCM   Primary Care Clinical RN Care Coordinator  [facility]  9/10/2021   3:06 PM  [email or web address]  Office: [phone]

## 2021-09-12 ENCOUNTER — HEALTH MAINTENANCE LETTER (OUTPATIENT)
Age: 46
End: 2021-09-12

## 2021-09-17 ENCOUNTER — LAB (OUTPATIENT)
Dept: LAB | Facility: CLINIC | Age: 46
End: 2021-09-17
Payer: COMMERCIAL

## 2021-09-17 DIAGNOSIS — E87.6 HYPOKALEMIA: ICD-10-CM

## 2021-09-17 PROCEDURE — 36415 COLL VENOUS BLD VENIPUNCTURE: CPT

## 2021-09-17 PROCEDURE — 80048 BASIC METABOLIC PNL TOTAL CA: CPT

## 2021-09-18 LAB
ANION GAP SERPL CALCULATED.3IONS-SCNC: 8 MMOL/L (ref 3–14)
BUN SERPL-MCNC: 13 MG/DL (ref 7–30)
CALCIUM SERPL-MCNC: 7.8 MG/DL (ref 8.5–10.1)
CHLORIDE BLD-SCNC: 104 MMOL/L (ref 94–109)
CO2 SERPL-SCNC: 20 MMOL/L (ref 20–32)
CREAT SERPL-MCNC: 0.47 MG/DL (ref 0.66–1.25)
GFR SERPL CREATININE-BSD FRML MDRD: >90 ML/MIN/1.73M2
GLUCOSE BLD-MCNC: 122 MG/DL (ref 70–99)
POTASSIUM BLD-SCNC: 3.8 MMOL/L (ref 3.4–5.3)
SODIUM SERPL-SCNC: 132 MMOL/L (ref 133–144)

## 2021-09-20 DIAGNOSIS — E87.1 HYPONATREMIA: Primary | ICD-10-CM

## 2021-09-29 ENCOUNTER — TELEPHONE (OUTPATIENT)
Dept: CARE COORDINATION | Facility: CLINIC | Age: 46
End: 2021-09-29

## 2021-09-29 DIAGNOSIS — M79.18 BUTTOCK PAIN: Primary | ICD-10-CM

## 2021-09-29 NOTE — TELEPHONE ENCOUNTER
The patient contacted the RN CC regarding continual pain in his buttock.  The patient states that the pain ranges from a 2-3 and up as high as a 7 or 8.  He is taking Tylenol which does not seem to affect the pain at all.  Although his bigger concern is whether he is starting to get a wound.  At this point in time his aids deny any redness at the area that he is indicating and he would really like to have a [facility] nurse the able to come out and look at it.  This would need an order from the PCP.  The patient does not feel that his urine is discolored or odiferous indicating a possible UTI so he is very concerned that this is coming from the buttock itself.    Please reach out to the Riley if you have any additional questions, or if you feel he needs an appointment.  He has recently been seen by colorectal.  He would like the nurse to come to his home as it is much easier for him to lay down at home as he has the lifts necessary for getting out of his chair.      Robbin Vanegas MSN, RN, PHN, CCM   Primary Care Clinical RN Care Coordinator  [facility]  9/29/2021   2:18 PM  [email or web address]  Office: [phone]

## 2021-09-30 NOTE — TELEPHONE ENCOUNTER
The RN CC contacted the patient to inform him that the orders for the WOC nurse are in.  Instructions include to call tomorrow morning if he has not heard from them.      The patient complains of back and side pain.  Still denies any symptoms of a UTI.  Encouraged to contact triage if symptoms worsen and to be seen.    Robbin Vanegas MSN, RN, PHN, CCM   Primary Care Clinical RN Care Coordinator  [facility]  9/30/2021   10:16 AM  [email or web address]  Office: [phone]

## 2021-10-01 ENCOUNTER — TELEPHONE (OUTPATIENT)
Dept: FAMILY MEDICINE | Facility: CLINIC | Age: 46
End: 2021-10-01

## 2021-10-01 NOTE — TELEPHONE ENCOUNTER
Forms received from [employer], /Detailed Prescription/ Equipment/ Services/ Fax Date: 10/1/2021 for Jan Harris PA-C.  Forms placed in provider 'sign me' folder.  Please fax forms to [phone] after completion.    GALE LOZADA (R)  Radiology Department

## 2021-10-02 DIAGNOSIS — F51.01 PRIMARY INSOMNIA: ICD-10-CM

## 2021-10-04 RX ORDER — ZOLPIDEM TARTRATE 10 MG/1
TABLET ORAL
Qty: 90 TABLET | Refills: 0 | Status: SHIPPED | OUTPATIENT
Start: 2021-10-04 | End: 2021-12-24

## 2021-10-05 ENCOUNTER — PATIENT OUTREACH (OUTPATIENT)
Dept: CARE COORDINATION | Facility: CLINIC | Age: 46
End: 2021-10-05

## 2021-10-05 ENCOUNTER — TELEPHONE (OUTPATIENT)
Dept: FAMILY MEDICINE | Facility: CLINIC | Age: 46
End: 2021-10-05

## 2021-10-05 NOTE — TELEPHONE ENCOUNTER
Alice from [facility] is calling to decline patient for Home Care due to them being at capacity.  Please refer to another agency.     Isaura Yeung/  [place]

## 2021-10-05 NOTE — TELEPHONE ENCOUNTER
Spoke with CC Robbin- see message below regarding access to care      Spoke with PCP regarding current needs of patient, patient should be seen urgently as soon as possible to have his pressure wound evaluated.  Wound is on/ near tailbone.  He will need home services as he is immobile -has shirin lift.    Rn did call [facility] wound and ostomy to discuss this patient's needs    Referral pended    TEAM: New Fax: [phone]- print and fax as urgent       Mary Jane Cotto RN

## 2021-10-05 NOTE — TELEPHONE ENCOUNTER
Alice from [facility] is calling in regards to the wound.  What size is it, how deep is it, what instructions are needed and how many visits are needed for this wound. She is trying to give this referral to [facility] but they want to know the frequency of wound care needs.     [phone]    Isaura Yeung/  [place]

## 2021-10-05 NOTE — TELEPHONE ENCOUNTER
[facility]- was informed they do not manage pressure wounds (only below the knee wounds)  I was transferred to [facility] whom we've already tried to get to see the patient- cannot take patient on due to capacity limits.    Pt does need to have services that can either come to his home or have access to a shirin lift.    Connected with CC Robbin who will escalate this.      Mary Jane Cotto RN

## 2021-10-05 NOTE — TELEPHONE ENCOUNTER
Spoke with Jim and advised that patient only needs pressure sore assessed by a wound nurse.    Jim will let us know if [facility] accepts this patient.      Mary Jane Cotto RN

## 2021-10-05 NOTE — PROGRESS NOTES
Clinic Care Coordination Contact  Care Team Conversations    The RN CC received a call from [facility].  Reaching out to find out when the wound care nurse will be coming out to examine the patient.  The RN CC nurse care coordinator reached out to [facility], to Rosenda of intake.  Rosenda informed the RN CC that the homecare company did not act on the orders immediately and now it is past the 48 hours and they will need new orders.  The RN CC requested that the homecare company reach out to the provider immediately for new orders.  Rosenda stated that she will reach out immediately for new orders.    VM left for Amanda Root    Forwarded to leadership.    Patient was updated as well.        Robbin Vanegas MSN, RN, PHN, CCM   Primary Care Clinical RN Care Coordinator  [facility]  10/5/2021   11:50 AM  [email or web address]  Office: [phone]

## 2021-10-06 NOTE — TELEPHONE ENCOUNTER
Jim calling to let provider know patient needs a face to face office visit that is pertaining to what home care needs are to qualify for another agency.

## 2021-10-06 NOTE — TELEPHONE ENCOUNTER
I am sorry this has been such a challenge for him.    He has a physical medicine doctor at [facility] that has seen him and is / does manage his ongoing chronic issues related to his quadriplegia including his wheelchair and his pressure cushion. Despite that, many questions and concerns continue to come back to our clinic and to me regarding his quadriplegia related challenges.    Unfortunately, we are are not always fully equipped to manage, evaluate or make recommendations on these issues despite the fact the questions always come back to us. We cannot examine him in clinic because we don't have the proper equipment to move him or position him.     I would suggest if possible someone reach out to his [facility] team and see if their home care team/referral options can see him to evaluate his buttock pain. Our [facility] care has unfortunately been consistently unreliable for our system.     I want to make sure he gets checked but we should not force him in for a visit to evaluate his buttock pain if he's had visits with his physical med rehab doc at [facility] addressing it (see notes 2/2021 with Dr. Calloway at [facility]). If they have a ready team of visiting nurses that can evaluate him in the home, that would be ideal. It doesn't have to be a wound care nurse per se as it isn't clear that he has a wound at all per his PCA.     Please let me know what other details are needed but he has a physical medicine team at [facility] for a reason and we should lean on them for these kinds of complicated situations that they are better equipped to manage.     Thank you.  Raffy Harris, MPAS, PA-C

## 2021-10-06 NOTE — TELEPHONE ENCOUNTER
See separate encounter, regarding same concern.     Jasmin Sommer, RN, BSN, PHN  [facility]: [place]

## 2021-10-06 NOTE — TELEPHONE ENCOUNTER
[facility] is unable to accept this patient for wound care. Intake informed clinic that a face to face visit is required for patient to qualify for care alternate agency.     Routing to PCP - willing to fit patient into schedule in coming days/week? No in person availability with any [place] provider remaining this week.     Also routing to Robbin Andino as FYI.     Jasmin Sommer, RN, BSN, PHN  [facility]: [place]

## 2021-10-08 ENCOUNTER — PATIENT OUTREACH (OUTPATIENT)
Dept: CARE COORDINATION | Facility: CLINIC | Age: 46
End: 2021-10-08

## 2021-10-08 ENCOUNTER — VIRTUAL VISIT (OUTPATIENT)
Dept: FAMILY MEDICINE | Facility: CLINIC | Age: 46
End: 2021-10-08
Payer: COMMERCIAL

## 2021-10-08 DIAGNOSIS — M79.18 BUTTOCK PAIN: Primary | ICD-10-CM

## 2021-10-08 PROCEDURE — 99213 OFFICE O/P EST LOW 20 MIN: CPT | Mod: 95 | Performed by: PHYSICIAN ASSISTANT

## 2021-10-08 NOTE — PATIENT INSTRUCTIONS
To schedule CT Scan:   To Schedule Imaging   [facility] Scheduling Phone Number: [phone]    [facility] Scheduling Phone number: [phone]

## 2021-10-08 NOTE — Clinical Note
Johnny PÉREZ. The outside agencies wouldn't see him for wound / RN check because I didn't have a visit with him. Now we have one! Is it possible to see if one of the other alternatives for homecare can get out to see him? Let my team know how to get orders or if they will accept a verbal from you? Evaluate and treat / RN wound? We can see if this sticks. Notoriously difficult process. Let me know if you have questions. Thanks. Jan

## 2021-10-08 NOTE — TELEPHONE ENCOUNTER
Clinic Care Coordination Contact  Care Team Conversations     Need office notes, face sheet, med list etc. Faxed to [facility].  [phone]        Please place urgent on the request.      Call me with questions.        Robbin Vanegas MSN, RN, PHN, CCM   Primary Care Clinical RN Care Coordinator  [facility]  10/8/2021   1:25 PM  [email or web address]  Office: [phone]

## 2021-10-08 NOTE — PROGRESS NOTES
Clinic Care Coordination Contact  Care Team Conversations    Need office notes, face sheet, med list etc. Faxed to [facility]. At [phone]      Please place urgent on the request.     Call me with questions.      Robbin Vanegas MSN, RN, PHN, CCM   Primary Care Clinical RN Care Coordinator  [facility]  10/8/2021   1:25 PM  [email or web address]  Office: [phone]

## 2021-10-08 NOTE — PROGRESS NOTES
Clinic Care Coordination Contact  Care Team Conversations    [facility] [phone]     Updated fax [phone]    atten intake.      Robbin Vanegas MSN, RN, PHN, CCM   Primary Care Clinical RN Care Coordinator  [facility]  10/8/2021   2:08 PM  [email or web address]  Office: [phone]

## 2021-10-08 NOTE — TELEPHONE ENCOUNTER
Refaxed the below information to the new fax number [phone].    ARLENE Blancas  [facility]

## 2021-10-08 NOTE — TELEPHONE ENCOUNTER
Faxed office notes, face sheet, med list Etc. To [facility] at fax [phone].    ARLENE Blancas  [facility]

## 2021-10-08 NOTE — PROGRESS NOTES
Riely is a 45 year old who is being evaluated via a billable video visit.      How would you like to obtain your AVS? MyChart  If the video visit is dropped, the invitation should be resent by: Send to e-mail at: [email or web address]  Will anyone else be joining your video visit? No    Video Start Time: 100 PM    Assessment & Plan     Buttock pain  Ongoing a month  Nothing noted by his PCA on exam, skin is intact  Pain is achy, sore, dull, constant, moderate to moderately severe  Not worse with position changes or being off / on the area  Rectal area is chronically hemorrhoidal but not worse or changed  Reviewed options. Consider having wound care RN take a look. Unfortunately, we've had a hard time finding anyone with availability.  Will see if an outside agency can see him  Recommend imaging area of complaint further  MR vs CT reviewed - declines MR due to claustrophobia and time required to be in the MRI suite. Agrees to CT scan. CT pelvis soft tissue ordered.  Follow up after results.  Warning symptoms of worsening condition discussed and patient shows good understanding.   - CT Pelvis Soft Tissue wo Contrast; Future         Return in about 1 week (around 10/15/2021) for Update me Via My Chart - No Charge.    DANNI DAVIS [facility]    Subjective   Riley is a 45 year old who presents for the following health issues     HPI     Concern - Left buttock pain  Onset: on/off, worsening past few weeks to a month+   Description: Patient is having left buttock pain, no skin breakage or redness  Intensity: 5/10  Progression of Symptoms:  worsening  Accompanying Signs & Symptoms: back pain  Previous history of similar problem: yes  Precipitating factors:        Worsened by: none  Alleviating factors:        Improved by: none  Therapies tried and outcome: None    Patient Active Problem List   Diagnosis     Vitamin D deficiency     Eosinophilic esophagitis     Neurogenic bladder      CARDIOVASCULAR SCREENING; LDL GOAL LESS THAN 160     Quadriplegia (H)     Chronic constipation     Internal hemorrhoids with other complication     Diagnostic skin and sensitization tests(aka ALLERGENS)     Anal or rectal pain     Allergic rhinitis due to animal dander     Allergy to mold spores     House dust mite allergy     Insomnia     Health Care Home     Gallstones     Chronic midline thoracic back pain     Pulmonary nodules     ACP (advance care planning)     Cervical spinal cord injury, sequela (H)     Spasticity     Self-catheterizes urinary bladder     Bleeding external hemorrhoids     History of claustrophobia      Current Outpatient Medications   Medication     Acetaminophen (TYLENOL PO)     albuterol (PROAIR HFA/PROVENTIL HFA/VENTOLIN HFA) 108 (90 Base) MCG/ACT inhaler     alum & mag hydroxide-simethicone (MYLANTA/MAALOX) 200-200-20 MG/5ML SUSP suspension     amitriptyline (ELAVIL) 25 MG tablet     baclofen (LIORESAL) 20 MG tablet     budesonide (PULMICORT) 0.5 MG/2ML neb solution     CARAFATE 1 GM/10ML PO suspension     cetirizine (ZYRTEC) 10 MG tablet     clonazePAM (KLONOPIN) 0.5 MG tablet     clotrimazole 10 MG meryl     COMPOUNDED NON-CONTROLLED SUBSTANCE (CMPD RX) - PHARMACY TO MIX COMPOUNDED MEDICATION     COMPRESSION STOCKINGS     diazepam (VALIUM) 5 MG tablet     diphenhydrAMINE (BENADRYL) 25 MG tablet     docusate sodium (ENEMEEZ MINI) 283 MG enema     hydrocortisone, Perianal, (ANUSOL-HC) 2.5 % cream     hyoscyamine 0.125 MG TBDP     ketoconazole (NIZORAL) 2 % external shampoo     lidocaine (XYLOCAINE) 5 % external ointment     magnesium hydroxide (MILK OF MAGNESIA) 400 MG/5ML suspension     nystatin (MYCOSTATIN) 461601 UNIT/GM POWD     omeprazole (PRILOSEC) 40 MG DR capsule     phenylephrine-cocoa butter (PREPARATION H) 0.25-88.44 % suppository     polyethylene glycol (MIRALAX) powder     prochlorperazine (COMPAZINE) 10 MG tablet     Simethicone 125 MG CAPS     sodium chloride 0.9% (bottle)  1,000 mL with gentamicin 500 mg irrigation     sodium phosphate (FLEET ENEMA) 7-19 GM/118ML rectal enema     sucralfate (CARAFATE) 1 GM/10ML suspension     tiZANidine (ZANAFLEX) 4 MG tablet     tolterodine (DETROL) 2 MG tablet     zinc oxide 10 % OINT     zolpidem (AMBIEN) 10 MG tablet     Current Facility-Administered Medications   Medication     Botulinum Toxin Type A (BOTOX) 200 units injection 200 Units        Review of Systems   Constitutional, HEENT, cardiovascular, pulmonary, gi and gu systems are negative, except as otherwise noted.      Objective           Vitals:  No vitals were obtained today due to virtual visit.    Physical Exam   GENERAL: Healthy, alert and no distress  EYES: Eyes grossly normal to inspection.  No discharge or erythema, or obvious scleral/conjunctival abnormalities.  RESP: No audible wheeze, cough, or visible cyanosis.  No visible retractions or increased work of breathing.    SKIN: Visible skin clear. No significant rash, abnormal pigmentation or lesions.  NEURO: Cranial nerves grossly intact.  Mentation and speech appropriate for age.  PSYCH: Mentation appears normal, affect normal/bright, judgement and insight intact, normal speech and appearance well-groomed.            Video-Visit Details    Type of service:  Video Visit    Video End Time:120    Originating Location (pt. Location): Home    Distant Location (provider location):  [facility]     Platform used for Video Visit: Sensipass

## 2021-10-11 ENCOUNTER — PATIENT OUTREACH (OUTPATIENT)
Dept: CARE COORDINATION | Facility: CLINIC | Age: 46
End: 2021-10-11

## 2021-10-11 NOTE — PROGRESS NOTES
Clinic Care Coordination Contact  Care Team Conversations    The RN CC was notified that [facility] will not take the patient at this time as the last visit with the provider was not descriptive enough.  The RN CC notified the patient of this and suggested contacting the nurse at his facility to take a look and document her findings.  If she has a finding of possible stage 1 we can have [facility] come to examine the patient.    Riley is open to this and will reach out to the facility nurse Alley Montoya.  The RN CC encouraged the patient to reach out to either myself or Amanda Root.  The patient stated understanding.        Robbin Vanegas MSN, RN, PHN, CCM   Primary Care Clinical RN Care Coordinator  [facility]  10/11/2021   5:25 PM  [email or web address]  Office: [phone]

## 2021-10-12 ENCOUNTER — ANCILLARY PROCEDURE (OUTPATIENT)
Dept: CT IMAGING | Facility: CLINIC | Age: 46
End: 2021-10-12
Attending: PHYSICIAN ASSISTANT
Payer: COMMERCIAL

## 2021-10-12 DIAGNOSIS — M79.18 BUTTOCK PAIN: ICD-10-CM

## 2021-10-12 PROCEDURE — 72192 CT PELVIS W/O DYE: CPT | Mod: GC | Performed by: RADIOLOGY

## 2021-10-15 ENCOUNTER — MYC MEDICAL ADVICE (OUTPATIENT)
Dept: SURGERY | Facility: CLINIC | Age: 46
End: 2021-10-15

## 2021-10-26 ENCOUNTER — MEDICAL CORRESPONDENCE (OUTPATIENT)
Dept: HEALTH INFORMATION MANAGEMENT | Facility: CLINIC | Age: 46
End: 2021-10-26
Payer: COMMERCIAL

## 2021-10-26 ENCOUNTER — TRANSFERRED RECORDS (OUTPATIENT)
Dept: HEALTH INFORMATION MANAGEMENT | Facility: CLINIC | Age: 46
End: 2021-10-26
Payer: COMMERCIAL

## 2021-11-03 ENCOUNTER — PATIENT OUTREACH (OUTPATIENT)
Dept: CARE COORDINATION | Facility: CLINIC | Age: 46
End: 2021-11-03

## 2021-11-03 ASSESSMENT — ACTIVITIES OF DAILY LIVING (ADL): DEPENDENT_IADLS:: CLEANING;COOKING;LAUNDRY;SHOPPING;MEAL PREPARATION;MEDICATION MANAGEMENT

## 2021-11-03 NOTE — LETTER
[facility]  Patient Centered Plan of Care  About Me:        Patient Name:  Riley Gifford    YOB: 1975  Age:         45 year old   Rut MRN:    [id number] Telephone Information:  Home Phone [phone]   Mobile NONE   Mobile [phone]       Address:  [address] [place] [address] [address]  [place] [address] Email address:  [email or web address]      Emergency Contact(s)    Name Relationship Lgl Grd Work Phone Home Phone Mobile Phone   1. TREY GIFFORD ([place]* Relative No noen none [phone]   2. AGUSTO CAVAZOS Sister   [phone]    3. HODA GIFFORD Brother No  [phone]            Primary language:  English     needed? No   [facility] [employer] [facility]:  [phone] op. 1  Other communication barriers:Glasses    Preferred Method of Communication:  Lisa  Current living arrangement: I live in assisted living    Mobility Status/ Medical Equipment: Dependent/Assisted by Another        Health Maintenance  Health Maintenance Reviewed: Due/Overdue   Health Maintenance Due   Topic Date Due     HEPATITIS C SCREENING  Never done     MEDICARE ANNUAL WELLNESS VISIT  06/02/2015     EYE EXAM  04/26/2018     DTAP/TDAP/TD IMMUNIZATION (2 - Td or Tdap) 10/29/2018     INFLUENZA VACCINE (1) 09/01/2021           My Access Plan  Medical Emergency 911   Primary Clinic Line [facility] - [phone]   24 Hour Appointment Line [phone] or  [phone]) (toll-free)   24 Hour Nurse Line [phone] (toll-free)   Preferred Urgent Care [facility] [phone]     Preferred Hospital [facility]  [phone]     Preferred Pharmacy [id number] - [place] [facility], [place] - [address] AT [facility] [address]     Behavioral Health Crisis Line The National Suicide Prevention Lifeline at 1-735.311.2916 or 911             My Care Team Members  Patient Care Team       Relationship  Specialty Notifications Start End    Raffy Harris PA-C PCP - General Physician Assistant  3/2/18     Phone: [phone] Fax: [phone]         [address] [place] [address]    Robbin Andino, RN Lead Care Coordinator Nurse Admissions 1/6/16     phone:  [phone]    Phone: [phone] Fax: [phone]        Rober Leo MD Referring Physician Urology  1/8/16     Phone: [phone] Fax: [phone] [address] [facility] [address]    Kimberly Zabala MD MD Urology  1/8/16     Phone: [phone] Fax: [phone]         [address] [place] [address] [address] [facility] [address]    Amanda Montoya (see comments)   1/8/16     [facility]     Phone: [phone]         Evaristo Hayes MD MD Urology  4/19/16     Phone: [phone] Fax: [phone]         [address] [place] [address] [address] [facility] [address]    Jenny Wright, RN Registered Nurse Urology  4/19/16     Rosemary Thomas, RN Nurse Coordinator Physical Medicine and Rehabilitation  3/30/17     Phone: [phone] Pager: [phone]        Raffy Harris PA-C Assigned PCP   6/7/20     Phone: [phone] Fax: [phone]         [address] [place] [address]    Lulú Reveles APRN CNP Nurse Practitioner Nurse Practitioner  8/28/20     Phone: [phone] Fax: [phone]         [address] [place] [address] [address] [facility] [address]    Raffy Harris PA-C Referring Physician Family Medicine  11/10/20     Phone: [phone] Fax: [phone]         [facility] [facility] [address]    Amina Doe, RN Specialty Care Coordinator Urology  11/10/20     Phone: [phone]         Lulú Reveles APRN CNP Assigned Surgical Provider   6/13/21     Phone: [phone] Fax: [phone]         [address] [place] [address]            My Care Plans  Self Management and Treatment Plan  Goals and (Comments)  Goals        General     #3  Other care  gaps (pt-stated)      Notes - Note edited  9/10/2021  2:57 PM by Robbin Andino, RN     Goal Statement: I will work with my primary care providers on closing my care gaps including things like my Medicare annual wellness exam, eye exam and updates to my immunizations.  Date Goal set: 9/10/2021  Barriers: Some things are difficult for him to get to.  Strengths: Engaged in care coordination.  Date to Achieve By: 3/10/2022  Patient expressed understanding of goal: Yes  Action steps to achieve this goal:  1. I will call and make an appointment for my Medicare annual wellness exam.  2. I will call and make an appointment for my eye exam.  3. I will ask my primary care provider which immunizations are appropriate for me to update.         Healthy Eating (pt-stated)      Notes - Note edited  3/24/2021  2:34 PM by Robbin Andino, RN     Goal Statement: I will work with the ILS person to make more palatable meals for me to eat.  Measure of Success: The patient is gaining weight  Supportive Steps to Achieve: weigh the patient routinely.  Barriers: food allergies  Strengths: motivated to gain weight  Date to Achieve By: 12/5/2021  Patient expressed understanding of goal: yes           Pain Management (pt-stated)      Notes - Note edited  3/24/2021  2:35 PM by Robbin Andino, RN     Goal Statement: I will work with the physical therapists at [facility] to decrease the amount of pain in my neck, back and sacral area.  Measure of Success: Patient states less pain.  Supportive Steps to Achieve: physical therapy from [facility]  Barriers: quadriplegic   Strengths: motivated  Date to Achieve By: 12/5/2021  Patient expressed understanding of goal: yes                 Action Plans on File:            Depression          Advance Care Plans/Directives Type:   No data recorded    My Medical and Care Information  Problem List   Patient Active Problem List   Diagnosis     Vitamin D deficiency     Eosinophilic esophagitis     Neurogenic  bladder     CARDIOVASCULAR SCREENING; LDL GOAL LESS THAN 160     Quadriplegia (H)     Chronic constipation     Internal hemorrhoids with other complication     Diagnostic skin and sensitization tests(aka ALLERGENS)     Anal or rectal pain     Allergic rhinitis due to animal dander     Allergy to mold spores     House dust mite allergy     Insomnia     Health Care Home     Gallstones     Chronic midline thoracic back pain     Pulmonary nodules     ACP (advance care planning)     Cervical spinal cord injury, sequela (H)     Spasticity     Self-catheterizes urinary bladder     Bleeding external hemorrhoids     History of claustrophobia      Current Medications and Allergies:  See printed Medication Report.    Care Coordination Start Date: 1/6/2016   Frequency of Care Coordination: monthly     Form Last Updated: 11/03/2021

## 2021-11-03 NOTE — PROGRESS NOTES
Clinic Care Coordination Contact    Follow Up Progress Note      Assessment: The RN CC nurse care coordinator received a phone call from the patient requesting assistance with the persistent nausea and back pain.  The patient denies any changes in his urine at this time.  The patient states that it is just getting more bothersome every day.  And he is looking for direction of where to go.  The RN CC did direct him to reach out to the GI group to see what they have to say especially if they would have an antinausea med that would work for him.  The RN CC also encouraged him to reach out to the PM&R group as they had been working on getting a baclofen pump for the patient.  And that currently seems to be at a standstill as the patient has not heard anything back.  The RN CC encouraged the patient to call back with any questions or concerns or if the patient feels that it would the better if the RN CC reached out to the department.    Care Gaps:    Health Maintenance Due   Topic Date Due     HEPATITIS C SCREENING  Never done     MEDICARE ANNUAL WELLNESS VISIT  06/02/2015     EYE EXAM  04/26/2018     DTAP/TDAP/TD IMMUNIZATION (2 - Td or Tdap) 10/29/2018     INFLUENZA VACCINE (1) 09/01/2021       Care Gaps Last addressed on 9/10/2021, Care Gap Goal set: Yes and Patient accepted scheduling phone number for [phone]  to schedule independently     Goals addressed this encounter:   Goals Addressed                    This Visit's Progress       #3  Other care gaps (pt-stated)   10%      Goal Statement: I will work with my primary care providers on closing my care gaps including things like my Medicare annual wellness exam, eye exam and updates to my immunizations.  Date Goal set: 9/10/2021  Barriers: Some things are difficult for him to get to.  Strengths: Engaged in care coordination.  Date to Achieve By: 3/10/2022  Patient expressed understanding of goal: Yes  Action steps to achieve this goal:  1. I will call and make  an appointment for my Medicare annual wellness exam.  2. I will call and make an appointment for my eye exam.  3. I will ask my primary care provider which immunizations are appropriate for me to update.           Healthy Eating (pt-stated)   60%      Goal Statement: I will work with the ILS person to make more palatable meals for me to eat.  Measure of Success: The patient is gaining weight  Supportive Steps to Achieve: weigh the patient routinely.  Barriers: food allergies  Strengths: motivated to gain weight  Date to Achieve By: 12/5/2021  Patient expressed understanding of goal: yes             Pain Management (pt-stated)   60%      Goal Statement: I will work with the physical therapists at [facility] to decrease the amount of pain in my neck, back and sacral area.  Measure of Success: Patient states less pain.  Supportive Steps to Achieve: physical therapy from [facility]  Barriers: quadriplegic   Strengths: motivated  Date to Achieve By: 12/5/2021  Patient expressed understanding of goal: yes                Intervention/Education provided during outreach: The RN CC encouraged the patient to reach out to the GI department as well as the PM&R department to see where the baclofen pump is at this time.     Outreach Frequency: monthly    Plan:   1.  The patient will make a call to the GI department to see if he can get answers regarding the nausea that continues as well as the back pain.  2.  The patient will also contact the PM&R department regarding the baclofen pump and where that is at.  3.  The patient will use ice and or heat very very very carefully so as to not frostbite or burn any areas that he cannot feel so only a small bit of cool or a small bit of warmth to see if that helps.    RN CC Nurse Care Coordinator will follow up in 4 weeks.          Robbin Vanegas MSN, RN, PHN, CCM   Primary Care Clinical RN Care Coordinator  [facility]  11/3/2021   12:07  SOSA [email or web address]  Office: [phone]

## 2021-11-05 ENCOUNTER — VIRTUAL VISIT (OUTPATIENT)
Dept: FAMILY MEDICINE | Facility: CLINIC | Age: 46
End: 2021-11-05
Payer: COMMERCIAL

## 2021-11-05 DIAGNOSIS — R10.84 ABDOMINAL PAIN, GENERALIZED: ICD-10-CM

## 2021-11-05 DIAGNOSIS — M79.18 BUTTOCK PAIN: ICD-10-CM

## 2021-11-05 DIAGNOSIS — R82.90 NONSPECIFIC FINDING ON EXAMINATION OF URINE: ICD-10-CM

## 2021-11-05 DIAGNOSIS — N39.0 FREQUENT UTI: ICD-10-CM

## 2021-11-05 DIAGNOSIS — R11.0 NAUSEA: Primary | ICD-10-CM

## 2021-11-05 PROCEDURE — 99214 OFFICE O/P EST MOD 30 MIN: CPT | Mod: 95 | Performed by: PHYSICIAN ASSISTANT

## 2021-11-05 RX ORDER — CEPHALEXIN 250 MG/5ML
POWDER, FOR SUSPENSION ORAL
Qty: 210 ML | Refills: 0 | Status: SHIPPED | OUTPATIENT
Start: 2021-11-05 | End: 2021-12-21

## 2021-11-05 NOTE — PROGRESS NOTES
Riley is a 45 year old who is being evaluated via a billable video visit.      How would you like to obtain your AVS? MyChart  If the video visit is dropped, the invitation should be resent by: Text to cell phone: [phone]  Will anyone else be joining your video visit? No    Video Start Time: 125 PM     Assessment & Plan     Nausea  Recurrent. Seeing GI soon. Seems to flare with UTIs. See below.    Frequent UTI  As noted    Nonspecific finding on examination of urine  Reviewed options. He's had a recurrent Group B strep infection from what is likely frequent catheter use. Considering his past cultures and his symptoms that seem to be a sign of an infection, I think treatment is suggested. I will send in an RX. I asked he contact me on Monday. Because getting into the clinic is very hard for him due to quadriplegia, this is a reasonable treatment approach  - cephALEXin (KEFLEX) 250 MG/5ML suspension; 2 tsp, 3 times daily x 7 days    Abdominal pain, generalized  Chronic issue. Is seeing Gastro in a week.    Buttock pain  Ongoing now for a few months. CT scan shows some inflammation around his sacrum. I think his quadriplegia and aging are causing increased buttock pressure, atrophy, etc. He will be working with his wheelchair company and PMR doc to get better seating and fitting options. May need MRI of the sacral area at some point but he will wait until he's done speaking with his PMR doc.     No follow-ups on file.    DANNI DAVIS [facility]    Kevyn Lin is a 45 year old who presents for the following health issues     HPI     Medication Followup of chronic Gi issues    Taking Medication as prescribed: yes    Side Effects:  Drowsiness     Medication Helping Symptoms:  NO-still having pain     He is quadriplegic. Chronic issues with lower abdominal discomfort, pain, spasms, spasms in his chest. He's had many work ups. He sees PMR and has had options reviewed. Sometimes  "when he has a UTI his symptoms flare and he feels \"unwell\" and has increased spasms and GI symptoms. He wonders if he has a UTI now. Denies any urinary changes however.     He will be seeing his PMR doctor soon and GI soon to review his chronic issues.     Patient Active Problem List   Diagnosis     Vitamin D deficiency     Eosinophilic esophagitis     Neurogenic bladder     CARDIOVASCULAR SCREENING; LDL GOAL LESS THAN 160     Quadriplegia (H)     Chronic constipation     Internal hemorrhoids with other complication     Diagnostic skin and sensitization tests(aka ALLERGENS)     Anal or rectal pain     Allergic rhinitis due to animal dander     Allergy to mold spores     House dust mite allergy     Insomnia     Health Care Home     Gallstones     Chronic midline thoracic back pain     Pulmonary nodules     ACP (advance care planning)     Cervical spinal cord injury, sequela (H)     Spasticity     Self-catheterizes urinary bladder     Bleeding external hemorrhoids     History of claustrophobia      Current Outpatient Medications   Medication     Acetaminophen (TYLENOL PO)     albuterol (PROAIR HFA/PROVENTIL HFA/VENTOLIN HFA) 108 (90 Base) MCG/ACT inhaler     alum & mag hydroxide-simethicone (MYLANTA/MAALOX) 200-200-20 MG/5ML SUSP suspension     amitriptyline (ELAVIL) 25 MG tablet     baclofen (LIORESAL) 20 MG tablet     budesonide (PULMICORT) 0.5 MG/2ML neb solution     CARAFATE 1 GM/10ML PO suspension     cephALEXin (KEFLEX) 250 MG/5ML suspension     cetirizine (ZYRTEC) 10 MG tablet     clonazePAM (KLONOPIN) 0.5 MG tablet     clotrimazole 10 MG meryl     COMPOUNDED NON-CONTROLLED SUBSTANCE (CMPD RX) - PHARMACY TO MIX COMPOUNDED MEDICATION     COMPRESSION STOCKINGS     diazepam (VALIUM) 5 MG tablet     diphenhydrAMINE (BENADRYL) 25 MG tablet     docusate sodium (ENEMEEZ MINI) 283 MG enema     hydrocortisone, Perianal, (ANUSOL-HC) 2.5 % cream     hyoscyamine 0.125 MG TBDP     ketoconazole (NIZORAL) 2 % external " shampoo     lidocaine (XYLOCAINE) 5 % external ointment     magnesium hydroxide (MILK OF MAGNESIA) 400 MG/5ML suspension     omeprazole (PRILOSEC) 40 MG DR capsule     phenylephrine-cocoa butter (PREPARATION H) 0.25-88.44 % suppository     polyethylene glycol (MIRALAX) powder     prochlorperazine (COMPAZINE) 10 MG tablet     Simethicone 125 MG CAPS     sodium chloride 0.9% (bottle) 1,000 mL with gentamicin 500 mg irrigation     sodium phosphate (FLEET ENEMA) 7-19 GM/118ML rectal enema     tiZANidine (ZANAFLEX) 4 MG tablet     tolterodine (DETROL) 2 MG tablet     zinc oxide 10 % OINT     zolpidem (AMBIEN) 10 MG tablet     nystatin (MYCOSTATIN) 983085 UNIT/GM POWD     sucralfate (CARAFATE) 1 GM/10ML suspension     No current facility-administered medications for this visit.          Review of Systems   Constitutional, HEENT, cardiovascular, pulmonary, GI, , musculoskeletal, neuro, skin, endocrine and psych systems are negative, except as otherwise noted.      Objective           Vitals:  No vitals were obtained today due to virtual visit.    Physical Exam   GENERAL: Healthy, alert and no distress  EYES: Eyes grossly normal to inspection.  No discharge or erythema, or obvious scleral/conjunctival abnormalities.  RESP: No audible wheeze, cough, or visible cyanosis.  No visible retractions or increased work of breathing.    SKIN: Visible skin clear. No significant rash, abnormal pigmentation or lesions.  NEURO: Cranial nerves grossly intact.  Mentation and speech appropriate for age.  PSYCH: Mentation appears normal, affect normal/bright, judgement and insight intact, normal speech and appearance well-groomed.          Video-Visit Details    Type of service:  Video Visit    Video End Time:140 PM    Originating Location (pt. Location): Home    Distant Location (provider location):  [facility]     Platform used for Video Visit: [employer]

## 2021-11-08 DIAGNOSIS — G82.50 QUADRIPLEGIA (H): ICD-10-CM

## 2021-11-09 RX ORDER — BACLOFEN 20 MG/1
TABLET ORAL
Qty: 360 TABLET | Refills: 3 | Status: SHIPPED | OUTPATIENT
Start: 2021-11-09 | End: 2022-11-01

## 2021-11-09 NOTE — TELEPHONE ENCOUNTER
Routing refill request to provider for review/approval because:  Drug not on the [facility] refill protocol     Leda Caraballo RN

## 2021-11-22 ENCOUNTER — TELEPHONE (OUTPATIENT)
Dept: FAMILY MEDICINE | Facility: CLINIC | Age: 46
End: 2021-11-22
Payer: COMMERCIAL

## 2021-11-22 NOTE — TELEPHONE ENCOUNTER
Forms received from [employer]/ Detailed prescription - Sling med patient lift (initial date: 11/22/21) for ERAN Pruett.  Forms placed in provider 'sign me' folder.  Please fax forms to [phone] after completion.    Raffy Haley RT (R) (ARRT)  Radiology Dept

## 2021-11-29 ENCOUNTER — PATIENT OUTREACH (OUTPATIENT)
Dept: CARE COORDINATION | Facility: CLINIC | Age: 46
End: 2021-11-29
Payer: COMMERCIAL

## 2021-11-29 NOTE — PROGRESS NOTES
Clinic Care Coordination Contact  Care Team Conversations    The patient requested the phone numbers for the P M &R at the [facility]  And the number for [facility].  These were sent to the patient via [employer].  The RN CC encouraged the patient to reach out with any concerns or needs.       Robbin Vanegas MSN, RN, PHN, CCM   Primary Care Clinical RN Care Coordinator  [facility]  11/29/2021   4:11 PM  [email or web address]  Office: [phone]         .

## 2021-12-01 ENCOUNTER — TELEPHONE (OUTPATIENT)
Dept: PHYSICAL MEDICINE AND REHAB | Facility: CLINIC | Age: 46
End: 2021-12-01
Payer: COMMERCIAL

## 2021-12-01 NOTE — TELEPHONE ENCOUNTER
Health Call Center    Phone Message    May a detailed message be left on voicemail: yes     Reason for Call: Other: Riley calling to request a call back to discuss the time frame for scheduling a consult to see if he is a candidate for a baclofen pump. He is currently a patient of Dr. Jackman at [facility], with records in epic. Please call Riley at your earliest convenience to discuss.    Action Taken: Message routed to:  [facility] ([facility]): [facility] PHYS MED & REHAB    Travel Screening: Not Applicable

## 2021-12-03 ENCOUNTER — PATIENT OUTREACH (OUTPATIENT)
Dept: NURSING | Facility: CLINIC | Age: 46
End: 2021-12-03
Payer: COMMERCIAL

## 2021-12-03 ASSESSMENT — ACTIVITIES OF DAILY LIVING (ADL): DEPENDENT_IADLS:: CLEANING;COOKING;LAUNDRY;SHOPPING;MEAL PREPARATION;MEDICATION MANAGEMENT

## 2021-12-03 NOTE — PROGRESS NOTES
Clinic Care Coordination Contact    Follow Up Progress Note      Assessment: The RN CC nurse care coordinator contacted the patient for a follow-up call.  The patient's main concern is that he needs to change providers again as his current provider is leaving the organization.  The RN CC did speak with the patient at great length about some of the different providers available at [place] and at [place].  The RN CC did encourage the patient to make a get to know you appointment so that he could see if his needs could be met by that provider.    Care Gaps:    Health Maintenance Due   Topic Date Due     HEPATITIS C SCREENING  Never done     MEDICARE ANNUAL WELLNESS VISIT  06/02/2015     DTAP/TDAP/TD IMMUNIZATION (2 - Td or Tdap) 10/29/2018     INFLUENZA VACCINE (1) 09/01/2021       Care Gaps Last addressed on 11/3/21, Care Gap Goal set: Yes and Patient accepted scheduling phone number for [phone]  to schedule independently     Goals addressed this encounter:   Goals Addressed                    This Visit's Progress       #3  Other care gaps (pt-stated)   10%      Goal Statement: I will work with my primary care providers on closing my care gaps including things like my Medicare annual wellness exam, eye exam and updates to my immunizations.  Date Goal set: 9/10/2021  Barriers: Some things are difficult for him to get to.  Strengths: Engaged in care coordination.  Date to Achieve By: 3/10/2022  Patient expressed understanding of goal: Yes  Action steps to achieve this goal:  1. I will call and make an appointment for my Medicare annual wellness exam.  2. I will call and make an appointment for my eye exam.  3. I will ask my primary care provider which immunizations are appropriate for me to update.           Healthy Eating (pt-stated)   60%      Goal Statement: I will work with the ILS person to make more palatable meals for me to eat.  Measure of Success: The patient is gaining weight  Supportive Steps to  Achieve: weigh the patient routinely.  Barriers: food allergies  Strengths: motivated to gain weight  Date to Achieve By: 12/5/2021  Patient expressed understanding of goal: yes             Pain Management (pt-stated)   60%      Goal Statement: I will work with the physical therapists at [facility] to decrease the amount of pain in my neck, back and sacral area.  Measure of Success: Patient states less pain.  Supportive Steps to Achieve: physical therapy from [facility]  Barriers: quadriplegic   Strengths: motivated  Date to Achieve By: 12/5/2021  Patient expressed understanding of goal: yes                Intervention/Education provided during outreach: In-depth conversation with the patient and the RN CC regarding providers at [facility].     Outreach Frequency: monthly    Plan:   1.  The patient will make a get to know you appointment with one of the providers at [place] or [place].  2.  The patient will discuss with the provider which immunizations are important for him to complete.  3.  The patient will take all medications as prescribed by the providers.    RN CC Nurse Care Coordinator will follow up in 4 weeks.          Robbin Vanegas MSN, RN, PHN, [facility]   Primary Care Clinical RN Care Coordinator  [facility]  12/3/2021   1:18 PM  [email or web address]  Office: [phone]

## 2021-12-06 NOTE — TELEPHONE ENCOUNTER
Spoke  To Riley, unfortunately [facility] CARROLL is not able to do a baclofen test dose or new pump implant at this time.    Shared other sources of [facility] Stroke [place] and [facility] [employer] in [facility]    Riley asked for a [employer] message with these numbers. (done)    Geneva Marques RN on 12/6/2021 at 1:56 PM

## 2021-12-21 ENCOUNTER — OFFICE VISIT (OUTPATIENT)
Dept: FAMILY MEDICINE | Facility: CLINIC | Age: 46
End: 2021-12-21
Payer: COMMERCIAL

## 2021-12-21 VITALS
HEART RATE: 117 BPM | SYSTOLIC BLOOD PRESSURE: 108 MMHG | TEMPERATURE: 98.7 F | DIASTOLIC BLOOD PRESSURE: 61 MMHG | OXYGEN SATURATION: 97 %

## 2021-12-21 DIAGNOSIS — M54.6 CHRONIC MIDLINE THORACIC BACK PAIN: ICD-10-CM

## 2021-12-21 DIAGNOSIS — R25.2 SPASTICITY: ICD-10-CM

## 2021-12-21 DIAGNOSIS — G89.29 CHRONIC MIDLINE THORACIC BACK PAIN: ICD-10-CM

## 2021-12-21 DIAGNOSIS — G82.50 QUADRIPLEGIA (H): Primary | ICD-10-CM

## 2021-12-21 PROCEDURE — 99214 OFFICE O/P EST MOD 30 MIN: CPT | Performed by: PHYSICIAN ASSISTANT
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
## 2021-12-23 ENCOUNTER — TELEPHONE (OUTPATIENT)
Dept: FAMILY MEDICINE | Facility: CLINIC | Age: 46
End: 2021-12-23
Payer: COMMERCIAL

## 2021-12-23 DIAGNOSIS — F51.01 PRIMARY INSOMNIA: ICD-10-CM

## 2021-12-23 RX ORDER — ZOLPIDEM TARTRATE 10 MG/1
TABLET ORAL
Qty: 90 TABLET | Refills: 0 | OUTPATIENT
Start: 2021-12-23

## 2021-12-23 NOTE — TELEPHONE ENCOUNTER
Josette faxed a Controlled Substance Prescription Refill Request for zolpidem (AMBIEN) 10 MG tablet

## 2021-12-23 NOTE — TELEPHONE ENCOUNTER
Gem remy from [facility] homecare intake. They can take the homecare referral, but will not be able to see pt until 1/3. If this is ok with Jenny Fay PA-C, they would need an updated order in Epic. Please advise.    Barbara Conway RN  [facility]

## 2021-12-24 RX ORDER — ZOLPIDEM TARTRATE 10 MG/1
TABLET ORAL
Qty: 90 TABLET | Refills: 0 | Status: SHIPPED | OUTPATIENT
Start: 2021-12-24 | End: 2022-03-18

## 2021-12-24 NOTE — TELEPHONE ENCOUNTER
Discussed options such as SSRI  Patient willing to try buspar  If he cant tolerate buspar we will consider the SSRI  Patient also has propranolol  Unable to leave message as the number is invalid.

## 2021-12-29 NOTE — TELEPHONE ENCOUNTER
Called [facility] and spoke with Vida.   Advised that it is ok to delay start of care until 1/3/2022    - please fax the updated (most recently ordered) home care referral to them at [phone]    Denisse Ryder RN  [facility]

## 2022-01-03 ENCOUNTER — TELEPHONE (OUTPATIENT)
Dept: FAMILY MEDICINE | Facility: CLINIC | Age: 47
End: 2022-01-03
Payer: COMMERCIAL

## 2022-01-03 NOTE — TELEPHONE ENCOUNTER
Reason for Call:  Other     Detailed comments: Home physical therapy one visit a week for three weeks.    Phone Number   [facility]  [phone]         Best Time:     Can we leave a detailed message on this number? YES    Call taken on 1/3/2022 at 2:08 PM by Violetta Mcarthur

## 2022-01-03 NOTE — TELEPHONE ENCOUNTER
Verbal given for orders requested. Message left on Giovanny's confidential voicemail     Ammy Quiroga RN

## 2022-01-04 ENCOUNTER — TRANSFERRED RECORDS (OUTPATIENT)
Dept: HEALTH INFORMATION MANAGEMENT | Facility: CLINIC | Age: 47
End: 2022-01-04
Payer: COMMERCIAL

## 2022-01-05 ENCOUNTER — MEDICAL CORRESPONDENCE (OUTPATIENT)
Dept: HEALTH INFORMATION MANAGEMENT | Facility: CLINIC | Age: 47
End: 2022-01-05
Payer: COMMERCIAL

## 2022-01-06 ENCOUNTER — TELEPHONE (OUTPATIENT)
Dept: CARE COORDINATION | Facility: CLINIC | Age: 47
End: 2022-01-06
Payer: COMMERCIAL

## 2022-01-06 NOTE — TELEPHONE ENCOUNTER
[facility] has faxed the progress notes to the provider.  I am not sure if they have faxed to Jenny Fay who is the current PCP.  The paperwork is scanned into the media section.  This needs to be signed and dated and returned to [facility] in order to proceed on a new electric chair.  Please assist me in getting these back to [facility].        Robbin Vanegas MSN, RN, PHN, [facility]   Primary Care Clinical RN Care Coordinator  [facility]  1/6/2022   10:53 AM  [email or web address]  Office: [phone]

## 2022-01-07 ENCOUNTER — PATIENT OUTREACH (OUTPATIENT)
Dept: NURSING | Facility: CLINIC | Age: 47
End: 2022-01-07
Payer: COMMERCIAL

## 2022-01-07 ASSESSMENT — ACTIVITIES OF DAILY LIVING (ADL): DEPENDENT_IADLS:: CLEANING;COOKING;LAUNDRY;SHOPPING;MEAL PREPARATION;MEDICATION MANAGEMENT

## 2022-01-07 NOTE — PROGRESS NOTES
Clinic Care Coordination Contact    Follow Up Progress Note      Assessment: The RN CC nurse care coordinator contacted the patient by phone for a follow-up call.  Patient states that he is working on getting his new electric wheelchair.  The RN CC did inform him the paperwork has been faxed in.  The patient was informed to contact the RN CC should there be any problem in getting the electric wheelchair switched out now patient stated understanding.  The patient is still having issues with the back pain and the nausea but he does not feel that there is a UTI at this point time.  The patient also spoke of establishing care with Jenny Fay PA-C, and he is very comfortable with her and pleased with the change.      Care Gaps:    Health Maintenance Due   Topic Date Due     HEPATITIS C SCREENING  Never done     MEDICARE ANNUAL WELLNESS VISIT  06/02/2015     DTAP/TDAP/TD IMMUNIZATION (2 - Td or Tdap) 10/29/2018       Care Gaps Last addressed on 12/3/2021, Care Gap Goal set: Yes and Patient accepted scheduling phone number for [phone]  to schedule independently     Goals addressed this encounter:   Goals Addressed                    This Visit's Progress       #3  Other care gaps (pt-stated)   10%      Goal Statement: I will work with my primary care providers on closing my care gaps including things like my Medicare annual wellness exam, eye exam and updates to my immunizations.  Date Goal set: 9/10/2021  Barriers: Some things are difficult for him to get to.  Strengths: Engaged in care coordination.  Date to Achieve By: 3/10/2022  Patient expressed understanding of goal: Yes  Action steps to achieve this goal:  1. I will call and make an appointment for my Medicare annual wellness exam.  2. I will call and make an appointment for my eye exam.  3. I will ask my primary care provider which immunizations are appropriate for me to update.           Healthy Eating (pt-stated)   60%      Goal Statement: I will work with  the ILS person to make more palatable meals for me to eat.  Measure of Success: The patient is gaining weight  Supportive Steps to Achieve: weigh the patient routinely.  Barriers: food allergies  Strengths: motivated to gain weight  Date to Achieve By: 12/5/2021  Patient expressed understanding of goal: yes             Pain Management (pt-stated)   60%      Goal Statement: I will work with the physical therapists at [facility] to decrease the amount of pain in my neck, back and sacral area.  Measure of Success: Patient states less pain.  Supportive Steps to Achieve: physical therapy from [facility]  Barriers: quadriplegic   Strengths: motivated  Date to Achieve By: 12/5/2021  Patient expressed understanding of goal: yes                Intervention/Education provided during outreach: The RN CC spoke with the patient regarding his new electric wheelchair and should he have any difficulties to give the RN CC at call.     Outreach Frequency: monthly    Plan:   1.  The patient will make and attend all follow-up appointments as directed in the discharge instruction.  2.  The patient will continue to call for a group that is doing the baclofen pump applications and make an appointment.  3.  The patient will contact the RN CC should any additional information be needed for his electric wheelchair.    RN CC Nurse Care Coordinator will follow up in 4 weeks.            Robbin Vanegas MSN, RN, PHN, CCM   Primary Care Clinical RN Care Coordinator  [facility]  1/7/2022   3:43 PM  [email or web address]  Office: [phone]

## 2022-01-10 ENCOUNTER — TELEPHONE (OUTPATIENT)
Dept: UROLOGY | Facility: CLINIC | Age: 47
End: 2022-01-10
Payer: COMMERCIAL

## 2022-01-10 NOTE — TELEPHONE ENCOUNTER
A prior authorization is needed for the following medication prescribed.  Please complete a prior authorization with the information included below.    Medication: Gentamicin 480mg/l Bladder irrigation    Ingredients: Gentamicin sulfate 40mg/ml soln NDC: [address]-34 Qty: 24.000  Sodium Chloride 0.9% soln NDC: [address]-91 Qty: 1976.000    RX #: [id number]    Reason for Rejection: Product not on formulary     Pharmacy Insurance plan: MEDICA DUAL   BIN #: [id number]  ID #: [id number]  PCN #: [id number]   Phone #: [phone]      Pharmacy NPI:[id number]      Please advise the pharmacy when the prior authorization is approved or denied.     Thank you for your time.    [facility]  [phone]

## 2022-01-12 NOTE — TELEPHONE ENCOUNTER
Central Prior Authorization Team   Phone: [phone]      PA Initiation    Medication: Gentamicin 480mg/l Bladder irrigation-Compounding   Insurance Company: [employer] - Phone [phone] Fax [phone]  Pharmacy Filling the Rx: [facility] - [place], [place] - [address]  Filling Pharmacy Phone:    Filling Pharmacy Fax:    Start Date: 1/12/2022

## 2022-01-13 ENCOUNTER — NURSE TRIAGE (OUTPATIENT)
Dept: UROLOGY | Facility: CLINIC | Age: 47
End: 2022-01-13
Payer: COMMERCIAL

## 2022-01-13 DIAGNOSIS — N39.0 RECURRENT UTI: Primary | ICD-10-CM

## 2022-01-13 NOTE — TELEPHONE ENCOUNTER
Prior Authorization Approval    Authorization Effective Date: 12/13/2021  Authorization Expiration Date: 4/12/2022  Medication: Gentamicin 480mg/l Bladder irrigation-Compounding   Approved Dose/Quantity:   Reference #: [id number]   Insurance Company: [employer] - Phone [phone] Fax [phone]  Expected CoPay:       Which Pharmacy is filling the prescription (Not needed for infusion/clinic administered): [facility] - [place] [address]   Pharmacy Notified: Yes  Patient Notified: No

## 2022-01-13 NOTE — TELEPHONE ENCOUNTER
[facility] Call Center    Phone Message    May a detailed message be left on voicemail: yes     Reason for Call: Symptoms or Concerns     If patient has red-flag symptoms, warm transfer to triage line    Current symptom or concern: UTI concerns - bladder spasms, odor, cloudy    Symptoms have been present for:  1 week(s)    Has patient previously been seen for this? No    By :     Date:     Are there any new or worsening symptoms? Yes: New - wondering if we can write orders for a urine culture or analysis. Please call Riley to discuss    Other: Riley also wanted to discuss scheduling more botox injections with Dr. Hayes. Please reach out to him to discuss. Thank you!     Action Taken: Message routed to:  [facility] ([facility]): uro    Travel Screening: Not Applicable

## 2022-01-13 NOTE — Clinical Note
Tracey Carrasco     Pt would like to schedule cysto with botox procedure and needs to have this done in the OR. Please assist to schedule this.     Thank you,   Shraddha Sal RN MSN

## 2022-01-13 NOTE — TELEPHONE ENCOUNTER
Spoke to pt. Pt reports having bladder spasms. Pt caths every 3-4 hours. Noted cloudiness and urgency. No fever or hematuria. Pt agrees to get labs done.   Pt also would like to schedule botox treatment. No other questions noted. Chart and problems list reviewed.    Shraddha Sal, RN MSN      Reason for Disposition    Urinating more frequently than usual (i.e., frequency)    Protocols used: URINARY SYMPTOMS-A-OH    Orders Placed This Encounter   Procedures     Routine UA with microscopic - No culture     Standing Status:   Future     Standing Expiration Date:   1/13/2023     Urine Culture Aerobic Bacterial     Standing Status:   Future     Standing Expiration Date:   1/13/2023

## 2022-01-14 ENCOUNTER — TELEPHONE (OUTPATIENT)
Dept: CARE COORDINATION | Facility: CLINIC | Age: 47
End: 2022-01-14
Payer: COMMERCIAL

## 2022-01-14 DIAGNOSIS — N31.9 NEUROGENIC BLADDER: Primary | ICD-10-CM

## 2022-01-14 DIAGNOSIS — R82.90 CLOUDY URINE: ICD-10-CM

## 2022-01-14 RX ORDER — CEPHALEXIN 500 MG/1
500 CAPSULE ORAL 3 TIMES DAILY
Qty: 15 CAPSULE | Refills: 0 | Status: SHIPPED | OUTPATIENT
Start: 2022-01-14 | End: 2022-01-19

## 2022-01-14 NOTE — TELEPHONE ENCOUNTER
The patient called the RN CC this am regarding urinary symptoms.  The patient endorses: cloudy urine with odor, significant increase in bladder spasms, increased back pain and nausea.  The patient is concerned about another UTI.  The patient currently has no way into leave a sample as his van to carry his wheelchair is in the shop.  The patient's question is if Jenny Fay would consider treating the symptoms.      Robbin Vanegas MSN, RN, PHN, CCM   Primary Care Clinical RN Care Coordinator  [facility]  1/14/2022   9:44 AM  [email or web address]  Office: [phone]

## 2022-01-14 NOTE — TELEPHONE ENCOUNTER
Patient notified of provider message as written. Patient verbalized good understanding.     Brie NJN, RN  [facility]

## 2022-01-14 NOTE — TELEPHONE ENCOUNTER
It looks like his last several cultures have shown a strep infection of the urine. Will write for keflex 500mg TID x 5 days. If not improving needs to leave urine sample. Sent to [facility].   Jenny Fay PA-C

## 2022-01-14 NOTE — TELEPHONE ENCOUNTER
Message has already been routed to provider. Awaiting provider response.     Ammy MANCINI RN  [facility]

## 2022-01-17 ENCOUNTER — TELEPHONE (OUTPATIENT)
Dept: UROLOGY | Facility: CLINIC | Age: 47
End: 2022-01-17
Payer: COMMERCIAL

## 2022-01-17 DIAGNOSIS — N39.0 RECURRENT UTI: Primary | ICD-10-CM

## 2022-01-17 DIAGNOSIS — N31.9 NEUROGENIC BLADDER: ICD-10-CM

## 2022-01-17 RX ORDER — CEFAZOLIN SODIUM 2 G/50ML
2 SOLUTION INTRAVENOUS SEE ADMIN INSTRUCTIONS
Status: CANCELLED | OUTPATIENT
Start: 2022-01-17

## 2022-01-17 RX ORDER — CEFAZOLIN SODIUM 2 G/50ML
2 SOLUTION INTRAVENOUS
Status: CANCELLED | OUTPATIENT
Start: 2022-01-17

## 2022-01-17 NOTE — TELEPHONE ENCOUNTER
Patient is scheduled for surgery with Dr. Hayes     Spoke with: Patient via phone     Date of Surgery: Friday February 04, 2022     Location[facility] OR      Informed patient they will need an adult : Yes     Pre-op: Yes      H&P: Patient to schedule      Pre-procedure COVID-19 Test: Monday January 31, 2022 at [facility]     Post-op: N/A    Additional imaging/appointments: N/A     Surgery packet: Sent via mail 1/17/22     Additional comments:

## 2022-01-18 ENCOUNTER — TELEPHONE (OUTPATIENT)
Dept: FAMILY MEDICINE | Facility: CLINIC | Age: 47
End: 2022-01-18
Payer: COMMERCIAL

## 2022-01-18 DIAGNOSIS — Z53.9 DIAGNOSIS NOT YET DEFINED: Primary | ICD-10-CM

## 2022-01-18 PROCEDURE — G0180 MD CERTIFICATION HHA PATIENT: HCPCS | Performed by: PHYSICIAN ASSISTANT

## 2022-01-18 NOTE — TELEPHONE ENCOUNTER
Reason for Call:  Form, our goal is to have forms completed with 72 hours, however, some forms may require a visit or additional information.    Type of letter, form or note:  Home Health Certification    Who is the form from?: Home care    Where did the form come from: form was faxed in    What clinic location was the form placed at?: [facility]    Where the form was placed: Given to physician    What number is listed as a contact on the form?: [phone]       Additional comments:     Call taken on 1/18/2022 at 11:31 AM by WANDA DE LEON

## 2022-01-24 ENCOUNTER — TELEPHONE (OUTPATIENT)
Dept: UROLOGY | Facility: CLINIC | Age: 47
End: 2022-01-24
Payer: COMMERCIAL

## 2022-01-24 NOTE — TELEPHONE ENCOUNTER
[facility]    Phone Message    May a detailed message be left on voicemail: yes     Reason for Call: Medication Question or concern regarding medication   Prescription Clarification  Name of Medication: Gentamycin bladder irrigation solution  Prescribing Provider: Enzo   Pharmacy: n/a   What on the order needs clarification? Rosenda with [employer] called stating they are having difficulty finding a compounding pharmacy within the patients network to have this filled. They are wondering where they are able to get this sterile compounded? She can be reached at [phone]. Please advise. Thank you.    Action Taken: Message routed to:  [facility] Urology    Travel Screening: Not Applicable

## 2022-01-25 DIAGNOSIS — Z11.59 ENCOUNTER FOR SCREENING FOR OTHER VIRAL DISEASES: Primary | ICD-10-CM

## 2022-01-31 ENCOUNTER — LAB (OUTPATIENT)
Dept: LAB | Facility: CLINIC | Age: 47
End: 2022-01-31
Payer: COMMERCIAL

## 2022-01-31 ENCOUNTER — OFFICE VISIT (OUTPATIENT)
Dept: FAMILY MEDICINE | Facility: CLINIC | Age: 47
End: 2022-01-31
Payer: COMMERCIAL

## 2022-01-31 VITALS
DIASTOLIC BLOOD PRESSURE: 79 MMHG | OXYGEN SATURATION: 99 % | BODY MASS INDEX: 17.63 KG/M2 | SYSTOLIC BLOOD PRESSURE: 124 MMHG | HEART RATE: 65 BPM | WEIGHT: 130 LBS | TEMPERATURE: 98.6 F

## 2022-01-31 DIAGNOSIS — Z11.59 NEED FOR HEPATITIS C SCREENING TEST: ICD-10-CM

## 2022-01-31 DIAGNOSIS — Z01.818 PRE-OPERATIVE EXAMINATION: Primary | ICD-10-CM

## 2022-01-31 DIAGNOSIS — N31.9 NEUROGENIC BLADDER: ICD-10-CM

## 2022-01-31 DIAGNOSIS — N39.0 RECURRENT UTI: ICD-10-CM

## 2022-01-31 DIAGNOSIS — G82.50 QUADRIPLEGIA (H): ICD-10-CM

## 2022-01-31 DIAGNOSIS — E87.1 HYPONATREMIA: ICD-10-CM

## 2022-01-31 DIAGNOSIS — Z11.59 ENCOUNTER FOR SCREENING FOR OTHER VIRAL DISEASES: ICD-10-CM

## 2022-01-31 LAB
ALBUMIN UR-MCNC: NEGATIVE MG/DL
ANION GAP SERPL CALCULATED.3IONS-SCNC: 9 MMOL/L (ref 3–14)
APPEARANCE UR: CLEAR
BILIRUB UR QL STRIP: NEGATIVE
BUN SERPL-MCNC: 8 MG/DL (ref 7–30)
CALCIUM SERPL-MCNC: 9 MG/DL (ref 8.5–10.1)
CHLORIDE BLD-SCNC: 108 MMOL/L (ref 94–109)
CO2 SERPL-SCNC: 21 MMOL/L (ref 20–32)
COLOR UR AUTO: YELLOW
CREAT SERPL-MCNC: 0.52 MG/DL (ref 0.66–1.25)
GFR SERPL CREATININE-BSD FRML MDRD: >90 ML/MIN/1.73M2
GLUCOSE BLD-MCNC: 112 MG/DL (ref 70–99)
GLUCOSE UR STRIP-MCNC: NEGATIVE MG/DL
HGB UR QL STRIP: ABNORMAL
HOLD SPECIMEN: NORMAL
KETONES UR STRIP-MCNC: NEGATIVE MG/DL
LEUKOCYTE ESTERASE UR QL STRIP: ABNORMAL
NITRATE UR QL: NEGATIVE
PH UR STRIP: 6 [PH] (ref 5–7)
POTASSIUM BLD-SCNC: 4.2 MMOL/L (ref 3.4–5.3)
RBC #/AREA URNS AUTO: ABNORMAL /HPF
SODIUM SERPL-SCNC: 138 MMOL/L (ref 133–144)
SP GR UR STRIP: 1 (ref 1–1.03)
UROBILINOGEN UR STRIP-ACNC: 0.2 E.U./DL
WBC #/AREA URNS AUTO: ABNORMAL /HPF

## 2022-01-31 PROCEDURE — 87186 SC STD MICRODIL/AGAR DIL: CPT | Performed by: FAMILY MEDICINE

## 2022-01-31 PROCEDURE — 87086 URINE CULTURE/COLONY COUNT: CPT | Performed by: FAMILY MEDICINE

## 2022-01-31 PROCEDURE — U0003 INFECTIOUS AGENT DETECTION BY NUCLEIC ACID (DNA OR RNA); SEVERE ACUTE RESPIRATORY SYNDROME CORONAVIRUS 2 (SARS-COV-2) (CORONAVIRUS DISEASE [COVID-19]), AMPLIFIED PROBE TECHNIQUE, MAKING USE OF HIGH THROUGHPUT TECHNOLOGIES AS DESCRIBED BY CMS-2020-01-R: HCPCS

## 2022-01-31 PROCEDURE — 99214 OFFICE O/P EST MOD 30 MIN: CPT | Performed by: FAMILY MEDICINE

## 2022-01-31 PROCEDURE — U0005 INFEC AGEN DETEC AMPLI PROBE: HCPCS

## 2022-01-31 PROCEDURE — 36415 COLL VENOUS BLD VENIPUNCTURE: CPT | Performed by: FAMILY MEDICINE

## 2022-01-31 PROCEDURE — 80048 BASIC METABOLIC PNL TOTAL CA: CPT | Performed by: FAMILY MEDICINE

## 2022-01-31 PROCEDURE — 81001 URINALYSIS AUTO W/SCOPE: CPT | Performed by: FAMILY MEDICINE

## 2022-01-31 ASSESSMENT — PATIENT HEALTH QUESTIONNAIRE - PHQ9: SUM OF ALL RESPONSES TO PHQ QUESTIONS 1-9: 3
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
## 2022-02-01 ENCOUNTER — PATIENT OUTREACH (OUTPATIENT)
Dept: UROLOGY | Facility: CLINIC | Age: 47
End: 2022-02-01
Payer: COMMERCIAL

## 2022-02-01 LAB — SARS-COV-2 RNA RESP QL NAA+PROBE: NEGATIVE

## 2022-02-01 NOTE — TELEPHONE ENCOUNTER
Pre Op Teaching Flowsheet                                        Pre and Post op Patient Education  Diagnosis: Neurogenic bladder  Teaching Pre and post op for Botox injections/ Bladder  Person Involved in teaching: Patient      Motivation Level: High  Asks Questions: Yes  Eager to Learn:  Yes  Cooperative: Yes  Receptive (willing/able to accept information):  Yes    Patient demonstrates understanding of the following:  Date of surgery:  2/4/22  Location of [facility]  Pre operative History/Physical other testing prior to surgery: PAC/PCP on 1/31/22  No more than 30 days prior to surgery date.   Medications to take the day of surgery: PCP   Blood thinner medications discussed and when to stop (if applicable): PCP   Diabetes medication management (if applicable): PCP    Patient demonstrates understanding of the following:  Patient informed and verbalizes understanding of the need for a responsible adult  and someone to stay with them for the first 24 hours post-operatively: lives in assisted living aware of needed care giver for discharge  Pre-op bowel prep: Yes Golytely    N/A  NPO per Anesthesia Guidelines : Reviewed  Pre-op showering/scrub information with Hibiclens Soap: Yes    Discussed   Pain Management after surgery: pain scale, pain medications techniques  Infection Prevention  Patient instructed on hand hygiene  Surgical procedure site care taught  Signs and symptoms of infection taught  Wound care will be taught at the time of discharge.    Urine anylisis and Urine Culture to be collected on:1/31/22  Pre op Covid testing on: 1/31/22  Post-op follow-up:as needed  Discussed how to contact the hospital, nurse, and clinic scheduling staff if necessary.     Surgical instructions given to patient Via Phone.  Instructional materials: Before your surgery packet.    Total time with patient: 10 minutes    Yessy Tapia RN

## 2022-02-02 LAB — BACTERIA UR CULT: ABNORMAL

## 2022-02-03 ENCOUNTER — ANESTHESIA EVENT (OUTPATIENT)
Dept: SURGERY | Facility: AMBULATORY SURGERY CENTER | Age: 47
End: 2022-02-03
Payer: COMMERCIAL

## 2022-02-03 DIAGNOSIS — N31.9 NEUROGENIC BLADDER: Primary | ICD-10-CM

## 2022-02-04 ENCOUNTER — ANESTHESIA (OUTPATIENT)
Dept: SURGERY | Facility: AMBULATORY SURGERY CENTER | Age: 47
End: 2022-02-04
Payer: COMMERCIAL

## 2022-02-04 ENCOUNTER — PATIENT OUTREACH (OUTPATIENT)
Dept: CARE COORDINATION | Facility: CLINIC | Age: 47
End: 2022-02-04
Payer: COMMERCIAL

## 2022-02-04 ENCOUNTER — HOSPITAL ENCOUNTER (OUTPATIENT)
Facility: AMBULATORY SURGERY CENTER | Age: 47
End: 2022-02-04
Attending: UROLOGY
Payer: COMMERCIAL

## 2022-02-04 VITALS
RESPIRATION RATE: 16 BRPM | BODY MASS INDEX: 17.61 KG/M2 | HEIGHT: 72 IN | TEMPERATURE: 97.6 F | HEART RATE: 118 BPM | WEIGHT: 130 LBS | DIASTOLIC BLOOD PRESSURE: 80 MMHG | OXYGEN SATURATION: 98 % | SYSTOLIC BLOOD PRESSURE: 106 MMHG

## 2022-02-04 DIAGNOSIS — N39.0 RECURRENT UTI: ICD-10-CM

## 2022-02-04 PROCEDURE — 52287 CYSTOSCOPY CHEMODENERVATION: CPT

## 2022-02-04 PROCEDURE — 52287 CYSTOSCOPY CHEMODENERVATION: CPT | Performed by: UROLOGY

## 2022-02-04 RX ORDER — ONDANSETRON 2 MG/ML
4 INJECTION INTRAMUSCULAR; INTRAVENOUS EVERY 30 MIN PRN
Status: DISCONTINUED | OUTPATIENT
Start: 2022-02-04 | End: 2022-02-08 | Stop reason: HOSPADM

## 2022-02-04 RX ORDER — PROPOFOL 10 MG/ML
INJECTION, EMULSION INTRAVENOUS CONTINUOUS PRN
Status: DISCONTINUED | OUTPATIENT
Start: 2022-02-04 | End: 2022-02-04

## 2022-02-04 RX ORDER — SODIUM CHLORIDE, SODIUM LACTATE, POTASSIUM CHLORIDE, CALCIUM CHLORIDE 600; 310; 30; 20 MG/100ML; MG/100ML; MG/100ML; MG/100ML
INJECTION, SOLUTION INTRAVENOUS CONTINUOUS
Status: DISCONTINUED | OUTPATIENT
Start: 2022-02-04 | End: 2022-02-04 | Stop reason: HOSPADM

## 2022-02-04 RX ORDER — FENTANYL CITRATE 50 UG/ML
25 INJECTION, SOLUTION INTRAMUSCULAR; INTRAVENOUS EVERY 5 MIN PRN
Status: DISCONTINUED | OUTPATIENT
Start: 2022-02-04 | End: 2022-02-04 | Stop reason: HOSPADM

## 2022-02-04 RX ORDER — PIPERACILLIN SODIUM, TAZOBACTAM SODIUM 3; .375 G/15ML; G/15ML
3.38 INJECTION, POWDER, LYOPHILIZED, FOR SOLUTION INTRAVENOUS ONCE
Status: COMPLETED | OUTPATIENT
Start: 2022-02-04 | End: 2022-02-04

## 2022-02-04 RX ORDER — LIDOCAINE 40 MG/G
CREAM TOPICAL
Status: DISCONTINUED | OUTPATIENT
Start: 2022-02-04 | End: 2022-02-04 | Stop reason: HOSPADM

## 2022-02-04 RX ORDER — LABETALOL HYDROCHLORIDE 5 MG/ML
10 INJECTION, SOLUTION INTRAVENOUS ONCE
Status: DISCONTINUED | OUTPATIENT
Start: 2022-02-04 | End: 2022-02-08 | Stop reason: HOSPADM

## 2022-02-04 RX ORDER — FENTANYL CITRATE 50 UG/ML
25 INJECTION, SOLUTION INTRAMUSCULAR; INTRAVENOUS
Status: DISCONTINUED | OUTPATIENT
Start: 2022-02-04 | End: 2022-02-08 | Stop reason: HOSPADM

## 2022-02-04 RX ORDER — HYDROMORPHONE HYDROCHLORIDE 1 MG/ML
0.2 INJECTION, SOLUTION INTRAMUSCULAR; INTRAVENOUS; SUBCUTANEOUS EVERY 5 MIN PRN
Status: DISCONTINUED | OUTPATIENT
Start: 2022-02-04 | End: 2022-02-04 | Stop reason: HOSPADM

## 2022-02-04 RX ORDER — SODIUM CHLORIDE, SODIUM LACTATE, POTASSIUM CHLORIDE, CALCIUM CHLORIDE 600; 310; 30; 20 MG/100ML; MG/100ML; MG/100ML; MG/100ML
INJECTION, SOLUTION INTRAVENOUS CONTINUOUS
Status: DISCONTINUED | OUTPATIENT
Start: 2022-02-04 | End: 2022-02-08 | Stop reason: HOSPADM

## 2022-02-04 RX ORDER — OXYCODONE HYDROCHLORIDE 5 MG/1
5 TABLET ORAL EVERY 4 HOURS PRN
Status: DISCONTINUED | OUTPATIENT
Start: 2022-02-04 | End: 2022-02-08 | Stop reason: HOSPADM

## 2022-02-04 RX ORDER — MEPERIDINE HYDROCHLORIDE 25 MG/ML
12.5 INJECTION INTRAMUSCULAR; INTRAVENOUS; SUBCUTANEOUS
Status: DISCONTINUED | OUTPATIENT
Start: 2022-02-04 | End: 2022-02-08 | Stop reason: HOSPADM

## 2022-02-04 RX ORDER — ACETAMINOPHEN 325 MG/1
975 TABLET ORAL ONCE
Status: DISCONTINUED | OUTPATIENT
Start: 2022-02-04 | End: 2022-02-04 | Stop reason: HOSPADM

## 2022-02-04 RX ORDER — PROPOFOL 10 MG/ML
INJECTION, EMULSION INTRAVENOUS PRN
Status: DISCONTINUED | OUTPATIENT
Start: 2022-02-04 | End: 2022-02-04

## 2022-02-04 RX ORDER — HYDRALAZINE HYDROCHLORIDE 20 MG/ML
5 INJECTION INTRAMUSCULAR; INTRAVENOUS ONCE
Status: COMPLETED | OUTPATIENT
Start: 2022-02-04 | End: 2022-02-04

## 2022-02-04 RX ORDER — ONDANSETRON 4 MG/1
4 TABLET, ORALLY DISINTEGRATING ORAL EVERY 30 MIN PRN
Status: DISCONTINUED | OUTPATIENT
Start: 2022-02-04 | End: 2022-02-08 | Stop reason: HOSPADM

## 2022-02-04 RX ADMIN — SODIUM CHLORIDE, SODIUM LACTATE, POTASSIUM CHLORIDE, CALCIUM CHLORIDE: 600; 310; 30; 20 INJECTION, SOLUTION INTRAVENOUS at 11:50

## 2022-02-04 RX ADMIN — PROPOFOL 200 MCG/KG/MIN: 10 INJECTION, EMULSION INTRAVENOUS at 12:25

## 2022-02-04 RX ADMIN — HYDRALAZINE HYDROCHLORIDE 5 MG: 20 INJECTION INTRAMUSCULAR; INTRAVENOUS at 13:12

## 2022-02-04 RX ADMIN — PIPERACILLIN SODIUM, TAZOBACTAM SODIUM 3.38 G: 3; .375 INJECTION, POWDER, LYOPHILIZED, FOR SOLUTION INTRAVENOUS at 12:18

## 2022-02-04 RX ADMIN — PROPOFOL 50 MG: 10 INJECTION, EMULSION INTRAVENOUS at 12:25

## 2022-02-04 ASSESSMENT — MIFFLIN-ST. JEOR: SCORE: 1512.68

## 2022-02-04 NOTE — ANESTHESIA CARE TRANSFER NOTE
Patient: Riley Gifford    Procedure: Procedure(s):  CYSTOSCOPY, WITH BOTULINUM TOXIN INJECTION       Diagnosis: Recurrent UTI [N39.0]  Diagnosis Additional Information: No value filed.    Anesthesia Type:   MAC     Note:    Oropharynx: oropharynx clear of all foreign objects and spontaneously breathing  Level of Consciousness: awake  Oxygen Supplementation: room air    Independent Airway: airway patency satisfactory and stable  Dentition: dentition unchanged      Patient transferred to: Phase II  Comments: Uneventful transport   Report to RN  Exchanging well; color genaro'l  Pt stated he feels fine; BP will occasionally be elevated; no headache  Pt responds appropriately to command  IV patent  Lips/teeth/dentition as preop status  Questions answered    Handoff Report: Identifed the Patient, Identified the Reponsible Provider, Reviewed the pertinent medical history, Discussed the surgical course, Reviewed Intra-OP anesthesia mangement and issues during anesthesia, Set expectations for post-procedure period and Allowed opportunity for questions and acknowledgement of understanding      Vitals:  Vitals Value Taken Time   /118 02/04/22 1248   Temp 36.1  C (96.9  F) 02/04/22 1248   Pulse 75 02/04/22 1248   Resp 16 02/04/22 1248   SpO2 96 % 02/04/22 1248       Electronically Signed By: SUJATA MARY CRNA  February 4, 2022  12:51 PM
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
## 2022-02-04 NOTE — LETTER
[facility]  Patient Centered Plan of Care  About Me:        Patient Name:  Riley Gifford    YOB: 1975  Age:         46 year old   Rut MRN:    [id number] Telephone Information:  Home Phone [phone]   Mobile [phone]   Mobile [phone]       Address:  [address] [place] [address] [address]  [place] [address] Email address:  [email or web address]      Emergency Contact(s)    Name Relationship Lgl Grd Work Phone Home Phone Mobile Phone   1. TREY GIFFORD (NE* Relative No noen none [phone]   2. AGUSTO CAVAZOS Sister   [phone]    3. HODA GIFFORD Brother No  [phone]            Primary language:  English     needed? No   [employer]:  [phone] op. 1  Other communication barriers:Glasses    Preferred Method of Communication:  Lisa  Current living arrangement: I live in assisted living    Mobility Status/ Medical Equipment: Dependent/Assisted by Another        Health Maintenance  Health Maintenance Reviewed: Due/Overdue   Health Maintenance Due   Topic Date Due     HEPATITIS C SCREENING  Never done     MEDICARE ANNUAL WELLNESS VISIT  06/02/2015     DTAP/TDAP/TD IMMUNIZATION (2 - Td or Tdap) 10/29/2018           My Access Plan  Medical Emergency 911   Primary Clinic Line [facility] [phone]   24 Hour Appointment Line [phone] or  [phone]) (toll-free)   24 Hour Nurse Line [phone] (toll-free)   Preferred Urgent Care [facility] [phone]     Preferred Hospital [facility]  [phone]     Preferred Pharmacy [id number] - [place], [place] - [address] AT [facility] [address]     Behavioral Health Crisis Line The National Suicide Prevention Lifeline at 1-838.506.7619 or 911             My Care Team Members  Patient Care Team       Relationship Specialty Notifications Start End    Jenny Fay,  DANNI PCP - General Family Medicine  12/21/21     Phone: [phone] Fax: [phone] [address] [facility] [address] [facility] [address]    Robbin Andino, RN Lead Care Coordinator Nurse Admissions 1/6/16     phone:  [phone]    Phone: [phone] Fax: [phone]        Rober Leo MD Referring Physician Urology  1/8/16     Phone: [phone] Fax: [phone] [address] [facility] [address] [facility] [address]    Kimberly Zabala MD MD Urology  1/8/16     Phone: [phone] Fax: [phone]         [address] [facility] [address] [facility] [address]    Amanda Montoya (see comments)   1/8/16     [facility]     Phone: [phone]         Evaristo Hayes MD MD Urology  4/19/16     Phone: [phone] Fax: [phone]         [address] [facility] [address] [facility] [address]    Jenny Wright, RN Registered Nurse Urology  4/19/16     Rosemary Thomas, RN Nurse Coordinator Physical Medicine and Rehabilitation  3/30/17     Phone: [phone] Pager: [phone]        Lulú Reveles APRN CNP Nurse Practitioner Nurse Practitioner  8/28/20     Phone: [phone] Fax: [phone]         [address] [facility] [address] [facility] [address]    Amina Doe, RN Specialty Care Coordinator Urology  11/10/20     Phone: [phone]         Lulú Reveles APRN CNP Assigned Surgical Provider   6/13/21     Phone: [phone] Fax: [phone]         [address] [facility] [address] [facility] [address]    Shalom Sutherland MD Assigned PCP   12/19/21     Phone: [phone] Fax: [phone]         [facility] [facility] [address]    [facility] Occupational Therapist Occupational Therapy  1/7/22     [facility] Shanique Alberto  fax [phone]    Phone: [phone] Fax: [phone]         [address] [facility] [address]    Evaristo Hayes MD MD Urology  1/17/22     Phone: [phone] Fax:  [phone]         [address] [facility] [address] [facility] [address]    Yessy Tapia, RN Specialty Care Coordinator   1/17/22             My Care Plans  Self Management and Treatment Plan  Goals and (Comments)  Goals        General     #3  Other care gaps (pt-stated)      Notes - Note edited  9/10/2021  2:57 PM by Robbin Andino, RN     Goal Statement: I will work with my primary care providers on closing my care gaps including things like my Medicare annual wellness exam, eye exam and updates to my immunizations.  Date Goal set: 9/10/2021  Barriers: Some things are difficult for him to get to.  Strengths: Engaged in care coordination.  Date to Achieve By: 3/10/2022  Patient expressed understanding of goal: Yes  Action steps to achieve this goal:  1. I will call and make an appointment for my Medicare annual wellness exam.  2. I will call and make an appointment for my eye exam.  3. I will ask my primary care provider which immunizations are appropriate for me to update.         Healthy Eating (pt-stated)      Notes - Note edited  3/24/2021  2:34 PM by Robbin Andino, RN     Goal Statement: I will work with the ILS person to make more palatable meals for me to eat.  Measure of Success: The patient is gaining weight  Supportive Steps to Achieve: weigh the patient routinely.  Barriers: food allergies  Strengths: motivated to gain weight  Date to Achieve By: 12/5/2021  Patient expressed understanding of goal: yes           Pain Management (pt-stated)      Notes - Note edited  3/24/2021  2:35 PM by Robbin Andino, RN     Goal Statement: I will work with the physical therapists at [facility] to decrease the amount of pain in my neck, back and sacral area.  Measure of Success: Patient states less pain.  Supportive Steps to Achieve: physical therapy from [facility]  Barriers: quadriplegic   Strengths: motivated  Date to Achieve By: 12/5/2021  Patient expressed understanding of goal: yes                 Action Plans on File:             Depression          Advance Care Plans/Directives Type:   No data recorded    My Medical and Care Information  Problem List   Patient Active Problem List   Diagnosis     Vitamin D deficiency     Eosinophilic esophagitis     Neurogenic bladder     CARDIOVASCULAR SCREENING; LDL GOAL LESS THAN 160     Quadriplegia (H)     Chronic constipation     Internal hemorrhoids with other complication     Diagnostic skin and sensitization tests(aka ALLERGENS)     Anal or rectal pain     Allergic rhinitis due to animal dander     Allergy to mold spores     House dust mite allergy     Insomnia     Health Care Home     Gallstones     Chronic midline thoracic back pain     Pulmonary nodules     ACP (advance care planning)     Cervical spinal cord injury, sequela (H)     Spasticity     Self-catheterizes urinary bladder     Bleeding external hemorrhoids     History of claustrophobia     Recurrent UTI      Current Medications and Allergies:  See printed Medication Report.    Care Coordination Start Date: 1/6/2016   Frequency of Care Coordination: monthly     Form Last Updated: 02/04/2022

## 2022-02-04 NOTE — ANESTHESIA POSTPROCEDURE EVALUATION
Patient: Riley Gifford    Procedure: Procedure(s):  CYSTOSCOPY, WITH BOTULINUM TOXIN INJECTION       Diagnosis:Recurrent UTI [N39.0]  Diagnosis Additional Information: No value filed.    Anesthesia Type:  MAC    Note:  Disposition: Outpatient   Postop Pain Control: Uneventful            Sign Out: Well controlled pain   PONV: No   Neuro/Psych: Uneventful            Sign Out: Acceptable/Baseline neuro status   Airway/Respiratory: Uneventful            Sign Out: Acceptable/Baseline resp. status   CV/Hemodynamics: Uneventful            Sign Out: Acceptable CV status; No obvious hypovolemia; No obvious fluid overload   Other NRE: NONE   DID A NON-ROUTINE EVENT OCCUR? No           Last vitals:  Vitals Value Taken Time   /80 02/04/22 1400   Temp 36.4  C (97.6  F) 02/04/22 1400   Pulse 118 02/04/22 1400   Resp 16 02/04/22 1400   SpO2 98 % 02/04/22 1400       Electronically Signed By: Joseluis Luther MD  February 4, 2022  2:13 PM

## 2022-02-04 NOTE — PROGRESS NOTES
Clinic Care Coordination Contact    Situation: Patient chart reviewed by care coordinator.    Background: The patient is scheduled for a botox injection and cystoscopy today.      Assessment: The patient has had this procedure done before and is well aware of the discharge instructions.  The RN CC will plan to contact the patient early next week for an update on his condition.    Plan/Recommendations: The RN CC will reach out to the patient early next week for an update on his condition.    Robbin Vanegas MSN, RN, PHN, CCM   Primary Care Clinical RN Care Coordinator  [facility]  2/4/2022   10:27 AM  [email or web address]  Office: [phone]

## 2022-02-04 NOTE — DISCHARGE INSTRUCTIONS
[facility]  Home Care Following Anesthesia  For 24 hours after surgery:  1. Get plenty of rest.  A responsible adult must stay with you for at least 24 hours after you leave the surgery center.  2. Do not drive or use heavy equipment.  If you have weakness or tingling, don't drive or use heavy equipment until this feeling goes away.   3. Do not drink alcohol.   4. Avoid strenuous or risky activities.  Ask for help when climbing stairs.  5. You may feel lightheaded.  IF so, sit for a few minutes before standing.  Have someone help you get up.   6. If you have nausea (feel sick to your stomach): Drink only clear liquids such as apple juice, ginger ale, broth or 7-Up.  Rest may also help.  Be sure to drink enough fluids.  Move to a regular diet as you feel able.   7. You may have a slight fever.  Call the doctor if your fever is over 100 F (37.7 C) (taken under the tongue) or lasts longer than 24 hours.  8. You may have a dry mouth, a sore throat, muscle aches or trouble sleeping. These should go away after 24 hours.  9. Do not make important or legal decisions.   10. It is recommended to avoid smoking.               Tips for taking pain medications  To get the best pain relief possible, remember these points:    Take pain medications as directed, before pain becomes severe.    Pain medication can upset your stomach: taking it with food may help.    Constipation is a common side effect of pain medication. Drink plenty of  fluids.    Eat foods high in fiber. Take a stool softener if recommended by your doctor or pharmacist.    Do not drink alcohol, drive or operate machinery while taking pain medications.    Ask about other ways to control pain, such as with heat, ice or relaxation.    Tylenol/Acetaminophen Consumption  To help encourage the safe use of acetaminophen, the makers of TYLENOL  have lowered the maximum daily dose for single-ingredient Extra Strength TYLENOL   (acetaminophen) products sold in the [place] from 8 pills per day (4,000 mg) to 6 pills per day (3,000 mg). The dosing interval has also changed from 2 pills every 4-6 hours to 2 pills every 6 hours.    If you feel your pain relief is insufficient, you may take Tylenol/Acetaminophen in addition to your narcotic pain medication.     Be careful not to exceed 3,000 mg of Tylenol/Acetaminophen in a 24 hour period from all sources.    If you are taking extra strength Tylenol/acetaminophen (500 mg), the maximum dose is 6 tablets in 24 hours.    If you are taking regular strength acetaminophen (325 mg), the maximum dose is 9 tablets in 24 hours.    Call a doctor for any of the followin. Signs of infection (fever, growing tenderness at the surgery site, a large amount of drainage or bleeding, severe pain, foul-smelling drainage, redness, swelling).  2. It has been over 8 to 10 hours since surgery and you are still not able to urinate (pass water).  3. Headache for over 24 hours.  4. Signs of Covid-19 infection (temperature over 100 degrees, shortness of breath, cough, loss of taste/smell, generalized body aches, persistent headache, chills, sore throat, nausea/vomiting/diarrhea)    Your doctor is:    Dr. Evaristo Giraldo, Prostate and Urology: [phone]  After Hours and Weekends dial: [phone] and ask for the resident on call for:  Prostate Urology  For emergency care, call the:  [facility]:  [phone] (TTY for hearing impaired: [phone])

## 2022-02-08 ENCOUNTER — PATIENT OUTREACH (OUTPATIENT)
Dept: CARE COORDINATION | Facility: CLINIC | Age: 47
End: 2022-02-08
Payer: COMMERCIAL

## 2022-02-08 NOTE — PROGRESS NOTES
Clinic Care Coordination Contact  [facility]/Voicemail       Clinical Data: Care Coordinator Outreach  Outreach attempted x 2.  Left message on patient's voicemail with call back information and requested return call.  Plan: Care Coordinator sent care coordination introduction letter on 3/24/21 via [employer]. Care Coordinator will try to reach patient again in 1 month.          Robbin Vanegas MSN, RN, PHN, CCM   Primary Care Clinical RN Care Coordinator  [facility]  2/8/2022   10:34 AM  [email or web address]  Office: [phone]

## 2022-02-09 ENCOUNTER — PATIENT OUTREACH (OUTPATIENT)
Dept: CARE COORDINATION | Facility: CLINIC | Age: 47
End: 2022-02-09
Payer: COMMERCIAL

## 2022-02-09 ASSESSMENT — ACTIVITIES OF DAILY LIVING (ADL): DEPENDENT_IADLS:: CLEANING;COOKING;LAUNDRY;SHOPPING;MEAL PREPARATION;MEDICATION MANAGEMENT

## 2022-02-09 NOTE — PROGRESS NOTES
Clinic Care Coordination Contact    Follow Up Progress Note      Assessment: The RN CC nurse care coordinator contacted the patient by phone for a follow up call.  The patient needs a new power wheelchair and the need was for the last office notes to be sent to [employer].  This was requested from the TC with fax number given [phone] attn Valery.  The patient needs his shower chair/commode repaired and a new one ordered.  The bottom of the seat is coming apart and the right foot pedal no longer locks.  The last office notes stating that he is still using this chair are needed to be sent to [facility] [employer] fax to [phone] attn: Melissa and Salome.    The patient is continuing to have back pain and will be making an appointment with the PMR provider.  He is hoping that the new power chair and the replacement of the shower/commode chair will help.      Care Gaps:    Health Maintenance Due   Topic Date Due     HEPATITIS C SCREENING  Never done     MEDICARE ANNUAL WELLNESS VISIT  06/02/2015     DTAP/TDAP/TD IMMUNIZATION (2 - Td or Tdap) 10/29/2018       Care Gaps Last addressed on 2/9/2022, Care Gap Goal set: Yes and Patient accepted scheduling phone number for [phone]  to schedule independently     Goals addressed this encounter:   Goals Addressed                    This Visit's Progress       #3  Other care gaps (pt-stated)   10%      Goal Statement: I will work with my primary care providers on closing my care gaps including things like my Medicare annual wellness exam, eye exam and updates to my immunizations.  Date Goal set: 9/10/2021  Barriers: Some things are difficult for him to get to.  Strengths: Engaged in care coordination.  Date to Achieve By: 3/10/2022  Patient expressed understanding of goal: Yes  Action steps to achieve this goal:  1. I will call and make an appointment for my Medicare annual wellness exam.  2. I will call and make an appointment for my eye exam.  3. I will ask my primary  care provider which immunizations are appropriate for me to update.           Healthy Eating (pt-stated)   60%      Goal Statement: I will work with the ILS person to make more palatable meals for me to eat.  Measure of Success: The patient is gaining weight  Supportive Steps to Achieve: weigh the patient routinely.  Barriers: food allergies  Strengths: motivated to gain weight  Date to Achieve By: 12/5/2021  Patient expressed understanding of goal: yes             Pain Management (pt-stated)   60%      Goal Statement: I will work with the physical therapists at [facility] to decrease the amount of pain in my neck, back and sacral area.  Measure of Success: Patient states less pain.  Supportive Steps to Achieve: physical therapy from [facility]  Barriers: quadriplegic   Strengths: motivated  Date to Achieve By: 12/5/2021  Patient expressed understanding of goal: yes                Intervention/Education provided during outreach: reviewed the needs of the patient     Outreach Frequency: monthly    Plan:   1.  The patient will make and attend an appointment with the PMR provider regarding the back pain.  2.  The patient will forward any questions or requests to the RN CC to facilitate the orders for the power chair and the shower chair/commode.  3.  The patient will make and attend appointments as recommended.    RN CC Nurse Care Coordinator will follow up in 4 weeks.          Robbin Vanegas MSN, RN, PHN, CCM   Primary Care Clinical RN Care Coordinator  [facility]  2/10/2022   7:59 AM  [email or web address]  Office: [phone]

## 2022-02-10 ENCOUNTER — MEDICAL CORRESPONDENCE (OUTPATIENT)
Dept: HEALTH INFORMATION MANAGEMENT | Facility: CLINIC | Age: 47
End: 2022-02-10
Payer: COMMERCIAL

## 2022-02-10 ENCOUNTER — TELEPHONE (OUTPATIENT)
Dept: CARE COORDINATION | Facility: CLINIC | Age: 47
End: 2022-02-10
Payer: COMMERCIAL

## 2022-02-10 DIAGNOSIS — Z53.9 ERRONEOUS ENCOUNTER--DISREGARD: Primary | ICD-10-CM

## 2022-02-17 DIAGNOSIS — Z53.9 DIAGNOSIS NOT YET DEFINED: Primary | ICD-10-CM

## 2022-02-17 PROCEDURE — G0180 MD CERTIFICATION HHA PATIENT: HCPCS | Performed by: PHYSICIAN ASSISTANT

## 2022-02-18 ENCOUNTER — TELEPHONE (OUTPATIENT)
Dept: FAMILY MEDICINE | Facility: CLINIC | Age: 47
End: 2022-02-18
Payer: COMMERCIAL

## 2022-02-18 NOTE — TELEPHONE ENCOUNTER
Reason for Call:  Form, our goal is to have forms completed with 72 hours, however, some forms may require a visit or additional information.    Type of letter, form or note:  Home Health Certification    Who is the form from?: Home care    Where did the form come from: form was faxed in    What clinic location was the form placed at?: [facility]    Where the form was placed: Given to physician    What number is listed as a contact on the form?: [phone]       Additional comments:     Call taken on 2/18/2022 at 3:17 PM by WANDA DE LEON

## 2022-02-20 DIAGNOSIS — R05.9 COUGH: ICD-10-CM

## 2022-02-21 ENCOUNTER — MEDICAL CORRESPONDENCE (OUTPATIENT)
Dept: HEALTH INFORMATION MANAGEMENT | Facility: CLINIC | Age: 47
End: 2022-02-21
Payer: COMMERCIAL

## 2022-02-21 RX ORDER — ALBUTEROL SULFATE 90 UG/1
AEROSOL, METERED RESPIRATORY (INHALATION)
Qty: 8.5 G | Refills: 0 | Status: SHIPPED | OUTPATIENT
Start: 2022-02-21 | End: 2022-04-06

## 2022-02-21 NOTE — TELEPHONE ENCOUNTER
"Prescription approved per [facility] Refill Protocol.      Requested Prescriptions   Pending Prescriptions Disp Refills     albuterol (PROAIR HFA/PROVENTIL HFA/VENTOLIN HFA) 108 (90 Base) MCG/ACT inhaler [Pharmacy Med Name: ALBUTEROL HFA INH (200 PUFFS)8.5GM] 8.5 g 0     Sig: INHALE 2 PUFFS BY MOUTH FOUR TIMES DAILY AS NEEDED       Asthma Maintenance Inhalers - Anticholinergics Passed - 2/20/2022 10:01 PM        Passed - Patient is age 12 years or older        Passed - Recent (12 mo) or future (30 days) visit within the authorizing provider's specialty     Patient has had an office visit with the authorizing provider or a provider within the authorizing providers department within the previous 12 mos or has a future within next 30 days. See \"Patient Info\" tab in inbasket, or \"Choose Columns\" in Meds & Orders section of the refill encounter.              Passed - Medication is active on med list       Short-Acting Beta Agonist Inhalers Protocol  Passed - 2/20/2022 10:01 PM        Passed - Patient is age 12 or older        Passed - Recent (12 mo) or future (30 days) visit within the authorizing provider's specialty     Patient has had an office visit with the authorizing provider or a provider within the authorizing providers department within the previous 12 mos or has a future within next 30 days. See \"Patient Info\" tab in inbasket, or \"Choose Columns\" in Meds & Orders section of the refill encounter.              Passed - Medication is active on med list           Valery BOWEN RN on 2/21/2022 at 1:47 PM      "

## 2022-02-22 ENCOUNTER — TELEPHONE (OUTPATIENT)
Dept: CARE COORDINATION | Facility: CLINIC | Age: 47
End: 2022-02-22
Payer: COMMERCIAL

## 2022-02-22 NOTE — TELEPHONE ENCOUNTER
[employer] needs a fax of the office visit of 12/21/21.  Please fax to [phone].      Attn Valery Vanegas MSN, RN, PHN, [facility]   Primary Care Clinical RN Care Coordinator  [facility]  2/22/2022   8:05 AM  [email or web address]  Office: [phone]

## 2022-03-08 ENCOUNTER — PATIENT OUTREACH (OUTPATIENT)
Dept: CARE COORDINATION | Facility: CLINIC | Age: 47
End: 2022-03-08
Payer: COMMERCIAL

## 2022-03-08 ENCOUNTER — TELEPHONE (OUTPATIENT)
Dept: FAMILY MEDICINE | Facility: CLINIC | Age: 47
End: 2022-03-08
Payer: COMMERCIAL

## 2022-03-08 DIAGNOSIS — N31.9 NEUROGENIC BLADDER: Primary | ICD-10-CM

## 2022-03-08 DIAGNOSIS — G82.50 QUADRIPLEGIA (H): ICD-10-CM

## 2022-03-08 ASSESSMENT — ACTIVITIES OF DAILY LIVING (ADL): DEPENDENT_IADLS:: CLEANING;COOKING;LAUNDRY;SHOPPING;MEAL PREPARATION;MEDICATION MANAGEMENT

## 2022-03-08 NOTE — TELEPHONE ENCOUNTER
Riley Casey uses 250 catheters per month for his self caths.  His insurance will only pay for 200 per month.  We need a letter of support and the order signed that will be faxed to you.  Jan sent a letter of support on 4/4/21 if you want to use that as a pattern.  Then we are able to get the extra 50 catheters paid for by insurance.  Let me know if you have any questions.         Cure #14 Arabic 16 inch long male funnel end straight tip catheter from [employer].  He receives catheters 200/month per MA allowable and receives extra 50/month per [employer] [facility].     You can fax to Amanda Root at [phone]       Thanks   Conrado

## 2022-03-08 NOTE — PROGRESS NOTES
Clinic Care Coordination Contact    Clinic Care Coordination Contact  OUTREACH    Referral Information:  Referral Source: PCP    Primary Diagnosis: Chronic Pain (wound care)    Chief Complaint   Patient presents with     Clinic Care Coordination - Initial     Annual assessment RN CC nurse care coordinator Robbin        [place] Utilization: The patient uses the [facility] system and the [facility].    Clinic Utilization  Difficulty keeping appointments:: No  Compliance Concerns: No  No-Show Concerns: No  No PCP office visit in Past Year: No  Utilization    Hospital Admissions  0             ED Visits  0             No Show Count (past year)  2                Current as of: 3/8/2022  8:57 AM              Clinical Concerns:  Current Medical Concerns:  The patient continues to have the back pain.  The RN CC worked with Amanda Root RN from [facility] through [facility].  The patient needed a letter of medical necessity from the provider to be able to receive the 250 catheters from the insurance.  At this time the insurance will cover 200 catheters per month.  A request was sent to the provider to send a letter to [facility].  The patient is good at monitoring for a change in symptoms and will reach out to get an appointment if needed.    Patient Active Problem List   Diagnosis     Vitamin D deficiency     Eosinophilic esophagitis     Neurogenic bladder     CARDIOVASCULAR SCREENING; LDL GOAL LESS THAN 160     Quadriplegia (H)     Chronic constipation     Internal hemorrhoids with other complication     Diagnostic skin and sensitization tests(aka ALLERGENS)     Anal or rectal pain     Allergic rhinitis due to animal dander     Allergy to mold spores     House dust mite allergy     Insomnia     Health Care Home     Gallstones     Chronic midline thoracic back pain     Pulmonary nodules     ACP (advance care planning)     Cervical spinal cord injury, sequela (H)     Spasticity     Self-catheterizes urinary  bladder     Bleeding external hemorrhoids     History of claustrophobia     Recurrent UTI          Current Behavioral Concerns: none currently noted.    Education Provided to patient: Reviewed with the patient the importance of working with the provider to close the care gaps especially the annual medicare wellness exam.  A goal was made to this effect.   Pain  Pain (GOAL):: Yes  Type: Chronic (>3mo)  Location of chronic pain:: back and sacral area, neck area  Radiating: Yes  Progression: Unchanged  Description of pain: Stabbing, Shooting  Chronic pain severity:: 5  Limitation of routine activities due to chronic pain:: Yes  Description: Unable to perform most daily activities (chores, hobbies, social activities, driving)  Alleviating Factors: Pain Medication  Aggravating Factors: Positioning, Breathing  Health Maintenance Reviewed: Due/Overdue   Health Maintenance Due   Topic Date Due     HEPATITIS C SCREENING  Never done     MEDICARE ANNUAL WELLNESS VISIT  06/02/2015     DTAP/TDAP/TD IMMUNIZATION (2 - Td or Tdap) 10/29/2018       Clinical Pathway: None    Medication Management:  Medication review status: Medications reviewed and no changes reported per patient.        Medication review was completed with the patient and marked as reviewed.  Health maintenance was reviewed and questions were answered with the patient.         Functional Status:  Dependent ADLs:: Wheelchair-independent, Bathing, Dressing, Grooming, Positioning, Transfers, Toileting  Dependent IADLs:: Cleaning, Cooking, Laundry, Shopping, Meal Preparation, Medication Management  Bed or wheelchair confined:: Yes  Mobility Status: Dependent/Assisted by Another  Fallen 2 or more times in the past year?: No  Any fall with injury in the past year?: No    Living Situation:  Current living arrangement:: I live in assisted living  Type of residence:: Assisted living    Lifestyle & Psychosocial Needs:    Social Determinants of Health     Tobacco Use: Low Risk       Smoking Tobacco Use: Never Smoker     Smokeless Tobacco Use: Never Used   Alcohol Use: Not on file   Financial Resource Strain: Not on file   Food Insecurity: Not on file   Transportation Needs: Not on file   Physical Activity: Not on file   Stress: Not on file   Social Connections: Not on file   Intimate Partner Violence: Not on file   Depression: Not at risk     PHQ-2 Score: 0   Housing Stability: Not on file     Diet:: Regular  Inadequate nutrition (GOAL):: No  Tube Feeding: No  Inadequate activity/exercise (GOAL):: No  Significant changes in sleep pattern (GOAL): No  Transportation means:: Accessible car  Financial/Insurance concerns (GOAL):: No  Samaritan or spiritual beliefs that impact treatment:: No  Mental health DX:: No  Mental health management concern (GOAL):: No  Informal Support system:: Family, Friends             Resources and Interventions:  Current Resources:   Skilled Home Care Services: Skilled Nursing  Community Resources: Other (see comment) ([place] and ILS workers)  Supplies Currently Used at Home: Other (self cath supplies)  Equipment Currently Used at Home: wheelchair, power, other (see comments), commode chair, hospital bed, shower chair, transfer board, tub bench (shirin lift)  Employment Status: disabled         Advance Care Plan/Directive  Advanced Care Plans/Directives on file:: Yes  Type Advanced Care Plans/Directives: Advanced Directive - On File (n/a)    Referrals Placed: None (unknown)     Goals:   Goals        General     #3  Other care gaps (pt-stated)      Notes - Note edited  3/8/2022  7:57 AM by Robbin Andino RN     Goal Statement: I will work with my primary care providers on closing my care gaps including things like my Medicare annual wellness exam, eye exam and updates to my immunizations.  Date Goal set: 9/10/2021  Barriers: Some things are difficult for him to get to.  Strengths: Engaged in care coordination.  Date to Achieve By: 3/10/2023  Patient expressed  understanding of goal: Yes  Action steps to achieve this goal:  1. I will call and make an appointment for my Medicare annual wellness exam.  2. I will call and make an appointment for my eye exam.  3. I will ask my primary care provider which immunizations are appropriate for me to update.         Healthy Eating (pt-stated)      Notes - Note edited  3/8/2022  7:57 AM by Robbin Vanegas RN     Goal Statement: I will work with the ILS person to make more palatable meals for me to eat.  Measure of Success: The patient is gaining weight  Supportive Steps to Achieve: weigh the patient routinely.  Barriers: food allergies  Strengths: motivated to gain weight  Date to Achieve By: 12/5/2022  Patient expressed understanding of goal: yes           Pain Management (pt-stated)      Notes - Note edited  3/8/2022  7:57 AM by Robbin Vanegas RN     Goal Statement: I will work with the physical therapists at [facility] to decrease the amount of pain in my neck, back and sacral area.  Measure of Success: Patient states less pain.  Supportive Steps to Achieve: physical therapy from [facility]  Barriers: quadriplegic   Strengths: motivated  Date to Achieve By: 12/5/2022  Patient expressed understanding of goal: yes                Patient/Caregiver understanding: The patient has a good understanding of the disease process.    Outreach Frequency: monthly      Plan: 1.  The patient will make an appointment for an annual medicare wellness exam.  2.  The patient will work with [facility] [employer] on getting the extra catheters for monthly use.  3.  The patient will take all medications as prescribed by the providers.          Robbin Vanegas MSN, RN, PHN, CCM   Primary Care Clinical RN Care Coordinator  [facility]  3/8/2022   1:42 PM  [email or web address]  Office: [phone]

## 2022-03-17 DIAGNOSIS — F51.01 PRIMARY INSOMNIA: ICD-10-CM

## 2022-03-18 RX ORDER — ZOLPIDEM TARTRATE 10 MG/1
TABLET ORAL
Qty: 90 TABLET | Refills: 1 | Status: SHIPPED | OUTPATIENT
Start: 2022-03-18 | End: 2022-09-19

## 2022-03-28 ENCOUNTER — TELEPHONE (OUTPATIENT)
Dept: CARE COORDINATION | Facility: CLINIC | Age: 47
End: 2022-03-28
Payer: COMMERCIAL

## 2022-03-28 ENCOUNTER — PATIENT OUTREACH (OUTPATIENT)
Dept: CARE COORDINATION | Facility: CLINIC | Age: 47
End: 2022-03-28
Payer: COMMERCIAL

## 2022-03-28 ASSESSMENT — ACTIVITIES OF DAILY LIVING (ADL): DEPENDENT_IADLS:: CLEANING;COOKING;LAUNDRY;SHOPPING;MEAL PREPARATION;MEDICATION MANAGEMENT

## 2022-03-28 NOTE — TELEPHONE ENCOUNTER
Patient can be seen in my approval require spot tomorrow. If more urgent needs, needs to be seen in urgent care or ED.   Jenny Fay PA-C

## 2022-03-28 NOTE — TELEPHONE ENCOUNTER
Patient notified of provider's message as written. Patient verbalized understanding. Scheduled with Jenny Fay, ELIZABETH 3/29/22.     Tamica Mac RN   [facility]

## 2022-03-28 NOTE — TELEPHONE ENCOUNTER
The patient is having back pain, abdominal pain, and currently having constipation.  The patient has added Maalox to his regular bowel regime.  The concern comes with the back pain as that usually comes with a UTI.    He tried  [facility] and the first available is June.  [facility] is July for first available.    Please contact the patient if he can be worked into Jenny Fay schedule at all.        Robbin Vanegas MSN, RN, PHN, [facility]   Primary Care Clinical RN Care Coordinator  [facility]  3/28/2022   10:35 AM  [email or web address]  Office: [phone]

## 2022-03-28 NOTE — PROGRESS NOTES
Clinic Care Coordination Contact    Follow Up Progress Note      Assessment: The patient called with symptoms of constipation, as well as back pain.  The patient was able to get an appointment for Friday with [facility].  A telephone message was sent to the provider and to the triage team.  The patient was reminded to schedule his annual medicare wellness exam.      Care Gaps:    Health Maintenance Due   Topic Date Due     HEPATITIS C SCREENING  Never done     MEDICARE ANNUAL WELLNESS VISIT  06/02/2015     DTAP/TDAP/TD IMMUNIZATION (2 - Td or Tdap) 10/29/2018       Care Gaps Last addressed on 3/28/22, Care Gap Goal set: Yes and Patient accepted scheduling phone number for [phone]  to schedule independently     Goals addressed this encounter:   Goals Addressed    None       Goals        #3  Other care gaps (pt-stated)       Goal Statement: I will work with my primary care providers on closing my care gaps including things like my Medicare annual wellness exam, eye exam and updates to my immunizations.  Date Goal set: 9/10/2021  Barriers: Some things are difficult for him to get to.  Strengths: Engaged in care coordination.  Date to Achieve By: 3/10/2023  Patient expressed understanding of goal: Yes  Action steps to achieve this goal:  1. I will call and make an appointment for my Medicare annual wellness exam.  2. I will call and make an appointment for my eye exam.  3. I will ask my primary care provider which immunizations are appropriate for me to update.           Healthy Eating (pt-stated)       Goal Statement: I will work with the ILS person to make more palatable meals for me to eat.  Measure of Success: The patient is gaining weight  Supportive Steps to Achieve: weigh the patient routinely.  Barriers: food allergies  Strengths: motivated to gain weight  Date to Achieve By: 12/5/2022  Patient expressed understanding of goal: yes             Pain Management (pt-stated)       Goal Statement: I will work with the  physical therapists at [facility] to decrease the amount of pain in my neck, back and sacral area.  Measure of Success: Patient states less pain.  [facility]  Barriers: quadriplegic   Strengths: motivated  Date to Achieve By: 12/5/2022  Patient expressed understanding of goal: yes                  Intervention/Education provided during outreach: Importance of completing the health maintenance items listed above.     Outreach Frequency: monthly    Plan:   1.  The patient will attend the appointment with [facility] on Friday.  2.  The patient will make and attend the annual medicare wellness exam.  3.  The patient will make and attend a possible appointment for current symptoms.    RN CC Nurse Care Coordinator will follow up in 4 weeks.            Robbin Vanegas MSN, RN, PHN, CCM   Primary Care Clinical RN Care Coordinator  [facility]  3/28/2022   11:46 AM  [email or web address]  Office: [phone]

## 2022-03-29 ENCOUNTER — TELEPHONE (OUTPATIENT)
Dept: UROLOGY | Facility: CLINIC | Age: 47
End: 2022-03-29

## 2022-03-29 ENCOUNTER — OFFICE VISIT (OUTPATIENT)
Dept: FAMILY MEDICINE | Facility: CLINIC | Age: 47
End: 2022-03-29
Payer: COMMERCIAL

## 2022-03-29 VITALS — HEART RATE: 112 BPM | TEMPERATURE: 98 F | SYSTOLIC BLOOD PRESSURE: 114 MMHG | DIASTOLIC BLOOD PRESSURE: 72 MMHG

## 2022-03-29 DIAGNOSIS — K64.4 EXTERNAL HEMORRHOIDS: ICD-10-CM

## 2022-03-29 DIAGNOSIS — N39.0 RECURRENT UTI: ICD-10-CM

## 2022-03-29 DIAGNOSIS — N31.9 NEUROGENIC BLADDER: Primary | ICD-10-CM

## 2022-03-29 DIAGNOSIS — K59.09 CHRONIC CONSTIPATION: ICD-10-CM

## 2022-03-29 LAB
ALBUMIN UR-MCNC: NEGATIVE MG/DL
APPEARANCE UR: CLEAR
BILIRUB UR QL STRIP: NEGATIVE
COLOR UR AUTO: YELLOW
GLUCOSE UR STRIP-MCNC: NEGATIVE MG/DL
HGB UR QL STRIP: NEGATIVE
KETONES UR STRIP-MCNC: NEGATIVE MG/DL
LEUKOCYTE ESTERASE UR QL STRIP: NEGATIVE
NITRATE UR QL: NEGATIVE
PH UR STRIP: 6 [PH] (ref 5–7)
SP GR UR STRIP: 1.02 (ref 1–1.03)
UROBILINOGEN UR STRIP-ACNC: 0.2 E.U./DL

## 2022-03-29 PROCEDURE — 81003 URINALYSIS AUTO W/O SCOPE: CPT | Performed by: PHYSICIAN ASSISTANT

## 2022-03-29 PROCEDURE — 99214 OFFICE O/P EST MOD 30 MIN: CPT | Performed by: PHYSICIAN ASSISTANT

## 2022-03-29 ASSESSMENT — PATIENT HEALTH QUESTIONNAIRE - PHQ9
SUM OF ALL RESPONSES TO PHQ QUESTIONS 1-9: 7
10. IF YOU CHECKED OFF ANY PROBLEMS, HOW DIFFICULT HAVE THESE PROBLEMS MADE IT FOR YOU TO DO YOUR WORK, TAKE CARE OF THINGS AT HOME, OR GET ALONG WITH OTHER PEOPLE: NOT DIFFICULT AT ALL
SUM OF ALL RESPONSES TO PHQ QUESTIONS 1-9: 7

## 2022-03-29 NOTE — PROGRESS NOTES
Assessment & Plan     Neurogenic bladder  - UA reflex to Microscopic and Culture; Future  - UA reflex to Microscopic and Culture    Recurrent UTI  No UTI on UA today.   - UA reflex to Microscopic and Culture; Future  - UA reflex to Microscopic and Culture    Chronic constipation  Patient does have appointment with GI this coming Friday. Encouraged to keep this appointment as they may have additional ideas.   -Encouraged to increased Maalox to 1 capful twice per day  -Incorporate the stool softener into regimen   -Continue to stay hydrated and increase water intake   -If no results, may need to use magnesium citrate in a few days    Hemorrhoids  These may get worse before they are better with the next couple of days  -Use baby wipes or water rinses after BMs  -Could trial using donut pillows to help lessen pressure  -Continue using Preparation H and hydrocortisone                   No follow-ups on file.    Jenny Fay PA-C  [facility]  The student Anna Watts PAS2 acted as a scribe and the encounter documented above was completely performed by myself and the documentation reflects the work I have performed today.   Jenny Fay PA-C       Kevyn Lin is a 46 year old who presents to clinic with ongoing abdominal pain, back pain, and constipation for the past couple of weeks. Patient reports that his pain is mostly localized to the middle of his back and along his sides. He describes the pain as a dull ache. He reports that this discomfort began a couple of weeks ago when he had some diarrhea. At that time, he took a tiny amount of Pepto bismol for relief. He has since had this discomfort and reports 4 days since his last true BM. He has added one capful of Mirlax to his regular bowel regimen with no results. He also reports some nausea in the mornings that seems to resolve throughout the day. He denies fever, vomiting, shortness of breath, urinary changes,  or chest pain. Riley also reports that his hemorrhoids seem to be causing him more discomfort. He noticed yesterday when he wiped there was bright red blood.     His history is significant for UTIs that are usually accompanied by this back pain, so he would like to rule that out today.         UTI    History of Present Illness       Back Pain:  He presents for follow up of back pain. Patient's back pain is a chronic problem.  Location of back pain:  Right middle of back and left middle of back  Description of back pain: dull ache  Back pain spreads: nowhere    Since patient first noticed back pain, pain is: unchanged  Does back pain interfere with his job:  Yes      Mental Health Follow-up:                    Today's PHQ-9         PHQ-9 Total Score: 7  PHQ-9 Q9 Thoughts of better off dead/self-harm past 2 weeks :   (P) Not at all    How difficult have these problems made it for you to do your work, take care of things at home, or get along with other people: Not difficult at all        Reason for visit:  Sic to my stomach and pain  Symptom onset:  1-2 weeks ago  Symptom intensity:  Moderate  Symptom progression:  Staying the same  Had these symptoms before:  Yes  Has tried/received treatment for these symptoms:  Yes  Previous treatment was successful:  Yes  Prior treatment description:  Treat for UTI    He eats 2-3 servings of fruits and vegetables daily.He consumes 2 sweetened beverage(s) daily.He exercises with enough effort to increase his heart rate 9 or less minutes per day.  He exercises with enough effort to increase his heart rate 3 or less days per week.   He is taking medications regularly.       Hemorrhoids  Onset/Duration: x1 day  Description:   Ankita-anal lump: YES  Pain: YES  Itching: unsure    Pt stated that he has had bleeding   Accompanying Signs & Symptoms:  Blood in stool: no  Changes in stool pattern: YES- a little constipation  History:   Any previous GI studies done:none  Family History of colon  cancer: no  Precipitating factors:   None  Alleviating factors:  None  Therapies tried and outcome: water and stool softener        Review of Systems   Constitutional, HEENT, cardiovascular, pulmonary, gi and gu systems are negative, except as otherwise noted.      Objective    /72 (BP Location: Left arm, Patient Position: Chair, Cuff Size: Adult Regular)   Pulse 112   Temp 98  F (36.7  C) (Oral)   There is no height or weight on file to calculate BMI.  Physical Exam   GENERAL: healthy, alert and no distress  RESP: lungs clear to auscultation - no rales, rhonchi or wheezes  CV: regular rate and rhythm, normal S1 S2, no S3 or S4, no murmur, click or rub  ABDOMEN: soft, with some diffuse tenderness, no organomegaly or masses, bowel sounds normal and no palpable or pulsatile masses  MSK: seated in wheelchair, moving both arms spontaneously.

## 2022-03-29 NOTE — TELEPHONE ENCOUNTER
[facility] Call Center    Phone Message    May a detailed message be left on voicemail: yes     Reason for Call: Medication Refill Request    Has the patient contacted the pharmacy for the refill? Yes   Name of medication being requested: sodium chloride 0.9% (bottle) 1,000 mL with gentamicin 500 mg irrigation   Provider who prescribed the medication: Evaristo Hayes  Pharmacy: [facility]  Date medication is needed: ARIANA Hinkle from [facility] called and stated they have been sending over faxes or the above medication and had no luck with a response, please fax order, call Manan with questions, Thanks!    Ph: [phone]  Fax: [phone]      Action Taken: Message routed to:  [facility] Uro    Travel Screening: Not Applicable

## 2022-03-30 ASSESSMENT — PATIENT HEALTH QUESTIONNAIRE - PHQ9: SUM OF ALL RESPONSES TO PHQ QUESTIONS 1-9: 7

## 2022-04-05 ENCOUNTER — TELEPHONE (OUTPATIENT)
Dept: CARE COORDINATION | Facility: CLINIC | Age: 47
End: 2022-04-05
Payer: COMMERCIAL

## 2022-04-05 DIAGNOSIS — R05.9 COUGH: ICD-10-CM

## 2022-04-05 DIAGNOSIS — N31.9 NEUROGENIC BLADDER: ICD-10-CM

## 2022-04-05 DIAGNOSIS — G82.50 QUADRIPLEGIA (H): Primary | ICD-10-CM

## 2022-04-05 NOTE — TELEPHONE ENCOUNTER
The patient called today requesting an order for knee hi salbador socks.  Needs to be sent to [facility] fax [phone].      Robbin Vanegas MSN, RN, PHN, CCM   Primary Care Clinical RN Care Coordinator  [facility]  4/5/2022   11:37 AM  [email or web address]  Office: [phone]

## 2022-04-06 RX ORDER — TOLTERODINE TARTRATE 2 MG/1
TABLET, EXTENDED RELEASE ORAL
Qty: 180 TABLET | Refills: 0 | Status: SHIPPED | OUTPATIENT
Start: 2022-04-06 | End: 2022-07-06

## 2022-04-06 RX ORDER — ALBUTEROL SULFATE 90 UG/1
AEROSOL, METERED RESPIRATORY (INHALATION)
Qty: 8.5 G | Refills: 0 | Status: SHIPPED | OUTPATIENT
Start: 2022-04-06 | End: 2022-05-12

## 2022-04-06 NOTE — TELEPHONE ENCOUNTER
"Prescription approved per [facility] Refill Protocol.  Requested Prescriptions   Pending Prescriptions Disp Refills     tolterodine (DETROL) 2 MG tablet [Pharmacy Med Name: TOLTERODINE TARTRATE 2MG TABLETS] 180 tablet 0     Sig: TAKE 1 TABLET(2 MG) BY MOUTH TWICE DAILY       Muscarinic Antagonists (Urinary Incontinence Agents) Passed - 4/5/2022  1:46 PM        Passed - Recent (12 mo) or future (30 days) visit within the authorizing provider's specialty     Patient has had an office visit with the authorizing provider or a provider within the authorizing providers department within the previous 12 mos or has a future within next 30 days. See \"Patient Info\" tab in inbasket, or \"Choose Columns\" in Meds & Orders section of the refill encounter.              Passed - Patient does not have a diagnosis of glaucoma on the problem list     If glaucoma diagnosis is new, refer refill to physician.          Passed - Medication is active on med list        Passed - Patient is 18 years of age or older           albuterol (PROAIR HFA/PROVENTIL HFA/VENTOLIN HFA) 108 (90 Base) MCG/ACT inhaler [Pharmacy Med Name: ALBUTEROL HFA INH (200 PUFFS)8.5GM] 8.5 g 0     Sig: INHALE 2 PUFFS BY MOUTH FOUR TIMES DAILY AS NEEDED       Asthma Maintenance Inhalers - Anticholinergics Passed - 4/5/2022  1:46 PM        Passed - Patient is age 12 years or older        Passed - Recent (12 mo) or future (30 days) visit within the authorizing provider's specialty     Patient has had an office visit with the authorizing provider or a provider within the authorizing providers department within the previous 12 mos or has a future within next 30 days. See \"Patient Info\" tab in inbasket, or \"Choose Columns\" in Meds & Orders section of the refill encounter.              Passed - Medication is active on med list       Short-Acting Beta Agonist Inhalers Protocol  Passed - 4/5/2022  1:46 PM        Passed - Patient is age 12 or older        Passed - Recent (12 mo) or " "future (30 days) visit within the authorizing provider's specialty     Patient has had an office visit with the authorizing provider or a provider within the authorizing providers department within the previous 12 mos or has a future within next 30 days. See \"Patient Info\" tab in inbasket, or \"Choose Columns\" in Meds & Orders section of the refill encounter.              Passed - Medication is active on med list             "

## 2022-04-20 ENCOUNTER — TELEPHONE (OUTPATIENT)
Dept: UROLOGY | Facility: CLINIC | Age: 47
End: 2022-04-20
Payer: COMMERCIAL

## 2022-04-20 NOTE — TELEPHONE ENCOUNTER
[facility] Call Center    Phone Message    May a detailed message be left on voicemail: yes     Reason for Call: Medication Question or concern regarding medication   Prescription Clarification  Name of Medication: sodium chloride 0.9% (bottle) 1,000 mL with gentamicin 500 mg irrigation  Prescribing Provider: Enzo   Pharmacy:  [facility]   What on the order needs clarification? [facility] with [facility] called stating gentamicin is on back order now. They will not be able to make any more of this solution. He says there are alternative antibiotics they can use. Please contact [facility] or a Pharmacist at [phone]. Thank you.     Action Taken: Message routed to:  [facility] Urology    Travel Screening: Not Applicable

## 2022-04-20 NOTE — TELEPHONE ENCOUNTER
Attempted to call pt. Left detailed message to call clinic back at [phone].   To discuss if pt would be ok with alternative options or send order to  compounding pharmacy.     Shraddha Sal RN MSN

## 2022-04-22 NOTE — TELEPHONE ENCOUNTER
Spoke to pt. Pt confirms that he would like alternative ordered, because insurance covers best at this compounding pharmacy.     Spoke to pharmacy. They reports tobramycin is the alternative. Will send to provider for input.     Shraddha Sal RN MSN

## 2022-04-24 ENCOUNTER — HEALTH MAINTENANCE LETTER (OUTPATIENT)
Age: 47
End: 2022-04-24

## 2022-04-25 NOTE — TELEPHONE ENCOUNTER
Vikki Beltrán MD Bratsch, Angie J, RN; Evaristo Hayes MD  Cc: Yessy Tapia RN  Caller: Unspecified (5 days ago,  3:45 PM)  That's fine, thanks!     Spoke to pharmacy. Informed them of alternative order is approved by provider.     Shraddha Sal, ARISTEO MSN

## 2022-04-29 DIAGNOSIS — K59.09 CHRONIC CONSTIPATION: ICD-10-CM

## 2022-04-29 NOTE — TELEPHONE ENCOUNTER
Previously filled by Jan Harris who has left [facility]- okay to continue refilling?     Leda Caraballo RN

## 2022-05-02 RX ORDER — DOCUSATE SODIUM 283 MG/5ML
LIQUID RECTAL
Qty: 1 ENEMA | Refills: 11 | Status: SHIPPED | OUTPATIENT
Start: 2022-05-02 | End: 2023-06-15

## 2022-05-06 ENCOUNTER — PATIENT OUTREACH (OUTPATIENT)
Dept: NURSING | Facility: CLINIC | Age: 47
End: 2022-05-06
Payer: COMMERCIAL

## 2022-05-06 ASSESSMENT — ACTIVITIES OF DAILY LIVING (ADL): DEPENDENT_IADLS:: CLEANING;COOKING;LAUNDRY;SHOPPING;MEAL PREPARATION;MEDICATION MANAGEMENT

## 2022-05-06 NOTE — PROGRESS NOTES
Clinic Care Coordination Contact    Follow Up Progress Note      Assessment: The RN CC nurse care coordinator contacted the patient by phone for a follow up call.  The patient stated that his new motorized chair is in already, and they are just making the final adjustments.  The patient is set for the trials of the baclofen pump now that they are starting up again.      Care Gaps:    Health Maintenance Due   Topic Date Due     HEPATITIS C SCREENING  Never done     DTAP/TDAP/TD IMMUNIZATION (1 - Tdap) 10/29/2008     MEDICARE ANNUAL WELLNESS VISIT  06/02/2015       Care Gaps Last addressed on 5/6/22, Care Gap Goal set: Yes and Patient accepted scheduling phone number for [phone]  to schedule independently     Goals addressed this encounter:    Goals Addressed                    This Visit's Progress       #3  Other care gaps (pt-stated)   30%      Goal Statement: I will work with my primary care providers on closing my care gaps including things like my Medicare annual wellness exam, eye exam and updates to my immunizations.  Date Goal set: 9/10/2021  Barriers: Some things are difficult for him to get to.  Strengths: Engaged in care coordination.  Date to Achieve By: 3/10/2023  Patient expressed understanding of goal: Yes  Action steps to achieve this goal:  1. I will call and make an appointment for my Medicare annual wellness exam.  2. I will call and make an appointment for my eye exam.  3. I will ask my primary care provider which immunizations are appropriate for me to update.             #4  Other Annual Wellness exam (pt-stated)         Goal Statement: I will complete my annual wellness visit.  Date Goal set: 5/6/22  Barriers: wheelchair bound   Strengths: engaged in care coordination  Date to Achieve By: 5/6/23  Patient expressed understanding of goal: yes  Action steps to achieve this goal:  1. I will schedule my annual wellness visit; [phone] ([phone])  2. I will attend my annual wellness  visit.  3. I will contact my Care Management or clinic team if I have barriers to attending my annual wellness visit.              Healthy Eating (pt-stated)   70%      Goal Statement: I will work with the ILS person to make more palatable meals for me to eat.  Measure of Success: The patient is gaining weight  Supportive Steps to Achieve: weigh the patient routinely.  Barriers: food allergies  Strengths: motivated to gain weight  Date to Achieve By: 12/5/2022  Patient expressed understanding of goal: yes               Pain Management (pt-stated)   70%      Goal Statement: I will work with the physical therapists at [facility] to decrease the amount of pain in my neck, back and sacral area.  Measure of Success: Patient states less pain.  Supportive Steps to Achieve: physical therapy from [facility]  Barriers: quadriplegic   Strengths: motivated  Date to Achieve By: 12/5/2022  Patient expressed understanding of goal: yes                  Intervention/Education provided during outreach: The RN CC explained the purpose and the need for the Annual Medicare Wellness exam.       Outreach Frequency: monthly        Plan:   1.  The patient will call and make an appointment for an Annual Medicare Wellness Exam with PCP.  2.  The patient will make and attend all of the recommended follow up appointments.  3.  The patient will reach out to the PCP for a possible increase in the amitriptyline.     RN CC Nurse Care Coordinator will follow up in 4 weeks.      Robbin Vanegas MSN, RN, PHN, CCM   Primary Care Clinical RN Care Coordinator  [facility]  5/6/2022   12:23 PM  [email or web address]  Office: [phone]

## 2022-05-06 NOTE — LETTER
Riley remember to make your Annual Wellness Exam.  Thanks Conrado  [facility]  Patient Centered Plan of Care  About Me:        Patient Name:  Riley Gifford    YOB: 1975  Age:         46 year old   Rut MRN:    [id number] Telephone Information:  Home Phone [phone]   Mobile [phone]   Mobile [phone]       Address:  [address] [place] [address] [address]  [place] [address] Email address:  [email or web address]      Emergency Contact(s)    Name Relationship Lgl Grd Work Phone Home Phone Mobile Phone   1. TREY GIFFORD (NE* Relative No noen none [phone]   2. AGUSTO CAVAZOS Sister   [phone]    3. HODA GIFFORD Brother No  [phone]            Primary language:  English     needed? No   [facility] [employer]:  [phone] op. 1  Other communication barriers:Glasses    Preferred Method of Communication:  Lisa  Current living arrangement: I live in assisted living    Mobility Status/ Medical Equipment: Dependent/Assisted by Another        Health Maintenance  Health Maintenance Reviewed: Due/Overdue   Health Maintenance Due   Topic Date Due     HEPATITIS C SCREENING  Never done     DTAP/TDAP/TD IMMUNIZATION (1 - Tdap) 10/29/2008     MEDICARE ANNUAL WELLNESS VISIT  06/02/2015           My Access Plan  Medical Emergency 911   Primary Clinic Line [facility] - [phone]   24 Hour Appointment Line [phone] or  [phone]) (toll-free)   24 Hour Nurse Line [phone] (toll-free)   Preferred Urgent Care [facility] [phone]     Preferred Hospital [facility]  [phone]     Preferred Pharmacy [id number] - [place], [place] - [address] AT [facility] [address] [facility] [address]     Behavioral Health Crisis Line The National Suicide Prevention Lifeline at 1-352.502.1929 or 911             My Care Team Members  Patient Care Team        Relationship Specialty Notifications Start End    Jenny Fay PA-C PCP - General Family Medicine  12/21/21     Phone: [phone] Fax: [phone] [address] [facility] [address]    Robbin Andino, RN Lead Care Coordinator Nurse Admissions 1/6/16     phone:  [phone]    Phone: [phone] Fax: [phone]        Rober Leo MD Referring Physician Urology  1/8/16     Phone: [phone] Fax: [phone] [address] [facility] [address]    Kimberly Zabala MD MD Urology  1/8/16     Phone: [phone] Fax: [phone]         [address] [facility] [address] [facility] [address]    Amanda Montoya (see comments)   1/8/16     [facility]     Phone: [phone]         Evaristo Hayes MD MD Urology  4/19/16     Phone: [phone] Fax: [phone]         [address] [facility] [address] [facility] [address]    Jenny Wright, RN Registered Nurse Urology  4/19/16     Rosemary Thomas, RN Nurse Coordinator Physical Medicine and Rehabilitation  3/30/17     Phone: [phone] Pager: [phone]        Lulú Reveles APRN CNP Nurse Practitioner Nurse Practitioner  8/28/20     Phone: [phone] Fax: [phone]         [address] [facility] [address] [facility] [address]    Amina Doe RN Specialty Care Coordinator Urology  11/10/20     Phone: [phone]         Lulú Reveles APRN CNP Assigned Surgical Provider   6/13/21     Phone: [phone] Fax: [phone]         [address] [facility] [address] [facility] [address]    [facility] Occupational Therapist Occupational Therapy  1/7/22     [facility] Shanique Alberto  fax [phone]    Phone: [phone] Fax: [phone] [address] [facility] [address]    Evaristo Hayes MD MD Urology  1/17/22     Phone: [phone] Fax: [phone]         [address] [place] [address] [address] [facility] [address]    Yessy Tapia,  RN Specialty Care Coordinator   1/17/22     Jenny Fay PA-C Assigned PCP   4/3/22     Phone: [phone] Fax: [phone] [address] [facility] [place] [address]            My Care Plans  Self Management and Treatment Plan  Goals and (Comments)   Goals        General     #3  Other care gaps (pt-stated)      Notes - Note edited  3/8/2022  7:57 AM by Robbin Andino, RN     Goal Statement: I will work with my primary care providers on closing my care gaps including things like my Medicare annual wellness exam, eye exam and updates to my immunizations.  Date Goal set: 9/10/2021  Barriers: Some things are difficult for him to get to.  Strengths: Engaged in care coordination.  Date to Achieve By: 3/10/2023  Patient expressed understanding of goal: Yes  Action steps to achieve this goal:  1. I will call and make an appointment for my Medicare annual wellness exam.  2. I will call and make an appointment for my eye exam.  3. I will ask my primary care provider which immunizations are appropriate for me to update.           #4  Other Annual Wellness exam (pt-stated)      Notes - Note created  5/6/2022 11:57 AM by Robbin Andino, RN     Goal Statement: I will complete my annual wellness visit.  Date Goal set: 5/6/22  Barriers: wheelchair bound   Strengths: engaged in care coordination  Date to Achieve By: 5/6/23  Patient expressed understanding of goal: yes  Action steps to achieve this goal:  1. I will schedule my annual wellness visit; [phone] ([phone])  2. I will attend my annual wellness visit.  3. I will contact my Care Management or clinic team if I have barriers to attending my annual wellness visit.            Healthy Eating (pt-stated)      Notes - Note edited  3/8/2022  7:57 AM by Robbin Andino, RN     Goal Statement: I will work with the ILS person to make more palatable meals for me to eat.  Measure of Success: The patient is gaining weight  Supportive Steps to Achieve: weigh the patient  routinely.  Barriers: food allergies  Strengths: motivated to gain weight  Date to Achieve By: 12/5/2022  Patient expressed understanding of goal: yes             Pain Management (pt-stated)      Notes - Note edited  3/8/2022  7:57 AM by Robbin Andino RN     Goal Statement: I will work with the physical therapists at [facility] to decrease the amount of pain in my neck, back and sacral area.  Measure of Success: Patient states less pain.  Supportive Steps to Achieve: physical therapy from [facility]  Barriers: quadriplegic   Strengths: motivated  Date to Achieve By: 12/5/2022  Patient expressed understanding of goal: yes                   Action Plans on File:            Depression          Advance Care Plans/Directives Type:   Advanced Directive - On File (n/a)      My Medical and Care Information  Problem List   Patient Active Problem List   Diagnosis     Vitamin D deficiency     Eosinophilic esophagitis     Neurogenic bladder     CARDIOVASCULAR SCREENING; LDL GOAL LESS THAN 160     Quadriplegia (H)     Chronic constipation     Internal hemorrhoids with other complication     Diagnostic skin and sensitization tests(aka ALLERGENS)     Anal or rectal pain     Allergic rhinitis due to animal dander     Allergy to mold spores     House dust mite allergy     Insomnia     Health Care Home     Gallstones     Chronic midline thoracic back pain     Pulmonary nodules     ACP (advance care planning)     Cervical spinal cord injury, sequela (H)     Spasticity     Self-catheterizes urinary bladder     Bleeding external hemorrhoids     History of claustrophobia     Recurrent UTI      Current Medications and Allergies:  See printed Medication Report.    Care Coordination Start Date: 1/6/2016   Frequency of Care Coordination: monthly     Form Last Updated: 05/06/2022

## 2022-05-10 DIAGNOSIS — N31.9 NEUROGENIC BLADDER: Primary | ICD-10-CM

## 2022-05-10 DIAGNOSIS — R05.9 COUGH: ICD-10-CM

## 2022-05-10 RX ORDER — PIPERACILLIN SODIUM, TAZOBACTAM SODIUM 3; .375 G/15ML; G/15ML
3.38 INJECTION, POWDER, LYOPHILIZED, FOR SOLUTION INTRAVENOUS
Status: DISCONTINUED | OUTPATIENT
Start: 2022-05-10 | End: 2022-05-10 | Stop reason: HOSPADM

## 2022-05-11 NOTE — TELEPHONE ENCOUNTER
"Routing refill request to provider for review/approval because:  Diagnosis is for- Cough      Requested Prescriptions   Pending Prescriptions Disp Refills     albuterol (PROAIR HFA/PROVENTIL HFA/VENTOLIN HFA) 108 (90 Base) MCG/ACT inhaler [Pharmacy Med Name: ALBUTEROL HFA INH (200 PUFFS)8.5GM] 8.5 g 0     Sig: INHALE 2 PUFFS BY MOUTH FOUR TIMES DAILY AS NEEDED       Asthma Maintenance Inhalers - Anticholinergics Passed - 5/10/2022  6:23 PM        Passed - Patient is age 12 years or older        Passed - Recent (12 mo) or future (30 days) visit within the authorizing provider's specialty     Patient has had an office visit with the authorizing provider or a provider within the authorizing providers department within the previous 12 mos or has a future within next 30 days. See \"Patient Info\" tab in inbasket, or \"Choose Columns\" in Meds & Orders section of the refill encounter.              Passed - Medication is active on med list       Short-Acting Beta Agonist Inhalers Protocol  Passed - 5/10/2022  6:23 PM        Passed - Patient is age 12 or older        Passed - Recent (12 mo) or future (30 days) visit within the authorizing provider's specialty     Patient has had an office visit with the authorizing provider or a provider within the authorizing providers department within the previous 12 mos or has a future within next 30 days. See \"Patient Info\" tab in inbasket, or \"Choose Columns\" in Meds & Orders section of the refill encounter.              Passed - Medication is active on med list           Orquidea Sinclair RN      "

## 2022-05-12 RX ORDER — ALBUTEROL SULFATE 90 UG/1
AEROSOL, METERED RESPIRATORY (INHALATION)
Qty: 8.5 G | Refills: 0 | Status: SHIPPED | OUTPATIENT
Start: 2022-05-12 | End: 2022-05-31

## 2022-05-13 DIAGNOSIS — M54.6 CHRONIC MIDLINE THORACIC BACK PAIN: ICD-10-CM

## 2022-05-13 DIAGNOSIS — G89.29 CHRONIC MIDLINE THORACIC BACK PAIN: ICD-10-CM

## 2022-05-13 NOTE — TELEPHONE ENCOUNTER
"Prescription approved per [facility] Refill Protocol.  Requested Prescriptions   Pending Prescriptions Disp Refills     amitriptyline (ELAVIL) 25 MG tablet [Pharmacy Med Name: AMITRIPTYLINE 25MG TABLETS] 90 tablet 1     Sig: TAKE 1 AND 1/2 TABLETS(37.5 MG) BY MOUTH AT BEDTIME       Tricyclic Agents ( Annual appt and no PHQ9) Passed - 5/13/2022  8:49 AM        Passed - Blood Pressure under 140/90 in past 12 mos     BP Readings from Last 3 Encounters:   03/29/22 114/72   02/04/22 106/80   01/31/22 124/79                 Passed - Recent (12 mo) or future (30 days) visit within authorizing provider's specialty     Patient has had an office visit with the authorizing provider or a provider within the authorizing providers department within the previous 12 mos or has a future within next 30 days. See \"Patient Info\" tab in inbasket, or \"Choose Columns\" in Meds & Orders section of the refill encounter.              Passed - Medication is active on med list        Passed - Patient is age 18 or older             "

## 2022-05-31 ENCOUNTER — OFFICE VISIT (OUTPATIENT)
Dept: FAMILY MEDICINE | Facility: CLINIC | Age: 47
End: 2022-05-31
Payer: COMMERCIAL

## 2022-05-31 VITALS
TEMPERATURE: 98 F | SYSTOLIC BLOOD PRESSURE: 120 MMHG | DIASTOLIC BLOOD PRESSURE: 64 MMHG | HEART RATE: 97 BPM | OXYGEN SATURATION: 98 %

## 2022-05-31 DIAGNOSIS — Z12.11 SCREENING FOR COLON CANCER: ICD-10-CM

## 2022-05-31 DIAGNOSIS — Z11.59 NEED FOR HEPATITIS C SCREENING TEST: ICD-10-CM

## 2022-05-31 DIAGNOSIS — Z00.00 ENCOUNTER FOR MEDICARE ANNUAL WELLNESS EXAM: Primary | ICD-10-CM

## 2022-05-31 DIAGNOSIS — R05.9 COUGH: ICD-10-CM

## 2022-05-31 PROCEDURE — G0438 PPPS, INITIAL VISIT: HCPCS | Performed by: PHYSICIAN ASSISTANT

## 2022-05-31 RX ORDER — ALBUTEROL SULFATE 90 UG/1
1-2 AEROSOL, METERED RESPIRATORY (INHALATION) EVERY 4 HOURS PRN
Qty: 8.5 G | Refills: 11 | Status: SHIPPED | OUTPATIENT
Start: 2022-05-31 | End: 2023-05-11

## 2022-05-31 ASSESSMENT — ENCOUNTER SYMPTOMS
DYSURIA: 0
DIZZINESS: 1
ABDOMINAL PAIN: 1
HEMATOCHEZIA: 0
JOINT SWELLING: 0
WEAKNESS: 0
NERVOUS/ANXIOUS: 0
HEMATURIA: 0
FREQUENCY: 0
SHORTNESS OF BREATH: 0
HEADACHES: 0
CONSTIPATION: 1
PALPITATIONS: 0
COUGH: 0
NAUSEA: 0
FEVER: 0
DIARRHEA: 0
CHILLS: 0
SORE THROAT: 0
HEARTBURN: 0
MYALGIAS: 1
ARTHRALGIAS: 0
EYE PAIN: 0
PARESTHESIAS: 0

## 2022-05-31 ASSESSMENT — ACTIVITIES OF DAILY LIVING (ADL)
CURRENT_FUNCTION: BATHING REQUIRES ASSISTANCE
CURRENT_FUNCTION: LAUNDRY REQUIRES ASSISTANCE
CURRENT_FUNCTION: HOUSEWORK REQUIRES ASSISTANCE

## 2022-05-31 ASSESSMENT — PATIENT HEALTH QUESTIONNAIRE - PHQ9
SUM OF ALL RESPONSES TO PHQ QUESTIONS 1-9: 7
SUM OF ALL RESPONSES TO PHQ QUESTIONS 1-9: 7
10. IF YOU CHECKED OFF ANY PROBLEMS, HOW DIFFICULT HAVE THESE PROBLEMS MADE IT FOR YOU TO DO YOUR WORK, TAKE CARE OF THINGS AT HOME, OR GET ALONG WITH OTHER PEOPLE: NOT DIFFICULT AT ALL

## 2022-05-31 NOTE — PATIENT INSTRUCTIONS
"1.Consider a tetanus vaccine.   2.      Patient Education   Personalized Prevention Plan  You are due for the preventive services outlined below.  Your care team is available to assist you in scheduling these services.  If you have already completed any of these items, please share that information with your care team to update in your medical record.  Health Maintenance Due   Topic Date Due    Hepatitis C Screening  Never done    Annual Wellness Visit  06/02/2015    Diptheria Tetanus Pertussis (DTAP/TDAP/TD) Vaccine (2 - Td or Tdap) 10/29/2018    ANNUAL REVIEW OF HM ORDERS  05/24/2022       Depression and Suicide in Older Adults    Nearly 2 million older Americans have some type of depression. Some of them even take their own lives. Yet depression among older adults is often ignored. Learn the warning signs. You may help spare a loved one needless pain. You may also save a life.   What is depression?  Depression is a common and serious illness that affects the way you think and feel. It is not a normal part of aging, nor is it a sign of weakness, a character flaw, or something you can snap out of. Most people with depression need treatment to get better. The most common symptom is a feeling of deep sadness. People who are depressed also may seem tired and listless. And nothing seems to give them pleasure. It s normal to grieve or be sad sometimes. But sadness lessens or passes with time. Depression rarely goes away or improves on its own. A person with clinical depression can't \"snap out of it.\" Other symptoms of depression are:   Sleeping more or less than normal  Eating more or less than normal  Having headaches, stomachaches, or other pains that don t go away  Feeling nervous,  empty,  or worthless  Crying a great deal  Thinking or talking about suicide or death  Loss of interest in activities previously enjoyed  Social isolation  Feeling confused or forgetful  What causes it?  The causes of depression aren t " fully known. But it is thought to result from a complex blend of these factors:   Biochemistry. Certain chemicals in the brain play a role.  Genes. Depression does run in families.  Life stress. Life stresses can also trigger depression in some people. Older adults often face many stressors, such as death of friends or a spouse, health problems, and financial concerns.  Chronic conditions. This includes conditions such as diabetes, heart disease, or cancer. These can cause symptoms of depression. Medicine side effects can cause changes in thoughts and behaviors.  How you can help  Often, depressed people may not want to ask for help. When they do, they may be ignored. Or, they may receive the wrong treatment. You can help by showing parents and older friends love and support. If they seem depressed, don t lecture the person, ignore the symptoms, or discount the symptoms as a  normal  part of aging -which they are not. Get involved, listen, and show interest and support.   Help them understand that depression is a treatable illness. Tell them you can help them find the right treatment. Offer to go to their healthcare provider's appointment with them for support when the symptoms are discussed. With their approval, contact a local mental health center, social service agency, or hospital about services.   You can be an advocate for him or her at healthcare appointments. Many older adults have chronic illnesses that can cause symptoms of depression. Medicine side effects can change thoughts and behaviors. You can help make sure that the healthcare provider looks at all of these factors. He or she should refer your family member or friend to a mental healthcare provider when needed. in some cases, untreated depression can lead to a misdiagnosis. A person may be diagnosed with a brain disorder such as dementia. If the healthcare provider does not take the issue of depression seriously, help your family member or friend to  find another provider.   Don't be afraid to ask  If you think an older person you care about could be suicidal, ask,  Have you thought about suicide?  Most people will tell you the truth. If they say  yes,  they may already have a plan for how and when they will attempt it. Find out as much as you can. The more detailed the plan, and the easier it is to carry out, the more danger the person is in right now. Tell the person you are there for them and do not want them to harm him or herself. Don't wait to get help for the person. Call the person's healthcare provider, local hospital, or emergency services.   To learn more  National Suicide Prevention Lifeline (crisis hotline) 259-554-TZSU (354-575-9652)  National Hope of Mental Bulotd951-083-7526jvl.[facility].nih.gov  National [place] on Mental Ildamte285-774-4518pbt.zheng.org  Mental Health Plvqrub578-711-3477ksq.[facility].org  National Suicide Vrtahee642-JEXUEYZ (092-431-6869)    Call 911  Never leave the person alone. A person who is actively suicidal needs psychiatric care right away. They will need constant supervision. Never leave the person out of sight. Call 911 or the national 24-hour suicide crisis hotline at 041-214-JAUF (522-667-0233). You can also take the person to the closest emergency room.   Rojas last reviewed this educational content on 5/1/2020 2000-2021 [employer]. All rights reserved. This information is not intended as a substitute for professional medical care. Always follow your healthcare professional's instructions.

## 2022-05-31 NOTE — PROGRESS NOTES
"SUBJECTIVE:   Riley Gifford is a 46 year old male who presents for Preventive Visit.      Patient has been advised of split billing requirements and indicates understanding: Yes  Are you in the first 12 months of your Medicare coverage?  No    Healthy Habits:     In general, how would you rate your overall health?  Fair    Frequency of exercise:  None    Do you usually eat at least 4 servings of fruit and vegetables a day, include whole grains    & fiber and avoid regularly eating high fat or \"junk\" foods?  No    Taking medications regularly:  Yes    Medication side effects:  Lightheadedness and Other    Ability to successfully perform activities of daily living:  Housework requires assistance, bathing requires assistance and laundry requires assistance    Home Safety:  No safety concerns identified    Hearing Impairment:  No hearing concerns    In the past 6 months, have you been bothered by leaking of urine?  No    In general, how would you rate your overall mental or emotional health?  Fair      PHQ-2 Total Score: 1    Additional concerns today:  No    Do you feel safe in your environment? Yes    Have you ever done Advance Care Planning? (For example, a Health Directive, POLST, or a discussion with a medical provider or your loved ones about your wishes): No, advance care planning information given to patient to review.  Patient declined advance care planning discussion at this time.       Fall risk  Fallen 2 or more times in the past year?: No  Any fall with injury in the past year?: No    Cognitive Screening Not appropriate due to handicap    Do you have sleep apnea, excessive snoring or daytime drowsiness?: yes,daytime drowsiness     Reviewed and updated as needed this visit by clinical staff   Tobacco  Allergies  Meds  Problems  Med Hx  Surg Hx  Fam Hx  Soc   Hx          Reviewed and updated as needed this visit by Provider   Tobacco  Allergies  Meds  Problems  Med Hx  Surg Hx  Fam Hx         "   Social History     Tobacco Use     Smoking status: Never Smoker     Smokeless tobacco: Never Used   Substance Use Topics     Alcohol use: Yes     Alcohol/week: 0.0 standard drinks     Comment: Rare     If you drink alcohol do you typically have >3 drinks per day or >7 drinks per week? No    Alcohol Use 5/31/2022   Prescreen: >3 drinks/day or >7 drinks/week? Not Applicable   Prescreen: >3 drinks/day or >7 drinks/week? -         Current providers sharing in care for this patient include:   Patient Care Team:  Jenny Fay PA-C as PCP - General (Family Medicine)  Robbin Andino, RN as Lead Care Coordinator (Nurse)  Rober Leo MD as Referring Physician (Urology)  Kimberly Zabala MD as MD (Urology)  Amanda as Other (see comments)  Evaristo aHyes MD as MD (Urology)  Jenny Wright RN as Registered Nurse (Urology)  Rosemary Thomas RN as Nurse Coordinator (Physical Medicine and Rehabilitation)  Lulú Reveles APRN CNP as Nurse Practitioner (Nurse Practitioner)  Amina Doe RN as Specialty Care Coordinator (Urology)  Lulú Reveles APRN CNP as Assigned Surgical Provider  Clinic, [facility] as Occupational Therapist (Occupational Therapy)  Evaristo Hayes MD as MD (Urology)  Yessy Tapia RN as Specialty Care Coordinator  Jenny Fay PA-C as Assigned PCP    The following health maintenance items are reviewed in Epic and correct as of today:  Health Maintenance Due   Topic Date Due     HEPATITIS C SCREENING  Never done     DTAP/TDAP/TD IMMUNIZATION (2 - Td or Tdap) 10/29/2018     ANNUAL REVIEW OF HM ORDERS  05/24/2022     Labs reviewed in EPIC          Review of Systems   Constitutional: Negative for chills and fever.   HENT: Negative for congestion, ear pain, hearing loss and sore throat.    Eyes: Negative for pain and visual disturbance.   Respiratory: Negative for cough and shortness of breath.    Cardiovascular: Negative for  chest pain, palpitations and peripheral edema.   Gastrointestinal: Positive for abdominal pain and constipation. Negative for diarrhea, heartburn, hematochezia and nausea.   Genitourinary: Negative for dysuria, frequency, genital sores, hematuria, impotence, penile discharge and urgency.   Musculoskeletal: Positive for myalgias. Negative for arthralgias and joint swelling.   Skin: Negative for rash.   Neurological: Positive for dizziness. Negative for weakness, headaches and paresthesias.   Psychiatric/Behavioral: Negative for mood changes. The patient is not nervous/anxious.          OBJECTIVE:   /64 (BP Location: Left arm, Patient Position: Chair, Cuff Size: Adult Regular)   Pulse 97   Temp 98  F (36.7  C) (Oral)   SpO2 98%  Estimated body mass index is 17.63 kg/m  as calculated from the following:    Height as of 2/4/22: 1.829 m (6').    Weight as of 2/4/22: 59 kg (130 lb).  Physical Exam  GENERAL: healthy, alert and no distress  EYES: Eyes grossly normal to inspection, PERRL and conjunctivae and sclerae normal  HENT: ear canals and TM's normal, nose and mouth without ulcers or lesions  RESP: lungs clear to auscultation - no rales, rhonchi or wheezes  CV: regular rate and rhythm, normal S1 S2, no S3 or S4, no murmur,   ABDOMEN: soft, nontender, no hepatosplenomegaly, no masses and bowel sounds normal  MS: Patient seated in a wheelchair, ocassional muscular Tic noted. Reduced ROM of the bilateral hands.   PSYCH: mentation appears normal, affect normal/bright    Diagnostic Test Results:  Labs reviewed in Epic    ASSESSMENT / PLAN:   (Z00.00) Encounter for Medicare annual wellness exam  (primary encounter diagnosis)  Comment:   Plan:     (Z11.59) Need for hepatitis C screening test  Comment:   Plan: Hepatitis C Screen Reflex to HCV RNA Quant and         Genotype            (R05.9) Cough  Comment:   Plan: albuterol (PROAIR HFA/PROVENTIL HFA/VENTOLIN         HFA) 108 (90 Base) MCG/ACT inhaler             (Z12.11) Screening for colon cancer  Comment:   Plan: COLOGUARD(EXACT SCIENCES)        Patient due for screening. Prefers cologuard due to complexity of colonoscopy with his underlying conditions.           COUNSELING:  Reviewed preventive health counseling, as reflected in patient instructions       Colon cancer screening    Estimated body mass index is 17.63 kg/m  as calculated from the following:    Height as of 2/4/22: 1.829 m (6').    Weight as of 2/4/22: 59 kg (130 lb).        He reports that he has never smoked. He has never used smokeless tobacco.      Appropriate preventive services were discussed with this patient, including applicable screening as appropriate for cardiovascular disease, diabetes, osteopenia/osteoporosis, and glaucoma.  As appropriate for age/gender, discussed screening for colorectal cancer, prostate cancer, breast cancer, and cervical cancer. Checklist reviewing preventive services available has been given to the patient.    Reviewed patients plan of care and provided an AVS. The Basic Care Plan (routine screening as documented in Health Maintenance) for Riley meets the Care Plan requirement. This Care Plan has been established and reviewed with the Patient.    Counseling Resources:  ATP IV Guidelines  Pooled Cohorts Equation Calculator  Breast Cancer Risk Calculator  Breast Cancer: Medication to Reduce Risk  FRAX Risk Assessment  ICSI Preventive Guidelines  Dietary Guidelines for Americans, 2010  [employer]'s MyPlate  ASA Prophylaxis  Lung CA Screening    [facility]    Identified Health Risks:  Answers for HPI/ROS submitted by the patient on 5/31/2022  If you checked off any problems, how difficult have these problems made it for you to do your work, take care of things at home, or get along with other people?: Not difficult at all  PHQ9 TOTAL SCORE: 7

## 2022-06-07 ENCOUNTER — TELEPHONE (OUTPATIENT)
Dept: UROLOGY | Facility: CLINIC | Age: 47
End: 2022-06-07
Payer: COMMERCIAL

## 2022-06-07 NOTE — TELEPHONE ENCOUNTER
Patient is scheduled for procedure with Dr. Hayes     Spoke with: Patient via phone     Date of Surgery: Friday August 05, 2022     Location: [facility] OR      Informed patient they will need an adult : Yes     Pre-op: Yes      H&P: Patient to schedule with PCP      Pre-procedure COVID-19 Test: Monday August 01, 2022 at [facility]     Post-op:     Additional imaging/appointments:     Additional comments:      Surgery packet: Sent via mail 6/7/22    Patient is aware that surgery time is tentative to change and to expect a call 3-1 business days from Pre Admission Nursing for instructions and arrival time

## 2022-06-08 ENCOUNTER — MYC MEDICAL ADVICE (OUTPATIENT)
Dept: UROLOGY | Facility: CLINIC | Age: 47
End: 2022-06-08

## 2022-06-08 DIAGNOSIS — N39.0 RECURRENT UTI: ICD-10-CM

## 2022-06-08 DIAGNOSIS — N39.0 URINARY TRACT INFECTION: Primary | ICD-10-CM

## 2022-06-08 DIAGNOSIS — N31.9 NEUROGENIC BLADDER: ICD-10-CM

## 2022-06-15 ENCOUNTER — PATIENT OUTREACH (OUTPATIENT)
Dept: CARE COORDINATION | Facility: CLINIC | Age: 47
End: 2022-06-15
Payer: COMMERCIAL

## 2022-06-16 NOTE — PROGRESS NOTES
Clinic Care Coordination Contact  [facility]/Voicemail       Clinical Data: Care Coordinator Outreach  Proactive follow up outreach    Outreach attempted x 1.  Left message on patient's voicemail with call back information and encouraged return call.    Per chart review, patient appears to be in close communication with his care team as appropriate and attending appointments as scheduled.    Plan: Care Coordinator will try to reach patient again in approximately 10 business days.    JADON Patterson, RN (assisting in coverage for Robbin Andino RN Care Coordinator on 6/15/22)  [facility]  - Ambulatory Care Management  Phone: [phone]

## 2022-06-17 ENCOUNTER — LAB (OUTPATIENT)
Dept: LAB | Facility: CLINIC | Age: 47
End: 2022-06-17
Payer: COMMERCIAL

## 2022-06-17 ENCOUNTER — NURSE TRIAGE (OUTPATIENT)
Dept: UROLOGY | Facility: CLINIC | Age: 47
End: 2022-06-17
Payer: COMMERCIAL

## 2022-06-17 DIAGNOSIS — N39.0 RECURRENT UTI: ICD-10-CM

## 2022-06-17 DIAGNOSIS — N39.0 RECURRENT UTI: Primary | ICD-10-CM

## 2022-06-17 LAB
ALBUMIN UR-MCNC: NEGATIVE MG/DL
APPEARANCE UR: CLEAR
BACTERIA #/AREA URNS HPF: ABNORMAL /HPF
BILIRUB UR QL STRIP: NEGATIVE
COLOR UR AUTO: YELLOW
GLUCOSE UR STRIP-MCNC: NEGATIVE MG/DL
HGB UR QL STRIP: NEGATIVE
KETONES UR STRIP-MCNC: NEGATIVE MG/DL
LEUKOCYTE ESTERASE UR QL STRIP: ABNORMAL
NITRATE UR QL: NEGATIVE
PH UR STRIP: 8.5 [PH] (ref 5–7)
RBC #/AREA URNS AUTO: ABNORMAL /HPF
SP GR UR STRIP: 1.01 (ref 1–1.03)
SQUAMOUS #/AREA URNS AUTO: ABNORMAL /LPF
UROBILINOGEN UR STRIP-ACNC: 0.2 E.U./DL
WBC #/AREA URNS AUTO: ABNORMAL /HPF

## 2022-06-17 PROCEDURE — 81001 URINALYSIS AUTO W/SCOPE: CPT

## 2022-06-17 PROCEDURE — 87086 URINE CULTURE/COLONY COUNT: CPT

## 2022-06-17 PROCEDURE — 87088 URINE BACTERIA CULTURE: CPT

## 2022-06-17 NOTE — TELEPHONE ENCOUNTER
Nurse Triage SBAR    Is this a 2nd Level Triage? NO    Situation: Spoke to pt. Pt reports abdominal pain and cloudy urine.     Background: Pt does [facility].     Assessment: Started yesterday. Pt mentions he thinks he is also constipated. Did have small BM yesterday. Last good size BM was earlier this week. No hematuria. No difficulty with passing catheter and no pain with passing catheter. Abdominal pain is about 5-6/10. Pain in upper and sides of abdomen. Pain is constant. No fever. No distention. No vomiting, but does feel a little nausea. No foul odor to urine. Taking Tylenol for pain. Pt is drinking about 50-60 ounces of water daily. Pt also takes miralax. No changes in medications or diet recently noted. Able to eat and drink ok. Chart and problems list reviewed.      Protocol Recommended Disposition:   See Today In Office    Recommendation: Increase fluid intake to 100 ounces of water daily.   UA/UC.   Tylenol and heating pad as needed.   Update primary provider of constipation.      .    Does the patient meet one of the following criteria for ADS visit consideration? No     Shraddha Sal RN MSN    Orders Placed This Encounter   Procedures     Routine UA with microscopic - No culture     Standing Status:   Future     Standing Expiration Date:   6/17/2023     Urine Culture Aerobic Bacterial     Standing Status:   Future     Standing Expiration Date:   6/17/2023         Reason for Disposition    MODERATE pain (e.g., interferes with normal activities or awakens from sleep)    Protocols used: FLANK PAIN-A-OH

## 2022-06-17 NOTE — TELEPHONE ENCOUNTER
[facility] Call Center    Phone Message    May a detailed message be left on voicemail: yes     Reason for Call: Symptoms or Concerns     If patient has red-flag symptoms, warm transfer to triage line    Current symptom or concern: UTI Infection.  Stomach pain, urine cloudy.  No pain in urinating as patient uses catheter.    Requesting urine order.    Symptoms have been present for:  1 day(s)    Has patient previously been seen for this? No    Are there any new or worsening symptoms? Yes: Stomach pain      Action Taken: Message routed to:  [facility] Urology    Travel Screening: Not Applicable

## 2022-06-19 LAB — BACTERIA UR CULT: ABNORMAL

## 2022-06-21 ENCOUNTER — TELEPHONE (OUTPATIENT)
Dept: UROLOGY | Facility: CLINIC | Age: 47
End: 2022-06-21
Payer: COMMERCIAL

## 2022-06-21 DIAGNOSIS — N39.0 RECURRENT UTI: Primary | ICD-10-CM

## 2022-06-21 RX ORDER — CEPHALEXIN 500 MG/1
500 CAPSULE ORAL 2 TIMES DAILY
Qty: 10 CAPSULE | Refills: 0 | Status: SHIPPED | OUTPATIENT
Start: 2022-06-21 | End: 2022-12-19

## 2022-06-27 DIAGNOSIS — N39.0 RECURRENT UTI: ICD-10-CM

## 2022-06-27 RX ORDER — GENTAMICIN SULFATE 3 MG/ML
1-2 SOLUTION/ DROPS OPHTHALMIC EVERY 4 HOURS
Status: CANCELLED | OUTPATIENT
Start: 2022-06-27

## 2022-06-27 NOTE — TELEPHONE ENCOUNTER
[facility] Call Center    Phone Message    May a detailed message be left on voicemail: yes     Reason for Call: Medication Refill Request    Has the patient contacted the pharmacy for the refill? Yes   Name of medication being requested: Gentamicin  Provider who prescribed the medication: Dr Hayes  Pharmacy: [facility], fax [phone]  Date medication is needed: as soon as possible         Action Taken: Message routed to:  [facility] URO    Travel Screening: Not Applicable

## 2022-07-06 DIAGNOSIS — N31.9 NEUROGENIC BLADDER: ICD-10-CM

## 2022-07-06 RX ORDER — TOLTERODINE TARTRATE 2 MG/1
TABLET, EXTENDED RELEASE ORAL
Qty: 180 TABLET | Refills: 0 | Status: SHIPPED | OUTPATIENT
Start: 2022-07-06 | End: 2022-10-04

## 2022-07-06 NOTE — TELEPHONE ENCOUNTER
"Requested Prescriptions   Signed Prescriptions Disp Refills    tolterodine (DETROL) 2 MG tablet 180 tablet 0     Sig: TAKE 1 TABLET(2 MG) BY MOUTH TWICE DAILY       Muscarinic Antagonists (Urinary Incontinence Agents) Passed - 7/6/2022 11:54 AM        Passed - Recent (12 mo) or future (30 days) visit within the authorizing provider's specialty     Patient has had an office visit with the authorizing provider or a provider within the authorizing providers department within the previous 12 mos or has a future within next 30 days. See \"Patient Info\" tab in inbasket, or \"Choose Columns\" in Meds & Orders section of the refill encounter.              Passed - Patient does not have a diagnosis of glaucoma on the problem list     If glaucoma diagnosis is new, refer refill to physician.          Passed - Medication is active on med list        Passed - Patient is 18 years of age or older           Nina Tenorio RN  [facility]     "

## 2022-07-08 ENCOUNTER — PATIENT OUTREACH (OUTPATIENT)
Dept: CARE COORDINATION | Facility: CLINIC | Age: 47
End: 2022-07-08

## 2022-07-08 NOTE — PROGRESS NOTES
Clinic Care Coordination Contact  [facility]/Voicemail       Clinical Data: Care Coordinator Outreach  Outreach attempted x 1.  Left message on patient's voicemail with call back information and requested return call.  Plan: Care Coordinator sent care coordination introduction letter on 3/8/22 via Pets are family too. Care Coordinator will try to reach patient again in 10 business days.          Robbin Vanegas MSN, RN, PHN, CCM   Primary Care Clinical RN Care Coordinator  [facility]  7/8/2022   9:26 AM  [email or web address]  Office: [phone]

## 2022-07-18 DIAGNOSIS — N39.0 RECURRENT UTI: ICD-10-CM

## 2022-07-21 DIAGNOSIS — N39.0 RECURRENT UTI: ICD-10-CM

## 2022-07-22 ENCOUNTER — DOCUMENTATION ONLY (OUTPATIENT)
Dept: LAB | Facility: CLINIC | Age: 47
End: 2022-07-22

## 2022-07-22 ENCOUNTER — PATIENT OUTREACH (OUTPATIENT)
Dept: UROLOGY | Facility: CLINIC | Age: 47
End: 2022-07-22

## 2022-07-22 ENCOUNTER — PATIENT OUTREACH (OUTPATIENT)
Dept: CARE COORDINATION | Facility: CLINIC | Age: 47
End: 2022-07-22

## 2022-07-22 ASSESSMENT — ACTIVITIES OF DAILY LIVING (ADL): DEPENDENT_IADLS:: CLEANING;COOKING;LAUNDRY;SHOPPING;MEAL PREPARATION;MEDICATION MANAGEMENT

## 2022-07-22 NOTE — PROGRESS NOTES
Clinic Care Coordination Contact    Follow Up Progress Note      Assessment: The RN CC nurse care coordinator contacted the patient by phone for a follow up call.  The patient states that he has good and bad days with the back pain.  The patient has an upcoming appointment for botulinum toxin with the urologist.  The RN CC removed some goals that were no longer relevant and assisted in creating a couple of new goals.  The patient is now keeping his anxiety under better control at this time, and consequently his pain is under somewhat better control.       Care Gaps:    Health Maintenance Due   Topic Date Due     HEPATITIS C SCREENING  Never done     BMP  07/31/2022       Care Gaps Last addressed on 7/22/22    Goals addressed this encounter:    Goals Addressed                    This Visit's Progress       #1  Medication 1 (pt-stated)         Goal Statement: I will take all medications as prescribed by the providers.  Date Goal set: 7/22/22  Barriers: wheelchair bound  Strengths: engaged in care coordination  Date to Achieve By: 7/22/23  Patient expressed understanding of goal: yes  Action steps to achieve this goal:  1. I will take all medications as prescribed.  2. I will ask any questions that I have regarding new medications.  3. I will work with the RN CC if I have any difficulty getting refills.              #2  Medical (pt-stated)         Goal Statement: I will make and attend all recommended appointments from my providers.   Date Goal set: 7/22/22  Barriers: Many providers.  Strengths: engaged in care coordination  Date to Achieve By: 7/22/23  Patient expressed understanding of goal: yes  Action steps to achieve this goal:  1. I will make all of the recommended follow up appointments.  2. I will attend all of the follow up appointments as recommended by the providers.               COMPLETED: #3  Other care gaps (pt-stated)         Goal Statement: I will work with my primary care providers on closing my care  gaps including things like my Medicare annual wellness exam, eye exam and updates to my immunizations.  Date Goal set: 9/10/2021  Barriers: Some things are difficult for him to get to.  Strengths: Engaged in care coordination.  Date to Achieve By: 3/10/2023  Patient expressed understanding of goal: Yes  Action steps to achieve this goal:  1. I will call and make an appointment for my Medicare annual wellness exam.  2. I will call and make an appointment for my eye exam.  3. I will ask my primary care provider which immunizations are appropriate for me to update.             COMPLETED: #4  Other Annual Wellness exam (pt-stated)         Goal Statement: I will complete my annual wellness visit.  Date Goal set: 5/6/22  Barriers: wheelchair bound   Strengths: engaged in care coordination  Date to Achieve By: 5/6/23  Patient expressed understanding of goal: yes  Action steps to achieve this goal:  1. I will schedule my annual wellness visit; [phone] ([phone])  2. I will attend my annual wellness visit.  3. I will contact my Care Management or clinic team if I have barriers to attending my annual wellness visit.              Healthy Eating (pt-stated)   50%      Goal Statement: I will work with the ILS person to make more palatable meals for me to eat.  Measure of Success: The patient is gaining weight  Supportive Steps to Achieve: weigh the patient routinely.  Barriers: food allergies  Strengths: motivated to gain weight  Date to Achieve By: 12/5/2022  Patient expressed understanding of goal: yes               COMPLETED: Pain Management (pt-stated)         Goal Statement: I will work with the physical therapists at [facility] to decrease the amount of pain in my neck, back and sacral area.  Measure of Success: Patient states less pain.  Supportive Steps to Achieve: physical therapy from [facility]  Barriers: quadriplegic   Strengths: motivated  Date to Achieve By: 12/5/2022  Patient expressed understanding  of goal: yes                  Intervention/Education provided during outreach: The patient completed his Medicare Annual Wellness Visit.       Plan:   1.  The patient will work with PT when it is available to him.  2.  The patient will take all medications including those for pain as prescribed by the providers.  3.  The patient will make and attend all recommended follow up appointments by the providers.      RN CC Nurse Care Coordinator will follow up in 2 weeks as the patient has a procedure with urology.        Robbin Vanegas MSN, RN, PHN, CCM   Primary Care Clinical RN Care Coordinator  [facility]  7/22/2022   1:59 PM  [email or web address]  Office: [phone]

## 2022-07-22 NOTE — PROGRESS NOTES
This patient has a future lab only appointment on 07/25/2022 and needs orders. Please send orders. Thanks elvis

## 2022-07-25 ENCOUNTER — LAB (OUTPATIENT)
Dept: LAB | Facility: CLINIC | Age: 47
End: 2022-07-25

## 2022-07-25 ENCOUNTER — HOSPITAL ENCOUNTER (OUTPATIENT)
Facility: CLINIC | Age: 47
Discharge: HOME | End: 2022-07-25
Attending: UROLOGY | Admitting: PHYSICIAN ASSISTANT
Payer: COMMERCIAL

## 2022-07-25 DIAGNOSIS — K64.9 HEMORRHOIDS, UNSPECIFIED HEMORRHOID TYPE: ICD-10-CM

## 2022-07-25 DIAGNOSIS — E87.1 HYPONATREMIA: Primary | ICD-10-CM

## 2022-07-25 DIAGNOSIS — N39.0 RECURRENT UTI: ICD-10-CM

## 2022-07-25 LAB
ALBUMIN UR-MCNC: NEGATIVE MG/DL
APPEARANCE UR: CLEAR
BACTERIA #/AREA URNS HPF: ABNORMAL /HPF
BILIRUB UR QL STRIP: NEGATIVE
COLOR UR AUTO: YELLOW
GLUCOSE UR STRIP-MCNC: NEGATIVE MG/DL
HGB UR QL STRIP: NEGATIVE
KETONES UR STRIP-MCNC: NEGATIVE MG/DL
LEUKOCYTE ESTERASE UR QL STRIP: ABNORMAL
NITRATE UR QL: NEGATIVE
PH UR STRIP: 7 [PH] (ref 5–7)
RBC #/AREA URNS AUTO: ABNORMAL /HPF
SP GR UR STRIP: 1.02 (ref 1–1.03)
SQUAMOUS #/AREA URNS AUTO: ABNORMAL /LPF
UROBILINOGEN UR STRIP-ACNC: 0.2 E.U./DL
WBC #/AREA URNS AUTO: ABNORMAL /HPF

## 2022-07-25 PROCEDURE — 87088 URINE BACTERIA CULTURE: CPT

## 2022-07-25 PROCEDURE — 81001 URINALYSIS AUTO W/SCOPE: CPT | Performed by: PATHOLOGY

## 2022-07-25 PROCEDURE — 87086 URINE CULTURE/COLONY COUNT: CPT

## 2022-07-25 PROCEDURE — 87186 SC STD MICRODIL/AGAR DIL: CPT

## 2022-07-25 PROCEDURE — 99000 SPECIMEN HANDLING OFFICE-LAB: CPT | Performed by: PATHOLOGY

## 2022-07-25 NOTE — TELEPHONE ENCOUNTER
sodium chloride 0.9% (bottle) 1,000 mL with gentamicin 500 mg irrigation    Routed because: rx sent to  compounding.  request per pt call. per pharm 7/2/22 order for instill 30 ml into bladder HS. ( not on med list) order pending from 7/18. Please send correct order to   compounding.   lv 2/8/21     Procedure 2/4/22

## 2022-07-25 NOTE — TELEPHONE ENCOUNTER
[facility] Call Center    Phone Message    May a detailed message be left on voicemail: no     Reason for Call: Medication Refill Request    Has the patient contacted the pharmacy for the refill? Yes   Name of medication being requested: sodium chloride 0.9% (bottle) 1,000 mL with gentamicin 500 mg irrigation  Provider who prescribed the medication: Freddy  Pharmacy: [facility]  Date medication is needed: ARIANA Hinkle - [facility] - called stating that the patient is currently out of their medication and is needing a refill. They are hoping to get at least 6 months worth of this medication. Stated she has reached out previously regarding this medication and has not heard back. Please reach out to the pharmacy - [phone] - to discuss further. Thank you    Provided Fax and Phone numbers for the pharm:   Fax : [phone]  Phone : [phone]    Action Taken: Message routed to:  [facility] ([facility] Urology    Travel Screening: Not Applicable

## 2022-07-27 NOTE — TELEPHONE ENCOUNTER
Previous Jan Harris pt. Now established with you.     Okay to refill and continue daily     Leda Caraballo RN

## 2022-07-27 NOTE — TELEPHONE ENCOUNTER
[facility] as this script was sent to the wrong pharmacy and the [facility] compounding pharmacy is still waiting to receive script.   Fax  for additional questions call .

## 2022-07-28 LAB — BACTERIA UR CULT: ABNORMAL

## 2022-07-28 RX ORDER — HYDROCORTISONE 25 MG/G
CREAM TOPICAL
Qty: 30 G | Refills: 11 | Status: SHIPPED | OUTPATIENT
Start: 2022-07-28 | End: 2023-10-19

## 2022-07-28 NOTE — TELEPHONE ENCOUNTER
Request sent to urology [facility]. Cant e rx order. asking for it to be faxed.

## 2022-07-28 NOTE — TELEPHONE ENCOUNTER
Per note below, Manan from [facility] is calling back to get a verbal order for this medication.  Please reach out to Manan at [phone].  Thank you.

## 2022-07-29 ENCOUNTER — PREP FOR PROCEDURE (OUTPATIENT)
Dept: UROLOGY | Facility: CLINIC | Age: 47
End: 2022-07-29

## 2022-08-01 ENCOUNTER — OFFICE VISIT (OUTPATIENT)
Dept: FAMILY MEDICINE | Facility: CLINIC | Age: 47
End: 2022-08-01
Payer: COMMERCIAL

## 2022-08-01 VITALS
BODY MASS INDEX: 17.61 KG/M2 | WEIGHT: 130 LBS | OXYGEN SATURATION: 98 % | SYSTOLIC BLOOD PRESSURE: 120 MMHG | TEMPERATURE: 98.7 F | DIASTOLIC BLOOD PRESSURE: 64 MMHG | HEART RATE: 108 BPM | HEIGHT: 72 IN

## 2022-08-01 DIAGNOSIS — N31.9 NEUROGENIC BLADDER: ICD-10-CM

## 2022-08-01 DIAGNOSIS — K59.09 CHRONIC CONSTIPATION: ICD-10-CM

## 2022-08-01 DIAGNOSIS — E87.1 HYPONATREMIA: Primary | ICD-10-CM

## 2022-08-01 DIAGNOSIS — G82.50 QUADRIPLEGIA (H): ICD-10-CM

## 2022-08-01 LAB
ANION GAP SERPL CALCULATED.3IONS-SCNC: 6 MMOL/L (ref 3–14)
BUN SERPL-MCNC: 8 MG/DL (ref 7–30)
CALCIUM SERPL-MCNC: 8.8 MG/DL (ref 8.5–10.1)
CHLORIDE BLD-SCNC: 102 MMOL/L (ref 94–109)
CO2 SERPL-SCNC: 26 MMOL/L (ref 20–32)
CREAT SERPL-MCNC: 0.52 MG/DL (ref 0.66–1.25)
GFR SERPL CREATININE-BSD FRML MDRD: >90 ML/MIN/1.73M2
GLUCOSE BLD-MCNC: 86 MG/DL (ref 70–99)
POTASSIUM BLD-SCNC: 4.1 MMOL/L (ref 3.4–5.3)
SODIUM SERPL-SCNC: 134 MMOL/L (ref 133–144)

## 2022-08-01 PROCEDURE — 36415 COLL VENOUS BLD VENIPUNCTURE: CPT | Performed by: PHYSICIAN ASSISTANT

## 2022-08-01 PROCEDURE — 86803 HEPATITIS C AB TEST: CPT | Performed by: PHYSICIAN ASSISTANT

## 2022-08-01 PROCEDURE — 99214 OFFICE O/P EST MOD 30 MIN: CPT | Performed by: PHYSICIAN ASSISTANT

## 2022-08-01 PROCEDURE — 80048 BASIC METABOLIC PNL TOTAL CA: CPT | Performed by: PHYSICIAN ASSISTANT
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
## 2022-08-02 ENCOUNTER — LAB (OUTPATIENT)
Dept: LAB | Facility: CLINIC | Age: 47
End: 2022-08-02
Payer: COMMERCIAL

## 2022-08-02 DIAGNOSIS — Z20.822 ENCOUNTER FOR LABORATORY TESTING FOR COVID-19 VIRUS: ICD-10-CM

## 2022-08-02 LAB — HCV AB SERPL QL IA: NONREACTIVE

## 2022-08-02 PROCEDURE — U0005 INFEC AGEN DETEC AMPLI PROBE: HCPCS

## 2022-08-02 PROCEDURE — U0003 INFECTIOUS AGENT DETECTION BY NUCLEIC ACID (DNA OR RNA); SEVERE ACUTE RESPIRATORY SYNDROME CORONAVIRUS 2 (SARS-COV-2) (CORONAVIRUS DISEASE [COVID-19]), AMPLIFIED PROBE TECHNIQUE, MAKING USE OF HIGH THROUGHPUT TECHNOLOGIES AS DESCRIBED BY CMS-2020-01-R: HCPCS

## 2022-08-03 ENCOUNTER — TELEPHONE (OUTPATIENT)
Dept: UROLOGY | Facility: CLINIC | Age: 47
End: 2022-08-03

## 2022-08-03 DIAGNOSIS — N31.9 NEUROGENIC BLADDER: Primary | ICD-10-CM

## 2022-08-03 LAB — SARS-COV-2 RNA RESP QL NAA+PROBE: NEGATIVE

## 2022-08-03 RX ORDER — LEVOFLOXACIN 500 MG/1
500 TABLET, FILM COATED ORAL DAILY
Qty: 3 TABLET | Refills: 0 | Status: SHIPPED | OUTPATIENT
Start: 2022-08-03 | End: 2022-09-01

## 2022-08-03 NOTE — TELEPHONE ENCOUNTER
Detailed message left and MyChart message sent notifying pt that pre-surgical antibiotics have been sent to the pharmacy we have on file. Instructions for pt to hold antibiotic the morning of surgery included.     Message included notifying pt that we have records of a pre-op physical, and requesting pt contact me with more information either call-back, or MyChart.    Corin Le CMA  08/03/22  9:35 AM

## 2022-08-04 ENCOUNTER — ANESTHESIA EVENT (OUTPATIENT)
Dept: SURGERY | Facility: AMBULATORY SURGERY CENTER | Age: 47
End: 2022-08-04
Payer: COMMERCIAL

## 2022-08-05 ENCOUNTER — ANESTHESIA (OUTPATIENT)
Dept: SURGERY | Facility: AMBULATORY SURGERY CENTER | Age: 47
End: 2022-08-05
Payer: COMMERCIAL

## 2022-08-05 ENCOUNTER — HOSPITAL ENCOUNTER (OUTPATIENT)
Facility: AMBULATORY SURGERY CENTER | Age: 47
Discharge: HOME | End: 2022-08-05
Attending: STUDENT IN AN ORGANIZED HEALTH CARE EDUCATION/TRAINING PROGRAM
Payer: COMMERCIAL

## 2022-08-05 VITALS
DIASTOLIC BLOOD PRESSURE: 100 MMHG | TEMPERATURE: 97 F | RESPIRATION RATE: 16 BRPM | SYSTOLIC BLOOD PRESSURE: 135 MMHG | HEART RATE: 100 BPM | BODY MASS INDEX: 17.61 KG/M2 | HEIGHT: 72 IN | WEIGHT: 130 LBS | OXYGEN SATURATION: 95 %

## 2022-08-05 DIAGNOSIS — N31.9 NEUROGENIC BLADDER: ICD-10-CM

## 2022-08-05 PROCEDURE — 52287 CYSTOSCOPY CHEMODENERVATION: CPT | Mod: GC | Performed by: STUDENT IN AN ORGANIZED HEALTH CARE EDUCATION/TRAINING PROGRAM

## 2022-08-05 PROCEDURE — 52287 CYSTOSCOPY CHEMODENERVATION: CPT

## 2022-08-05 RX ORDER — ALBUTEROL SULFATE 0.83 MG/ML
2.5 SOLUTION RESPIRATORY (INHALATION) EVERY 4 HOURS PRN
Status: DISCONTINUED | OUTPATIENT
Start: 2022-08-05 | End: 2022-08-06 | Stop reason: HOSPADM

## 2022-08-05 RX ORDER — LIDOCAINE HYDROCHLORIDE 20 MG/ML
INJECTION, SOLUTION INFILTRATION; PERINEURAL PRN
Status: DISCONTINUED | OUTPATIENT
Start: 2022-08-05 | End: 2022-08-05

## 2022-08-05 RX ORDER — FENTANYL CITRATE 50 UG/ML
INJECTION, SOLUTION INTRAMUSCULAR; INTRAVENOUS PRN
Status: DISCONTINUED | OUTPATIENT
Start: 2022-08-05 | End: 2022-08-05

## 2022-08-05 RX ORDER — GABAPENTIN 300 MG/1
300 CAPSULE ORAL
Status: DISCONTINUED | OUTPATIENT
Start: 2022-08-05 | End: 2022-08-06 | Stop reason: HOSPADM

## 2022-08-05 RX ORDER — ONDANSETRON 2 MG/ML
4 INJECTION INTRAMUSCULAR; INTRAVENOUS ONCE
Status: COMPLETED | OUTPATIENT
Start: 2022-08-05 | End: 2022-08-05

## 2022-08-05 RX ORDER — HYDROMORPHONE HYDROCHLORIDE 1 MG/ML
0.2 INJECTION, SOLUTION INTRAMUSCULAR; INTRAVENOUS; SUBCUTANEOUS EVERY 10 MIN PRN
Status: DISCONTINUED | OUTPATIENT
Start: 2022-08-05 | End: 2022-08-06 | Stop reason: HOSPADM

## 2022-08-05 RX ORDER — ONDANSETRON 4 MG/1
4 TABLET, ORALLY DISINTEGRATING ORAL EVERY 30 MIN PRN
Status: DISCONTINUED | OUTPATIENT
Start: 2022-08-05 | End: 2022-08-06 | Stop reason: HOSPADM

## 2022-08-05 RX ORDER — MEPERIDINE HYDROCHLORIDE 25 MG/ML
12.5 INJECTION INTRAMUSCULAR; INTRAVENOUS; SUBCUTANEOUS
Status: DISCONTINUED | OUTPATIENT
Start: 2022-08-05 | End: 2022-08-06 | Stop reason: HOSPADM

## 2022-08-05 RX ORDER — OXYCODONE HYDROCHLORIDE 5 MG/1
5 TABLET ORAL EVERY 4 HOURS PRN
Status: DISCONTINUED | OUTPATIENT
Start: 2022-08-05 | End: 2022-08-06 | Stop reason: HOSPADM

## 2022-08-05 RX ORDER — LIDOCAINE 40 MG/G
CREAM TOPICAL
Status: DISCONTINUED | OUTPATIENT
Start: 2022-08-05 | End: 2022-08-06 | Stop reason: HOSPADM

## 2022-08-05 RX ORDER — ACETAMINOPHEN 325 MG/1
975 TABLET ORAL ONCE
Status: COMPLETED | OUTPATIENT
Start: 2022-08-05 | End: 2022-08-05

## 2022-08-05 RX ORDER — CEFAZOLIN SODIUM/WATER 2 G/20 ML
SYRINGE (ML) INTRAVENOUS PRN
Status: DISCONTINUED | OUTPATIENT
Start: 2022-08-05 | End: 2022-08-05

## 2022-08-05 RX ORDER — HALOPERIDOL 5 MG/ML
1 INJECTION INTRAMUSCULAR
Status: DISCONTINUED | OUTPATIENT
Start: 2022-08-05 | End: 2022-08-06 | Stop reason: HOSPADM

## 2022-08-05 RX ORDER — SODIUM CHLORIDE, SODIUM LACTATE, POTASSIUM CHLORIDE, CALCIUM CHLORIDE 600; 310; 30; 20 MG/100ML; MG/100ML; MG/100ML; MG/100ML
INJECTION, SOLUTION INTRAVENOUS CONTINUOUS
Status: DISCONTINUED | OUTPATIENT
Start: 2022-08-05 | End: 2022-08-06 | Stop reason: HOSPADM

## 2022-08-05 RX ORDER — SCOLOPAMINE TRANSDERMAL SYSTEM 1 MG/1
1 PATCH, EXTENDED RELEASE TRANSDERMAL
Status: DISCONTINUED | OUTPATIENT
Start: 2022-08-05 | End: 2022-08-06 | Stop reason: HOSPADM

## 2022-08-05 RX ORDER — FENTANYL CITRATE 50 UG/ML
25 INJECTION, SOLUTION INTRAMUSCULAR; INTRAVENOUS EVERY 5 MIN PRN
Status: DISCONTINUED | OUTPATIENT
Start: 2022-08-05 | End: 2022-08-06 | Stop reason: HOSPADM

## 2022-08-05 RX ORDER — ONDANSETRON 2 MG/ML
4 INJECTION INTRAMUSCULAR; INTRAVENOUS EVERY 30 MIN PRN
Status: DISCONTINUED | OUTPATIENT
Start: 2022-08-05 | End: 2022-08-06 | Stop reason: HOSPADM

## 2022-08-05 RX ORDER — PROPOFOL 10 MG/ML
INJECTION, EMULSION INTRAVENOUS CONTINUOUS PRN
Status: DISCONTINUED | OUTPATIENT
Start: 2022-08-05 | End: 2022-08-05

## 2022-08-05 RX ADMIN — Medication 2 G: at 08:18

## 2022-08-05 RX ADMIN — FENTANYL CITRATE 25 MCG: 50 INJECTION, SOLUTION INTRAMUSCULAR; INTRAVENOUS at 08:36

## 2022-08-05 RX ADMIN — FENTANYL CITRATE 25 MCG: 50 INJECTION, SOLUTION INTRAMUSCULAR; INTRAVENOUS at 08:24

## 2022-08-05 RX ADMIN — ONDANSETRON 4 MG: 2 INJECTION INTRAMUSCULAR; INTRAVENOUS at 07:29

## 2022-08-05 RX ADMIN — LIDOCAINE HYDROCHLORIDE 60 MG: 20 INJECTION, SOLUTION INFILTRATION; PERINEURAL at 08:26

## 2022-08-05 RX ADMIN — ACETAMINOPHEN 975 MG: 325 TABLET ORAL at 07:13

## 2022-08-05 RX ADMIN — PROPOFOL 150 MCG/KG/MIN: 10 INJECTION, EMULSION INTRAVENOUS at 08:24

## 2022-08-05 RX ADMIN — SODIUM CHLORIDE, SODIUM LACTATE, POTASSIUM CHLORIDE, CALCIUM CHLORIDE: 600; 310; 30; 20 INJECTION, SOLUTION INTRAVENOUS at 07:45

## 2022-08-05 RX ADMIN — SCOLOPAMINE TRANSDERMAL SYSTEM 1 PATCH: 1 PATCH, EXTENDED RELEASE TRANSDERMAL at 07:30

## 2022-08-05 NOTE — DISCHARGE INSTRUCTIONS
"[facility]  Home Care Following Anesthesia  For 24 hours after surgery:  Get plenty of rest.  A responsible adult must stay with you for at least 24 hours after you leave the surgery center.  Do not drive or use heavy equipment.  If you have weakness or tingling, don't drive or use heavy equipment until this feeling goes away.   Do not drink alcohol.   Avoid strenuous or risky activities.  Ask for help when climbing stairs.  You may feel lightheaded.  IF so, sit for a few minutes before standing.  Have someone help you get up.   If you have nausea (feel sick to your stomach): Drink only clear liquids such as apple juice, ginger ale, broth or 7-Up.  Rest may also help.  Be sure to drink enough fluids.  Move to a regular diet as you feel able.   You may have a slight fever.  Call the doctor if your fever is over 100 F (37.7 C) (taken under the tongue) or lasts longer than 24 hours.  You may have a dry mouth, a sore throat, muscle aches or trouble sleeping. These should go away after 24 hours.  Do not make important or legal decisions.   It is recommended to avoid smoking.        Today you received a Marcaine or bupivacaine block to numb the nerves near your surgery site.  This is a block using local anesthetic or \"numbing\" medication injected around the nerves to anesthetize or \"numb\" the area supplied by those nerves.  This block is injected into the muscle layer near your surgical site.  The medication may numb the location where you had surgery for 6-18 hours, but may last up to 24 hours.  If your surgical site is an arm or leg you should be careful with your affected limb, since it is possible to injure your limb without being aware of it due to the numbing.  Until full feeling returns, you should guard against bumping or hitting your limb, and avoid extreme hot or cold temperatures on the skin.  As the block wears off, the feeling will return as a tingling or prickly " sensation near your surgical site.  You will experience more discomfort from your incision as the feeling returns.  You may want to take a pain pill (a narcotic or Tylenol if this was prescribed by your surgeon) when you start to experience mild pain before the pain beccomes more severe.  If your pain medications do not control your pain you should notifiy your surgeon.    Tips for taking pain medications  To get the best pain relief possible, remember these points:  Take pain medications as directed, before pain becomes severe.  Pain medication can upset your stomach: taking it with food may help.  Constipation is a common side effect of pain medication. Drink plenty of  fluids.  Eat foods high in fiber. Take a stool softener if recommended by your doctor or pharmacist.  Do not drink alcohol, drive or operate machinery while taking pain medications.  Ask about other ways to control pain, such as with heat, ice or relaxation.    Tylenol/Acetaminophen Consumption  To help encourage the safe use of acetaminophen, the makers of TYLENOL  have lowered the maximum daily dose for single-ingredient Extra Strength TYLENOL  (acetaminophen) products sold in the [place] from 8 pills per day (4,000 mg) to 6 pills per day (3,000 mg). The dosing interval has also changed from 2 pills every 4-6 hours to 2 pills every 6 hours.  If you feel your pain relief is insufficient, you may take Tylenol/Acetaminophen in addition to your narcotic pain medication.   Be careful not to exceed 3,000 mg of Tylenol/Acetaminophen in a 24 hour period from all sources.  If you are taking extra strength Tylenol/acetaminophen (500 mg), the maximum dose is 6 tablets in 24 hours.  If you are taking regular strength acetaminophen (325 mg), the maximum dose is 9 tablets in 24 hours.    Call a doctor for any of the following:  Signs of infection (fever, growing tenderness at the surgery site, a large amount of drainage or bleeding, severe pain, foul-smelling  drainage, redness, swelling).  It has been over 8 to 10 hours since surgery and you are still not able to urinate (pass water).  Headache for over 24 hours.  Signs of Covid-19 infection (temperature over 100 degrees, shortness of breath, cough, loss of taste/smell, generalized body aches, persistent headache, chills, sore throat, nausea/vomiting/diarrhea)  Your doctor is:  Dr. Evaristo Giraldo, Prostate and Urology: [phone]                 Or dial [phone] and ask for the resident on call for:  Prostate Urology  For emergency care, call the:  [facility]:  [phone] (TTY for hearing impaired: [phone])
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
## 2022-08-05 NOTE — ANESTHESIA POSTPROCEDURE EVALUATION
Patient: Riley Gifford    Procedure: Procedure(s):  CYSTOSCOPY, WITH BOTULINUM TOXIN INJECTION       Anesthesia Type:  MAC    Note:  Disposition: Outpatient   Postop Pain Control: Uneventful            Sign Out: Well controlled pain   PONV:    Neuro/Psych: Uneventful            Sign Out: Acceptable/Baseline neuro status   Airway/Respiratory: Uneventful            Sign Out: Acceptable/Baseline resp. status   CV/Hemodynamics: Uneventful            Sign Out: Acceptable CV status; No obvious hypovolemia; No obvious fluid overload   Other NRE: NONE   DID A NON-ROUTINE EVENT OCCUR? No           Last vitals:  Vitals Value Taken Time   /100 08/05/22 0924   Temp 36.1  C (97  F) 08/05/22 0904   Pulse 100 08/05/22 0904   Resp 16 08/05/22 0904   SpO2 95 % 08/05/22 0904       Electronically Signed By: Roger Byrne MD, MD  August 5, 2022  1:16 PM

## 2022-08-05 NOTE — OP NOTE
OPERATIVE REPORT - 8/5/2022    PREOPERATIVE DIAGNOSIS:  Neurogenic bladder    POSTOPERATIVE DIAGNOSIS: Same    PROCEDURES PERFORMED:   1. Cystourethroscopy with injection of botulinum toxin A 300units in 20cc sterile saline    STAFF SURGEON: Dr. Jaden Velasco MD     RESIDENT: Jaden Romero MD    ANESTHESIA: Monitor Anesthesia Care    ESTIMATED BLOOD LOSS: Minimal     DRAINS: None    SIGNIFICANT FINDINGS:  1. ~20 Fr annular short bulbar stricture, easily traversed with cystoscope    OPERATIVE INDICATIONS:   Riley Gifford is a 46 year old male with a history of neurogenic bladder managed with botox injection, last treated in February 2022. The patient was counseled on the alternatives, risks, and benefits and elected to proceed with the above stated procedure.    DESCRIPTION OF PROCEDURE:   After verification of informed consent was obtained, the patient was brought to the operating room, and moved to the operating table. After adequate anesthesia was induced, the patient was repositioned in the lithotomy position and prepped and draped in the usual sterile fashion. A formal timeout was performed to confirm the correct patient, procedure and operative site.     A 21-Kiswahili De La Garza injection cystoscope was placed into the bladder.  The bladder mucosa appeared to be normal without stones, tumors or diverticulum on 360 degree inspection. The ureteral orifices were in the normal orthotopic position bilaterally.  We mixed 3 vials of 100 U botulinum toxin A into 20 mL of injectable saline for a total of (15 units/mL).  We performed a template injection procedure with 5 columns of 4 injections. All injections were placed deep to the mucosa into the detrusor muscle.  We then emptied his bladder to re-inspect our injection sites and found that there was a minimal amount of blood. His bladder was then emptied again. The patient was returned to supine position and transported to recovery in stable condition.    The  procedure was then concluded. The patient was transferred to the postanesthesia care unit in stable condition and tolerated the procedure well.    POSTOP PLAN:  1. PACU, then discharge to home  2. Follow up in 6 months for repeat injection, renal ultrasound prior    Jaden Romero MD  Urology Resident, PGY-4      I was present and scrubbed for the entire case  Jaden Velasco MD  August 5, 2022

## 2022-08-05 NOTE — ANESTHESIA CARE TRANSFER NOTE
Patient: Riley Gifford    Procedure: Procedure(s):  CYSTOSCOPY, WITH BOTULINUM TOXIN INJECTION       Diagnosis: Neurogenic bladder [N31.9]  Diagnosis Additional Information: No value filed.    Anesthesia Type:   MAC     Note:    Oropharynx: oropharynx clear of all foreign objects and spontaneously breathing  Level of Consciousness: awake  Oxygen Supplementation: room air    Independent Airway: airway patency satisfactory and stable  Dentition: dentition unchanged  Vital Signs Stable: post-procedure vital signs reviewed and stable  Report to RN Given: handoff report given  Patient transferred to: Phase II  Comments: MDA aware of hypertension  Handoff Report: Identifed the Patient, Identified the Reponsible Provider, Reviewed the pertinent medical history, Discussed the surgical course, Reviewed Intra-OP anesthesia mangement and issues during anesthesia, Set expectations for post-procedure period and Allowed opportunity for questions and acknowledgement of understanding      Vitals:  Vitals Value Taken Time   /125    Temp 36.1  C (97  F) 08/05/22 0851   Pulse 95 08/05/22 0851   Resp 16 08/05/22 0851   SpO2 96 % 08/05/22 0851       Electronically Signed By: JOSUÉ AVILES  August 5, 2022  8:54 AM

## 2022-08-05 NOTE — BRIEF OP NOTE
[facility] [place] [facility]    Brief Operative Note    Pre-operative diagnosis: Neurogenic bladder [N31.9]  Post-operative diagnosis Same as pre-operative diagnosis    Procedure: Procedure(s):  CYSTOSCOPY, WITH BOTULINUM TOXIN INJECTION  Surgeon: Surgeon(s) and Role:     * Jaden Velasco MD - Primary     * Jaden Romero MD - Resident - Assisting  Anesthesia: Monitor Anesthesia Care   Estimated Blood Loss: Minimal    Drains: None  Specimens: * No specimens in log *  Findings:   Short annular bulbar stricture, ~20 Fr, easily passed with injection cystoscope  Complications: None.  Implants: * No implants in log *

## 2022-08-05 NOTE — INTERVAL H&P NOTE
I have reviewed the surgical (or preoperative) H&P that is linked to this encounter, and examined the patient. There are no significant changes    Clinical Conditions Present on Arrival:  Clinically Significant Risk Factors Present on Admission                   # Cachexia: Estimated body mass index is 17.63 kg/m  as calculated from the following:    Height as of this encounter: 1.829 m (6').    Weight as of this encounter: 59 kg (130 lb).

## 2022-08-10 ENCOUNTER — OFFICE VISIT (OUTPATIENT)
Dept: OPTOMETRY | Facility: CLINIC | Age: 47
End: 2022-08-10
Payer: COMMERCIAL

## 2022-08-10 DIAGNOSIS — H52.4 PRESBYOPIA: ICD-10-CM

## 2022-08-10 DIAGNOSIS — Z01.00 ENCOUNTER FOR EXAMINATION OF EYES AND VISION WITHOUT ABNORMAL FINDINGS: Primary | ICD-10-CM

## 2022-08-10 DIAGNOSIS — H52.13 MYOPIA OF BOTH EYES: ICD-10-CM

## 2022-08-10 DIAGNOSIS — H52.223 REGULAR ASTIGMATISM OF BOTH EYES: ICD-10-CM

## 2022-08-10 PROCEDURE — 92015 DETERMINE REFRACTIVE STATE: CPT | Performed by: OPTOMETRIST

## 2022-08-10 PROCEDURE — 92004 COMPRE OPH EXAM NEW PT 1/>: CPT | Performed by: OPTOMETRIST

## 2022-08-10 RX ORDER — BUDESONIDE 1 MG/2ML
INHALANT ORAL
COMMUNITY
Start: 2022-08-02 | End: 2023-05-04

## 2022-08-10 ASSESSMENT — VISUAL ACUITY
OS_SC: 20/70
OS_SC: 20/120+1
OD_CC: 20/30-1
METHOD: SNELLEN - LINEAR
OS_CC: 20/20
OD_CC+: -1
OD_SC: 20/80
OD_SC+: +1
OS_SC+: -1
OD_SC: 20/80
CORRECTION_TYPE: GLASSES
OD_CC: 20/20
OS_CC+: -1
OS_CC: 20/80+1

## 2022-08-10 ASSESSMENT — REFRACTION_WEARINGRX
SPECS_TYPE: SVL
OS_SPHERE: -2.00
OS_CYLINDER: +1.25
OS_AXIS: 090
OD_AXIS: 102
OD_CYLINDER: +1.75
OD_SPHERE: -2.75

## 2022-08-10 ASSESSMENT — CONF VISUAL FIELD
METHOD: COUNTING FINGERS
OD_NORMAL: 1
OS_NORMAL: 1

## 2022-08-10 ASSESSMENT — CUP TO DISC RATIO
OD_RATIO: 0.25
OS_RATIO: 0.3

## 2022-08-10 ASSESSMENT — SLIT LAMP EXAM - LIDS
COMMENTS: NORMAL
COMMENTS: NORMAL

## 2022-08-10 ASSESSMENT — REFRACTION_MANIFEST
OD_AXIS: 112
OD_CYLINDER: +2.00
OS_SPHERE: -2.00
OS_CYLINDER: +1.25
OD_SPHERE: -3.00
OS_AXIS: 086
OD_ADD: +1.50
OS_ADD: +1.50

## 2022-08-10 ASSESSMENT — TONOMETRY
OD_IOP_MMHG: 08
OS_IOP_MMHG: 09
IOP_METHOD: APPLANATION

## 2022-08-10 ASSESSMENT — EXTERNAL EXAM - LEFT EYE: OS_EXAM: NORMAL

## 2022-08-10 ASSESSMENT — EXTERNAL EXAM - RIGHT EYE: OD_EXAM: NORMAL

## 2022-08-10 NOTE — PROGRESS NOTES
Chief Complaint   Patient presents with     Annual Eye Exam         Last Eye Exam: 4/26/2017  Dilated Previously: Yes, side effects of dilation explained today    What are you currently using to see?  Glasses - wears full time       Distance Vision Acuity: Satisfied with vision    Near Vision Acuity: Not satisfied - more difficulty with fine print    Eye Comfort: good  Do you use eye drops? : No  Occupation or Hobbies: [employer]    Helen Soares        Medical, surgical and family histories reviewed and updated 8/10/2022.       OBJECTIVE: See Ophthalmology exam    ASSESSMENT:    ICD-10-CM    1. Encounter for examination of eyes and vision without abnormal findings  Z01.00    2. Myopia of both eyes  H52.13    3. Regular astigmatism of both eyes  H52.223    4. Presbyopia  H52.4        PLAN:     Patient Instructions   Riley was advised of today's exam findings.  Fill glasses prescription  Allow 2 weeks to adapt to change in glasses  Wear glasses full time  Copy of glasses Rx provided today.    Return in 2 years for eye exam, or sooner if needed.    The effects of the dilating drops last for 4- 6 hours.  You will be more sensitive to light and vision will be blurry up close.  Mydriatic sunglasses were given if needed.       Raffy Hernández O.D.  [facility]  [address] [place] [address]. [place]  [address]    [phone]

## 2022-08-10 NOTE — PATIENT INSTRUCTIONS
Riley was advised of today's exam findings.  Fill glasses prescription  Allow 2 weeks to adapt to change in glasses  Wear glasses full time  Copy of glasses Rx provided today.    Return in 2 years for eye exam, or sooner if needed.    The effects of the dilating drops last for 4- 6 hours.  You will be more sensitive to light and vision will be blurry up close.  Mydriatic sunglasses were given if needed.       Raffy Hernández O.D.  [facility] [place]  [place]  [address]    [phone]

## 2022-08-10 NOTE — LETTER
8/10/2022         RE: Riley Gifford  [address]  [place] [address]        Dear Colleague,    Thank you for referring your patient, Riley Gifford, to the [facility]. Please see a copy of my visit note below.    Chief Complaint   Patient presents with     Annual Eye Exam         Last Eye Exam: 4/26/2017  Dilated Previously: Yes, side effects of dilation explained today    What are you currently using to see?  Glasses - wears full time       Distance Vision Acuity: Satisfied with vision    Near Vision Acuity: Not satisfied - more difficulty with fine print    Eye Comfort: good  Do you use eye drops? : No  Occupation or Hobbies: [employer] - [employer]    Helen Sierraadrian        Medical, surgical and family histories reviewed and updated 8/10/2022.       OBJECTIVE: See Ophthalmology exam    ASSESSMENT:    ICD-10-CM    1. Encounter for examination of eyes and vision without abnormal findings  Z01.00    2. Myopia of both eyes  H52.13    3. Regular astigmatism of both eyes  H52.223    4. Presbyopia  H52.4        PLAN:     Patient Instructions   Riley was advised of today's exam findings.  Fill glasses prescription  Allow 2 weeks to adapt to change in glasses  Wear glasses full time  Copy of glasses Rx provided today.    Return in 2 years for eye exam, or sooner if needed.    The effects of the dilating drops last for 4- 6 hours.  You will be more sensitive to light and vision will be blurry up close.  Mydriatic sunglasses were given if needed.       Raffy Hernández O.D.  [address] [place]  [address]    [phone]             Again, thank you for allowing me to participate in the care of your patient.        Sincerely,        Raffy Hernández, OD

## 2022-08-11 ENCOUNTER — TELEPHONE (OUTPATIENT)
Dept: FAMILY MEDICINE | Facility: CLINIC | Age: 47
End: 2022-08-11

## 2022-08-11 ENCOUNTER — PATIENT OUTREACH (OUTPATIENT)
Dept: CARE COORDINATION | Facility: CLINIC | Age: 47
End: 2022-08-11

## 2022-08-11 ASSESSMENT — ACTIVITIES OF DAILY LIVING (ADL): DEPENDENT_IADLS:: CLEANING;COOKING;LAUNDRY;SHOPPING;MEAL PREPARATION;MEDICATION MANAGEMENT

## 2022-08-11 NOTE — PROGRESS NOTES
Clinic Care Coordination Contact    Follow Up Progress Note      Assessment: The RN CC nurse care coordinator contacted the patient by phone for a follow-up call.  The patient stated that he tolerated the procedure of Botox very well last week.  And now he is scheduled to see the providers that would put a implantable drug reservoir.  He is hoping to get his baclofen into an implantable drug reservoir.  The patient is having a lot of trouble with constipation at this point in time and the RN care coordinator offered to send a message to his provider and he said no that he would send her a MyChart message.      Care Gaps:    There are no preventive care reminders to display for this patient.    Currently there are no Care Gaps.    Goals addressed this encounter:    Goals Addressed                    This Visit's Progress       #1  Medication 1 (pt-stated)   60%      Goal Statement: I will take all medications as prescribed by the providers.  Date Goal set: 7/22/22  Barriers: wheelchair bound  Strengths: engaged in care coordination  Date to Achieve By: 7/22/23  Patient expressed understanding of goal: yes  Action steps to achieve this goal:  1. I will take all medications as prescribed.  2. I will ask any questions that I have regarding new medications.  3. I will work with the RN CC if I have any difficulty getting refills.              #2  Medical (pt-stated)   50%      Goal Statement: I will make and attend all recommended appointments from my providers.   Date Goal set: 7/22/22  Barriers: Many providers.  Strengths: engaged in care coordination  Date to Achieve By: 7/22/23  Patient expressed understanding of goal: yes  Action steps to achieve this goal:  1. I will make all of the recommended follow up appointments.  2. I will attend all of the follow up appointments as recommended by the providers.               Healthy Eating (pt-stated)   50%      Goal Statement: I will work with the ILS person to make more  palatable meals for me to eat.  Measure of Success: The patient is gaining weight  Supportive Steps to Achieve: weigh the patient routinely.  Barriers: food allergies  Strengths: motivated to gain weight  Date to Achieve By: 12/5/2022  Patient expressed understanding of goal: yes                  Intervention/Education provided during outreach: The RN CC nurse care coordinator encouraged the patient to reach out for assistance with the constipation issues that he has been having.     Outreach Frequency: monthly        Plan:   1.  The patient will reach out to the provider to request some other ideas of how to deal with his constipation.  2.  The patient will reach out via [employer] and if he does not get a response he needs to reach out to the RN CC.  3.  The patient will use all medications as prescribed by the providers.    RN CC Nurse Care Coordinator will follow up in 4 weeks.          Robbin Vanegas MSN, RN, PHN, CCM   Primary Care Clinical RN Care Coordinator  [facility]  8/11/2022   12:42 PM  [email or web address]  Office: [phone]

## 2022-08-11 NOTE — LETTER
[facility]  Patient Centered Plan of Care  About Me:        Patient Name:  Riley Gifford    YOB: 1975  Age:         46 year old   Rut MRN:    [id number] Telephone Information:  Home Phone [phone]   Mobile [phone]   Mobile [phone]       Address:  [address] [place] [address] [address]  [place] [address] Email address:  [email or web address]      Emergency Contact(s)    Name Relationship Lgl Grd Work Phone Home Phone Mobile Phone   1. TREY GIFFORD (NE* Relative No noen none [phone]   2. AGUSTO CAVAZOS Sister   [phone]    3. HODA GIFFORD Brother No  [phone]            Primary language:  English     needed? No   [place] [facility]:  [phone] op. 1  Other communication barriers:Glasses    Preferred Method of Communication:  Lisa  Current living arrangement: I live in assisted living    Mobility Status/ Medical Equipment: Dependent/Assisted by Another        Health Maintenance  Health Maintenance Reviewed: Due/Overdue There are no preventive care reminders to display for this patient.        My Access Plan  Medical Emergency 911   Primary Clinic Line [facility] - [phone]   24 Hour Appointment Line [phone] or  [phone]) (toll-free)   24 Hour Nurse Line [phone] (toll-free)   Preferred Urgent Care [facility] [phone]     Preferred Hospital [facility]  [phone]     [id number] [address] AT [facility] [address]     Behavioral Health Crisis Line The National Suicide Prevention Lifeline at 1-266.685.5486 or 988             My Care Team Members  Patient Care Team       Relationship Specialty Notifications Start End    Jenny Fay PA-C PCP - General Family Medicine  12/21/21     Phone: [phone] Fax: [phone]         [address]  [facility] [address]    Robbin Andino, RN Lead Care Coordinator Nurse Admissions 1/6/16     phone:  [phone]    Phone: [phone] Fax: [phone]        Rober Leo MD Referring Physician Urology  1/8/16     Phone: [phone] Fax: [phone]         [facility] [address]    Kimberly Zabala MD MD Urology  1/8/16     Phone: [phone] Fax: [phone]         [address] [facility] [address] [facility] [address]    Amanda Montoya (see comments)   1/8/16     [facility]     Phone: [phone]         Evaristo Hayes MD MD Urology  4/19/16     Phone: [phone] Fax: [phone]         [address] [facility] [address] [facility] [address]    Jenny Wright, RN Registered Nurse Urology  4/19/16     Rosemary Thomas, RN Nurse Coordinator Physical Medicine and Rehabilitation  3/30/17     Phone: [phone] Pager: [phone]        Lulú Reveles APRN CNP Nurse Practitioner Nurse Practitioner  8/28/20     Phone: [phone] Fax: [phone]         [address] [facility] [address] [facility] [address]    Lulú Reveles APRN CNP Assigned Surgical Provider   6/13/21     Phone: [phone] Fax: [phone]         [address] [facility] [address] [facility] [address]    [facility] Occupational Therapist Occupational Therapy  1/7/22     [facility] Shanique Alberto  fax [phone]    Phone: [phone] Fax: [phone] [address] [facility] [address]    Evaristo Hayes MD MD Urology  1/17/22     Phone: [phone] Fax: [phone]         [address] [place] [address] [address] [facility] [address]    Yessy Tapia, RN Specialty Care Coordinator   1/17/22     Jenny Fay, PA-C Assigned PCP   4/3/22     Phone: [phone] Fax: [phone] [address] [facility] [place] [address]            My Care Plans  Self Management and Treatment Plan  Goals and (Comments)   Goals         General     #1  Medication 1 (pt-stated)      Notes - Note edited  7/22/2022  1:09 PM by Robbin Andino, RN     Goal Statement: I will take all medications as prescribed by the providers.  Date Goal set: 7/22/22  Barriers: wheelchair bound  Strengths: engaged in care coordination  Date to Achieve By: 7/22/23  Patient expressed understanding of goal: yes  Action steps to achieve this goal:  1. I will take all medications as prescribed.  2. I will ask any questions that I have regarding new medications.  3. I will work with the RN CC if I have any difficulty getting refills.            #2  Medical (pt-stated)      Notes - Note created  7/22/2022  1:14 PM by Robbin Andino, RN     Goal Statement: I will make and attend all recommended appointments from my providers.   Date Goal set: 7/22/22  Barriers: Many providers.  Strengths: engaged in care coordination  Date to Achieve By: 7/22/23  Patient expressed understanding of goal: yes  Action steps to achieve this goal:  1. I will make all of the recommended follow up appointments.  2. I will attend all of the follow up appointments as recommended by the providers.             Healthy Eating (pt-stated)      Notes - Note edited  3/8/2022  7:57 AM by Robbin Andino, RN     Goal Statement: I will work with the ILS person to make more palatable meals for me to eat.  Measure of Success: The patient is gaining weight  Supportive Steps to Achieve: weigh the patient routinely.  Barriers: food allergies  Strengths: motivated to gain weight  Date to Achieve By: 12/5/2022  Patient expressed understanding of goal: yes                   Action Plans on File:            Depression          Advance Care Plans/Directives Type:   Advanced Directive - On File      My Medical and Care Information  Problem List   Patient Active Problem List   Diagnosis     Vitamin D deficiency     Eosinophilic esophagitis     Neurogenic bladder     CARDIOVASCULAR SCREENING; LDL GOAL LESS THAN 160      Quadriplegia (H)     Chronic constipation     Internal hemorrhoids with other complication     Diagnostic skin and sensitization tests(aka ALLERGENS)     Anal or rectal pain     Allergic rhinitis due to animal dander     Allergy to mold spores     House dust mite allergy     Insomnia     Health Care Home     Gallstones     Chronic midline thoracic back pain     Pulmonary nodules     ACP (advance care planning)     Cervical spinal cord injury, sequela (H)     Spasticity     Self-catheterizes urinary bladder     Bleeding external hemorrhoids     History of claustrophobia     Recurrent UTI      Current Medications and Allergies:  See printed Medication Report.    Care Coordination Start Date: 1/6/2016   Frequency of Care Coordination: monthly     Form Last Updated: 08/11/2022

## 2022-08-22 ENCOUNTER — APPOINTMENT (OUTPATIENT)
Dept: OPTOMETRY | Facility: CLINIC | Age: 47
End: 2022-08-22
Payer: COMMERCIAL

## 2022-08-22 PROCEDURE — 92341 FIT SPECTACLES BIFOCAL: CPT | Performed by: OPTOMETRIST

## 2022-08-29 ENCOUNTER — PATIENT OUTREACH (OUTPATIENT)
Dept: CARE COORDINATION | Facility: CLINIC | Age: 47
End: 2022-08-29

## 2022-08-29 NOTE — PROGRESS NOTES
Clinic Care Coordination - Chart Review Only    Situation/Background: Patient chart reviewed by care coordinator related to [facility] conversion.    Assessment: Patient continues to be followed by Clinic Care Coordination.    Plan: Patient's chart updated to align with [facility] program for ongoing patient management.    Robbin Vanegas MSN, RN, PHN, [facility]   Primary Care Clinical RN Care Coordinator  [facility]  8/29/2022   1:16 PM  [email or web address]  Office: [phone]

## 2022-09-01 ENCOUNTER — NURSE TRIAGE (OUTPATIENT)
Dept: FAMILY MEDICINE | Facility: CLINIC | Age: 47
End: 2022-09-01

## 2022-09-01 ENCOUNTER — LAB (OUTPATIENT)
Dept: LAB | Facility: CLINIC | Age: 47
End: 2022-09-01
Payer: COMMERCIAL

## 2022-09-01 DIAGNOSIS — N31.9 NEUROGENIC BLADDER: ICD-10-CM

## 2022-09-01 LAB
ALBUMIN UR-MCNC: NEGATIVE MG/DL
APPEARANCE UR: CLEAR
BACTERIA #/AREA URNS HPF: ABNORMAL /HPF
BILIRUB UR QL STRIP: NEGATIVE
COLOR UR AUTO: YELLOW
GLUCOSE UR STRIP-MCNC: NEGATIVE MG/DL
HGB UR QL STRIP: NEGATIVE
KETONES UR STRIP-MCNC: NEGATIVE MG/DL
LEUKOCYTE ESTERASE UR QL STRIP: ABNORMAL
NITRATE UR QL: NEGATIVE
PH UR STRIP: 6 [PH] (ref 5–7)
RBC #/AREA URNS AUTO: ABNORMAL /HPF
SP GR UR STRIP: <=1.005 (ref 1–1.03)
UROBILINOGEN UR STRIP-ACNC: 0.2 E.U./DL
WBC #/AREA URNS AUTO: ABNORMAL /HPF

## 2022-09-01 PROCEDURE — 87086 URINE CULTURE/COLONY COUNT: CPT

## 2022-09-01 PROCEDURE — 81001 URINALYSIS AUTO W/SCOPE: CPT

## 2022-09-01 PROCEDURE — 87088 URINE BACTERIA CULTURE: CPT

## 2022-09-01 RX ORDER — LEVOFLOXACIN 500 MG/1
500 TABLET, FILM COATED ORAL DAILY
Qty: 5 TABLET | Refills: 0 | Status: SHIPPED | OUTPATIENT
Start: 2022-09-01 | End: 2022-09-06

## 2022-09-01 NOTE — TELEPHONE ENCOUNTER
Called patient and notified him of message from provider. Lab appt scheduled for today for UA/UC and abx sent to pharmacy.    Arabella Mancuso RN   [facility]

## 2022-09-01 NOTE — TELEPHONE ENCOUNTER
It would be best if patient can bring in a urine for a culture as his last urine had limited sensitivities but I can start treatment as a culture won't be back until this weekend.   He should do the urine then start the antibiotics. RN can send antibiotics to preferred pharmacy.   Jenny Fay PA-C

## 2022-09-01 NOTE — TELEPHONE ENCOUNTER
Received call from patient stating that he thinks he may have a UTI and is wondering if he can receive treatment. Please advise, would UA/UC be okay or E-visit?    His symptoms began a few days ago. He mainly is experiencing nausea and cloudy urine. States that he has a hx of UTI's. He intermittently straight caths every 3-4 hours at baseline.  No fever  No blood in urine  No frequency or burning-- but he never really has these symptoms d/t straight cathing  No abdominal pain    Disposition: See in Office Today. No appointments available. Pt sates that he would be willing to do UA/lab.      Reason for Disposition    Patient wants to be seen    Additional Information    Negative: Shock suspected (e.g., cold/pale/clammy skin, too weak to stand, low BP, rapid pulse)    Negative: Sounds like a life-threatening emergency to the triager    Negative: Followed a female genital area injury (e.g., vagina, vulva)    Negative: Followed a male genital area injury (penis, scrotum)    Negative: Vaginal discharge    Negative: Pus (white, yellow) or bloody discharge from end of penis    Negative: Pain or burning with passing urine (urination) and pregnant    Negative: Pain or burning with passing urine (urination) and female    Negative: Pain or burning with passing urine (urination) and male    Negative: Pain or itching in the vulvar area    Negative: Pain in scrotum is main symptom    Negative: Blood in the urine is main symptom    Negative: Symptoms arising from use of a urinary catheter (e.g., coude, Nguyen)    Negative: Unable to urinate (or only a few drops) > 4 hours and bladder feels very full (e.g., palpable bladder or strong urge to urinate)    Negative: Bad or foul-smelling urine    Negative: Urinating more frequently than usual (i.e., frequency)    Negative: Can't control passage of urine (i.e., urinary incontinence) and new-onset (< 2 weeks) or worsening    Negative: Side (flank) or lower back pain present    Negative:  Fever > 100.4 F  (38.0 C)    Negative: Decreased urination and drinking very little and dehydration suspected (e.g., dark urine, no urine > 12 hours, very dry mouth, very lightheaded)    Negative: Patient sounds very sick or weak to the triager    Protocols used: URINARY SYMPTOMS-A-OH    Arabella Mancuso RN   [facility]

## 2022-09-03 LAB — BACTERIA UR CULT: ABNORMAL

## 2022-09-06 NOTE — RESULT ENCOUNTER NOTE
Riley,     Are you feeling better with the antibiotics? This is a different bacteria than your last infection so if you aren't feeling that the UTI has improved we would want to change antibiotics.   Jenny Fay PA-C

## 2022-09-07 DIAGNOSIS — M54.6 CHRONIC MIDLINE THORACIC BACK PAIN: ICD-10-CM

## 2022-09-07 DIAGNOSIS — G89.29 CHRONIC MIDLINE THORACIC BACK PAIN: ICD-10-CM

## 2022-09-09 NOTE — TELEPHONE ENCOUNTER
"Routing refill request to provider for review/approval because:  Labs out of range:  bp    Requested Prescriptions   Pending Prescriptions Disp Refills     amitriptyline (ELAVIL) 25 MG tablet [Pharmacy Med Name: AMITRIPTYLINE 25MG TABLETS] 90 tablet 1     Sig: TAKE 1 AND 1/2 TABLETS(37.5 MG) BY MOUTH AT BEDTIME       Tricyclic Agents ( Annual appt and no PHQ9) Failed - 9/7/2022  9:32 AM        Failed - Blood Pressure under 140/90 in past 12 mos     BP Readings from Last 3 Encounters:   08/05/22 (!) 135/100   08/01/22 120/64   05/31/22 120/64                 Passed - Recent (12 mo) or future (30 days) visit within authorizing provider's specialty     Patient has had an office visit with the authorizing provider or a provider within the authorizing providers department within the previous 12 mos or has a future within next 30 days. See \"Patient Info\" tab in inbasket, or \"Choose Columns\" in Meds & Orders section of the refill encounter.              Passed - Medication is active on med list        Passed - Patient is age 18 or older                 "

## 2022-09-16 ENCOUNTER — PATIENT OUTREACH (OUTPATIENT)
Dept: CARE COORDINATION | Facility: CLINIC | Age: 47
End: 2022-09-16

## 2022-09-16 DIAGNOSIS — G89.29 CHRONIC BILATERAL LOW BACK PAIN WITHOUT SCIATICA: ICD-10-CM

## 2022-09-16 DIAGNOSIS — F51.01 PRIMARY INSOMNIA: ICD-10-CM

## 2022-09-16 DIAGNOSIS — M54.50 CHRONIC BILATERAL LOW BACK PAIN WITHOUT SCIATICA: ICD-10-CM

## 2022-09-16 RX ORDER — LIDOCAINE 50 MG/G
OINTMENT TOPICAL PRN
Qty: 2500 G | Refills: 0 | Status: SHIPPED | OUTPATIENT
Start: 2022-09-16

## 2022-09-16 SDOH — ECONOMIC STABILITY: FOOD INSECURITY: WITHIN THE PAST 12 MONTHS, YOU WORRIED THAT YOUR FOOD WOULD RUN OUT BEFORE YOU GOT MONEY TO BUY MORE.: NEVER TRUE

## 2022-09-16 SDOH — ECONOMIC STABILITY: INCOME INSECURITY: IN THE LAST 12 MONTHS, WAS THERE A TIME WHEN YOU WERE NOT ABLE TO PAY THE MORTGAGE OR RENT ON TIME?: NO

## 2022-09-16 SDOH — ECONOMIC STABILITY: FOOD INSECURITY: WITHIN THE PAST 12 MONTHS, THE FOOD YOU BOUGHT JUST DIDN'T LAST AND YOU DIDN'T HAVE MONEY TO GET MORE.: NEVER TRUE

## 2022-09-16 ASSESSMENT — ACTIVITIES OF DAILY LIVING (ADL): DEPENDENT_IADLS:: CLEANING;COOKING;LAUNDRY;SHOPPING;MEAL PREPARATION;MEDICATION MANAGEMENT

## 2022-09-16 NOTE — PROGRESS NOTES
Clinic Care Coordination Contact    Follow Up Progress Note      Assessment: The RN CC nurse care coordinator contacted the patient by phone for a follow up call.  Patient stated that he was scheduled for repeat baclofen pump test this week but due to the nurses strike it has been rescheduled to the end of September.  The patient is following up with his appointments as suggested by the providers.  He is taking all medications as prescribed by the providers.  At this point there are no questions from the patient, and he is good about keeping his follow-up appointments with his PCP.    Care Gaps:    Health Maintenance Due   Topic Date Due     INFLUENZA VACCINE (1) 09/01/2022       Care Gaps Last addressed on 9/16/22    Care Plans  Care Plan: General  take medications as prescribed     Problem: HP GENERAL PROBLEM     Goal: General Goal -  I will take all medications as prescribed by the providers.     Start Date: 7/22/2022 Expected End Date: 7/22/2023    This Visit's Progress: 50%    Note:     Barriers: wheelchair bound  Strengths: engaged in care coordination  Patient expressed understanding of goal: yes  Action steps to achieve this goal:  1. I will take all medications as prescribed.  2. I will ask any questions that I have regarding new medications.  3. I will work with the RN CC if I have any difficulty getting refills.                     Care Plan: General -  Make and attend follow up appointments     Problem: HP GENERAL PROBLEM     Goal: General Goal - I will make and attend all recommended appointments from my providers.     Start Date: 7/22/2022 Expected End Date: 7/22/2023    This Visit's Progress: 40%    Note:     Barriers: Many providers.  Strengths: engaged in care coordination  Patient expressed understanding of goal: yes  Action steps to achieve this goal:  1. I will make all of the recommended follow up appointments.  2. I will attend all of the follow up appointments as recommended by the  providers.                        Intervention/Education provided during outreach: Encouraged the patient to reach out with any questions should they arise.     Outreach Frequency: monthly        Plan:   1.  The patient will continue to make all recommended follow-up appointments with the provider.  2.  The patient will attend the rescheduled appointment for his baclofen pump test.  3.  The patient will take all medications as prescribed by the provider.    RN CC Nurse Care Coordinator will follow up in 4 weeks.          Robbin Vanegas MSN, RN, PHN, CCM   Primary Care Clinical RN Care Coordinator  [facility]  9/16/2022   1:16 PM  [email or web address]  Office: [phone]

## 2022-09-19 RX ORDER — ZOLPIDEM TARTRATE 10 MG/1
TABLET ORAL
Qty: 90 TABLET | Refills: 0 | Status: SHIPPED | OUTPATIENT
Start: 2022-09-19 | End: 2022-12-19

## 2022-09-27 ENCOUNTER — TELEPHONE (OUTPATIENT)
Dept: CARE COORDINATION | Facility: CLINIC | Age: 47
End: 2022-09-27

## 2022-09-27 NOTE — TELEPHONE ENCOUNTER
"Spoke with pt. States he is having more abdominal pain than normal. Not as bad as yesterday and thinks he could \"hold off\" a day or 2. Pt needs a follow up check up from his procedure yesterday and needs to see PCP. Has nausea off and on. No vomiting. No fever. Urine has cloudiness every once and awhile. Is drinking plenty of fluids. No openings at [facility], [facility], or [facility] today. PCP is out of office today returning tomorrow and no openings the rest of this week. Routing to provider to advise.    Barbara Conway RN  [facility]    "

## 2022-09-27 NOTE — TELEPHONE ENCOUNTER
Patient should be seen. PCP (Jenny) has no openings this week. If UTI symptoms worsening, suggest urgent care.     Fabiana Krishna PA-C

## 2022-09-27 NOTE — TELEPHONE ENCOUNTER
Called patient and notified him of message from provider. He scheduled virtual appointment for tomorrow 09/28 with Vazquez Huggins at [facility].    Arabella Mancuso RN   [facility]

## 2022-09-27 NOTE — TELEPHONE ENCOUNTER
The patient had a intrathecal Baclofen trial yesterday.  Discharge instructions indicated to see the the PCP in 1-2 days.  The patient is having an increase in abdominal pain and a concern of a UTI.  The patient is willing to see any provider.  Please reach out to the patient to make an appointment.        Robbin Vanegas MSN, RN, PHN, CCM   Primary Care Clinical RN Care Coordinator  [facility]  9/27/2022   9:47 AM  [email or web address]  Office: [phone]

## 2022-09-28 ENCOUNTER — VIRTUAL VISIT (OUTPATIENT)
Dept: FAMILY MEDICINE | Facility: CLINIC | Age: 47
End: 2022-09-28
Payer: COMMERCIAL

## 2022-09-28 DIAGNOSIS — R10.84 ABDOMINAL PAIN, GENERALIZED: ICD-10-CM

## 2022-09-28 DIAGNOSIS — N31.9 NEUROGENIC BLADDER: ICD-10-CM

## 2022-09-28 DIAGNOSIS — N39.0 FREQUENT UTI: Primary | ICD-10-CM

## 2022-09-28 PROCEDURE — 99213 OFFICE O/P EST LOW 20 MIN: CPT | Mod: 95

## 2022-09-28 NOTE — PATIENT INSTRUCTIONS
Call to schedule LAB ONLY visit to collect Urine sample.  If initial urine is positive we will treat with an antibiotic and get a culture.   Continue your plan to follow up with your gastroenterology specialist.     If urine is negative but symptoms worsen or fail to improve schedule follow up. If fevers, fatigue, change in mental status - go to ER.

## 2022-09-28 NOTE — PROGRESS NOTES
Riley is a 46 year old who is being evaluated via a billable telephone visit.      What phone number would you like to be contacted at? [phone]  How would you like to obtain your AVS? MyChart    Assessment & Plan       Neurogenic bladder  Chronic. Stable, contributes to risk of abx resistant UTIs. Self-caths at home q3 hours. Has PCA for hygiene cares.   - UA with Microscopic reflex to Culture - lab collect    Frequent UTI  Chronic/historical. Unclear if current symptoms are UTI or not, patient will schedule lab visit to drop off urine sample.   - UA with Microscopic reflex to Culture - lab collect    Abdominal pain, generalized  Acute on Chronic. Chronic GI issues, unclear if current sx are GI related or could be UTI. Urine to rule out UTI. Has appointment scheduled with GI specialist.  - UA with Microscopic reflex to Culture - lab collect               See Patient Instructions    No follow-ups on file.    SUJATA Smalls CNP  [facility]   Riley is a 46 year old, presenting for the following health issues:  Urinary Problem, Abdominal Pain, and Back Pain      HPI     Abdominal pain 2-3/10, back pain 5/10.     Recent lumbar procedure.     Abdominal pain ongoing for about 1 week, worsening past 5 days, today is not as bad.     Genitourinary - Male  Onset/Duration: reoccuring infections due to get cathter   Description:    Dysuria (painful urination): No, does not get sensation down there  Hematuria (blood in urine): No  Frequency: N/A  Waking at night to urinate: N/A  Hesitancy (delay in urine): N/A  Retention (unable to empty): N/A  Decrease in urinary flow: N/A  Incontinence: N/A  Progression of Symptoms:  improving  Accompanying Signs & Symptoms:  Fever: No  Back/Flank pain: YES  Urethral discharge: No, some cloudiness in urine, not as bad  Testicle lumps/masses/pain: N/A  Nausea and/or vomiting: YES, nausea once in a while  Abdominal pain: YES  History:   History of  frequent UTI s: YES  History of kidney stones: No  History of hernias: No  Personal or Family history of Prostate problems: N/A  Sexually active: N/A  Precipitating or alleviating factors: None  Therapies tried and outcome: does clean cathter every night      History of frequent UTI, intermittent cath at home every 3 hours for neurogenic bladder. Occasional cloudy urine is normal for him.   Normally drops urine off for culture.  He was worried about UTI since he was having abdominal pain over the weekend but the last 1-2 days his symptoms have improved/almost resolved.    He sees [facility] for chronic GI issues and has follow up appointment scheduled with them to further eval GI issues.      Recent lumbar procedure was trial of baclofen injection - he has PCA at home who said the injection site is clean with no redness, drainage, or swelling.     He has no other changes in symptoms, he feels currently at his baseline health.        Review of Systems   Constitutional, HEENT, cardiovascular, pulmonary, gi and gu systems are negative, except as otherwise noted.      Objective           Vitals:  No vitals were obtained today due to virtual visit.    Physical Exam   healthy, alert and no distress  PSYCH: Alert and oriented times 3; coherent speech, normal   rate and volume, able to articulate logical thoughts, able   to abstract reason, no tangential thoughts, no hallucinations   or delusions  His affect is normal  RESP: No cough, no audible wheezing, able to talk in full sentences  Remainder of exam unable to be completed due to telephone visits    Future urine culture.            Phone call duration: 10 minutes

## 2022-09-29 ENCOUNTER — LAB (OUTPATIENT)
Dept: LAB | Facility: CLINIC | Age: 47
End: 2022-09-29
Payer: COMMERCIAL

## 2022-09-29 DIAGNOSIS — N31.9 NEUROGENIC BLADDER: ICD-10-CM

## 2022-09-29 DIAGNOSIS — N39.0 FREQUENT UTI: ICD-10-CM

## 2022-09-29 DIAGNOSIS — R10.84 ABDOMINAL PAIN, GENERALIZED: ICD-10-CM

## 2022-09-29 LAB
ALBUMIN UR-MCNC: NEGATIVE MG/DL
APPEARANCE UR: CLEAR
BACTERIA #/AREA URNS HPF: ABNORMAL /HPF
BILIRUB UR QL STRIP: NEGATIVE
COLOR UR AUTO: YELLOW
GLUCOSE UR STRIP-MCNC: NEGATIVE MG/DL
HGB UR QL STRIP: NEGATIVE
KETONES UR STRIP-MCNC: NEGATIVE MG/DL
LEUKOCYTE ESTERASE UR QL STRIP: ABNORMAL
NITRATE UR QL: NEGATIVE
PH UR STRIP: 8.5 [PH] (ref 5–7)
RBC #/AREA URNS AUTO: ABNORMAL /HPF
SP GR UR STRIP: 1.01 (ref 1–1.03)
SQUAMOUS #/AREA URNS AUTO: ABNORMAL /LPF
UROBILINOGEN UR STRIP-ACNC: 0.2 E.U./DL
WBC #/AREA URNS AUTO: ABNORMAL /HPF
WBC CLUMPS #/AREA URNS HPF: PRESENT /HPF

## 2022-09-29 PROCEDURE — 81001 URINALYSIS AUTO W/SCOPE: CPT

## 2022-09-29 PROCEDURE — 87086 URINE CULTURE/COLONY COUNT: CPT

## 2022-10-01 LAB — BACTERIA UR CULT: NORMAL

## 2022-10-02 DIAGNOSIS — N31.9 NEUROGENIC BLADDER: ICD-10-CM

## 2022-10-02 NOTE — TELEPHONE ENCOUNTER
Spoke to pt. Pt reports that his symptoms are persistent today and has more of a back ache now. Reviewed UC results. Will have provider provide input for treatment. Pt prefers to have order sent to [facility] [place].     Shraddha Sal RN MSN    
Vikki Beltrán MD Bratsch, Angie J, RN; Evaristo Hayes MD  Cc: Yessy Tapia RN  Caller: Unspecified (Today, 12:19 PM)  You can send him five days of keflex and let him know to continue working on his constipation. If no improvement he might just be due for botox     Spoke to pt. Informed pt of orders. Pt verbalized understanding.     Shraddha Solano RN MSN      Signed Prescriptions:                        Disp   Refills    cephALEXin (KEFLEX) 500 MG capsule         10 cap*0        Sig: Take 1 capsule (500 mg) by mouth 2 times daily  Authorizing Provider: EVARISTO HAYES  Ordering User: SHRADDHA SOLANO      
Male

## 2022-10-03 ENCOUNTER — MEDICAL CORRESPONDENCE (OUTPATIENT)
Dept: HEALTH INFORMATION MANAGEMENT | Facility: CLINIC | Age: 47
End: 2022-10-03

## 2022-10-04 ENCOUNTER — MEDICAL CORRESPONDENCE (OUTPATIENT)
Dept: HEALTH INFORMATION MANAGEMENT | Facility: CLINIC | Age: 47
End: 2022-10-04

## 2022-10-04 ENCOUNTER — TELEPHONE (OUTPATIENT)
Dept: CARE COORDINATION | Facility: CLINIC | Age: 47
End: 2022-10-04

## 2022-10-04 RX ORDER — TOLTERODINE TARTRATE 2 MG/1
TABLET, EXTENDED RELEASE ORAL
Qty: 180 TABLET | Refills: 0 | Status: SHIPPED | OUTPATIENT
Start: 2022-10-04 | End: 2022-12-26

## 2022-10-04 NOTE — TELEPHONE ENCOUNTER
Spoke with patient. Assisted patient with scheduling soonest available appointment with PCP on 10/15 in approval requested spot. Patient agreeable to timing of appointment.     Douglas Avery RN  [facility]

## 2022-10-04 NOTE — TELEPHONE ENCOUNTER
The RN CC received a call from the patient stating that he needs an ED follow up appointment.  The patient tried to schedule online and there is nothing available with Jenny Fay until November.  Could someone contact the patient with any other options?        Robbin Vanegas MSN, RN, PHN, [facility]   Primary Care Clinical RN Care Coordinator  [facility]  10/4/2022   11:13 AM  [email or web address]  Office: [phone]

## 2022-10-13 ENCOUNTER — OFFICE VISIT (OUTPATIENT)
Dept: FAMILY MEDICINE | Facility: CLINIC | Age: 47
End: 2022-10-13
Payer: COMMERCIAL

## 2022-10-13 VITALS
OXYGEN SATURATION: 95 % | DIASTOLIC BLOOD PRESSURE: 62 MMHG | TEMPERATURE: 97.6 F | SYSTOLIC BLOOD PRESSURE: 120 MMHG | HEART RATE: 116 BPM

## 2022-10-13 DIAGNOSIS — D72.829 LEUKOCYTOSIS, UNSPECIFIED TYPE: ICD-10-CM

## 2022-10-13 DIAGNOSIS — R10.84 ABDOMINAL PAIN, GENERALIZED: ICD-10-CM

## 2022-10-13 DIAGNOSIS — G82.50 QUADRIPLEGIA (H): ICD-10-CM

## 2022-10-13 DIAGNOSIS — M54.50 CHRONIC BILATERAL LOW BACK PAIN WITHOUT SCIATICA: Primary | ICD-10-CM

## 2022-10-13 DIAGNOSIS — K59.09 CHRONIC CONSTIPATION: ICD-10-CM

## 2022-10-13 DIAGNOSIS — G89.29 CHRONIC BILATERAL LOW BACK PAIN WITHOUT SCIATICA: Primary | ICD-10-CM

## 2022-10-13 DIAGNOSIS — N31.9 NEUROGENIC BLADDER: ICD-10-CM

## 2022-10-13 LAB
BASOPHILS # BLD AUTO: 0.1 10E3/UL (ref 0–0.2)
BASOPHILS NFR BLD AUTO: 2 %
EOSINOPHIL # BLD AUTO: 2.2 10E3/UL (ref 0–0.7)
EOSINOPHIL NFR BLD AUTO: 27 %
ERYTHROCYTE [DISTWIDTH] IN BLOOD BY AUTOMATED COUNT: 12.6 % (ref 10–15)
HCT VFR BLD AUTO: 42 % (ref 40–53)
HGB BLD-MCNC: 14.2 G/DL (ref 13.3–17.7)
IMM GRANULOCYTES # BLD: 0 10E3/UL
IMM GRANULOCYTES NFR BLD: 0 %
LYMPHOCYTES # BLD AUTO: 1.4 10E3/UL (ref 0.8–5.3)
LYMPHOCYTES NFR BLD AUTO: 18 %
MCH RBC QN AUTO: 31.7 PG (ref 26.5–33)
MCHC RBC AUTO-ENTMCNC: 33.8 G/DL (ref 31.5–36.5)
MCV RBC AUTO: 94 FL (ref 78–100)
MONOCYTES # BLD AUTO: 0.5 10E3/UL (ref 0–1.3)
MONOCYTES NFR BLD AUTO: 7 %
NEUTROPHILS # BLD AUTO: 3.8 10E3/UL (ref 1.6–8.3)
NEUTROPHILS NFR BLD AUTO: 46 %
NRBC # BLD AUTO: 0 10E3/UL
NRBC BLD AUTO-RTO: 0 /100
PLATELET # BLD AUTO: 315 10E3/UL (ref 150–450)
RBC # BLD AUTO: 4.48 10E6/UL (ref 4.4–5.9)
WBC # BLD AUTO: 8.1 10E3/UL (ref 4–11)

## 2022-10-13 PROCEDURE — 85025 COMPLETE CBC W/AUTO DIFF WBC: CPT | Performed by: PHYSICIAN ASSISTANT

## 2022-10-13 PROCEDURE — 99214 OFFICE O/P EST MOD 30 MIN: CPT | Performed by: PHYSICIAN ASSISTANT

## 2022-10-13 PROCEDURE — 96127 BRIEF EMOTIONAL/BEHAV ASSMT: CPT | Performed by: PHYSICIAN ASSISTANT

## 2022-10-13 PROCEDURE — 36415 COLL VENOUS BLD VENIPUNCTURE: CPT | Performed by: PHYSICIAN ASSISTANT

## 2022-10-13 RX ORDER — METHYLPREDNISOLONE 4 MG
TABLET, DOSE PACK ORAL
Qty: 21 TABLET | Refills: 0 | Status: SHIPPED | OUTPATIENT
Start: 2022-10-13 | End: 2022-12-19

## 2022-10-13 ASSESSMENT — PATIENT HEALTH QUESTIONNAIRE - PHQ9
SUM OF ALL RESPONSES TO PHQ QUESTIONS 1-9: 9
SUM OF ALL RESPONSES TO PHQ QUESTIONS 1-9: 9

## 2022-10-13 ASSESSMENT — PAIN SCALES - GENERAL: PAINLEVEL: SEVERE PAIN (6)

## 2022-10-13 NOTE — PROGRESS NOTES
Assessment & Plan     Chronic bilateral low back pain without sciatica  Trial oral steroid for help of inflammation.  Refer to pain management per patient request. Already working with PM&R on baclofen pump  - Pain Management  Referral; Future  - methylPREDNISolone (MEDROL DOSEPAK) 4 MG tablet therapy pack; Follow Package Directions    Quadriplegia (H)  - Pain Management  Referral; Future    Leukocytosis, unspecified type  Labs today to recheck slightly elevated WBC from ED visit to rule out continued infection.  - CBC with Platelets & Differential; Future  - CBC with Platelets & Differential    Abdominal pain, generalized  Continue to monitor for worsening or consistency of pain symptoms.  Negative CT scan from ED was beneficial to rule out any bowel abnormality that would need intervention.  Labs were also negative for any pancreatitis etiology. Patient will reach out if symptoms change or worsen.     Neurogenic bladder  Continue to monitor urine for signs of infection. Self monitor for any fever or chills.    Chronic constipation  Continue bowel routine as needed for constipation prevention. Monitor any changes in quality(color, consistency) of bowel movements.           No follow-ups on file.    Jenny Fay PA-C  [facility]  The student SOUMYA Ely acted as a scribe and the encounter documented above was completely performed by myself and the documentation reflects the work I have performed today.   Jenny Fay PA-C       Subjective   Riley is a 46 year old, presenting for the following health issues:  ER F/U and Health Maintenance      HPI     ED/UC Followup:    Facility:  [facility]  Date of visit: 10/2/22-10/3/22  Reason for visit: Abdominal Pain  Current Status: Pt is having sharp and cramping pains in his lower abdominal, sides, and mid-lower back. 6/10 currently     Pain still coming and going. No real improvement since ED visit.  Sitting in wheelchair for long times does make it worse, and laying down in bed lessens pain.  Pt states no radiation of back pain.      Review of Systems   Constitutional, HEENT, cardiovascular, pulmonary, GI, , musculoskeletal, neuro, skin, endocrine and psych systems are negative, except as otherwise noted.      Objective    /62 (BP Location: Left arm, Patient Position: Chair, Cuff Size: Adult Regular)   Pulse 116   Temp 97.6  F (36.4  C) (Temporal)   SpO2 95%   There is no height or weight on file to calculate BMI.     Physical Exam   GENERAL: healthy, alert and no distress exam performed with patient seated in mechanical wheelchair  EYES: Eyes grossly normal to inspection, PERRL and conjunctivae and sclerae normal  HENT:  nose and mouth without ulcers or lesions  NECK: no adenopathy, no asymmetry, masses  RESP: lungs clear to auscultation - no rales, rhonchi or wheezes  CV: regular rate and rhythm, normal S1 S2, no S3 or S4, no murmur,   ABDOMEN: slightly firm, tender to palpation, no hepatosplenomegaly, no masses and bowel sounds normal  MS: no gross musculoskeletal defects from patients baseline noted, no edema  SKIN: no suspicious lesions or rashes  NEURO: Normal strength and tone for patients baseline, mentation intact and speech normal  PSYCH: mentation appears normal, affect normal/bright              Answers for HPI/ROS submitted by the patient on 10/13/2022  PHQ9 TOTAL SCORE: 9

## 2022-10-18 ENCOUNTER — PATIENT OUTREACH (OUTPATIENT)
Dept: CARE COORDINATION | Facility: CLINIC | Age: 47
End: 2022-10-18

## 2022-10-18 NOTE — PROGRESS NOTES
Clinic Care Coordination Contact    Follow Up Progress Note      Assessment: The RN CC nurse care coordinator contacted the patient by phone for a follow up call.  The patient stated that he did not finish the methylprednisolone as it was keeping him up at night.  He did increase the amitriptyline as he and his PCP agreed upon.  He is not able to see the pain clinic until the beginning of December.  Although he does go to the [facility] clinic next week.  The patient has stopped his medical marijuana as he has to pay for it, and he is wondering if that has something to do with his inability to sleep.  The patient will reach out to Dr. Barrera for recertification and see if that helps him to be able to sleep better.  And his other option is to take the methylprednisolone and see if taking it in the morning with food helps.    Care Gaps:    Health Maintenance Due   Topic Date Due     HEPATITIS B IMMUNIZATION (1 of 3 - 3-dose series) Never done       Care Gaps Last addressed on 10/18/22    Care Plans  Care Plan: General  take medications as prescribed     Problem: HP GENERAL PROBLEM     Goal: General Goal -  I will take all medications as prescribed by the providers.     Start Date: 7/22/2022 Expected End Date: 7/22/2023    This Visit's Progress: 60% Recent Progress: 50%    Note:     Barriers: wheelchair bound  Strengths: engaged in care coordination  Patient expressed understanding of goal: yes  Action steps to achieve this goal:  1. I will take all medications as prescribed.  2. I will ask any questions that I have regarding new medications.  3. I will work with the RN CC if I have any difficulty getting refills.                     Care Plan: General -  Make and attend follow up appointments     Problem: HP GENERAL PROBLEM     Goal: General Goal - I will make and attend all recommended appointments from my providers.     Start Date: 7/22/2022 Expected End Date: 7/22/2023    This Visit's Progress: 50% Recent Progress: 40%     Note:     Barriers: Many providers.  Strengths: engaged in care coordination  Patient expressed understanding of goal: yes  Action steps to achieve this goal:  1. I will make all of the recommended follow up appointments.  2. I will attend all of the follow up appointments as recommended by the providers.                        Intervention/Education provided during outreach: Reviewed how the methylprednisolone worked and that there are less side effects with morning dosing and making sure there is food with it.     Outreach Frequency: monthly        Plan:   1.  The patient will make and attend all follow-up appointments as recommended.  2.  The patient will make an appointment with Dr. Barrera for recertification of his medical marijuana.  3.  The patient will take all medications as prescribed by the providers.    RN CC Nurse Care Coordinator will follow up in 30 days.          Robbin Vanegas MSN, RN, PHN, CCM   Primary Care Clinical RN Care Coordinator  [facility]  10/18/2022   2:50 PM  [email or web address]  Office: [phone]

## 2022-10-31 DIAGNOSIS — G82.50 QUADRIPLEGIA (H): ICD-10-CM

## 2022-11-01 RX ORDER — BACLOFEN 20 MG/1
TABLET ORAL
Qty: 360 TABLET | Refills: 0 | Status: SHIPPED | OUTPATIENT
Start: 2022-11-01 | End: 2023-01-25

## 2022-11-18 ENCOUNTER — PATIENT OUTREACH (OUTPATIENT)
Dept: CARE COORDINATION | Facility: CLINIC | Age: 47
End: 2022-11-18

## 2022-11-18 NOTE — PROGRESS NOTES
Clinic Care Coordination Contact  [facility]/Voicemail       Clinical Data: Care Coordinator Outreach  Outreach attempted x 1.  Left message on patient's voicemail with call back information and requested return call.  Plan: Care Coordinator sent care coordination introduction letter on 3/24/21 via [employer]. Care Coordinator will try to reach patient again in 10 business days.    Robbin Vanegas MSN, RN, PHN, CCM   Primary Care Clinical RN Care Coordinator  [facility]  11/18/2022   10:07 AM  [email or web address]  Office: [phone]

## 2022-11-18 NOTE — LETTER
[facility]  Patient Centered Plan of Care  About Me:        Patient Name:  Riley Gifford    YOB: 1975  Age:         46 year old   Rut MRN:    [id number] Telephone Information:  Home Phone [phone]   Mobile [phone]   Mobile [phone]       Address:  [address] [place] [address] [address]  [place] [address] Email address:  [email or web address]      Emergency Contact(s)    Name Relationship Lgl Grd Work Phone Home Phone Mobile Phone   1. TREY GIFFORD (NE* Relative No noen none [phone]   2. AGUSTO CAVAZOS Sister   [phone]    3. HODA GIFFORD Brother No  [phone]            Primary language:  English     needed? No   [facility] [facility]:  [phone] op. 1  Other communication barriers:Glasses    Preferred Method of Communication:  Lisa  Current living arrangement: I live in assisted living    Mobility Status/ Medical Equipment: Dependent/Assisted by Another        Health Maintenance  Health Maintenance Reviewed: Due/Overdue   Health Maintenance Due   Topic Date Due     HEPATITIS B IMMUNIZATION (1 of 3 - 3-dose series) Never done           My Access Plan  Medical Emergency 911   Primary Clinic Line [facility] [phone]   24 Hour Appointment Line [phone] or  [phone]) (toll-free)   24 Hour Nurse Line [phone] (toll-free)   Preferred Urgent Care [facility] .[phone]     [facility]  [phone]     [id number] [place] - [address] [facility] [address]     Behavioral Health Crisis Line The National Suicide Prevention Lifeline at 1-338.434.1335 or Text/Call 548             My Care Team Members  Patient Care Team       Relationship Specialty Notifications Start End    Jenny Fay PA-C PCP - General Family Medicine  12/21/21     Phone: [phone]  Fax: [phone]         [address] [facility] [address] [facility] [address]    Robbin Andino, RN Lead Care Coordinator Nurse Admissions 1/6/16     phone:  [phone]    Phone: [phone] Fax: [phone]        Rober Leo MD Referring Physician Urology  1/8/16     Phone: [phone] Fax: [phone] [address] [facility] [address]    Kimberly Zabala MD MD Urology  1/8/16      INACTIVE IN [place] AS OF 4/30/2021    Amanda Other (see comments)   1/8/16     [facility]     Phone: [phone]         Evaristo Hayes MD MD Urology  4/19/16     Phone: [phone] Fax: [phone]         [address] [facility] [address] [facility] [address]    Jenny Wright, RN Registered Nurse Urology  4/19/16     Rosemary Thomas, RN Nurse Coordinator Physical Medicine and Rehabilitation  3/30/17     Phone: [phone] Pager: [phone]        Lulú Reveles APRN CNP Nurse Practitioner Nurse Practitioner  8/28/20     Phone: [phone] Fax: [phone]         [address] [facility] [address] [facility] [address]    Lulú Reveles APRN CNP Assigned Surgical Provider   6/13/21     Phone: [phone] Fax: [phone]         [address] [facility] [address] [facility] [address]    [facility] Occupational Therapist Occupational Therapy  1/7/22     [facility] Shanique Alberto  fax [phone]    Phone: [phone] Fax: [phone] [facility] [address]    Evaristo Hayes MD MD Urology  1/17/22     Phone: [phone] Fax: [phone]         [address]  [facility] [address]    Yessy Tapia, RN Specialty Care Coordinator   1/17/22     Jenny Fay PAEmilyC Assigned PCP   4/3/22     Phone: [phone] Fax: [phone] [address] [facility] [address]            My Care Plans  Self Management and Treatment Plan  Care Plan  Care Plan: General  take medications  as prescribed     Problem: HP GENERAL PROBLEM     Goal: General Goal -  I will take all medications as prescribed by the providers.     Start Date: 7/22/2022 Expected End Date: 7/22/2023    This Visit's Progress: 60% Recent Progress: 50%    Note:     Barriers: wheelchair bound  Strengths: engaged in care coordination  Patient expressed understanding of goal: yes  Action steps to achieve this goal:  1. I will take all medications as prescribed.  2. I will ask any questions that I have regarding new medications.  3. I will work with the RN CC if I have any difficulty getting refills.                     Care Plan: General -  Make and attend follow up appointments     Problem: HP GENERAL PROBLEM     Goal: General Goal - I will make and attend all recommended appointments from my providers.     Start Date: 7/22/2022 Expected End Date: 7/22/2023    This Visit's Progress: 50% Recent Progress: 40%    Note:     Barriers: Many providers.  Strengths: engaged in care coordination  Patient expressed understanding of goal: yes  Action steps to achieve this goal:  1. I will make all of the recommended follow up appointments.  2. I will attend all of the follow up appointments as recommended by the providers.                         Action Plans on File:            Depression          Advance Care Plans/Directives Type:   Advanced Directive - On File      My Medical and Care Information  Problem List   Patient Active Problem List   Diagnosis     Vitamin D deficiency     Eosinophilic esophagitis     Neurogenic bladder     CARDIOVASCULAR SCREENING; LDL GOAL LESS THAN 160     Quadriplegia (H)     Chronic constipation     Internal hemorrhoids with other complication     Diagnostic skin and sensitization tests(aka ALLERGENS)     Anal or rectal pain     Allergic rhinitis due to animal dander     Allergy to mold spores     House dust mite allergy     Insomnia     Health Care Home     Gallstones     Chronic midline thoracic back pain      Pulmonary nodules     ACP (advance care planning)     Cervical spinal cord injury, sequela (H)     Spasticity     Self-catheterizes urinary bladder     Bleeding external hemorrhoids     History of claustrophobia     Recurrent UTI      Current Medications and Allergies:  See printed Medication Report.    Care Coordination Start Date: 1/6/2016   Frequency of Care Coordination: monthly     Form Last Updated: 11/18/2022

## 2022-11-28 ENCOUNTER — MEDICAL CORRESPONDENCE (OUTPATIENT)
Dept: HEALTH INFORMATION MANAGEMENT | Facility: CLINIC | Age: 47
End: 2022-11-28

## 2022-11-29 ENCOUNTER — PATIENT OUTREACH (OUTPATIENT)
Dept: CARE COORDINATION | Facility: CLINIC | Age: 47
End: 2022-11-29

## 2022-11-29 NOTE — PROGRESS NOTES
Clinic Care Coordination Contact    Follow Up Progress Note      Assessment: As needed CC nurse care coordinator contacted the patient by phone for a follow-up call.  The patient is preparing for surgery to implant a baclofen pump.  A review was made of his upcoming appointments which include his appointment with the pain management clinic as well as he is preop.  He is aware of the fact that he needs a COVID test done prior to arriving at the hospital, and will make that appointment.  At this point in time the patient does not have any questions or concerns with the upcoming procedure he feels that his questions have been answered thoroughly by the team.  The RN CC and the patient agreed to a call following when he should be returning home from surgery.    Care Gaps:    Health Maintenance Due   Topic Date Due     HEPATITIS B IMMUNIZATION (1 of 3 - 3-dose series) Never done       Care Gaps Last addressed on 11/29/22    Care Plans  Care Plan: General  take medications as prescribed     Problem: HP GENERAL PROBLEM     Goal: General Goal -  I will take all medications as prescribed by the providers.     Start Date: 7/22/2022 Expected End Date: 7/22/2023    This Visit's Progress: 60% Recent Progress: 60%    Note:     Barriers: wheelchair bound  Strengths: engaged in care coordination  Patient expressed understanding of goal: yes  Action steps to achieve this goal:  1. I will take all medications as prescribed.  2. I will ask any questions that I have regarding new medications.  3. I will work with the RN CC if I have any difficulty getting refills.                     Care Plan: General -  Make and attend follow up appointments     Problem: HP GENERAL PROBLEM     Goal: General Goal - I will make and attend all recommended appointments from my providers.     Start Date: 7/22/2022 Expected End Date: 7/22/2023    This Visit's Progress: 60% Recent Progress: 50%    Note:     Barriers: Many providers.  Strengths: engaged in  care coordination  Patient expressed understanding of goal: yes  Action steps to achieve this goal:  1. I will make all of the recommended follow up appointments.  2. I will attend all of the follow up appointments as recommended by the providers.                        Intervention/Education provided during outreach: The RN CC did review following the discharge instructions when he is discharged from the hospital to home.  And how this will include follow-up appointments with his primary care and the surgeon as well.     Outreach Frequency: monthly        Plan:   1.  The patient will make and attend all recommended follow-up appointments following his surgery.  2.  The patient will take all medications as prescribed by the providers.  3.  The patient will make and attend an appointment to have a COVID test prior to his surgical procedure.    RN CC Nurse Care Coordinator will follow up in 30+ days.          Robbin Vanegas MSN, RN, PHN, CCM   Primary Care Clinical RN Care Coordinator  [facility]  11/29/2022   2:17 PM  [email or web address]  Office: [phone]

## 2022-12-01 ENCOUNTER — MEDICAL CORRESPONDENCE (OUTPATIENT)
Dept: HEALTH INFORMATION MANAGEMENT | Facility: CLINIC | Age: 47
End: 2022-12-01

## 2022-12-14 ENCOUNTER — OFFICE VISIT (OUTPATIENT)
Dept: PALLIATIVE MEDICINE | Facility: CLINIC | Age: 47
End: 2022-12-14
Attending: PHYSICIAN ASSISTANT
Payer: COMMERCIAL

## 2022-12-14 VITALS — DIASTOLIC BLOOD PRESSURE: 84 MMHG | HEART RATE: 102 BPM | SYSTOLIC BLOOD PRESSURE: 118 MMHG

## 2022-12-14 DIAGNOSIS — G82.50 QUADRIPLEGIA (H): ICD-10-CM

## 2022-12-14 DIAGNOSIS — R10.84 ABDOMINAL PAIN, GENERALIZED: ICD-10-CM

## 2022-12-14 DIAGNOSIS — M54.50 CHRONIC BILATERAL LOW BACK PAIN WITHOUT SCIATICA: ICD-10-CM

## 2022-12-14 DIAGNOSIS — M54.6 PAIN IN THORACIC SPINE: ICD-10-CM

## 2022-12-14 DIAGNOSIS — M79.2 NEUROPATHIC PAIN: Primary | ICD-10-CM

## 2022-12-14 DIAGNOSIS — G89.29 CHRONIC BILATERAL LOW BACK PAIN WITHOUT SCIATICA: ICD-10-CM

## 2022-12-14 PROCEDURE — 99205 OFFICE O/P NEW HI 60 MIN: CPT

## 2022-12-14 RX ORDER — PREGABALIN 25 MG/1
CAPSULE ORAL
Qty: 120 CAPSULE | Refills: 1 | Status: SHIPPED | OUTPATIENT
Start: 2022-12-14 | End: 2023-01-26

## 2022-12-14 ASSESSMENT — PAIN SCALES - GENERAL: PAINLEVEL: MODERATE PAIN (5)

## 2022-12-14 NOTE — PATIENT INSTRUCTIONS
Pain Physical Therapy:  NO   We will defer at this time, may consider referral in the future.     Pain Psychologist to address relaxation, behavioral change, coping style, and other factors important to improvement.  NO    Diagnostic Studies:  None ordered today.     Medication Management:   Start Lyrica 25 mg with dinner increase to goal dose of 50 mg with breakfast and dinner following the titration instructions below. Be sure to monitor for benefit, even subtle changes in pain over the next few weeks before baclofen pump implant. We will discuss continuation and possible increased dosage at follow up.     0 mg with breakfast   25 mg with dinner x 1 week, then increase to;  25 mg with breakfast   25 mg with dinner x 1 week, then increase to;  25 mg with breakfast   50 mg with dinner x 1 week, then increase to;   50 mg with breakfast   50 mg with dinner, this is goal.     *Be careful with driving/activities until you know how this medication affects you. Alcohol may enhance sedation effects, avoid concurrent use.     Potential procedures:   He has baclofen IT pump implant scheduled on 1/3/23 ([facility]).     Other Orders/Referrals:   We will consider PT referral, pending outcome from IT pump implant and recommendation of surgeon.     Follow up with SUJATA Armendariz CNP in 6 weeks (around end of January), in person or video visit is okay     ----------------------------------------------------------------  Clinic Number:  [phone]   Call with any questions about your care and for scheduling assistance.   Calls are returned Monday through Friday between 8 AM and 4:30 PM. We usually get back to you within 2 business days depending on the issue/request.    If we are prescribing your medications:  For opioid medication refills, call the clinic or send a [employer] message 7 days in advance.  Please include:  Name of requested medication  Name of the pharmacy.  For non-opioid medications, call your pharmacy directly to  request a refill. Please allow 3-4 days to be processed.   Per [place] State Law:  All controlled substance prescriptions must be filled within 30 days of being written.    For those controlled substances allowing refills, pickup must occur within 30 days of last fill.      We believe regular attendance is key to your success in our program!    Any time you are unable to keep your appointment we ask that you call us at least 24 hours in advance to cancel.This will allow us to offer the appointment time to another patient.   Multiple missed appointments may lead to dismissal from the clinic.
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
## 2022-12-19 DIAGNOSIS — F51.01 PRIMARY INSOMNIA: ICD-10-CM

## 2022-12-19 RX ORDER — ZOLPIDEM TARTRATE 10 MG/1
TABLET ORAL
Qty: 90 TABLET | Refills: 0 | Status: SHIPPED | OUTPATIENT
Start: 2022-12-19 | End: 2023-03-08

## 2022-12-20 ENCOUNTER — OFFICE VISIT (OUTPATIENT)
Dept: FAMILY MEDICINE | Facility: CLINIC | Age: 47
End: 2022-12-20
Payer: COMMERCIAL

## 2022-12-20 VITALS
DIASTOLIC BLOOD PRESSURE: 62 MMHG | RESPIRATION RATE: 15 BRPM | HEART RATE: 105 BPM | SYSTOLIC BLOOD PRESSURE: 126 MMHG | TEMPERATURE: 97.3 F | OXYGEN SATURATION: 95 %

## 2022-12-20 DIAGNOSIS — G82.50 QUADRIPLEGIA (H): ICD-10-CM

## 2022-12-20 DIAGNOSIS — Z78.9 SELF-CATHETERIZES URINARY BLADDER: ICD-10-CM

## 2022-12-20 DIAGNOSIS — N31.9 NEUROGENIC BLADDER: ICD-10-CM

## 2022-12-20 DIAGNOSIS — N31.9 NEUROGENIC BLADDER: Primary | ICD-10-CM

## 2022-12-20 DIAGNOSIS — Z01.818 PREOP GENERAL PHYSICAL EXAM: Primary | ICD-10-CM

## 2022-12-20 DIAGNOSIS — R25.2 SPASTICITY: ICD-10-CM

## 2022-12-20 PROCEDURE — 99214 OFFICE O/P EST MOD 30 MIN: CPT | Performed by: PHYSICIAN ASSISTANT

## 2022-12-20 RX ORDER — CEFAZOLIN SODIUM 2 G/50ML
2 SOLUTION INTRAVENOUS
Status: CANCELLED | OUTPATIENT
Start: 2022-12-20

## 2022-12-20 RX ORDER — CEFAZOLIN SODIUM 2 G/50ML
2 SOLUTION INTRAVENOUS SEE ADMIN INSTRUCTIONS
Status: CANCELLED | OUTPATIENT
Start: 2022-12-20

## 2022-12-20 ASSESSMENT — PATIENT HEALTH QUESTIONNAIRE - PHQ9
SUM OF ALL RESPONSES TO PHQ QUESTIONS 1-9: 5
10. IF YOU CHECKED OFF ANY PROBLEMS, HOW DIFFICULT HAVE THESE PROBLEMS MADE IT FOR YOU TO DO YOUR WORK, TAKE CARE OF THINGS AT HOME, OR GET ALONG WITH OTHER PEOPLE: SOMEWHAT DIFFICULT
SUM OF ALL RESPONSES TO PHQ QUESTIONS 1-9: 5
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
## 2022-12-20 NOTE — PATIENT INSTRUCTIONS
For informational purposes only. Not to replace the advice of your health care provider. Copyright   2003,  [employer] [facility]. All rights reserved. Clinically reviewed by Laquita [place] MD. imagoo [id number] - REV .  Preparing for Your Surgery  Getting started  A nurse will call you to review your health history and instructions. They will give you an arrival time based on your scheduled surgery time. Please be ready to share:    Your doctor's clinic name and phone number    Your medical, surgical, and anesthesia history    A list of allergies and sensitivities    A list of medicines, including herbal treatments and over-the-counter drugs    Whether the patient has a legal guardian (ask how to send us the papers in advance)  Please tell us if you're pregnant--or if there's any chance you might be pregnant. Some surgeries may injure a fetus (unborn baby), so they require a pregnancy test. Surgeries that are safe for a fetus don't always need a test, and you can choose whether to have one.   If you have a child who's having surgery, please ask for a copy of Preparing for Your Child's Surgery.    Preparing for surgery    Within 10 to 30 days of surgery: Have a pre-op exam (sometimes called an H&P, or History and Physical). This can be done at a clinic or pre-operative center.  ? If you're having a , you may not need this exam. Talk to your care team.    At your pre-op exam, talk to your care team about all medicines you take. If you need to stop any medicines before surgery, ask when to start taking them again.  ? We do this for your safety. Many medicines can make you bleed too much during surgery. Some change how well surgery (anesthesia) drugs work.    Call your insurance company to let them know you're having surgery. (If you don't have insurance, call [phone].)    Call your clinic if there's any change in your health. This includes signs of a cold or flu (sore throat, runny nose,  cough, rash, fever). It also includes a scrape or scratch near the surgery site.    If you have questions on the day of surgery, call your hospital or surgery center.  Eating and drinking guidelines  For your safety: Unless your surgeon tells you otherwise, follow the guidelines below.    Eat and drink as usual until 8 hours before you arrive for surgery. After that, no food or milk.    Drink clear liquids until 2 hours before you arrive. These are liquids you can see through, like water, Gatorade, and Propel Water. They also include plain black coffee and tea (no cream or milk), candy, and breath mints. You can spit out gum when you arrive.    If you drink alcohol: Stop drinking it the night before surgery.    If your care team tells you to take medicine on the morning of surgery, it's okay to take it with a sip of water.  Preventing infection    Shower or bathe the night before and morning of your surgery. Follow the instructions your clinic gave you. (If no instructions, use regular soap.)    Don't shave or clip hair near your surgery site. We'll remove the hair if needed.    Don't smoke or vape the morning of surgery. You may chew nicotine gum up to 2 hours before surgery. A nicotine patch is okay.  ? Note: Some surgeries require you to completely quit smoking and nicotine. Check with your surgeon.    Your care team will make every effort to keep you safe from infection. We will:  ? Clean our hands often with soap and water (or an alcohol-based hand rub).  ? Clean the skin at your surgery site with a special soap that kills germs.  ? Give you a special gown to keep you warm. (Cold raises the risk of infection.)  ? Wear special hair covers, masks, gowns and gloves during surgery.  ? Give antibiotic medicine, if prescribed. Not all surgeries need antibiotics.  What to bring on the day of surgery    Photo ID and insurance card    Copy of your health care directive, if you have one    Glasses and hearing aids (bring  cases)  ? You can't wear contacts during surgery    Inhaler and eye drops, if you use them (tell us about these when you arrive)    CPAP machine or breathing device, if you use them    A few personal items, if spending the night    If you have . . .  ? A pacemaker, ICD (cardiac defibrillator) or other implant: Bring the ID card.  ? An implanted stimulator: Bring the remote control.  ? A legal guardian: Bring a copy of the certified (court-stamped) guardianship papers.  Please remove any jewelry, including body piercings. Leave jewelry and other valuables at home.  If you're going home the day of surgery    You must have a responsible adult drive you home. They should stay with you overnight as well.    If you don't have someone to stay with you, and you aren't safe to go home alone, we may keep you overnight. Insurance often won't pay for this.  After surgery  If it's hard to control your pain or you need more pain medicine, please call your surgeon's office.  Questions?   If you have any questions for your care team, list them here: _________________________________________________________________________________________________________________________________________________________________________ ____________________________________ ____________________________________ ____________________________________

## 2022-12-26 ENCOUNTER — LAB (OUTPATIENT)
Dept: LAB | Facility: CLINIC | Age: 47
End: 2022-12-26
Payer: COMMERCIAL

## 2022-12-26 DIAGNOSIS — R07.89 ATYPICAL CHEST PAIN: ICD-10-CM

## 2022-12-26 DIAGNOSIS — K20.0 EOSINOPHILIC ESOPHAGITIS: ICD-10-CM

## 2022-12-26 DIAGNOSIS — N31.9 NEUROGENIC BLADDER: ICD-10-CM

## 2022-12-26 LAB
ALBUMIN UR-MCNC: NEGATIVE MG/DL
ANION GAP SERPL CALCULATED.3IONS-SCNC: 7 MMOL/L (ref 3–14)
APPEARANCE UR: CLEAR
BACTERIA #/AREA URNS HPF: ABNORMAL /HPF
BILIRUB UR QL STRIP: NEGATIVE
BUN SERPL-MCNC: 9 MG/DL (ref 7–30)
CALCIUM SERPL-MCNC: 8.9 MG/DL (ref 8.5–10.1)
CHLORIDE BLD-SCNC: 106 MMOL/L (ref 94–109)
CO2 SERPL-SCNC: 24 MMOL/L (ref 20–32)
COLOR UR AUTO: YELLOW
CREAT SERPL-MCNC: 0.44 MG/DL (ref 0.66–1.25)
GFR SERPL CREATININE-BSD FRML MDRD: >90 ML/MIN/1.73M2
GLUCOSE BLD-MCNC: 90 MG/DL (ref 70–99)
GLUCOSE UR STRIP-MCNC: NEGATIVE MG/DL
HGB UR QL STRIP: NEGATIVE
KETONES UR STRIP-MCNC: NEGATIVE MG/DL
LEUKOCYTE ESTERASE UR QL STRIP: ABNORMAL
NITRATE UR QL: NEGATIVE
PH UR STRIP: 7 [PH] (ref 5–7)
POTASSIUM BLD-SCNC: 4.1 MMOL/L (ref 3.4–5.3)
RBC #/AREA URNS AUTO: ABNORMAL /HPF
SODIUM SERPL-SCNC: 137 MMOL/L (ref 133–144)
SP GR UR STRIP: 1.02 (ref 1–1.03)
SQUAMOUS #/AREA URNS AUTO: ABNORMAL /LPF
UROBILINOGEN UR STRIP-ACNC: 0.2 E.U./DL
WBC #/AREA URNS AUTO: ABNORMAL /HPF

## 2022-12-26 PROCEDURE — 87086 URINE CULTURE/COLONY COUNT: CPT

## 2022-12-26 PROCEDURE — 36415 COLL VENOUS BLD VENIPUNCTURE: CPT

## 2022-12-26 PROCEDURE — 81001 URINALYSIS AUTO W/SCOPE: CPT

## 2022-12-26 PROCEDURE — 80048 BASIC METABOLIC PNL TOTAL CA: CPT

## 2022-12-26 RX ORDER — OMEPRAZOLE 40 MG/1
CAPSULE, DELAYED RELEASE ORAL
Qty: 90 CAPSULE | Refills: 3 | Status: SHIPPED | OUTPATIENT
Start: 2022-12-26 | End: 2023-12-15

## 2022-12-26 RX ORDER — TOLTERODINE TARTRATE 2 MG/1
TABLET, EXTENDED RELEASE ORAL
Qty: 180 TABLET | Refills: 0 | Status: SHIPPED | OUTPATIENT
Start: 2022-12-26 | End: 2023-03-27

## 2022-12-26 NOTE — TELEPHONE ENCOUNTER
"Requested Prescriptions   Signed Prescriptions Disp Refills    omeprazole (PRILOSEC) 40 MG DR capsule 90 capsule 3     Sig: TAKE 1 CAPSULE(40 MG) BY MOUTH DAILY       PPI Protocol Passed - 12/26/2022  5:58 AM        Passed - Not on Clopidogrel (unless Pantoprazole ordered)        Passed - No diagnosis of osteoporosis on record        Passed - Recent (12 mo) or future (30 days) visit within the authorizing provider's specialty     Patient has had an office visit with the authorizing provider or a provider within the authorizing providers department within the previous 12 mos or has a future within next 30 days. See \"Patient Info\" tab in inbasket, or \"Choose Columns\" in Meds & Orders section of the refill encounter.              Passed - Medication is active on med list        Passed - Patient is age 18 or older          tolterodine (DETROL) 2 MG tablet 180 tablet 0     Sig: TAKE 1 TABLET(2 MG) BY MOUTH TWICE DAILY       Muscarinic Antagonists (Urinary Incontinence Agents) Passed - 12/26/2022  5:58 AM        Passed - Recent (12 mo) or future (30 days) visit within the authorizing provider's specialty     Patient has had an office visit with the authorizing provider or a provider within the authorizing providers department within the previous 12 mos or has a future within next 30 days. See \"Patient Info\" tab in inbasket, or \"Choose Columns\" in Meds & Orders section of the refill encounter.              Passed - Patient does not have a diagnosis of glaucoma on the problem list     If glaucoma diagnosis is new, refer refill to physician.          Passed - Medication is active on med list        Passed - Patient is 18 years of age or older           Nina Tenorio RN  [facility]     "

## 2022-12-27 LAB — BACTERIA UR CULT: NORMAL

## 2022-12-28 ENCOUNTER — MEDICAL CORRESPONDENCE (OUTPATIENT)
Dept: HEALTH INFORMATION MANAGEMENT | Facility: CLINIC | Age: 47
End: 2022-12-28

## 2022-12-28 NOTE — PROGRESS NOTES
DME (Durable Medical Equipment) Orders and Documentation  Orders Placed This Encounter   Procedures     Hospital Bed Order      The patient was assessed and it was determined the patient is in need of the following listed DME Supplies/Equipment. Please complete supporting documentation below to demonstrate medical necessity.         Riley has quadriplegia and needs frequent changes in body positioning to alleviate positioning issues and to prevent skin break down.   A change in bed height allows his caregivers to assist with care. They will need a bed height change to help with transfers in and out of bed.   Needs the head of the bed to incline more than 30 degrees due to esophageal reflux.     The use of cushions, pillows or wedges on a regular bed will not improve the patient's condition.   Jenny Fay PA-C

## 2022-12-29 ENCOUNTER — LAB (OUTPATIENT)
Dept: URGENT CARE | Facility: URGENT CARE | Age: 47
End: 2022-12-29
Attending: PHYSICIAN ASSISTANT
Payer: COMMERCIAL

## 2022-12-29 ENCOUNTER — TELEPHONE (OUTPATIENT)
Dept: UROLOGY | Facility: CLINIC | Age: 47
End: 2022-12-29

## 2022-12-29 DIAGNOSIS — N31.9 NEUROGENIC BLADDER: ICD-10-CM

## 2022-12-29 PROCEDURE — U0003 INFECTIOUS AGENT DETECTION BY NUCLEIC ACID (DNA OR RNA); SEVERE ACUTE RESPIRATORY SYNDROME CORONAVIRUS 2 (SARS-COV-2) (CORONAVIRUS DISEASE [COVID-19]), AMPLIFIED PROBE TECHNIQUE, MAKING USE OF HIGH THROUGHPUT TECHNOLOGIES AS DESCRIBED BY CMS-2020-01-R: HCPCS

## 2022-12-29 PROCEDURE — U0005 INFEC AGEN DETEC AMPLI PROBE: HCPCS

## 2022-12-29 NOTE — TELEPHONE ENCOUNTER
Called patient to schedule surgery with Dr. Hayes there was no answer, left voice message for patient to callback direct line to schedule.     Luna Cortés on 12/29/2022 at 11:36 AM

## 2022-12-30 LAB — SARS-COV-2 RNA RESP QL NAA+PROBE: NEGATIVE

## 2023-01-06 ENCOUNTER — MEDICAL CORRESPONDENCE (OUTPATIENT)
Dept: HEALTH INFORMATION MANAGEMENT | Facility: CLINIC | Age: 48
End: 2023-01-06

## 2023-01-06 ENCOUNTER — PATIENT OUTREACH (OUTPATIENT)
Dept: CARE COORDINATION | Facility: CLINIC | Age: 48
End: 2023-01-06

## 2023-01-06 NOTE — PROGRESS NOTES
Clinic Care Coordination Contact  [facility]: Post-Discharge Note  SITUATION                                                      Admission:    Admission Date: 01/03/23   Reason for Admission: spacity of muscle  Discharge:   Discharge Date: 01/04/23  Discharge Diagnosis: insertion of baclofen pump    BACKGROUND                                                      Per hospital discharge summary and inpatient provider notes:  The patient is a quadriplegic suffering from severe back spasms.  The pain clinic inserted a Baclofen Pump to help control the spasms.       ASSESSMENT           Discharge Assessment  How are you doing now that you are home?: a little sore but I am sure that it will get better with time.  The patient is not taking any pain meds at this time as they make him too drowsy.  How are your symptoms? (Red Flag symptoms escalate to triage hotline per guidelines): Unchanged  Do you feel your condition is stable enough to be safe at home until your provider visit?: Yes  Does the patient have their discharge instructions? : Yes  Does the patient have questions regarding their discharge instructions? : No  Were you started on any new medications or were there changes to any of your previous medications? : No  Do you have questions regarding any of your medications? : No  Do you have all of your needed medical supplies or equipment (DME)?  (i.e. oxygen tank, CPAP, cane, etc.): Yes  Discharge follow-up appointment scheduled within 14 calendar days? : Yes  Discharge Follow Up Appointment Date: 01/26/23  Discharge Follow Up Appointment Scheduled with?: Specialty Care Provider         Post-op (Clinicians Only)  Did the patient have surgery or a procedure: Yes  Incision: healing  Drainage: No  Bleeding: none  Fever: No  Chills: No  Redness: No  Warmth: No  Swelling: No  Incision site pain: Yes  Closure: dissolving  Eating & Drinking: eating and drinking without complaints/concerns  PO Intake: regular  diet  Bowel Function: normal  Urinary Status: voiding without complaint/concerns        PLAN                                                      Outpatient Plan:  The patient will make and attend the follow up appointment with his surgeon.    Future Appointments   Date Time Provider Department Center   1/26/2023  1:00 PM Nalini Resendez APRN CNP [facility]         For any urgent concerns, please contact our 24 hour nurse triage line: [phone] ([phone])         Robbin Vanegas MSN, RN, PHN, CCM   Primary Care Clinical RN Care Coordinator  [facility]  1/6/2023   11:36 AM  [email or web address]  Office: [phone]

## 2023-01-12 ENCOUNTER — MYC MEDICAL ADVICE (OUTPATIENT)
Dept: FAMILY MEDICINE | Facility: CLINIC | Age: 48
End: 2023-01-12

## 2023-01-12 DIAGNOSIS — K59.09 CHRONIC CONSTIPATION: Primary | ICD-10-CM

## 2023-01-12 RX ORDER — POLYETHYLENE GLYCOL 3350 17 G/17G
1-2 POWDER, FOR SOLUTION ORAL DAILY
Qty: 578 G | Refills: 11 | Status: SHIPPED | OUTPATIENT
Start: 2023-01-12 | End: 2024-01-16

## 2023-01-22 DIAGNOSIS — G89.29 CHRONIC MIDLINE THORACIC BACK PAIN: ICD-10-CM

## 2023-01-22 DIAGNOSIS — M54.6 CHRONIC MIDLINE THORACIC BACK PAIN: ICD-10-CM

## 2023-01-25 DIAGNOSIS — G82.50 QUADRIPLEGIA (H): ICD-10-CM

## 2023-01-25 RX ORDER — BACLOFEN 20 MG/1
TABLET ORAL
Qty: 360 TABLET | Refills: 0 | Status: SHIPPED | OUTPATIENT
Start: 2023-01-25 | End: 2023-04-25

## 2023-01-26 ENCOUNTER — OFFICE VISIT (OUTPATIENT)
Dept: PALLIATIVE MEDICINE | Facility: CLINIC | Age: 48
End: 2023-01-26
Payer: COMMERCIAL

## 2023-01-26 VITALS — SYSTOLIC BLOOD PRESSURE: 114 MMHG | HEART RATE: 101 BPM | DIASTOLIC BLOOD PRESSURE: 77 MMHG

## 2023-01-26 DIAGNOSIS — M54.6 PAIN IN THORACIC SPINE: Primary | ICD-10-CM

## 2023-01-26 DIAGNOSIS — G89.29 CHRONIC BILATERAL LOW BACK PAIN WITHOUT SCIATICA: ICD-10-CM

## 2023-01-26 DIAGNOSIS — R10.84 ABDOMINAL PAIN, GENERALIZED: ICD-10-CM

## 2023-01-26 DIAGNOSIS — M54.50 CHRONIC BILATERAL LOW BACK PAIN WITHOUT SCIATICA: ICD-10-CM

## 2023-01-26 DIAGNOSIS — G82.50 QUADRIPLEGIA (H): ICD-10-CM

## 2023-01-26 DIAGNOSIS — M79.2 NEUROPATHIC PAIN: ICD-10-CM

## 2023-01-26 PROCEDURE — 99213 OFFICE O/P EST LOW 20 MIN: CPT

## 2023-01-26 RX ORDER — PREGABALIN 50 MG/1
50 CAPSULE ORAL 3 TIMES DAILY
Qty: 60 CAPSULE | Refills: 1 | Status: SHIPPED | OUTPATIENT
Start: 2023-01-26 | End: 2023-07-26

## 2023-01-26 RX ORDER — PREGABALIN 25 MG/1
CAPSULE ORAL
Qty: 60 CAPSULE | Refills: 0 | Status: SHIPPED | OUTPATIENT
Start: 2023-01-26 | End: 2023-03-09

## 2023-01-26 ASSESSMENT — PAIN SCALES - GENERAL: PAINLEVEL: MODERATE PAIN (5)

## 2023-01-26 NOTE — PATIENT INSTRUCTIONS
Pain Physical Therapy:  NO   We will defer at this time, may consider referral in the future.   Pain Psychologist to address relaxation, behavioral change, coping style, and other factors important to improvement.  NO  Diagnostic Studies:  None ordered today.   Medication Management:   Start Lyrica 25 mg with dinner increase to goal dose of 50 mg with breakfast and dinner following the titration instructions below. Be sure to monitor for benefit, even subtle changes in pain over the next few weeks before baclofen pump implant. We will discuss continuation and possible increased dosage at follow up.   0 mg with breakfast   25 mg with dinner x 1 week, then increase to;  25 mg with breakfast   25 mg with dinner x 1 week, then increase to;  25 mg with breakfast   50 mg with dinner x 1 week, then increase to;   50 mg with breakfast   50 mg with dinner, this is goal.   *Be careful with driving/activities until you know how this medication affects you. Alcohol may enhance sedation effects, avoid concurrent use.   Potential procedures:   He had baclofen IT pump implant on 1/3/23 ([facility]).   Other Orders/Referrals:   We will consider PT referral, pending outcome from IT pump implant and recommendation of surgeon.   Follow up with SUJATA Armendariz CNP in 6-8 weeks in person or video visit is okay     ----------------------------------------------------------------  Clinic Number:  [phone]   Call with any questions about your care and for scheduling assistance.   Calls are returned Monday through Friday between 8 AM and 4:30 PM. We usually get back to you within 2 business days depending on the issue/request.    If we are prescribing your medications:  For opioid medication refills, call the clinic or send a [employer] message 7 days in advance.  Please include:  Name of requested medication  Name of the pharmacy.  For non-opioid medications, call your pharmacy directly to request a refill. Please allow 3-4 days to be  processed.   Per [place] State Law:  All controlled substance prescriptions must be filled within 30 days of being written.    For those controlled substances allowing refills, pickup must occur within 30 days of last fill.      We believe regular attendance is key to your success in our program!    Any time you are unable to keep your appointment we ask that you call us at least 24 hours in advance to cancel.This will allow us to offer the appointment time to another patient.   Multiple missed appointments may lead to dismissal from the clinic.

## 2023-01-26 NOTE — PROGRESS NOTES
[facility]     Date of visit: 1/26/2023      Assessment:   Riley Gifford is a 47 year old male with a past medical history significant for cervical spinal cord injury, quadriplegia, thoracic/lumbar pain, neurogenic bladder, recurrent UTI, spasticity, chronic constipation, s/p cervical fusion who presents with complaints of mid back (lower thoracic) and abdominal pain.      1. Thoracic/abdominal pain - Onset occurred following spinal cord injury in 1995, progressive worsening of symptoms over time. Etiology is likely mixed, with h/o cervical spinal cord injury (quadraplegic), neuropathic pain secondary to spinal cord injury, underlying degenerative changes of spine, and overlying myofascial component to some extent.     Visit Diagnoses:  1. Pain in thoracic spine    2. Quadriplegia (H)    3. Neuropathic pain    4. Abdominal pain, generalized    5. Chronic bilateral low back pain without sciatica        Plan:  Diagnosis reviewed, treatment option addressed, and risk/benifits discussed.  Self-care instructions given.  I am recommending a multidisciplinary treatment plan to help this patient better manage their pain.      Pain Physical Therapy:  NO   We will defer at this time, may consider referral in the future.   Pain Psychologist to address relaxation, behavioral change, coping style, and other factors important to improvement.  NO  Diagnostic Studies:  None ordered today.   Medication Management:     Start Lyrica 25 mg with dinner increase to goal dose of 50 mg with breakfast and dinner following the titration instructions below. Be sure to monitor for benefit, even subtle changes in pain over the next few weeks before baclofen pump implant. We will discuss continuation and possible increased dosage at follow up.   - 0 mg with breakfast   25 mg with dinner x 1 week, then increase to;  - 25 mg with breakfast   25 mg with dinner x 1 week, then increase to;  - 25 mg with breakfast   50 mg with  dinner x 1 week, then increase to;   - 50 mg with breakfast   50 mg with dinner, this is goal.   *Be careful with driving/activities until you know how this medication affects you. Alcohol may enhance sedation effects, avoid concurrent use.   Potential procedures:     He had baclofen IT pump implant on 1/3/23 ([facility]).   Other Orders/Referrals:     We will consider PT referral, pending outcome from IT pump implant and recommendation of surgeon.   Follow up with SUJATA Armendariz CNP in 6-8 weeks in person or video visit is okay       Review of Electronic Chart: Today I have also reviewed available medical information in the patient's medical record at [facility] and Care Everywhere (if available), including relevant provider notes, laboratory work, and imaging.     Nalini Resendez DNP, SUJATA, AGNP-C  [facility] Pain Management     -------------------------------------------------    Subjective:    Chief complaint:   Chief Complaint   Patient presents with     Pain       Interval history:  Riley Gifford is a 47 year old male last seen on 12/14/22.  They are a patient of mine seen in follow up.     Recommendations/plan at the last visit included:  Pain Physical Therapy:  NO   We will defer at this time, may consider referral in the future.      Pain Psychologist to address relaxation, behavioral change, coping style, and other factors important to improvement.  NO     Diagnostic Studies:  None ordered today.      Medication Management:     Start Lyrica 25 mg with dinner increase to goal dose of 50 mg with breakfast and dinner following the titration instructions below. Be sure to monitor for benefit, even subtle changes in pain over the next few weeks before baclofen pump implant. We will discuss continuation and possible increased dosage at follow up.   - 0 mg with breakfast   25 mg with dinner x 1 week, then increase to;  - 25 mg with breakfast   25 mg with dinner x 1 week, then increase to;  - 25  "mg with breakfast   50 mg with dinner x 1 week, then increase to;   - 50 mg with breakfast   50 mg with dinner, this is goal.    *Be careful with driving/activities until you know how this medication affects you. Alcohol may enhance sedation effects, avoid concurrent use.   Potential procedures:     He has baclofen IT pump implant scheduled on 1/3/23 ([facility] ).      Other Orders/Referrals:     We will consider PT referral, pending outcome from IT pump implant and recommendation of surgeon.   Follow up with SUJATA Armendariz CNP in 6 weeks (around end of January), in person or video visit is okay     Since his last visit, Riley Gifford reports:  -His pain about the same as last visit.   -He had an issue with Lyrica and was not able to start since last visit.   -He had baclofen ITP implant 1/3/23.  -He reports surgery went well but it is a slow process with dosage titration.   -He has started PT.     HPI from initial visit with me on 12/14/222:  Riley Gifford is a 47 year old male presents with a chief complaint of mid back and abdominal pain.      The pain has been present for many years, progressive worsening with time .    The pain is Moderate Pain (5) in severity.    The pain is described as dull, aching.   The pain is alleviated by position changes, reclining position.    It is exacerbated by \"none\".    Modalities that have been utilized in the past which were helpful include PT (somewhat helpful).    Things that were not helpful, but tried ,include PT (limited benefit), TENS unit.    The patient has never tried pool PT.  The patient denies red flag symptoms, does have h/o neurogenic bladder and constipation.      Riley Gifford has been seen at a pain clinic [facility] ([facility] ).     -Abdominal pain is more prominent right now.   -Muscle spasms are worsening, he has upcoming baclofen pump implant on 1/3/23 through [facility] [place] [facility])   -He has been waiting for a few years to get pump, delays with COVID.   -He got a new motorized wheelchair 2-3 months ago, has a recline function that is helpful.   -His pain has somewhat improved since getting chair, though no initial improvement, has appreciated over past few weeks.   -Pain seems pretty constant, no time of day more painful   -He tried TENS unit in the past that was not helpful.   -He tried gabapentin in the past, did not find helpful, was very sedating.   -He is using medical cannabis, primarily takes at bedtime only to help with sleep.   -He was using medical cannabis product twice daily, but has to minimize usage to bedtime due to cost.   -He takes baclofen and ditropan with breakfast.   -He takes baclofen and omeprazole at dinner  -He takes baclofen, detrol LA, amitriptyline, and tizanidine at bedtime.      -He lives in an apartment, assisted living facility.   -He has 24 hour care coverage, calls when needed.   -His nephew and family lives locally, sister lives in [place], other family still in [place].      Current Pain Treatments:       Pain Information:   Pain rating: averages 6/10 on a 0-10 scale.      Current Pain Treatments:    1. Medications:               Baclofen IT pump              Amitriptyline 50 mg at bedtime (PCP, dx chronic midline thoracic back pain)              Zolpidem (PCP, sleep)              Medical cannabis (certified through [facility] primary care, renewed recently), takes it at night, finds this most helpful for sleep              Lyrica 50 mg BID     2. Other therapies:               PT      Current MME: 0 (one prescription 1/4/23 [place] for postop pain.     Review of [place] Prescription Monitoring Program (): No concern for abuse or misuse of controlled medications based on this report. Reviewed - appears appropriate.       Past pain treatments:  Baclofen       Medications:  Current Outpatient Medications   Medication Sig Dispense Refill      Acetaminophen (TYLENOL PO) Take 500 mg by mouth as needed for mild pain or fever Reported on 2/15/2017       albuterol (PROAIR HFA/PROVENTIL HFA/VENTOLIN HFA) 108 (90 Base) MCG/ACT inhaler Inhale 1-2 puffs into the lungs every 4 hours as needed for shortness of breath / dyspnea or wheezing 8.5 g 11     alum & mag hydroxide-simethicone (MYLANTA/MAALOX) 200-200-20 MG/5ML SUSP suspension Take 30 mLs by mouth  0     amitriptyline (ELAVIL) 25 MG tablet TAKE 2 TABLETS(50 MG) BY MOUTH AT BEDTIME 180 tablet 0     baclofen (LIORESAL) 20 MG tablet TAKE 1 TABLET(20 MG) BY MOUTH FOUR TIMES DAILY 360 tablet 0     budesonide (PULMICORT) 1 MG/2ML neb solution TAKE 1 CAPSULE BY MOUTH TWICE DAILY IN SYRUP       CARAFATE 1 GM/10ML PO suspension        cetirizine (ZYRTEC) 10 MG tablet Take 10 mg by mouth daily       COMPOUNDED NON-CONTROLLED SUBSTANCE (CMPD RX) - PHARMACY TO MIX COMPOUNDED MEDICATION Instill 30 mls into empty bladder at bedtime. Leave in the bladder overnight and drain in the morning. 900 mL 11     docusate sodium (ENEMEEZ MINI) 283 MG enema USE 1 EVERY OTHER DAY 1 enema 11     hydrocortisone, Perianal, (ANUSOL-HC) 2.5 % cream APPLY RECTALLY TO THE AFFECTED AREA TWICE DAILY 30 g 11     lidocaine (XYLOCAINE) 5 % external ointment Apply topically as needed for moderate pain 2500 g 0     omeprazole (PRILOSEC) 40 MG DR capsule TAKE 1 CAPSULE(40 MG) BY MOUTH DAILY 90 capsule 3     phenylephrine-cocoa butter (PREPARATION H) 0.25-88.44 % suppository Insert one suppository rectally twice daily as needed. 48 suppository 3     polyethylene glycol (MIRALAX) 17 GM/Dose powder Take 17-34 g (1-2 capfuls) by mouth daily 578 g 11     polyethylene glycol (MIRALAX) powder Take 17 g (1 capful) by mouth daily as needed for constipation 510 g 1     pregabalin (LYRICA) 25 MG capsule Start 25 mg with dinner x 1 week, then increase to goal dose of 50 mg twice daily per clinic instruction detailed in after visit summary. 60 capsule 0      pregabalin (LYRICA) 50 MG capsule Take 1 capsule (50 mg) by mouth 3 times daily 60 capsule 1     sodium chloride 0.9% (bottle) 1,000 mL with gentamicin 500 mg irrigation 480 mg of Gentamicin in 1 L of NS. Please instill 60 mL of Gentamicin solution into bladder at HS. 1800 mL 4     sodium phosphate (FLEET ENEMA) 7-19 GM/118ML rectal enema Place 1 Bottle (1 enema) rectally daily as needed for constipation 1 Bottle 0     tiZANidine (ZANAFLEX) 4 MG tablet Take 4 mg by mouth 2 mg twice a day, 4 mg at night, another 2 mg PRN during the day       tolterodine (DETROL) 2 MG tablet TAKE 1 TABLET(2 MG) BY MOUTH TWICE DAILY 180 tablet 0     zolpidem (AMBIEN) 10 MG tablet TAKE 1 TABLET(10 MG) BY MOUTH EVERY NIGHT AS NEEDED FOR SLEEP 90 tablet 0       Medical History: any changes in medical history since they were last seen? Yes - baclofen ITP implant 1/3/23.       Objective:    Physical Exam:  Blood pressure (!) 87/58, pulse 99.  Constitutional: Well developed, well nourished, appears stated age.  Gait: motorized wheelchair   HEENT: Head atraumatic, normocephalic. Eyes without conjunctival injection or jaundice. Oropharynx clear. Neck supple. No obvious neck masses.  Skin: No rash, lesions, or petechiae of exposed skin.   Psychiatric/mental status: Alert, without lethargy or stupor. Speech fluent. Appropriate affect. Mood normal. Able to follow commands without difficulty.     Imaging:  None     BILLING TIME DOCUMENTATION:   The total TIME spent on this patient on the date of the encounter/appointment was 25 minutes.      TOTAL TIME includes:   Time spent preparing to see the patient (reviewing records and tests)   Time spent face to face (or over the phone) with the patient   Time spent ordering tests, medications, procedures and referrals   Time spent Referring and communicating with other healthcare professionals   Time spent documenting clinical information in Epic

## 2023-02-03 ENCOUNTER — TELEPHONE (OUTPATIENT)
Dept: UROLOGY | Facility: CLINIC | Age: 48
End: 2023-02-03
Payer: COMMERCIAL

## 2023-02-03 DIAGNOSIS — Z01.812 PRE-PROCEDURE LAB EXAM: Primary | ICD-10-CM

## 2023-02-03 DIAGNOSIS — N31.9 NEUROGENIC BLADDER: ICD-10-CM

## 2023-02-03 DIAGNOSIS — N39.0 RECURRENT UTI: ICD-10-CM

## 2023-02-03 NOTE — CONFIDENTIAL NOTE
"Pt called. Pt states he has not scheduled his pre-op and would like to reschedule. He recently had a Baclofen pump placed and \"wants to get it figured out first\". Message routed to surgery scheduling. Pt reminded he will need a pre-op and a urine sample. He expressed understanding.    Corin Le CMA  02/03/23  2:38 PM    "

## 2023-02-07 ENCOUNTER — PATIENT OUTREACH (OUTPATIENT)
Dept: CARE COORDINATION | Facility: CLINIC | Age: 48
End: 2023-02-07
Payer: COMMERCIAL

## 2023-02-07 ASSESSMENT — ACTIVITIES OF DAILY LIVING (ADL): DEPENDENT_IADLS:: CLEANING;COOKING;LAUNDRY;SHOPPING;MEAL PREPARATION;MEDICATION MANAGEMENT

## 2023-02-07 NOTE — LETTER
[facility]  Patient Centered Plan of Care  About Me:        Patient Name:  Riley Gifford    YOB: 1975  Age:         47 year old   [facility]ut MRN:    [id number] Telephone Information:  Home Phone [phone]   Mobile [phone]   Mobile [phone]       Address:  [address] [place] [address] [address]  [place] [address] Email address:  [email or web address]      Emergency Contact(s)    Name Relationship Lgl Grd Work Phone Home Phone Mobile Phone   1. TREY GIFFORD (NE* Relative No noen none [phone]   2. AGUSTO CAVAZOS Sister   [phone]    3. HODA GIFFORD Brother No  [phone]            Primary language:  English     needed? No   [place] [employer] [facility]:  [phone] op. 1  Other communication barriers:Glasses    Preferred Method of Communication:  Lisa  Current living arrangement: I live in assisted living    Mobility Status/ Medical Equipment: Dependent/Assisted by Another        Health Maintenance  Health Maintenance Reviewed: Due/Overdue   Health Maintenance Due   Topic Date Due     HEPATITIS B IMMUNIZATION (1 of 3 - 3-dose series) Never done           My Access Plan  Medical Emergency 911   Primary Clinic Line [facility] [phone]   24 Hour Appointment Line [phone] or  [phone]) (toll-free)   24 Hour Nurse Line [phone] (toll-free)   Preferred Urgent Care [facility] [phone]     [facility]  [phone]     Preferred Pharmacy [id number] [place] - [address] AT [facility] [address]     Behavioral Health Crisis Line The National Suicide Prevention Lifeline at 1-720.261.7327 or Text/Call 818             My Care Team Members  Patient Care Team       Relationship Specialty Notifications Start End    Jenny Fay PA-C PCP - General Family Medicine  12/21/21     Phone: [phone]  Fax: [phone]         [address] [facility] [address]    Robbin Andino, RN Lead Care Coordinator Nurse Admissions 1/6/16     phone:  [phone]    Phone: [phone] Fax: [phone]        Rober Leo MD Referring Physician Urology  1/8/16     Phone: [phone] Fax: [phone] [address] [facility] [address]    Kimberly Zabala MD MD Urology  1/8/16      INACTIVE IN [place] AS OF 4/30/2021    Amanda Other (see comments)   1/8/16     [facility]     Phone: [phone]         Evaristo Hayes MD MD Urology  4/19/16     Phone: [phone] Fax: [phone]         [address] [facility] [address] [facility] [address]    Jenny Wright, RN Registered Nurse Urology  4/19/16     Rosemary Thomas, RN Nurse Coordinator Physical Medicine and Rehabilitation  3/30/17     Phone: [phone] Pager: [phone]        Lulú Reveles APRN CNP Nurse Practitioner Nurse Practitioner  8/28/20     Phone: [phone] Fax: [phone]         [address] [facility] [address] [facility] [address]    [facility] Occupational Therapist Occupational Therapy  1/7/22     [facility] Shanique Alberto  fax [phone]    Phone: [phone] Fax: [phone]         [address] [facility] [address]    Evarisot Hayes MD MD Urology  1/17/22     Phone: [phone] Fax: [phone]         [address] [facility] [address] [facility] [address]    Yessy Tapia, RN Specialty Care Coordinator   1/17/22     Jenny Fay, PA-C Assigned PCP   4/3/22     Phone: [phone] Fax: [phone] [address] [place] [address]    Raffy Hernández OD Assigned Surgical Provider   11/12/22     Phone: [phone] Fax: [phone] [address] [place] [address]            My Care Plans  Self Management and Treatment Plan  Care Plan  Care Plan: General  take medications as prescribed      Problem: HP GENERAL PROBLEM     Goal: General Goal -  I will take all medications as prescribed by the providers.     Start Date: 7/22/2022 Expected End Date: 7/22/2023    This Visit's Progress: 60% Recent Progress: 60%    Note:     Barriers: wheelchair bound  Strengths: engaged in care coordination  Patient expressed understanding of goal: yes  Action steps to achieve this goal:  1. I will take all medications as prescribed.  2. I will ask any questions that I have regarding new medications.  3. I will work with the RN CC if I have any difficulty getting refills.                     Care Plan: General -  Make and attend follow up appointments     Problem: HP GENERAL PROBLEM     Goal: General Goal - I will make and attend all recommended appointments from my providers.     Start Date: 7/22/2022 Expected End Date: 7/22/2023    This Visit's Progress: 60% Recent Progress: 60%    Note:     Barriers: Many providers.  Strengths: engaged in care coordination  Patient expressed understanding of goal: yes  Action steps to achieve this goal:  1. I will make all of the recommended follow up appointments.  2. I will attend all of the follow up appointments as recommended by the providers.                         Action Plans on File:            Depression          Advance Care Plans/Directives Type:   Advanced Directive - On File      My Medical and Care Information  Problem List   Patient Active Problem List   Diagnosis     Vitamin D deficiency     Eosinophilic esophagitis     Neurogenic bladder     CARDIOVASCULAR SCREENING; LDL GOAL LESS THAN 160     Quadriplegia (H)     Chronic constipation     Internal hemorrhoids with other complication     Diagnostic skin and sensitization tests(aka ALLERGENS)     Anal or rectal pain     Allergic rhinitis due to animal dander     Allergy to mold spores     House dust mite allergy     Insomnia     Health Care Home     Gallstones     Chronic midline thoracic back pain     Pulmonary nodules      ACP (advance care planning)     Cervical spinal cord injury, sequela (H)     Spasticity     Self-catheterizes urinary bladder     Bleeding external hemorrhoids     History of claustrophobia     Recurrent UTI      Current Medications and Allergies:  See printed Medication Report.    Care Coordination Start Date: 1/6/2016   Frequency of Care Coordination: monthly     Form Last Updated: 02/07/2023

## 2023-02-07 NOTE — PROGRESS NOTES
Clinic Care Coordination Contact  [facility]/Voicemail       Clinical Data: Care Coordinator Outreach  Outreach attempted x 1.  Left message on patient's voicemail with call back information and requested return call.  Plan: Care Coordinator sent care coordination introduction letter on 3/24/21 via [employer]. Care Coordinator will try to reach patient again in 3-5 business days.    Robbin Vanegas MSN, RN, PHN, CCM   Primary Care Clinical RN Care Coordinator  [facility]  2/7/2023   10:06 AM  [email or web address]  Office: [phone]

## 2023-02-07 NOTE — PROGRESS NOTES
Clinic Care Coordination Contact    Clinic Care Coordination Contact  OUTREACH    Referral Information:  Referral Source: PCP    Primary Diagnosis: Chronic Pain (wound care)    Chief Complaint   Patient presents with     Clinic Care Coordination - Initial     Annual Assessment         Universal Utilization: The patient uses the [facility] system and the [facility] as well as the [facility].  Clinic Utilization  Difficulty keeping appointments:: No  Compliance Concerns: No  No-Show Concerns: No  No PCP office visit in Past Year: No  Utilization    Hospital Admissions  0             ED Visits  0             No Show Count (past year)  0                Current as of: 2/7/2023  2:49 AM              Clinical Concerns:  Current Medical Concerns: The RN CC nurse care coordinator contacted the patient by phone for an annual assessment.  The patient recently had a baclofen pump insertion so therefore they are increasing his pump dose and decreasing his oral dose of the baclofen.  At this point the patient is not noticing as much of a change as he expected.  The patient was also started on Lyrica which again is not showing much of a difference.  The RN CC reinforced with the patient that all of this could take some time to feel the full effect of the medications.  The patient stated understanding.   The Patient states that he will be needing the authorization for the additional catheters and that Amanda will need to send the request to Jenny Fay.  The RN CC reassured the patient to have the request sent.  If Amanda needs additional information to reach out to me.      Patient Active Problem List   Diagnosis     Vitamin D deficiency     Eosinophilic esophagitis     Neurogenic bladder     CARDIOVASCULAR SCREENING; LDL GOAL LESS THAN 160     Quadriplegia (H)     Chronic constipation     Internal hemorrhoids with other complication     Diagnostic skin and sensitization tests(aka ALLERGENS)     Anal or rectal pain      Allergic rhinitis due to animal dander     Allergy to mold spores     House dust mite allergy     Insomnia     Health Care Home     Gallstones     Chronic midline thoracic back pain     Pulmonary nodules     ACP (advance care planning)     Cervical spinal cord injury, sequela (H)     Spasticity     Self-catheterizes urinary bladder     Bleeding external hemorrhoids     History of claustrophobia     Recurrent UTI       Current Behavioral Concerns: None currently noted.  Education Provided to patient: Reviewed with the patient that changing medications and/or dosages of medications takes time for them to kick in.  Pain  Pain (GOAL):: Yes  Type: Chronic (>3mo)  Location of chronic pain:: back and sacral area, neck area  Radiating: Yes  Location pain radiates to: down spine  Progression: Unchanged  Description of pain: Stabbing, Shooting  Chronic pain severity:: 5  Limitation of routine activities due to chronic pain:: Yes  Description: Unable to perform most daily activities (chores, hobbies, social activities, driving)  Alleviating Factors: Pain Medication  Aggravating Factors: Positioning, Breathing  Health Maintenance Reviewed: Due/Overdue   Health Maintenance Due   Topic Date Due     HEPATITIS B IMMUNIZATION (1 of 3 - 3-dose series) Never done       Clinical Pathway: None    Medication Management:  Medication review status: Medications reviewed and no changes reported per patient.        Patient is knowledgeable on medications and is adherent.  No financial concerns reported at this time.  Medication review was completed with the patient and there are no questions or concerns at this time.       Functional Status:  Dependent ADLs:: Wheelchair-independent, Bathing, Dressing, Grooming, Positioning, Transfers, Toileting  Dependent IADLs:: Cleaning, Cooking, Laundry, Shopping, Meal Preparation, Medication Management  Bed or wheelchair confined:: Yes  Mobility Status: Dependent/Assisted by Another  Fallen 2 or more  times in the past year?: No  Any fall with injury in the past year?: No    Living Situation:  Current living arrangement:: I live in assisted living  Type of residence:: Assisted living    Lifestyle & Psychosocial Needs:    Social Determinants of Health     Tobacco Use: Low Risk      Smoking Tobacco Use: Never     Smokeless Tobacco Use: Never     Passive Exposure: Not on file   Alcohol Use: Not on file   Financial Resource Strain: Not on file   Food Insecurity: No Food Insecurity     Worried About Running Out of Food in the Last Year: Never true     Ran Out of Food in the Last Year: Never true   Transportation Needs: No Transportation Needs     Lack of Transportation (Medical): No     Lack of Transportation (Non-Medical): No   Physical Activity: Not on file   Stress: Not on file   Social Connections: Not on file   Intimate Partner Violence: Not on file   Depression: Not at risk     PHQ-2 Score: 1   Housing Stability: Low Risk      Unable to Pay for Housing in the Last Year: No     Number of Places Lived in the Last Year: 1     Unstable Housing in the Last Year: No     Diet:: Regular  Inadequate nutrition (GOAL):: No  Tube Feeding: No  Inadequate activity/exercise (GOAL):: No  Significant changes in sleep pattern (GOAL): No  Transportation means:: Accessible car  Financial/Insurance concerns (GOAL):: No  Scientologist or spiritual beliefs that impact treatment:: No  Mental health DX:: No  Mental health management concern (GOAL):: No  Chemical Dependency Status: No Current Concerns  Informal Support system:: Family, Friends             Resources and Interventions:  Current Resources:   Skilled Home Care Services: Skilled Nursing  Community Resources: PCA  Supplies Currently Used at Home: Other  Equipment Currently Used at Home: wheelchair, power, other (see comments), commode chair, hospital bed, shower chair, transfer board, tub bench  Employment Status: disabled         Advance Care Plan/Directive  Advanced Care  Plans/Directives on file:: Yes  Type Advanced Care Plans/Directives: Advanced Directive - On File    Referrals Placed: None         Care Plan:  Care Plan: General  take medications as prescribed     Problem: HP GENERAL PROBLEM     Goal: General Goal -  I will take all medications as prescribed by the providers.     Start Date: 7/22/2022 Expected End Date: 7/22/2023    This Visit's Progress: 60% Recent Progress: 60%    Note:     Barriers: wheelchair bound  Strengths: engaged in care coordination  Patient expressed understanding of goal: yes  Action steps to achieve this goal:  1. I will take all medications as prescribed.  2. I will ask any questions that I have regarding new medications.  3. I will work with the RN CC if I have any difficulty getting refills.                     Care Plan: General -  Make and attend follow up appointments     Problem: HP GENERAL PROBLEM     Goal: General Goal - I will make and attend all recommended appointments from my providers.     Start Date: 7/22/2022 Expected End Date: 7/22/2023    This Visit's Progress: 60% Recent Progress: 60%    Note:     Barriers: Many providers.  Strengths: engaged in care coordination  Patient expressed understanding of goal: yes  Action steps to achieve this goal:  1. I will make all of the recommended follow up appointments.  2. I will attend all of the follow up appointments as recommended by the providers.                        Patient/Caregiver understanding: Patient has a very good understanding of the disease process.    Outreach Frequency: monthly  Future Appointments              In 1 month Nalini Resendez APRN CNP [facility] [place], YADIRA PAIN BLAI          Plan: 1.  The patient will continue to follow the directions from the pain management as far as lowering the oral dose of baclofen and increasing the pump dose.  2.  The patient will make and attend all recommended follow-up appointments.  3.  The patient will take all medications  as prescribed by the providers.        Robbin Vanegas MSN, RN, PHN, CCM   Primary Care Clinical RN Care Coordinator  [facility]  2/7/2023   12:05 PM  [email or web address]  Office: [phone]

## 2023-02-10 ENCOUNTER — HOSPITAL ENCOUNTER (OUTPATIENT)
Facility: AMBULATORY SURGERY CENTER | Age: 48
Discharge: HOME | End: 2023-02-10
Attending: STUDENT IN AN ORGANIZED HEALTH CARE EDUCATION/TRAINING PROGRAM
Payer: COMMERCIAL

## 2023-02-16 ENCOUNTER — PATIENT OUTREACH (OUTPATIENT)
Dept: CARE COORDINATION | Facility: CLINIC | Age: 48
End: 2023-02-16
Payer: COMMERCIAL

## 2023-02-16 NOTE — PROGRESS NOTES
Clinic Care Coordination Contact  Care Team Conversations    The patient has a need for a letter of medical necessity regarding his catheters.  Please include:     3 UTI during the year 2022  Dr. Giraldo injects Botox to decrease bladder spasms.  The patient irrigates bladder daily with Gentamycin.  The patient straight caths 8-9/day.      Please fax attn Amanda to [phone]        Robbin Vanegas MSN, RN, PHN, [facility]   Primary Care Clinical RN Care Coordinator  [facility]  2/16/2023   9:51 AM  [email or web address]  Office: [phone]       Universal Safety Interventions

## 2023-02-16 NOTE — LETTER
February 16, 2023      Riley Gifford  1975  [address] [place] [address] [place]   [place] [address]        To Whom It May Concern,     Riley Gifford is a patient under my care. He has a diagnosis of quadriplegia with a neurogenic bladder. He requires catheter supplies for his urinary self catheterization program. He uses a straight catheter to expel urine 8-9 times per day.      In 2022 he had 3 UTI's treated with antibiotics. Dr. Giraldo injects Botox to decrease bladder spasms for patient. He irrigates his bladder daily with Gentamycin to help prevent infections.           I, the undersigned, certify that the above prescribed supplies for self catheterization are medically necessary for this patient and is both reasonable and necessary in reference to accepted standards of medical and necessary in reference to accepted standards of medical practice in the treatment of this patient's condition and is not prescribed as a convenience.     Sincerely,    Jenny Fay PA-C

## 2023-03-02 ENCOUNTER — TELEPHONE (OUTPATIENT)
Dept: FAMILY MEDICINE | Facility: CLINIC | Age: 48
End: 2023-03-02
Payer: COMMERCIAL

## 2023-03-02 NOTE — TELEPHONE ENCOUNTER
Received call from a nurse care coordinator with [facility]. (This is with [employer] medical assistance/insurance).    She is calling to request fax number for Jenny Fay PA-C. Gave her fax number ([phone]).     She states that she is going to be faxing a from over for PCP to complete.   Pt uses 250 urinary catheters every month and she needs to complete a benefit exception. She will fax the form over that needs some information from provider.     Arabella Mancuso RN   [facility]

## 2023-03-08 DIAGNOSIS — F51.01 PRIMARY INSOMNIA: ICD-10-CM

## 2023-03-08 RX ORDER — ZOLPIDEM TARTRATE 10 MG/1
TABLET ORAL
Qty: 90 TABLET | Refills: 0 | Status: SHIPPED | OUTPATIENT
Start: 2023-03-08 | End: 2023-06-16

## 2023-03-09 ENCOUNTER — PATIENT OUTREACH (OUTPATIENT)
Dept: CARE COORDINATION | Facility: CLINIC | Age: 48
End: 2023-03-09

## 2023-03-09 ENCOUNTER — TELEPHONE (OUTPATIENT)
Dept: PALLIATIVE MEDICINE | Facility: CLINIC | Age: 48
End: 2023-03-09

## 2023-03-09 ENCOUNTER — VIRTUAL VISIT (OUTPATIENT)
Dept: PALLIATIVE MEDICINE | Facility: CLINIC | Age: 48
End: 2023-03-09
Payer: COMMERCIAL

## 2023-03-09 DIAGNOSIS — M79.2 NEUROPATHIC PAIN: ICD-10-CM

## 2023-03-09 DIAGNOSIS — R10.84 ABDOMINAL PAIN, GENERALIZED: ICD-10-CM

## 2023-03-09 DIAGNOSIS — M54.6 PAIN IN THORACIC SPINE: ICD-10-CM

## 2023-03-09 DIAGNOSIS — G82.50 QUADRIPLEGIA (H): ICD-10-CM

## 2023-03-09 PROCEDURE — 99215 OFFICE O/P EST HI 40 MIN: CPT | Mod: VID

## 2023-03-09 RX ORDER — PREGABALIN 25 MG/1
CAPSULE ORAL
Qty: 180 CAPSULE | Refills: 0 | Status: SHIPPED | OUTPATIENT
Start: 2023-03-09 | End: 2024-06-26 | Stop reason: DRUGHIGH

## 2023-03-09 ASSESSMENT — PAIN SCALES - GENERAL: PAINLEVEL: MODERATE PAIN (4)

## 2023-03-09 NOTE — PATIENT INSTRUCTIONS
Pain Physical Therapy:  NO   We will defer at this time, may consider referral in the future.   Pain Psychologist to address relaxation, behavioral change, coping style, and other factors important to improvement.  NO  Diagnostic Studies:  None ordered today.   Medication Management:   Start Lyrica 25 mg with dinner increase to goal dose of 50 mg with breakfast and dinner following the titration instructions below. Be sure to monitor for benefit, even subtle changes in pain over the next few weeks before baclofen pump implant. We will discuss continuation and possible increased dosage at follow up.   50 mg at noon and 75 mg at bedtime x 2 weeks, then increase to;  50 mg at noon and  100 mg at bedtime, this is goal.   *Be careful with driving/activities until you know how this medication affects you. Alcohol may enhance sedation effects, avoid concurrent use.   Potential procedures:   He had baclofen IT pump implant on 1/3/23 ([facility]).   Other Orders/Referrals:   We will consider PT referral, pending outcome from IT pump implant and recommendation of surgeon.   Follow up with SUJATA Armendariz CNP in 6-8 weeks in person or video visit is okay     ----------------------------------------------------------------  Clinic Number:  [phone]   Call with any questions about your care and for scheduling assistance.   Calls are returned Monday through Friday between 8 AM and 4:30 PM. We usually get back to you within 2 business days depending on the issue/request.    If we are prescribing your medications:  For opioid medication refills, call the clinic or send a "[employer] message 7 days in advance.  Please include:  Name of requested medication  Name of the pharmacy.  For non-opioid medications, call your pharmacy directly to request a refill. Please allow 3-4 days to be processed.   Per [place] State Law:  All controlled substance prescriptions must be filled within 30 days of being written.    For those controlled  substances allowing refills, pickup must occur within 30 days of last fill.      We believe regular attendance is key to your success in our program!    Any time you are unable to keep your appointment we ask that you call us at least 24 hours in advance to cancel.This will allow us to offer the appointment time to another patient.   Multiple missed appointments may lead to dismissal from the clinic.

## 2023-03-09 NOTE — CONFIDENTIAL NOTE
PA needed for Lyrica 25 mg caps #6 per day. Patient has SCI and reports trouble with dysphagia. He is able to swallow 25 mg caps without difficulty and has concerns about size of 50 mg or 100 mg strength capsules.     Nalini Resendez, KENDELL, APRN, AGNP-C  [facility]

## 2023-03-09 NOTE — PROGRESS NOTES
Clinic Care Coordination Contact    Follow Up Progress Note      Assessment: The RN CC nurse care coordinator contacted the patient by phone for a follow up call.  The patient has been working with PT as well as pain management to have the baclofen pump adjusted.  Per the patient he was also put on Lyrica which has helped some especially with his sleep.  Therefore he was given an extra 10 mg tablet to take as needed for sleep.  Per the patient this is helping.  The patient is very aware that it is a work in progress.      Care Gaps:    Health Maintenance Due   Topic Date Due     HEPATITIS B IMMUNIZATION (1 of 3 - 3-dose series) Never done       Care Gaps Last addressed on 3/8/23    Care Plans  Care Plan: General  take medications as prescribed     Problem: HP GENERAL PROBLEM     Goal: General Goal -  I will take all medications as prescribed by the providers.     Start Date: 7/22/2022 Expected End Date: 7/22/2023    This Visit's Progress: 60% Recent Progress: 60%    Note:     Barriers: wheelchair bound  Strengths: engaged in care coordination  Patient expressed understanding of goal: yes  Action steps to achieve this goal:  1. I will take all medications as prescribed.  2. I will ask any questions that I have regarding new medications.  3. I will work with the RN CC if I have any difficulty getting refills.                     Care Plan: General -  Make and attend follow up appointments     Problem: HP GENERAL PROBLEM     Goal: General Goal - I will make and attend all recommended appointments from my providers.     Start Date: 7/22/2022 Expected End Date: 7/22/2023    This Visit's Progress: 60% Recent Progress: 60%    Note:     Barriers: Many providers.  Strengths: engaged in care coordination  Patient expressed understanding of goal: yes  Action steps to achieve this goal:  1. I will make all of the recommended follow up appointments.  2. I will attend all of the follow up appointments as recommended by the  providers.                        Intervention/Education provided during outreach: The RN CC reviewed the care gaps.     Outreach Frequency: monthly        Plan:   1.  The patient will continue to work with pain management on adjustments of medication dosing.  2.  The patient will make and attend all recommended follow-up appointments.  3.  The patient will take all medications as prescribed by the providers inclusive of the decreasing dose on certain medications.    RN CC Nurse Care Coordinator will follow up in 30 days.          Robbin Vanegas MSN, RN, PHN, CCM   Primary Care Clinical RN Care Coordinator  [facility]  3/9/2023   2:06 PM  [email or web address]  Office: [phone]

## 2023-03-09 NOTE — PROGRESS NOTES
Riley is a 47 year old who is being evaluated via a billable video visit.      How would you like to obtain your AVS? MyChart  If the video visit is dropped, the invitation should be resent by: Send to e-mail at: [email or web address]  Will anyone else be joining your video visit? No   Is Pt currently in [place]? Yes    Philip Herzog MA      NOTE:  If Pt is not in [place], Appointment needs to be canceled and rescheduled.          Video-Visit Details    Type of service:  Video Visit     Originating Location (pt. Location): Home    Distant Location (provider location):  On-site  Platform used for Video Visit: [employer]     [facility]     Date of visit: 3/9/2023      Assessment:   Riley Gifford is a 47 year old male with a past medical history significant for cervical spinal cord injury, quadriplegia, thoracic/lumbar pain, neurogenic bladder, recurrent UTI, spasticity, chronic constipation, s/p cervical fusion who presents with complaints of mid back (lower thoracic) and abdominal pain.      1. Thoracic/abdominal pain - Onset occurred following spinal cord injury in 1995, progressive worsening of symptoms over time. Etiology is likely mixed, with h/o cervical spinal cord injury (quadraplegic), neuropathic pain secondary to spinal cord injury, underlying degenerative changes of spine, and overlying myofascial component to some extent.     Visit Diagnoses:  No diagnosis found.    Plan:  Diagnosis reviewed, treatment option addressed, and risk/benifits discussed.  Self-care instructions given.  I am recommending a multidisciplinary treatment plan to help this patient better manage their pain.      Pain Physical Therapy:  NO   We will defer at this time, may consider referral in the future.   Pain Psychologist to address relaxation, behavioral change, coping style, and other factors important to improvement.  NO  Diagnostic Studies:  None ordered today.   Medication Management:     Start Lyrica 25 mg with  dinner increase to goal dose of 50 mg with breakfast and dinner following the titration instructions below. Be sure to monitor for benefit, even subtle changes in pain over the next few weeks before baclofen pump implant. We will discuss continuation and possible increased dosage at follow up.   - 50 mg at noon and 75 mg at bedtime x 2 weeks, then increase to;  - 50 mg at noon and  100 mg at bedtime, this is goal.   *Be careful with driving/activities until you know how this medication affects you. Alcohol may enhance sedation effects, avoid concurrent use.   Potential procedures:     He had baclofen IT pump implant on 1/3/23 ([facility]).   Other Orders/Referrals:     We will consider PT referral, pending outcome from IT pump implant and recommendation of surgeon.   Follow up with SUJATA Armendariz CNP in 6-8 weeks in person or video visit is okay     Visit discussion: He has ongoing issues with dysphagia, reports trouble with PO medications sometimes. He reports Lyrica 25 mg caps are small enough for him and prefers to take multiple smaller caps. I encouraged him to speak with the pharmacy, as it is possible we may run into insurance coverage issues with #5-6 caps/day.     Review of Electronic Chart: Today I have also reviewed available medical information in the patient's medical record at [facility] ([facility]) and Care Everywhere (if available), including relevant provider notes, laboratory work, and imaging.     Nalini Resendez, KENDELL, APRN, AGNP-C  [facility]     -------------------------------------------------    Subjective:    Chief complaint:   Chief Complaint   Patient presents with     Pain       Interval history:  Riley Gifford is a 47 year old male last seen on 1/26/23.  They are a patient of mine seen in follow up.     Recommendations/plan at the last visit included:  Pain Physical Therapy:  NO   We will defer at this time, may consider referral in the future.   Pain Psychologist to  address relaxation, behavioral change, coping style, and other factors important to improvement.  NO  Diagnostic Studies:  None ordered today.   Medication Management:     Start Lyrica 25 mg with dinner increase to goal dose of 50 mg with breakfast and dinner following the titration instructions below. Be sure to monitor for benefit, even subtle changes in pain over the next few weeks before baclofen pump implant. We will discuss continuation and possible increased dosage at follow up.   - 0 mg with breakfast   25 mg with dinner x 1 week, then increase to;  - 25 mg with breakfast   25 mg with dinner x 1 week, then increase to;  - 25 mg with breakfast   50 mg with dinner x 1 week, then increase to;   - 50 mg with breakfast   50 mg with dinner, this is goal.   *Be careful with driving/activities until you know how this medication affects you. Alcohol may enhance sedation effects, avoid concurrent use.   Potential procedures:     He had baclofen IT pump implant on 1/3/23 ([facility]).   Other Orders/Referrals:     We will consider PT referral, pending outcome from IT pump implant and recommendation of surgeon.   Follow up with SUJATA Armendariz CNP in 6-8 weeks in person or video visit is okay     Since his last visit, Riley Gifford reports:  -His pain is a little better than it was at last visit.   -He was started on Lyrica 50 mg BID at last visit, reports he is taking this mid-day and then at bedtime.   -He reports improvement in sleep after starting Lyrica.   -He had recent changes to baclofen pump this week, still working on achieving therapeutic dose.   -He has been working with PT through [facility], last visit was yesterday.   -    Pain Information:   Pain rating: averages 4/10 on a 0-10 scale.      Interval history from last visit on 1/26/23:  -His pain about the same as last visit.   -He had an issue with Lyrica and was not able to start since last visit.   -He had baclofen ITP implant 1/3/23.  -He reports surgery  "went well but it is a slow process with dosage titration.   -He has started PT.      HPI from initial visit with me on 12/14/222:  Riley Gifford is a 47 year old male presents with a chief complaint of mid back and abdominal pain.      The pain has been present for many years, progressive worsening with time .    The pain is Moderate Pain (5) in severity.    The pain is described as dull, aching.   The pain is alleviated by position changes, reclining position.    It is exacerbated by \"none\".    Modalities that have been utilized in the past which were helpful include PT (somewhat helpful).    Things that were not helpful, but tried ,include PT (limited benefit), TENS unit.    The patient has never tried pool PT.  The patient denies red flag symptoms, does have h/o neurogenic bladder and constipation.      Riley Gifford has been seen at a pain clinic in the past - [facility] pain program, [facility] for baclofen pump implant ([facility] ).     -Abdominal pain is more prominent right now.   -Muscle spasms are worsening, he has upcoming baclofen pump implant on 1/3/23 through [facility] ( chronic pain program)   -He has been waiting for a few years to get pump, delays with COVID.   -He got a new motorized wheelchair 2-3 months ago, has a recline function that is helpful.   -His pain has somewhat improved since getting chair, though no initial improvement, has appreciated over past few weeks.   -Pain seems pretty constant, no time of day more painful   -He tried TENS unit in the past that was not helpful.   -He tried gabapentin in the past, did not find helpful, was very sedating.   -He is using medical cannabis, primarily takes at bedtime only to help with sleep.   -He was using medical cannabis product twice daily, but has to minimize usage to bedtime due to cost.   -He takes baclofen and ditropan with breakfast.   -He takes baclofen and omeprazole at dinner  -He takes baclofen, detrol LA, amitriptyline, and " tizanidine at bedtime.      -He lives in an apartment, assisted living facility.   -He has 24 hour care coverage, calls when needed.   -His nephew and family lives locally, sister lives in [place], other family still in [place].       Current Pain Treatments:    3. Medications:               Baclofen IT pump              Amitriptyline 50 mg at bedtime (PCP, dx chronic midline thoracic back pain)              Zolpidem (PCP, sleep)              Medical cannabis (certified through [facility] primary care, renewed recently), takes it at night, finds this most helpful for sleep              Lyrica 50 mg BID     2. Other therapies:               PT        Current MME: 0      Review of [place] Prescription Monitoring Program (): No concern for abuse or misuse of controlled medications based on this report. Reviewed - appears appropriate.         Past pain treatments:  Baclofen       Medications:  Current Outpatient Medications   Medication Sig Dispense Refill     Acetaminophen (TYLENOL PO) Take 500 mg by mouth as needed for mild pain or fever Reported on 2/15/2017       albuterol (PROAIR HFA/PROVENTIL HFA/VENTOLIN HFA) 108 (90 Base) MCG/ACT inhaler Inhale 1-2 puffs into the lungs every 4 hours as needed for shortness of breath / dyspnea or wheezing 8.5 g 11     alum & mag hydroxide-simethicone (MYLANTA/MAALOX) 200-200-20 MG/5ML SUSP suspension Take 30 mLs by mouth  0     amitriptyline (ELAVIL) 25 MG tablet TAKE 2 TABLETS(50 MG) BY MOUTH AT BEDTIME 180 tablet 0     baclofen (LIORESAL) 20 MG tablet TAKE 1 TABLET(20 MG) BY MOUTH FOUR TIMES DAILY 360 tablet 0     budesonide (PULMICORT) 1 MG/2ML neb solution TAKE 1 CAPSULE BY MOUTH TWICE DAILY IN SYRUP       CARAFATE 1 GM/10ML PO suspension        cetirizine (ZYRTEC) 10 MG tablet Take 10 mg by mouth daily       COMPOUNDED NON-CONTROLLED SUBSTANCE (CMPD RX) - PHARMACY TO MIX COMPOUNDED MEDICATION Instill 30 mls into empty bladder at bedtime. Leave in the bladder overnight and  drain in the morning. 900 mL 11     docusate sodium (ENEMEEZ MINI) 283 MG enema USE 1 EVERY OTHER DAY 1 enema 11     hydrocortisone, Perianal, (ANUSOL-HC) 2.5 % cream APPLY RECTALLY TO THE AFFECTED AREA TWICE DAILY 30 g 11     lidocaine (XYLOCAINE) 5 % external ointment Apply topically as needed for moderate pain 2500 g 0     omeprazole (PRILOSEC) 40 MG DR capsule TAKE 1 CAPSULE(40 MG) BY MOUTH DAILY 90 capsule 3     phenylephrine-cocoa butter (PREPARATION H) 0.25-88.44 % suppository Insert one suppository rectally twice daily as needed. 48 suppository 3     polyethylene glycol (MIRALAX) 17 GM/Dose powder Take 17-34 g (1-2 capfuls) by mouth daily 578 g 11     polyethylene glycol (MIRALAX) powder Take 17 g (1 capful) by mouth daily as needed for constipation 510 g 1     pregabalin (LYRICA) 25 MG capsule Start 25 mg with dinner x 1 week, then increase to goal dose of 50 mg twice daily per clinic instruction detailed in after visit summary. 60 capsule 0     pregabalin (LYRICA) 50 MG capsule Take 1 capsule (50 mg) by mouth 3 times daily 60 capsule 1     sodium chloride 0.9% (bottle) 1,000 mL with gentamicin 500 mg irrigation 480 mg of Gentamicin in 1 L of NS. Please instill 60 mL of Gentamicin solution into bladder at HS. 1800 mL 4     sodium phosphate (FLEET ENEMA) 7-19 GM/118ML rectal enema Place 1 Bottle (1 enema) rectally daily as needed for constipation 1 Bottle 0     tiZANidine (ZANAFLEX) 4 MG tablet Take 4 mg by mouth 2 mg twice a day, 4 mg at night, another 2 mg PRN during the day       tolterodine (DETROL) 2 MG tablet TAKE 1 TABLET(2 MG) BY MOUTH TWICE DAILY 180 tablet 0     zolpidem (AMBIEN) 10 MG tablet TAKE 1 TABLET(10 MG) BY MOUTH EVERY NIGHT AS NEEDED FOR SLEEP 90 tablet 0       Medical History: any changes in medical history since they were last seen? No      Objective:    Physical Exam:  There were no vitals taken for this visit.  GENERAL: Healthy, alert and no distress  EYES: Eyes grossly normal to  inspection.  No discharge or erythema, or obvious scleral/conjunctival abnormalities.  RESP: No audible wheeze, cough, or visible cyanosis.  No visible retractions or increased work of breathing.    SKIN: Visible skin clear. No significant rash, abnormal pigmentation or lesions.  NEURO: Cranial nerves grossly intact.  Mentation and speech appropriate for age.  PSYCH: Mentation appears normal, affect normal/bright, judgement and insight intact, normal speech and appearance well-groomed.    Diagnostic Tests/Imaging/Labs:  None     BILLING TIME DOCUMENTATION:   The total TIME spent on this patient on the date of the encounter/appointment was 40 minutes.      TOTAL TIME includes:   Time spent preparing to see the patient (reviewing records and tests)   Time spent face to face (or over the phone) with the patient   Time spent ordering tests, medications, procedures and referrals   Time spent Referring and communicating with other healthcare professionals   Time spent documenting clinical information in Epic

## 2023-03-10 NOTE — TELEPHONE ENCOUNTER
PA needed for Lyrica 25 mg caps #6 per day. Patient has SCI and reports trouble with dysphagia. He is able to swallow 25 mg caps without difficulty and has concerns about size of 50 mg or 100 mg strength capsules.     Prior Authorization Retail Medication Request    Medication/Dose: pregabalin (LYRICA) 25 MG capsule    Rationale:  Dysphagia (Cannot swallow larger capsules)    Coverage information:     Subscriber: [id number] MANAS MARCOS     Rel to sub: 01 - Self     Member ID: [id number]     Payor: -MEDICA Ph: [phone]     Benefit plan: [facility]9-[employer] ACCESSABILITY ENHANCED Ph: [phone]     Group number: [id number]     Member effective dates: from 01/22/22      [id number] - [place], [address]  [place] [address]  Phone: [phone] Fax: [phone]

## 2023-03-14 NOTE — TELEPHONE ENCOUNTER
Prior Authorization Approval    Authorization Effective Date: 2/12/2023  Authorization Expiration Date: 3/13/2024  Medication: pregabalin (LYRICA) 25 MG capsule  Approved Dose/Quantity:   Reference #: [id number]   Insurance Company: [employer] - Phone [phone] Fax [phone]  Which Pharmacy is filling the prescription (Not needed for infusion/clinic administered): [id number] [place] [address] AT [place] [address]  Pharmacy Notified: Yes  Patient Notified: Yes

## 2023-03-14 NOTE — TELEPHONE ENCOUNTER
PA Initiation    Medication: pregabalin (LYRICA) 25 MG capsule  Insurance Company: [employer] - Phone [phone] Fax [phone]  Pharmacy Filling the Rx: [id number] - [facility], [place] [address] AT [place] [address]  Filling Pharmacy Phone: [phone]  Filling Pharmacy Fax: [phone]  Start Date: 3/14/2023    Riley Gifford (Hernandez: [id number])

## 2023-03-26 DIAGNOSIS — N31.9 NEUROGENIC BLADDER: ICD-10-CM

## 2023-03-27 RX ORDER — TOLTERODINE TARTRATE 2 MG/1
TABLET, EXTENDED RELEASE ORAL
Qty: 180 TABLET | Refills: 0 | Status: SHIPPED | OUTPATIENT
Start: 2023-03-27 | End: 2023-06-21

## 2023-03-27 NOTE — PROGRESS NOTES
SUBJECTIVE:   Riley Gifford is a 41 year old male who presents to clinic today for the following health issues:      Genitourinary - Male  Onset: 1 day    Description:   Dysuria (painful urination): Uses catheter  Hematuria (blood in urine): YES  Frequency: YES  Are you urinating at night : YES  Hesitancy (delay in urine): no   Retention (unable to empty): no   Decrease in urinary flow: no   Incontinence: no     Progression of Symptoms:  same    Accompanying Signs & Symptoms:  Fever: YES- Feeling feverish  Back/Flank pain: YES- Back pain-chronic and abdominal side pain has been present for over a month and patient has had a normal CT last month.  Urethral discharge: no   Testicle lumps/masses/pain: no   Nausea and/or vomiting: no   Abdominal pain: YES- over 1 yr    History:   History of frequent UTI's: YES due to neurogenic bladder  History of kidney stones: no   History of hernias: no   Personal or Family history of Prostate problems: no  Sexually active: no     Precipitating factors:       Alleviating factors:    Patient has had same pain last month, he had normal CT , then he was placed on antibiotic for UTI and pain has resolved.   No fever, no nausea, no vomiting.       Problem list and histories reviewed & adjusted, as indicated.  Additional history: as documented    Patient Active Problem List   Diagnosis     Vitamin D deficiency     Eosinophilic esophagitis     Neurogenic bladder     CARDIOVASCULAR SCREENING; LDL GOAL LESS THAN 160     Quadriplegia (H)     Chronic constipation     Internal hemorrhoids with other complication     Diagnostic skin and sensitization tests(aka ALLERGENS)     Anal or rectal pain     Allergic rhinitis due to animal dander     Allergy to mold spores     House dust mite allergy     Insomnia     Health Care Home     Feeling worried     Gallstones     Chronic midline thoracic back pain     Pulmonary nodules     Past Surgical History:   Procedure Laterality Date     BACK SURGERY       C  [address] [place] [phone] DATE OF " NONSPECIFIC PROCEDURE      C5-6 Fusion     C NONSPECIFIC PROCEDURE      Pressure Ulcer     COLONOSCOPY       CYSTOSCOPY N/A 6/23/2017    Procedure: CYSTOSCOPY;  Cystoscopy and Botox Injection Into the Bladder  ;  Surgeon: Evaristo Hayes MD;  Location: [facility]     CYSTOSCOPY, INTRAVESICAL INJECTION N/A 5/12/2016    Procedure: CYSTOSCOPY, INTRAVESICAL INJECTION;  Surgeon: Evarisot Hayes MD;  Location: [facility] OR     CYSTOSCOPY, INTRAVESICAL INJECTION N/A 11/11/2016    Procedure: CYSTOSCOPY, INTRAVESICAL INJECTION;  Surgeon: Evaristo Hayes MD;  Location: [facility]     GI SURGERY      endoscopy x2     INJECT BOTOX N/A 6/23/2017    Procedure: INJECT BOTOX;;  Surgeon: Evaristo Hayes MD;  Location: [facility] OR       Social History   Substance Use Topics     Smoking status: Never Smoker     Smokeless tobacco: Never Used     Alcohol use 0.0 oz/week     0 Standard drinks or equivalent per week      Comment: 1-2 drinks per month     Family History   Problem Relation Age of Onset     Breast Cancer Mother      Prostate Cancer Father      Breast Cancer Maternal Grandmother      CANCER Maternal Grandfather      Glaucoma No family hx of      Macular Degeneration No family hx of      DIABETES No family hx of      Hypertension No family hx of          Current Outpatient Prescriptions   Medication Sig Dispense Refill     sulfamethoxazole-trimethoprim (BACTRIM/SEPTRA) suspension Take 20 mLs (160 mg) by mouth 2 times daily Dose based on TMP component. 400 mL 0     budesonide (PULMICORT) 0.5 MG/2ML neb solution USE 2 VIALS TWICE DAILY. MIX WITH HONEY AND SWALLOW 1080 mL 0     NEW MED Gentamycin 240mg in 500mL 0.9% Normal Saline.  Instill 30mL into empty bladder at bedtime.  Leave in bladder overnight and drain in the morning 500 mL 3     ondansetron (ZOFRAN ODT) 4 MG ODT tab Take 1-2 tablets (4-8 mg) by mouth every 8 hours as needed for nausea 20 tablet 1     cetirizine (ZYRTEC) 10 MG tablet Take 10 mg by mouth daily        EPINEPHrine (EPIPEN/ADRENACLICK/OR ANY BX GENERIC EQUIV) 0.3 MG/0.3ML injection 2-pack Inject 0.3 mLs (0.3 mg) into the muscle as needed for anaphylaxis 0.6 mL 3     zolpidem (AMBIEN) 10 MG tablet Take 0.5-1 tablets (5-10 mg) by mouth nightly as needed for sleep 30 tablet 5     Omeprazole-Sodium Bicarbonate  MG PACK TAKE 1 PACKET BY MOUTH DISSOLVED IN LIQUID DAILY 90 each 0     docusate sodium (ENEMEEZ MINI) 283 MG enema Use one every other day and prn per patients's request. 30 enema 3     hydrocortisone (ANUSOL-HC) 2.5 % cream Place rectally 2 times daily 30 g 3     gabapentin (NEURONTIN) 250 MG/5ML solution Take 2.5 mLs (125 mg) by mouth 3 times daily as needed 450 mL 0     tolterodine (DETROL) 2 MG tablet Take 1 tablet (2 mg) by mouth 2 times daily 180 tablet 2     Lidocaine 0.5 % GEL Externally apply 1 Application topically every 6 hours as needed (for mid thoracic pain) 5 Tube 3     omeprazole (PRILOSEC) 20 MG CR capsule Take 1 capsule (20 mg) by mouth daily as needed       baclofen (LIORESAL) 20 MG tablet TAKE 1 TABLET(20 MG) BY MOUTH FOUR TIMES DAILY 360 tablet 3     sterile water, bottle, irrigation Irrigate with 60 mLs as directed daily 1000 mL 0     diphenhydrAMINE (BENADRYL) 25 MG tablet Take 25 mg by mouth every 8 hours as needed for itching or allergies Reported on 2/15/2017       polyethylene glycol (MIRALAX) powder Take 17 g (1 capful) by mouth daily as needed for constipation 510 g 1     phenylephrine-cocoa butter (PREPARATION H) 0.25-88.44 % suppository Insert one suppository rectally twice daily as needed. 48 suppository 3     Acetaminophen (TYLENOL PO) Take 500 mg by mouth as needed for mild pain or fever Reported on 2/15/2017       Allergies   Allergen Reactions     Banana Hives     Other reaction(s): GI Upset     Egg/Pro [Chicken-Derived Products (Egg)]      Fish      Soybean Oil      Other reaction(s): *Unknown  Discovered on allergy testing. Has never had any reaction to his knowledge  Wheat          Reviewed and updated as needed this visit by clinical staffTobacco  Allergies  Meds  Med Hx  Surg Hx  Soc Hx      Reviewed and updated as needed this visit by Provider         ROS:  Constitutional, HEENT, cardiovascular, pulmonary, GI, , musculoskeletal, neuro, skin, endocrine and psych systems are negative, except as otherwise noted.      OBJECTIVE:   /75 (BP Location: Left arm, Patient Position: Chair, Cuff Size: Adult Regular)  Pulse 104  Temp 98.8  F (37.1  C) (Oral)  There is no height or weight on file to calculate BMI.  GENERAL: alert and no distress  NECK: no adenopathy, no asymmetry, masses, or scars and thyroid normal to palpation  RESP: lungs clear to auscultation - no rales, rhonchi or wheezes  CV: regular rate and rhythm, normal S1 S2, no S3 or S4, no murmur, click or rub, no peripheral edema and peripheral pulses strong  ABDOMEN: soft, epigastric and RUQ tenderness , no hepatosplenomegaly, no masses and bowel sounds normal  MS: quadriplegia, No CVAT bilaterally, paraspinal muscle tenderness bilaterally of the lumbar region    Diagnostic Test Results:  Results for orders placed or performed in visit on 12/05/17 (from the past 24 hour(s))   UA with Microscopic reflex to Culture   Result Value Ref Range    Color Urine Yellow     Appearance Urine Slightly Cloudy     Glucose Urine Negative NEG^Negative mg/dL    Bilirubin Urine Negative NEG^Negative    Ketones Urine Negative NEG^Negative mg/dL    Specific Gravity Urine 1.020 1.003 - 1.035    pH Urine 7.0 5.0 - 7.0 pH    Protein Albumin Urine Negative NEG^Negative mg/dL    Urobilinogen Urine 1.0 0.2 - 1.0 EU/dL    Nitrite Urine Negative NEG^Negative    Blood Urine Small (A) NEG^Negative    Leukocyte Esterase Urine Negative NEG^Negative    Source Midstream Urine     WBC Urine O - 2 OTO2^O - 2 /HPF    RBC Urine 2-5 (A) OTO2^O - 2 /HPF    Squamous Epithelial /LPF Urine Few FEW^Few /LPF    Bacteria Urine Few (A) NEG^Negative /HPF  Amorphous Crystals Moderate (A) NEG^Negative /HPF    Mucous Urine Present (A) NEG^Negative /LPF       ASSESSMENT/PLAN:       ICD-10-CM    1. Gross hematuria R31.0 Urine Culture Aerobic Bacterial   2. Acute cystitis with hematuria N30.01 sulfamethoxazole-trimethoprim (BACTRIM/SEPTRA) suspension     Bactrim 20 ml twice a day for 10 days   Follow up or call in 3 days if not better   Patient may need CT without contrast to rule out nephrolithiasis     Catalina Dong PA-C  [facility]     [facility]@

## 2023-03-28 ENCOUNTER — MEDICAL CORRESPONDENCE (OUTPATIENT)
Dept: FAMILY MEDICINE | Facility: CLINIC | Age: 48
End: 2023-03-28
Payer: COMMERCIAL

## 2023-03-31 ENCOUNTER — TELEPHONE (OUTPATIENT)
Dept: FAMILY MEDICINE | Facility: CLINIC | Age: 48
End: 2023-03-31

## 2023-03-31 NOTE — TELEPHONE ENCOUNTER
Order/Referral Request        Who is requesting: [facility] Amanda Root, RN Nurse Care Coordinator with [employer] and his medical assistance     Orders being requested: Seating assessment for a new show commode chair    When are orders needed by: ASAP    Phone: [phone]  Fax: [phone]    Where to send orders: Fax      Please call if you have any questions. Paper order from placed at the provider's desk.

## 2023-04-03 ENCOUNTER — MEDICAL CORRESPONDENCE (OUTPATIENT)
Dept: HEALTH INFORMATION MANAGEMENT | Facility: CLINIC | Age: 48
End: 2023-04-03
Payer: COMMERCIAL

## 2023-04-10 ENCOUNTER — PATIENT OUTREACH (OUTPATIENT)
Dept: CARE COORDINATION | Facility: CLINIC | Age: 48
End: 2023-04-10
Payer: COMMERCIAL

## 2023-04-10 NOTE — PROGRESS NOTES
Clinic Care Coordination Contact    Follow Up Progress Note      Assessment: The patient called the RN CC nurse care coordinator for a follow-up call and a question regarding making an appointment.  The patient states that he is doing much better as far as the back pain is going.  They have adjusted his baclofen pump as much as they can for now.  The patient states.his GI doctor would like to perform a scope an EGD scope.  As they feel that his epigastric pain is due to his esophagitis.  At this time the date of surgery is unknown although the patient does need a preop and the GI doctor wanted the primary care physicians opinion.    Care Gaps:    Health Maintenance Due   Topic Date Due     HEPATITIS B IMMUNIZATION (1 of 3 - 3-dose series) Never done       Care Gaps Last addressed on 4/10/23    Care Plans  Care Plan: General  take medications as prescribed     Problem: HP GENERAL PROBLEM     Goal: General Goal -  I will take all medications as prescribed by the providers.     Start Date: 7/22/2022 Expected End Date: 7/22/2023    This Visit's Progress: 60% Recent Progress: 60%    Note:     Barriers: wheelchair bound  Strengths: engaged in care coordination  Patient expressed understanding of goal: yes  Action steps to achieve this goal:  1. I will take all medications as prescribed.  2. I will ask any questions that I have regarding new medications.  3. I will work with the RN CC if I have any difficulty getting refills.                     Care Plan: General -  Make and attend follow up appointments     Problem: HP GENERAL PROBLEM     Goal: General Goal - I will make and attend all recommended appointments from my providers.     Start Date: 7/22/2022 Expected End Date: 7/22/2023    This Visit's Progress: 60% Recent Progress: 60%    Note:     Barriers: Many providers.  Strengths: engaged in care coordination  Patient expressed understanding of goal: yes  Action steps to achieve this goal:  1. I will make all of the  recommended follow up appointments.  2. I will attend all of the follow up appointments as recommended by the providers.                        Intervention/Education provided during outreach: Reviewed with the patient the recommended follow-up appointments.     Outreach Frequency: monthly        Plan:   1.  The patient will make and attend all recommended follow-up appointments.  2.  The patient will take all medications as prescribed by the providers.  3.  The patient will follow through with the testing that the GI doctor would like to have done in the form of the EGD.    RN CC Nurse Care Coordinator will follow up in 30 days.          Robbin Vanegas MSN, RN, PHN, CCM   Primary Care Clinical RN Care Coordinator  [facility]  4/10/2023   2:26 PM  [email or web address]  Office: [phone]

## 2023-04-13 ENCOUNTER — TELEPHONE (OUTPATIENT)
Dept: FAMILY MEDICINE | Facility: CLINIC | Age: 48
End: 2023-04-13
Payer: COMMERCIAL

## 2023-04-13 DIAGNOSIS — K20.0 EOSINOPHILIC ESOPHAGITIS: ICD-10-CM

## 2023-04-13 DIAGNOSIS — R10.13 EPIGASTRIC PAIN: ICD-10-CM

## 2023-04-13 NOTE — TELEPHONE ENCOUNTER
Noted that patient has received prescription for magic mouthwash in the past for periodic epigastric pain, eosinophilic esophagitis from Laura Yao MD.    Please advise if ok to refill or if appointment is needed    Denisse Ryder RN  [facility]

## 2023-04-13 NOTE — TELEPHONE ENCOUNTER
New Medication Request    Contacts       Type Contact Phone/Fax    04/13/2023 09:45 AM CDT Phone (Incoming) Riley Gifford (Self) [phone] (M)          What medication are you requesting?: Magic Mouthwash    Reason for medication request: Esophagus pain    Have you taken this medication before?: Yes: Patient had prescription 2 years ago    Controlled Substance Agreement on file:   CSA -- Patient Level:    CSA: None found at the patient level.         Patient offered an appointment? No    Preferred Pharmacy:   [id number] - [address] [address]  Phone: [phone] Fax: [phone]      Could we send this information to you in [facility] or would you prefer to receive a phone call?:   Patient would prefer a phone call   Okay to leave a detailed message?: Yes at Cell number on file:    Telephone Information:   Mobile [phone]   Mobile [phone]

## 2023-04-14 RX ORDER — DIPHENHYDRAMINE HYDROCHLORIDE AND LIDOCAINE HYDROCHLORIDE AND ALUMINUM HYDROXIDE AND MAGNESIUM HYDRO
KIT
Qty: 240 ML | Refills: 1 | Status: SHIPPED | OUTPATIENT
Start: 2023-04-14

## 2023-04-21 ENCOUNTER — OFFICE VISIT (OUTPATIENT)
Dept: FAMILY MEDICINE | Facility: CLINIC | Age: 48
End: 2023-04-21
Payer: COMMERCIAL

## 2023-04-21 VITALS
SYSTOLIC BLOOD PRESSURE: 94 MMHG | OXYGEN SATURATION: 93 % | TEMPERATURE: 97.7 F | HEART RATE: 103 BPM | DIASTOLIC BLOOD PRESSURE: 69 MMHG

## 2023-04-21 DIAGNOSIS — Z01.818 PREOP GENERAL PHYSICAL EXAM: Primary | ICD-10-CM

## 2023-04-21 DIAGNOSIS — N31.9 NEUROGENIC BLADDER: ICD-10-CM

## 2023-04-21 DIAGNOSIS — K20.0 EOSINOPHILIC ESOPHAGITIS: ICD-10-CM

## 2023-04-21 DIAGNOSIS — G82.50 QUADRIPLEGIA (H): ICD-10-CM

## 2023-04-21 PROCEDURE — 99214 OFFICE O/P EST MOD 30 MIN: CPT | Performed by: PHYSICIAN ASSISTANT

## 2023-04-21 NOTE — PATIENT INSTRUCTIONS
You should stop using any Ibuprofen (Advil), Naproxen (Aleve) or Aspirin containing products 7 days before your procedure. You may use Tylenol (Acetaminophen) as needed.    For informational purposes only. Not to replace the advice of your health care provider. Copyright   2003, 2019 [place] [employer] [facility]. All rights reserved. Clinically reviewed by Laquita Eden MD. [employer] [id number] - REV .  Preparing for Your Surgery  Getting started  A nurse will call you to review your health history and instructions. They will give you an arrival time based on your scheduled surgery time. Please be ready to share:    Your doctor's clinic name and phone number    Your medical, surgical, and anesthesia history    A list of allergies and sensitivities    A list of medicines, including herbal treatments and over-the-counter drugs    Whether the patient has a legal guardian (ask how to send us the papers in advance)  Please tell us if you're pregnant--or if there's any chance you might be pregnant. Some surgeries may injure a fetus (unborn baby), so they require a pregnancy test. Surgeries that are safe for a fetus don't always need a test, and you can choose whether to have one.   If you have a child who's having surgery, please ask for a copy of Preparing for Your Child's Surgery.    Preparing for surgery    Within 10 to 30 days of surgery: Have a pre-op exam (sometimes called an H&P, or History and Physical). This can be done at a clinic or pre-operative center.  ? If you're having a , you may not need this exam. Talk to your care team.    At your pre-op exam, talk to your care team about all medicines you take. If you need to stop any medicines before surgery, ask when to start taking them again.  ? We do this for your safety. Many medicines can make you bleed too much during surgery. Some change how well surgery (anesthesia) drugs work.    Call your insurance company to let them know you're having surgery. (If  you don't have insurance, call [phone].)    Call your clinic if there's any change in your health. This includes signs of a cold or flu (sore throat, runny nose, cough, rash, fever). It also includes a scrape or scratch near the surgery site.    If you have questions on the day of surgery, call your hospital or surgery center.  Eating and drinking guidelines  For your safety: Unless your surgeon tells you otherwise, follow the guidelines below.    Eat and drink as usual until 8 hours before you arrive for surgery. After that, no food or milk.    Drink clear liquids until 2 hours before you arrive. These are liquids you can see through, like water, Gatorade, and Propel Water. They also include plain black coffee and tea (no cream or milk), candy, and breath mints. You can spit out gum when you arrive.    If you drink alcohol: Stop drinking it the night before surgery.    If your care team tells you to take medicine on the morning of surgery, it's okay to take it with a sip of water.  Preventing infection    Shower or bathe the night before and morning of your surgery. Follow the instructions your clinic gave you. (If no instructions, use regular soap.)    Don't shave or clip hair near your surgery site. We'll remove the hair if needed.    Don't smoke or vape the morning of surgery. You may chew nicotine gum up to 2 hours before surgery. A nicotine patch is okay.  ? Note: Some surgeries require you to completely quit smoking and nicotine. Check with your surgeon.    Your care team will make every effort to keep you safe from infection. We will:  ? Clean our hands often with soap and water (or an alcohol-based hand rub).  ? Clean the skin at your surgery site with a special soap that kills germs.  ? Give you a special gown to keep you warm. (Cold raises the risk of infection.)  ? Wear special hair covers, masks, gowns and gloves during surgery.  ? Give antibiotic medicine, if prescribed. Not all surgeries need  antibiotics.  What to bring on the day of surgery    Photo ID and insurance card    Copy of your health care directive, if you have one    Glasses and hearing aids (bring cases)  ? You can't wear contacts during surgery    Inhaler and eye drops, if you use them (tell us about these when you arrive)    CPAP machine or breathing device, if you use them    A few personal items, if spending the night    If you have . . .  ? A pacemaker, ICD (cardiac defibrillator) or other implant: Bring the ID card.  ? An implanted stimulator: Bring the remote control.  ? A legal guardian: Bring a copy of the certified (court-stamped) guardianship papers.  Please remove any jewelry, including body piercings. Leave jewelry and other valuables at home.  If you're going home the day of surgery    You must have a responsible adult drive you home. They should stay with you overnight as well.    If you don't have someone to stay with you, and you aren't safe to go home alone, we may keep you overnight. Insurance often won't pay for this.  After surgery  If it's hard to control your pain or you need more pain medicine, please call your surgeon's office.  Questions?   If you have any questions for your care team, list them here: _________________________________________________________________________________________________________________________________________________________________________ ____________________________________ ____________________________________ ____________________________________
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
## 2023-04-22 ENCOUNTER — MEDICAL CORRESPONDENCE (OUTPATIENT)
Dept: HEALTH INFORMATION MANAGEMENT | Facility: CLINIC | Age: 48
End: 2023-04-22

## 2023-04-25 DIAGNOSIS — G82.50 QUADRIPLEGIA (H): ICD-10-CM

## 2023-04-25 RX ORDER — BACLOFEN 20 MG/1
TABLET ORAL
Qty: 360 TABLET | Refills: 1 | Status: SHIPPED | OUTPATIENT
Start: 2023-04-25 | End: 2023-05-19

## 2023-05-04 DIAGNOSIS — K20.0 EOSINOPHILIC ESOPHAGITIS: Primary | ICD-10-CM

## 2023-05-04 RX ORDER — BUDESONIDE 1 MG/2ML
INHALANT ORAL
Qty: 180 ML | Refills: 4 | Status: SHIPPED | OUTPATIENT
Start: 2023-05-04 | End: 2023-12-27

## 2023-05-10 ENCOUNTER — PATIENT OUTREACH (OUTPATIENT)
Dept: CARE COORDINATION | Facility: CLINIC | Age: 48
End: 2023-05-10

## 2023-05-10 ENCOUNTER — LAB (OUTPATIENT)
Dept: LAB | Facility: CLINIC | Age: 48
End: 2023-05-10
Payer: COMMERCIAL

## 2023-05-10 ENCOUNTER — MYC MEDICAL ADVICE (OUTPATIENT)
Dept: FAMILY MEDICINE | Facility: CLINIC | Age: 48
End: 2023-05-10

## 2023-05-10 DIAGNOSIS — N31.9 NEUROGENIC BLADDER: ICD-10-CM

## 2023-05-10 DIAGNOSIS — N39.0 RECURRENT UTI: ICD-10-CM

## 2023-05-10 DIAGNOSIS — N30.00 ACUTE CYSTITIS WITHOUT HEMATURIA: Primary | ICD-10-CM

## 2023-05-10 LAB
ALBUMIN UR-MCNC: NEGATIVE MG/DL
AMORPH CRY #/AREA URNS HPF: ABNORMAL /HPF
APPEARANCE UR: ABNORMAL
BACTERIA #/AREA URNS HPF: ABNORMAL /HPF
BILIRUB UR QL STRIP: NEGATIVE
COLOR UR AUTO: YELLOW
GLUCOSE UR STRIP-MCNC: NEGATIVE MG/DL
HGB UR QL STRIP: NEGATIVE
KETONES UR STRIP-MCNC: NEGATIVE MG/DL
LEUKOCYTE ESTERASE UR QL STRIP: ABNORMAL
NITRATE UR QL: NEGATIVE
PH UR STRIP: 7 [PH] (ref 5–7)
RBC #/AREA URNS AUTO: ABNORMAL /HPF
SP GR UR STRIP: 1.02 (ref 1–1.03)
SQUAMOUS #/AREA URNS AUTO: ABNORMAL /LPF
UROBILINOGEN UR STRIP-ACNC: 0.2 E.U./DL
WBC #/AREA URNS AUTO: ABNORMAL /HPF

## 2023-05-10 PROCEDURE — 87088 URINE BACTERIA CULTURE: CPT

## 2023-05-10 PROCEDURE — 87086 URINE CULTURE/COLONY COUNT: CPT

## 2023-05-10 PROCEDURE — 81001 URINALYSIS AUTO W/SCOPE: CPT

## 2023-05-10 NOTE — LETTER
[facility]  Patient Centered Plan of Care  About Me:        Patient Name:  Riley Gifford    YOB: 1975  Age:         47 year old   Rut MRN:    [id number] Telephone Information:  Home Phone [phone]   Mobile [phone]   Mobile [phone]       Address:  [address] [place] [address] [address]  [place] [address] Email address:  [email or web address]      Emergency Contact(s)    Name Relationship Lgl Grd Work Phone Home Phone Mobile Phone   1. TREY GIFFORD (NE* Relative No noen none [phone]   2. AGUSTO CAVAZOS Sister   [phone]    3. HODA GIFFORD Brother No  [phone]            Primary language:  English     needed? No   [facility]:  [phone] op. 1  Other communication barriers:Glasses    Preferred Method of Communication:  Lisa  Current living arrangement: I live in assisted living    Mobility Status/ Medical Equipment: Dependent/Assisted by Another        Health Maintenance  Health Maintenance Reviewed: Due/Overdue   Health Maintenance Due   Topic Date Due    HEPATITIS B IMMUNIZATION (1 of 3 - 3-dose series) Never done    MEDICARE ANNUAL WELLNESS VISIT  05/31/2023           My Access Plan  Medical Emergency 911   Primary Clinic Line [facility] [phone]   24 Hour Appointment Line [phone] or  [phone]) (toll-free)   24 Hour Nurse Line [phone] (toll-free)   Preferred Urgent Care [facility] [phone]       Preferred Hospital [facility]  [phone]       Preferred Pharmacy [id number] - [place], [place] - [address] AT [facility] & [facility] [address]     Behavioral Health Crisis Line The National Suicide Prevention Lifeline at 1-563.410.2244 or Text/Call 808             My Care Team Members  Patient Care Team         Relationship Specialty Notifications Start End    Jenny Fay, PAEmilyC PCP - General  Family Medicine  12/21/21     Phone: [phone] Fax: [phone] [address] [facility] [address]    Robbin Andino, RN Lead Care Coordinator Nurse Admissions 1/6/16     phone:  [phone]    Phone: [phone] Fax: [phone]        Rober Leo MD Referring Physician Urology  1/8/16     Phone: [phone] Fax: [phone] [address] [facility] [address]                 Amanda Other (see comments)   1/8/16     [facility]     Phone: [phone]         Evaristo Hayes MD MD Urology  4/19/16     Phone: [phone] Fax: [phone]         [address] [facility] [address] [facility] [address]    Jenny Wright, RN Registered Nurse Urology  4/19/16     Rosemary Thomas, RN Nurse Coordinator Physical Medicine and Rehabilitation  3/30/17     Phone: [phone] Pager: [phone]        Lulú Reveles APRN CNP Nurse Practitioner Nurse Practitioner  8/28/20     Phone: [phone] Fax: [phone]         [address] [facility] [address] [facility] [address]    [facility] Occupational Therapist Occupational Therapy  1/7/22     [facility] Shanique Alberto  fax [phone]    Phone: [phone] Fax: [phone] [address] [facility] [address]    Evaristo Hayes MD MD Urology  1/17/22     Phone: [phone] Fax: [phone]         [address] [facility] [address] [facility] [address]    Yessy Tapia, RN Specialty Care Coordinator   1/17/22     Jenny Fay, PA-C Assigned PCP   4/3/22     Phone: [phone] Fax: [phone] [address] [facility] [address]    Raffy Hernández OD Assigned Surgical Provider   11/12/22     Phone: [phone] Fax: [phone] [address] [place] [address]              My Care Plans  Self Management and Treatment Plan  Care Plan  Care Plan: General  take medications as prescribed       Problem: HP GENERAL  PROBLEM       Goal: General Goal -  I will take all medications as prescribed by the providers.       Start Date: 7/22/2022 Expected End Date: 7/22/2024    This Visit's Progress: 60% Recent Progress: 60%    Note:     Barriers: wheelchair bound  Strengths: engaged in care coordination  Patient expressed understanding of goal: yes  Action steps to achieve this goal:  1. I will take all medications as prescribed.  2. I will ask any questions that I have regarding new medications.  3. I will work with the RN CC if I have any difficulty getting refills.                             Care Plan: General -  Make and attend follow up appointments       Problem: HP GENERAL PROBLEM       Goal: General Goal - I will make and attend all recommended appointments from my providers.       Start Date: 7/22/2022 Expected End Date: 7/22/2023    This Visit's Progress: 60% Recent Progress: 60%    Note:     Barriers: Many providers.  Strengths: engaged in care coordination  Patient expressed understanding of goal: yes  Action steps to achieve this goal:  1. I will make all of the recommended follow up appointments.  2. I will attend all of the follow up appointments as recommended by the providers.                               Action Plans on File:            Depression          Advance Care Plans/Directives Type:   Advanced Directive - On File      My Medical and Care Information  Problem List   Patient Active Problem List   Diagnosis    Vitamin D deficiency    Eosinophilic esophagitis    Neurogenic bladder    CARDIOVASCULAR SCREENING; LDL GOAL LESS THAN 160    Quadriplegia (H)    Chronic constipation    Internal hemorrhoids with other complication    Diagnostic skin and sensitization tests(aka ALLERGENS)    Anal or rectal pain    Allergic rhinitis due to animal dander    Allergy to mold spores    House dust mite allergy    Insomnia    Health Care Home    Gallstones    Chronic midline thoracic back pain    Pulmonary nodules    ACP  (advance care planning)    Cervical spinal cord injury, sequela (H)    Spasticity    Self-catheterizes urinary bladder    Bleeding external hemorrhoids    History of claustrophobia    Recurrent UTI      Current Medications and Allergies:  See printed Medication Report.    Care Coordination Start Date: 1/6/2016   Frequency of Care Coordination: monthly       Form Last Updated: 05/10/2023

## 2023-05-10 NOTE — PROGRESS NOTES
Clinic Care Coordination Contact    Follow Up Progress Note      Assessment: The RN CC nurse care coordinator contacted the patient by phone for a follow up call.  The patient states that he is having abdominal pain, back pain, and cloudy urine.  Denies a fever at this time.  The patient has a lab appointment to use the standing orders from the PCP.  There is also an appointment with the PCP scheduled for next week.  The patient has also made an appointment with the [facility] for an EGD to see how his eosinophilic esophagitis is doing, scheduled for 5/31/23.  Otherwise the patient was doing ok and monitoring his symptoms.         Care Gaps:    Health Maintenance Due   Topic Date Due     HEPATITIS B IMMUNIZATION (1 of 3 - 3-dose series) Never done     MEDICARE ANNUAL WELLNESS VISIT  05/31/2023       Care Gaps Last addressed on 5/10/23    Care Plans  Care Plan: General  take medications as prescribed     Problem: HP GENERAL PROBLEM     Goal: General Goal -  I will take all medications as prescribed by the providers.     Start Date: 7/22/2022 Expected End Date: 7/22/2024    This Visit's Progress: 60% Recent Progress: 60%    Note:     Barriers: wheelchair bound  Strengths: engaged in care coordination  Patient expressed understanding of goal: yes  Action steps to achieve this goal:  1. I will take all medications as prescribed.  2. I will ask any questions that I have regarding new medications.  3. I will work with the RN CC if I have any difficulty getting refills.                     Care Plan: General -  Make and attend follow up appointments     Problem: HP GENERAL PROBLEM     Goal: General Goal - I will make and attend all recommended appointments from my providers.     Start Date: 7/22/2022 Expected End Date: 7/22/2023    This Visit's Progress: 60% Recent Progress: 60%    Note:     Barriers: Many providers.  Strengths: engaged in care coordination  Patient expressed understanding of goal: yes  Action steps to  achieve this goal:  1. I will make all of the recommended follow up appointments.  2. I will attend all of the follow up appointments as recommended by the providers.                        Intervention/Education provided during outreach: The RN CC nurse care coordinator reviewed with the patient the days that his provider is in clinic.  The patient has an upcoming pre-op for the EGD and the patient will attend.     Outreach Frequency: monthly        Plan:   1.  The patient will attend the upcoming pre-op appointment.  2.  The patient will take all medications as prescribed by the providers.  3.  The patient will report any change in symptoms to the providers.     RN CC Nurse Care Coordinator will follow up in 30 days.            Robbin Vanegas MSN, RN, PHN, CCM   Primary Care Clinical RN Care Coordinator  [facility]  5/10/2023   12:21 PM  [email or web address]  Office: [phone]

## 2023-05-11 RX ORDER — SULFAMETHOXAZOLE/TRIMETHOPRIM 800-160 MG
1 TABLET ORAL 2 TIMES DAILY
Qty: 6 TABLET | Refills: 0 | Status: SHIPPED | OUTPATIENT
Start: 2023-05-11 | End: 2023-05-14

## 2023-05-12 DIAGNOSIS — N30.00 ACUTE CYSTITIS WITHOUT HEMATURIA: Primary | ICD-10-CM

## 2023-05-12 LAB — BACTERIA UR CULT: ABNORMAL

## 2023-05-13 NOTE — TELEPHONE ENCOUNTER
GENTAMICIN 480 MG/L NS 0.09% SOLUTION (TESSIE AMB MIXTURE)  Instill 60 mL into bladder at bedtime    Last Written Prescription Date:  7-2-22  Last Fill Quantity: 900ml,   # refills: 11  Last Office Visit : 2-8-21                          Procedure 8-5-22  Future Office visit:  none    Routing refill request to provider for review/approval because:  Med not on protocol  Disp:  1000 mL (Pharmacy requested: 1,000 mL)

## 2023-05-15 ENCOUNTER — TELEPHONE (OUTPATIENT)
Dept: UROLOGY | Facility: CLINIC | Age: 48
End: 2023-05-15
Payer: COMMERCIAL

## 2023-05-15 NOTE — TELEPHONE ENCOUNTER
Health Call Center    Phone Message    May a detailed message be left on voicemail: yes     Reason for Call: Medication Question or concern regarding medication   Prescription Clarification  Name of Medication: COMPOUNDED NON-CONTROLLED SUBSTANCE (CMPD RX) - PHARMACY TO MIX COMPOUNDED MEDICATION  Prescribing Provider: Dr. Hayes   Pharmacy: [place], [place] - [address]   What on the order needs clarification? Calling to ask some questions, needing drug details.          Action Taken: Message routed to:  [facility] URO    Travel Screening: Not Applicable

## 2023-05-15 NOTE — TELEPHONE ENCOUNTER
Signed Prescriptions:                        Disp   Refills    COMPOUNDED NON-CONTROLLED SUBSTANCE (CMPD *1000 mL3        Sig: Instill 60 mL into bladder at bedtime. Refrigerate.           Expires: For additional refills, please schedule           a follow-up appointment                    .  Authorizing Provider: KILLIAN HALL  Ordering User: KODY SOLANO

## 2023-05-15 NOTE — TELEPHONE ENCOUNTER
"Writer contacted pharmacy back today.  They needed to confirm that the \"compounded medication\" refill they received back was for the gentamicin instillations.  Writer confirmed medication to pharmacist today.  "

## 2023-05-17 NOTE — TELEPHONE ENCOUNTER
Forms faxed 04/20/2021      .Kizzy Mott  Patient Representative   
Forms received from Seg[facility]/Detailed Prescription/ Equipment/Services/ Fax Date: 4/15/2021 for Jan Harris PA-C.  Forms placed in provider 'sign me' folder.  Please fax forms to [phone] after completion.    GALE LOZADA (R)  Radiology Department    
Hypoxemia

## 2023-05-19 ENCOUNTER — OFFICE VISIT (OUTPATIENT)
Dept: FAMILY MEDICINE | Facility: CLINIC | Age: 48
End: 2023-05-19
Payer: COMMERCIAL

## 2023-05-19 VITALS
SYSTOLIC BLOOD PRESSURE: 82 MMHG | OXYGEN SATURATION: 95 % | DIASTOLIC BLOOD PRESSURE: 41 MMHG | TEMPERATURE: 98.5 F | HEART RATE: 106 BPM

## 2023-05-19 DIAGNOSIS — K20.0 EOSINOPHILIC ESOPHAGITIS: ICD-10-CM

## 2023-05-19 DIAGNOSIS — N39.0 RECURRENT UTI: ICD-10-CM

## 2023-05-19 DIAGNOSIS — N31.9 NEUROGENIC BLADDER: ICD-10-CM

## 2023-05-19 DIAGNOSIS — G82.50 QUADRIPLEGIA (H): Primary | ICD-10-CM

## 2023-05-19 DIAGNOSIS — L30.4 INTERTRIGO: ICD-10-CM

## 2023-05-19 DIAGNOSIS — Z01.818 PREOP GENERAL PHYSICAL EXAM: ICD-10-CM

## 2023-05-19 PROCEDURE — 99214 OFFICE O/P EST MOD 30 MIN: CPT | Performed by: PHYSICIAN ASSISTANT

## 2023-05-19 RX ORDER — BACLOFEN 10 MG/1
10 TABLET ORAL 2 TIMES DAILY
COMMUNITY
Start: 2023-05-19

## 2023-05-19 RX ORDER — NYSTATIN 100000 U/G
CREAM TOPICAL 2 TIMES DAILY
Qty: 60 G | Refills: 0 | Status: SHIPPED | OUTPATIENT
Start: 2023-05-19 | End: 2023-05-24
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
## 2023-05-19 NOTE — PATIENT INSTRUCTIONS
For informational purposes only. Not to replace the advice of your health care provider. Copyright   2003,  [facility]. All rights reserved. Clinically reviewed by Laquita [place] MD. Domob [id number] - REV .  Preparing for Your Surgery  Getting started  A nurse will call you to review your health history and instructions. They will give you an arrival time based on your scheduled surgery time. Please be ready to share:    Your doctor's clinic name and phone number    Your medical, surgical, and anesthesia history    A list of allergies and sensitivities    A list of medicines, including herbal treatments and over-the-counter drugs    Whether the patient has a legal guardian (ask how to send us the papers in advance)  Please tell us if you're pregnant--or if there's any chance you might be pregnant. Some surgeries may injure a fetus (unborn baby), so they require a pregnancy test. Surgeries that are safe for a fetus don't always need a test, and you can choose whether to have one.   If you have a child who's having surgery, please ask for a copy of Preparing for Your Child's Surgery.    Preparing for surgery    Within 10 to 30 days of surgery: Have a pre-op exam (sometimes called an H&P, or History and Physical). This can be done at a clinic or pre-operative center.  ? If you're having a , you may not need this exam. Talk to your care team.    At your pre-op exam, talk to your care team about all medicines you take. If you need to stop any medicines before surgery, ask when to start taking them again.  ? We do this for your safety. Many medicines can make you bleed too much during surgery. Some change how well surgery (anesthesia) drugs work.    Call your insurance company to let them know you're having surgery. (If you don't have insurance, call [phone].)    Call your clinic if there's any change in your health. This includes signs of a cold or flu (sore throat, runny nose,  cough, rash, fever). It also includes a scrape or scratch near the surgery site.    If you have questions on the day of surgery, call your hospital or surgery center.  Eating and drinking guidelines  For your safety: Unless your surgeon tells you otherwise, follow the guidelines below.    Eat and drink as usual until 8 hours before you arrive for surgery. After that, no food or milk.    Drink clear liquids until 2 hours before you arrive. These are liquids you can see through, like water, Gatorade, and Propel Water. They also include plain black coffee and tea (no cream or milk), candy, and breath mints. You can spit out gum when you arrive.    If you drink alcohol: Stop drinking it the night before surgery.    If your care team tells you to take medicine on the morning of surgery, it's okay to take it with a sip of water.  Preventing infection    Shower or bathe the night before and morning of your surgery. Follow the instructions your clinic gave you. (If no instructions, use regular soap.)    Don't shave or clip hair near your surgery site. We'll remove the hair if needed.    Don't smoke or vape the morning of surgery. You may chew nicotine gum up to 2 hours before surgery. A nicotine patch is okay.  ? Note: Some surgeries require you to completely quit smoking and nicotine. Check with your surgeon.    Your care team will make every effort to keep you safe from infection. We will:  ? Clean our hands often with soap and water (or an alcohol-based hand rub).  ? Clean the skin at your surgery site with a special soap that kills germs.  ? Give you a special gown to keep you warm. (Cold raises the risk of infection.)  ? Wear special hair covers, masks, gowns and gloves during surgery.  ? Give antibiotic medicine, if prescribed. Not all surgeries need antibiotics.  What to bring on the day of surgery    Photo ID and insurance card    Copy of your health care directive, if you have one    Glasses and hearing aids (bring  cases)  ? You can't wear contacts during surgery    Inhaler and eye drops, if you use them (tell us about these when you arrive)    CPAP machine or breathing device, if you use them    A few personal items, if spending the night    If you have . . .  ? A pacemaker, ICD (cardiac defibrillator) or other implant: Bring the ID card.  ? An implanted stimulator: Bring the remote control.  ? A legal guardian: Bring a copy of the certified (court-stamped) guardianship papers.  Please remove any jewelry, including body piercings. Leave jewelry and other valuables at home.  If you're going home the day of surgery    You must have a responsible adult drive you home. They should stay with you overnight as well.    If you don't have someone to stay with you, and you aren't safe to go home alone, we may keep you overnight. Insurance often won't pay for this.  After surgery  If it's hard to control your pain or you need more pain medicine, please call your surgeon's office.  Questions?   If you have any questions for your care team, list them here: _________________________________________________________________________________________________________________________________________________________________________ ____________________________________ ____________________________________ ____________________________________    How to Take Your Medication Before Surgery  - Take all of your medications before surgery as usual

## 2023-05-26 ENCOUNTER — LAB (OUTPATIENT)
Dept: LAB | Facility: CLINIC | Age: 48
End: 2023-05-26
Payer: COMMERCIAL

## 2023-05-26 DIAGNOSIS — E87.1 HYPONATREMIA: Primary | ICD-10-CM

## 2023-05-26 DIAGNOSIS — N31.9 NEUROGENIC BLADDER: ICD-10-CM

## 2023-05-26 LAB
ALBUMIN UR-MCNC: NEGATIVE MG/DL
APPEARANCE UR: CLEAR
BACTERIA #/AREA URNS HPF: ABNORMAL /HPF
BILIRUB UR QL STRIP: NEGATIVE
COLOR UR AUTO: YELLOW
GLUCOSE UR STRIP-MCNC: NEGATIVE MG/DL
HGB UR QL STRIP: NEGATIVE
KETONES UR STRIP-MCNC: NEGATIVE MG/DL
LEUKOCYTE ESTERASE UR QL STRIP: ABNORMAL
NITRATE UR QL: NEGATIVE
PH UR STRIP: 7.5 [PH] (ref 5–7)
RBC #/AREA URNS AUTO: ABNORMAL /HPF
SP GR UR STRIP: 1.01 (ref 1–1.03)
UROBILINOGEN UR STRIP-ACNC: 0.2 E.U./DL
WBC #/AREA URNS AUTO: ABNORMAL /HPF

## 2023-05-26 PROCEDURE — 81001 URINALYSIS AUTO W/SCOPE: CPT

## 2023-05-26 PROCEDURE — 87186 SC STD MICRODIL/AGAR DIL: CPT

## 2023-05-26 PROCEDURE — 87088 URINE BACTERIA CULTURE: CPT | Mod: 59

## 2023-05-26 PROCEDURE — 87086 URINE CULTURE/COLONY COUNT: CPT

## 2023-05-29 LAB
BACTERIA UR CULT: ABNORMAL
BACTERIA UR CULT: ABNORMAL

## 2023-05-30 DIAGNOSIS — G82.50 QUADRIPLEGIA (H): Primary | ICD-10-CM

## 2023-05-30 DIAGNOSIS — N31.9 NEUROGENIC BLADDER: ICD-10-CM

## 2023-05-30 DIAGNOSIS — N30.00 ACUTE CYSTITIS WITHOUT HEMATURIA: ICD-10-CM

## 2023-05-30 RX ORDER — AMOXICILLIN 400 MG/5ML
500 POWDER, FOR SUSPENSION ORAL 3 TIMES DAILY
Qty: 93.75 ML | Refills: 0 | Status: SHIPPED | OUTPATIENT
Start: 2023-05-30 | End: 2023-06-18

## 2023-05-30 NOTE — RESULT ENCOUNTER NOTE
Johnny Lin,   Your urine is still showing a low amount of bacteria. I have ordered 5 days of antibiotics for you.   Jenny Fay PA-C

## 2023-06-15 DIAGNOSIS — K59.09 CHRONIC CONSTIPATION: ICD-10-CM

## 2023-06-15 DIAGNOSIS — F51.01 PRIMARY INSOMNIA: ICD-10-CM

## 2023-06-16 ENCOUNTER — PATIENT OUTREACH (OUTPATIENT)
Dept: CARE COORDINATION | Facility: CLINIC | Age: 48
End: 2023-06-16

## 2023-06-16 ENCOUNTER — LAB (OUTPATIENT)
Dept: LAB | Facility: CLINIC | Age: 48
End: 2023-06-16
Payer: COMMERCIAL

## 2023-06-16 ENCOUNTER — MYC MEDICAL ADVICE (OUTPATIENT)
Dept: FAMILY MEDICINE | Facility: CLINIC | Age: 48
End: 2023-06-16

## 2023-06-16 DIAGNOSIS — E87.1 HYPONATREMIA: ICD-10-CM

## 2023-06-16 DIAGNOSIS — N31.9 NEUROGENIC BLADDER: ICD-10-CM

## 2023-06-16 LAB
ALBUMIN UR-MCNC: NEGATIVE MG/DL
APPEARANCE UR: CLEAR
BACTERIA #/AREA URNS HPF: ABNORMAL /HPF
BILIRUB UR QL STRIP: NEGATIVE
COLOR UR AUTO: YELLOW
GLUCOSE UR STRIP-MCNC: NEGATIVE MG/DL
HGB UR QL STRIP: NEGATIVE
KETONES UR STRIP-MCNC: NEGATIVE MG/DL
LEUKOCYTE ESTERASE UR QL STRIP: ABNORMAL
NITRATE UR QL: NEGATIVE
PH UR STRIP: 7 [PH] (ref 5–7)
RBC #/AREA URNS AUTO: ABNORMAL /HPF
SP GR UR STRIP: 1.01 (ref 1–1.03)
UROBILINOGEN UR STRIP-ACNC: 0.2 E.U./DL
WBC #/AREA URNS AUTO: ABNORMAL /HPF

## 2023-06-16 PROCEDURE — 36415 COLL VENOUS BLD VENIPUNCTURE: CPT

## 2023-06-16 PROCEDURE — 87088 URINE BACTERIA CULTURE: CPT

## 2023-06-16 PROCEDURE — 80048 BASIC METABOLIC PNL TOTAL CA: CPT

## 2023-06-16 PROCEDURE — 81001 URINALYSIS AUTO W/SCOPE: CPT

## 2023-06-16 PROCEDURE — 87186 SC STD MICRODIL/AGAR DIL: CPT

## 2023-06-16 PROCEDURE — 87086 URINE CULTURE/COLONY COUNT: CPT

## 2023-06-16 RX ORDER — ZOLPIDEM TARTRATE 10 MG/1
TABLET ORAL
Qty: 90 TABLET | Refills: 0 | Status: SHIPPED | OUTPATIENT
Start: 2023-06-16 | End: 2023-09-14

## 2023-06-16 RX ORDER — DOCUSATE SODIUM 283 MG/5ML
LIQUID RECTAL
Qty: 150 ML | Refills: 0 | Status: SHIPPED | OUTPATIENT
Start: 2023-06-16 | End: 2023-08-18

## 2023-06-16 NOTE — PROGRESS NOTES
Clinic Care Coordination Contact    Follow Up Progress Note      Assessment: The RN CC nurse care coordinator contacted the patient by phone for a follow up call.  The patient is having symptoms of a possible UTI, abdominal pain, unable to sleep, and urine is very cloudy.  The patient has made an appointment with the lab to leave a urine specimen.  The patient is hoping to go out of town this weekend, and is aware that the culture would not be available before then.  The patient states that the implanted pump is helping with the back pain.  The patient has sent a message to his provider.      Care Gaps:    Health Maintenance Due   Topic Date Due     HEPATITIS B IMMUNIZATION (1 of 3 - 3-dose series) Never done     MEDICARE ANNUAL WELLNESS VISIT  05/31/2023     BMP  06/26/2023     LIPID  07/12/2023       Care Gaps Last addressed on 6/16/23    Care Plans  Care Plan: General  take medications as prescribed     Problem: HP GENERAL PROBLEM     Goal: General Goal -  I will take all medications as prescribed by the providers.     Start Date: 7/22/2022 Expected End Date: 7/22/2024    This Visit's Progress: 70% Recent Progress: 60%    Note:     Barriers: wheelchair bound  Strengths: engaged in care coordination  Patient expressed understanding of goal: yes  Action steps to achieve this goal:  1. I will take all medications as prescribed.  2. I will ask any questions that I have regarding new medications.  3. I will work with the RN CC if I have any difficulty getting refills.                     Care Plan: General -  Make and attend follow up appointments     Problem: HP GENERAL PROBLEM     Goal: General Goal - I will make and attend all recommended appointments from my providers.     Start Date: 7/22/2022 Expected End Date: 7/22/2024    This Visit's Progress: 70% Recent Progress: 60%    Note:     Barriers: Many providers.  Strengths: engaged in care coordination  Patient expressed understanding of goal: yes  Action steps to  achieve this goal:  1. I will make all of the recommended follow up appointments.  2. I will attend all of the follow up appointments as recommended by the providers.                        Intervention/Education provided during outreach: Reviewed the timing of getting the urine culture back if needed.       Outreach Frequency: monthly        Plan:   1,  The patient will leave the urine sample today and it will be taken care of by another provider.  2.  The patient will take all medications as prescribed by the providers.  3.  The patient will continue to work with the pain management department to control the back pain with the implanted pump.    RN CC Nurse Care Coordinator will follow up in 30 days.        Robbin Vanegas MSN, RN, PHN, [facility]   Primary Care Clinical RN Care Coordinator  [facility]  6/16/2023   10:54 AM  [email or web address]  Office: [phone]

## 2023-06-17 LAB
ANION GAP SERPL CALCULATED.3IONS-SCNC: 12 MMOL/L (ref 7–15)
BUN SERPL-MCNC: 9.7 MG/DL (ref 6–20)
CALCIUM SERPL-MCNC: 9.3 MG/DL (ref 8.6–10)
CHLORIDE SERPL-SCNC: 101 MMOL/L (ref 98–107)
CREAT SERPL-MCNC: 0.48 MG/DL (ref 0.67–1.17)
DEPRECATED HCO3 PLAS-SCNC: 21 MMOL/L (ref 22–29)
GFR SERPL CREATININE-BSD FRML MDRD: >90 ML/MIN/1.73M2
GLUCOSE SERPL-MCNC: 83 MG/DL (ref 70–99)
POTASSIUM SERPL-SCNC: 4.6 MMOL/L (ref 3.4–5.3)
SODIUM SERPL-SCNC: 134 MMOL/L (ref 136–145)

## 2023-06-18 DIAGNOSIS — N30.00 ACUTE CYSTITIS WITHOUT HEMATURIA: ICD-10-CM

## 2023-06-18 DIAGNOSIS — G82.50 QUADRIPLEGIA (H): ICD-10-CM

## 2023-06-18 DIAGNOSIS — N31.9 NEUROGENIC BLADDER: ICD-10-CM

## 2023-06-18 RX ORDER — AMOXICILLIN 400 MG/5ML
500 POWDER, FOR SUSPENSION ORAL 3 TIMES DAILY
Qty: 93.75 ML | Refills: 0 | Status: SHIPPED | OUTPATIENT
Start: 2023-06-18 | End: 2023-06-23

## 2023-06-19 LAB — BACTERIA UR CULT: ABNORMAL

## 2023-06-22 ENCOUNTER — TELEPHONE (OUTPATIENT)
Dept: UROLOGY | Facility: CLINIC | Age: 48
End: 2023-06-22
Payer: COMMERCIAL

## 2023-06-22 NOTE — TELEPHONE ENCOUNTER
[facility] Call Center    Phone Message    May a detailed message be left on voicemail: yes     Reason for Call: Other: Pt called and is wondering if Dr. Hayes would like a f/u appointment virtually or if he can schedule the botox injections - please call pt to further discuss, thanks!    Action Taken: Message routed to:  [facility]    Travel Screening: Not Applicable

## 2023-06-26 ENCOUNTER — TRANSFERRED RECORDS (OUTPATIENT)
Dept: HEALTH INFORMATION MANAGEMENT | Facility: CLINIC | Age: 48
End: 2023-06-26
Payer: COMMERCIAL

## 2023-06-26 DIAGNOSIS — N31.9 NEUROGENIC BLADDER: Primary | ICD-10-CM

## 2023-06-26 RX ORDER — CEFAZOLIN SODIUM 2 G/50ML
2 SOLUTION INTRAVENOUS
Status: CANCELLED | OUTPATIENT
Start: 2023-06-26

## 2023-06-26 RX ORDER — CEFAZOLIN SODIUM 2 G/50ML
2 SOLUTION INTRAVENOUS SEE ADMIN INSTRUCTIONS
Status: CANCELLED | OUTPATIENT
Start: 2023-06-26

## 2023-07-01 ENCOUNTER — HEALTH MAINTENANCE LETTER (OUTPATIENT)
Age: 48
End: 2023-07-01

## 2023-07-03 ENCOUNTER — TELEPHONE (OUTPATIENT)
Dept: UROLOGY | Facility: CLINIC | Age: 48
End: 2023-07-03
Payer: COMMERCIAL

## 2023-07-03 DIAGNOSIS — R05.9 COUGH: ICD-10-CM

## 2023-07-03 RX ORDER — ALBUTEROL SULFATE 90 UG/1
AEROSOL, METERED RESPIRATORY (INHALATION)
Qty: 8.5 G | Refills: 1 | Status: SHIPPED | OUTPATIENT
Start: 2023-07-03 | End: 2023-08-28

## 2023-07-03 NOTE — TELEPHONE ENCOUNTER
Patient is scheduled for a surgical procedure with Dr. Hayes      Spoke with: Patient via [employer]      Date of Surgery/Procedure: Friday August 28, 2023    Location: [facility] OR      Informed patient they will need an adult : Yes     Pre-op: Yes      H&P: Patient to schedule    Additional comments:      Surgery packet: Sent via [employer]     Patient is aware that surgery time is tentative to change and to expect a call 3-1 business days from Pre Admission Nursing for instructions and arrival time

## 2023-07-17 ENCOUNTER — PATIENT OUTREACH (OUTPATIENT)
Dept: CARE COORDINATION | Facility: CLINIC | Age: 48
End: 2023-07-17
Payer: COMMERCIAL

## 2023-07-17 NOTE — PROGRESS NOTES
Clinic Care Coordination Contact  [facility]/Voicemail       Clinical Data: Care Coordinator Outreach  Outreach attempted x 1.  Left message on patient's voicemail with call back information and requested return call.  Plan: Care Coordinator sent care coordination introduction letter on 3/4/21 via [employer]. Care Coordinator will try to reach patient again in 10 business days.    Robbin Vanegas MSN, RN, PHN, CCM   Primary Care Clinical RN Care Coordinator  [facility]  7/17/2023   3:12 PM  [email or web address]  Office: [phone]

## 2023-07-21 DIAGNOSIS — N39.0 RECURRENT UTI: Primary | ICD-10-CM

## 2023-07-24 DIAGNOSIS — M54.6 CHRONIC MIDLINE THORACIC BACK PAIN: ICD-10-CM

## 2023-07-24 DIAGNOSIS — G89.29 CHRONIC MIDLINE THORACIC BACK PAIN: ICD-10-CM

## 2023-07-26 ENCOUNTER — OFFICE VISIT (OUTPATIENT)
Dept: PALLIATIVE MEDICINE | Facility: CLINIC | Age: 48
End: 2023-07-26
Payer: COMMERCIAL

## 2023-07-26 VITALS — SYSTOLIC BLOOD PRESSURE: 104 MMHG | HEART RATE: 117 BPM | DIASTOLIC BLOOD PRESSURE: 73 MMHG

## 2023-07-26 DIAGNOSIS — G82.50 QUADRIPLEGIA (H): ICD-10-CM

## 2023-07-26 DIAGNOSIS — M54.6 PAIN IN THORACIC SPINE: ICD-10-CM

## 2023-07-26 DIAGNOSIS — M79.2 NEUROPATHIC PAIN: Primary | ICD-10-CM

## 2023-07-26 PROCEDURE — 99215 OFFICE O/P EST HI 40 MIN: CPT

## 2023-07-26 RX ORDER — PREGABALIN 50 MG/1
CAPSULE ORAL
Qty: 90 CAPSULE | Refills: 1 | Status: SHIPPED | OUTPATIENT
Start: 2023-07-26 | End: 2023-10-04

## 2023-07-26 ASSESSMENT — PAIN SCALES - GENERAL: PAINLEVEL: MODERATE PAIN (5)

## 2023-07-26 NOTE — PROGRESS NOTES
[facility] Pain Management     Date of visit: 7/26/2023      Assessment:   Riley Gifford is a 47 year old male with a past medical history significant for cervical spinal cord injury, quadriplegia, thoracic/lumbar pain, neurogenic bladder, recurrent UTI, spasticity, chronic constipation, s/p cervical fusion who presents with complaints of mid back (lower thoracic) and abdominal pain.      Thoracic/abdominal pain - Onset occurred following spinal cord injury in 1995, progressive worsening of symptoms over time. Etiology is likely mixed, with h/o cervical spinal cord injury (quadraplegic), neuropathic pain secondary to spinal cord injury, underlying degenerative changes of spine, and overlying myofascial component to some extent.     Visit Diagnoses:  1. Neuropathic pain    2. Pain in thoracic spine    3. Quadriplegia (H)        Plan:  Diagnosis reviewed, treatment option addressed, and risk/benifits discussed.  Self-care instructions given.  I am recommending a multidisciplinary treatment plan to help this patient better manage their pain.      Pain Physical Therapy:  NO   We will defer at this time, may consider referral in the future.     Pain Psychologist to address relaxation, behavioral change, coping style, and other factors important to improvement.  NO    Diagnostic Studies:  None ordered today.     Medication Management:   Amitriptyline - Current dosage 50 mg at bedtime.  He questions benefit and notes daytime sedation since starting this medication.  He is interested in trial taper down/off, which is reasonable.  Recommend reducing dosage to 25 mg at bedtime x2 weeks.  If no change in pain levels, recommend stopping altogether.  Monitor changes in pain levels, advised to update clinic if he has increased pain with taper.  Continue Lyrica 50 mg twice daily. Once completely off amitriptyline for 2 weeks, may trial increasing Lyrica dose at bedtime to 100 mg and continue 50 mg late morning/noon.  Updated  prescription sent in to pharmacy today.  Medical cannabis - recommend continuing to work with the dispensary to explore new products that may be available.  Advised to let me know if he needs medical cannabis renewal in between visits.    Potential procedures:   He had baclofen IT pump implant on 1/3/23 ([facility]).     Other Orders/Referrals:   None     Follow up with SUJATA Armendariz CNP in 6-8 weeks in person or video visit is okay       Review of Electronic Chart: Today I have also reviewed available medical information in the patient's medical record at [facility]) and Care Everywhere (if available), including relevant provider notes, laboratory work, and imaging.     Nalini Resendez, KENDELL, SUJATA, AGNP-C  [facility] Pain Management     -------------------------------------------------    Subjective:    Chief complaint:   Chief Complaint   Patient presents with    Pain       Interval history:  Riley Gifford is a 47 year old male last seen on 3/9/23.  They are a patient of mine seen in follow up.     Recommendations/plan at the last visit included:  Pain Physical Therapy:  NO   We will defer at this time, may consider referral in the future.   Pain Psychologist to address relaxation, behavioral change, coping style, and other factors important to improvement.  NO  Diagnostic Studies:  None ordered today.   Medication Management:   Start Lyrica 25 mg with dinner increase to goal dose of 50 mg with breakfast and dinner following the titration instructions below. Be sure to monitor for benefit, even subtle changes in pain over the next few weeks before baclofen pump implant. We will discuss continuation and possible increased dosage at follow up.   50 mg at noon and 75 mg at bedtime x 2 weeks, then increase to;  50 mg at noon and  100 mg at bedtime, this is goal.   *Be careful with driving/activities until you know how this medication affects you. Alcohol may enhance sedation effects, avoid concurrent  use.   Potential procedures:   He had baclofen IT pump implant on 1/3/23 ([facility]).   Other Orders/Referrals:   We will consider PT referral, pending outcome from IT pump implant and recommendation of surgeon.   Follow up with SUJATA Armendariz CNP in 6-8 weeks in person or video visit is okay      Visit discussion: He has ongoing issues with dysphagia, reports trouble with PO medications sometimes. He reports Lyrica 25 mg caps are small enough for him and prefers to take multiple smaller caps. I encouraged him to speak with the pharmacy, as it is possible we may run into insurance coverage issues with #5-6 caps/day.     Since his last visit, Riley Gifford reports:  -his pain is about the same as it was at last visit.   -He is taking Lyrica 50 mg BID, did not increase to 100 mg at bedtime.   -He continues to have significant daytime sedation.   -He is interested in trial tapering of amitriptyline at bedtime, not sure of benefit at this point.   -He may consider increasing Lyrica to 100 mg at bedtime.   -He has baclofen pump, dosage has been increased over the past few months.   -He has questions about medical cannabis, specifically should he be exploring other products, right now taking red pill.   -He has not explored other products in quite some time, will plan to follow up with dispensary.     Pain Information:   Pain rating: averages 4/10 on a 0-10 scale.      Interval history from last visit on 3/9/23:  -His pain is a little better than it was at last visit.   -He was started on Lyrica 50 mg BID at last visit, reports he is taking this mid-day and then at bedtime.   -He reports improvement in sleep after starting Lyrica.   -He had recent changes to baclofen pump this week, still working on achieving therapeutic dose.   -He has been working with PT through [facility], last visit was yesterday.   Pain Information:              Pain rating: averages 4/10 on a 0-10 scale.        Interval history from last visit on  "1/26/23:  -His pain about the same as last visit.   -He had an issue with Lyrica and was not able to start since last visit.   -He had baclofen ITP implant 1/3/23.  -He reports surgery went well but it is a slow process with dosage titration.   -He has started PT.      HPI from initial visit with me on 12/14/222:  Riley Gifford is a 47 year old male presents with a chief complaint of mid back and abdominal pain.      The pain has been present for many years, progressive worsening with time .    The pain is Moderate Pain (5) in severity.    The pain is described as dull, aching.   The pain is alleviated by position changes, reclining position.    It is exacerbated by \"none\".    Modalities that have been utilized in the past which were helpful include PT (somewhat helpful).    Things that were not helpful, but tried ,include PT (limited benefit), TENS unit.    The patient has never tried pool PT.  The patient denies red flag symptoms, does have h/o neurogenic bladder and constipation.      Riley Gifford has been seen at a pain clinic in the past - [facility] pain program, [facility] for baclofen pump implant ([facility] ).     -Abdominal pain is more prominent right now.   -Muscle spasms are worsening, he has upcoming baclofen pump implant on 1/3/23 through [facility] ( chronic pain program)   -He has been waiting for a few years to get pump, delays with COVID.   -He got a new motorized wheelchair 2-3 months ago, has a recline function that is helpful.   -His pain has somewhat improved since getting chair, though no initial improvement, has appreciated over past few weeks.   -Pain seems pretty constant, no time of day more painful   -He tried TENS unit in the past that was not helpful.   -He tried gabapentin in the past, did not find helpful, was very sedating.   -He is using medical cannabis, primarily takes at bedtime only to help with sleep.   -He was using medical cannabis product twice daily, but has to minimize " usage to bedtime due to cost.   -He takes baclofen and ditropan with breakfast.   -He takes baclofen and omeprazole at dinner  -He takes baclofen, detrol LA, amitriptyline, and tizanidine at bedtime.      -He lives in an apartment, assisted living facility.   -He has 24 hour care coverage, calls when needed.   -His nephew and family lives locally, sister lives in [place], other family still in [place].         Current Pain Treatments:    Medications:               Baclofen IT pump              Amitriptyline 50 mg at bedtime (PCP, dx chronic midline thoracic back pain)              Zolpidem (PCP, sleep)              Medical cannabis (certified through [facility] primary care, renewed recently), takes it at night, finds this most helpful for sleep              Lyrica 50 mg BID     Other therapies:               PT        Current MME: 0      Review of [place] Prescription Monitoring Program (): No concern for abuse or misuse of controlled medications based on this report. Reviewed - appears appropriate.         Past pain treatments:  Baclofen       Medications:  Current Outpatient Medications   Medication Sig Dispense Refill    Acetaminophen (TYLENOL PO) Take 500 mg by mouth as needed for mild pain or fever Reported on 2/15/2017      albuterol (PROAIR HFA/PROVENTIL HFA/VENTOLIN HFA) 108 (90 Base) MCG/ACT inhaler INHALE 1 TO 2 PUFFS INTO THE LUNGS EVERY 4 HOURS AS NEEDED FOR SHORTNESS OF BREATH OR DIFFICULT BREATHING OR WHEEZING 8.5 g 1    alum & mag hydroxide-simethicone (MYLANTA/MAALOX) 200-200-20 MG/5ML SUSP suspension Take 30 mLs by mouth  0    amitriptyline (ELAVIL) 25 MG tablet TAKE 2 TABLETS(50 MG) BY MOUTH AT BEDTIME 180 tablet 0    baclofen (LIORESAL) 10 MG tablet Take 1 tablet (10 mg) by mouth 2 times daily      budesonide (PULMICORT) 1 MG/2ML neb solution TAKE 1 CAPSULE BY MOUTH TWICE DAILY IN SYRUP 180 mL 4    cetirizine (ZYRTEC) 10 MG tablet Take 10 mg by mouth daily      COMPOUNDED NON-CONTROLLED  SUBSTANCE (CMPD RX) - PHARMACY TO MIX COMPOUNDED MEDICATION Instill 60 mL into bladder at bedtime. Refrigerate. Expires: For additional refills, please schedule a follow-up appointment                    . 1000 mL 3    docusate sodium (ENEMEEZ MINI) 283 MG enema USE ONE EVERY OTHER  mL 0    hydrocortisone, Perianal, (ANUSOL-HC) 2.5 % cream APPLY RECTALLY TO THE AFFECTED AREA TWICE DAILY 30 g 11    lidocaine (XYLOCAINE) 5 % external ointment Apply topically as needed for moderate pain 2500 g 0    omeprazole (PRILOSEC) 40 MG DR capsule TAKE 1 CAPSULE(40 MG) BY MOUTH DAILY 90 capsule 3    phenylephrine-cocoa butter (PREPARATION H) 0.25-88.44 % suppository Insert one suppository rectally twice daily as needed. 48 suppository 3    polyethylene glycol (MIRALAX) 17 GM/Dose powder Take 17-34 g (1-2 capfuls) by mouth daily 578 g 11    polyethylene glycol (MIRALAX) powder Take 17 g (1 capful) by mouth daily as needed for constipation 510 g 1    pregabalin (LYRICA) 50 MG capsule Take 50 mg late morning/noon and 100 mg at bedtime 90 capsule 1    sodium chloride 0.9% (bottle) 1,000 mL with gentamicin 500 mg irrigation 480 mg of Gentamicin in 1 L of NS. Please instill 60 mL of Gentamicin solution into bladder at HS. 1800 mL 4    sodium phosphate (FLEET ENEMA) 7-19 GM/118ML rectal enema Place 1 Bottle (1 enema) rectally daily as needed for constipation 1 Bottle 0    tiZANidine (ZANAFLEX) 4 MG tablet Take 4 mg by mouth 2 mg twice a day, 4 mg at night, another 2 mg PRN during the day      tolterodine (DETROL) 2 MG tablet TAKE 1 TABLET(2 MG) BY MOUTH TWICE DAILY 180 tablet 0    zolpidem (AMBIEN) 10 MG tablet TAKE 1 TABLET(10 MG) BY MOUTH EVERY NIGHT AS NEEDED FOR SLEEP 90 tablet 0    CARAFATE 1 GM/10ML PO suspension  (Patient not taking: Reported on 7/26/2023)      magic mouthwash suspension, diphenhydrAMINE, lidocaine, aluminum-magnesium & simethicone, (FIRST-MOUTHWASH BLM) compounding kit Combine 1/3rd each of viscous  lidocaine, maalox and liquid benadryl - swish and swallow 5 mL as needed every 4-6 hours for pain (Patient not taking: Reported on 7/26/2023) 240 mL 1    pregabalin (LYRICA) 25 MG capsule Increase Lyrica to 50 mg at noon and 75 mg at bedtime x 2 weeks, then increase to 50 mg at noon and 100 mg at bedtime. (Patient not taking: Reported on 7/26/2023) 180 capsule 0       Medical History: any changes in medical history since they were last seen? No      Objective:    Physical Exam:  Blood pressure 104/73, pulse 117.  Constitutional: Well developed, well nourished, appears stated age.  Gait: Wheelchair bound   HEENT: Head atraumatic, normocephalic. Eyes without conjunctival injection or jaundice. Oropharynx clear. Neck supple. No obvious neck masses.  Skin: No rash, lesions, or petechiae of exposed skin.   Psychiatric/mental status: Alert, without lethargy or stupor. Speech fluent. Appropriate affect. Mood normal. Able to follow commands without difficulty.     Diagnostic Tests/Imaging/Labs:  None     BILLING TIME DOCUMENTATION:   The total TIME spent on this patient on the date of the encounter/appointment was 40 minutes.      TOTAL TIME includes:   Time spent preparing to see the patient (reviewing records and tests)   Time spent face to face (or over the phone) with the patient   Time spent ordering tests, medications, procedures and referrals   Time spent Referring and communicating with other healthcare professionals   Time spent documenting clinical information in Epic

## 2023-07-26 NOTE — PATIENT INSTRUCTIONS
Pain Physical Therapy:  NO   We will defer at this time, may consider referral in the future.     Pain Psychologist to address relaxation, behavioral change, coping style, and other factors important to improvement.  NO    Diagnostic Studies:  None ordered today.     Medication Management:   Amitriptyline - Current dosage 50 mg at bedtime.  He questions benefit and notes daytime sedation since starting this medication.  He is interested in trial taper down/off, which is reasonable.  Recommend reducing dosage to 25 mg at bedtime x2 weeks.  If no change in pain levels, recommend stopping altogether.  Monitor changes in pain levels, advised to update clinic if he has increased pain with taper.  Continue Lyrica 50 mg twice daily. Once completely off amitriptyline for 2 weeks, may trial increasing Lyrica dose at bedtime to 100 mg and continue 50 mg late morning/noon.  Updated prescription sent in to pharmacy today.  Medical cannabis - recommend continuing to work with the dispensary to explore new products that may be available.  Advised to let me know if he needs medical cannabis renewal in between visits.    Potential procedures:   He had baclofen IT pump implant on 1/3/23 ([facility]     Other Orders/Referrals:   None     Follow up with SUJATA Armendariz CNP in 6-8 weeks in person or video visit is okay     ----------------------------------------------------------------  Clinic Number:  [phone]   Call with any questions about your care and for scheduling assistance.   Calls are returned Monday through Friday between 8 AM and 4:30 PM. We usually get back to you within 2 business days depending on the issue/request.    If we are prescribing your medications:  For opioid medication refills, call the clinic or send a Tandem message 7 days in advance.  Please include:  Name of requested medication  Name of the pharmacy.  For non-opioid medications, call your pharmacy directly to request a refill. Please allow 3-4  days to be processed.   Per [place] State Law:  All controlled substance prescriptions must be filled within 30 days of being written.    For those controlled substances allowing refills, pickup must occur within 30 days of last fill.      We believe regular attendance is key to your success in our program!    Any time you are unable to keep your appointment we ask that you call us at least 24 hours in advance to cancel.This will allow us to offer the appointment time to another patient.   Multiple missed appointments may lead to dismissal from the clinic.

## 2023-07-26 NOTE — PROGRESS NOTES
07/26/23 1122   PEG: A Thee-Item Scale Assessing Pain Intensity and Interference        0 = No pain / No interference    10 = Pain as bad as you can imagine / Completely interferes   What number best describes your pain on average in the past week? 5   What number best describes how, during the past week, pain has interfered with your enjoyment of life? 4   What number best describes how, during the past week, pain has interfered with your general activity? 3   PEG Total Score 4

## 2023-08-01 ENCOUNTER — PATIENT OUTREACH (OUTPATIENT)
Dept: CARE COORDINATION | Facility: CLINIC | Age: 48
End: 2023-08-01
Payer: COMMERCIAL

## 2023-08-01 NOTE — PROGRESS NOTES
Clinic Care Coordination Contact  Follow Up Progress Note      Assessment: The RN CC nurse care coordinator contacted the patient by phone for a follow-up call.  The patient states that he is having an increase in pain and is working on changing his medications with getting off amitriptyline and increasing the Lyrica dose.  The RN CC did ask him if the increase in pain was from the change in medications and he said he thought it might be.  But he does not like the medications that make him so groggy and that was his reason for trying to get off the amitriptyline.  The patient is going in next week for a preop for his Botox injection in his bladder.    Care Gaps:    Health Maintenance Due   Topic Date Due    HEPATITIS B IMMUNIZATION (1 of 3 - 3-dose series) Never done    MEDICARE ANNUAL WELLNESS VISIT  05/31/2023    LIPID  07/12/2023       Care Gaps Last addressed on 8/1/23    Care Plans  Care Plan: General  take medications as prescribed       Problem: HP GENERAL PROBLEM       Goal: General Goal -  I will take all medications as prescribed by the providers.       Start Date: 7/22/2022 Expected End Date: 7/22/2024    This Visit's Progress: 70% Recent Progress: 70%    Note:     Barriers: wheelchair bound  Strengths: engaged in care coordination  Patient expressed understanding of goal: yes  Action steps to achieve this goal:  1. I will take all medications as prescribed.  2. I will ask any questions that I have regarding new medications.  3. I will work with the RN CC if I have any difficulty getting refills.                             Care Plan: General -  Make and attend follow up appointments       Problem: HP GENERAL PROBLEM       Goal: General Goal - I will make and attend all recommended appointments from my providers.       Start Date: 7/22/2022 Expected End Date: 7/22/2024    This Visit's Progress: 70% Recent Progress: 70%    Note:     Barriers: Many providers.  Strengths: engaged in care  coordination  Patient expressed understanding of goal: yes  Action steps to achieve this goal:  1. I will make all of the recommended follow up appointments.  2. I will attend all of the follow up appointments as recommended by the providers.                              Intervention/Education provided during outreach: The patient is also concerned he may be having a UTI and the RN CC encouraged him to reach out to urology or to his PCP to be able to have a sample brought in.     Outreach Frequency: monthly        Plan:   1.  The patient will take all medications as prescribed by the providers including the change over from amitriptyline to Lyrica.  2.  The patient will make and attend all recommended follow-up appointments from his providers.  3.  The patient will attend his preop for his Botox in he is bladder.    RN CC Nurse Care Coordinator will follow up in 30 days.          Robbin Vanegas MSN, RN, PHN, CCM   Primary Care Clinical RN Care Coordinator  [facility]  8/1/2023   3:33 PM  [email or web address]  Office: [phone]

## 2023-08-01 NOTE — LETTER
[facility]  Patient Centered Plan of Care  About Me:        Patient Name:  Riley Gifford    YOB: 1975  Age:         47 year old   Rut MRN:    [id number] Telephone Information:  Home Phone [phone]   Mobile [phone]   Mobile [phone]       Address:  [address] [place] [address] [address]  [place] [address] Email address:  [email or web address]      Emergency Contact(s)    Name Relationship Lgl Grd Work Phone Home Phone Mobile Phone   1. TREY GIFFORD (NE* Relative No noen none [phone]   2. AGUSTO CAVAZOS Sister   [phone]    3. HODA GIFFORD Brother No  [phone]            Primary language:  English     needed? No   [facility]:  [phone] op. 1  Other communication barriers:Glasses    Preferred Method of Communication:  Lisa  Current living arrangement: I live in assisted living    Mobility Status/ Medical Equipment: Dependent/Assisted by Another        Health Maintenance  Health Maintenance Reviewed: Due/Overdue   Health Maintenance Due   Topic Date Due    HEPATITIS B IMMUNIZATION (1 of 3 - 3-dose series) Never done    MEDICARE ANNUAL WELLNESS VISIT  05/31/2023    LIPID  07/12/2023           My Access Plan  Medical Emergency 911   Primary Clinic Line [facility] [phone]   24 Hour Appointment Line [phone] or  [phone]) (toll-free)   24 Hour Nurse Line [phone] (toll-free)   Preferred Urgent Care [facility] [phone]     Preferred Hospital [facility]  [phone]     Preferred Pharmacy [id number] - [place], [place] - [address] [place] [address] AT [facility] [address]     Behavioral Health Crisis Line The National Suicide Prevention Lifeline at 1-423.351.1329 or Text/Call 478             My Care Team Members  Patient Care Team         Relationship Specialty Notifications Start End    Jenny Fay,  DANNI PCP - General Family Medicine  12/21/21     Phone: [phone] Fax: [phone] [address] [facility] [address]    Robbin Andino, RN Lead Care Coordinator Nurse Admissions 1/6/16     phone:  [phone]    Phone: [phone] Fax: [phone]        Rober Leo MD Referring Physician Urology  1/8/16     Phone: [phone] Fax: [phone] [address] [facility] [address]                 Amanda Other (see comments)   1/8/16     [facility]     Phone: [phone]         Evaristo Hayes MD MD Urology  4/19/16     Phone: [phone] Fax: [phone]         [address] [facility] [address] [facility] [address]    Jenny Wright, RN Registered Nurse Urology  4/19/16     Rosemary Thomas, RN Nurse Coordinator Physical Medicine and Rehabilitation  3/30/17     Phone: [phone] Pager: [phone]        Lulú Reveles APRN CNP Nurse Practitioner Nurse Practitioner  8/28/20     Phone: [phone] Fax: [phone]         [address] [facility] [address] [facility] [address]    [facility] Occupational Therapist Occupational Therapy  1/7/22     [facility] Shanique Alberto  fax [phone]    Phone: [phone] Fax: [phone]         [address] [facility] [address]    Evaristo Hayes MD MD Urology  1/17/22     Phone: [phone] Fax: [phone]         [address] [facility] [address] [facility] [address]    Yessy Tapia, RN Specialty Care Coordinator   1/17/22     Jenny Fay PA-C Assigned PCP   4/3/22     Phone: [phone] Fax: [phone] [address] [address]    Raffy Hernández OD Assigned Surgical Provider   11/12/22     Phone: [phone] Fax: [phone] [address] [address]              My Care Plans  Self Management and Treatment Plan  Care Plan  Care Plan: General  take medications as prescribed        Problem: HP GENERAL PROBLEM       Goal: General Goal -  I will take all medications as prescribed by the providers.       Start Date: 7/22/2022 Expected End Date: 7/22/2024    This Visit's Progress: 70% Recent Progress: 70%    Note:     Barriers: wheelchair bound  Strengths: engaged in care coordination  Patient expressed understanding of goal: yes  Action steps to achieve this goal:  1. I will take all medications as prescribed.  2. I will ask any questions that I have regarding new medications.  3. I will work with the RN CC if I have any difficulty getting refills.                             Care Plan: General -  Make and attend follow up appointments       Problem: HP GENERAL PROBLEM       Goal: General Goal - I will make and attend all recommended appointments from my providers.       Start Date: 7/22/2022 Expected End Date: 7/22/2024    This Visit's Progress: 70% Recent Progress: 70%    Note:     Barriers: Many providers.  Strengths: engaged in care coordination  Patient expressed understanding of goal: yes  Action steps to achieve this goal:  1. I will make all of the recommended follow up appointments.  2. I will attend all of the follow up appointments as recommended by the providers.                               Action Plans on File:            Depression          Advance Care Plans/Directives Type:   Advanced Directive - On File      My Medical and Care Information  Problem List   Patient Active Problem List   Diagnosis    Vitamin D deficiency    Eosinophilic esophagitis    Neurogenic bladder    CARDIOVASCULAR SCREENING; LDL GOAL LESS THAN 160    Quadriplegia (H)    Chronic constipation    Internal hemorrhoids with other complication    Diagnostic skin and sensitization tests(aka ALLERGENS)    Anal or rectal pain    Allergic rhinitis due to animal dander    Allergy to mold spores    House dust mite allergy    Insomnia    Health Care Home    Gallstones    Chronic midline thoracic back pain    Pulmonary  nodules    ACP (advance care planning)    Cervical spinal cord injury, sequela (H)    Spasticity    Self-catheterizes urinary bladder    Bleeding external hemorrhoids    History of claustrophobia    Recurrent UTI      Current Medications and Allergies:  See printed Medication Report.    Care Coordination Start Date: 1/6/2016   Frequency of Care Coordination: monthly     Form Last Updated: 08/01/2023

## 2023-08-08 ENCOUNTER — OFFICE VISIT (OUTPATIENT)
Dept: FAMILY MEDICINE | Facility: CLINIC | Age: 48
End: 2023-08-08
Payer: COMMERCIAL

## 2023-08-08 VITALS
HEART RATE: 64 BPM | SYSTOLIC BLOOD PRESSURE: 105 MMHG | TEMPERATURE: 98.6 F | OXYGEN SATURATION: 95 % | DIASTOLIC BLOOD PRESSURE: 71 MMHG

## 2023-08-08 DIAGNOSIS — M54.6 CHRONIC MIDLINE THORACIC BACK PAIN: ICD-10-CM

## 2023-08-08 DIAGNOSIS — K20.0 EOSINOPHILIC ESOPHAGITIS: ICD-10-CM

## 2023-08-08 DIAGNOSIS — G89.29 CHRONIC MIDLINE THORACIC BACK PAIN: ICD-10-CM

## 2023-08-08 DIAGNOSIS — G82.50 QUADRIPLEGIA (H): ICD-10-CM

## 2023-08-08 DIAGNOSIS — Z01.818 PREOP GENERAL PHYSICAL EXAM: Primary | ICD-10-CM

## 2023-08-08 DIAGNOSIS — N39.0 RECURRENT UTI: ICD-10-CM

## 2023-08-08 DIAGNOSIS — N31.9 NEUROGENIC BLADDER: ICD-10-CM

## 2023-08-08 LAB
ALBUMIN UR-MCNC: NEGATIVE MG/DL
APPEARANCE UR: CLEAR
BILIRUB UR QL STRIP: NEGATIVE
COLOR UR AUTO: YELLOW
GLUCOSE UR STRIP-MCNC: NEGATIVE MG/DL
HGB UR QL STRIP: NEGATIVE
KETONES UR STRIP-MCNC: NEGATIVE MG/DL
LEUKOCYTE ESTERASE UR QL STRIP: ABNORMAL
NITRATE UR QL: NEGATIVE
PH UR STRIP: 6 [PH] (ref 5–7)
RBC #/AREA URNS AUTO: NORMAL /HPF
SP GR UR STRIP: 1.02 (ref 1–1.03)
UROBILINOGEN UR STRIP-ACNC: 0.2 E.U./DL
WBC #/AREA URNS AUTO: NORMAL /HPF

## 2023-08-08 PROCEDURE — 81001 URINALYSIS AUTO W/SCOPE: CPT | Performed by: PHYSICIAN ASSISTANT

## 2023-08-08 PROCEDURE — 36415 COLL VENOUS BLD VENIPUNCTURE: CPT | Performed by: PHYSICIAN ASSISTANT

## 2023-08-08 PROCEDURE — 87086 URINE CULTURE/COLONY COUNT: CPT | Performed by: PHYSICIAN ASSISTANT

## 2023-08-08 PROCEDURE — 80048 BASIC METABOLIC PNL TOTAL CA: CPT | Performed by: PHYSICIAN ASSISTANT

## 2023-08-08 PROCEDURE — 99214 OFFICE O/P EST MOD 30 MIN: CPT | Performed by: PHYSICIAN ASSISTANT

## 2023-08-08 ASSESSMENT — PATIENT HEALTH QUESTIONNAIRE - PHQ9
SUM OF ALL RESPONSES TO PHQ QUESTIONS 1-9: 9
SUM OF ALL RESPONSES TO PHQ QUESTIONS 1-9: 9
10. IF YOU CHECKED OFF ANY PROBLEMS, HOW DIFFICULT HAVE THESE PROBLEMS MADE IT FOR YOU TO DO YOUR WORK, TAKE CARE OF THINGS AT HOME, OR GET ALONG WITH OTHER PEOPLE: SOMEWHAT DIFFICULT

## 2023-08-08 NOTE — PATIENT INSTRUCTIONS
For informational purposes only. Not to replace the advice of your health care provider. Copyright   2003,  [facility]. All rights reserved. Clinically reviewed by Laquita Eden MD. [employer]wite [id number] - REV .  Preparing for Your Surgery  Getting started  A nurse will call you to review your health history and instructions. They will give you an arrival time based on your scheduled surgery time. Please be ready to share:  Your doctor's clinic name and phone number  Your medical, surgical, and anesthesia history  A list of allergies and sensitivities  A list of medicines, including herbal treatments and over-the-counter drugs  Whether the patient has a legal guardian (ask how to send us the papers in advance)  Please tell us if you're pregnant--or if there's any chance you might be pregnant. Some surgeries may injure a fetus (unborn baby), so they require a pregnancy test. Surgeries that are safe for a fetus don't always need a test, and you can choose whether to have one.   If you have a child who's having surgery, please ask for a copy of Preparing for Your Child's Surgery.    Preparing for surgery  Within 10 to 30 days of surgery: Have a pre-op exam (sometimes called an H&P, or History and Physical). This can be done at a clinic or pre-operative center.  If you're having a , you may not need this exam. Talk to your care team.  At your pre-op exam, talk to your care team about all medicines you take. If you need to stop any medicines before surgery, ask when to start taking them again.  We do this for your safety. Many medicines can make you bleed too much during surgery. Some change how well surgery (anesthesia) drugs work.  Call your insurance company to let them know you're having surgery. (If you don't have insurance, call [phone].)  Call your clinic if there's any change in your health. This includes signs of a cold or flu (sore throat, runny nose, cough, rash, fever). It also  includes a scrape or scratch near the surgery site.  If you have questions on the day of surgery, call your hospital or surgery center.  Eating and drinking guidelines  For your safety: Unless your surgeon tells you otherwise, follow the guidelines below.  Eat and drink as usual until 8 hours before you arrive for surgery. After that, no food or milk.  Drink clear liquids until 2 hours before you arrive. These are liquids you can see through, like water, Gatorade, and Propel Water. They also include plain black coffee and tea (no cream or milk), candy, and breath mints. You can spit out gum when you arrive.  If you drink alcohol: Stop drinking it the night before surgery.  If your care team tells you to take medicine on the morning of surgery, it's okay to take it with a sip of water.  Preventing infection  Shower or bathe the night before and morning of your surgery. Follow the instructions your clinic gave you. (If no instructions, use regular soap.)  Don't shave or clip hair near your surgery site. We'll remove the hair if needed.  Don't smoke or vape the morning of surgery. You may chew nicotine gum up to 2 hours before surgery. A nicotine patch is okay.  Note: Some surgeries require you to completely quit smoking and nicotine. Check with your surgeon.  Your care team will make every effort to keep you safe from infection. We will:  Clean our hands often with soap and water (or an alcohol-based hand rub).  Clean the skin at your surgery site with a special soap that kills germs.  Give you a special gown to keep you warm. (Cold raises the risk of infection.)  Wear special hair covers, masks, gowns and gloves during surgery.  Give antibiotic medicine, if prescribed. Not all surgeries need antibiotics.  What to bring on the day of surgery  Photo ID and insurance card  Copy of your health care directive, if you have one  Glasses and hearing aids (bring cases)  You can't wear contacts during surgery  Inhaler and eye  drops, if you use them (tell us about these when you arrive)  CPAP machine or breathing device, if you use them  A few personal items, if spending the night  If you have . . .  A pacemaker, ICD (cardiac defibrillator) or other implant: Bring the ID card.  An implanted stimulator: Bring the remote control.  A legal guardian: Bring a copy of the certified (court-stamped) guardianship papers.  Please remove any jewelry, including body piercings. Leave jewelry and other valuables at home.  If you're going home the day of surgery  You must have a responsible adult drive you home. They should stay with you overnight as well.  If you don't have someone to stay with you, and you aren't safe to go home alone, we may keep you overnight. Insurance often won't pay for this.  After surgery  If it's hard to control your pain or you need more pain medicine, please call your surgeon's office.  Questions?   If you have any questions for your care team, list them here: _________________________________________________________________________________________________________________________________________________________________________ ____________________________________ ____________________________________ ____________________________________
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
## 2023-08-09 LAB
ANION GAP SERPL CALCULATED.3IONS-SCNC: 11 MMOL/L (ref 7–15)
BUN SERPL-MCNC: 10.1 MG/DL (ref 6–20)
CALCIUM SERPL-MCNC: 8.7 MG/DL (ref 8.6–10)
CHLORIDE SERPL-SCNC: 102 MMOL/L (ref 98–107)
CREAT SERPL-MCNC: 0.51 MG/DL (ref 0.67–1.17)
DEPRECATED HCO3 PLAS-SCNC: 21 MMOL/L (ref 22–29)
GFR SERPL CREATININE-BSD FRML MDRD: >90 ML/MIN/1.73M2
GLUCOSE SERPL-MCNC: 81 MG/DL (ref 70–99)
POTASSIUM SERPL-SCNC: 4.2 MMOL/L (ref 3.4–5.3)
SODIUM SERPL-SCNC: 134 MMOL/L (ref 136–145)

## 2023-08-09 NOTE — RESULT ENCOUNTER NOTE
Johnny Lin,     Your labs are stable.   Your urine wasn't overly convincing for a UTI. Lets wait on the culture to see before treating you.   Jenny Fay PA-C

## 2023-08-10 LAB — BACTERIA UR CULT: NO GROWTH

## 2023-08-10 NOTE — RESULT ENCOUNTER NOTE
Riley,     Your urine had no infection. Please feel free to leave an additional urine sample next week prior to your surgery or sooner if symptoms change.   Jenny Fay PA-C

## 2023-08-18 ENCOUNTER — PATIENT OUTREACH (OUTPATIENT)
Dept: UROLOGY | Facility: CLINIC | Age: 48
End: 2023-08-18
Payer: COMMERCIAL

## 2023-08-18 DIAGNOSIS — K59.09 CHRONIC CONSTIPATION: ICD-10-CM

## 2023-08-18 DIAGNOSIS — N39.0 RECURRENT UTI: ICD-10-CM

## 2023-08-18 RX ORDER — DOCUSATE SODIUM 283 MG/5ML
LIQUID RECTAL
Qty: 150 ML | Refills: 0 | Status: SHIPPED | OUTPATIENT
Start: 2023-08-18 | End: 2023-10-17

## 2023-08-18 NOTE — TELEPHONE ENCOUNTER
Writer reached out to pt to answer questions related to UC and covid test.     Pt notes that he has an appt on Monday for his UC at   [facility]. Writer informed him there is not a need for a Covid test unless he has been around anyone known to have covid or if he has covid like symptoms. Pt expressed understanding.     Pt notes that he needs a refill of Gentamiacin irrigation. Writer notes that with it being Friday, he would not be able to get his medication likely until early next week due to the Rx going to compounding pharmacy. Pt expressed understanding.     Will continue to follow as needed.

## 2023-08-21 ENCOUNTER — LAB (OUTPATIENT)
Dept: LAB | Facility: CLINIC | Age: 48
End: 2023-08-21
Payer: COMMERCIAL

## 2023-08-21 DIAGNOSIS — N31.9 NEUROGENIC BLADDER: ICD-10-CM

## 2023-08-21 LAB
ALBUMIN UR-MCNC: NEGATIVE MG/DL
AMORPH CRY #/AREA URNS HPF: ABNORMAL /HPF
APPEARANCE UR: CLEAR
BACTERIA #/AREA URNS HPF: ABNORMAL /HPF
BILIRUB UR QL STRIP: NEGATIVE
COLOR UR AUTO: YELLOW
GLUCOSE UR STRIP-MCNC: NEGATIVE MG/DL
HGB UR QL STRIP: NEGATIVE
KETONES UR STRIP-MCNC: NEGATIVE MG/DL
LEUKOCYTE ESTERASE UR QL STRIP: ABNORMAL
NITRATE UR QL: NEGATIVE
PH UR STRIP: 7.5 [PH] (ref 5–7)
RBC #/AREA URNS AUTO: ABNORMAL /HPF
SP GR UR STRIP: 1.02 (ref 1–1.03)
SQUAMOUS #/AREA URNS AUTO: ABNORMAL /LPF
UROBILINOGEN UR STRIP-ACNC: 0.2 E.U./DL
WBC #/AREA URNS AUTO: ABNORMAL /HPF

## 2023-08-21 PROCEDURE — 81001 URINALYSIS AUTO W/SCOPE: CPT

## 2023-08-21 PROCEDURE — 87086 URINE CULTURE/COLONY COUNT: CPT

## 2023-08-23 LAB — BACTERIA UR CULT: NORMAL

## 2023-08-24 ENCOUNTER — ANESTHESIA EVENT (OUTPATIENT)
Dept: SURGERY | Facility: AMBULATORY SURGERY CENTER | Age: 48
End: 2023-08-24

## 2023-08-24 RX ORDER — ONDANSETRON 2 MG/ML
4 INJECTION INTRAMUSCULAR; INTRAVENOUS EVERY 30 MIN PRN
Status: CANCELLED | OUTPATIENT
Start: 2023-08-24

## 2023-08-24 RX ORDER — OXYCODONE HYDROCHLORIDE 5 MG/1
5 TABLET ORAL
Status: CANCELLED | OUTPATIENT
Start: 2023-08-24

## 2023-08-24 RX ORDER — FENTANYL CITRATE 50 UG/ML
25 INJECTION, SOLUTION INTRAMUSCULAR; INTRAVENOUS EVERY 5 MIN PRN
Status: CANCELLED | OUTPATIENT
Start: 2023-08-24

## 2023-08-24 RX ORDER — HALOPERIDOL 5 MG/ML
1 INJECTION INTRAMUSCULAR
Status: CANCELLED | OUTPATIENT
Start: 2023-08-24

## 2023-08-24 RX ORDER — HYDROXYZINE HYDROCHLORIDE 25 MG/1
25 TABLET, FILM COATED ORAL EVERY 6 HOURS PRN
Status: CANCELLED | OUTPATIENT
Start: 2023-08-24

## 2023-08-24 RX ORDER — FENTANYL CITRATE 50 UG/ML
50 INJECTION, SOLUTION INTRAMUSCULAR; INTRAVENOUS EVERY 5 MIN PRN
Status: CANCELLED | OUTPATIENT
Start: 2023-08-24

## 2023-08-24 RX ORDER — DIAZEPAM 10 MG/2ML
2.5 INJECTION, SOLUTION INTRAMUSCULAR; INTRAVENOUS
Status: CANCELLED | OUTPATIENT
Start: 2023-08-24

## 2023-08-24 RX ORDER — HYDROMORPHONE HYDROCHLORIDE 1 MG/ML
0.2 INJECTION, SOLUTION INTRAMUSCULAR; INTRAVENOUS; SUBCUTANEOUS EVERY 5 MIN PRN
Status: CANCELLED | OUTPATIENT
Start: 2023-08-24

## 2023-08-24 RX ORDER — KETOROLAC TROMETHAMINE 30 MG/ML
15 INJECTION, SOLUTION INTRAMUSCULAR; INTRAVENOUS
Status: CANCELLED | OUTPATIENT
Start: 2023-08-24

## 2023-08-24 RX ORDER — MEPERIDINE HYDROCHLORIDE 25 MG/ML
12.5 INJECTION INTRAMUSCULAR; INTRAVENOUS; SUBCUTANEOUS EVERY 5 MIN PRN
Status: CANCELLED | OUTPATIENT
Start: 2023-08-24

## 2023-08-24 RX ORDER — DEXAMETHASONE SODIUM PHOSPHATE 10 MG/ML
4 INJECTION, SOLUTION INTRAMUSCULAR; INTRAVENOUS
Status: CANCELLED | OUTPATIENT
Start: 2023-08-24

## 2023-08-24 RX ORDER — LABETALOL HYDROCHLORIDE 5 MG/ML
10 INJECTION, SOLUTION INTRAVENOUS
Status: CANCELLED | OUTPATIENT
Start: 2023-08-24

## 2023-08-24 RX ORDER — HYDROMORPHONE HYDROCHLORIDE 1 MG/ML
0.4 INJECTION, SOLUTION INTRAMUSCULAR; INTRAVENOUS; SUBCUTANEOUS EVERY 5 MIN PRN
Status: CANCELLED | OUTPATIENT
Start: 2023-08-24

## 2023-08-24 RX ORDER — SODIUM CHLORIDE, SODIUM LACTATE, POTASSIUM CHLORIDE, CALCIUM CHLORIDE 600; 310; 30; 20 MG/100ML; MG/100ML; MG/100ML; MG/100ML
INJECTION, SOLUTION INTRAVENOUS CONTINUOUS
Status: CANCELLED | OUTPATIENT
Start: 2023-08-24

## 2023-08-24 RX ORDER — ALBUTEROL SULFATE 0.83 MG/ML
2.5 SOLUTION RESPIRATORY (INHALATION) EVERY 4 HOURS PRN
Status: CANCELLED | OUTPATIENT
Start: 2023-08-24

## 2023-08-24 RX ORDER — ONDANSETRON 4 MG/1
4 TABLET, ORALLY DISINTEGRATING ORAL EVERY 30 MIN PRN
Status: CANCELLED | OUTPATIENT
Start: 2023-08-24

## 2023-08-24 RX ORDER — OXYCODONE HYDROCHLORIDE 5 MG/1
10 TABLET ORAL
Status: CANCELLED | OUTPATIENT
Start: 2023-08-24

## 2023-08-24 RX ORDER — FENTANYL CITRATE 50 UG/ML
25 INJECTION, SOLUTION INTRAMUSCULAR; INTRAVENOUS
Status: CANCELLED | OUTPATIENT
Start: 2023-08-24

## 2023-08-25 ENCOUNTER — HOSPITAL ENCOUNTER (OUTPATIENT)
Facility: AMBULATORY SURGERY CENTER | Age: 48
Discharge: HOME | End: 2023-08-25
Attending: UROLOGY
Payer: COMMERCIAL

## 2023-08-25 ENCOUNTER — ANESTHESIA (OUTPATIENT)
Dept: SURGERY | Facility: AMBULATORY SURGERY CENTER | Age: 48
End: 2023-08-25

## 2023-08-25 VITALS
TEMPERATURE: 99.7 F | BODY MASS INDEX: 17.61 KG/M2 | SYSTOLIC BLOOD PRESSURE: 137 MMHG | HEART RATE: 102 BPM | DIASTOLIC BLOOD PRESSURE: 107 MMHG | WEIGHT: 130 LBS | HEIGHT: 72 IN | RESPIRATION RATE: 18 BRPM | OXYGEN SATURATION: 96 %

## 2023-08-25 DIAGNOSIS — N31.9 NEUROGENIC BLADDER: ICD-10-CM

## 2023-08-25 LAB
ALBUMIN UR-MCNC: 10 MG/DL
AMORPH CRY #/AREA URNS HPF: ABNORMAL /HPF
APPEARANCE UR: ABNORMAL
BILIRUB UR QL STRIP: NEGATIVE
COLOR UR AUTO: YELLOW
GLUCOSE UR STRIP-MCNC: NEGATIVE MG/DL
HGB UR QL STRIP: NEGATIVE
KETONES UR STRIP-MCNC: NEGATIVE MG/DL
LEUKOCYTE ESTERASE UR QL STRIP: ABNORMAL
MUCOUS THREADS #/AREA URNS LPF: PRESENT /LPF
NITRATE UR QL: NEGATIVE
PH UR STRIP: 7.5 [PH] (ref 5–7)
RBC URINE: 5 /HPF
SP GR UR STRIP: 1.02 (ref 1–1.03)
UROBILINOGEN UR STRIP-MCNC: NORMAL MG/DL
WBC URINE: 103 /HPF

## 2023-08-25 PROCEDURE — 99000 SPECIMEN HANDLING OFFICE-LAB: CPT | Performed by: PATHOLOGY

## 2023-08-25 PROCEDURE — 87088 URINE BACTERIA CULTURE: CPT | Performed by: UROLOGY

## 2023-08-25 PROCEDURE — 81001 URINALYSIS AUTO W/SCOPE: CPT | Performed by: PATHOLOGY

## 2023-08-25 RX ORDER — LIDOCAINE 40 MG/G
CREAM TOPICAL
Status: DISCONTINUED | OUTPATIENT
Start: 2023-08-25 | End: 2023-08-26 | Stop reason: HOSPADM

## 2023-08-25 RX ORDER — SODIUM CHLORIDE, SODIUM LACTATE, POTASSIUM CHLORIDE, CALCIUM CHLORIDE 600; 310; 30; 20 MG/100ML; MG/100ML; MG/100ML; MG/100ML
INJECTION, SOLUTION INTRAVENOUS CONTINUOUS
Status: DISCONTINUED | OUTPATIENT
Start: 2023-08-25 | End: 2023-08-26 | Stop reason: HOSPADM

## 2023-08-25 RX ORDER — CEFAZOLIN SODIUM 2 G/50ML
2 SOLUTION INTRAVENOUS
Status: DISCONTINUED | OUTPATIENT
Start: 2023-08-25 | End: 2023-08-26 | Stop reason: HOSPADM

## 2023-08-25 RX ORDER — ACETAMINOPHEN 325 MG/1
975 TABLET ORAL ONCE
Status: DISCONTINUED | OUTPATIENT
Start: 2023-08-25 | End: 2023-08-26 | Stop reason: HOSPADM

## 2023-08-25 RX ORDER — CEFAZOLIN SODIUM 2 G/50ML
2 SOLUTION INTRAVENOUS SEE ADMIN INSTRUCTIONS
Status: DISCONTINUED | OUTPATIENT
Start: 2023-08-25 | End: 2023-08-26 | Stop reason: HOSPADM
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
## 2023-08-25 NOTE — PROGRESS NOTES
Case cancelled- patient reports S/S of UTI.  Dr. Hayes notified- UA/UC sent and 7 day supply of bactrim BID called into pharmacy per Dr. Hayes.

## 2023-08-27 LAB — BACTERIA UR CULT: ABNORMAL

## 2023-08-28 DIAGNOSIS — N31.9 NEUROGENIC BLADDER: Primary | ICD-10-CM

## 2023-08-28 DIAGNOSIS — R05.9 COUGH: ICD-10-CM

## 2023-08-28 RX ORDER — ALBUTEROL SULFATE 90 UG/1
AEROSOL, METERED RESPIRATORY (INHALATION)
Qty: 8.5 G | Refills: 1 | Status: SHIPPED | OUTPATIENT
Start: 2023-08-28 | End: 2023-10-12

## 2023-08-29 ENCOUNTER — PATIENT OUTREACH (OUTPATIENT)
Dept: UROLOGY | Facility: CLINIC | Age: 48
End: 2023-08-29
Payer: COMMERCIAL

## 2023-08-29 ENCOUNTER — TELEPHONE (OUTPATIENT)
Dept: UROLOGY | Facility: CLINIC | Age: 48
End: 2023-08-29
Payer: COMMERCIAL

## 2023-08-29 NOTE — TELEPHONE ENCOUNTER
Spoke to patients, aware of new date for this Friday September 1st, has some questions regarding his UA/UC.     Luna Cortés on 8/29/2023 at 8:26 AM

## 2023-08-29 NOTE — TELEPHONE ENCOUNTER
Writer reached out to pt to discuss his abx for noted UTI.     Pt notes that he is feeling better but wanted to ensure he was on the correct medication. Writer notes that the medication that he is currently taking covers the infection noted in his UC.     Pt expressed understanding and had no other questions.     Will continue to follow as needed.

## 2023-08-31 ENCOUNTER — ANESTHESIA EVENT (OUTPATIENT)
Dept: SURGERY | Facility: AMBULATORY SURGERY CENTER | Age: 48
End: 2023-08-31
Payer: COMMERCIAL

## 2023-09-01 ENCOUNTER — ANESTHESIA (OUTPATIENT)
Dept: SURGERY | Facility: AMBULATORY SURGERY CENTER | Age: 48
End: 2023-09-01
Payer: COMMERCIAL

## 2023-09-01 ENCOUNTER — HOSPITAL ENCOUNTER (OUTPATIENT)
Facility: AMBULATORY SURGERY CENTER | Age: 48
Discharge: HOME | End: 2023-09-01
Attending: UROLOGY
Payer: COMMERCIAL

## 2023-09-01 VITALS
WEIGHT: 130 LBS | TEMPERATURE: 97 F | SYSTOLIC BLOOD PRESSURE: 112 MMHG | DIASTOLIC BLOOD PRESSURE: 69 MMHG | BODY MASS INDEX: 17.61 KG/M2 | OXYGEN SATURATION: 94 % | HEIGHT: 72 IN | RESPIRATION RATE: 16 BRPM | HEART RATE: 95 BPM

## 2023-09-01 PROCEDURE — 52287 CYSTOSCOPY CHEMODENERVATION: CPT

## 2023-09-01 RX ORDER — LIDOCAINE 40 MG/G
CREAM TOPICAL
Status: DISCONTINUED | OUTPATIENT
Start: 2023-09-01 | End: 2023-09-01 | Stop reason: HOSPADM

## 2023-09-01 RX ORDER — ONDANSETRON 2 MG/ML
INJECTION INTRAMUSCULAR; INTRAVENOUS PRN
Status: DISCONTINUED | OUTPATIENT
Start: 2023-09-01 | End: 2023-09-01

## 2023-09-01 RX ORDER — MAGNESIUM HYDROXIDE 1200 MG/15ML
LIQUID ORAL PRN
Status: DISCONTINUED | OUTPATIENT
Start: 2023-09-01 | End: 2023-09-01 | Stop reason: HOSPADM

## 2023-09-01 RX ORDER — FENTANYL CITRATE 50 UG/ML
50 INJECTION, SOLUTION INTRAMUSCULAR; INTRAVENOUS
Status: DISCONTINUED | OUTPATIENT
Start: 2023-09-01 | End: 2023-09-02 | Stop reason: HOSPADM

## 2023-09-01 RX ORDER — ONDANSETRON 2 MG/ML
4 INJECTION INTRAMUSCULAR; INTRAVENOUS EVERY 30 MIN PRN
Status: DISCONTINUED | OUTPATIENT
Start: 2023-09-01 | End: 2023-09-02 | Stop reason: HOSPADM

## 2023-09-01 RX ORDER — ACETAMINOPHEN 325 MG/1
975 TABLET ORAL ONCE
Status: COMPLETED | OUTPATIENT
Start: 2023-09-01 | End: 2023-09-01

## 2023-09-01 RX ORDER — CEFAZOLIN SODIUM 2 G/50ML
2 SOLUTION INTRAVENOUS
Status: COMPLETED | OUTPATIENT
Start: 2023-09-01 | End: 2023-09-01

## 2023-09-01 RX ORDER — OXYCODONE HYDROCHLORIDE 5 MG/1
5 TABLET ORAL
Status: DISCONTINUED | OUTPATIENT
Start: 2023-09-01 | End: 2023-09-02 | Stop reason: HOSPADM

## 2023-09-01 RX ORDER — FENTANYL CITRATE 50 UG/ML
INJECTION, SOLUTION INTRAMUSCULAR; INTRAVENOUS PRN
Status: DISCONTINUED | OUTPATIENT
Start: 2023-09-01 | End: 2023-09-01

## 2023-09-01 RX ORDER — ACETAMINOPHEN 325 MG/1
975 TABLET ORAL ONCE
Status: DISCONTINUED | OUTPATIENT
Start: 2023-09-01 | End: 2023-09-02 | Stop reason: HOSPADM

## 2023-09-01 RX ORDER — ONDANSETRON 4 MG/1
4 TABLET, ORALLY DISINTEGRATING ORAL EVERY 30 MIN PRN
Status: DISCONTINUED | OUTPATIENT
Start: 2023-09-01 | End: 2023-09-02 | Stop reason: HOSPADM

## 2023-09-01 RX ORDER — CEFAZOLIN SODIUM 2 G/50ML
2 SOLUTION INTRAVENOUS SEE ADMIN INSTRUCTIONS
Status: DISCONTINUED | OUTPATIENT
Start: 2023-09-01 | End: 2023-09-01 | Stop reason: HOSPADM

## 2023-09-01 RX ORDER — ALBUTEROL SULFATE 0.83 MG/ML
2.5 SOLUTION RESPIRATORY (INHALATION) EVERY 6 HOURS PRN
Status: DISCONTINUED | OUTPATIENT
Start: 2023-09-01 | End: 2023-09-02 | Stop reason: HOSPADM

## 2023-09-01 RX ORDER — PROPOFOL 10 MG/ML
INJECTION, EMULSION INTRAVENOUS CONTINUOUS PRN
Status: DISCONTINUED | OUTPATIENT
Start: 2023-09-01 | End: 2023-09-01

## 2023-09-01 RX ORDER — SODIUM CHLORIDE, SODIUM LACTATE, POTASSIUM CHLORIDE, CALCIUM CHLORIDE 600; 310; 30; 20 MG/100ML; MG/100ML; MG/100ML; MG/100ML
INJECTION, SOLUTION INTRAVENOUS CONTINUOUS
Status: DISCONTINUED | OUTPATIENT
Start: 2023-09-01 | End: 2023-09-01 | Stop reason: HOSPADM

## 2023-09-01 RX ORDER — OXYCODONE HYDROCHLORIDE 5 MG/1
10 TABLET ORAL
Status: DISCONTINUED | OUTPATIENT
Start: 2023-09-01 | End: 2023-09-02 | Stop reason: HOSPADM

## 2023-09-01 RX ORDER — LIDOCAINE HYDROCHLORIDE 20 MG/ML
INJECTION, SOLUTION INFILTRATION; PERINEURAL PRN
Status: DISCONTINUED | OUTPATIENT
Start: 2023-09-01 | End: 2023-09-01

## 2023-09-01 RX ADMIN — LIDOCAINE HYDROCHLORIDE 40 MG: 20 INJECTION, SOLUTION INFILTRATION; PERINEURAL at 12:48

## 2023-09-01 RX ADMIN — SODIUM CHLORIDE, SODIUM LACTATE, POTASSIUM CHLORIDE, CALCIUM CHLORIDE: 600; 310; 30; 20 INJECTION, SOLUTION INTRAVENOUS at 11:40

## 2023-09-01 RX ADMIN — FENTANYL CITRATE 25 MCG: 50 INJECTION, SOLUTION INTRAMUSCULAR; INTRAVENOUS at 13:24

## 2023-09-01 RX ADMIN — ONDANSETRON 4 MG: 2 INJECTION INTRAMUSCULAR; INTRAVENOUS at 13:01

## 2023-09-01 RX ADMIN — FENTANYL CITRATE 25 MCG: 50 INJECTION, SOLUTION INTRAMUSCULAR; INTRAVENOUS at 13:30

## 2023-09-01 RX ADMIN — CEFAZOLIN SODIUM 2 G: 2 SOLUTION INTRAVENOUS at 12:44

## 2023-09-01 RX ADMIN — ALBUTEROL SULFATE 2.5 MG: 0.83 SOLUTION RESPIRATORY (INHALATION) at 13:55

## 2023-09-01 RX ADMIN — FENTANYL CITRATE 25 MCG: 50 INJECTION, SOLUTION INTRAMUSCULAR; INTRAVENOUS at 13:17

## 2023-09-01 RX ADMIN — FENTANYL CITRATE 25 MCG: 50 INJECTION, SOLUTION INTRAMUSCULAR; INTRAVENOUS at 12:51

## 2023-09-01 RX ADMIN — PROPOFOL 200 MCG/KG/MIN: 10 INJECTION, EMULSION INTRAVENOUS at 12:48

## 2023-09-01 ASSESSMENT — LIFESTYLE VARIABLES: TOBACCO_USE: 0

## 2023-09-01 ASSESSMENT — ENCOUNTER SYMPTOMS: ORTHOPNEA: 0

## 2023-09-01 ASSESSMENT — COPD QUESTIONNAIRES: COPD: 0

## 2023-09-01 NOTE — ANESTHESIA CARE TRANSFER NOTE
Patient: Riley Gifford    Procedure: Procedure(s):  CYSTOSCOPY, URETHRAL DILATION, WITH BOTULINUM TOXIN INJECTION       Diagnosis: Neurogenic bladder [N31.9]  Diagnosis Additional Information: No value filed.    Anesthesia Type:   MAC     Note:  Anesthesia Care Transfer Notewriter  Vitals:  Vitals Value Taken Time   /70    Temp 97.5    Pulse 88    Resp 18    SpO2 96        Electronically Signed By: SUJATA Melendez CRNA  September 1, 2023  1:41 PM

## 2023-09-01 NOTE — DISCHARGE INSTRUCTIONS
[facility]  Home Care Following Anesthesia  For 24 hours after surgery:  Get plenty of rest.  A responsible adult must stay with you for at least 24 hours after you leave the surgery center.  Do not drive or use heavy equipment.  If you have weakness or tingling, don't drive or use heavy equipment until this feeling goes away.   Do not drink alcohol.   Avoid strenuous or risky activities.  Ask for help when climbing stairs.  You may feel lightheaded.  IF so, sit for a few minutes before standing.  Have someone help you get up.   If you have nausea (feel sick to your stomach): Drink only clear liquids such as apple juice, ginger ale, broth or 7-Up.  Rest may also help.  Be sure to drink enough fluids.  Move to a regular diet as you feel able.   You may have a slight fever.  Call the doctor if your fever is over 100 F (37.7 C) (taken under the tongue) or lasts longer than 24 hours.  You may have a dry mouth, a sore throat, muscle aches or trouble sleeping. These should go away after 24 hours.  Do not make important or legal decisions.   It is recommended to avoid smoking.               Tips for taking pain medications  To get the best pain relief possible, remember these points:  Take pain medications as directed, before pain becomes severe.  Pain medication can upset your stomach: taking it with food may help.  Constipation is a common side effect of pain medication. Drink plenty of  fluids.  Eat foods high in fiber. Take a stool softener if recommended by your doctor or pharmacist.  Do not drink alcohol, drive or operate machinery while taking pain medications.  Ask about other ways to control pain, such as with heat, ice or relaxation.    Tylenol/Acetaminophen Consumption    If you feel your pain relief is insufficient, you may take Tylenol/Acetaminophen in addition to your narcotic pain medication.   Be careful not to exceed 4,000 mg of Tylenol/Acetaminophen in a 24 hour  period from all sources.  If you are taking extra strength Tylenol/acetaminophen (500 mg), the maximum dose is 8 tablets in 24 hours.  If you are taking regular strength acetaminophen (325 mg), the maximum dose is 12 tablets in 24 hours.    Call a doctor for any of the following:  Signs of infection (fever, growing tenderness at the surgery site, a large amount of drainage or bleeding, severe pain, foul-smelling drainage, redness, swelling).  It has been over 8 to 10 hours since surgery and you are still not able to urinate (pass water).  Headache for over 24 hours.  Signs of Covid-19 infection (temperature over 100 degrees, shortness of breath, cough, loss of taste/smell, generalized body aches, persistent headache, chills, sore throat, nausea/vomiting/diarrhea)  Your doctor is:  Dr. Evaristo Giraldo, Prostate and Urology: [phone]  Or dial [phone] and ask for the resident on call for:  Prostate Urology  For emergency care, call the:  [facility]:  [phone] (TTY for hearing impaired: [phone])
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
## 2023-09-01 NOTE — OP NOTE
OPERATIVE REPORT - 09-    PREOPERATIVE DIAGNOSIS:  Neurogenic bladder    POSTOPERATIVE DIAGNOSIS: Same    PROCEDURES PERFORMED:   1. Cystourethroscopy with injection of botulinum toxin A 300units in 15cc sterile saline  2. Complex urethral catheter placement  3. Dilation of bulbar urethral stricture    STAFF SURGEON: Dr. Evaristo Hayes MD     RESIDENT: Cj Villagomez MD    ANESTHESIA: Monitor Anesthesia Care    ESTIMATED BLOOD LOSS: Minimal     DRAINS: 16 Fr silicon rocha catheter, 10 ml in balloon     SIGNIFICANT FINDINGS:  1. 14 Fr annular short bulbar stricture dilated     OPERATIVE INDICATIONS:   Riley Gifford is a 47 year old male with a history of neurogenic bladder managed with botox injections. The patient was counseled on the alternatives, risks, and benefits and elected to proceed with the above stated procedure.    DESCRIPTION OF PROCEDURE:   After verification of informed consent was obtained, the patient was brought to the operating room, and moved to the operating table. After adequate anesthesia was induced, the patient was repositioned in the lithotomy position and prepped and draped in the usual sterile fashion. A formal timeout was performed to confirm the correct patient, procedure and operative site.     A 21-Urdu De La Garza injection cystoscope was placed into the urethra. The anterior urethra was normal up to the proximal bulbar urethra where a 14 Fr annular short bulbar urethral stricture was visualized. The cystoscope was unable to pass. We then dilated the stricture with hollow best sounds to 18 Fr over a sensor wire introduced through an 18 Fr white light rigid cystoscope. Following this we placed a flexible cystoscope alongside the sensor wire in the bladder. The bladder was large, moderately trabeculated, bilateral ureteral orifices were orthotopic. There were no lesions or bladder stones. 300cc of botox in 15mL of injectable saline was instilled using a template of 5 columns of 4  injections. All injections were placed deep to the mucosa into the detrusor muscle.  We then emptied his bladder to re-inspect our injection sites and found that there was a minimal amount of blood. We then placed a 16 Fr silicon rocha catheter fashioned into a Tanana tip catheter with a 14 gauge angiocatheter over the sensor wire into the bladder with return of clear urine. 10cc of sterile water was used to fill the balloon. The patient was returned to supine position and transported to recovery in stable condition.    The procedure was then concluded. The patient was transferred to the postanesthesia care unit in stable condition and tolerated the procedure well.    POSTOP PLAN:  1. PACU, then discharge to home  2. F/u in 6m w/ botox, would recommend be performed with flexible cystoscope    Cj Villagomez  PGY-4  [phone]

## 2023-09-08 ENCOUNTER — PATIENT OUTREACH (OUTPATIENT)
Dept: CARE COORDINATION | Facility: CLINIC | Age: 48
End: 2023-09-08
Payer: COMMERCIAL

## 2023-09-08 ENCOUNTER — MEDICAL CORRESPONDENCE (OUTPATIENT)
Dept: HEALTH INFORMATION MANAGEMENT | Facility: CLINIC | Age: 48
End: 2023-09-08
Payer: COMMERCIAL

## 2023-09-08 NOTE — PROGRESS NOTES
Clinic Care Coordination Contact  [facility]/Voicemail       Clinical Data: Care Coordinator Outreach  Outreach attempted x 1.  Left message on patient's voicemail with call back information and requested return call.  Plan: Care Coordinator sent care coordination introduction letter on 3/24/21 via [employer]. Care Coordinator will try to reach patient again in 3-5 business days.    Robbin Vanegas MSN, RN, PHN, CCM   Primary Care Clinical RN Care Coordinator  [facility]  9/8/2023   2:31 PM  [email or web address]  Office: [phone]

## 2023-09-14 DIAGNOSIS — F51.01 PRIMARY INSOMNIA: ICD-10-CM

## 2023-09-14 DIAGNOSIS — N31.9 NEUROGENIC BLADDER: ICD-10-CM

## 2023-09-14 RX ORDER — ZOLPIDEM TARTRATE 10 MG/1
TABLET ORAL
Qty: 90 TABLET | Refills: 0 | Status: SHIPPED | OUTPATIENT
Start: 2023-09-14 | End: 2023-12-18

## 2023-09-14 RX ORDER — TOLTERODINE TARTRATE 2 MG/1
TABLET, EXTENDED RELEASE ORAL
Qty: 180 TABLET | Refills: 0 | Status: SHIPPED | OUTPATIENT
Start: 2023-09-14 | End: 2023-12-05

## 2023-09-18 ENCOUNTER — PATIENT OUTREACH (OUTPATIENT)
Dept: CARE COORDINATION | Facility: CLINIC | Age: 48
End: 2023-09-18
Payer: COMMERCIAL

## 2023-09-18 NOTE — PROGRESS NOTES
Clinic Care Coordination Contact  Follow Up Progress Note      Assessment: The RN CC nurse care coordinator contacted the patient by phone for a follow-up call.  The patient states that he was doing much better after the Nguyen catheter was removed.  When questioned about changing the size of the catheters that he uses the patient stated that he was just going to wait and see how things go as he was not having any trouble with the straight cath.  If that should change he will reach out to the urologist again.  The patient is still having the pain in the hands neck and back and buttocks and he sees the PM and R provider soon and will question at that time if any changes could be made.    Care Gaps:    Health Maintenance Due   Topic Date Due    HEPATITIS B IMMUNIZATION (1 of 3 - 3-dose series) Never done    MEDICARE ANNUAL WELLNESS VISIT  05/31/2023    LIPID  07/12/2023    INFLUENZA VACCINE (1) 09/01/2023       Care Gaps Last addressed on 9/18/23    Care Plans  Care Plan: General  take medications as prescribed       Problem: HP GENERAL PROBLEM       Goal: General Goal -  I will take all medications as prescribed by the providers.       Start Date: 7/22/2022 Expected End Date: 7/22/2024    This Visit's Progress: 70% Recent Progress: 70%    Note:     Barriers: wheelchair bound  Strengths: engaged in care coordination  Patient expressed understanding of goal: yes  Action steps to achieve this goal:  1. I will take all medications as prescribed.  2. I will ask any questions that I have regarding new medications.  3. I will work with the RN CC if I have any difficulty getting refills.                             Care Plan: General -  Make and attend follow up appointments       Problem: HP GENERAL PROBLEM       Goal: General Goal - I will make and attend all recommended appointments from my providers.       Start Date: 7/22/2022 Expected End Date: 7/22/2024    This Visit's Progress: 80% Recent Progress: 70%    Note:      Barriers: Many providers.  Strengths: engaged in care coordination  Patient expressed understanding of goal: yes  Action steps to achieve this goal:  1. I will make all of the recommended follow up appointments.  2. I will attend all of the follow up appointments as recommended by the providers.                              Intervention/Education provided during outreach: The RN CC nurse care coordinator asked the patient about the request for a change in size of his catheters.  At this point in time he wants to just put that on hold.     Outreach Frequency: monthly        Plan:   1.  The patient will take all medications as prescribed by the providers.  2.  The patient will reach out to urology should he have difficulty with his straight caths.  3.  The patient will speak with the PM and R provider regarding any other changes that could be made.    RN CC Nurse Care Coordinator will follow up in 30 days.            Robbin Vanegas MSN, RN, PHN, [facility]   Primary Care Clinical RN Care Coordinator  [facility]  9/18/2023   1:29 PM  [email or web address]  Office: [phone]

## 2023-09-25 ENCOUNTER — OFFICE VISIT (OUTPATIENT)
Dept: SURGERY | Facility: CLINIC | Age: 48
End: 2023-09-25
Payer: COMMERCIAL

## 2023-09-25 VITALS
HEART RATE: 104 BPM | BODY MASS INDEX: 17.63 KG/M2 | OXYGEN SATURATION: 96 % | DIASTOLIC BLOOD PRESSURE: 76 MMHG | HEIGHT: 72 IN | SYSTOLIC BLOOD PRESSURE: 111 MMHG

## 2023-09-25 DIAGNOSIS — K64.8 INTERNAL HEMORRHOIDS: ICD-10-CM

## 2023-09-25 DIAGNOSIS — Z12.11 ENCOUNTER FOR SCREENING COLONOSCOPY: Primary | ICD-10-CM

## 2023-09-25 DIAGNOSIS — K62.5 RECTAL BLEEDING: ICD-10-CM

## 2023-09-25 ASSESSMENT — PAIN SCALES - GENERAL: PAINLEVEL: NO PAIN (0)
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
## 2023-09-25 NOTE — NURSING NOTE
Chief Complaint   Patient presents with    RECHECK    Hemorrhoids       Vitals:    09/25/23 1303   BP: 111/76   BP Location: Right arm   Patient Position: Sitting   Cuff Size: Adult Regular   Pulse: 104   SpO2: 96%   Height: 6'       Body mass index is 17.63 kg/m .     Maxx Morrow, EMT- P

## 2023-10-04 ENCOUNTER — OFFICE VISIT (OUTPATIENT)
Dept: PALLIATIVE MEDICINE | Facility: CLINIC | Age: 48
End: 2023-10-04
Payer: COMMERCIAL

## 2023-10-04 VITALS — HEART RATE: 67 BPM | DIASTOLIC BLOOD PRESSURE: 51 MMHG | SYSTOLIC BLOOD PRESSURE: 88 MMHG

## 2023-10-04 DIAGNOSIS — G82.50 QUADRIPLEGIA (H): ICD-10-CM

## 2023-10-04 DIAGNOSIS — R10.84 ABDOMINAL PAIN, GENERALIZED: ICD-10-CM

## 2023-10-04 DIAGNOSIS — M54.6 PAIN IN THORACIC SPINE: ICD-10-CM

## 2023-10-04 DIAGNOSIS — G89.29 CHRONIC MIDLINE THORACIC BACK PAIN: ICD-10-CM

## 2023-10-04 DIAGNOSIS — M54.6 CHRONIC MIDLINE THORACIC BACK PAIN: ICD-10-CM

## 2023-10-04 DIAGNOSIS — M79.2 NEUROPATHIC PAIN: Primary | ICD-10-CM

## 2023-10-04 PROCEDURE — 99214 OFFICE O/P EST MOD 30 MIN: CPT

## 2023-10-04 RX ORDER — PREGABALIN 50 MG/1
CAPSULE ORAL
Qty: 270 CAPSULE | Refills: 1 | Status: SHIPPED | OUTPATIENT
Start: 2023-10-04 | End: 2024-04-23

## 2023-10-04 ASSESSMENT — PAIN SCALES - GENERAL: PAINLEVEL: MODERATE PAIN (4)

## 2023-10-04 NOTE — PATIENT INSTRUCTIONS
Pain Physical Therapy:  NO   We will defer at this time, may consider referral in the future.      Pain Psychologist to address relaxation, behavioral change, coping style, and other factors important to improvement.  NO     Diagnostic Studies:  None ordered today.      Medication Management:   Amitriptyline - Continue 50 mg at bedtime.  He questioned benefit and notes daytime sedation since starting this medication, trial taper to 25 mg at bedtime x 2 week and noted increased pain. He resumed 50 mg dosage. Continue at this dose, may consider increase in future, should baseline pain levels change.   Continue Lyrica 50 mg in morning and 100 mg at bedtime. He thinks increasing bedtime dose has helped to some extent. Will consider further dosage increase in future, should baseline pain levels change.   Medical cannabis - recommend continuing to work with the dispensary to explore new products that may be available.  Advised to let me know if he needs medical cannabis renewal in between visits. He reports using more gummies at night, has had some increased daytime sedation, which could be related to increased medical cannabis use. Advised to try taking medical cannabis and Lyrica/TCA two hours apart and monitor for improvement in daytime sedation.      Potential procedures:   He had baclofen IT pump implant on 1/3/23 ([facility]).      Other Orders/Referrals:   None      Follow up with SUJATA Armendariz CNP in 3-6 months, or sooner if needed

## 2023-10-04 NOTE — PROGRESS NOTES
[facility]     Date of visit: 10/4/2023      Assessment:   Riley Gifford is a 47 year old male with a past medical history significant for cervical spinal cord injury, quadriplegia, thoracic/lumbar pain, neurogenic bladder, recurrent UTI, spasticity, chronic constipation, s/p cervical fusion who presents with complaints of mid back (lower thoracic) and abdominal pain.      Thoracic/abdominal pain - Onset occurred following spinal cord injury in 1995, progressive worsening of symptoms over time. Etiology is likely mixed, with h/o cervical spinal cord injury (quadraplegic), neuropathic pain secondary to spinal cord injury, underlying degenerative changes of spine, and overlying myofascial component to some extent.     Assigned to [facility] nursing team.     Visit Diagnoses:  1. Neuropathic pain    2. Chronic midline thoracic back pain    3. Pain in thoracic spine    4. Quadriplegia (H)    5. Abdominal pain, generalized        Plan:  Diagnosis reviewed, treatment option addressed, and risk/benifits discussed.  Self-care instructions given.  I am recommending a multidisciplinary treatment plan to help this patient better manage their pain.      Pain Physical Therapy:  NO   We will defer at this time, may consider referral in the future.      Pain Psychologist to address relaxation, behavioral change, coping style, and other factors important to improvement.  NO     Diagnostic Studies:  None ordered today.      Medication Management:   Amitriptyline - Continue 50 mg at bedtime.  He questioned benefit and notes daytime sedation since starting this medication, trial taper to 25 mg at bedtime x 2 week and noted increased pain. He resumed 50 mg dosage. Continue at this dose, may consider increase in future, should baseline pain levels change.   Continue Lyrica 50 mg in morning and 100 mg at bedtime. He thinks increasing bedtime dose has helped to some extent. Will consider further dosage increase in future,  should baseline pain levels change.   Medical cannabis - recommend continuing to work with the dispensary to explore new products that may be available.  Advised to let me know if he needs medical cannabis renewal in between visits. He reports using more gummies at night, has had some increased daytime sedation, which could be related to increased medical cannabis use. Advised to try taking medical cannabis and Lyrica/TCA two hours apart and monitor for improvement in daytime sedation.      Potential procedures:   He had baclofen IT pump implant on 1/3/23 ([facility]).      Other Orders/Referrals:   None      Follow up with SUJATA Armendariz CNP in 3-6 months, or sooner if needed     Visit discussion: Discussed transitioning medication management to PCP in future, once on current/stable doses for at least 6 months. Will plan to revisit at next follow up.     Review of Electronic Chart: Today I have also reviewed available medical information in the patient's medical record at [facility] ([facility]) and Care Everywhere (if available), including relevant provider notes, laboratory work, and imaging.     Nalini Resendez DNP, APRN, AGNP-C  [facility]     -------------------------------------------------    Subjective:    Chief complaint: No chief complaint on file.      Interval history:  Riley Gifford is a 47 year old male last seen on 7/26/23.  They are a patient of mine seen in follow up.     Recommendations/plan at the last visit included:  Pain Physical Therapy:  NO   We will defer at this time, may consider referral in the future.      Pain Psychologist to address relaxation, behavioral change, coping style, and other factors important to improvement.  NO     Diagnostic Studies:  None ordered today.      Medication Management:   Amitriptyline - Current dosage 50 mg at bedtime.  He questions benefit and notes daytime sedation since starting this medication.  He is interested in trial taper  down/off, which is reasonable.  Recommend reducing dosage to 25 mg at bedtime x2 weeks.  If no change in pain levels, recommend stopping altogether.  Monitor changes in pain levels, advised to update clinic if he has increased pain with taper.  Continue Lyrica 50 mg twice daily. Once completely off amitriptyline for 2 weeks, may trial increasing Lyrica dose at bedtime to 100 mg and continue 50 mg late morning/noon.  Updated prescription sent in to pharmacy today.  Medical cannabis - recommend continuing to work with the dispensary to explore new products that may be available.  Advised to let me know if he needs medical cannabis renewal in between visits.     Potential procedures:   He had baclofen IT pump implant on 1/3/23 ([facility]).      Other Orders/Referrals:   None      Follow up with SUJATA Armendariz CNP in 6-8 weeks in person or video visit is okay     Since his last visit, Riley Gifford reports:  -His pain is about the same as it was at last visit.   -He tried reducing amitriptyline dosage to 25 mg at bedtime for a couple of weeks, but noticed increased pain and increased back up to 50 mg at bedtime.   -Overall, he was questioning benefit from TCA, also had daytime sedation, not sure how much of a difference in sedation effects he noticed with trial reduction.   -He had increased Lyrica to 50 mg in afternoon and 100 mg at bedtime.   -He thinks this increase made a difference a little bit.   -He continues to use medical cannabis, notes using more gummies at night.   -Overall he is having some daytime sedation, enhanced more so over the past few weeks.       Pain Information:   Pain rating: averages 5/10 on a 0-10 scale.      Interval history from last visit on 7/26/23:  -his pain is about the same as it was at last visit.   -He is taking Lyrica 50 mg BID, did not increase to 100 mg at bedtime.   -He continues to have significant daytime sedation.   -He is interested in trial tapering of amitriptyline at  "bedtime, not sure of benefit at this point.   -He may consider increasing Lyrica to 100 mg at bedtime.   -He has baclofen pump, dosage has been increased over the past few months.   -He has questions about medical cannabis, specifically should he be exploring other products, right now taking red pill.   -He has not explored other products in quite some time, will plan to follow up with dispensary.   Pain Information:              Pain rating: averages 4/10 on a 0-10 scale.        Interval history from last visit on 3/9/23:  -His pain is a little better than it was at last visit.   -He was started on Lyrica 50 mg BID at last visit, reports he is taking this mid-day and then at bedtime.   -He reports improvement in sleep after starting Lyrica.   -He had recent changes to baclofen pump this week, still working on achieving therapeutic dose.   -He has been working with PT through [facility], last visit was yesterday.   Pain Information:              Pain rating: averages 4/10 on a 0-10 scale.        Interval history from last visit on 1/26/23:  -His pain about the same as last visit.   -He had an issue with Lyrica and was not able to start since last visit.   -He had baclofen ITP implant 1/3/23.  -He reports surgery went well but it is a slow process with dosage titration.   -He has started PT.      HPI from initial visit with me on 12/14/222:  Riley Gifford is a 47 year old male presents with a chief complaint of mid back and abdominal pain.      The pain has been present for many years, progressive worsening with time .    The pain is Moderate Pain (5) in severity.    The pain is described as dull, aching.   The pain is alleviated by position changes, reclining position.    It is exacerbated by \"none\".    Modalities that have been utilized in the past which were helpful include PT (somewhat helpful).    Things that were not helpful, but tried ,include PT (limited benefit), TENS unit.    The patient has never tried pool " PT.  The patient denies red flag symptoms, does have h/o neurogenic bladder and constipation.      Riley Gifford has been seen at a pain clinic in the past - [facility] pain program, [place] for baclofen pump implant ([facility] ).     -Abdominal pain is more prominent right now.   -Muscle spasms are worsening, he has upcoming baclofen pump implant on 1/3/23 through [facility] ( chronic pain program)   -He has been waiting for a few years to get pump, delays with COVID.   -He got a new motorized wheelchair 2-3 months ago, has a recline function that is helpful.   -His pain has somewhat improved since getting chair, though no initial improvement, has appreciated over past few weeks.   -Pain seems pretty constant, no time of day more painful   -He tried TENS unit in the past that was not helpful.   -He tried gabapentin in the past, did not find helpful, was very sedating.   -He is using medical cannabis, primarily takes at bedtime only to help with sleep.   -He was using medical cannabis product twice daily, but has to minimize usage to bedtime due to cost.   -He takes baclofen and ditropan with breakfast.   -He takes baclofen and omeprazole at dinner  -He takes baclofen, detrol LA, amitriptyline, and tizanidine at bedtime.      -He lives in an apartment, assisted living facility.   -He has 24 hour care coverage, calls when needed.   -His nephew and family lives locally, sister lives in [place], other family still in [place].            Current Pain Treatments:     Medications:               Baclofen IT pump              Amitriptyline 50 mg at bedtime (PCP, dx chronic midline thoracic back pain)              Zolpidem (PCP, sleep)              Medical cannabis (certified through [facility] primary care, renewed recently), takes it at night, finds this most helpful for sleep              Lyrica 50 mg BID     Other therapies:               PT        Current MME: 0      Review of [place] Prescription Monitoring  Program (): No concern for abuse or misuse of controlled medications based on this report. Reviewed - appears appropriate.         Past pain treatments:  Baclofen PO      Medications:  Current Outpatient Medications   Medication Sig Dispense Refill    Acetaminophen (TYLENOL PO) Take 500 mg by mouth as needed for mild pain or fever Reported on 2/15/2017      albuterol (PROAIR HFA/PROVENTIL HFA/VENTOLIN HFA) 108 (90 Base) MCG/ACT inhaler INHALE 1 TO 2 PUFFS INTO THE LUNGS EVERY 4 HOURS AS NEEDED FOR SHORTNESS OF BREATH OR DIFFICULT BREATHING OR WHEEZING 8.5 g 1    alum & mag hydroxide-simethicone (MYLANTA/MAALOX) 200-200-20 MG/5ML SUSP suspension Take 30 mLs by mouth  0    amitriptyline (ELAVIL) 25 MG tablet Take 2 tablets (50 mg) by mouth At Bedtime 180 tablet 1    baclofen (LIORESAL) 10 MG tablet Take 1 tablet (10 mg) by mouth 2 times daily      budesonide (PULMICORT) 1 MG/2ML neb solution TAKE 1 CAPSULE BY MOUTH TWICE DAILY IN SYRUP 180 mL 4    CARAFATE 1 GM/10ML PO suspension       cetirizine (ZYRTEC) 10 MG tablet Take 10 mg by mouth daily      COMPOUNDED NON-CONTROLLED SUBSTANCE (CMPD RX) - PHARMACY TO MIX COMPOUNDED MEDICATION Instill 60 mL into bladder at bedtime. Refrigerate. Expires: For additional refills, please schedule a follow-up appointment                    . 1000 mL 3    ENEMEEZ MINI 283 MG/5ML enema USE ONE EVERY OTHER  mL 0    hydrocortisone, Perianal, (ANUSOL-HC) 2.5 % cream APPLY RECTALLY TO THE AFFECTED AREA TWICE DAILY 30 g 11    lidocaine (XYLOCAINE) 5 % external ointment Apply topically as needed for moderate pain 2500 g 0    magic mouthwash suspension, diphenhydrAMINE, lidocaine, aluminum-magnesium & simethicone, (FIRST-MOUTHWASH BLM) compounding kit Combine 1/3rd each of viscous lidocaine, maalox and liquid benadryl - swish and swallow 5 mL as needed every 4-6 hours for pain 240 mL 1    omeprazole (PRILOSEC) 40 MG DR capsule TAKE 1 CAPSULE(40 MG) BY MOUTH DAILY 90 capsule 3     phenylephrine-cocoa butter (PREPARATION H) 0.25-88.44 % suppository Insert one suppository rectally twice daily as needed. 48 suppository 3    polyethylene glycol (MIRALAX) powder Take 17 g (1 capful) by mouth daily as needed for constipation 510 g 1    pregabalin (LYRICA) 25 MG capsule Increase Lyrica to 50 mg at noon and 75 mg at bedtime x 2 weeks, then increase to 50 mg at noon and 100 mg at bedtime. 180 capsule 0    pregabalin (LYRICA) 50 MG capsule Take 50 mg late morning/noon and 100 mg at bedtime 270 capsule 1    sodium chloride 0.9% (bottle) 1,000 mL with gentamicin 500 mg irrigation 480 mg of Gentamicin in 1 L of NS. Please instill 60 mL of Gentamicin solution into bladder at HS. 1800 mL 4    sodium phosphate (FLEET ENEMA) 7-19 GM/118ML rectal enema Place 1 Bottle (1 enema) rectally daily as needed for constipation 1 Bottle 0    tiZANidine (ZANAFLEX) 4 MG tablet Take 4 mg by mouth 2 mg twice a day, 4 mg at night, another 2 mg PRN during the day      tolterodine (DETROL) 2 MG tablet TAKE 1 TABLET(2 MG) BY MOUTH TWICE DAILY 180 tablet 0    zolpidem (AMBIEN) 10 MG tablet TAKE 1 TABLET(10 MG) BY MOUTH EVERY NIGHT AS NEEDED FOR SLEEP 90 tablet 0    polyethylene glycol (MIRALAX) 17 GM/Dose powder Take 17-34 g (1-2 capfuls) by mouth daily (Patient not taking: Reported on 10/4/2023) 578 g 11       Medical History: any changes in medical history since they were last seen? No      Objective:    Physical Exam:  Blood pressure (!) 88/51, pulse 67.  Constitutional: Well developed, well nourished, appears stated age.  Gait: Wheelchair bound   HEENT: Head atraumatic, normocephalic. Eyes without conjunctival injection or jaundice. Oropharynx clear. Neck supple. No obvious neck masses.  Skin: No rash, lesions, or petechiae of exposed skin.   Psychiatric/mental status: Alert, without lethargy or stupor. Speech fluent. Appropriate affect. Mood normal. Able to follow commands without difficulty.     Diagnostic  Tests/Imaging/Labs:  [facility] 8/8/23 - GFR >90    BILLING TIME DOCUMENTATION:   The total TIME spent on this patient on the date of the encounter/appointment was 25 minutes.      TOTAL TIME includes:   Time spent preparing to see the patient (reviewing records and tests)   Time spent face to face (or over the phone) with the patient   Time spent ordering tests, medications, procedures and referrals   Time spent Referring and communicating with other healthcare professionals   Time spent documenting clinical information in Epic

## 2023-10-12 DIAGNOSIS — R05.9 COUGH: ICD-10-CM

## 2023-10-12 RX ORDER — ALBUTEROL SULFATE 90 UG/1
AEROSOL, METERED RESPIRATORY (INHALATION)
Qty: 8.5 G | Refills: 0 | Status: SHIPPED | OUTPATIENT
Start: 2023-10-12 | End: 2023-11-09

## 2023-10-17 DIAGNOSIS — K59.09 CHRONIC CONSTIPATION: ICD-10-CM

## 2023-10-17 RX ORDER — DOCUSATE SODIUM 283 MG/5ML
LIQUID RECTAL
Qty: 150 ML | Refills: 0 | Status: SHIPPED | OUTPATIENT
Start: 2023-10-17 | End: 2023-12-15

## 2023-10-19 DIAGNOSIS — K64.9 HEMORRHOIDS, UNSPECIFIED HEMORRHOID TYPE: ICD-10-CM

## 2023-10-19 RX ORDER — HYDROCORTISONE 25 MG/G
CREAM TOPICAL
Qty: 30 G | Refills: 9 | Status: SHIPPED | OUTPATIENT
Start: 2023-10-19

## 2023-10-25 ENCOUNTER — PATIENT OUTREACH (OUTPATIENT)
Dept: CARE COORDINATION | Facility: CLINIC | Age: 48
End: 2023-10-25
Payer: COMMERCIAL

## 2023-10-25 NOTE — PROGRESS NOTES
Clinic Care Coordination Contact  Follow Up Progress Note      Assessment: The RN CC nurse care coordinator contacted the patient by phone for a follow-up call.  The patient states that his bladder instillation medication which has been compounded by [facility] can no longer be done in their pharmacy.  The patient has a message into the urologist regarding this as well as his axis  is also working on it.  The patient currently has a message into the urologist although he has not heard back from them.  The patient plans on placing another call to the urology department tomorrow.  The patient states that otherwise he is doing fine, the pain is now controlled by the baclofen pump versus the oral medication.  And updated care plan was sent to the patient.  And he agreed to it another call in a month.    Care Gaps:    Health Maintenance Due   Topic Date Due    HEPATITIS B IMMUNIZATION (1 of 3 - 3-dose series) Never done    MEDICARE ANNUAL WELLNESS VISIT  05/31/2023    LIPID  07/12/2023       Care Gaps Last addressed on 10/25/23    Care Plans  Care Plan: General  take medications as prescribed       Problem: HP GENERAL PROBLEM       Goal: General Goal -  I will take all medications as prescribed by the providers.       Start Date: 7/22/2022 Expected End Date: 7/22/2024    This Visit's Progress: 70% Recent Progress: 70%    Note:     Barriers: wheelchair bound  Strengths: engaged in care coordination  Patient expressed understanding of goal: yes  Action steps to achieve this goal:  1. I will take all medications as prescribed.  2. I will ask any questions that I have regarding new medications.  3. I will work with the RN CC if I have any difficulty getting refills.                             Care Plan: General -  Make and attend follow up appointments       Problem: HP GENERAL PROBLEM       Goal: General Goal - I will make and attend all recommended appointments from my providers.       Start Date: 7/22/2022  Expected End Date: 7/22/2024    This Visit's Progress: 80% Recent Progress: 80%    Note:     Barriers: Many providers.  Strengths: engaged in care coordination  Patient expressed understanding of goal: yes  Action steps to achieve this goal:  1. I will make all of the recommended follow up appointments.  2. I will attend all of the follow up appointments as recommended by the providers.                            Care Plan: Chronic Pain       Problem: Chronic Pain is Not Self-Managed       Goal: Demonstrate improved self-management of chronic pain       Start Date: 10/25/2023                            Intervention/Education provided during outreach: Reviewed with the patient adding chronic pain into the care plan.     Outreach Frequency: monthly, more frequently as needed        Plan:   1.  The patient will continue to work with getting the message through to urology about his refills of the compounded medication for his bladder.  2.  The patient will take all medications as prescribed by the providers.  3.  The patient will reach out to the RN CC should he need any help in getting the new prescription sent to a new compounding pharmacy.    RN CC Nurse Care Coordinator will follow up in 30 days.            Robbin Vanegas MSN, RN, PHN, [facility]   Primary Care Clinical RN Care Coordinator  [facility]  10/25/2023   3:22 PM  [email or web address]  Office: [phone]

## 2023-10-25 NOTE — LETTER
[facility]  Patient Centered Plan of Care  About Me:        Patient Name:  Riley Gifford    YOB: 1975  Age:         47 year old   Rut MRN:    [id number] Telephone Information:  Home Phone [phone]   Mobile [phone]   Mobile [phone]       Address:  [address] [place] [address] [address]  [place] [address] Email address:  [email or web address]      Emergency Contact(s)    Name Relationship Lgl Grd Work Phone Home Phone Mobile Phone   1. TREY GIFFORD (NEP* Relative No noen none [phone]   2. AGUSTO CAVAZOS Sister   [phone]    3. HODA GIFFORD Brother No  [phone]            Primary language:  English     needed? No   [employer]:  [phone] op. 1  Other communication barriers:Glasses    Preferred Method of Communication:  Lisa  Current living arrangement: I live in assisted living    Mobility Status/ Medical Equipment: Dependent/Assisted by Another        Health Maintenance  Health Maintenance Reviewed: Due/Overdue   Health Maintenance Due   Topic Date Due    HEPATITIS B IMMUNIZATION (1 of 3 - 3-dose series) Never done    MEDICARE ANNUAL WELLNESS VISIT  05/31/2023    LIPID  07/12/2023           My Access Plan  Medical Emergency 911   Primary Clinic Line [facility] [phone]   24 Hour Appointment Line [phone] or  [phone]) (toll-free)   24 Hour Nurse Line [phone] (toll-free)   Preferred Urgent Care [facility] [phone]     Preferred Hospital [facility]  [phone]     Preferred Pharmacy [id number] - [place], [place] - [address] [place] [address] AT [facility] [address]     Behavioral Health Crisis Line The National Suicide Prevention Lifeline at 1-388.945.5163 or Text/Call 018           My Care Team Members  Patient Care Team         Relationship Specialty Notifications Start End    Jenny Fay, DANNI  PCP - General Family Medicine  12/21/21     Phone: [phone] Fax: [phone] [address] [facility] [address]    Robbin Andino, RN Lead Care Coordinator Nurse Admissions 1/6/16     phone:  [phone]    Phone: [phone] Fax: [phone]        Rober Leo MD Referring Physician Urology  1/8/16     Phone: [phone] Fax: [phone]         [address] [facility] [address]                 Amanda Other (see comments)   1/8/16     [facility]     Phone: [phone]         Evaristo Hayes MD MD Urology  4/19/16     Phone: [phone] Fax: [phone]         [address] [facility] [address] [facility] [address]    Jenny Wright, RN Registered Nurse Urology  4/19/16     Rosemary Thomas, RN Nurse Coordinator Physical Medicine and Rehabilitation  3/30/17     Phone: [phone] Pager: [phone]        Lulú Reveles APRN CNP Nurse Practitioner Nurse Practitioner  8/28/20     Phone: [phone] Fax: [phone]         [address] [facility] [address] [facility] [address]    [facility] Occupational Therapist Occupational Therapy  1/7/22     [facility] Shanique Alberto  fax [phone]    Phone: [phone] Fax: [phone]         [address] [facility] [address]    Evaristo Hayes MD MD Urology  1/17/22     Phone: [phone] Fax: [phone]         [address] [facility] [address] [facility] [address]    Yessy Tapia, RN Specialty Care Coordinator   1/17/22     Jenny Fay, PA-C Assigned PCP   4/3/22     Phone: [phone] Fax: [phone] [address] [facility] [address]    Nalini Resendez APRN CNP Nurse Practitioner   9/12/23     Phone: [phone] Fax: [phone] [address] [place] [address]    Lulú Reveles APRN CNP Assigned Surgical Provider   9/30/23     Phone: [phone] Fax: [phone]         [address] [place] [address]  [facility] [address] [facility] [address]                My Care Plans  Self Management and Treatment Plan    Care Plan  Care Plan: General  take medications as prescribed       Problem: HP GENERAL PROBLEM       Goal: General Goal -  I will take all medications as prescribed by the providers.       Start Date: 7/22/2022 Expected End Date: 7/22/2024    This Visit's Progress: 70% Recent Progress: 70%    Note:     Barriers: wheelchair bound  Strengths: engaged in care coordination  Patient expressed understanding of goal: yes  Action steps to achieve this goal:  1. I will take all medications as prescribed.  2. I will ask any questions that I have regarding new medications.  3. I will work with the RN CC if I have any difficulty getting refills.                             Care Plan: General -  Make and attend follow up appointments       Problem: HP GENERAL PROBLEM       Goal: General Goal - I will make and attend all recommended appointments from my providers.       Start Date: 7/22/2022 Expected End Date: 7/22/2024    This Visit's Progress: 80% Recent Progress: 80%    Note:     Barriers: Many providers.  Strengths: engaged in care coordination  Patient expressed understanding of goal: yes  Action steps to achieve this goal:  1. I will make all of the recommended follow up appointments.  2. I will attend all of the follow up appointments as recommended by the providers.                            Care Plan: Chronic Pain       Problem: Chronic Pain is Not Self-Managed       Goal: Demonstrate improved self-management of chronic pain       Start Date: 10/25/2023                            Action Plans on File:            Depression          Advance Care Plans/Directives:   Advanced Care Plan/Directives on file:   Yes    Status of Document(s): No data recorded  Advanced Care Plan/Directives Type:   Advanced Directive - On File           My Medical and Care Information  Problem List   Patient Active Problem List   Diagnosis    Vitamin  D deficiency    Eosinophilic esophagitis    Neurogenic bladder    CARDIOVASCULAR SCREENING; LDL GOAL LESS THAN 160    Quadriplegia (H)    Chronic constipation    Internal hemorrhoids with other complication    Diagnostic skin and sensitization tests(aka ALLERGENS)    Anal or rectal pain    Allergic rhinitis due to animal dander    Allergy to mold spores    House dust mite allergy    Insomnia    Health Care Home    Gallstones    Chronic midline thoracic back pain    Pulmonary nodules    ACP (advance care planning)    Cervical spinal cord injury, sequela (H24)    Spasticity    Self-catheterizes urinary bladder    Bleeding external hemorrhoids    History of claustrophobia    Recurrent UTI      Current Medications and Allergies:  See printed Medication Report.    Care Coordination Start Date: 1/6/2016   Frequency of Care Coordination: monthly, more frequently as needed     Form Last Updated: 10/25/2023

## 2023-10-26 ENCOUNTER — TELEPHONE (OUTPATIENT)
Dept: UROLOGY | Facility: CLINIC | Age: 48
End: 2023-10-26
Payer: COMMERCIAL

## 2023-10-26 NOTE — TELEPHONE ENCOUNTER
[facility] Call Center    Phone Message    May a detailed message be left on voicemail: no     Reason for Call: Other: Patient stated that his irrigation solution cannot be picked up from his normal pharmacy anymore.Please call patient to follow up about where this could be sent to so that it is covered under his insurance.     Action Taken: Message routed to:  [facility] Urology    Travel Screening: Not Applicable

## 2023-10-27 NOTE — TELEPHONE ENCOUNTER
----- Message from Laurie Tucker RN sent at 10/27/2023 10:12 AM CDT -----  Patient is requesting medication refills but has not been seen in over a year.  Can you please call him and offer him soonest available with Dr. Hayes, virtual visit it okay too.  Thank you.

## 2023-10-30 ENCOUNTER — HOSPITAL ENCOUNTER (OUTPATIENT)
Facility: CLINIC | Age: 48
End: 2023-10-30
Attending: INTERNAL MEDICINE | Admitting: INTERNAL MEDICINE
Payer: COMMERCIAL

## 2023-10-30 ENCOUNTER — TELEPHONE (OUTPATIENT)
Dept: GASTROENTEROLOGY | Facility: CLINIC | Age: 48
End: 2023-10-30
Payer: COMMERCIAL

## 2023-10-30 NOTE — TELEPHONE ENCOUNTER
"Endoscopy Scheduling Screen    Have you had a positive Covid test in the last 14 days?  No    Are you active on MyChart?   Yes    What insurance is in the chart?  Other:  [employer]    Ordering/Referring Provider:   DARLENE QUINTANA        (If ordering provider performs procedure, schedule with ordering provider unless otherwise instructed. )    BMI: Estimated body mass index is 17.63 kg/m  as calculated from the following:    Height as of 9/25/23: 1.829 m (6').    Weight as of 9/1/23: 59 kg (130 lb).     Sedation Ordered  moderate sedation.   If patient BMI > 50 do not schedule in [facility].    If patient BMI > 45 do not schedule at [facility].    Are you taking methadone or Suboxone?  No      Are you taking any prescription medications for pain 3 or more times per week?   Yes, sedation and prep (RN Review required.)      Do you have a history of malignant hyperthermia or adverse reaction to anesthesia?  No    (Females) Are you currently pregnant?   No     Have you been diagnosed or told you have pulmonary hypertension?   No    Do you have an LVAD?  No    Have you been told you have moderate to severe sleep apnea?  No    Have you been told you have COPD, asthma, or any other lung disease?  No    Do you have any heart conditions?  No     Have you ever had an organ transplant?   No    Have you ever had or are you awaiting a heart or lung transplant?   No    Have you had a stroke or transient ischemic attack (TIA aka \"mini stroke\" in the last 6 months?   No    Have you been diagnosed with or been told you have cirrhosis of the liver?   No    Are you currently on dialysis?   No    Do you need assistance transferring?   Yes (Hospital Only)    BMI: Estimated body mass index is 17.63 kg/m  as calculated from the following:    Height as of 9/25/23: 1.829 m (6').    Weight as of 9/1/23: 59 kg (130 lb).     Is patients BMI > 40 and scheduling location [facility]?  No    Do you take an injectable medication for weight loss or diabetes " (excluding insulin)?  No    Do you take the medication Naltrexone?  No    Do you take blood thinners?  No       Prep   Are you currently on dialysis or do you have chronic kidney disease?  No    Do you have a diagnosis of diabetes?  No    Do you have a diagnosis of cystic fibrosis (CF)?  No    On a regular basis do you go 3 -5 days between bowel movements?  No    BMI > 40?  No    Preferred Pharmacy:    [id number] - [place], [place] - [address] [address] [address] AT [facility] [address]  [address]  [place] [address]  Phone: [phone] Fax: [phone]      Final Scheduling Details   Colonoscopy prep sent?  Golytely Extended Prep    Procedure scheduled  Lower endoscopic ultrasound (EUS)    Surgeon:  Julian     Date of procedure:  2/22/24     Pre-OP / PAC:   No - Not required for this site.    Location  [facility] - Per exclusion criteria.    Sedation   MAC/Deep Sedation - Per exclusion criteria.      Patient Reminders:   You will receive a call from a Nurse to review instructions and health history.  This assessment must be completed prior to your procedure.  Failure to complete the Nurse assessment may result in the procedure being cancelled.      On the day of your procedure, please designate an adult(s) who can drive you home stay with you for the next 24 hours. The medicines used in the exam will make you sleepy. You will not be able to drive.      You cannot take public transportation, ride share services, or non-medical taxi service without a responsible caregiver.  Medical transport services are allowed with the requirement that a responsible caregiver will receive you at your destination.  We require that drivers and caregivers are confirmed prior to your procedure.

## 2023-11-02 ENCOUNTER — TELEPHONE (OUTPATIENT)
Dept: UROLOGY | Facility: CLINIC | Age: 48
End: 2023-11-02
Payer: COMMERCIAL

## 2023-11-02 DIAGNOSIS — N31.9 NEUROGENIC BLADDER: Primary | ICD-10-CM

## 2023-11-02 NOTE — TELEPHONE ENCOUNTER
[facility] Call Center    Phone Message    May a detailed message be left on voicemail: yes     Reason for Call: Medication Refill Request    Has the patient contacted the pharmacy for the refill? Yes   Name of medication being requested: sodium chloride 0.9% (bottle) 1,000 mL with gentamicin 500 mg  Provider who prescribed the medication: Dr. Hayes  [facility] [address] [facility]  Date medication is needed: ASAP   Thanks - pt did just switch Pharmacy [facility]  [facility].     Action Taken: Message routed to:  [facility] Uro    Travel Screening: Not Applicable

## 2023-11-09 DIAGNOSIS — R05.9 COUGH: ICD-10-CM

## 2023-11-09 RX ORDER — ALBUTEROL SULFATE 90 UG/1
AEROSOL, METERED RESPIRATORY (INHALATION)
Qty: 8.5 G | Refills: 1 | Status: SHIPPED | OUTPATIENT
Start: 2023-11-09 | End: 2023-12-27

## 2023-11-14 ENCOUNTER — TELEPHONE (OUTPATIENT)
Dept: UROLOGY | Facility: CLINIC | Age: 48
End: 2023-11-14
Payer: COMMERCIAL

## 2023-11-14 NOTE — TELEPHONE ENCOUNTER
A prior authorization is needed for the following medication prescribed.  Please complete a prior authorization with the information included below.    Medication: Gentamicin 480mg/L Bladder Irrigation    Ingredients:    Gentamicin Sulf 40mg/ml Soln  NDC: [address]-55  QTY: 10.8mls  Sodium Chloride 0.9% Soln  NDC: [address]-96   QTY: 889.2mls  BD Syringe   NDC: [address]-31   QTY: 30 syr       RX #: [id number]    Reason for Rejection: Code 70 Product / Service not covered    Pharmacy Insurance plan: Medical Dual  BIN #: [id number]  ID #: [id number]  PCN #: [id number]  Phone #: [phone]      Pharmacy NPI:[id number]      Please advise the pharmacy when the prior authorization is approved or denied.     Thank you for your time.    [facility]  [phone]

## 2023-11-17 NOTE — TELEPHONE ENCOUNTER
PRIOR AUTHORIZATION DENIED    Medication: Gentamicin 480mg/L Bladder Irrigation    Denial Date: 11/17/2023    Denial Rational:   Called [employer] admin review [phone] to initiate PA request for compound.     Per rep this is an exclusion. Patient's plan won't even allow her to build a case for this compound request.     She stated patient may try for a member level PA, phone number for [employer] member services is [phone]    PA team is unable to assist with member level PA's       No appeal can be done, a case for this compound could not be built.  Only other option would be the patient call for a member level PA through [employer] phone number above.     Compounding pharmacy was called, they will contact patient with this information

## 2023-11-17 NOTE — TELEPHONE ENCOUNTER
Central Prior Authorization Team   Phone: [phone]    PA Initiation    Medication: Gentamicin 480mg/L Bladder Irrigation   Insurance Company: [employer] - Phone [phone] Fax [phone]  Pharmacy Filling the Rx: [facility] - [place], [place] - [address]   Filling Pharmacy Phone: [phone]  Filling Pharmacy Fax:    Start Date: 11/17/2023      Calling [employer] admin review [phone] to initiate PA request for compound.

## 2023-11-27 ENCOUNTER — OFFICE VISIT (OUTPATIENT)
Dept: UROLOGY | Facility: CLINIC | Age: 48
End: 2023-11-27
Payer: COMMERCIAL

## 2023-11-27 VITALS — BODY MASS INDEX: 17.61 KG/M2 | HEIGHT: 72 IN | WEIGHT: 130 LBS

## 2023-11-27 DIAGNOSIS — N31.9 NEUROGENIC BLADDER: Primary | ICD-10-CM

## 2023-11-27 PROCEDURE — 99214 OFFICE O/P EST MOD 30 MIN: CPT | Performed by: STUDENT IN AN ORGANIZED HEALTH CARE EDUCATION/TRAINING PROGRAM

## 2023-11-27 ASSESSMENT — PAIN SCALES - GENERAL: PAINLEVEL: MODERATE PAIN (4)

## 2023-11-27 NOTE — NURSING NOTE
Chief Complaint   Patient presents with    RECHECK     Annual follow up       Height 1.829 m (6'), weight 59 kg (130 lb). Body mass index is 17.63 kg/m .    Patient Active Problem List   Diagnosis    Vitamin D deficiency    Eosinophilic esophagitis    Neurogenic bladder    CARDIOVASCULAR SCREENING; LDL GOAL LESS THAN 160    Quadriplegia (H)    Chronic constipation    Internal hemorrhoids with other complication    Diagnostic skin and sensitization tests(aka ALLERGENS)    Anal or rectal pain    Allergic rhinitis due to animal dander    Allergy to mold spores    House dust mite allergy    Insomnia    Health Care Home    Gallstones    Chronic midline thoracic back pain    Pulmonary nodules    ACP (advance care planning)    Cervical spinal cord injury, sequela (H24)    Spasticity    Self-catheterizes urinary bladder    Bleeding external hemorrhoids    History of claustrophobia    Recurrent UTI       Allergies   Allergen Reactions    Banana Hives     Other reaction(s): GI Upset    Chicken-Derived Products (Egg)      Other reaction(s): nausea, hives    Fish     Soybean Oil      Other reaction(s): *Unknown  Discovered on allergy testing. Has never had any reaction to his knowledge    Wheat        Current Outpatient Medications   Medication Sig Dispense Refill    Acetaminophen (TYLENOL PO) Take 500 mg by mouth as needed for mild pain or fever Reported on 2/15/2017      albuterol (PROAIR HFA/PROVENTIL HFA/VENTOLIN HFA) 108 (90 Base) MCG/ACT inhaler INHALE 1 TO 2 PUFFS INTO THE LUNGS EVERY 4 HOURS AS NEEDED FOR SHORTNESS OF BREATH OR DIFFICULT BREATHING OR WHEEZING 8.5 g 1    alum & mag hydroxide-simethicone (MYLANTA/MAALOX) 200-200-20 MG/5ML SUSP suspension Take 30 mLs by mouth  0    amitriptyline (ELAVIL) 25 MG tablet Take 2 tablets (50 mg) by mouth At Bedtime 180 tablet 1    baclofen (LIORESAL) 10 MG tablet Take 1 tablet (10 mg) by mouth 2 times daily      budesonide (PULMICORT) 1 MG/2ML neb solution TAKE 1 CAPSULE BY  MOUTH TWICE DAILY IN SYRUP 180 mL 4    CARAFATE 1 GM/10ML PO suspension       cetirizine (ZYRTEC) 10 MG tablet Take 10 mg by mouth daily      COMPOUNDED NON-CONTROLLED SUBSTANCE (CMPD RX) - PHARMACY TO MIX COMPOUNDED MEDICATION 30 mLs by Bladder Instillation route at bedtime sodium chloride 0.9% (bottle) 1,000 mL with gentamicin 480mg.  Instill 30mL into the bladder at bedtime. 1 each 0    COMPOUNDED NON-CONTROLLED SUBSTANCE (CMPD RX) - PHARMACY TO MIX COMPOUNDED MEDICATION Instill 60 mL into bladder at bedtime. Refrigerate. Expires: For additional refills, please schedule a follow-up appointment                    . 1000 mL 3    docusate sodium (ENEMEEZ MINI) 283 MG enema USE EVERY OTHER  mL 0    hydrocortisone, Perianal, (ANUSOL-HC) 2.5 % cream APPLY RECTALLY TO THE AFFECTED AREA TWICE DAILY 30 g 9    lidocaine (XYLOCAINE) 5 % external ointment Apply topically as needed for moderate pain 2500 g 0    magic mouthwash suspension, diphenhydrAMINE, lidocaine, aluminum-magnesium & simethicone, (FIRST-MOUTHWASH BLM) compounding kit Combine 1/3rd each of viscous lidocaine, maalox and liquid benadryl - swish and swallow 5 mL as needed every 4-6 hours for pain 240 mL 1    omeprazole (PRILOSEC) 40 MG DR capsule TAKE 1 CAPSULE(40 MG) BY MOUTH DAILY 90 capsule 3    phenylephrine-cocoa butter (PREPARATION H) 0.25-88.44 % suppository Insert one suppository rectally twice daily as needed. 48 suppository 3    polyethylene glycol (MIRALAX) 17 GM/Dose powder Take 17-34 g (1-2 capfuls) by mouth daily 578 g 11    polyethylene glycol (MIRALAX) powder Take 17 g (1 capful) by mouth daily as needed for constipation 510 g 1    pregabalin (LYRICA) 25 MG capsule Increase Lyrica to 50 mg at noon and 75 mg at bedtime x 2 weeks, then increase to 50 mg at noon and 100 mg at bedtime. 180 capsule 0    pregabalin (LYRICA) 50 MG capsule Take 50 mg late morning/noon and 100 mg at bedtime 270 capsule 1    sodium chloride 0.9% (bottle) 1,000 mL  with gentamicin 500 mg irrigation 480 mg of Gentamicin in 1 L of NS. Please instill 60 mL of Gentamicin solution into bladder at HS. 1800 mL 4    sodium phosphate (FLEET ENEMA) 7-19 GM/118ML rectal enema Place 1 Bottle (1 enema) rectally daily as needed for constipation 1 Bottle 0    tiZANidine (ZANAFLEX) 4 MG tablet Take 4 mg by mouth 2 mg twice a day, 4 mg at night, another 2 mg PRN during the day      tolterodine (DETROL) 2 MG tablet TAKE 1 TABLET(2 MG) BY MOUTH TWICE DAILY 180 tablet 0    zolpidem (AMBIEN) 10 MG tablet TAKE 1 TABLET(10 MG) BY MOUTH EVERY NIGHT AS NEEDED FOR SLEEP 90 tablet 0       Social History     Tobacco Use    Smoking status: Never    Smokeless tobacco: Never   Vaping Use    Vaping Use: Never used   Substance Use Topics    Alcohol use: Yes     Alcohol/week: 0.0 standard drinks of alcohol     Comment: Rare    Drug use: No       Odessa Canales MA  11/27/2023  3:21 PM

## 2023-11-27 NOTE — PROGRESS NOTES
HISTORY: Riley Gifford is a 47 year old male with a history of neurogenic bladder secondary to cervical spinal cord injury.  Managed with [facility], bladder botox, gentamicin instillations  Minimal leakage, no issues with catheterizations  He feels he is doing very well on current regimen  Denies recent UTIs  Having trouble filling gentamicin Rx recently due to law changes around compounding mixing, we are currently working on insurance authorization and he would like us to follow up on that    Exam:  Ht 1.829 m (6')   Wt 59 kg (130 lb)   BMI 17.63 kg/m    GENERAL: Healthy, alert and no distress  EYES: Eyes grossly normal to inspection.  No discharge or erythema, or obvious scleral/conjunctival abnormalities.  RESP: No audible wheeze, cough, or visible cyanosis.  No visible retractions or increased work of breathing.    SKIN: Visible skin clear. No significant rash, abnormal pigmentation or lesions.  NEURO: Cranial nerves grossly intact.  Mentation and speech appropriate for age.  PSYCH: Mentation appears normal, affect normal/bright, judgement and insight intact, normal speech and appearance well-groomed.  Neuro: Absent in lower limbs, Diminished on right and left upper limb    Review of Imaging:  The following imaging exams were independently viewed and interpreted by me and discussed with patient:  CT scan 10/2022 without hydronephrosis    Review of Labs:  none    Assessment & Plan     Neurogenic bladder due to spinal cord injury.  Doing well on Botox injections and gentamicin installations.   Last botox 9/2023, will schedule for botox 3/2024  Due for RBUS, will order today to be completed when patient available  Refill for gentimicin instillations in progress, insurance prior auth has been sent

## 2023-11-27 NOTE — LETTER
11/27/2023       RE: Riley Gifford  [address]  [place] [address]     Dear Colleague,    Thank you for referring your patient, Riley Gifford, to the [facility] [place] at [facility]. Please see a copy of my visit note below.    HISTORY: Riley Gifford is a 47 year old male with a history of neurogenic bladder secondary to cervical spinal cord injury.  Managed with [facility], bladder botox, gentamicin instillations  Minimal leakage, no issues with catheterizations  He feels he is doing very well on current regimen  Denies recent UTIs  Having trouble filling gentamicin Rx recently due to law changes around compounding mixing, we are currently working on insurance authorization and he would like us to follow up on that    Exam:  Ht 1.829 m (6')   Wt 59 kg (130 lb)   BMI 17.63 kg/m    GENERAL: Healthy, alert and no distress  EYES: Eyes grossly normal to inspection.  No discharge or erythema, or obvious scleral/conjunctival abnormalities.  RESP: No audible wheeze, cough, or visible cyanosis.  No visible retractions or increased work of breathing.    SKIN: Visible skin clear. No significant rash, abnormal pigmentation or lesions.  NEURO: Cranial nerves grossly intact.  Mentation and speech appropriate for age.  PSYCH: Mentation appears normal, affect normal/bright, judgement and insight intact, normal speech and appearance well-groomed.  Neuro: Absent in lower limbs, Diminished on right and left upper limb    Review of Imaging:  The following imaging exams were independently viewed and interpreted by me and discussed with patient:  CT scan 10/2022 without hydronephrosis    Review of Labs:  none    Assessment & Plan    Neurogenic bladder due to spinal cord injury.  Doing well on Botox injections and gentamicin installations.   Last botox 9/2023, will schedule for botox 3/2024  Due for RBUS, will order today to be completed when patient  available  Refill for gentimicin instillations in progress, insurance prior auth has been sent      Glenny Rosado MD

## 2023-11-29 ENCOUNTER — PATIENT OUTREACH (OUTPATIENT)
Dept: CARE COORDINATION | Facility: CLINIC | Age: 48
End: 2023-11-29
Payer: COMMERCIAL

## 2023-11-29 NOTE — PROGRESS NOTES
Clinic Care Coordination Contact  Follow Up Progress Note      Assessment: The RN CC nurse care coordinator contacted the patient by phone for a follow-up call.  The patient is still working with urology to get a prior authorization of his gentamicin instillation.  So far he has heard no word back, the provider did suggest that he could have the parts separately and mixed with the gentamicin with the saline himself.  The patient's concern was if the nurses at the facility could assist him with that or not.  So he was going to check with them tomorrow and then call the office back.  Otherwise per the patient his pain is unchanged and he still has the baclofen pump which is working better than the oral baclofen.    Care Gaps:    Health Maintenance Due   Topic Date Due    HEPATITIS B IMMUNIZATION (1 of 3 - 3-dose series) Never done    MEDICARE ANNUAL WELLNESS VISIT  05/31/2023    LIPID  07/12/2023       Care Gaps Last addressed on 11/29/23    Care Plans  Care Plan: General  take medications as prescribed       Problem: HP GENERAL PROBLEM       Goal: General Goal -  I will take all medications as prescribed by the providers.       Start Date: 7/22/2022 Expected End Date: 7/22/2024    This Visit's Progress: 70% Recent Progress: 70%    Note:     Barriers: wheelchair bound  Strengths: engaged in care coordination  Patient expressed understanding of goal: yes  Action steps to achieve this goal:  1. I will take all medications as prescribed.  2. I will ask any questions that I have regarding new medications.  3. I will work with the RN CC if I have any difficulty getting refills.   4. I will work with urology for the PA on the gentamicin instillations for the bladder.                              Care Plan: General -  Make and attend follow up appointments       Problem: HP GENERAL PROBLEM       Goal: General Goal - I will make and attend all recommended appointments from my providers.       Start Date: 7/22/2022 Expected  End Date: 7/22/2024    This Visit's Progress: 80% Recent Progress: 80%    Note:     Barriers: Many providers.  Strengths: engaged in care coordination  Patient expressed understanding of goal: yes  Action steps to achieve this goal:  1. I will make all of the recommended follow up appointments.  2. I will attend all of the follow up appointments as recommended by the providers.                            Care Plan: Chronic Pain       Problem: Chronic Pain is Not Self-Managed       Goal: Demonstrate improved self-management of chronic pain       Start Date: 10/25/2023    This Visit's Progress: 20%                            Intervention/Education provided during outreach: The RN CC did review with the patient the ability to try other ways of being able to get the gentamicin instillation.     Outreach Frequency: monthly, more frequently as needed        Plan:   1.  The patient will use the medical marijuana in addition to the other pain options that he has to keep his back pain under control.  2.  The patient will make and attend the recommended follow-up appointments.  3.  The patient will take the in touch with the urology department in order to get the prior authorization on the gentamicin instillation.    RN CC Nurse Care Coordinator will follow up in 30 days.        Robbin Vanegas MSN, RN, PHN, CCM   Primary Care Clinical RN Care Coordinator  [facility]  11/29/2023   2:29 PM  [email or web address]  Office: [phone]

## 2023-12-01 RX ORDER — CEFAZOLIN SODIUM 2 G/50ML
2 SOLUTION INTRAVENOUS SEE ADMIN INSTRUCTIONS
Status: CANCELLED | OUTPATIENT
Start: 2023-12-01

## 2023-12-01 RX ORDER — CEFAZOLIN SODIUM 2 G/50ML
2 SOLUTION INTRAVENOUS
Status: CANCELLED | OUTPATIENT
Start: 2023-12-01

## 2023-12-05 DIAGNOSIS — N31.9 NEUROGENIC BLADDER: ICD-10-CM

## 2023-12-05 RX ORDER — TOLTERODINE TARTRATE 2 MG/1
TABLET, EXTENDED RELEASE ORAL
Qty: 180 TABLET | Refills: 1 | Status: SHIPPED | OUTPATIENT
Start: 2023-12-05 | End: 2024-06-18

## 2023-12-15 DIAGNOSIS — K20.0 EOSINOPHILIC ESOPHAGITIS: ICD-10-CM

## 2023-12-15 DIAGNOSIS — K59.09 CHRONIC CONSTIPATION: ICD-10-CM

## 2023-12-15 DIAGNOSIS — R07.89 ATYPICAL CHEST PAIN: ICD-10-CM

## 2023-12-15 RX ORDER — OMEPRAZOLE 40 MG/1
CAPSULE, DELAYED RELEASE ORAL
Qty: 90 CAPSULE | Refills: 3 | Status: SHIPPED | OUTPATIENT
Start: 2023-12-15

## 2023-12-15 RX ORDER — DOCUSATE SODIUM 283 MG/5ML
LIQUID RECTAL
Qty: 150 ML | Refills: 0 | Status: SHIPPED | OUTPATIENT
Start: 2023-12-15 | End: 2024-02-12

## 2023-12-18 DIAGNOSIS — F51.01 PRIMARY INSOMNIA: ICD-10-CM

## 2023-12-18 RX ORDER — ZOLPIDEM TARTRATE 10 MG/1
TABLET ORAL
Qty: 90 TABLET | Refills: 0 | Status: SHIPPED | OUTPATIENT
Start: 2023-12-18 | End: 2024-03-21

## 2023-12-22 ENCOUNTER — TELEPHONE (OUTPATIENT)
Dept: UROLOGY | Facility: CLINIC | Age: 48
End: 2023-12-22
Payer: COMMERCIAL

## 2023-12-22 NOTE — TELEPHONE ENCOUNTER
Patient is scheduled for a surgical procedure with Dr. Hayes      Spoke with: Patient via phone      Date of Surgery/Procedure: Friday March 01, 2024    Location: [facility] OR      Informed patient they will need an adult : Yes     Pre-op: Yes      H&P: Patient to schedule    Additional comments:      Surgery packet: alexandra     Patient is aware that surgery time is tentative to change and to expect a call 3-1 business days from Pre Admission Nursing for instructions and arrival time

## 2023-12-26 DIAGNOSIS — K20.0 EOSINOPHILIC ESOPHAGITIS: ICD-10-CM

## 2023-12-26 DIAGNOSIS — R05.9 COUGH: ICD-10-CM

## 2023-12-27 RX ORDER — BUDESONIDE 1 MG/2ML
INHALANT ORAL
Qty: 180 ML | Refills: 1 | Status: SHIPPED | OUTPATIENT
Start: 2023-12-27 | End: 2024-03-21

## 2023-12-27 RX ORDER — ALBUTEROL SULFATE 90 UG/1
AEROSOL, METERED RESPIRATORY (INHALATION)
Qty: 8.5 G | Refills: 1 | Status: SHIPPED | OUTPATIENT
Start: 2023-12-27 | End: 2024-02-14

## 2023-12-28 ENCOUNTER — TELEPHONE (OUTPATIENT)
Dept: UROLOGY | Facility: CLINIC | Age: 48
End: 2023-12-28
Payer: COMMERCIAL

## 2023-12-28 DIAGNOSIS — N31.9 NEUROGENIC BLADDER: ICD-10-CM

## 2023-12-28 NOTE — TELEPHONE ENCOUNTER
[facility] Call Center    Phone Message    May a detailed message be left on voicemail: yes     Reason for Call: Other: looking for clarification on compound Gentimyison, needs quantity, please advise     Action Taken: Other: urology    Travel Screening: Not Applicable

## 2023-12-29 NOTE — TELEPHONE ENCOUNTER
Called and spoke to Joshua in the compounding pharmacy.  Order clarified.      Laurie Tucker RN   10:20 AM

## 2024-01-04 ENCOUNTER — MEDICAL CORRESPONDENCE (OUTPATIENT)
Dept: HEALTH INFORMATION MANAGEMENT | Facility: CLINIC | Age: 49
End: 2024-01-04
Payer: COMMERCIAL

## 2024-01-10 ENCOUNTER — MEDICAL CORRESPONDENCE (OUTPATIENT)
Dept: HEALTH INFORMATION MANAGEMENT | Facility: CLINIC | Age: 49
End: 2024-01-10
Payer: COMMERCIAL

## 2024-01-12 ENCOUNTER — PATIENT OUTREACH (OUTPATIENT)
Dept: CARE COORDINATION | Facility: CLINIC | Age: 49
End: 2024-01-12
Payer: COMMERCIAL

## 2024-01-12 ASSESSMENT — ACTIVITIES OF DAILY LIVING (ADL): DEPENDENT_IADLS:: CLEANING;COOKING;LAUNDRY;SHOPPING;MEAL PREPARATION;MEDICATION MANAGEMENT

## 2024-01-12 NOTE — PROGRESS NOTES
Clinic Care Coordination Contact  Clinic Care Coordination Contact  OUTREACH    Referral Information:  Referral Source: PCP    Primary Diagnosis: Chronic Pain (wound care)    Chief Complaint   Patient presents with    Clinic Care Coordination - Initial     Annual Assessment        Universal Utilization: The patient uses the [employer] system and the [facility].  Clinic Utilization  Difficulty keeping appointments:: No  Compliance Concerns: No  No-Show Concerns: No  No PCP office visit in Past Year: No  Utilization      No Show Count (past year)  0             ED Visits  0             Hospital Admissions  0                    Current as of: 1/10/2024  3:26 PM                Clinical Concerns:  Current Medical Concerns: The patient has frequent UTIs and he has been battling with the insurance company for the gentamicin antibiotic that he instills in his bladder.  He had been trying to stretch it out as he was struggling to get it refilled and ended up with a UTI right before Christmas.  Currently the patient is feeling good he is having the same spasms and pains as before.  The patient is on a baclofen pump which has seemed to help more than the oral medications did.  The patient will be seeing the PM&R for his pain management in February.  Patient Active Problem List   Diagnosis    Vitamin D deficiency    Eosinophilic esophagitis    Neurogenic bladder    CARDIOVASCULAR SCREENING; LDL GOAL LESS THAN 160    Quadriplegia (H)    Chronic constipation    Internal hemorrhoids with other complication    Diagnostic skin and sensitization tests(aka ALLERGENS)    Anal or rectal pain    Allergic rhinitis due to animal dander    Allergy to mold spores    House dust mite allergy    Insomnia    Health Care Home    Gallstones    Chronic midline thoracic back pain    Pulmonary nodules    ACP (advance care planning)    Cervical spinal cord injury, sequela (H24)    Spasticity    Self-catheterizes urinary bladder    Bleeding  external hemorrhoids    History of claustrophobia    Recurrent UTI     Current Behavioral Concerns: None currently noted.  Education Provided to patient: Reviewed with the patient options for working with the insurance company to get the refills of the gentamicin.  Pain  Pain (GOAL):: Yes  Type: Chronic (>3mo)  Location of chronic pain:: back and sacral area, neck area  Radiating: Yes  Location pain radiates to: down spine  Progression: Unchanged  Description of pain: Stabbing, Shooting  Chronic pain severity:: 5  Limitation of routine activities due to chronic pain:: Yes  Description: Unable to perform most daily activities (chores, hobbies, social activities, driving)  Alleviating Factors: Pain Medication  Aggravating Factors: Positioning, Breathing  Health Maintenance Reviewed: Due/Overdue   Health Maintenance Due   Topic Date Due    HEPATITIS B IMMUNIZATION (1 of 3 - 3-dose series) Never done    MEDICARE ANNUAL WELLNESS VISIT  05/31/2023    LIPID  07/12/2023    COLORECTAL CANCER SCREENING  01/30/2024    BMP  02/08/2024       Clinical Pathway: None    Medication Management:  Medication review status: Medications reviewed and no changes reported per patient.        Patient is knowledgeable on medications and is adherent.  No financial concerns reported at this time.  Medication review was completed with the patient and there are no questions or concerns at this time.       Functional Status:  Dependent ADLs:: Wheelchair-independent, Bathing, Dressing, Grooming, Positioning, Transfers, Toileting  Dependent IADLs:: Cleaning, Cooking, Laundry, Shopping, Meal Preparation, Medication Management  Bed or wheelchair confined:: Yes  Mobility Status: Dependent/Assisted by Another  Fallen 2 or more times in the past year?: No  Any fall with injury in the past year?: No    Living Situation:  Current living arrangement:: I live in assisted living  Type of residence:: Assisted living    Lifestyle & Psychosocial Needs:    Social  Determinants of Health     Food Insecurity: No Food Insecurity (9/16/2022)    Hunger Vital Sign     Worried About Running Out of Food in the Last Year: Never true     Ran Out of Food in the Last Year: Never true   Depression: At risk (8/8/2023)    PHQ-2     PHQ-2 Score: 3   Housing Stability: Low Risk  (9/16/2022)    Housing Stability Vital Sign     Unable to Pay for Housing in the Last Year: No     Number of Places Lived in the Last Year: 1     Unstable Housing in the Last Year: No   Tobacco Use: Low Risk  (11/27/2023)    Patient History     Smoking Tobacco Use: Never     Smokeless Tobacco Use: Never     Passive Exposure: Not on file   Financial Resource Strain: Low Risk  (2/18/2020)    Overall Financial Resource Strain (CARDIA)     Difficulty of Paying Living Expenses: Not hard at all   Alcohol Use: Not on file   Transportation Needs: No Transportation Needs (9/16/2022)    PRAPARE - Transportation     Lack of Transportation (Medical): No     Lack of Transportation (Non-Medical): No   Physical Activity: Inactive (3/24/2020)    Exercise Vital Sign     Days of Exercise per Week: 0 days     Minutes of Exercise per Session: 0 min   Interpersonal Safety: Not on file   Stress: Not on file   Social Connections: Not on file     Diet:: Regular  Inadequate nutrition (GOAL):: No  Tube Feeding: No  Inadequate activity/exercise (GOAL):: No  Significant changes in sleep pattern (GOAL): No  Transportation means:: Accessible car  Financial/Insurance concerns (GOAL):: No  Adventist or spiritual beliefs that impact treatment:: No  Mental health DX:: No  Mental health management concern (GOAL):: No  Chemical Dependency Status: No Current Concerns  Informal Support system:: Family, Friends      Resources and Interventions:  Current Resources:   Skilled Home Care Services: Skilled Nursing  Community Resources: PCA  Supplies Currently Used at Home: Other  Equipment Currently Used at Home: wheelchair, power, other (see comments), commode  chair, hospital bed, shower chair, transfer board, tub bench  Employment Status: disabled         Advance Care Plan/Directive  Advanced Care Plans/Directives on file:: Yes  Status of record:: On File and Validated  Type Advanced Care Plans/Directives: Advanced Directive - On File    Referrals Placed: None         Care Plan:  Care Plan: General  take medications as prescribed       Problem: HP GENERAL PROBLEM       Goal: General Goal -  I will take all medications as prescribed by the providers.       Start Date: 7/22/2022 Expected End Date: 7/22/2024    This Visit's Progress: 70% Recent Progress: 70%    Note:     Barriers: wheelchair bound  Strengths: engaged in care coordination  Patient expressed understanding of goal: yes  Action steps to achieve this goal:  1. I will take all medications as prescribed.  2. I will ask any questions that I have regarding new medications.  3. I will work with the RN CC if I have any difficulty getting refills.   4. I will work with urology for the PA on the gentamicin instillations for the bladder.                              Care Plan: General -  Make and attend follow up appointments       Problem: HP GENERAL PROBLEM       Goal: General Goal - I will make and attend all recommended appointments from my providers.       Start Date: 7/22/2022 Expected End Date: 7/22/2024    This Visit's Progress: 80% Recent Progress: 80%    Note:     Barriers: Many providers.  Strengths: engaged in care coordination  Patient expressed understanding of goal: yes  Action steps to achieve this goal:  1. I will make all of the recommended follow up appointments.  2. I will attend all of the follow up appointments as recommended by the providers.                            Care Plan: Chronic Pain       Problem: Chronic Pain is Not Self-Managed       Goal: Demonstrate improved self-management of chronic pain       Start Date: 10/25/2023    This Visit's Progress: 20% Recent Progress: 20%                             Patient/Caregiver understanding: The patient has a very good understanding of the disease process.    Outreach Frequency: monthly, more frequently as needed      Plan: 1.  The patient will continue to work with the insurance company and the compounding pharmacy to get the gentamicin for bladder instillation that he needs.  2.  The patient will take all medications as prescribed by the providers.  3.  The patient will reach out to the PM&R department if he needs assistance earlier than his appointment.          Robbin Vanegas MSN, RN, PHN, CCM   Primary Care Clinical RN Care Coordinator  [facility]  1/12/2024   12:18 PM  [email or web address]  Office: [phone]

## 2024-01-12 NOTE — LETTER
[facility]  Patient Centered Plan of Care  About Me:        Patient Name:  Riley Gifford    YOB: 1975  Age:         48 year old   Rut MRN:    [id number] Telephone Information:  Home Phone [phone]   Mobile [phone]   Mobile [phone]       Address:  [address] [place] [address] [address]  [place] [address] Email address:  [email or web address]      Emergency Contact(s)    Name Relationship Lgl Grd Work Phone Home Phone Mobile Phone   1. TREY GIFFORD (NEP* Relative No noen none [phone]   2. AGUSTO CAVAZOS Sister   [phone]    3. GIFFORDHODA Brother No  [phone]            Primary language:  English     needed? No   [facility] [employer] [facility]:  [phone] op. 1  Other communication barriers:Glasses    Preferred Method of Communication:  Lisa  Current living arrangement: I live in assisted living    Mobility Status/ Medical Equipment: Dependent/Assisted by Another        Health Maintenance  Health Maintenance Reviewed: Due/Overdue   Health Maintenance Due   Topic Date Due    HEPATITIS B IMMUNIZATION (1 of 3 - 3-dose series) Never done    MEDICARE ANNUAL WELLNESS VISIT  05/31/2023    LIPID  07/12/2023    COLORECTAL CANCER SCREENING  01/30/2024    BMP  02/08/2024           My Access Plan  Medical Emergency 911   Primary Clinic Line [facility] [phone]   24 Hour Appointment Line [phone] or  [phone]) (toll-free)   24 Hour Nurse Line [phone] (toll-free)   Preferred Urgent Care [facility] [phone]     Preferred Hospital [facility]  [phone]     Preferred Pharmacy [id number] - [place], [place] - [address] [place] [address] AT [facility] [address]     Behavioral Health Crisis Line The National Suicide Prevention Lifeline at 1-514.869.9210 or Text/Call 678           My Care Team Members  Patient Care Team          Relationship Specialty Notifications Start End    Jenny Fay PA-C PCP - General Family Medicine  12/21/21     Phone: [phone] Fax: [phone] [address] [facility] [address]    Robbin Andino, RN Lead Care Coordinator Nurse Admissions 1/6/16     phone:  [phone]    Phone: [phone] Fax: [phone]        Rober Leo MD Referring Physician Urology  1/8/16     Phone: [phone] Fax: [phone]         [address] [facility] [address]                 Amanda Other (see comments)   1/8/16     [facility]     Phone: [phone]         Evaristo Hayes MD MD Urology  4/19/16     Phone: [phone] Fax: [phone]         [address] [facility] [address] [facility] [address]    Jenny Wright, RN Registered Nurse Urology  4/19/16     Rosemary Thomas RN Nurse Coordinator Physical Medicine and Rehabilitation  3/30/17     Phone: [phone] Pager: [phone]        Lulú Reveles APRN CNP Nurse Practitioner Nurse Practitioner  8/28/20     Phone: [phone] Fax: [phone]         [address] [facility] [address] [facility] [address]    [facility] Occupational Therapist Occupational Therapy  1/7/22     [facility] Alberto  fax [phone]    Phone: [phone] Fax: [phone]         [address] [facility] [address]    Evaristo Hayes MD MD Urology  1/17/22     Phone: [phone] Fax: [phone]         [address] [facility] [address] [facility] [address]    Yessy Tapia, RN Specialty Care Coordinator   1/17/22     Jenny Fay PA-C Assigned PCP   4/3/22     Phone: [phone] Fax: [phone] [address] [facility] [place] [address]    Nalini Resendez APRN CNP Nurse Practitioner   9/12/23     Phone: [phone] Fax: [phone] [address] [place] [address]    Glenny Rosado MD Assigned Surgical Provider   12/2/23     Phone:  [phone] Fax: [phone]         [address] [facility] [address]                My Care Plans  Self Management and Treatment Plan    Care Plan  Care Plan: General  take medications as prescribed       Problem: HP GENERAL PROBLEM       Goal: General Goal -  I will take all medications as prescribed by the providers.       Start Date: 7/22/2022 Expected End Date: 7/22/2024    This Visit's Progress: 70% Recent Progress: 70%    Note:     Barriers: wheelchair bound  Strengths: engaged in care coordination  Patient expressed understanding of goal: yes  Action steps to achieve this goal:  1. I will take all medications as prescribed.  2. I will ask any questions that I have regarding new medications.  3. I will work with the RN CC if I have any difficulty getting refills.   4. I will work with urology for the PA on the gentamicin instillations for the bladder.                              Care Plan: General -  Make and attend follow up appointments       Problem: HP GENERAL PROBLEM       Goal: General Goal - I will make and attend all recommended appointments from my providers.       Start Date: 7/22/2022 Expected End Date: 7/22/2024    This Visit's Progress: 80% Recent Progress: 80%    Note:     Barriers: Many providers.  Strengths: engaged in care coordination  Patient expressed understanding of goal: yes  Action steps to achieve this goal:  1. I will make all of the recommended follow up appointments.  2. I will attend all of the follow up appointments as recommended by the providers.                            Care Plan: Chronic Pain       Problem: Chronic Pain is Not Self-Managed       Goal: Demonstrate improved self-management of chronic pain       Start Date: 10/25/2023    This Visit's Progress: 20% Recent Progress: 20%                            Action Plans on File:            Depression          Advance Care Plans/Directives:   Advanced Care Plan/Directives on file:   Yes    Status of Document(s):    On File and Validated    Advanced Care Plan/Directives Type:   Advanced Directive - On File           My Medical and Care Information  Problem List   Patient Active Problem List   Diagnosis    Vitamin D deficiency    Eosinophilic esophagitis    Neurogenic bladder    CARDIOVASCULAR SCREENING; LDL GOAL LESS THAN 160    Quadriplegia (H)    Chronic constipation    Internal hemorrhoids with other complication    Diagnostic skin and sensitization tests(aka ALLERGENS)    Anal or rectal pain    Allergic rhinitis due to animal dander    Allergy to mold spores    House dust mite allergy    Insomnia    Health Care Home    Gallstones    Chronic midline thoracic back pain    Pulmonary nodules    ACP (advance care planning)    Cervical spinal cord injury, sequela (H24)    Spasticity    Self-catheterizes urinary bladder    Bleeding external hemorrhoids    History of claustrophobia    Recurrent UTI      Current Medications and Allergies:  See printed Medication Report.    Care Coordination Start Date: 1/6/2016   Frequency of Care Coordination: monthly, more frequently as needed     Form Last Updated: 01/12/2024

## 2024-01-16 DIAGNOSIS — K59.09 CHRONIC CONSTIPATION: ICD-10-CM

## 2024-01-16 RX ORDER — POLYETHYLENE GLYCOL 3350 17 G/17G
POWDER, FOR SOLUTION ORAL
Qty: 510 G | Refills: 1 | Status: SHIPPED | OUTPATIENT
Start: 2024-01-16 | End: 2024-04-02

## 2024-01-24 ENCOUNTER — TELEPHONE (OUTPATIENT)
Dept: GASTROENTEROLOGY | Facility: CLINIC | Age: 49
End: 2024-01-24
Payer: COMMERCIAL

## 2024-01-24 NOTE — TELEPHONE ENCOUNTER
Caller: Lillie  Reason for Reschedule/Cancellation (please be detailed, any staff messages or encounters to note?): MD block released /patient aware of new date and times and is fine waiting until June 2024      Prior to reschedule please review:  Ordering Provider: Jenny Fay  Sedation Determined: mac  Does patient have any ASC Exclusions, please identify?: y needs assistance      Notes on Cancelled Procedure:  Procedure: Lower Endoscopy [Colonoscopy]   Date: 2/22  Location: [facility]; [address] [facility] [place] [address]  Surgeon: Julian      Rescheduled: Yes  Procedure: Lower Endoscopy [Colonoscopy]   Date: 6/13  Location: [facility]; [address] [facility] [place] [address]  Surgeon: Julian  Sedation Level Scheduled  mac,  Reason for Sedation Level ordred  Prep/Instructions updated and sent: y       Send In - basket message to Panc - Tristin Pool if EUS  procedure is canceled or rescheduled: [ N/A, YES or NO] n/a

## 2024-01-30 ENCOUNTER — TELEPHONE (OUTPATIENT)
Dept: FAMILY MEDICINE | Facility: CLINIC | Age: 49
End: 2024-01-30
Payer: COMMERCIAL

## 2024-01-30 DIAGNOSIS — N31.9 NEUROGENIC BLADDER: ICD-10-CM

## 2024-01-30 NOTE — TELEPHONE ENCOUNTER
If these symptoms have significantly changed I would recommend an in person appointment with any provider to check a urine, flu/covid or even labs. However, if he feels that it is only likely a UTI I can place the order, but without urine changes this doesn't fit his usual symptoms. Jenny Fay PA-C

## 2024-01-30 NOTE — TELEPHONE ENCOUNTER
Provider's message relayed to pt.   States he is willing to see a Provider today or tomorrow after 1030 at  or NE. No openings at  or NE today. Pt declined UC. He will go to [facility] or [facility] UC only if symptoms worsen. Assisted with scheduling appt for tomorrow at .    Routing to PCP as ELISA.    Barbara Conway RN  [facility]

## 2024-01-30 NOTE — TELEPHONE ENCOUNTER
Patient having nausea and muscle spasms in lower half of body. He is a quadriplegic and stated this is normal for him but they have become worse. These symptoms started late on Friday 4 days ago. Urine appears normal, drinking appropriately. He caths every 2-4 hours. No cloudiness/odor, fevers, URI symptoms, SOB, chest pains.  Has had BMs appropriately. Denies unusual pain in general. Denies skin breakdown/wounds. Last UTI was right before roya. He has been able to eat appropriately. Denies vomiting. Does not believe he was exposed to anything.     He stated he feels like this could be a UTI as he gets these frequently.    He is wondering if he needs to be seen for an appointment or if she recommends just having a Lab sample for urine?     Encouraged patient to call back with worsening symptoms.     Rosa Jiménez RN on 1/30/2024 at 11:16 AM

## 2024-01-31 ENCOUNTER — OFFICE VISIT (OUTPATIENT)
Dept: FAMILY MEDICINE | Facility: CLINIC | Age: 49
End: 2024-01-31
Payer: COMMERCIAL

## 2024-01-31 VITALS
HEART RATE: 110 BPM | TEMPERATURE: 97.8 F | OXYGEN SATURATION: 96 % | DIASTOLIC BLOOD PRESSURE: 65 MMHG | SYSTOLIC BLOOD PRESSURE: 98 MMHG | RESPIRATION RATE: 18 BRPM

## 2024-01-31 DIAGNOSIS — K20.0 EOSINOPHILIC ESOPHAGITIS: ICD-10-CM

## 2024-01-31 DIAGNOSIS — E87.1 LOW SODIUM LEVELS: ICD-10-CM

## 2024-01-31 DIAGNOSIS — G82.50 QUADRIPLEGIA (H): ICD-10-CM

## 2024-01-31 DIAGNOSIS — R11.0 NAUSEA: ICD-10-CM

## 2024-01-31 DIAGNOSIS — M62.838 MUSCLE SPASM: ICD-10-CM

## 2024-01-31 DIAGNOSIS — K20.0 EOSINOPHILIC ESOPHAGITIS: Primary | ICD-10-CM

## 2024-01-31 DIAGNOSIS — E78.6 LOW HDL (UNDER 40): ICD-10-CM

## 2024-01-31 LAB
ANION GAP SERPL CALCULATED.3IONS-SCNC: 11 MMOL/L (ref 7–15)
BUN SERPL-MCNC: 7.5 MG/DL (ref 6–20)
CALCIUM SERPL-MCNC: 8.9 MG/DL (ref 8.6–10)
CHLORIDE SERPL-SCNC: 103 MMOL/L (ref 98–107)
CREAT SERPL-MCNC: 0.49 MG/DL (ref 0.67–1.17)
DEPRECATED HCO3 PLAS-SCNC: 21 MMOL/L (ref 22–29)
EGFRCR SERPLBLD CKD-EPI 2021: >90 ML/MIN/1.73M2
GLUCOSE SERPL-MCNC: 137 MG/DL (ref 70–99)
POTASSIUM SERPL-SCNC: 3.8 MMOL/L (ref 3.4–5.3)
SODIUM SERPL-SCNC: 135 MMOL/L (ref 135–145)

## 2024-01-31 PROCEDURE — 36415 COLL VENOUS BLD VENIPUNCTURE: CPT | Performed by: NURSE PRACTITIONER

## 2024-01-31 PROCEDURE — 80048 BASIC METABOLIC PNL TOTAL CA: CPT | Performed by: NURSE PRACTITIONER

## 2024-01-31 PROCEDURE — 99214 OFFICE O/P EST MOD 30 MIN: CPT | Performed by: NURSE PRACTITIONER

## 2024-01-31 RX ORDER — FAMOTIDINE 20 MG/1
20 TABLET, FILM COATED ORAL 2 TIMES DAILY PRN
Qty: 60 TABLET | Refills: 0 | Status: SHIPPED | OUTPATIENT
Start: 2024-01-31

## 2024-01-31 RX ORDER — ONDANSETRON 4 MG/1
4 TABLET, FILM COATED ORAL EVERY 8 HOURS PRN
Qty: 10 TABLET | Refills: 0 | Status: SHIPPED | OUTPATIENT
Start: 2024-01-31 | End: 2024-05-02

## 2024-01-31 RX ORDER — FAMOTIDINE 20 MG/1
TABLET, FILM COATED ORAL
Qty: 180 TABLET | OUTPATIENT
Start: 2024-01-31

## 2024-01-31 ASSESSMENT — PAIN SCALES - GENERAL: PAINLEVEL: MODERATE PAIN (5)

## 2024-01-31 NOTE — PROGRESS NOTES
Assessment & Plan     (K20.0) Eosinophilic esophagitis  (primary encounter diagnosis)  Comment: his symptoms are likely due esophagitis. Pt is on PPI but takes in the evening.  Plan:   - Famotidine (PEPCID) 20 MG tablet, take as directed.  - Change Omeprazole intake to the morning for better symptom control.  - He stated that he was schedule appointment  with his PCP.  - Instructed the patient to seek immediate medical attention if symptoms worsen or new symptoms develop.  -Consider referral to gastroenterology if symptoms persist or worsen despite treatment.    (R11.0) Nausea  Comment: likely esophagitis  Plan: ondansetron (ZOFRAN) 4 MG tablet as needed for nausea.    (M62.838) Muscle spasm  Comment: The patient has a history of Quadriplegia  and has baclofen pump. He is also on Tizanidine.  PLAN:  - Continue current plan and medications for muscle spasms.    (E87.1) Low sodium levels  Comment: he recently had very mild low sodium. Will recheck his sodium   Plan: Basic metabolic panel  (Ca, Cl, CO2, Creat,         Gluc, K, Na, BUN)  to recheck sodium levels and other electrolytes.    (E78.6) Low HDL (under 40)  Comment: Pt had low HLD and is due lipid screening.  Plan: Lipid panel reflex to direct LDL Non-fasting      (G82.50) Quadriplegia (H)  Comment: Chronic quadriplegia. Denies signs of UTI.  PLAN:   -Continue current medications.                    Subjective   Riley is a 48 year old, presenting for the following health issues:  Nausea (In the mornings improves as the day goes on for the last 5-6 days) and Spasms (Abdomin, lower back and legs for a few past few weeks more intense than normal. )        1/31/2024    10:55 AM   Additional Questions   Roomed by Alea DE LA ROSA     Chief Complaint   Patient presents with    Nausea     In the mornings improves as the day goes on for the last 5-6 days    Spasms     Abdomin, lower back and legs for a few past few weeks more intense than normal.      Riley Gifford is 48  year old male with a history of esophagitis presents with morning nausea that improves as the day progresses, ongoing for the last 5-6 days. The patient also reports experiencing spasms in the abdomen, lower back, and legs for the past few weeks, noting that they have been more intense than usual. No associated vomiting, fever, diarrhea, or recent travel. The patient denies any changes in diet, medication, or new stressors. No history of similar symptoms in the past.           Review of Systems  Constitutional, HEENT, cardiovascular, pulmonary, gi and gu systems are negative, except as otherwise noted.      Objective    BP 98/65   Pulse 110   Temp 97.8  F (36.6  C) (Oral)   Resp 18   SpO2 96%   There is no height or weight on file to calculate BMI.  Physical Exam  Constitutional:       Appearance: Normal appearance.   HENT:      Head: Normocephalic and atraumatic.      Nose: Nose normal.      Mouth/Throat:      Mouth: Mucous membranes are dry.   Eyes:      Extraocular Movements: Extraocular movements intact.   Cardiovascular:      Heart sounds: Normal heart sounds.   Pulmonary:      Effort: Pulmonary effort is normal.      Breath sounds: Normal breath sounds.   Abdominal:      General: Bowel sounds are normal. There is no distension.      Palpations: Abdomen is soft.      Tenderness: There is no abdominal tenderness. There is no right CVA tenderness, left CVA tenderness, guarding or rebound.   Musculoskeletal:      Cervical back: Normal range of motion and neck supple.   Neurological:      Mental Status: He is alert and oriented to person, place, and time.      Gait: Gait normal.   Psychiatric:         Mood and Affect: Mood normal.         Behavior: Behavior normal.         Thought Content: Thought content normal.         Judgment: Judgment normal.                    Signed Electronically by: SUJATA Morris CNP

## 2024-02-07 ENCOUNTER — PATIENT OUTREACH (OUTPATIENT)
Dept: CARE COORDINATION | Facility: CLINIC | Age: 49
End: 2024-02-07
Payer: COMMERCIAL

## 2024-02-07 NOTE — PROGRESS NOTES
Clinic Care Coordination Contact  Follow Up Progress Note      Assessment: The RN CC nurse care coordinator contacted the patient by phone for a follow-up call.  The patient stated that he was feeling much better this week as compared to last week.  He switched his medications as suggested by Dr. Herman and that has seemed to help.  He is taking his omeprazole in the morning and he is using the Zofran for nausea and that seems to be helping.  He has not started the Pepcid yet, and the RN CC stated that that may be an additional help for that nausea that he has.  The patient stated that he will try using that as well.  The patient also has an appointment with his PCP next week as he needs a preop for his Botox injection.    Care Gaps:    Health Maintenance Due   Topic Date Due    HEPATITIS B IMMUNIZATION (1 of 3 - 3-dose series) Never done    MEDICARE ANNUAL WELLNESS VISIT  05/31/2023    LIPID  07/12/2023    COLORECTAL CANCER SCREENING  01/30/2024       Care Gaps Last addressed on 2/7/24    Care Plans  Care Plan: General  take medications as prescribed       Problem: HP GENERAL PROBLEM       Goal: General Goal -  I will take all medications as prescribed by the providers.       Start Date: 7/22/2022 Expected End Date: 7/22/2024    This Visit's Progress: 80% Recent Progress: 70%    Note:     Barriers: wheelchair bound  Strengths: engaged in care coordination  Patient expressed understanding of goal: yes  Action steps to achieve this goal:  1. I will take all medications as prescribed.  2. I will ask any questions that I have regarding new medications.  3. I will work with the RN CC if I have any difficulty getting refills.   4. I will work with urology for the PA on the gentamicin instillations for the bladder.                              Care Plan: General -  Make and attend follow up appointments       Problem: HP GENERAL PROBLEM       Goal: General Goal - I will make and attend all recommended appointments from my  providers.       Start Date: 7/22/2022 Expected End Date: 7/22/2024    This Visit's Progress: 80% Recent Progress: 80%    Note:     Barriers: Many providers.  Strengths: engaged in care coordination  Patient expressed understanding of goal: yes  Action steps to achieve this goal:  1. I will make all of the recommended follow up appointments.  2. I will attend all of the follow up appointments as recommended by the providers.                            Care Plan: Chronic Pain       Problem: Chronic Pain is Not Self-Managed       Goal: Demonstrate improved self-management of chronic pain       Start Date: 10/25/2023    This Visit's Progress: 40% Recent Progress: 20%                            Intervention/Education provided during outreach: The RN CC encouraged the patient to try the Pepcid to see if it makes any difference in his abdominal pain or the nausea.     Outreach Frequency: monthly, more frequently as needed        Plan:   1.  The patient will make and attend all recommended follow-up appointments.  2.  The patient will take all medications as prescribed by the providers.  3.  The patient will try the Pepcid to see if it makes any difference in the nausea or abdominal pain.    RN CC Nurse Care Coordinator will follow up in 30 days.              Robbin Vanegas MSN, RN, PHN, CCM   Primary Care Clinical RN Care Coordinator  [facility]  2/7/2024   9:51 AM  [email or web address]  Office: [phone]

## 2024-02-10 DIAGNOSIS — K59.09 CHRONIC CONSTIPATION: ICD-10-CM

## 2024-02-12 RX ORDER — DOCUSATE SODIUM 283 MG/5ML
LIQUID RECTAL
Qty: 150 ML | Refills: 0 | Status: SHIPPED | OUTPATIENT
Start: 2024-02-12 | End: 2024-04-16

## 2024-02-14 DIAGNOSIS — R05.9 COUGH: ICD-10-CM

## 2024-02-14 RX ORDER — ALBUTEROL SULFATE 90 UG/1
AEROSOL, METERED RESPIRATORY (INHALATION)
Qty: 8.5 G | Refills: 1 | Status: SHIPPED | OUTPATIENT
Start: 2024-02-14 | End: 2024-04-01

## 2024-02-15 ENCOUNTER — OFFICE VISIT (OUTPATIENT)
Dept: FAMILY MEDICINE | Facility: CLINIC | Age: 49
End: 2024-02-15
Payer: COMMERCIAL

## 2024-02-15 VITALS
TEMPERATURE: 98.9 F | HEART RATE: 111 BPM | OXYGEN SATURATION: 96 % | DIASTOLIC BLOOD PRESSURE: 76 MMHG | BODY MASS INDEX: 17.63 KG/M2 | RESPIRATION RATE: 25 BRPM | SYSTOLIC BLOOD PRESSURE: 119 MMHG | WEIGHT: 130 LBS

## 2024-02-15 DIAGNOSIS — N39.0 RECURRENT UTI: ICD-10-CM

## 2024-02-15 DIAGNOSIS — K59.09 CHRONIC CONSTIPATION: ICD-10-CM

## 2024-02-15 DIAGNOSIS — N31.9 NEUROGENIC BLADDER: ICD-10-CM

## 2024-02-15 DIAGNOSIS — Z78.9 SELF-CATHETERIZES URINARY BLADDER: ICD-10-CM

## 2024-02-15 DIAGNOSIS — G82.50 QUADRIPLEGIA (H): ICD-10-CM

## 2024-02-15 DIAGNOSIS — M62.838 MUSCLE SPASM: ICD-10-CM

## 2024-02-15 DIAGNOSIS — Z01.818 PREOP GENERAL PHYSICAL EXAM: Primary | ICD-10-CM

## 2024-02-15 LAB
ANION GAP SERPL CALCULATED.3IONS-SCNC: 10 MMOL/L (ref 7–15)
BUN SERPL-MCNC: 6.7 MG/DL (ref 6–20)
CALCIUM SERPL-MCNC: 8.4 MG/DL (ref 8.6–10)
CHLORIDE SERPL-SCNC: 102 MMOL/L (ref 98–107)
CHOLEST SERPL-MCNC: 110 MG/DL
CREAT SERPL-MCNC: 0.72 MG/DL (ref 0.67–1.17)
DEPRECATED HCO3 PLAS-SCNC: 23 MMOL/L (ref 22–29)
EGFRCR SERPLBLD CKD-EPI 2021: >90 ML/MIN/1.73M2
FASTING STATUS PATIENT QL REPORTED: YES
GLUCOSE SERPL-MCNC: 92 MG/DL (ref 70–99)
HDLC SERPL-MCNC: 25 MG/DL
LDLC SERPL CALC-MCNC: 70 MG/DL
NONHDLC SERPL-MCNC: 85 MG/DL
POTASSIUM SERPL-SCNC: 4.3 MMOL/L (ref 3.4–5.3)
SODIUM SERPL-SCNC: 135 MMOL/L (ref 135–145)
TRIGL SERPL-MCNC: 75 MG/DL

## 2024-02-15 PROCEDURE — 36415 COLL VENOUS BLD VENIPUNCTURE: CPT | Performed by: PHYSICIAN ASSISTANT

## 2024-02-15 PROCEDURE — 80061 LIPID PANEL: CPT | Performed by: PHYSICIAN ASSISTANT

## 2024-02-15 PROCEDURE — 80048 BASIC METABOLIC PNL TOTAL CA: CPT | Performed by: PHYSICIAN ASSISTANT

## 2024-02-15 PROCEDURE — 99214 OFFICE O/P EST MOD 30 MIN: CPT | Performed by: PHYSICIAN ASSISTANT

## 2024-02-15 ASSESSMENT — PATIENT HEALTH QUESTIONNAIRE - PHQ9
10. IF YOU CHECKED OFF ANY PROBLEMS, HOW DIFFICULT HAVE THESE PROBLEMS MADE IT FOR YOU TO DO YOUR WORK, TAKE CARE OF THINGS AT HOME, OR GET ALONG WITH OTHER PEOPLE: SOMEWHAT DIFFICULT
SUM OF ALL RESPONSES TO PHQ QUESTIONS 1-9: 10
SUM OF ALL RESPONSES TO PHQ QUESTIONS 1-9: 10

## 2024-02-15 NOTE — PATIENT INSTRUCTIONS
Preparing for Your Surgery  Getting started  A nurse will call you to review your health history and instructions. They will give you an arrival time based on your scheduled surgery time. Please be ready to share:  Your doctor's clinic name and phone number  Your medical, surgical, and anesthesia history  A list of allergies and sensitivities  A list of medicines, including herbal treatments and over-the-counter drugs  Whether the patient has a legal guardian (ask how to send us the papers in advance)  Please tell us if you're pregnant--or if there's any chance you might be pregnant. Some surgeries may injure a fetus (unborn baby), so they require a pregnancy test. Surgeries that are safe for a fetus don't always need a test, and you can choose whether to have one.   If you have a child who's having surgery, please ask for a copy of Preparing for Your Child's Surgery.    Preparing for surgery  Within 10 to 30 days of surgery: Have a pre-op exam (sometimes called an H&P, or History and Physical). This can be done at a clinic or pre-operative center.  If you're having a , you may not need this exam. Talk to your care team.  At your pre-op exam, talk to your care team about all medicines you take. If you need to stop any medicines before surgery, ask when to start taking them again.  We do this for your safety. Many medicines can make you bleed too much during surgery. Some change how well surgery (anesthesia) drugs work.  Call your insurance company to let them know you're having surgery. (If you don't have insurance, call [phone].)  Call your clinic if there's any change in your health. This includes signs of a cold or flu (sore throat, runny nose, cough, rash, fever). It also includes a scrape or scratch near the surgery site.  If you have questions on the day of surgery, call your hospital or surgery center.  Eating and drinking guidelines  For your safety: Unless your surgeon tells you otherwise,  follow the guidelines below.  Eat and drink as usual until 8 hours before you arrive for surgery. After that, no food or milk.  Drink clear liquids until 2 hours before you arrive. These are liquids you can see through, like water, Gatorade, and Propel Water. They also include plain black coffee and tea (no cream or milk), candy, and breath mints. You can spit out gum when you arrive.  If you drink alcohol: Stop drinking it the night before surgery.  If your care team tells you to take medicine on the morning of surgery, it's okay to take it with a sip of water.  Preventing infection  Shower or bathe the night before and morning of your surgery. Follow the instructions your clinic gave you. (If no instructions, use regular soap.)  Don't shave or clip hair near your surgery site. We'll remove the hair if needed.  Don't smoke or vape the morning of surgery. You may chew nicotine gum up to 2 hours before surgery. A nicotine patch is okay.  Note: Some surgeries require you to completely quit smoking and nicotine. Check with your surgeon.  Your care team will make every effort to keep you safe from infection. We will:  Clean our hands often with soap and water (or an alcohol-based hand rub).  Clean the skin at your surgery site with a special soap that kills germs.  Give you a special gown to keep you warm. (Cold raises the risk of infection.)  Wear special hair covers, masks, gowns and gloves during surgery.  Give antibiotic medicine, if prescribed. Not all surgeries need antibiotics.  What to bring on the day of surgery  Photo ID and insurance card  Copy of your health care directive, if you have one  Glasses and hearing aids (bring cases)  You can't wear contacts during surgery  Inhaler and eye drops, if you use them (tell us about these when you arrive)  CPAP machine or breathing device, if you use them  A few personal items, if spending the night  If you have . . .  A pacemaker, ICD (cardiac defibrillator) or other  implant: Bring the ID card.  An implanted stimulator: Bring the remote control.  A legal guardian: Bring a copy of the certified (court-stamped) guardianship papers.  Please remove any jewelry, including body piercings. Leave jewelry and other valuables at home.  If you're going home the day of surgery  You must have a responsible adult drive you home. They should stay with you overnight as well.  If you don't have someone to stay with you, and you aren't safe to go home alone, we may keep you overnight. Insurance often won't pay for this.  After surgery  If it's hard to control your pain or you need more pain medicine, please call your surgeon's office.  Questions?   If you have any questions for your care team, list them here: _________________________________________________________________________________________________________________________________________________________________________ ____________________________________ ____________________________________ ____________________________________  For informational purposes only. Not to replace the advice of your health care provider. Copyright   2003, 2019 [facility]. All rights reserved. Clinically reviewed by Laquita Eden MD. [employer] [id number] - REV 12/22.
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
## 2024-02-19 DIAGNOSIS — N39.0 RECURRENT UTI: Primary | ICD-10-CM

## 2024-02-23 ENCOUNTER — APPOINTMENT (OUTPATIENT)
Dept: LAB | Facility: CLINIC | Age: 49
End: 2024-02-23
Payer: COMMERCIAL

## 2024-02-23 LAB
ALBUMIN UR-MCNC: NEGATIVE MG/DL
APPEARANCE UR: CLEAR
BACTERIA #/AREA URNS HPF: ABNORMAL /HPF
BILIRUB UR QL STRIP: NEGATIVE
COLOR UR AUTO: YELLOW
GLUCOSE UR STRIP-MCNC: NEGATIVE MG/DL
HGB UR QL STRIP: NEGATIVE
KETONES UR STRIP-MCNC: NEGATIVE MG/DL
LEUKOCYTE ESTERASE UR QL STRIP: NEGATIVE
MUCOUS THREADS #/AREA URNS LPF: PRESENT /LPF
NITRATE UR QL: NEGATIVE
PH UR STRIP: 5.5 [PH] (ref 5–7)
RBC #/AREA URNS AUTO: ABNORMAL /HPF
SP GR UR STRIP: >=1.03 (ref 1–1.03)
SQUAMOUS #/AREA URNS AUTO: ABNORMAL /LPF
UROBILINOGEN UR STRIP-ACNC: 0.2 E.U./DL
WBC #/AREA URNS AUTO: ABNORMAL /HPF

## 2024-02-23 PROCEDURE — 87086 URINE CULTURE/COLONY COUNT: CPT | Performed by: STUDENT IN AN ORGANIZED HEALTH CARE EDUCATION/TRAINING PROGRAM

## 2024-02-23 PROCEDURE — 99000 SPECIMEN HANDLING OFFICE-LAB: CPT | Performed by: PATHOLOGY

## 2024-02-23 PROCEDURE — 81001 URINALYSIS AUTO W/SCOPE: CPT

## 2024-02-26 DIAGNOSIS — N39.0 RECURRENT UTI: Primary | ICD-10-CM

## 2024-02-26 LAB
BACTERIA UR CULT: ABNORMAL

## 2024-02-26 RX ORDER — NITROFURANTOIN 25; 75 MG/1; MG/1
100 CAPSULE ORAL 2 TIMES DAILY
Qty: 6 CAPSULE | Refills: 0 | Status: SHIPPED | OUTPATIENT
Start: 2024-02-26 | End: 2024-03-01

## 2024-02-29 ENCOUNTER — ANESTHESIA EVENT (OUTPATIENT)
Dept: SURGERY | Facility: AMBULATORY SURGERY CENTER | Age: 49
End: 2024-02-29
Payer: COMMERCIAL

## 2024-03-01 ENCOUNTER — HOSPITAL ENCOUNTER (OUTPATIENT)
Facility: AMBULATORY SURGERY CENTER | Age: 49
Discharge: HOME | End: 2024-03-01
Attending: UROLOGY
Payer: COMMERCIAL

## 2024-03-01 ENCOUNTER — ANESTHESIA (OUTPATIENT)
Dept: SURGERY | Facility: AMBULATORY SURGERY CENTER | Age: 49
End: 2024-03-01
Payer: COMMERCIAL

## 2024-03-01 VITALS
RESPIRATION RATE: 16 BRPM | HEIGHT: 72 IN | OXYGEN SATURATION: 93 % | TEMPERATURE: 97.7 F | DIASTOLIC BLOOD PRESSURE: 76 MMHG | WEIGHT: 130 LBS | SYSTOLIC BLOOD PRESSURE: 109 MMHG | HEART RATE: 103 BPM | BODY MASS INDEX: 17.61 KG/M2

## 2024-03-01 DIAGNOSIS — N31.9 NEUROGENIC BLADDER: Primary | ICD-10-CM

## 2024-03-01 PROCEDURE — 52287 CYSTOSCOPY CHEMODENERVATION: CPT | Mod: GC | Performed by: UROLOGY

## 2024-03-01 PROCEDURE — 52287 CYSTOSCOPY CHEMODENERVATION: CPT | Performed by: NURSE ANESTHETIST, CERTIFIED REGISTERED

## 2024-03-01 PROCEDURE — 52287 CYSTOSCOPY CHEMODENERVATION: CPT

## 2024-03-01 PROCEDURE — 52287 CYSTOSCOPY CHEMODENERVATION: CPT | Performed by: ANESTHESIOLOGY

## 2024-03-01 RX ORDER — ONDANSETRON 4 MG/1
4 TABLET, ORALLY DISINTEGRATING ORAL EVERY 30 MIN PRN
Status: DISCONTINUED | OUTPATIENT
Start: 2024-03-01 | End: 2024-03-02 | Stop reason: HOSPADM

## 2024-03-01 RX ORDER — ONDANSETRON 2 MG/ML
4 INJECTION INTRAMUSCULAR; INTRAVENOUS EVERY 30 MIN PRN
Status: DISCONTINUED | OUTPATIENT
Start: 2024-03-01 | End: 2024-03-02 | Stop reason: HOSPADM

## 2024-03-01 RX ORDER — SODIUM CHLORIDE, SODIUM LACTATE, POTASSIUM CHLORIDE, CALCIUM CHLORIDE 600; 310; 30; 20 MG/100ML; MG/100ML; MG/100ML; MG/100ML
INJECTION, SOLUTION INTRAVENOUS CONTINUOUS
Status: DISCONTINUED | OUTPATIENT
Start: 2024-03-01 | End: 2024-03-01 | Stop reason: HOSPADM

## 2024-03-01 RX ORDER — ACETAMINOPHEN 325 MG/1
975 TABLET ORAL ONCE
Status: COMPLETED | OUTPATIENT
Start: 2024-03-01 | End: 2024-03-01

## 2024-03-01 RX ORDER — NALOXONE HYDROCHLORIDE 0.4 MG/ML
0.1 INJECTION, SOLUTION INTRAMUSCULAR; INTRAVENOUS; SUBCUTANEOUS
Status: DISCONTINUED | OUTPATIENT
Start: 2024-03-01 | End: 2024-03-02 | Stop reason: HOSPADM

## 2024-03-01 RX ORDER — FENTANYL CITRATE 50 UG/ML
INJECTION, SOLUTION INTRAMUSCULAR; INTRAVENOUS PRN
Status: DISCONTINUED | OUTPATIENT
Start: 2024-03-01 | End: 2024-03-01

## 2024-03-01 RX ORDER — PROPOFOL 10 MG/ML
INJECTION, EMULSION INTRAVENOUS CONTINUOUS PRN
Status: DISCONTINUED | OUTPATIENT
Start: 2024-03-01 | End: 2024-03-01

## 2024-03-01 RX ORDER — CEFAZOLIN SODIUM 2 G/50ML
2 SOLUTION INTRAVENOUS
Status: COMPLETED | OUTPATIENT
Start: 2024-03-01 | End: 2024-03-01

## 2024-03-01 RX ORDER — OXYCODONE HYDROCHLORIDE 5 MG/1
5 TABLET ORAL
Status: DISCONTINUED | OUTPATIENT
Start: 2024-03-01 | End: 2024-03-02 | Stop reason: HOSPADM

## 2024-03-01 RX ORDER — ACYCLOVIR 200 MG/1
CAPSULE ORAL PRN
Status: DISCONTINUED | OUTPATIENT
Start: 2024-03-01 | End: 2024-03-01 | Stop reason: HOSPADM

## 2024-03-01 RX ORDER — LIDOCAINE HYDROCHLORIDE 20 MG/ML
INJECTION, SOLUTION INFILTRATION; PERINEURAL PRN
Status: DISCONTINUED | OUTPATIENT
Start: 2024-03-01 | End: 2024-03-01

## 2024-03-01 RX ORDER — CEFAZOLIN SODIUM 2 G/50ML
2 SOLUTION INTRAVENOUS SEE ADMIN INSTRUCTIONS
Status: DISCONTINUED | OUTPATIENT
Start: 2024-03-01 | End: 2024-03-01 | Stop reason: HOSPADM

## 2024-03-01 RX ORDER — LIDOCAINE 40 MG/G
CREAM TOPICAL
Status: DISCONTINUED | OUTPATIENT
Start: 2024-03-01 | End: 2024-03-01 | Stop reason: HOSPADM

## 2024-03-01 RX ORDER — OXYCODONE HYDROCHLORIDE 5 MG/1
10 TABLET ORAL
Status: DISCONTINUED | OUTPATIENT
Start: 2024-03-01 | End: 2024-03-02 | Stop reason: HOSPADM

## 2024-03-01 RX ADMIN — CEFAZOLIN SODIUM 2 G: 2 SOLUTION INTRAVENOUS at 11:55

## 2024-03-01 RX ADMIN — LIDOCAINE HYDROCHLORIDE 40 MG: 20 INJECTION, SOLUTION INFILTRATION; PERINEURAL at 11:55

## 2024-03-01 RX ADMIN — SODIUM CHLORIDE, SODIUM LACTATE, POTASSIUM CHLORIDE, CALCIUM CHLORIDE: 600; 310; 30; 20 INJECTION, SOLUTION INTRAVENOUS at 11:51

## 2024-03-01 RX ADMIN — ACETAMINOPHEN 975 MG: 325 TABLET ORAL at 10:55

## 2024-03-01 RX ADMIN — FENTANYL CITRATE 50 MCG: 50 INJECTION, SOLUTION INTRAMUSCULAR; INTRAVENOUS at 11:55

## 2024-03-01 RX ADMIN — PROPOFOL 150 MCG/KG/MIN: 10 INJECTION, EMULSION INTRAVENOUS at 11:55

## 2024-03-01 ASSESSMENT — COPD QUESTIONNAIRES: COPD: 0

## 2024-03-01 ASSESSMENT — LIFESTYLE VARIABLES: TOBACCO_USE: 0

## 2024-03-01 ASSESSMENT — ENCOUNTER SYMPTOMS: ORTHOPNEA: 0

## 2024-03-01 NOTE — DISCHARGE INSTRUCTIONS
"Bladder botox    Activity  - No strenuous exercise for 3-5 days.  - No lifting, pushing, pulling more than 15 pounds for 3-5 days.   - Do not strain with bowel movements.  - You may notice more blood in the urine with increasing physical activity. This is normal and is not a cause for concern unless the urine becomes dark maroon in color, contains clots larger than a quarter, or if you cannot urinate.   - Do not drive until you can press the brake pedal quickly and fully without pain.   - Do not operate a motor vehicle while taking narcotic pain medications.   - Some blood in the urine is expected. Increase your fluid intake to help flush the blood out of your bladder    Medications  - No narcotic pain medications are required and over the counter tylenol should suffice.  Wean yourself off all pain medications as you are able.  - Take over the counter fiber (metamucil or benefiber) and stool softeners (miralax, docusate or senna) to prevent postoperative constipation, but stop if you develop diarrhea.    Follow-Up:  - Follow up in 6 months for your next injection, you can call to schedule this sooner if your symptoms return sooner  - Call or return sooner than your regularly scheduled visit if you develop any of the following: fever (greater than 101.5), uncontrolled pain, uncontrolled nausea or vomiting, as well as worsening blood in the urine    Phone numbers:   - Monday through Friday 8am to 4:30pm: Call [phone] with questions, requests for medication refills, or to schedule or confirm an appointment.  - Nights or weekends: call the after hours emergency pager - [phone] and tell the  \"I would like to page the Urology Resident on call.\" Please note, due to prescribing laws, resident physicians are unable to prescribe narcotics after-hours. If you feel as though you will need a refill of a narcotic pain medication, you will need to call the clinic during business hours OR seek emergency care.  - " For emergencies, call 911        [facility]  Home Care Following Anesthesia  For 24 hours after surgery:  Get plenty of rest.  A responsible adult must stay with you for at least 24 hours after you leave the surgery center.  Do not drive or use heavy equipment.  If you have weakness or tingling, don't drive or use heavy equipment until this feeling goes away.   Do not drink alcohol.   Avoid strenuous or risky activities.  Ask for help when climbing stairs.  You may feel lightheaded.  IF so, sit for a few minutes before standing.  Have someone help you get up.   If you have nausea (feel sick to your stomach): Drink only clear liquids such as apple juice, ginger ale, broth or 7-Up.  Rest may also help.  Be sure to drink enough fluids.  Move to a regular diet as you feel able.   You may have a slight fever.  Call the doctor if your fever is over 100 F (37.7 C) (taken under the tongue) or lasts longer than 24 hours.  You may have a dry mouth, a sore throat, muscle aches or trouble sleeping. These should go away after 24 hours.  Do not make important or legal decisions.   It is recommended to avoid smoking.               Tips for taking pain medications  To get the best pain relief possible, remember these points:  Take pain medications as directed, before pain becomes severe.  Pain medication can upset your stomach: taking it with food may help.  Constipation is a common side effect of pain medication. Drink plenty of  fluids.  Eat foods high in fiber. Take a stool softener if recommended by your doctor or pharmacist.  Do not drink alcohol, drive or operate machinery while taking pain medications.  Ask about other ways to control pain, such as with heat, ice or relaxation.    Tylenol/Acetaminophen Consumption    If you feel your pain relief is insufficient, you may take Tylenol/Acetaminophen in addition to your narcotic pain medication.   Be careful not to exceed 4,000 mg of  Tylenol/Acetaminophen in a 24 hour period from all sources.  If you are taking extra strength Tylenol/acetaminophen (500 mg), the maximum dose is 8 tablets in 24 hours.  If you are taking regular strength acetaminophen (325 mg), the maximum dose is 12 tablets in 24 hours.    Tylenol 975 mg given at 10:55 AM. OK to take more after 4:55 PM.       Call a doctor for any of the following:  Signs of infection (fever, growing tenderness at the surgery site, a large amount of drainage or bleeding, severe pain, foul-smelling drainage, redness, swelling).  It has been over 8 to 10 hours since surgery and you are still not able to urinate (pass water).  Headache for over 24 hours.  Signs of Covid-19 infection (temperature over 100 degrees, shortness of breath, cough, loss of taste/smell, generalized body aches, persistent headache, chills, sore throat, nausea/vomiting/diarrhea)  Your doctor is:  Dr. Evaristo Giraldo, Prostate and Urology: [phone]  Or dial [phone] and ask for the resident on call for:  Prostate Urology  For emergency care, call the:  [facility] Emergency Department:  [phone] (TTY for hearing impaired: [phone])

## 2024-03-01 NOTE — ANESTHESIA CARE TRANSFER NOTE
Patient: Riley Gifford    Procedure: Procedure(s):  CYSTOSCOPY, WITH BOTULINUM TOXIN INJECTION       Diagnosis: Neurogenic bladder [N31.9]  Diagnosis Additional Information: No value filed.    Anesthesia Type:   MAC     Note:    Oropharynx: oropharynx clear of all foreign objects and spontaneously breathing  Level of Consciousness: awake  Oxygen Supplementation: room air    Independent Airway: airway patency satisfactory and stable  Dentition: dentition unchanged  Vital Signs Stable: post-procedure vital signs reviewed and stable  Report to RN Given: handoff report given  Patient transferred to: Phase II    Handoff Report: Identifed the Patient, Identified the Reponsible Provider, Reviewed the pertinent medical history, Discussed the surgical course, Reviewed Intra-OP anesthesia mangement and issues during anesthesia, Set expectations for post-procedure period and Allowed opportunity for questions and acknowledgement of understanding      Vitals:  Vitals Value Taken Time   /76 03/01/24 1216   Temp 36.7  C (98.1  F) 03/01/24 1216   Pulse 106 03/01/24 1216   Resp 16 03/01/24 1216   SpO2 92 % 03/01/24 1216       Electronically Signed By: SUJATA Lopez CRNA  March 1, 2024  12:20 PM

## 2024-03-01 NOTE — ANESTHESIA POSTPROCEDURE EVALUATION
Patient: Riley Gifford    Procedure: Procedure(s):  CYSTOSCOPY, WITH BOTULINUM TOXIN INJECTION       Anesthesia Type:  MAC    Note:  Disposition: Outpatient   Postop Pain Control: Uneventful            Sign Out: Well controlled pain   PONV: No   Neuro/Psych: Uneventful            Sign Out: Acceptable/Baseline neuro status   Airway/Respiratory: Uneventful            Sign Out: Acceptable/Baseline resp. status   CV/Hemodynamics: Uneventful            Sign Out: Acceptable CV status; No obvious hypovolemia; No obvious fluid overload   Other NRE: NONE   DID A NON-ROUTINE EVENT OCCUR?            Last vitals:  Vitals Value Taken Time   /76 03/01/24 1223   Temp 36.5  C (97.7  F) 03/01/24 1223   Pulse 103 03/01/24 1223   Resp 16 03/01/24 1223   SpO2 93 % 03/01/24 1223       Electronically Signed By: Joseluis Luther MD  March 1, 2024  12:33 PM

## 2024-03-01 NOTE — OP NOTE
OPERATIVE REPORT - 03/01/2024     PREOPERATIVE DIAGNOSIS:  Neurogenic bladder    POSTOPERATIVE DIAGNOSIS: Same    PROCEDURES PERFORMED:   1. Cystourethroscopy with injection of botulinum toxin A 300units in 15cc sterile saline    STAFF SURGEON: Dr. Evaristo Hayes MD   FELLOW: Glenny Rosado MD  RESIDENT: Sunny Fox MD    ANESTHESIA: Monitor Anesthesia Care    ESTIMATED BLOOD LOSS: Minimal     DRAINS: None     SIGNIFICANT FINDINGS:  1. 14-16 Fr annular short bulbar stricture, soft and easily passed with scope    OPERATIVE INDICATIONS:   Riley Gifford is a 48 year old male with a history of neurogenic bladder managed with botox injections. The patient was counseled on the alternatives, risks, and benefits and elected to proceed with the above stated procedure.    DESCRIPTION OF PROCEDURE:   After verification of informed consent was obtained, the patient was brought to the operating room, and moved to the operating table. After adequate anesthesia was induced, the patient was repositioned in the lithotomy position and prepped and draped in the usual sterile fashion. A formal timeout was performed to confirm the correct patient, procedure and operative site.     A 16Fr flexible cystoscope was placed into the urethra. The anterior urethra was normal up to the proximal bulbar urethra where a 14-16 Fr annular short bulbar urethral stricture was visualized. This was easily traversed with the scope without resistance. The bladder was large, moderately trabeculated, bilateral ureteral orifices were orthotopic. There were no lesions or bladder stones. 300cc of botox in 15mL of injectable saline was instilled using a template of 5 columns of 4 injections. All injections were placed deep to the mucosa into the detrusor muscle.  We then emptied his bladder to re-inspect our injection sites and found that there was a minimal amount of blood. The scope was removed. The patient was returned to supine position and transported to  recovery in stable condition.     The procedure was then concluded. The patient was transferred to the postanesthesia care unit in stable condition and tolerated the procedure well.     POSTOP PLAN:  1. PACU, then discharge to home, resume CIC  2. F/u in 6m w/ botox, would recommend be performed with flexible cystoscope    Sunny Fox MD  Urology PGY-4
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
## 2024-03-06 ENCOUNTER — TELEPHONE (OUTPATIENT)
Dept: UROLOGY | Facility: CLINIC | Age: 49
End: 2024-03-06
Payer: COMMERCIAL

## 2024-03-06 DIAGNOSIS — N39.0 RECURRENT UTI: Primary | ICD-10-CM

## 2024-03-06 NOTE — TELEPHONE ENCOUNTER
[facility] Call Center    Phone Message    May a detailed message be left on voicemail: yes     Reason for Call: Other: pt calling is having UTI symptoms and wants a UA lab order, please advise     Action Taken: Other: urology    Travel Screening: Not Applicable

## 2024-03-06 NOTE — TELEPHONE ENCOUNTER
Spoke with patient. He reports fatigue, bladder pain, odor of urine, slight urgency and frequency. He denies fever, back pain and blood in urine. Will put in orders for UA/UC. Patient will call to schedule lab appointment. Encouraged him to continue to keep up his fluids and call back with any other questions or concerns.    .sign

## 2024-03-07 ENCOUNTER — LAB (OUTPATIENT)
Dept: LAB | Facility: CLINIC | Age: 49
End: 2024-03-07
Payer: COMMERCIAL

## 2024-03-07 DIAGNOSIS — N39.0 RECURRENT UTI: ICD-10-CM

## 2024-03-07 LAB
ALBUMIN UR-MCNC: NEGATIVE MG/DL
APPEARANCE UR: CLEAR
BACTERIA #/AREA URNS HPF: ABNORMAL /HPF
BILIRUB UR QL STRIP: NEGATIVE
COLOR UR AUTO: YELLOW
GLUCOSE UR STRIP-MCNC: NEGATIVE MG/DL
HGB UR QL STRIP: NEGATIVE
KETONES UR STRIP-MCNC: NEGATIVE MG/DL
LEUKOCYTE ESTERASE UR QL STRIP: ABNORMAL
NITRATE UR QL: NEGATIVE
PH UR STRIP: 7 [PH] (ref 5–7)
RBC #/AREA URNS AUTO: ABNORMAL /HPF
SP GR UR STRIP: 1.02 (ref 1–1.03)
UROBILINOGEN UR STRIP-ACNC: 0.2 E.U./DL
WBC #/AREA URNS AUTO: ABNORMAL /HPF

## 2024-03-07 PROCEDURE — 87088 URINE BACTERIA CULTURE: CPT

## 2024-03-07 PROCEDURE — 87086 URINE CULTURE/COLONY COUNT: CPT

## 2024-03-07 PROCEDURE — 87186 SC STD MICRODIL/AGAR DIL: CPT

## 2024-03-07 PROCEDURE — 81001 URINALYSIS AUTO W/SCOPE: CPT

## 2024-03-08 LAB — BACTERIA UR CULT: ABNORMAL

## 2024-03-11 ENCOUNTER — TELEPHONE (OUTPATIENT)
Dept: FAMILY MEDICINE | Facility: CLINIC | Age: 49
End: 2024-03-11
Payer: COMMERCIAL

## 2024-03-11 NOTE — TELEPHONE ENCOUNTER
General Call  Contacts         Type Contact Phone/Fax    03/11/2024 01:24 PM CDT Phone (Incoming) Amanda Root, RN Case Manager [phone]          Reason for Call:  [facility] Case management [facility] faxed over form/ DME for the provider this morning.    She was calling to check if the clinic received the fax    Return signed orders to fax: [phone]    What are your questions or concerns:  She would like this addressed ASAP    Patient is in need of Catheters    Orders will be faxed     Please call her with questions.     Brooklyn Blandon,

## 2024-03-12 ENCOUNTER — PATIENT OUTREACH (OUTPATIENT)
Dept: UROLOGY | Facility: CLINIC | Age: 49
End: 2024-03-12
Payer: COMMERCIAL

## 2024-03-12 NOTE — PROGRESS NOTES
Patient calling asking about urinalysis results from specimen collection last week. Urinalysis reviewed vy provided on 3/11, no treatment recommendation at that time, writer will clarify with provider and update patient he has asked for a mCASH message with update    Thank you,  Yue Dykes,ONDINAN RN-Triage-Urology

## 2024-03-13 ENCOUNTER — TELEPHONE (OUTPATIENT)
Dept: CARE COORDINATION | Facility: CLINIC | Age: 49
End: 2024-03-13
Payer: COMMERCIAL

## 2024-03-13 NOTE — TELEPHONE ENCOUNTER
Amanda from [facility] coordination needs a letter of medical necessity to take to [employer] in order to get Riley the extra catheters that he needs.  You can use last years which is under my name on 2/16/23.  Please include these additional items:    Need 250 straight catheter    5 UTI's from 2023-now.    This needs to be faxed to [phone] attn: Amanda Root     Let me know if there are any questions.        Robbin Vanegas MSN, RN, PHN, CCM   Primary Care Clinical RN Care Coordinator  [facility]  3/13/2024   12:34 PM  [email or web address]  Office: [phone]

## 2024-03-13 NOTE — LETTER
March 14, 2024      Riley Gifford  1975  [address] [place] [address] [place]   [place] [address]        To Whom It May Concern,         Riley Gifford is a patient under my care. He has a diagnosis of quadriplegia with a neurogenic bladder. He requires catheter supplies for his urinary self catheterization program. He uses a straight catheter to expel urine 8-9 times per day. He needs 250 straight catheters per month.      Since 2023 he had 5 UTI's treated with antibiotics. Dr. Giraldo injects Botox to decrease bladder spasms for patient. He irrigates his bladder daily with Gentamycin to help prevent infections.           I, the undersigned, certify that the above prescribed supplies for self catheterization are medically necessary for this patient and is both reasonable and necessary in reference to accepted standards of medical and necessary in reference to accepted standards of medical practice in the treatment of this patient's condition and is not prescribed as a convenience.     Sincerely,    Jenny Fay PA-C    Sincerely,        Robbin Andino RN

## 2024-03-20 ENCOUNTER — PATIENT OUTREACH (OUTPATIENT)
Dept: CARE COORDINATION | Facility: CLINIC | Age: 49
End: 2024-03-20
Payer: COMMERCIAL

## 2024-03-20 DIAGNOSIS — F51.01 PRIMARY INSOMNIA: ICD-10-CM

## 2024-03-20 DIAGNOSIS — K20.0 EOSINOPHILIC ESOPHAGITIS: ICD-10-CM

## 2024-03-20 DIAGNOSIS — N31.9 NEUROGENIC BLADDER: ICD-10-CM

## 2024-03-20 NOTE — PROGRESS NOTES
Clinic Care Coordination Contact  Follow Up Progress Note      Assessment: The RN CC nurse care coordinator contacted the patient by phone for a follow-up call.  The patient stated that he was feeling better this week with having less pain and less urinary tract symptoms.  The patient stated that last week his father passed away.  The RN CC expressed her condolences to the patient and his family.  The patient stated that sometimes 1 day is okay and the next is fine and he is sometimes frustrated with that.  The RN CC reassured the patient that he is doing everything he can to keep the pain under control, and he is doing a great job.    Care Gaps:    Health Maintenance Due   Topic Date Due    HEPATITIS B IMMUNIZATION (1 of 3 - 19+ 3-dose series) Never done    MEDICARE ANNUAL WELLNESS VISIT  05/31/2023    COLORECTAL CANCER SCREENING  01/30/2024       Care Gaps Last addressed on 3/20/24    Care Plans  Care Plan: General  take medications as prescribed       Problem: HP GENERAL PROBLEM       Goal: General Goal -  I will take all medications as prescribed by the providers.       Start Date: 7/22/2022 Expected End Date: 7/22/2024    This Visit's Progress: 80% Recent Progress: 80%    Note:     Barriers: wheelchair bound  Strengths: engaged in care coordination  Patient expressed understanding of goal: yes  Action steps to achieve this goal:  1. I will take all medications as prescribed.  2. I will ask any questions that I have regarding new medications.  3. I will work with the RN CC if I have any difficulty getting refills.   4. I will work with urology for the PA on the gentamicin instillations for the bladder.                              Care Plan: General -  Make and attend follow up appointments       Problem: HP GENERAL PROBLEM       Goal: General Goal - I will make and attend all recommended appointments from my providers.       Start Date: 7/22/2022 Expected End Date: 7/22/2024    This Visit's Progress: 80% Recent  Progress: 80%    Note:     Barriers: Many providers.  Strengths: engaged in care coordination  Patient expressed understanding of goal: yes  Action steps to achieve this goal:  1. I will make all of the recommended follow up appointments.  2. I will attend all of the follow up appointments as recommended by the providers.                            Care Plan: Chronic Pain       Problem: Chronic Pain is Not Self-Managed       Goal: Demonstrate improved self-management of chronic pain       Start Date: 10/25/2023    This Visit's Progress: 40% Recent Progress: 40%                            Intervention/Education provided during outreach: The RN CC explained to the patient that there are so many variables that enter into that equation of more or less pain.     Outreach Frequency: monthly, more frequently as needed      Plan:   1.  The patient will take all medications as prescribed by the providers.  2.  The patient will report any increase in symptoms or increase in pain to the providers.  3.  The patient will make and attend all recommended follow-up appointments.    RN CC Nurse Care Coordinator will follow up in 30 days.          Robbin Vanegas MSN, RN, PHN, CCM   Primary Care Clinical RN Care Coordinator  [facility]  3/20/2024   2:26 PM  [email or web address]  Office: [phone]

## 2024-03-21 RX ORDER — BUDESONIDE 1 MG/2ML
INHALANT ORAL
Qty: 180 ML | Refills: 1 | Status: SHIPPED | OUTPATIENT
Start: 2024-03-21 | End: 2024-06-24

## 2024-03-21 RX ORDER — ZOLPIDEM TARTRATE 10 MG/1
TABLET ORAL
Qty: 90 TABLET | Refills: 0 | Status: SHIPPED | OUTPATIENT
Start: 2024-03-21 | End: 2024-06-24

## 2024-03-30 DIAGNOSIS — R05.9 COUGH: ICD-10-CM

## 2024-04-01 DIAGNOSIS — K59.09 CHRONIC CONSTIPATION: ICD-10-CM

## 2024-04-01 RX ORDER — ALBUTEROL SULFATE 90 UG/1
AEROSOL, METERED RESPIRATORY (INHALATION)
Qty: 8.5 G | Refills: 1 | Status: SHIPPED | OUTPATIENT
Start: 2024-04-01 | End: 2024-05-14

## 2024-04-02 RX ORDER — POLYETHYLENE GLYCOL 3350 17 G/17G
POWDER, FOR SOLUTION ORAL
Qty: 510 G | Refills: 1 | Status: SHIPPED | OUTPATIENT
Start: 2024-04-02 | End: 2024-06-13

## 2024-04-12 DIAGNOSIS — K59.09 CHRONIC CONSTIPATION: ICD-10-CM

## 2024-04-12 DIAGNOSIS — M54.6 CHRONIC MIDLINE THORACIC BACK PAIN: ICD-10-CM

## 2024-04-12 DIAGNOSIS — G89.29 CHRONIC MIDLINE THORACIC BACK PAIN: ICD-10-CM

## 2024-04-15 NOTE — TELEPHONE ENCOUNTER
Received fax request from [facility] pharmacy requesting refill(s) for amitriptyline (ELAVIL) 25 MG tablet     Last refilled on 01/17/24    Pt last seen on 10/04/23  Next appt scheduled for : none    Will facilitate refill.

## 2024-04-15 NOTE — TELEPHONE ENCOUNTER
Chart reviewed - he is overdue for follow up. Please confirm if he is still taking medication/current dose. He will need to follow up with me within 1 month. Once scheduled and confirm usage, I will send in refill to bridge to next appointment.     Nalini Resendez DNP, APRN, AGNP-C  [facility]

## 2024-04-15 NOTE — TELEPHONE ENCOUNTER
Name from pharmacy: AMITRIPTYLINE 25MG TABLETS          Will file in chart as: amitriptyline (ELAVIL) 25 MG tablet    Sig: TAKE 2 TABLETS(50 MG) BY MOUTH AT BEDTIME    Disp: 180 tablet    Refills: 1 (Pharmacy requested: Not specified)    Start: 4/12/2024    Class: E-Prescribe    Non-formulary For: Chronic midline thoracic back pain    Last ordered: 6 months ago (10/4/2023) by SUJATA Armendariz CNP    Last refill: 1/16/2024    Rx #: [id number]    Tricyclic Agents ( Annual appt and no PHQ9) [id number]/12/2024 09:43 PM   Protocol Details Blood Pressure under 140/90 in past 12 mos    Recent (12 mo) or future (30 days) visit within authorizing provider's specialty    Medication is active on med list    Patient is age 18 or older      To be filled at: [id number] - [place], [place] - [address] [place] [address] [facility] [address]

## 2024-04-16 RX ORDER — DOCUSATE SODIUM 283 MG/5ML
LIQUID RECTAL
Qty: 150 ML | Refills: 0 | Status: SHIPPED | OUTPATIENT
Start: 2024-04-16 | End: 2024-06-13

## 2024-04-16 NOTE — TELEPHONE ENCOUNTER
Chart reviewed - Request appears appropriate. Refilled for 90 day supply.     Nalini Resendez DNP, APRN, AGNP-C  [facility]

## 2024-04-16 NOTE — TELEPHONE ENCOUNTER
Patient states he is taking two tablets at bedtime currently.  Note that last refills of medication were 90 day supplies.     Patient scheduled for 4/23/2024 at 1100.    Salome Eddy RN  [facility]  [phone]

## 2024-04-18 DIAGNOSIS — N39.0 RECURRENT UTI: ICD-10-CM

## 2024-04-22 DIAGNOSIS — N31.9 NEUROGENIC BLADDER: Primary | ICD-10-CM

## 2024-04-22 DIAGNOSIS — N31.9 NEUROGENIC BLADDER: ICD-10-CM

## 2024-04-22 NOTE — PROGRESS NOTES
[facility]     Date of visit: 4/23/2024      Assessment:   Riley Gifford is a 48 year old male with a past medical history significant for cervical spinal cord injury, quadriplegia, thoracic/lumbar pain, neurogenic bladder, recurrent UTI, spasticity, chronic constipation, s/p cervical fusion who presents with complaints of mid back (lower thoracic) and abdominal pain.      Thoracic/abdominal pain - Onset occurred following spinal cord injury in 1995, progressive worsening of symptoms over time. Etiology is likely mixed, with h/o cervical spinal cord injury (quadraplegic), neuropathic pain secondary to spinal cord injury, underlying degenerative changes of spine, and overlying myofascial component to some extent.      Assigned to [place] nursing team.     Visit Diagnoses:  1. Myofascial pain    2. Neck pain    3. Chronic bilateral thoracic back pain    4. Cervicogenic headache    5. Muscle spasm    6. Chronic midline thoracic back pain    7. Pain in thoracic spine    8. Quadriplegia (H)    9. Neuropathic pain        Plan:  Diagnosis reviewed, treatment option addressed, and risk/benifits discussed.  Self-care instructions given.  I am recommending a multidisciplinary treatment plan to help this patient better manage their pain.      Pain Physical Therapy:  NO   We will defer at this time, may consider referral in the future.      Pain Psychologist to address relaxation, behavioral change, coping style, and other factors important to improvement.  NO     Diagnostic Studies:    Last EKG found in chart from 2020 - QT/QTc within normal limits. No significant cardiac history. Will plan to discuss repeat EKG at follow up for TCA monitoring.      Medication Management:   Amitriptyline - Continue 50 mg at bedtime. Appreciates some degree of benefit, no side effects. Discussed potential dose increase in future to help better target baseline pain, if needed. Refilled today for 90 day supply.   Continue  Lyrica 50 mg in morning and 100 mg at bedtime. He appreciates some degree of benefit, no side effects. Discussed potential dose increase in future to help better target baseline pain, if needed.   [place] - [place] last renewed on 11/21/23. Appreciates some degree of benefit. Recommend continuing to work with pharmacist/dispensary to explore available products.   Tizanidine - currently takes 2 mg during daytime as needed and 4 mg at bedtime. Appreciates benefit with neck/back pain, sedation effects with daytime that are manageable. Recommend trial increase, 2-4 mg during daytime and 4-8 mg at bedtime as needed. Max three times daily as needed. Updated prescription sent into pharmacy today.     Potential procedures:   He had baclofen IT pump implant on 1/3/23 ([employer] ).      Other Orders/Referrals:   None      Follow up with SUJATA Armendariz CNP in around 8 weeks, or sooner if needed.       Review of Electronic Chart: Today I have also reviewed available medical information in the patient's medical record at [facility] ([facility]) and Care Everywhere (if available), including relevant provider notes, laboratory work, and imaging.     Nalini Resendez, KENDELL, SUJATA, AGNP-C  [facility]     -------------------------------------------------    Subjective:    Chief complaint:   Chief Complaint   Patient presents with    Pain       Interval history:  Riley Gifford is a 48 year old male last seen on 10/4/23.  They are a patient of mine seen in follow up.     Recommendations/plan at the last visit included:  Pain Physical Therapy:  NO   We will defer at this time, may consider referral in the future.      Pain Psychologist to address relaxation, behavioral change, coping style, and other factors important to improvement.  NO     Diagnostic Studies:  None ordered today.      Medication Management:   Amitriptyline - Continue 50 mg at bedtime.  He questioned benefit and notes daytime  sedation since starting this medication, trial taper to 25 mg at bedtime x 2 week and noted increased pain. He resumed 50 mg dosage. Continue at this dose, may consider increase in future, should baseline pain levels change.   Continue Lyrica 50 mg in morning and 100 mg at bedtime. He thinks increasing bedtime dose has helped to some extent. Will consider further dosage increase in future, should baseline pain levels change.   Medical cannabis - recommend continuing to work with the dispensary to explore new products that may be available.  Advised to let me know if he needs medical cannabis renewal in between visits. He reports using more gummies at night, has had some increased daytime sedation, which could be related to increased medical cannabis use. Advised to try taking medical cannabis and Lyrica/TCA two hours apart and monitor for improvement in daytime sedation.      Potential procedures:   He had baclofen IT pump implant on 1/3/23 ([facility] ).      Other Orders/Referrals:   None      Follow up with SUJATA Armendariz CNP in 3-6 months, or sooner if needed      Visit discussion: Discussed transitioning medication management to PCP in future, once on current/stable doses for at least 6 months. Will plan to revisit at next follow up.     Since his last visit, Riley Gifford reports:  -He reports neck/upper back pain is more prominent, increased stiffness and having more headaches.   -He notes increased pain started 1+ weeks ago. No changes in routine or other factors he identifies as potentially contributing.   -He has baclofen pump, small adjustment last a few months ago, notes his dose is high, has next fill in a couple of weeks.   -He takes tizanidine at bedtime to help with pain, also appreciates benefit from sedation effects for sleep.   -He notes using 2 mg dose during daytime and 4 mg at bedtime.   -He does have sedation effects with tizanidine, particularly with sedation effects. He typically lies down  during daytime to rest after use.   -He continues medical cannabis, primarily morning and afternoon.   -Lyrica 50 mg in morning and 100 mg at bedtime.   -Amitriptyline dose 50 mg at bedtime. He would like to hold off on dosage increase right now.     Pain Information:   Pain rating: averages 6/10 on a 0-10 scale.      Interval history from last visit on 10/4/23:  -His pain is about the same as it was at last visit.   -He tried reducing amitriptyline dosage to 25 mg at bedtime for a couple of weeks, but noticed increased pain and increased back up to 50 mg at bedtime.   -Overall, he was questioning benefit from TCA, also had daytime sedation, not sure how much of a difference in sedation effects he noticed with trial reduction.   -He had increased Lyrica to 50 mg in afternoon and 100 mg at bedtime.   -He thinks this increase made a difference a little bit.   -He continues to use medical cannabis, notes using more gummies at night.   -Overall he is having some daytime sedation, enhanced more so over the past few weeks.   Pain Information:              Pain rating: averages 5/10 on a 0-10 scale.        Interval history from last visit on 7/26/23:  -his pain is about the same as it was at last visit.   -He is taking Lyrica 50 mg BID, did not increase to 100 mg at bedtime.   -He continues to have significant daytime sedation.   -He is interested in trial tapering of amitriptyline at bedtime, not sure of benefit at this point.   -He may consider increasing Lyrica to 100 mg at bedtime.   -He has baclofen pump, dosage has been increased over the past few months.   -He has questions about medical cannabis, specifically should he be exploring other products, right now taking red pill.   -He has not explored other products in quite some time, will plan to follow up with dispensary.   Pain Information:              Pain rating: averages 4/10 on a 0-10 scale.        Interval history from last visit on 3/9/23:  -His pain is a  "little better than it was at last visit.   -He was started on Lyrica 50 mg BID at last visit, reports he is taking this mid-day and then at bedtime.   -He reports improvement in sleep after starting Lyrica.   -He had recent changes to baclofen pump this week, still working on achieving therapeutic dose.   -He has been working with PT through [facility], last visit was yesterday.   Pain Information:              Pain rating: averages 4/10 on a 0-10 scale.        Interval history from last visit on 1/26/23:  -His pain about the same as last visit.   -He had an issue with Lyrica and was not able to start since last visit.   -He had baclofen ITP implant 1/3/23.  -He reports surgery went well but it is a slow process with dosage titration.   -He has started PT.      HPI from initial visit with me on 12/14/222:  Riley Gifford is a 47 year old male presents with a chief complaint of mid back and abdominal pain.      The pain has been present for many years, progressive worsening with time .    The pain is Moderate Pain (5) in severity.    The pain is described as dull, aching.   The pain is alleviated by position changes, reclining position.    It is exacerbated by \"none\".    Modalities that have been utilized in the past which were helpful include PT (somewhat helpful).    Things that were not helpful, but tried ,include PT (limited benefit), TENS unit.    The patient has never tried pool PT.  The patient denies red flag symptoms, does have h/o neurogenic bladder and constipation.      Riley Gifford has been seen at a pain clinic in the past - [facility], [facility] for baclofen pump implant ([facility] ).     -Abdominal pain is more prominent right now.   -Muscle spasms are worsening, he has upcoming baclofen pump implant on 1/3/23 through [facility] ( chronic pain program)   -He has been waiting for a few years to get pump, delays with COVID.   -He got a new motorized wheelchair 2-3 months ago, has a recline " function that is helpful.   -His pain has somewhat improved since getting chair, though no initial improvement, has appreciated over past few weeks.   -Pain seems pretty constant, no time of day more painful   -He tried TENS unit in the past that was not helpful.   -He tried gabapentin in the past, did not find helpful, was very sedating.   -He is using medical cannabis, primarily takes at bedtime only to help with sleep.   -He was using medical cannabis product twice daily, but has to minimize usage to bedtime due to cost.   -He takes baclofen and ditropan with breakfast.   -He takes baclofen and omeprazole at dinner  -He takes baclofen, detrol LA, amitriptyline, and tizanidine at bedtime.      -He lives in an apartment, assisted living facility.   -He has 24 hour care coverage, calls when needed.   -His nephew and family lives locally, sister lives in [place], other family still in [place].            Current Pain Treatments:     Medications:               Baclofen IT pump              Amitriptyline 50 mg at bedtime (PCP, dx chronic midline thoracic back pain)              Zolpidem (PCP, sleep)              Medical cannabis (last renewed on 11/21/23), takes it at night, finds this most helpful for sleep              Lyrica 50 mg in AM and 100 mg in PM     Other therapies:               PMR - baclofen pump management        Current MME: 0      Review of [place] Prescription Monitoring Program (): No concern for abuse or misuse of controlled medications based on this report. Reviewed - appears appropriate.         Past pain treatments:  Baclofen PO      Medications:  Current Outpatient Medications   Medication Sig Dispense Refill    Acetaminophen (TYLENOL PO) Take 500 mg by mouth as needed for mild pain or fever Reported on 2/15/2017      albuterol (PROAIR HFA/PROVENTIL HFA/VENTOLIN HFA) 108 (90 Base) MCG/ACT inhaler INHALE 1 TO 2 PUFFS INTO THE LUNGS EVERY 4 HOURS AS NEEDED FOR SHORTNESS OF BREATH OR  DIFFICULT BREATHING OR WHEEZING 8.5 g 1    alum & mag hydroxide-simethicone (MYLANTA/MAALOX) 200-200-20 MG/5ML SUSP suspension Take 30 mLs by mouth  0    amitriptyline (ELAVIL) 25 MG tablet Take 2 tablets (50 mg) by mouth at bedtime 180 tablet 0    baclofen (LIORESAL) 10 MG tablet Take 1 tablet (10 mg) by mouth 2 times daily      budesonide (PULMICORT) 1 MG/2ML neb solution MIX 1 RESPULE IN SYRUP AND TAKE BY MOUTH TWICE DAILY 180 mL 1    cetirizine (ZYRTEC) 10 MG tablet Take 10 mg by mouth daily      COMPOUNDED NON-CONTROLLED SUBSTANCE (CMPD RX) - PHARMACY TO MIX COMPOUNDED MEDICATION 30 mLs by Bladder Instillation route at bedtime sodium chloride 0.9% (bottle) 1,000 mL with gentamicin 480mg.  Instill 30mL into the bladder at bedtime. 1 each 0    COMPOUNDED NON-CONTROLLED SUBSTANCE (CMPD RX) - PHARMACY TO MIX COMPOUNDED MEDICATION Instill 60 mL into bladder at bedtime. Refrigerate. Expires: For additional refills, please schedule a follow-up appointment                    . 1000 mL 3    ENEMEEZ MINI 283 MG/5ML enema USE ONE RECTALLY EVERY OTHER  mL 0    famotidine (PEPCID) 20 MG tablet Take 1 tablet (20 mg) by mouth 2 times daily as needed 60 tablet 0    hydrocortisone, Perianal, (ANUSOL-HC) 2.5 % cream APPLY RECTALLY TO THE AFFECTED AREA TWICE DAILY 30 g 9    lidocaine (XYLOCAINE) 5 % external ointment Apply topically as needed for moderate pain 2500 g 0    omeprazole (PRILOSEC) 40 MG DR capsule TAKE 1 CAPSULE(40 MG) BY MOUTH DAILY 90 capsule 3    ondansetron (ZOFRAN) 4 MG tablet Take 1 tablet (4 mg) by mouth every 8 hours as needed for nausea 10 tablet 0    phenylephrine-cocoa butter (PREPARATION H) 0.25-88.44 % suppository Insert one suppository rectally twice daily as needed. 48 suppository 3    polyethylene glycol (MIRALAX) powder Take 17 g (1 capful) by mouth daily as needed for constipation 510 g 1    pregabalin (LYRICA) 25 MG capsule Increase Lyrica to 50 mg at noon and 75 mg at bedtime x 2 weeks,  then increase to 50 mg at noon and 100 mg at bedtime. 180 capsule 0    pregabalin (LYRICA) 50 MG capsule Take 50 mg late morning/noon and 100 mg at bedtime 270 capsule 0    sodium chloride 0.9% (bottle) 1,000 mL with gentamicin 480 mg COMPOUNDED irrigation Instill 60mg of Gentamicin solution into bladder at bedtime. 1800 mL 4    sodium chloride 0.9% (bottle) 1,000 mL with gentamicin 500 mg irrigation 480 mg of Gentamicin in 1 L of NS. Please instill 60 mL of Gentamicin solution into bladder at HS. 1800 mL 4    tiZANidine (ZANAFLEX) 2 MG tablet Take 1-4 tablets (2-8 mg) by mouth 3 times daily 120 tablet 1    tolterodine (DETROL) 2 MG tablet TAKE 1 TABLET(2 MG) BY MOUTH TWICE DAILY 180 tablet 1    zolpidem (AMBIEN) 10 MG tablet TAKE 1 TABLET(10 MG) BY MOUTH EVERY NIGHT AS NEEDED FOR SLEEP 90 tablet 0    CARAFATE 1 GM/10ML PO suspension  (Patient not taking: Reported on 4/23/2024)      magic mouthwash suspension, diphenhydrAMINE, lidocaine, aluminum-magnesium & simethicone, (FIRST-MOUTHWASH BLM) compounding kit Combine 1/3rd each of viscous lidocaine, maalox and liquid benadryl - swish and swallow 5 mL as needed every 4-6 hours for pain (Patient not taking: Reported on 4/23/2024) 240 mL 1    polyethylene glycol (MIRALAX) 17 GM/Dose powder DISSOLVE 1 TO 2 CAPFULS INTO 8 OUNCES OF LIQUID AND DRINK ONCE DAILY (Patient not taking: Reported on 4/23/2024) 510 g 1    sodium phosphate (FLEET ENEMA) 7-19 GM/118ML rectal enema Place 1 Bottle (1 enema) rectally daily as needed for constipation (Patient not taking: Reported on 4/23/2024) 1 Bottle 0       Medical History: any changes in medical history since they were last seen? No      Objective:    Physical Exam:  Blood pressure 111/81, pulse 105.  Constitutional: Well developed, well nourished, appears stated age.  Gait: Wheelchair bound   HEENT: Head atraumatic, normocephalic. Eyes without conjunctival injection or jaundice. Oropharynx clear. Neck supple. No obvious neck  masses.  Skin: No rash, lesions, or petechiae of exposed skin.   Psychiatric/mental status: Alert, without lethargy or stupor. Speech fluent. Appropriate affect. Mood normal. Able to follow commands without difficulty.     Diagnostic Tests/Imaging/Labs:  BMP 2/15/24 - GFR >90    BILLING TIME DOCUMENTATION:   The total TIME spent on this patient on the date of the encounter/appointment was 31 minutes.      TOTAL TIME includes:   Time spent preparing to see the patient (reviewing records and tests)   Time spent face to face (or over the phone) with the patient   Time spent ordering tests, medications, procedures and referrals   Time spent Referring and communicating with other healthcare professionals   Time spent documenting clinical information in Epic

## 2024-04-23 ENCOUNTER — OFFICE VISIT (OUTPATIENT)
Dept: PALLIATIVE MEDICINE | Facility: CLINIC | Age: 49
End: 2024-04-23
Payer: COMMERCIAL

## 2024-04-23 VITALS — DIASTOLIC BLOOD PRESSURE: 81 MMHG | SYSTOLIC BLOOD PRESSURE: 111 MMHG | HEART RATE: 105 BPM

## 2024-04-23 DIAGNOSIS — M54.6 CHRONIC MIDLINE THORACIC BACK PAIN: ICD-10-CM

## 2024-04-23 DIAGNOSIS — M79.2 NEUROPATHIC PAIN: ICD-10-CM

## 2024-04-23 DIAGNOSIS — M54.2 NECK PAIN: ICD-10-CM

## 2024-04-23 DIAGNOSIS — M62.838 MUSCLE SPASM: ICD-10-CM

## 2024-04-23 DIAGNOSIS — M54.6 PAIN IN THORACIC SPINE: ICD-10-CM

## 2024-04-23 DIAGNOSIS — G89.29 CHRONIC MIDLINE THORACIC BACK PAIN: ICD-10-CM

## 2024-04-23 DIAGNOSIS — M79.18 MYOFASCIAL PAIN: Primary | ICD-10-CM

## 2024-04-23 DIAGNOSIS — G82.50 QUADRIPLEGIA (H): ICD-10-CM

## 2024-04-23 DIAGNOSIS — G44.86 CERVICOGENIC HEADACHE: ICD-10-CM

## 2024-04-23 DIAGNOSIS — G89.29 CHRONIC BILATERAL THORACIC BACK PAIN: ICD-10-CM

## 2024-04-23 DIAGNOSIS — M54.6 CHRONIC BILATERAL THORACIC BACK PAIN: ICD-10-CM

## 2024-04-23 PROCEDURE — 99214 OFFICE O/P EST MOD 30 MIN: CPT

## 2024-04-23 RX ORDER — PREGABALIN 50 MG/1
CAPSULE ORAL
Qty: 270 CAPSULE | Refills: 0 | Status: SHIPPED | OUTPATIENT
Start: 2024-04-23 | End: 2024-06-26

## 2024-04-23 RX ORDER — TIZANIDINE 2 MG/1
2-8 TABLET ORAL 3 TIMES DAILY
Qty: 120 TABLET | Refills: 1 | Status: SHIPPED | OUTPATIENT
Start: 2024-04-23 | End: 2024-06-26

## 2024-04-23 ASSESSMENT — PAIN SCALES - GENERAL: PAINLEVEL: SEVERE PAIN (6)

## 2024-04-23 NOTE — PATIENT INSTRUCTIONS
Pain Physical Therapy:  NO   We will defer at this time, may consider referral in the future.      Pain Psychologist to address relaxation, behavioral change, coping style, and other factors important to improvement.  NO     Diagnostic Studies:    Last EKG found in chart from 2020 - QT/QTc within normal limits. No significant cardiac history. Will plan to discuss repeat EKG at follow up for TCA monitoring.      Medication Management:   Amitriptyline - Continue 50 mg at bedtime. Appreciates some degree of benefit, no side effects. Discussed potential dose increase in future to help better target baseline pain, if needed. Refilled today for 90 day supply.   Continue Lyrica 50 mg in morning and 100 mg at bedtime. He appreciates some degree of benefit, no side effects. Discussed potential dose increase in future to help better target baseline pain, if needed.   [place] - [place] last renewed on 11/21/23. Appreciates some degree of benefit. Recommend continuing to work with pharmacist/dispensary to explore available products.   Tizanidine - currently takes 2 mg during daytime as needed and 4 mg at bedtime. Appreciates benefit with neck/back pain, sedation effects with daytime that are manageable. Recommend trial increase, 2-4 mg during daytime and 4-8 mg at bedtime as needed. Max three times daily as needed. Updated prescription sent into pharmacy today.     Potential procedures:   He had baclofen IT pump implant on 1/3/23 ([facility]).      Other Orders/Referrals:   None      Follow up with SUJATA Armendariz CNP in around 8 weeks, or sooner if needed.     ----------------------------------------------------------------  Clinic Number:  [phone]   Call with any questions about your care and for scheduling assistance.   Calls are returned Monday through Friday between 8 AM and 4:30 PM. We usually get back to you within 2 business days depending on the issue/request.    If we are prescribing  your medications:  For opioid medication refills, call the clinic or send a [employer] message 7 days in advance.  Please include:  Name of requested medication  Name of the pharmacy.  For non-opioid medications, call your pharmacy directly to request a refill. Please allow 3-4 days to be processed.   Per [place] State Law:  All controlled substance prescriptions must be filled within 30 days of being written.    For those controlled substances allowing refills, pickup must occur within 30 days of last fill.      We believe regular attendance is key to your success in our program!    Any time you are unable to keep your appointment we ask that you call us at least 24 hours in advance to cancel.This will allow us to offer the appointment time to another patient.   Multiple missed appointments may lead to dismissal from the clinic.

## 2024-04-29 ENCOUNTER — PATIENT OUTREACH (OUTPATIENT)
Dept: CARE COORDINATION | Facility: CLINIC | Age: 49
End: 2024-04-29
Payer: COMMERCIAL

## 2024-04-29 NOTE — PROGRESS NOTES
Clinic Care Coordination Contact  Follow Up Progress Note      Assessment: The patient uses the [facility] system and the [facility].    Care Gaps:    Health Maintenance Due   Topic Date Due    HEPATITIS B IMMUNIZATION (1 of 3 - 19+ 3-dose series) Never done    MEDICARE ANNUAL WELLNESS VISIT  05/31/2023    COLORECTAL CANCER SCREENING  01/30/2024       Care Gaps Last addressed on 4/29/24    Care Plans  Care Plan: General  take medications as prescribed       Problem: HP GENERAL PROBLEM       Goal: General Goal -  I will take all medications as prescribed by the providers.       Start Date: 7/22/2022    This Visit's Progress: 80% Recent Progress: 80%    Note:     Barriers: wheelchair bound  Strengths: engaged in care coordination  Patient expressed understanding of goal: yes  Action steps to achieve this goal:  1. I will take all medications as prescribed.  2. I will ask any questions that I have regarding new medications.  3. I will work with the RN CC if I have any difficulty getting refills.   4. I will work with urology for the PA on the gentamicin instillations for the bladder.                              Care Plan: General -  Make and attend follow up appointments       Problem: HP GENERAL PROBLEM       Goal: General Goal - I will make and attend all recommended appointments from my providers.       Start Date: 7/22/2022    This Visit's Progress: 80% Recent Progress: 80%    Note:     Barriers: Many providers.  Strengths: engaged in care coordination  Patient expressed understanding of goal: yes  Action steps to achieve this goal:  1. I will make all of the recommended follow up appointments.  2. I will attend all of the follow up appointments as recommended by the providers.                            Care Plan: Chronic Pain       Problem: Chronic Pain is Not Self-Managed       Goal: Demonstrate improved self-management of chronic pain       Start Date: 10/25/2023    This Visit's Progress: 20% Recent  Progress: 40%    Note:     Try ice or heat if tolerated.                                Intervention/Education provided during outreach: Reviewed with the patient that there is an allergist at the [facility] for [place] and that he can call the main line and make an appointment.     Outreach Frequency: monthly, more frequently as needed        Plan:   1.  The patient will call and make an appointment with an allergist to review his medications and or what he can use to control his symptoms.  2.  The patient will try ice or heat with his pain to see if he can minimize it at all.  3.  The patient will take all medications as prescribed by the providers.    RN CC Nurse Care Coordinator will follow up in 30 days.        Robbin Vanegas MSN, RN, PHN, CCM   Primary Care Clinical RN Care Coordinator  [facility]  4/29/2024   3:10 PM  [email or web address]  Office: [phone]

## 2024-05-02 DIAGNOSIS — R11.0 NAUSEA: ICD-10-CM

## 2024-05-02 RX ORDER — ONDANSETRON 4 MG/1
4 TABLET, FILM COATED ORAL EVERY 8 HOURS PRN
Qty: 10 TABLET | Refills: 0 | Status: SHIPPED | OUTPATIENT
Start: 2024-05-02

## 2024-05-08 ENCOUNTER — TRANSFERRED RECORDS (OUTPATIENT)
Dept: HEALTH INFORMATION MANAGEMENT | Facility: CLINIC | Age: 49
End: 2024-05-08
Payer: COMMERCIAL

## 2024-05-08 ENCOUNTER — MEDICAL CORRESPONDENCE (OUTPATIENT)
Dept: HEALTH INFORMATION MANAGEMENT | Facility: CLINIC | Age: 49
End: 2024-05-08
Payer: COMMERCIAL

## 2024-05-09 DIAGNOSIS — R05.9 COUGH: ICD-10-CM

## 2024-05-09 RX ORDER — ALBUTEROL SULFATE 90 UG/1
AEROSOL, METERED RESPIRATORY (INHALATION)
Qty: 8.5 G | Refills: 1 | OUTPATIENT
Start: 2024-05-09

## 2024-05-14 DIAGNOSIS — R05.9 COUGH: ICD-10-CM

## 2024-05-14 RX ORDER — ALBUTEROL SULFATE 90 UG/1
AEROSOL, METERED RESPIRATORY (INHALATION)
Qty: 8.5 G | Refills: 1 | Status: SHIPPED | OUTPATIENT
Start: 2024-05-14 | End: 2024-06-24

## 2024-05-17 DIAGNOSIS — N31.9 NEUROGENIC BLADDER: ICD-10-CM

## 2024-05-29 ENCOUNTER — PATIENT OUTREACH (OUTPATIENT)
Dept: CARE COORDINATION | Facility: CLINIC | Age: 49
End: 2024-05-29
Payer: COMMERCIAL

## 2024-05-29 NOTE — PROGRESS NOTES
Clinic Care Coordination Contact  Follow Up Progress Note      Assessment: The primary care care coordinator RN CC contacted the patient by phone for a follow-up call.  The patient stated that he was not doing very well.  He was having back spasms as well as bladder spasms and really did not feel very well at all.  He was going to wait and see how we felt tomorrow and then call again for an appointment.  The RN CC encouraged him to call now and see if anyone has canceled or if there is an opening.  And it may be good for him to speak with the triage nurses about getting in to see one of the providers on a same-day situation.  Otherwise there were not a lot of changes, he was trying to lie down to see if that would help this situation and help him to feel better.    Care Gaps:    Health Maintenance Due   Topic Date Due    HEPATITIS B IMMUNIZATION (1 of 3 - 19+ 3-dose series) Never done    MEDICARE ANNUAL WELLNESS VISIT  05/31/2023    COLORECTAL CANCER SCREENING  01/30/2024       Care Gaps Last addressed on 5/29/24    Care Plans  Care Plan: General  take medications as prescribed       Problem: HP GENERAL PROBLEM       Goal: General Goal -  I will take all medications as prescribed by the providers.       Start Date: 7/22/2022    This Visit's Progress: 80% Recent Progress: 80%    Note:     Barriers: wheelchair bound  Strengths: engaged in care coordination  Patient expressed understanding of goal: yes  Action steps to achieve this goal:  1. I will take all medications as prescribed.  2. I will ask any questions that I have regarding new medications.  3. I will work with the RN CC if I have any difficulty getting refills.   4. I will work with urology for the PA on the gentamicin instillations for the bladder.                              Care Plan: General -  Make and attend follow up appointments       Problem: HP GENERAL PROBLEM       Goal: General Goal - I will make and attend all recommended appointments from my  providers.       Start Date: 7/22/2022    This Visit's Progress: 80% Recent Progress: 80%    Note:     Barriers: Many providers.  Strengths: engaged in care coordination  Patient expressed understanding of goal: yes  Action steps to achieve this goal:  1. I will make all of the recommended follow up appointments.  2. I will attend all of the follow up appointments as recommended by the providers.                            Care Plan: Chronic Pain       Problem: Chronic Pain is Not Self-Managed       Goal: Demonstrate improved self-management of chronic pain       Start Date: 10/25/2023    This Visit's Progress: 30% Recent Progress: 20%    Note:     Try ice or heat if tolerated.                                Intervention/Education provided during outreach: Reviewed the goals with the patient.  And answered any questions that he had.  The RN CC also suggested that he call today rather than another time for an appointment.     Outreach Frequency: monthly, more frequently as needed      Plan:   1.  The RN CC encouraged the patient to call today for an appointment to see if he could get in and find out why he was not feeling well.  2.  The patient will take all medications as prescribed by the providers.  3.  The patient will work with the pain management program regarding his medications for that.    RN CC Nurse Care Coordinator will follow up in 30 days.          Robbin Vanegas MSN, RN, PHN, CCM   Primary Care Clinical RN Care Coordinator  [facility]  5/29/2024   3:23 PM  [email or web address]  Office: [phone]

## 2024-05-29 NOTE — PROGRESS NOTES
Clinic Care Coordination Contact  [facility]/Voicemail    Clinical Data: Care Coordinator Outreach    Outreach Documentation Number of Outreach Attempt   5/29/2024   2:34 PM 1       Left message on patient's voicemail with call back information and requested return call.    Plan: Care Coordinator sent care coordination introduction letter on 3//24/21 via [employer]. Care Coordinator will try to reach patient again in 3-5 business days.    Robbin Vanegas MSN, RN, PHN, [facility]   Primary Care Clinical RN Care Coordinator  [facility]  5/29/2024   2:35 PM  [email or web address]  Office: [phone]

## 2024-05-30 ENCOUNTER — VIRTUAL VISIT (OUTPATIENT)
Dept: FAMILY MEDICINE | Facility: CLINIC | Age: 49
End: 2024-05-30
Payer: COMMERCIAL

## 2024-05-30 DIAGNOSIS — R21 SKIN RASH: ICD-10-CM

## 2024-05-30 DIAGNOSIS — R53.81 MALAISE: Primary | ICD-10-CM

## 2024-05-30 PROCEDURE — 99214 OFFICE O/P EST MOD 30 MIN: CPT | Mod: 95 | Performed by: FAMILY MEDICINE

## 2024-05-30 NOTE — PROGRESS NOTES
"Riley is a 48 year old who is being evaluated via a billable video visit.    How would you like to obtain your AVS? MyChart  If the video visit is dropped, the invitation should be resent by: Send to e-mail at: [email or web address]  Will anyone else be joining your video visit?       Assessment & Plan   Problem List Items Addressed This Visit    None  Visit Diagnoses       Malaise    -  Primary    Relevant Orders    CBC with platelets and differential    Comprehensive metabolic panel (BMP + Alb, Alk Phos, ALT, AST, Total. Bili, TP)    UA Macroscopic with reflex to Microscopic and Culture - Lab Collect    Skin rash               Continue with the emollient cream for the skin rash.  Generic workup for malaise, he will accomplish these labs tomorrow.  He does have GI follow-up in the next few weeks additionally.    Subjective   Riley is a 48 year old, presenting for the following health issues:  Derm Problem (Behind right knee) and Gastrointestinal Problem      5/30/2024     3:59 PM   Additional Questions   Roomed by Carmen SANDRA CMA     Video Start Time:  4:30pm    History of Present Illness       Reason for visit:  Gastrointestinal issues  Symptom onset:  1-3 days ago  Symptoms include:  Achy and Tired  Symptom intensity:  Mild  Symptom progression:  Worsening  Had these symptoms before:  No  What makes it worse:  Nothing  What makes it better:  Resting helps    He eats 2-3 servings of fruits and vegetables daily.He consumes 2 sweetened beverage(s) daily.He exercises with enough effort to increase his heart rate 10 to 19 minutes per day.  He exercises with enough effort to increase his heart rate 3 or less days per week.   He is taking medications regularly.     He feels like he is just \"not myself\"  No specific pains but just does not feel well in general, tired.  He was recently treated for Klebsiella UTI and says he takes antibiotics every day  because he self caths.  He shows a picture of a rash on his right " buttock that is being monitored by his aide who started applying a petroleum-based emollient this morning.              Objective           Vitals:  No vitals were obtained today due to virtual visit.    Physical Exam   GENERAL: alert and no distress  EYES: Eyes grossly normal to inspection.  No discharge or erythema, or obvious scleral/conjunctival abnormalities.  RESP: No audible wheeze, cough, or visible cyanosis.    SKIN: I reviewed the picture of his right buttock and there is some mottling but no demarcated sign of cellulitis or fungal infection.  NEURO: Cranial nerves grossly intact.  Mentation and speech appropriate for age.  PSYCH: Appropriate affect, tone, and pace of words          Video-Visit Details    Type of service:  Video Visit   Video End Time:4:50 PM  Originating Location (pt. Location): Home    Distant Location (provider location):  On-site  Platform used for Video Visit: Gerardo  Signed Electronically by: ELFEGO KATE DO

## 2024-05-31 ENCOUNTER — LAB (OUTPATIENT)
Dept: LAB | Facility: CLINIC | Age: 49
End: 2024-05-31
Payer: COMMERCIAL

## 2024-05-31 DIAGNOSIS — R53.81 MALAISE: ICD-10-CM

## 2024-05-31 LAB
ALBUMIN UR-MCNC: NEGATIVE MG/DL
APPEARANCE UR: ABNORMAL
BACTERIA #/AREA URNS HPF: ABNORMAL /HPF
BASOPHILS # BLD AUTO: 0.1 10E3/UL (ref 0–0.2)
BASOPHILS NFR BLD AUTO: 1 %
BILIRUB UR QL STRIP: NEGATIVE
COLOR UR AUTO: YELLOW
EOSINOPHIL # BLD AUTO: 1.2 10E3/UL (ref 0–0.7)
EOSINOPHIL NFR BLD AUTO: 14 %
ERYTHROCYTE [DISTWIDTH] IN BLOOD BY AUTOMATED COUNT: 13.7 % (ref 10–15)
GLUCOSE UR STRIP-MCNC: 250 MG/DL
HCT VFR BLD AUTO: 36.8 % (ref 40–53)
HGB BLD-MCNC: 12.3 G/DL (ref 13.3–17.7)
HGB UR QL STRIP: ABNORMAL
IMM GRANULOCYTES # BLD: 0 10E3/UL
IMM GRANULOCYTES NFR BLD: 0 %
KETONES UR STRIP-MCNC: NEGATIVE MG/DL
LEUKOCYTE ESTERASE UR QL STRIP: ABNORMAL
LYMPHOCYTES # BLD AUTO: 0.8 10E3/UL (ref 0.8–5.3)
LYMPHOCYTES NFR BLD AUTO: 9 %
MCH RBC QN AUTO: 29.7 PG (ref 26.5–33)
MCHC RBC AUTO-ENTMCNC: 33.4 G/DL (ref 31.5–36.5)
MCV RBC AUTO: 89 FL (ref 78–100)
MONOCYTES # BLD AUTO: 0.2 10E3/UL (ref 0–1.3)
MONOCYTES NFR BLD AUTO: 3 %
NEUTROPHILS # BLD AUTO: 6.3 10E3/UL (ref 1.6–8.3)
NEUTROPHILS NFR BLD AUTO: 73 %
NITRATE UR QL: NEGATIVE
PH UR STRIP: 7.5 [PH] (ref 5–7)
PLATELET # BLD AUTO: 298 10E3/UL (ref 150–450)
RBC # BLD AUTO: 4.14 10E6/UL (ref 4.4–5.9)
RBC #/AREA URNS AUTO: ABNORMAL /HPF
SP GR UR STRIP: 1.01 (ref 1–1.03)
SQUAMOUS #/AREA URNS AUTO: ABNORMAL /LPF
TRANS CELLS #/AREA URNS HPF: ABNORMAL /HPF
UROBILINOGEN UR STRIP-ACNC: 0.2 E.U./DL
WBC # BLD AUTO: 8.6 10E3/UL (ref 4–11)
WBC #/AREA URNS AUTO: ABNORMAL /HPF
WBC CLUMPS #/AREA URNS HPF: PRESENT /HPF

## 2024-05-31 PROCEDURE — 36415 COLL VENOUS BLD VENIPUNCTURE: CPT

## 2024-05-31 PROCEDURE — 85025 COMPLETE CBC W/AUTO DIFF WBC: CPT

## 2024-05-31 PROCEDURE — 80053 COMPREHEN METABOLIC PANEL: CPT

## 2024-05-31 PROCEDURE — 81001 URINALYSIS AUTO W/SCOPE: CPT

## 2024-05-31 PROCEDURE — 87088 URINE BACTERIA CULTURE: CPT

## 2024-05-31 PROCEDURE — 87086 URINE CULTURE/COLONY COUNT: CPT

## 2024-06-01 LAB
ALBUMIN SERPL BCG-MCNC: 4 G/DL (ref 3.5–5.2)
ALP SERPL-CCNC: 68 U/L (ref 40–150)
ALT SERPL W P-5'-P-CCNC: 14 U/L (ref 0–70)
ANION GAP SERPL CALCULATED.3IONS-SCNC: 13 MMOL/L (ref 7–15)
AST SERPL W P-5'-P-CCNC: 16 U/L (ref 0–45)
BACTERIA UR CULT: ABNORMAL
BILIRUB SERPL-MCNC: 0.2 MG/DL
BUN SERPL-MCNC: 4.3 MG/DL (ref 6–20)
CALCIUM SERPL-MCNC: 8.6 MG/DL (ref 8.6–10)
CHLORIDE SERPL-SCNC: 102 MMOL/L (ref 98–107)
CREAT SERPL-MCNC: 0.49 MG/DL (ref 0.67–1.17)
DEPRECATED HCO3 PLAS-SCNC: 19 MMOL/L (ref 22–29)
EGFRCR SERPLBLD CKD-EPI 2021: >90 ML/MIN/1.73M2
GLUCOSE SERPL-MCNC: 218 MG/DL (ref 70–99)
POTASSIUM SERPL-SCNC: 3.7 MMOL/L (ref 3.4–5.3)
PROT SERPL-MCNC: 6.3 G/DL (ref 6.4–8.3)
SODIUM SERPL-SCNC: 134 MMOL/L (ref 135–145)

## 2024-06-04 ENCOUNTER — TELEPHONE (OUTPATIENT)
Dept: GASTROENTEROLOGY | Facility: CLINIC | Age: 49
End: 2024-06-04
Payer: COMMERCIAL

## 2024-06-04 DIAGNOSIS — Z12.11 ENCOUNTER FOR SCREENING COLONOSCOPY: Primary | ICD-10-CM

## 2024-06-04 RX ORDER — BISACODYL 5 MG/1
TABLET, DELAYED RELEASE ORAL
Qty: 4 TABLET | Refills: 0 | Status: SHIPPED | OUTPATIENT
Start: 2024-06-04 | End: 2024-09-03

## 2024-06-04 NOTE — TELEPHONE ENCOUNTER
Pre visit planning completed.      Procedure details:    Patient scheduled for Colonoscopy  on 6/13/2024.     Arrival time: 0845. Procedure time 1015    Facility location: [facility]; [address], [place] [address]. Check in location: Main entrance at registration desk.    Sedation type: MAC    Pre op exam needed? N/A    Indication for procedure: Encounter for screening colonoscopy [Z12.11]       Chart review:     Electronic implanted devices? Yes, Baclofen pump-right abdomen    Recent diagnosis of diverticulitis within the last 6 weeks? No    Diabetic? No      Medication review:    Anticoagulants? No    NSAIDS? No NSAID medications per patient's medication list.  RN will verify with pre-assessment call.    Other medication HOLDING recommendations:  N/A      Prep for procedure:     Bowel prep recommendation: Extended Golytely. Bowel prep prescription sent to    [id number] - [place], [place] - [address] AT [facility] [address]    Due to: constipation noted or reported.     Prep instructions sent via Ens[employer]         Abi Jordan RN  Endoscopy Procedure Pre Assessment RN  [phone] option 4

## 2024-06-04 NOTE — TELEPHONE ENCOUNTER
Pre assessment completed for upcoming procedure.   (Please see previous telephone encounter notes for complete details)      Procedure details:    Arrival time and facility location reviewed.    Pre op exam needed? N/A    Designated  policy reviewed. Instructed to have someone stay 24  hours post procedure.       Medication review:    Medications reviewed. Please see supporting documentation below. Holding recommendations discussed (if applicable).       Prep for procedure:     Procedure prep instructions reviewed.        Any additional information needed:  N/A      Patient  verbalized understanding and had no questions or concerns at this time.      Abi Jordan RN  Endoscopy Procedure Pre Assessment   [phone] option 4

## 2024-06-06 ENCOUNTER — TELEPHONE (OUTPATIENT)
Dept: FAMILY MEDICINE | Facility: CLINIC | Age: 49
End: 2024-06-06
Payer: COMMERCIAL

## 2024-06-06 NOTE — TELEPHONE ENCOUNTER
----- Message from Arabella Dickens RN sent at 6/6/2024 10:45 AM CDT -----  Regarding: colonoscopy referral  Lulú Reveles-  (Cc'ing PCP Jenny Fay PA-C)    Patient is scheduled for a Colonoscopy   Date of procedure: 6.13.2024  Indication: screening colonoscopy    The patient's RN coordinator Amanda called today requested that the patient be admitted to the hospital to complete their bowel prep. Patient is a C5-6 quadriplegic. They are also concerned about skin breakdown during the prepping process.     Per Amanda you can coordinate this with Riley.  If there are any further questions, Amanda Velasco RN Coordinator can be reached at [phone].    In-patient admissions are not coordinated by the Endoscopy Schedulers, Endoscopy Pre-Assessment Nurses, or performing Endoscopist.  Ordering providers must initiate a review for admission utilizing the [employer] form, please access the form through the [employer] Service Catalog.  You may copy and paste the following link:     [email or web address]_[email or web address]_[email or web address]_[email or web address]&sysparm_catalog_view=catalog_default      Thank you,    [facility] Endoscopy Team

## 2024-06-06 NOTE — TELEPHONE ENCOUNTER
"Per Jenny Fay \"I placed the orders in Epic [hospital admission for bowel prep].\"    Arabella Dickens RN    "

## 2024-06-06 NOTE — ADDENDUM NOTE
Addendum  created 08/09/22 1419 by Ammy Weber    Intraprocedure Meds edited       [facility]  Recreational Therapy  Discharge Note  Inpatient Rehabilitation Unit         Date:  6/6/2024       Patient Name: Jake Dwyer      MRN: [id number]       YOB: 1934 (89 y.o.)       Gender: male  Diagnosis: s/p total knee arthroscopy  Referring Practitioner: Quintin Boudreaux MD    Patient discharged from Recreational Therapy at this time.  See recreational therapy notes for details.    Electronically signed by: YOLI English  Date: 6/6/2024

## 2024-06-06 NOTE — TELEPHONE ENCOUNTER
Amanda Velasco, patient's RN Coordinator called asking for help to coordinate a hospital admission for patient to bowel prep prior to scheduled procedure.     Patient is a C5-6 quadriplegic. They are also concerned about skin breakdown during the prepping process.     RN sent a message to referring provider and PCP to assist patient in coordinating as in-patient admissions are not coordinated by the Endoscopy Schedulers, Endoscopy Pre-Assessment Nurses, or performing Endoscopist.      Arabella Dickens RN

## 2024-06-10 ENCOUNTER — TELEPHONE (OUTPATIENT)
Dept: FAMILY MEDICINE | Facility: CLINIC | Age: 49
End: 2024-06-10
Payer: COMMERCIAL

## 2024-06-10 ENCOUNTER — PATIENT OUTREACH (OUTPATIENT)
Dept: CARE COORDINATION | Facility: CLINIC | Age: 49
End: 2024-06-10
Payer: COMMERCIAL

## 2024-06-10 ENCOUNTER — HOSPITAL ENCOUNTER (OUTPATIENT)
Age: 49
End: 2024-06-10
Payer: COMMERCIAL

## 2024-06-10 NOTE — PROGRESS NOTES
Clinic Care Coordination Contact  Care Team Conversations    The RN CC nurse care coordinator contacted Riley to say that we are working on having him in the hospital during his prep.  The RN CC will keep him updated.      Robbin Vanegas MSN, RN, PHN, [facility]   Primary Care Clinical RN Care Coordinator  [facility]  6/10/2024   4:57 PM  [email or web address]  Office: [phone]

## 2024-06-10 NOTE — TELEPHONE ENCOUNTER
Ynei from [facility] calling to request Hand off from pcp for scheduled admission for 6/12/24.     This will be a peer to peer review prior to pt's admission.     Please return call to [phone], option 9 for provider line and ask to speak with Yeni (she will be in from 11-7:30pm tomorrow 6/11/24 as well).     Jayne Vargas, RN on 6/10/2024 at 5:27 PM

## 2024-06-11 ENCOUNTER — PATIENT OUTREACH (OUTPATIENT)
Dept: CARE COORDINATION | Facility: CLINIC | Age: 49
End: 2024-06-11
Payer: COMMERCIAL

## 2024-06-11 NOTE — PROGRESS NOTES
Clinic Care Coordination Contact  Care Team Conversations    The primary care care coordinator contacted the patient by phone to give him the results of the peer to peer review.  Jenny Fay PA-C had the peer to peer review with the providers of the endoscopy dept.  They agreed that the patient should be hospitalized for the prep for his colonoscopy.  The patient will be admitted on 6/12/24 for the procedure on 6/13/24.  I wanted to make sure that the patient was aware of the outcome of the request in case the staff called him today for admission questions.       Robbin Vanegas MSN, RN, PHN, [facility]   Primary Care Clinical RN Care Coordinator  [facility]  6/11/2024   10:00 AM  [email or web address]  Office: [phone]

## 2024-06-11 NOTE — PROGRESS NOTES
Clinic Care Coordination Contact  Care Team Conversations    The primary care care coordinator ARISTEO CC contacted the [facility] regarding the time that Riley should be at the hospital for admission.  Ronaldo answered that the patient needed to be at the hospital at noon.  This information was relayed to the patient and he verified the address of [address] [place] [address].      The patient was then going to work on changing the time and day of his ride.        Robbin Vanegas MSN, RN, PHN, CCM   Primary Care Clinical RN Care Coordinator  [facility]  6/11/2024   12:10 PM  [email or web address]  Office: [phone]

## 2024-06-12 ENCOUNTER — TELEPHONE (OUTPATIENT)
Dept: GASTROENTEROLOGY | Facility: CLINIC | Age: 49
End: 2024-06-12
Payer: COMMERCIAL

## 2024-06-12 ENCOUNTER — PATIENT OUTREACH (OUTPATIENT)
Dept: CARE COORDINATION | Facility: CLINIC | Age: 49
End: 2024-06-12
Payer: COMMERCIAL

## 2024-06-12 DIAGNOSIS — K59.09 CHRONIC CONSTIPATION: ICD-10-CM

## 2024-06-12 NOTE — TELEPHONE ENCOUNTER
MICHELLE Siegel [facility] RN care coordinator calling, states patient needs to cancel colonoscopy for tomorrow.  States it is a transportation issue and would not be able to get patient to the hospital for the prep.    Writer reviewed chart and noted that hospital admission was coordinated.  Also noted patient needs the extended GoLytely prep.    Writer talked with Robbin about needing this prep therefore would need a 2-day stay in the hospital.  Robbin states made note of this to coordinate such after getting the colonoscopy rescheduled.    Warm transferred to endoscopy scheduling.    Karon Landers RN  Endoscopy Procedure Pre Assessment ARISTEO  [phone] option 4

## 2024-06-12 NOTE — TELEPHONE ENCOUNTER
Caller: Robbin--care coordinator    Reason for Reschedule/Cancellation   (please be detailed, any staff messages or encounters to note?): Patient needs 2 day hospital prep--not started      Prior to reschedule please review:  Ordering Provider: Jenny Fay   Sedation Determined: MAC  Does patient have any ASC Exclusions, please identify?: Y--NEEDS ASSISTANCE      Notes on Cancelled Procedure:  Procedure: Lower Endoscopy [Colonoscopy]   Date: 6/13  Location: [facility]; [address] [place] [address]   Surgeon: Julian      Rescheduled: Yes,   Procedure: Lower Endoscopy [Colonoscopy]    Date: 1/9/25   Location: [facility]; [address] [place] [address]    Surgeon: Julian   Sedation Level Scheduled  MAC ,  Reason for Sedation Level order   Instructions updated and sent: y     Does patient need PAC or Pre -Op Rescheduled? : n       Did you cancel or rescheduled an EUS procedure? No.

## 2024-06-12 NOTE — PROGRESS NOTES
Clinic Care Coordination Contact  Care Team Conversations    The patient called the primary care care coordinator RN CC quite upset that he had to change his ride twice and now they will not take him down to the hospital.  The RN CC contacted the hospital to find out that Riley's colonoscopy prep is a 2-day prep.  The RN CC got the information from Karon one of the nurses in the endoscopy and colonoscopy area.  We ended up canceling the colonoscopy for tomorrow..  We rescheduled that with Domenic on January 9, and he will be the first patient of the day.  The patient had talked about having the colonoscopy and the EGD with dilation at the same time.  It is possible to schedule the 2 of them at the same time in January.  Then when the patient had some time to think about it he requested keeping the appointment with ADELAIDA in July as the provider for that wanted to get a biopsy of his esophagus for the eosinophilic esophagitis.  The patient stated that the provider may put in a prior authorization for one of the medications that is now available for eosinophilic esophagitis.  The patient is not concerned about the prep for the EGD as he is done that before.    The RN CC spoke with Karon at the endoscopy and she stated that the prep for this patient is actually a 2 night prep so we would need to have him in the hospital for 2 nights.  The RN CC will look into what is needed including a peer to peer review for the patient to be in the hospital 2 nights prior to his colonoscopy which is January 2, 2025.  The PCP for the patient will be notified of all of this information..      Robbin Vanegas MSN, RN, PHN, CCM   Primary Care Clinical RN Care Coordinator  [facility]  6/12/2024   4:11 PM  [email or web address]  Office: [phone]

## 2024-06-13 RX ORDER — POLYETHYLENE GLYCOL 3350 17 G/17G
POWDER, FOR SOLUTION ORAL
Qty: 510 G | Refills: 1 | Status: SHIPPED | OUTPATIENT
Start: 2024-06-13 | End: 2024-08-30

## 2024-06-13 RX ORDER — DOCUSATE SODIUM 283 MG/5ML
LIQUID RECTAL
Qty: 150 ML | Refills: 0 | Status: SHIPPED | OUTPATIENT
Start: 2024-06-13 | End: 2024-08-12

## 2024-06-18 DIAGNOSIS — N31.9 NEUROGENIC BLADDER: ICD-10-CM

## 2024-06-18 RX ORDER — TOLTERODINE TARTRATE 2 MG/1
TABLET, EXTENDED RELEASE ORAL
Qty: 180 TABLET | Refills: 1 | Status: SHIPPED | OUTPATIENT
Start: 2024-06-18

## 2024-06-23 DIAGNOSIS — R05.9 COUGH: ICD-10-CM

## 2024-06-23 DIAGNOSIS — K20.0 EOSINOPHILIC ESOPHAGITIS: ICD-10-CM

## 2024-06-24 DIAGNOSIS — M62.838 MUSCLE SPASM: ICD-10-CM

## 2024-06-24 DIAGNOSIS — G44.86 CERVICOGENIC HEADACHE: ICD-10-CM

## 2024-06-24 DIAGNOSIS — M54.2 NECK PAIN: ICD-10-CM

## 2024-06-24 DIAGNOSIS — M54.6 CHRONIC BILATERAL THORACIC BACK PAIN: ICD-10-CM

## 2024-06-24 DIAGNOSIS — M79.18 MYOFASCIAL PAIN: ICD-10-CM

## 2024-06-24 DIAGNOSIS — F51.01 PRIMARY INSOMNIA: ICD-10-CM

## 2024-06-24 DIAGNOSIS — G89.29 CHRONIC BILATERAL THORACIC BACK PAIN: ICD-10-CM

## 2024-06-24 RX ORDER — ALBUTEROL SULFATE 90 UG/1
AEROSOL, METERED RESPIRATORY (INHALATION)
Qty: 8.5 G | Refills: 1 | Status: SHIPPED | OUTPATIENT
Start: 2024-06-24 | End: 2024-08-05

## 2024-06-24 RX ORDER — ZOLPIDEM TARTRATE 10 MG/1
TABLET ORAL
Qty: 90 TABLET | Refills: 0 | Status: SHIPPED | OUTPATIENT
Start: 2024-06-24 | End: 2024-09-23

## 2024-06-24 RX ORDER — BUDESONIDE 1 MG/2ML
INHALANT ORAL
Qty: 180 ML | Refills: 1 | Status: SHIPPED | OUTPATIENT
Start: 2024-06-24

## 2024-06-24 NOTE — TELEPHONE ENCOUNTER
Will route to MA pool for assistance with gathering/prep of refill information.    Yvette LÓPEZ RN Care Coordinator  [facility]

## 2024-06-25 NOTE — TELEPHONE ENCOUNTER
Received call from patient  requesting refill(s) for     tiZANidine HCl 2 MG Oral Tablet (ZANAFLEX)  Last refilled on: May. 28, 2024         Patient last seen on:   Next appt scheduled for: June. 26, 2024       E-prescribe to:     [id number] - [place], [place] - [address] AT [facility] [address]       Will facilitate refill.      Elsie Chirinos, Clinic Facilitator  [facility]

## 2024-06-26 ENCOUNTER — OFFICE VISIT (OUTPATIENT)
Dept: PALLIATIVE MEDICINE | Facility: CLINIC | Age: 49
End: 2024-06-26
Payer: COMMERCIAL

## 2024-06-26 VITALS — HEART RATE: 113 BPM | DIASTOLIC BLOOD PRESSURE: 59 MMHG | SYSTOLIC BLOOD PRESSURE: 87 MMHG

## 2024-06-26 DIAGNOSIS — M54.2 NECK PAIN: ICD-10-CM

## 2024-06-26 DIAGNOSIS — M62.838 MUSCLE SPASM: ICD-10-CM

## 2024-06-26 DIAGNOSIS — Z51.81 ENCOUNTER FOR THERAPEUTIC DRUG MONITORING: ICD-10-CM

## 2024-06-26 DIAGNOSIS — M79.18 MYOFASCIAL PAIN: ICD-10-CM

## 2024-06-26 DIAGNOSIS — M79.2 NEUROPATHIC PAIN: Primary | ICD-10-CM

## 2024-06-26 DIAGNOSIS — M54.6 PAIN IN THORACIC SPINE: ICD-10-CM

## 2024-06-26 DIAGNOSIS — G82.50 QUADRIPLEGIA (H): ICD-10-CM

## 2024-06-26 PROCEDURE — 99214 OFFICE O/P EST MOD 30 MIN: CPT

## 2024-06-26 RX ORDER — TIZANIDINE 2 MG/1
TABLET ORAL
Qty: 270 TABLET | Refills: 1 | Status: SHIPPED | OUTPATIENT
Start: 2024-06-26

## 2024-06-26 RX ORDER — PREGABALIN 100 MG/1
CAPSULE ORAL
Qty: 90 CAPSULE | Refills: 0 | Status: SHIPPED | OUTPATIENT
Start: 2024-06-26 | End: 2024-09-24

## 2024-06-26 RX ORDER — PREGABALIN 50 MG/1
CAPSULE ORAL
Qty: 180 CAPSULE | Refills: 0 | Status: SHIPPED | OUTPATIENT
Start: 2024-06-26 | End: 2024-09-25

## 2024-06-26 ASSESSMENT — PAIN SCALES - GENERAL: PAINLEVEL: MODERATE PAIN (5)

## 2024-06-26 NOTE — PROGRESS NOTES
06/26/24 1056   PEG: A Thee-Item Scale Assessing Pain Intensity and Interference        0 = No pain / No interference    10 = Pain as bad as you can imagine / Completely interferes   What number best describes your pain on average in the past week? 5   What number best describes how, during the past week, pain has interfered with your enjoyment of life? 5   What number best describes how, during the past week, pain has interfered with your general activity? 5   PEG Total Score 5

## 2024-06-26 NOTE — PATIENT INSTRUCTIONS
Pain Physical Therapy:  NO - engaging in HEP, using arm bike more recently.      Pain Psychologist to address relaxation, behavioral change, coping style, and other factors important to improvement.  NO     Diagnostic Studies:    EKG ordered today for TCA monitoring.  Advised he will need to schedule test and complete prior to next appointment.  EKG scheduling number: [phone].     Medication Management:   Amitriptyline - Continue 50 mg at bedtime. Appreciates some degree of benefit, no side effects. Discussed potential dose increase in future to help better target baseline pain, if needed.  Lyrica -current dose 50 mg in morning and 100 mg at bedtime. He appreciates some degree of benefit, no side effects.  Daytime pain control is not optimized, he is interested in trial of 50 mg dose in afternoon.  He will contact clinic with any concerns for sedation effects adding 50 mg, could reduce to 25 mg starting dose if needed and titrate to goal dose of 50 mg.  Medical cannabis - certified for [place] program, last renewed on 11/21/23. Appreciates some degree of benefit. Recommend continuing to work with pharmacist/dispensary to explore available products.   Tizanidine 2-8 mg TID PRN - current takes 2 mg three times daily. Appreciates benefit with neck/back pain, sedation effects with daytime that are manageable.      Potential procedures:   Baclofen IT pump implant on 1/3/23 ([facility]). Refill next month and plans to inquire about dosage increase to better target spasms.      Other Orders/Referrals:   None      Follow up with SUJATA Armendariz CNP in around 8-10 weeks, or sooner if needed.     ----------------------------------------------------------------  Clinic Number:  [phone]   Call with any questions about your care and for scheduling assistance.   Calls are returned Monday through Friday between 8 AM and 4:30 PM. We usually get back to you within 2 business days depending on the issue/request.    If we are  prescribing your medications:  For opioid medication refills, call the clinic or send a [employer] message 7 days in advance.  Please include:  Name of requested medication  Name of the pharmacy.  For non-opioid medications, call your pharmacy directly to request a refill. Please allow 3-4 days to be processed.   Per [place] State Law:  All controlled substance prescriptions must be filled within 30 days of being written.    For those controlled substances allowing refills, pickup must occur within 30 days of last fill.      We believe regular attendance is key to your success in our program!    Any time you are unable to keep your appointment we ask that you call us at least 24 hours in advance to cancel.This will allow us to offer the appointment time to another patient.   Multiple missed appointments may lead to dismissal from the clinic.

## 2024-06-26 NOTE — TELEPHONE ENCOUNTER
Chart reviewed - Request appears appropriate. Refilled for 90 day supply.     Nalini Resendez DNP, APRN, AGNP-C  [facility]      no

## 2024-06-26 NOTE — PROGRESS NOTES
[facility]     Date of visit: 6/26/2024      Assessment:   Riley Gifford is a 48 year old male with a past medical history significant for cervical spinal cord injury, quadriplegia, thoracic/lumbar pain, neurogenic bladder, recurrent UTI, spasticity, chronic constipation, s/p cervical fusion who presents with complaints of mid back (lower thoracic) and abdominal pain.      Thoracic/abdominal pain - Onset occurred following spinal cord injury in 1995, progressive worsening of symptoms over time. Etiology is likely mixed, with h/o cervical spinal cord injury (quadraplegic), neuropathic pain secondary to spinal cord injury, underlying degenerative changes of spine, and overlying myofascial component to some extent.      Assigned to [place] nursing team.     Visit Diagnoses:  1. Neuropathic pain    2. Pain in thoracic spine    3. Quadriplegia (H)    4. Encounter for therapeutic drug monitoring    5. Myofascial pain    6. Neck pain    7. Muscle spasm        Plan:  Diagnosis reviewed, treatment option addressed, and risk/benifits discussed.  Self-care instructions given.  I am recommending a multidisciplinary treatment plan to help this patient better manage their pain.      Pain Physical Therapy:  NO - engaging in HEP, using arm bike more recently.      Pain Psychologist to address relaxation, behavioral change, coping style, and other factors important to improvement.  NO     Diagnostic Studies:    EKG ordered today for TCA monitoring.  Advised he will need to schedule test and complete prior to next appointment.  EKG scheduling number: [phone].     Medication Management:   Amitriptyline - Continue 50 mg at bedtime. Appreciates some degree of benefit, no side effects. Discussed potential dose increase in future to help better target baseline pain, if needed.  Lyrica -current dose 50 mg in morning and 100 mg at bedtime. He appreciates some degree of benefit, no side effects.  Daytime pain control  is not optimized, he is interested in trial of 50 mg dose in afternoon.  He will contact clinic with any concerns for sedation effects adding 50 mg, could reduce to 25 mg starting dose if needed and titrate to goal dose of 50 mg.  Medical cannabis - certified for [place] program, last renewed on 11/21/23. Appreciates some degree of benefit. Recommend continuing to work with pharmacist/dispensary to explore available products.   Tizanidine 2-8 mg TID PRN - current takes 2 mg three times daily. Appreciates benefit with neck/back pain, sedation effects with daytime that are manageable.      Potential procedures:   Baclofen IT pump implant on 1/3/23 ([facility]). Refill next month and plans to inquire about dosage increase to better target spasms.      Other Orders/Referrals:   None      Follow up with SUJATA Armendariz CNP in around 8-10 weeks, or sooner if needed.       Review of Electronic Chart: Today I have also reviewed available medical information in the patient's medical record at [facility] ([facility]) and Care Everywhere (if available), including relevant provider notes, laboratory work, and imaging.     Nalini Resendez DNP, SUJATA, AGNP-C  [facility]     -------------------------------------------------    Subjective:    Chief complaint:   Chief Complaint   Patient presents with    Pain       Interval history:  Riley Gifford is a 48 year old male last seen on 4/23/24.  They are a patient of mine seen in follow up.     Recommendations/plan at the last visit included:  Pain Physical Therapy:  NO   We will defer at this time, may consider referral in the future.      Pain Psychologist to address relaxation, behavioral change, coping style, and other factors important to improvement.  NO     Diagnostic Studies:    Last EKG found in chart from 2020 - QT/QTc within normal limits. No significant cardiac history. Will plan to discuss repeat EKG at follow up for TCA monitoring.      Medication Management:    Amitriptyline - Continue 50 mg at bedtime. Appreciates some degree of benefit, no side effects. Discussed potential dose increase in future to help better target baseline pain, if needed. Refilled today for 90 day supply.   Continue Lyrica 50 mg in morning and 100 mg at bedtime. He appreciates some degree of benefit, no side effects. Discussed potential dose increase in future to help better target baseline pain, if needed.   [place] - [place], last renewed on 11/21/23. Appreciates some degree of benefit. Recommend continuing to work with pharmacist/dispensary to explore available products.   Tizanidine - currently takes 2 mg during daytime as needed and 4 mg at bedtime. Appreciates benefit with neck/back pain, sedation effects with daytime that are manageable. Recommend trial increase, 2-4 mg during daytime and 4-8 mg at bedtime as needed. Max three times daily as needed. Updated prescription sent into pharmacy today.      Potential procedures:   He had baclofen IT pump implant on 1/3/23 ([facility]).      Other Orders/Referrals:   None      Follow up with SUJATA Armendariz CNP in around 8 weeks, or sooner if needed.     Since his last visit, Riley Gifford reports:  -He continues to have persistent neck/upper back pain.   -He notes using arm bike more recently, able to go for 2-3 minutes then has to rest.   -Notes more spasms recently.   -He is not taking PO baclofen, has pump refill next month.   -He continues to use tizanidine 2 mg TID PRN.   -Takes Amitriptyline 50 mg at bedtime, notes benefit and pain is not major disruption with sleep right now.   -Lyrica 50 mg in morning and 100 mg at bedtime, appreciates benefit but would like to add 50 mg in afternoon to see if this helps more with pain control.     Pain Information:   Pain rating: averages 5/10 on a 0-10 scale.      Interval history from last visit on 4/23/24:  -He reports neck/upper back pain is more prominent, increased stiffness  and having more headaches.   -He notes increased pain started 1+ weeks ago. No changes in routine or other factors he identifies as potentially contributing.   -He has baclofen pump, small adjustment last a few months ago, notes his dose is high, has next fill in a couple of weeks.   -He takes tizanidine at bedtime to help with pain, also appreciates benefit from sedation effects for sleep.   -He notes using 2 mg dose during daytime and 4 mg at bedtime.   -He does have sedation effects with tizanidine, particularly with sedation effects. He typically lies down during daytime to rest after use.   -He continues medical cannabis, primarily morning and afternoon.   -Lyrica 50 mg in morning and 100 mg at bedtime.   -Amitriptyline dose 50 mg at bedtime. He would like to hold off on dosage increase right now.   Pain Information:              Pain rating: averages 6/10 on a 0-10 scale.        Current Pain Treatments:    Medications:               Baclofen IT pump              Amitriptyline 50 mg at bedtime (PCP, dx chronic midline thoracic back pain)              Zolpidem (PCP, sleep)              Medical cannabis (last renewed on 11/21/23), takes it at night, finds this most helpful for sleep              Lyrica 50 mg in AM and afternoon and 100 mg in PM   Tizanidine TID PRN     Other therapies:               PMR - baclofen pump management        Current MME: 0      Review of [place] Prescription Monitoring Program (): No concern for abuse or misuse of controlled medications based on this report. Reviewed - appears appropriate.         Past pain treatments:  Baclofen PO      Medications:  Current Outpatient Medications   Medication Sig Dispense Refill    Acetaminophen (TYLENOL PO) Take 500 mg by mouth as needed for mild pain or fever Reported on 2/15/2017      albuterol (PROAIR HFA/PROVENTIL HFA/VENTOLIN HFA) 108 (90 Base) MCG/ACT inhaler INHALE 1 TO 2 PUFFS INTO THE LUNGS EVERY 4 HOURS AS NEEDED FOR SHORTNESS OF  BREATH OR DIFFICULT BREATHING OR WHEEZING 8.5 g 1    alum & mag hydroxide-simethicone (MYLANTA/MAALOX) 200-200-20 MG/5ML SUSP suspension Take 30 mLs by mouth  0    amitriptyline (ELAVIL) 25 MG tablet Take 2 tablets (50 mg) by mouth at bedtime 180 tablet 0    baclofen (LIORESAL) 10 MG tablet Take 1 tablet (10 mg) by mouth 2 times daily      budesonide (PULMICORT) 1 MG/2ML neb solution MIX 1 RESPULE IN SYRUP AND TAKE BY MOUTH TWICE DAILY 180 mL 1    cetirizine (ZYRTEC) 10 MG tablet Take 10 mg by mouth daily      COMPOUNDED NON-CONTROLLED SUBSTANCE (CMPD RX) - PHARMACY TO MIX COMPOUNDED MEDICATION 30 mLs by Bladder Instillation route at bedtime sodium chloride 0.9% (bottle) 1,000 mL with gentamicin 480mg.  Instill 30mL into the bladder at bedtime. 1 each 11    COMPOUNDED NON-CONTROLLED SUBSTANCE (CMPD RX) - PHARMACY TO MIX COMPOUNDED MEDICATION Instill 60 mL into bladder at bedtime. Refrigerate. Expires: For additional refills, please schedule a follow-up appointment                    . 1000 mL 3    ENEMEEZ MINI 283 MG/5ML enema USE ONE RECTALLY EVERY OTHER  mL 0    famotidine (PEPCID) 20 MG tablet Take 1 tablet (20 mg) by mouth 2 times daily as needed 60 tablet 0    hydrocortisone, Perianal, (ANUSOL-HC) 2.5 % cream APPLY RECTALLY TO THE AFFECTED AREA TWICE DAILY 30 g 9    lidocaine (XYLOCAINE) 5 % external ointment Apply topically as needed for moderate pain 2500 g 0    omeprazole (PRILOSEC) 40 MG DR capsule TAKE 1 CAPSULE(40 MG) BY MOUTH DAILY 90 capsule 3    ondansetron (ZOFRAN) 4 MG tablet TAKE 1 TABLET(4 MG) BY MOUTH EVERY 8 HOURS AS NEEDED FOR NAUSEA 10 tablet 0    phenylephrine-cocoa butter (PREPARATION H) 0.25-88.44 % suppository Insert one suppository rectally twice daily as needed. 48 suppository 3    polyethylene glycol (MIRALAX) 17 GM/Dose powder DISSOLVE 1 TO 2 CAPFULS INTO 8 OUNCES OF LIQUID AND DRINK ONCE DAILY 510 g 1    polyethylene glycol (MIRALAX) powder Take 17 g (1 capful) by mouth daily as  needed for constipation 510 g 1    pregabalin (LYRICA) 100 MG capsule Take 50 mg in morning and afternoon, along with 100 mg at bedtime 90 capsule 0    pregabalin (LYRICA) 50 MG capsule Take 50 mg in morning and afternoon, along with 100 mg at bedtime 180 capsule 0    sodium chloride 0.9% (bottle) 1,000 mL with gentamicin 480 mg COMPOUNDED irrigation Instill 60mg of Gentamicin solution into bladder at bedtime. 1800 mL 4    sodium chloride 0.9% (bottle) 1,000 mL with gentamicin 500 mg irrigation 480 mg of Gentamicin in 1 L of NS. Please instill 60 mL of Gentamicin solution into bladder at HS. 1800 mL 4    sodium phosphate (FLEET ENEMA) 7-19 GM/118ML rectal enema Place 1 enema rectally daily as needed for constipation 1 Bottle 0    tolterodine (DETROL) 2 MG tablet TAKE 1 TABLET(2 MG) BY MOUTH TWICE DAILY 180 tablet 1    zolpidem (AMBIEN) 10 MG tablet TAKE 1 TABLET(10 MG) BY MOUTH EVERY NIGHT AS NEEDED FOR SLEEP 90 tablet 0    bisacodyl (DULCOLAX) 5 MG EC tablet Two days prior to exam take two (2) tablets at 4pm. One day prior to exam take two (2) tablets at 4pm (Patient not taking: Reported on 6/26/2024) 4 tablet 0    CARAFATE 1 GM/10ML PO suspension  (Patient not taking: Reported on 6/26/2024)      magic mouthwash suspension, diphenhydrAMINE, lidocaine, aluminum-magnesium & simethicone, (FIRST-MOUTHWASH BLM) compounding kit Combine 1/3rd each of viscous lidocaine, maalox and liquid benadryl - swish and swallow 5 mL as needed every 4-6 hours for pain (Patient not taking: Reported on 6/26/2024) 240 mL 1    polyethylene glycol (GOLYTELY) 236 g suspension Two days before procedure at 5PM fill first container with water. Mix and drink an 8 oz glass every 10-15 minutes until HALF of the container is gone. Place the remainder in the refrigerator. One day before procedure at 5PM drink second half of bowel prep. Drink an 8 oz glass every 10-15 minutes until it is gone. Day of procedure 6 hours before arrival time fill the 2nd  container with water. Mix and drink an 8 oz glass every 10-15 minutes until HALF of the container is gone. Discard the remaining solution. (Patient not taking: Reported on 6/26/2024) 8000 mL 0    tiZANidine (ZANAFLEX) 2 MG tablet TAKE 1 TO 4 TABLETS(2 TO 8 MG) BY MOUTH THREE TIMES DAILY 270 tablet 1       Medical History: any changes in medical history since they were last seen? No      Objective:    Physical Exam:  Blood pressure (!) 87/59, pulse 113.  Constitutional: Well developed, well nourished, appears stated age.  Gait: Wheelchair bound   HEENT: Head atraumatic, normocephalic. Eyes without conjunctival injection or jaundice. Oropharynx clear. Neck supple. No obvious neck masses.  Skin: No rash, lesions, or petechiae of exposed skin.   Psychiatric/mental status: Alert, without lethargy or stupor. Speech fluent. Appropriate affect. Mood normal. Able to follow commands without difficulty.     Diagnostic Tests/Imaging/Labs:  BMP on 5/31/24 - hepatic and renal function intact, GFR >90.     BILLING TIME DOCUMENTATION:   The total TIME spent on this patient on the date of the encounter/appointment was 24 minutes.      TOTAL TIME includes:   Time spent preparing to see the patient (reviewing records and tests)   Time spent face to face (or over the phone) with the patient   Time spent ordering tests, medications, procedures and referrals   Time spent Referring and communicating with other healthcare professionals   Time spent documenting clinical information in Epic

## 2024-06-27 DIAGNOSIS — N31.9 NEUROGENIC BLADDER: Primary | ICD-10-CM

## 2024-07-03 ENCOUNTER — TELEPHONE (OUTPATIENT)
Dept: UROLOGY | Facility: CLINIC | Age: 49
End: 2024-07-03

## 2024-07-03 ENCOUNTER — ALLIED HEALTH/NURSE VISIT (OUTPATIENT)
Dept: FAMILY MEDICINE | Facility: CLINIC | Age: 49
End: 2024-07-03
Payer: COMMERCIAL

## 2024-07-03 DIAGNOSIS — Z79.899 MEDICATION MANAGEMENT: Primary | ICD-10-CM

## 2024-07-03 DIAGNOSIS — N39.0 RECURRENT UTI: Primary | ICD-10-CM

## 2024-07-03 PROCEDURE — 93000 ELECTROCARDIOGRAM COMPLETE: CPT

## 2024-07-03 PROCEDURE — 99207 PR NO CHARGE NURSE ONLY: CPT

## 2024-07-03 NOTE — TELEPHONE ENCOUNTER
7/12/17 @ 1315 (JULIETTE)  RETURNED CALL TO [facility] , INFORMED HER THAT DR WHITEHEAD SUGGEST SHE SEE A PAIN MGT PHYSICIAN, GAVE HER THE PHONE # TO THE [facility], /[phone]. PT STATED HER UNDERSTANDING     Called patient to schedule surgery with Dr. Giraldo    Spoke with: Riley    Date of Surgery: 9/13    Approximate arrival time given:  Yes    Location of surgery: [facility]     Pre-Op H&P: [facility]    Post-Op Appt Date: NONE     Imaging needed:  No    Discussed with patient pre-op RN will call 2-3 days prior to surgery with arrival time and instructions:  Yes    Discussed with patient PAC RN will provide arrival time and instructions for surgery at the time of the appointment: [[place] locations only]: Not Applicable      Packet sent out: Yes 07/03/24  via [employer] Message      Additional Comments:  NA    All patients questions were answered and was instructed to review surgical packet and call back with any questions or concerns.       Kalyn Stoll on 7/3/2024 at 9:00 AM

## 2024-07-09 DIAGNOSIS — N39.0 RECURRENT UTI: Primary | ICD-10-CM

## 2024-07-09 NOTE — PROGRESS NOTES
Call placed to patient. He reports fatigue and cloudy urine. He would like to do a urinalysis and culture. Orders placed and number given for him to call and schedule a lab visit.     Thank you,  Anni Jay RN, BSN Urology Triage

## 2024-07-10 ENCOUNTER — LAB (OUTPATIENT)
Dept: LAB | Facility: CLINIC | Age: 49
End: 2024-07-10
Payer: COMMERCIAL

## 2024-07-10 DIAGNOSIS — N39.0 RECURRENT UTI: ICD-10-CM

## 2024-07-10 PROCEDURE — 87086 URINE CULTURE/COLONY COUNT: CPT

## 2024-07-10 PROCEDURE — 87088 URINE BACTERIA CULTURE: CPT

## 2024-07-10 PROCEDURE — 81001 URINALYSIS AUTO W/SCOPE: CPT

## 2024-07-10 PROCEDURE — 87186 SC STD MICRODIL/AGAR DIL: CPT

## 2024-07-13 LAB — BACTERIA UR CULT: ABNORMAL

## 2024-07-15 ENCOUNTER — PATIENT OUTREACH (OUTPATIENT)
Dept: UROLOGY | Facility: CLINIC | Age: 49
End: 2024-07-15
Payer: COMMERCIAL

## 2024-07-15 DIAGNOSIS — N39.0 RECURRENT UTI: Primary | ICD-10-CM

## 2024-07-15 RX ORDER — NITROFURANTOIN 25; 75 MG/1; MG/1
100 CAPSULE ORAL 2 TIMES DAILY
Qty: 10 CAPSULE | Refills: 0 | Status: SHIPPED | OUTPATIENT
Start: 2024-07-15 | End: 2024-07-20

## 2024-07-15 NOTE — PROGRESS NOTES
Call received from patient. He states that he is still symptomatic with increased fatigue and clouded urine. Let him know that I sent the prescription for nitrofurantoin to his pharmacy of choice.    Thank you,  Anni Jay RN, BSN Urology Triage

## 2024-07-15 NOTE — PROGRESS NOTES
Call placed to patient to let him know that he does have a treatable bacteria in his urine. Requested that he call me back and let me know if he is still symptomatic. Nitrofurantoin order placed and will inform patient when he calls. My direct line given.    Thank you,  Anni Jay RN, BSN Urology Triage

## 2024-07-16 ENCOUNTER — PATIENT OUTREACH (OUTPATIENT)
Dept: CARE COORDINATION | Facility: CLINIC | Age: 49
End: 2024-07-16
Payer: COMMERCIAL

## 2024-07-16 NOTE — LETTER
[facility]  Patient Centered Plan of Care  About Me:        Patient Name:  Riley Marcos    YOB: 1975  Age:         48 year old   [place] MRN:    [id number] Telephone Information:  Home Phone [phone]   Mobile [phone]   Mobile [phone]       Address:  [address] [place] [address] [address]  [place] [address] Email address:  [email or web address]      Emergency Contact(s)    Name Relationship Lgl Grd Work Phone Home Phone Mobile Phone   1. TREY MARCOS (NEP* Relative No noen none [phone]   2. AGUSTO CAVAZOS Sister   [phone]    3. HODA MARCOS Brother No  [phone]            Primary language:  English     needed? No   [facility]:  [phone] op. 1  Other communication barriers:Glasses    Preferred Method of Communication:  Lisa  Current living arrangement: I live in assisted living    Mobility Status/ Medical Equipment: Dependent/Assisted by Another            My Access Plan  Medical Emergency 911   Primary Clinic Line [facility] - [phone]   24 Hour Appointment Line [phone] or  [phone]) (toll-free)   24 Hour Nurse Line [phone] (toll-free)   Preferred Urgent Care [facility] [phone]     Preferred Hospital [facility]  [phone]     Pref[facility]acy [id number] - [place], [place] - [address] [place] [address] AT [facility] & [facility] [address]     Behavioral Health Crisis Line The National Suicide Prevention Lifeline at 1-157.433.2022 or Text/Call 898           My Care Team Members  Patient Care Team         Relationship Specialty Notifications Start End    Jenny Fay PAEmilyC PCP - General Family Medicine  12/21/21     Phone: [phone] Fax: [phone] [address] [facility] [address]    Robbin Andino, RN Lead Care Coordinator Nurse Admissions 1/6/16     phone:  [phone]    Phone:  [phone] Fax: [phone]        Rober Leo MD Referring Physician Urology  1/8/16     Phone: [phone] Fax: [phone] [address] [facility] [address]    Amanda Other (see comments)   1/8/16     [facility]     Phone: [phone]         Evaristo Hayes MD MD Urology  4/19/16     Phone: [phone] Fax: [phone]         [address] [place] [address] [address] [facility] [address]    Jenny Wright, RN Registered Nurse Urology  4/19/16     Rosemary Thomas, RN Nurse Coordinator Physical Medicine and Rehabilitation  3/30/17     Phone: [phone] Pager: [phone]        Lulú Reveles APRN CNP Nurse Practitioner Nurse Practitioner  8/28/20     Phone: [phone] Fax: [phone]         [address] [place] [address]    [facility] Occupational Therapist Occupational Therapy  1/7/22     [facility] Shanique Alberto  fax [phone]    Phone: [phone] Fax: [phone]         [address] [facility] [address]    Evaristo Hayes MD MD Urology  1/17/22     Phone: [phone] Fax: [phone]         [address] [place] [address]    Yessy Tapia, RN Specialty Care Coordinator   1/17/22     Jenny Fay, PA-C Assigned PCP   4/3/22     Phone: [phone] Fax: [phone] [address] [facility] [address]    Nalini Resendez APRN CNP Nurse Practitioner   9/12/23     Phone: [phone] Fax: [phone] [address] [place] [address]    Glenny Rosado MD Assigned Surgical Provider   12/2/23     Phone: [phone] Fax: [phone]         [address] [facility] [address]                My Care Plans  Self Management and Treatment Plan    Care Plan  Care Plan: General  take medications as prescribed       Problem: HP GENERAL PROBLEM       Goal: General Goal -  I will take all medications as prescribed by the  providers.       Start Date: 7/22/2022    This Visit's Progress: 80% Recent Progress: 80%    Note:     Barriers: wheelchair bound  Strengths: engaged in care coordination  Patient expressed understanding of goal: yes  Action steps to achieve this goal:  1. I will take all medications as prescribed.  2. I will ask any questions that I have regarding new medications.  3. I will work with the RN CC if I have any difficulty getting refills.   4. I will work with urology for the PA on the gentamicin instillations for the bladder.                              Care Plan: General -  Make and attend follow up appointments       Problem: HP GENERAL PROBLEM       Goal: General Goal - I will make and attend all recommended appointments from my providers.       Start Date: 7/22/2022    This Visit's Progress: 80% Recent Progress: 80%    Note:     Barriers: Many providers.  Strengths: engaged in care coordination  Patient expressed understanding of goal: yes  Action steps to achieve this goal:  1. I will make all of the recommended follow up appointments.  2. I will attend all of the follow up appointments as recommended by the providers.                            Care Plan: Chronic Pain       Problem: Chronic Pain is Not Self-Managed       Goal: Demonstrate improved self-management of chronic pain       Start Date: 10/25/2023    This Visit's Progress: 30% Recent Progress: 30%    Note:     Try ice or heat if tolerated.                                Action Plans on File:            Depression          Advance Care Plans/Directives:   Advanced Care Plan/Directives on file:   Yes    Status of Document(s):   On File and Validated    Advanced Care Plan/Directives Type:   Advanced Directive - On File           My Medical and Care Information  Problem List   Patient Active Problem List   Diagnosis    Vitamin D deficiency    Eosinophilic esophagitis    Neurogenic bladder    CARDIOVASCULAR SCREENING; LDL GOAL LESS THAN 160     Quadriplegia (H)    Chronic constipation    Internal hemorrhoids with other complication    Diagnostic skin and sensitization tests(aka ALLERGENS)    Anal or rectal pain    Allergic rhinitis due to animal dander    Allergy to mold spores    House dust mite allergy    Insomnia    Gallstones    Chronic midline thoracic back pain    Pulmonary nodules    ACP (advance care planning)    Cervical spinal cord injury, sequela (H24)    Spasticity    Self-catheterizes urinary bladder    Bleeding external hemorrhoids    History of claustrophobia    Recurrent UTI    Muscle spasm    Low sodium levels    Low HDL (under 40)      Current Medications and Allergies:  See printed Medication Report.    Care Coordination Start Date: 1/6/2016   Frequency of Care Coordination: monthly, more frequently as needed     Form Last Updated: 07/16/2024

## 2024-07-16 NOTE — PROGRESS NOTES
Clinic Care Coordination - Chart Review Only    Situation: Ambulatory Care Coordination leader performing chart review related to critical staff coverage needs.    Background: Patient enrolled in Care Coordination with last reassessment completed on 1/24/24.  Lead RN CC spoke to patient on 6/12/24.     Assessment: Per chart review, patient spoke with Urology RN yesterday regarding UTI symptoms. Prescription was sent to pharmacy for treatment.  Patient also had contact by [place]  on 7/10/24 to discuss renewal of [place] and completing necessary paperwork for county to process this.    Plan: Due to longstanding relationship with Lead CC as well as care team contact this month, writer will set next follow up outreach for Lead CC to complete upon her return in August. Patient is due for complex care plan which was sent by writer. Covering CC team remains available for urgent needs until Lead CC returns.    Virgen Diego, ONDINAN, RN   Manager of Ambulatory Care Management  [facility]

## 2024-07-23 ENCOUNTER — OFFICE VISIT (OUTPATIENT)
Dept: FAMILY MEDICINE | Facility: CLINIC | Age: 49
End: 2024-07-23
Payer: COMMERCIAL

## 2024-07-23 VITALS
BODY MASS INDEX: 17.63 KG/M2 | TEMPERATURE: 98.8 F | HEART RATE: 123 BPM | RESPIRATION RATE: 20 BRPM | WEIGHT: 130 LBS | SYSTOLIC BLOOD PRESSURE: 126 MMHG | OXYGEN SATURATION: 95 % | DIASTOLIC BLOOD PRESSURE: 83 MMHG

## 2024-07-23 DIAGNOSIS — K20.0 EOSINOPHILIC ESOPHAGITIS: ICD-10-CM

## 2024-07-23 DIAGNOSIS — N39.0 RECURRENT UTI: ICD-10-CM

## 2024-07-23 DIAGNOSIS — R79.89 ABNORMAL CBC: ICD-10-CM

## 2024-07-23 DIAGNOSIS — K59.09 CHRONIC CONSTIPATION: ICD-10-CM

## 2024-07-23 DIAGNOSIS — N31.9 NEUROGENIC BLADDER: ICD-10-CM

## 2024-07-23 DIAGNOSIS — G82.50 QUADRIPLEGIA (H): ICD-10-CM

## 2024-07-23 DIAGNOSIS — Z01.818 PREOP GENERAL PHYSICAL EXAM: Primary | ICD-10-CM

## 2024-07-23 LAB
ALBUMIN UR-MCNC: NEGATIVE MG/DL
ANION GAP SERPL CALCULATED.3IONS-SCNC: 12 MMOL/L (ref 7–15)
APPEARANCE UR: CLEAR
BILIRUB UR QL STRIP: NEGATIVE
BUN SERPL-MCNC: 6.9 MG/DL (ref 6–20)
CALCIUM SERPL-MCNC: 8.8 MG/DL (ref 8.8–10.4)
CHLORIDE SERPL-SCNC: 100 MMOL/L (ref 98–107)
COLOR UR AUTO: YELLOW
CREAT SERPL-MCNC: 0.48 MG/DL (ref 0.67–1.17)
EGFRCR SERPLBLD CKD-EPI 2021: >90 ML/MIN/1.73M2
ERYTHROCYTE [DISTWIDTH] IN BLOOD BY AUTOMATED COUNT: 13.6 % (ref 10–15)
FERRITIN SERPL-MCNC: 20 NG/ML (ref 31–409)
GLUCOSE SERPL-MCNC: 87 MG/DL (ref 70–99)
GLUCOSE UR STRIP-MCNC: NEGATIVE MG/DL
HCO3 SERPL-SCNC: 21 MMOL/L (ref 22–29)
HCT VFR BLD AUTO: 37.9 % (ref 40–53)
HGB BLD-MCNC: 12.7 G/DL (ref 13.3–17.7)
HGB UR QL STRIP: NEGATIVE
IRON BINDING CAPACITY (ROCHE): 332 UG/DL (ref 240–430)
IRON SATN MFR SERPL: 14 % (ref 15–46)
IRON SERPL-MCNC: 46 UG/DL (ref 61–157)
KETONES UR STRIP-MCNC: NEGATIVE MG/DL
LEUKOCYTE ESTERASE UR QL STRIP: ABNORMAL
MCH RBC QN AUTO: 29.6 PG (ref 26.5–33)
MCHC RBC AUTO-ENTMCNC: 33.5 G/DL (ref 31.5–36.5)
MCV RBC AUTO: 88 FL (ref 78–100)
NITRATE UR QL: NEGATIVE
PH UR STRIP: 7.5 [PH] (ref 5–7)
PLATELET # BLD AUTO: 336 10E3/UL (ref 150–450)
POTASSIUM SERPL-SCNC: 4.2 MMOL/L (ref 3.4–5.3)
RBC # BLD AUTO: 4.29 10E6/UL (ref 4.4–5.9)
RBC #/AREA URNS AUTO: ABNORMAL /HPF
SODIUM SERPL-SCNC: 133 MMOL/L (ref 135–145)
SP GR UR STRIP: 1.01 (ref 1–1.03)
SQUAMOUS #/AREA URNS AUTO: ABNORMAL /LPF
UROBILINOGEN UR STRIP-ACNC: 0.2 E.U./DL
WBC # BLD AUTO: 8.3 10E3/UL (ref 4–11)
WBC #/AREA URNS AUTO: ABNORMAL /HPF

## 2024-07-23 PROCEDURE — 36415 COLL VENOUS BLD VENIPUNCTURE: CPT | Performed by: PHYSICIAN ASSISTANT

## 2024-07-23 PROCEDURE — 83550 IRON BINDING TEST: CPT | Performed by: PHYSICIAN ASSISTANT

## 2024-07-23 PROCEDURE — 82728 ASSAY OF FERRITIN: CPT | Performed by: PHYSICIAN ASSISTANT

## 2024-07-23 PROCEDURE — 99214 OFFICE O/P EST MOD 30 MIN: CPT | Performed by: PHYSICIAN ASSISTANT

## 2024-07-23 PROCEDURE — 81001 URINALYSIS AUTO W/SCOPE: CPT | Performed by: PHYSICIAN ASSISTANT

## 2024-07-23 PROCEDURE — 85027 COMPLETE CBC AUTOMATED: CPT | Performed by: PHYSICIAN ASSISTANT

## 2024-07-23 PROCEDURE — 80048 BASIC METABOLIC PNL TOTAL CA: CPT | Performed by: PHYSICIAN ASSISTANT

## 2024-07-23 PROCEDURE — G2211 COMPLEX E/M VISIT ADD ON: HCPCS | Performed by: PHYSICIAN ASSISTANT

## 2024-07-23 PROCEDURE — 87086 URINE CULTURE/COLONY COUNT: CPT | Performed by: PHYSICIAN ASSISTANT

## 2024-07-23 PROCEDURE — 83540 ASSAY OF IRON: CPT | Performed by: PHYSICIAN ASSISTANT
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
## 2024-07-23 NOTE — PATIENT INSTRUCTIONS

## 2024-07-24 NOTE — RESULT ENCOUNTER NOTE
Riley,     We are waiting on your urine culture still. However, your labs do continue to show some anemia. This is new over the last few months for you. I would like to see you after your endoscopy to determine if we should start some iron and the next steps take.   Jenny Fay PA-C

## 2024-07-25 LAB — BACTERIA UR CULT: NORMAL

## 2024-08-04 DIAGNOSIS — R05.9 COUGH: ICD-10-CM

## 2024-08-05 RX ORDER — ALBUTEROL SULFATE 90 UG/1
AEROSOL, METERED RESPIRATORY (INHALATION)
Qty: 36 G | Refills: 4 | Status: SHIPPED | OUTPATIENT
Start: 2024-08-05

## 2024-08-10 DIAGNOSIS — K59.09 CHRONIC CONSTIPATION: ICD-10-CM

## 2024-08-12 ENCOUNTER — TRANSFERRED RECORDS (OUTPATIENT)
Dept: HEALTH INFORMATION MANAGEMENT | Facility: CLINIC | Age: 49
End: 2024-08-12
Payer: COMMERCIAL

## 2024-08-12 RX ORDER — DOCUSATE SODIUM 283 MG/5ML
LIQUID RECTAL
Qty: 150 ML | Refills: 0 | Status: SHIPPED | OUTPATIENT
Start: 2024-08-12

## 2024-08-13 ENCOUNTER — OFFICE VISIT (OUTPATIENT)
Dept: FAMILY MEDICINE | Facility: CLINIC | Age: 49
End: 2024-08-13
Payer: COMMERCIAL

## 2024-08-13 VITALS
TEMPERATURE: 98.1 F | WEIGHT: 130 LBS | HEIGHT: 72 IN | DIASTOLIC BLOOD PRESSURE: 65 MMHG | OXYGEN SATURATION: 97 % | HEART RATE: 111 BPM | SYSTOLIC BLOOD PRESSURE: 99 MMHG | RESPIRATION RATE: 20 BRPM | BODY MASS INDEX: 17.61 KG/M2

## 2024-08-13 DIAGNOSIS — D50.0 IRON DEFICIENCY ANEMIA DUE TO CHRONIC BLOOD LOSS: ICD-10-CM

## 2024-08-13 DIAGNOSIS — E87.1 LOW SODIUM LEVELS: ICD-10-CM

## 2024-08-13 DIAGNOSIS — N31.9 NEUROGENIC BLADDER: ICD-10-CM

## 2024-08-13 DIAGNOSIS — G82.50 QUADRIPLEGIA (H): ICD-10-CM

## 2024-08-13 DIAGNOSIS — Z01.818 PREOP GENERAL PHYSICAL EXAM: Primary | ICD-10-CM

## 2024-08-13 DIAGNOSIS — K59.09 CHRONIC CONSTIPATION: ICD-10-CM

## 2024-08-13 DIAGNOSIS — K20.0 EOSINOPHILIC ESOPHAGITIS: ICD-10-CM

## 2024-08-13 LAB
BASOPHILS # BLD MANUAL: 0.1 10E3/UL (ref 0–0.2)
BASOPHILS NFR BLD MANUAL: 1 %
EOSINOPHIL # BLD MANUAL: 2.4 10E3/UL (ref 0–0.7)
EOSINOPHIL NFR BLD MANUAL: 26 %
ERYTHROCYTE [DISTWIDTH] IN BLOOD BY AUTOMATED COUNT: 13.6 % (ref 10–15)
HCT VFR BLD AUTO: 37.6 % (ref 40–53)
HGB BLD-MCNC: 12.5 G/DL (ref 13.3–17.7)
LYMPHOCYTES # BLD MANUAL: 2.5 10E3/UL (ref 0.8–5.3)
LYMPHOCYTES NFR BLD MANUAL: 27 %
MCH RBC QN AUTO: 29.9 PG (ref 26.5–33)
MCHC RBC AUTO-ENTMCNC: 33.2 G/DL (ref 31.5–36.5)
MCV RBC AUTO: 90 FL (ref 78–100)
MONOCYTES # BLD MANUAL: 0.3 10E3/UL (ref 0–1.3)
MONOCYTES NFR BLD MANUAL: 3 %
NEUTROPHILS # BLD MANUAL: 4 10E3/UL (ref 1.6–8.3)
NEUTROPHILS NFR BLD MANUAL: 43 %
NRBC # BLD AUTO: 0 10E3/UL
NRBC BLD AUTO-RTO: 0 /100
PLAT MORPH BLD: ABNORMAL
PLATELET # BLD AUTO: 387 10E3/UL (ref 150–450)
RBC # BLD AUTO: 4.18 10E6/UL (ref 4.4–5.9)
RBC MORPH BLD: ABNORMAL
WBC # BLD AUTO: 9.3 10E3/UL (ref 4–11)

## 2024-08-13 PROCEDURE — G2211 COMPLEX E/M VISIT ADD ON: HCPCS | Performed by: PHYSICIAN ASSISTANT

## 2024-08-13 PROCEDURE — 83550 IRON BINDING TEST: CPT | Performed by: PHYSICIAN ASSISTANT

## 2024-08-13 PROCEDURE — 83540 ASSAY OF IRON: CPT | Performed by: PHYSICIAN ASSISTANT

## 2024-08-13 PROCEDURE — 82728 ASSAY OF FERRITIN: CPT | Performed by: PHYSICIAN ASSISTANT

## 2024-08-13 PROCEDURE — 85027 COMPLETE CBC AUTOMATED: CPT | Performed by: PHYSICIAN ASSISTANT

## 2024-08-13 PROCEDURE — 99214 OFFICE O/P EST MOD 30 MIN: CPT | Performed by: PHYSICIAN ASSISTANT

## 2024-08-13 PROCEDURE — 80048 BASIC METABOLIC PNL TOTAL CA: CPT | Performed by: PHYSICIAN ASSISTANT

## 2024-08-13 PROCEDURE — 36415 COLL VENOUS BLD VENIPUNCTURE: CPT | Performed by: PHYSICIAN ASSISTANT

## 2024-08-13 PROCEDURE — 85007 BL SMEAR W/DIFF WBC COUNT: CPT | Performed by: PHYSICIAN ASSISTANT
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
## 2024-08-13 NOTE — PATIENT INSTRUCTIONS
How to Take Your Medication Before Surgery  Preoperative Medication Instructions   Antiplatelet or Anticoagulation Medication Instructions   - Bleeding risk is low for this procedure (e.g. dental, skin, cataract).    Additional Medication Instructions  Take all scheduled medications on the day of surgery       Patient Education   Preparing for Your Surgery  Getting started  A nurse will call you to review your health history and instructions. They will give you an arrival time based on your scheduled surgery time. Please be ready to share:  Your doctor's clinic name and phone number  Your medical, surgical, and anesthesia history  A list of allergies and sensitivities  A list of medicines, including herbal treatments and over-the-counter drugs  Whether the patient has a legal guardian (ask how to send us the papers in advance)  Please tell us if you're pregnant--or if there's any chance you might be pregnant. Some surgeries may injure a fetus (unborn baby), so they require a pregnancy test. Surgeries that are safe for a fetus don't always need a test, and you can choose whether to have one.   If you have a child who's having surgery, please ask for a copy of Preparing for Your Child's Surgery.    Preparing for surgery  Within 10 to 30 days of surgery: Have a pre-op exam (sometimes called an H&P, or History and Physical). This can be done at a clinic or pre-operative center.  If you're having a , you may not need this exam. Talk to your care team.  At your pre-op exam, talk to your care team about all medicines you take. If you need to stop any medicines before surgery, ask when to start taking them again.  We do this for your safety. Many medicines can make you bleed too much during surgery. Some change how well surgery (anesthesia) drugs work.  Call your insurance company to let them know you're having surgery. (If you don't have insurance, call [phone].)  Call your clinic if there's any change in  your health. This includes signs of a cold or flu (sore throat, runny nose, cough, rash, fever). It also includes a scrape or scratch near the surgery site.  If you have questions on the day of surgery, call your hospital or surgery center.  Eating and drinking guidelines  For your safety: Unless your surgeon tells you otherwise, follow the guidelines below.  Eat and drink as usual until 8 hours before you arrive for surgery. After that, no food or milk.  Drink clear liquids until 2 hours before you arrive. These are liquids you can see through, like water, Gatorade, and Propel Water. They also include plain black coffee and tea (no cream or milk), candy, and breath mints. You can spit out gum when you arrive.  If you drink alcohol: Stop drinking it the night before surgery.  If your care team tells you to take medicine on the morning of surgery, it's okay to take it with a sip of water.  Preventing infection  Shower or bathe the night before and morning of your surgery. Follow the instructions your clinic gave you. (If no instructions, use regular soap.)  Don't shave or clip hair near your surgery site. We'll remove the hair if needed.  Don't smoke or vape the morning of surgery. You may chew nicotine gum up to 2 hours before surgery. A nicotine patch is okay.  Note: Some surgeries require you to completely quit smoking and nicotine. Check with your surgeon.  Your care team will make every effort to keep you safe from infection. We will:  Clean our hands often with soap and water (or an alcohol-based hand rub).  Clean the skin at your surgery site with a special soap that kills germs.  Give you a special gown to keep you warm. (Cold raises the risk of infection.)  Wear special hair covers, masks, gowns and gloves during surgery.  Give antibiotic medicine, if prescribed. Not all surgeries need antibiotics.  What to bring on the day of surgery  Photo ID and insurance card  Copy of your health care directive, if you  have one  Glasses and hearing aids (bring cases)  You can't wear contacts during surgery  Inhaler and eye drops, if you use them (tell us about these when you arrive)  CPAP machine or breathing device, if you use them  A few personal items, if spending the night  If you have . . .  A pacemaker, ICD (cardiac defibrillator) or other implant: Bring the ID card.  An implanted stimulator: Bring the remote control.  A legal guardian: Bring a copy of the certified (court-stamped) guardianship papers.  Please remove any jewelry, including body piercings. Leave jewelry and other valuables at home.  If you're going home the day of surgery  You must have a responsible adult drive you home. They should stay with you overnight as well.  If you don't have someone to stay with you, and you aren't safe to go home alone, we may keep you overnight. Insurance often won't pay for this.  After surgery  If it's hard to control your pain or you need more pain medicine, please call your surgeon's office.  Questions?   If you have any questions for your care team, list them here: _________________________________________________________________________________________________________________________________________________________________________ ____________________________________ ____________________________________ ____________________________________  For informational purposes only. Not to replace the advice of your health care provider. Copyright   2003, 2019 [facility]. All rights reserved. Clinically reviewed by Laquita Eden MD. [employer] [id number] - REV 12/22.

## 2024-08-14 LAB
ANION GAP SERPL CALCULATED.3IONS-SCNC: 11 MMOL/L (ref 7–15)
BUN SERPL-MCNC: 10.3 MG/DL (ref 6–20)
CALCIUM SERPL-MCNC: 8.4 MG/DL (ref 8.8–10.4)
CHLORIDE SERPL-SCNC: 99 MMOL/L (ref 98–107)
CREAT SERPL-MCNC: 0.84 MG/DL (ref 0.67–1.17)
EGFRCR SERPLBLD CKD-EPI 2021: >90 ML/MIN/1.73M2
FERRITIN SERPL-MCNC: 15 NG/ML (ref 31–409)
GLUCOSE SERPL-MCNC: 92 MG/DL (ref 70–99)
HCO3 SERPL-SCNC: 21 MMOL/L (ref 22–29)
IRON BINDING CAPACITY (ROCHE): 336 UG/DL (ref 240–430)
IRON SATN MFR SERPL: 14 % (ref 15–46)
IRON SERPL-MCNC: 47 UG/DL (ref 61–157)
POTASSIUM SERPL-SCNC: 4.4 MMOL/L (ref 3.4–5.3)
SODIUM SERPL-SCNC: 131 MMOL/L (ref 135–145)

## 2024-08-14 RX ORDER — FERROUS SULFATE 325(65) MG
325 TABLET, DELAYED RELEASE (ENTERIC COATED) ORAL EVERY OTHER DAY
Qty: 45 TABLET | Refills: 1 | Status: SHIPPED | OUTPATIENT
Start: 2024-08-14

## 2024-08-14 NOTE — RESULT ENCOUNTER NOTE
Riley,     Your anemia is staying stable. I have ordered iron for you to start. Typically, as we discussed the next step is a colonoscopy. I know we have this ordered for January. Would you be willing to move this up? I know it requires some coordination with the hospital but I can have Robbin help us with this. Since I don't have a reason for the bleeding I would like to rule out some sort of colon bleed. Let me know what you think. I have sent the iron to the pharmacy. \  Jenny Fay PA-C

## 2024-08-16 ENCOUNTER — PATIENT OUTREACH (OUTPATIENT)
Dept: CARE COORDINATION | Facility: CLINIC | Age: 49
End: 2024-08-16
Payer: COMMERCIAL

## 2024-08-16 NOTE — PROGRESS NOTES
Clinic Care Coordination - Chart Review Only     Situation: Ambulatory Care Coordination leader performing chart review related to critical staff coverage needs.     Background: Patient enrolled in Care Coordination with last reassessment completed on 1/24/24.  Lead RN CC spoke to patient on 6/12/24.     Assessment: Per chart review, patient spoke his [place]  on 7/31/24 following a same day Endoscopy procedure.  Patient also completed office visit with PCP on 8/13/24.     Plan: Due to longstanding relationship with Lead CC as well as care team contact this month, writer will set next follow up outreach for Lead CC to complete upon her return in September. Patient is not due for complex care plan at this time. Covering CC team remains available for urgent needs until Lead CC returns.     Virgen Diego, ONDINAN, RN   Manager of Ambulatory Care Management  [facility]

## 2024-08-24 ENCOUNTER — HEALTH MAINTENANCE LETTER (OUTPATIENT)
Age: 49
End: 2024-08-24

## 2024-08-29 DIAGNOSIS — K59.09 CHRONIC CONSTIPATION: ICD-10-CM

## 2024-08-30 RX ORDER — POLYETHYLENE GLYCOL 3350 17 G/17G
POWDER, FOR SOLUTION ORAL
Qty: 510 G | Refills: 1 | Status: SHIPPED | OUTPATIENT
Start: 2024-08-30 | End: 2024-09-03

## 2024-09-03 ENCOUNTER — TELEPHONE (OUTPATIENT)
Dept: CARE COORDINATION | Facility: CLINIC | Age: 49
End: 2024-09-03
Payer: COMMERCIAL

## 2024-09-03 DIAGNOSIS — N31.9 NEUROGENIC BLADDER: Primary | ICD-10-CM

## 2024-09-03 DIAGNOSIS — G82.50 QUADRIPLEGIA (H): ICD-10-CM

## 2024-09-03 DIAGNOSIS — Z78.9 SELF-CATHETERIZES URINARY BLADDER: ICD-10-CM

## 2024-09-03 PROBLEM — R25.2 SPASTICITY: Status: RESOLVED | Noted: 2018-11-09 | Resolved: 2024-09-03

## 2024-09-03 PROBLEM — K64.4 BLEEDING EXTERNAL HEMORRHOIDS: Status: RESOLVED | Noted: 2021-03-03 | Resolved: 2024-09-03

## 2024-09-03 PROBLEM — E78.6 LOW HDL (UNDER 40): Status: RESOLVED | Noted: 2024-01-31 | Resolved: 2024-09-03

## 2024-09-03 PROBLEM — Z71.89 ACP (ADVANCE CARE PLANNING): Chronic | Status: RESOLVED | Noted: 2018-02-27 | Resolved: 2024-09-03
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
## 2024-09-03 NOTE — TELEPHONE ENCOUNTER
Amanda called today and the patient needs additional catheters for self cath.  Please order 270 catheters as per the fax from Amanda.  She will also need a letter of medical necessity for the increased number of catheters.   Let me know if there are any questions.    Robbin Vanegas MSN, RN, PHN, [facility]   Primary Care Clinical RN Care Coordinator  [facility]  9/3/2024   3:47 PM  [email or web address]  Office: [phone]     Manual Repair Warning Statement: We plan on removing the manually selected variable below in favor of our much easier automatic structured text blocks found in the previous tab. We decided to do this to help make the flow better and give you the full power of structured data. Manual selection is never going to be ideal in our platform and I would encourage you to avoid using manual selection from this point on, especially since I will be sunsetting this feature. It is important that you do one of two things with the customized text below. First, you can save all of the text in a word file so you can have it for future reference. Second, transfer the text to the appropriate area in the Library tab. Lastly, if there is a flap or graft type which we do not have you need to let us know right away so I can add it in before the variable is hidden. No need to panic, we plan to give you roughly 6 months to make the change.

## 2024-09-04 ENCOUNTER — ANCILLARY PROCEDURE (OUTPATIENT)
Dept: ULTRASOUND IMAGING | Facility: CLINIC | Age: 49
End: 2024-09-04
Payer: COMMERCIAL

## 2024-09-04 ENCOUNTER — TELEPHONE (OUTPATIENT)
Dept: UROLOGY | Facility: CLINIC | Age: 49
End: 2024-09-04

## 2024-09-04 ENCOUNTER — LAB (OUTPATIENT)
Dept: LAB | Facility: CLINIC | Age: 49
End: 2024-09-04
Payer: COMMERCIAL

## 2024-09-04 DIAGNOSIS — N39.0 RECURRENT UTI: ICD-10-CM

## 2024-09-04 DIAGNOSIS — N31.9 NEUROGENIC BLADDER: ICD-10-CM

## 2024-09-04 LAB
ALBUMIN UR-MCNC: NEGATIVE MG/DL
APPEARANCE UR: ABNORMAL
BILIRUB UR QL STRIP: NEGATIVE
COLOR UR AUTO: YELLOW
GLUCOSE UR STRIP-MCNC: NEGATIVE MG/DL
HGB UR QL STRIP: NEGATIVE
KETONES UR STRIP-MCNC: NEGATIVE MG/DL
LEUKOCYTE ESTERASE UR QL STRIP: ABNORMAL
NITRATE UR QL: NEGATIVE
PH UR STRIP: 7 [PH] (ref 5–7)
RBC #/AREA URNS AUTO: ABNORMAL /HPF
SP GR UR STRIP: 1.01 (ref 1–1.03)
SQUAMOUS #/AREA URNS AUTO: ABNORMAL /LPF
UROBILINOGEN UR STRIP-ACNC: 0.2 E.U./DL
WBC #/AREA URNS AUTO: ABNORMAL /HPF

## 2024-09-04 PROCEDURE — 87086 URINE CULTURE/COLONY COUNT: CPT

## 2024-09-04 PROCEDURE — 81001 URINALYSIS AUTO W/SCOPE: CPT

## 2024-09-04 PROCEDURE — 76770 US EXAM ABDO BACK WALL COMP: CPT | Mod: TC | Performed by: RADIOLOGY

## 2024-09-04 NOTE — TELEPHONE ENCOUNTER
"I called Amanda (nurse care coordinator with [employer]) to get a fax number.  She questioned what the DME order says and I told her.  She said that it needs to be very specific with a quantity and said she has a form for the provider to sign instead of the DME.    She will fax a form to [phone] to have Jenny Fay sign.  The form is called \"doctor order request\" and it will be from [facility].  She will also need the letter for medical necessity.    Tiffany Ibarra,         "

## 2024-09-04 NOTE — TELEPHONE ENCOUNTER
[facility] Call Center    Phone Message    May a detailed message be left on voicemail: yes     Reason for Call: Other: pt care coordinator calling and pt is cathing 9 times a day and needs more supplies and they are wondering if this is ok to being Cath 9 times a day     Action Taken: Other: urology    Travel Screening: Not Applicable     Date of Service:

## 2024-09-05 ENCOUNTER — TRANSFERRED RECORDS (OUTPATIENT)
Dept: HEALTH INFORMATION MANAGEMENT | Facility: CLINIC | Age: 49
End: 2024-09-05
Payer: COMMERCIAL

## 2024-09-05 NOTE — TELEPHONE ENCOUNTER
Spoke to Amanda [employer] Care Coordinator at [facility].  Riley is requesting 9 catheters a day for [facility].  Dr. Hayes does not manage supply orders, Riley gets them from Dr. Fay's office, but they wanted to make sure it would be okay to request an increase in supplies.  Okay from our standpoint for Riley to have increased supplies.  Amanda will reach out to Dr. Fay's office.  Nothing else needed at this time.     Laurie Tucker RN

## 2024-09-05 NOTE — TELEPHONE ENCOUNTER
Benefit Exception Inquiry (LASHAWN) form from [employer] received and given to Jenny Fay PA-C to sign if appropriate.  Tiffany Ibarra,

## 2024-09-05 NOTE — TELEPHONE ENCOUNTER
[facility] Call Center    Phone Message    May a detailed message be left on voicemail: yes     Reason for Call: please call Amanda back asap at [phone], she is a nurse coordinator with [employer]. Everything is on hold for now until we reach back to Amanda. Pt was on 8 times strait cath a day. Now pt is using strait cath 9 times a day. Amanda needs to reach Dr. Giraldo and needs his feed back. Pt has been receiving cath from his Primary provider as easier to reach. This will need to be submitted to [facility] for approval.  Please call ASAP as everything is on a holding pattern. Thanks       Action Taken: Message routed to:  [facility] Uro     Travel Screening: Not Applicable     Date of Service:

## 2024-09-06 LAB — BACTERIA UR CULT: NORMAL

## 2024-09-09 ENCOUNTER — TELEPHONE (OUTPATIENT)
Dept: CARE COORDINATION | Facility: CLINIC | Age: 49
End: 2024-09-09
Payer: COMMERCIAL

## 2024-09-09 NOTE — PROGRESS NOTES
Recommendation  Approval given to proceed with proposed procedure on 9/13/24 without further evaluation.   Jenny Fay PA-C

## 2024-09-09 NOTE — TELEPHONE ENCOUNTER
Amanda called stating that she got the form for [employer] requesting the increase in catheters.  She still needs the letter of medical necessity.  Requesting an increase of 20 catheters due to the patient using a catheter 9 times a day to empty his bladder.  Phone [phone]  Fax is [phone].    Robbin Vanegas MSN, RN, PHN, [facility]   Primary Care Clinical RN Care Coordinator  [facility]  9/9/2024   9:40 AM  [email or web address]  Office: [phone]

## 2024-09-09 NOTE — LETTER
2024    INSURER: Payor: [employer] / Plan: [employer] ACCESSABILITY ENHANCED / Product Type: HMO /   ATTN: [employer]  Re: Prior Authorization Request  Patient: Riley Gifford  Policy ID#:  [id number]  : 1975      To Whom it May Concern:    I am writing to formally request a prior authorization of coverage for my patient,  Riley Gifford, for treatment using urinary catheters.  I am requesting authorization for applicable provider professional and facility services associated with this therapy.    Mr. Gifford has a diagnosis of neurogenic bladder that requires regular bladder cauterization. He also has a history of frequent urinary tract infections. In order to prevent infections patient needs to use a urinary catheter 9 times per day.       Mr. Gifford is also receiving regular care from his urology team for his recurrent urinary tract infections and neurogenic bladder.       I have treated Riley Gifford since  and I have determined that it is medically appropriate for this patient to receive be treated with an increased number of urinary catheters.    I firmly believe that this therapy is clinically appropriate and that Riley Gifford would benefit from increased allowance of urinary catheters if allowed the opportunity to receive this treatment.  Please contact me at Dept: [phone] if you require additional information to ensure the prompt approval for coverage.      Jenny Fay PA-C

## 2024-09-12 ENCOUNTER — ANESTHESIA EVENT (OUTPATIENT)
Dept: SURGERY | Facility: AMBULATORY SURGERY CENTER | Age: 49
End: 2024-09-12
Payer: COMMERCIAL

## 2024-09-12 ASSESSMENT — LIFESTYLE VARIABLES: TOBACCO_USE: 0

## 2024-09-12 ASSESSMENT — COPD QUESTIONNAIRES: COPD: 0

## 2024-09-12 ASSESSMENT — ENCOUNTER SYMPTOMS: ORTHOPNEA: 0
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
## 2024-09-13 ENCOUNTER — ANESTHESIA (OUTPATIENT)
Dept: SURGERY | Facility: AMBULATORY SURGERY CENTER | Age: 49
End: 2024-09-13
Payer: COMMERCIAL

## 2024-09-13 ENCOUNTER — HOSPITAL ENCOUNTER (OUTPATIENT)
Facility: AMBULATORY SURGERY CENTER | Age: 49
Discharge: HOME | End: 2024-09-13
Attending: UROLOGY
Payer: COMMERCIAL

## 2024-09-13 VITALS
DIASTOLIC BLOOD PRESSURE: 79 MMHG | RESPIRATION RATE: 16 BRPM | WEIGHT: 130 LBS | TEMPERATURE: 97.7 F | SYSTOLIC BLOOD PRESSURE: 102 MMHG | BODY MASS INDEX: 17.61 KG/M2 | HEIGHT: 72 IN | OXYGEN SATURATION: 96 % | HEART RATE: 96 BPM

## 2024-09-13 PROCEDURE — 52287 CYSTOSCOPY CHEMODENERVATION: CPT | Performed by: STUDENT IN AN ORGANIZED HEALTH CARE EDUCATION/TRAINING PROGRAM

## 2024-09-13 PROCEDURE — 52287 CYSTOSCOPY CHEMODENERVATION: CPT

## 2024-09-13 PROCEDURE — 52287 CYSTOSCOPY CHEMODENERVATION: CPT | Performed by: NURSE ANESTHETIST, CERTIFIED REGISTERED

## 2024-09-13 PROCEDURE — 52287 CYSTOSCOPY CHEMODENERVATION: CPT | Mod: GC | Performed by: UROLOGY

## 2024-09-13 RX ORDER — FENTANYL CITRATE 50 UG/ML
25 INJECTION, SOLUTION INTRAMUSCULAR; INTRAVENOUS EVERY 5 MIN PRN
Status: DISCONTINUED | OUTPATIENT
Start: 2024-09-13 | End: 2024-09-14 | Stop reason: HOSPADM

## 2024-09-13 RX ORDER — ONDANSETRON 4 MG/1
4 TABLET, ORALLY DISINTEGRATING ORAL EVERY 30 MIN PRN
Status: DISCONTINUED | OUTPATIENT
Start: 2024-09-13 | End: 2024-09-14 | Stop reason: HOSPADM

## 2024-09-13 RX ORDER — LIDOCAINE HYDROCHLORIDE 20 MG/ML
INJECTION, SOLUTION INFILTRATION; PERINEURAL PRN
Status: DISCONTINUED | OUTPATIENT
Start: 2024-09-13 | End: 2024-09-13

## 2024-09-13 RX ORDER — DEXAMETHASONE SODIUM PHOSPHATE 10 MG/ML
4 INJECTION, SOLUTION INTRAMUSCULAR; INTRAVENOUS
Status: DISCONTINUED | OUTPATIENT
Start: 2024-09-13 | End: 2024-09-14 | Stop reason: HOSPADM

## 2024-09-13 RX ORDER — PROPOFOL 10 MG/ML
INJECTION, EMULSION INTRAVENOUS CONTINUOUS PRN
Status: DISCONTINUED | OUTPATIENT
Start: 2024-09-13 | End: 2024-09-13

## 2024-09-13 RX ORDER — NALOXONE HYDROCHLORIDE 0.4 MG/ML
0.1 INJECTION, SOLUTION INTRAMUSCULAR; INTRAVENOUS; SUBCUTANEOUS
Status: DISCONTINUED | OUTPATIENT
Start: 2024-09-13 | End: 2024-09-14 | Stop reason: HOSPADM

## 2024-09-13 RX ORDER — ONDANSETRON 2 MG/ML
4 INJECTION INTRAMUSCULAR; INTRAVENOUS EVERY 30 MIN PRN
Status: DISCONTINUED | OUTPATIENT
Start: 2024-09-13 | End: 2024-09-14 | Stop reason: HOSPADM

## 2024-09-13 RX ORDER — OXYCODONE HYDROCHLORIDE 5 MG/1
10 TABLET ORAL
Status: DISCONTINUED | OUTPATIENT
Start: 2024-09-13 | End: 2024-09-14 | Stop reason: HOSPADM

## 2024-09-13 RX ORDER — HYDROMORPHONE HYDROCHLORIDE 1 MG/ML
0.2 INJECTION, SOLUTION INTRAMUSCULAR; INTRAVENOUS; SUBCUTANEOUS EVERY 5 MIN PRN
Status: DISCONTINUED | OUTPATIENT
Start: 2024-09-13 | End: 2024-09-14 | Stop reason: HOSPADM

## 2024-09-13 RX ORDER — LABETALOL 20 MG/4 ML (5 MG/ML) INTRAVENOUS SYRINGE
PRN
Status: DISCONTINUED | OUTPATIENT
Start: 2024-09-13 | End: 2024-09-13

## 2024-09-13 RX ORDER — SODIUM CHLORIDE, SODIUM LACTATE, POTASSIUM CHLORIDE, CALCIUM CHLORIDE 600; 310; 30; 20 MG/100ML; MG/100ML; MG/100ML; MG/100ML
INJECTION, SOLUTION INTRAVENOUS CONTINUOUS
Status: DISCONTINUED | OUTPATIENT
Start: 2024-09-13 | End: 2024-09-14 | Stop reason: HOSPADM

## 2024-09-13 RX ORDER — CEFAZOLIN SODIUM 2 G/50ML
2 SOLUTION INTRAVENOUS
Status: COMPLETED | OUTPATIENT
Start: 2024-09-13 | End: 2024-09-13

## 2024-09-13 RX ORDER — FENTANYL CITRATE 50 UG/ML
25 INJECTION, SOLUTION INTRAMUSCULAR; INTRAVENOUS
Status: DISCONTINUED | OUTPATIENT
Start: 2024-09-13 | End: 2024-09-14 | Stop reason: HOSPADM

## 2024-09-13 RX ORDER — HYDROMORPHONE HYDROCHLORIDE 1 MG/ML
0.4 INJECTION, SOLUTION INTRAMUSCULAR; INTRAVENOUS; SUBCUTANEOUS EVERY 5 MIN PRN
Status: DISCONTINUED | OUTPATIENT
Start: 2024-09-13 | End: 2024-09-14 | Stop reason: HOSPADM

## 2024-09-13 RX ORDER — LIDOCAINE 40 MG/G
CREAM TOPICAL
Status: DISCONTINUED | OUTPATIENT
Start: 2024-09-13 | End: 2024-09-14 | Stop reason: HOSPADM

## 2024-09-13 RX ORDER — OXYCODONE HYDROCHLORIDE 5 MG/1
5 TABLET ORAL
Status: DISCONTINUED | OUTPATIENT
Start: 2024-09-13 | End: 2024-09-14 | Stop reason: HOSPADM

## 2024-09-13 RX ORDER — PROPOFOL 10 MG/ML
INJECTION, EMULSION INTRAVENOUS PRN
Status: DISCONTINUED | OUTPATIENT
Start: 2024-09-13 | End: 2024-09-13

## 2024-09-13 RX ORDER — ONDANSETRON 2 MG/ML
INJECTION INTRAMUSCULAR; INTRAVENOUS PRN
Status: DISCONTINUED | OUTPATIENT
Start: 2024-09-13 | End: 2024-09-13

## 2024-09-13 RX ORDER — FENTANYL CITRATE 50 UG/ML
INJECTION, SOLUTION INTRAMUSCULAR; INTRAVENOUS PRN
Status: DISCONTINUED | OUTPATIENT
Start: 2024-09-13 | End: 2024-09-13

## 2024-09-13 RX ORDER — ACETAMINOPHEN 325 MG/1
975 TABLET ORAL ONCE
Status: COMPLETED | OUTPATIENT
Start: 2024-09-13 | End: 2024-09-13

## 2024-09-13 RX ORDER — FENTANYL CITRATE 50 UG/ML
50 INJECTION, SOLUTION INTRAMUSCULAR; INTRAVENOUS EVERY 5 MIN PRN
Status: DISCONTINUED | OUTPATIENT
Start: 2024-09-13 | End: 2024-09-14 | Stop reason: HOSPADM

## 2024-09-13 RX ORDER — CEFAZOLIN SODIUM 2 G/50ML
2 SOLUTION INTRAVENOUS SEE ADMIN INSTRUCTIONS
Status: DISCONTINUED | OUTPATIENT
Start: 2024-09-13 | End: 2024-09-14 | Stop reason: HOSPADM

## 2024-09-13 RX ADMIN — CEFAZOLIN SODIUM 2 G: 2 SOLUTION INTRAVENOUS at 10:20

## 2024-09-13 RX ADMIN — PROPOFOL 125 MCG/KG/MIN: 10 INJECTION, EMULSION INTRAVENOUS at 10:23

## 2024-09-13 RX ADMIN — ACETAMINOPHEN 975 MG: 325 TABLET ORAL at 08:13

## 2024-09-13 RX ADMIN — LIDOCAINE HYDROCHLORIDE 60 MG: 20 INJECTION, SOLUTION INFILTRATION; PERINEURAL at 10:23

## 2024-09-13 RX ADMIN — FENTANYL CITRATE 50 MCG: 50 INJECTION, SOLUTION INTRAMUSCULAR; INTRAVENOUS at 10:20

## 2024-09-13 RX ADMIN — LABETALOL 20 MG/4 ML (5 MG/ML) INTRAVENOUS SYRINGE 10 MG: at 10:27

## 2024-09-13 RX ADMIN — ONDANSETRON 4 MG: 2 INJECTION INTRAMUSCULAR; INTRAVENOUS at 10:23

## 2024-09-13 RX ADMIN — SODIUM CHLORIDE, SODIUM LACTATE, POTASSIUM CHLORIDE, CALCIUM CHLORIDE: 600; 310; 30; 20 INJECTION, SOLUTION INTRAVENOUS at 08:24

## 2024-09-13 RX ADMIN — PROPOFOL 60 MG: 10 INJECTION, EMULSION INTRAVENOUS at 10:23

## 2024-09-13 NOTE — DISCHARGE INSTRUCTIONS
Tylenol 975 mg given at 8:15am.   Ok to take more after 2:15pm.      [facility]  Home Care Following Anesthesia  For 24 hours after surgery:  Get plenty of rest.  A responsible adult must stay with you for at least 24 hours after you leave the surgery center.  Do not drive or use heavy equipment.  If you have weakness or tingling, don't drive or use heavy equipment until this feeling goes away.   Do not drink alcohol.   Avoid strenuous or risky activities.  Ask for help when climbing stairs.  You may feel lightheaded.  IF so, sit for a few minutes before standing.  Have someone help you get up.   If you have nausea (feel sick to your stomach): Drink only clear liquids such as apple juice, ginger ale, broth or 7-Up.  Rest may also help.  Be sure to drink enough fluids.  Move to a regular diet as you feel able.   You may have a slight fever.  Call the doctor if your fever is over 100 F (37.7 C) (taken under the tongue) or lasts longer than 24 hours.  You may have a dry mouth, a sore throat, muscle aches or trouble sleeping. These should go away after 24 hours.  Do not make important or legal decisions.   It is recommended to avoid smoking.               Tips for taking pain medications  To get the best pain relief possible, remember these points:  Take pain medications as directed, before pain becomes severe.  Pain medication can upset your stomach: taking it with food may help.  Constipation is a common side effect of pain medication. Drink plenty of  fluids.  Eat foods high in fiber. Take a stool softener if recommended by your doctor or pharmacist.  Do not drink alcohol, drive or operate machinery while taking pain medications.  Ask about other ways to control pain, such as with heat, ice or relaxation.    Tylenol/Acetaminophen Consumption    If you feel your pain relief is insufficient, you may take Tylenol/Acetaminophen in addition to your narcotic pain medication.   Be  careful not to exceed 4,000 mg of Tylenol/Acetaminophen in a 24 hour period from all sources.  If you are taking extra strength Tylenol/acetaminophen (500 mg), the maximum dose is 8 tablets in 24 hours.  If you are taking regular strength acetaminophen (325 mg), the maximum dose is 12 tablets in 24 hours.    Call a doctor for any of the following:  Signs of infection (fever, growing tenderness at the surgery site, a large amount of drainage or bleeding, severe pain, foul-smelling drainage, redness, swelling).  It has been over 8 to 10 hours since surgery and you are still not able to urinate (pass water).  Headache for over 24 hours.  Numbness, tingling or weakness the day after surgery (if you had spinal anesthesia).  Signs of Covid-19 infection (temperature over 100 degrees, shortness of breath, cough, loss of taste/smell, generalized body aches, persistent headache, chills, sore throat, nausea/vomiting/diarrhea)  Your doctor is:  Dr. Evaristo Giraldo, Prostate and Urology: [phone]                    Or dial [phone] and ask for the resident on call for:  Prostate Urology  For emergency care, call the:  [place] [facility]:  [phone] (TTY for hearing impaired: [phone])

## 2024-09-13 NOTE — ANESTHESIA CARE TRANSFER NOTE
Patient: Riley Gifford    Procedure: Procedure(s):  CYSTOSCOPY, WITH BOTULINUM TOXIN INJECTION       Diagnosis: Neurogenic bladder [N31.9]  Diagnosis Additional Information: No value filed.    Anesthesia Type:   MAC     Note:    Oropharynx: oropharynx clear of all foreign objects and spontaneously breathing  Level of Consciousness: awake  Oxygen Supplementation: room air    Independent Airway: airway patency satisfactory and stable  Dentition: dentition unchanged  Vital Signs Stable: post-procedure vital signs reviewed and stable  Report to RN Given: handoff report given  Patient transferred to: Phase II  Comments: Refer to RN flowsheet for post-operative vital signs.   Handoff Report: Identifed the Patient, Identified the Reponsible Provider, Reviewed the pertinent medical history, Discussed the surgical course, Reviewed Intra-OP anesthesia mangement and issues during anesthesia, Set expectations for post-procedure period and Allowed opportunity for questions and acknowledgement of understanding      Vitals:  Vitals Value Taken Time   BP     Temp     Pulse     Resp     SpO2         Electronically Signed By: SUJATA Witt CRNA  September 13, 2024  11:00 AM

## 2024-09-13 NOTE — OP NOTE
OPERATIVE REPORT - 03/01/2024     PREOPERATIVE DIAGNOSIS:  Neurogenic bladder    POSTOPERATIVE DIAGNOSIS: Same    PROCEDURES PERFORMED:   1. Cystourethroscopy with injection of botulinum toxin A 300units in 15cc sterile saline    STAFF SURGEON: Dr. Evaristo Hayes MD   RESIDENT: Cj Villagomez MD    ANESTHESIA: Monitor Anesthesia Care    ESTIMATED BLOOD LOSS: Minimal     DRAINS: None     SIGNIFICANT FINDINGS:  Sclerotic changes of penile and bulbar urethra without stricture     OPERATIVE INDICATIONS:   Riley Gifford is a 48 year old male with a history of neurogenic bladder managed with botox injections. The patient was counseled on the alternatives, risks, and benefits and elected to proceed with the above stated procedure.    DESCRIPTION OF PROCEDURE:   After verification of informed consent was obtained, the patient was brought to the operating room, and moved to the operating table. After adequate anesthesia was induced, the patient was repositioned in the lithotomy position and prepped and draped in the usual sterile fashion. A formal timeout was performed to confirm the correct patient, procedure and operative site.     A 16Fr flexible cystoscope was placed into the urethra. The penile and bulbar urethra demonstrated sclerotic changes without stricture. The bladder was large, moderately trabeculated, bilateral ureteral orifices were orthotopic. There were no lesions or bladder stones. 300cc of botox in 30mL of injectable saline was instilled using a template of 5 columns of 4 injections. All injections were placed deep to the mucosa into the detrusor muscle.  We then emptied his bladder to re-inspect our injection sites and found that there was a minimal amount of blood. The scope was removed. The patient was returned to supine position and transported to recovery in stable condition.     The procedure was then concluded. The patient was transferred to the postanesthesia care unit in stable condition and tolerated  the procedure well.     POSTOP PLAN:  1. PACU, then discharge to home, resume CIC  2. F/u in 6m w/ botox, would recommend be performed with flexible cystoscope    Cj Villagomez MD  Chief Urology Resident, PGY-5  [phone]

## 2024-09-13 NOTE — ANESTHESIA POSTPROCEDURE EVALUATION
Patient: Riley Gifford    Procedure: Procedure(s):  CYSTOSCOPY, WITH BOTULINUM TOXIN INJECTION       Anesthesia Type:  MAC    Note:  Disposition: Outpatient   Postop Pain Control: Uneventful            Sign Out: Well controlled pain   PONV: No   Neuro/Psych: Uneventful            Sign Out: Acceptable/Baseline neuro status   Airway/Respiratory: Uneventful            Sign Out: Acceptable/Baseline resp. status   CV/Hemodynamics: Uneventful            Sign Out: Acceptable CV status; No obvious hypovolemia; No obvious fluid overload   Other NRE:    DID A NON-ROUTINE EVENT OCCUR?            Last vitals:  Vitals Value Taken Time   /79 09/13/24 1130   Temp 36.5  C (97.7  F) 09/13/24 1130   Pulse 96 09/13/24 1130   Resp 16 09/13/24 1130   SpO2 96 % 09/13/24 1130       Electronically Signed By: Virgilio Roberts MD  September 13, 2024  11:32 AM

## 2024-09-23 DIAGNOSIS — F51.01 PRIMARY INSOMNIA: ICD-10-CM

## 2024-09-23 DIAGNOSIS — G82.50 QUADRIPLEGIA (H): ICD-10-CM

## 2024-09-23 DIAGNOSIS — M79.2 NEUROPATHIC PAIN: ICD-10-CM

## 2024-09-23 DIAGNOSIS — M54.6 PAIN IN THORACIC SPINE: ICD-10-CM

## 2024-09-23 RX ORDER — ZOLPIDEM TARTRATE 10 MG/1
TABLET ORAL
Qty: 90 TABLET | Refills: 1 | Status: SHIPPED | OUTPATIENT
Start: 2024-09-23

## 2024-09-23 NOTE — TELEPHONE ENCOUNTER
Name from pharmacy: PREGABALIN 100MG CAPSULES          Will file in chart as: pregabalin (LYRICA) 100 MG capsule    Sig: TAKE 1 CAPSULE BY MOUTH AT BEDTIME ALONG WITH 50MG IN AM AND AFTERNOON.    Disp: 90 capsule    Refills: Not specified    Start: 9/23/2024    Class: E-Prescribe    Non-formulary For: Pain in thoracic spine, Quadriplegia, Neuropathic pain    Last ordered: 2 months ago (6/26/2024) by SUJATA Armendariz CNP    Last refill: 6/27/2024    Rx #: [id number]       To be filled at: [id number] - [place], [place] - [address] [place] [address] AT [facility] [address]

## 2024-09-23 NOTE — TELEPHONE ENCOUNTER
Received call from patient requesting refill(s) of     PREGABALIN 100 MG PO CAPS  Last dispensed from pharmacy on: July. 8 2024         Patient's last office/virtual visit by prescribing provider on: June. 26, 2024   Next office/virtual appointment scheduled for: Sep. 25, 2024       UDT: None on file   CSA: Feb. 13, 2024         E-prescribe to    [id number] - [place], [place] - [address] AT [facility] [address] [facility] [address]      Will route to nursing pool for review and preparation of prescription(s).

## 2024-09-24 RX ORDER — PREGABALIN 100 MG/1
CAPSULE ORAL
Qty: 90 CAPSULE | Refills: 1 | Status: SHIPPED | OUTPATIENT
Start: 2024-09-24

## 2024-09-24 NOTE — PROGRESS NOTES
[facility] Pain Management     Date of visit: 9/25/2024      Assessment:   Riley Gifford is a 48 year old male with a past medical history significant for cervical spinal cord injury, quadriplegia, thoracic/lumbar pain, neurogenic bladder, recurrent UTI, spasticity, chronic constipation, s/p cervical fusion who presents with complaints of mid back (lower thoracic) and abdominal pain.      Thoracic/abdominal pain - Onset occurred following spinal cord injury in 1995, progressive worsening of symptoms over time. Etiology is likely mixed, with h/o cervical spinal cord injury (quadraplegic), neuropathic pain secondary to spinal cord injury, underlying degenerative changes of spine, and overlying myofascial component to some extent.      Assigned to [place] nursing team.     Visit Diagnoses:  1. Neuropathic pain    2. Pain in thoracic spine    3. Quadriplegia (H)    4. Myofascial pain    5. Neck pain    6. Chronic bilateral low back pain without sciatica        Plan:  Diagnosis reviewed, treatment option addressed, and risk/benifits discussed.  Self-care instructions given.  I am recommending a multidisciplinary treatment plan to help this patient better manage their pain.      Pain Physical Therapy:  NO - engaging in HEP.      Pain Psychology: NO     Diagnostic Studies:    EKG completed on 7/3/24 - QT/QTc WNL     Medication Management:   Amitriptyline - Continue 50 mg at bedtime. Appreciates some degree of benefit, no side effects. Discussed potential dose increase in future to help better target baseline pain, if needed.  Lyrica -current dose 50 mg in morning and 100 mg at bedtime. Had increased daytime sedation effects with higher dose. Appreciates some degree of benefit.   [place] - certified for [place] program, last renewed on 9/25/24. Appreciates benefit.   Tizanidine 2-8 mg TID PRN - generally takes 2 mg three times daily. Appreciates benefit with neck/back pain, sedation effects with daytime that are  manageable.      Potential procedures:   Baclofen IT pump implant on 1/3/23 ([facility]). Refill next month and plans to inquire about dosage increase to better target spasms.      Other Orders/Referrals:   None      Follow up with SUJATA Armendariz CNP around 6 months or sooner if needed.       Review of Electronic Chart: Today I have also reviewed available medical information in the patient's medical record at [facility] ([facility]) and Care Everywhere (if available), including relevant provider notes, laboratory work, and imaging.     Nalini Resendez DNP, SUJATA, AGNP-C  [facility] Pain Management     -------------------------------------------------    Subjective:    Chief complaint:   Chief Complaint   Patient presents with    Pain       Interval history:  Riley Gifford is a 48 year old male last seen on 6/26/24.  They are a patient of mine seen in follow up.     Recommendations/plan at the last visit included:  Pain Physical Therapy:  NO - engaging in HEP, using arm bike more recently.      Pain Psychologist to address relaxation, behavioral change, coping style, and other factors important to improvement.  NO     Diagnostic Studies:    EKG ordered today for TCA monitoring.  Advised he will need to schedule test and complete prior to next appointment.  EKG scheduling number: [phone].     Medication Management:   Amitriptyline - Continue 50 mg at bedtime. Appreciates some degree of benefit, no side effects. Discussed potential dose increase in future to help better target baseline pain, if needed.  Lyrica -current dose 50 mg in morning and 100 mg at bedtime. He appreciates some degree of benefit, no side effects.  Daytime pain control is not optimized, he is interested in trial of 50 mg dose in afternoon.  He will contact clinic with any concerns for sedation effects adding 50 mg, could reduce to 25 mg starting dose if needed and titrate to goal dose of 50 mg.  Medical cannabis - [place]  program, last renewed on 23. Appreciates some degree of benefit. Recommend continuing to work with pharmacist/dispensary to explore available products.   Tizanidine 2-8 mg TID PRN - current takes 2 mg three times daily. Appreciates benefit with neck/back pain, sedation effects with daytime that are manageable.      Potential procedures:   Baclofen IT pump implant on 1/3/23 ([facility]). Refill next month and plans to inquire about dosage increase to better target spasms.      Other Orders/Referrals:   None      Follow up with SUJATA Armendariz CNP in around 8-10 weeks, or sooner if needed.     Since his last visit, Riley Gifford reports:  -He has good days and bad days with pain.   -Pain overall is stable, no new symptoms or significant changes.   -Tried to increase Lyrica dose in morning in between visits, had sedation side effects and resumed 50 mg dose.   -He would like to continue meds at current doses.   -He is due for medical cannabis renewal.     Pain Information:   Pain ratin/10 on a 0-10 scale.      Interval history from last visit on 24:  -He continues to have persistent neck/upper back pain.   -He notes using arm bike more recently, able to go for 2-3 minutes then has to rest.   -Notes more spasms recently.   -He is not taking PO baclofen, has pump refill next month.   -He continues to use tizanidine 2 mg TID PRN.   -Takes Amitriptyline 50 mg at bedtime, notes benefit and pain is not major disruption with sleep right now.   -Lyrica 50 mg in morning and 100 mg at bedtime, appreciates benefit but would like to add 50 mg in afternoon to see if this helps more with pain control.   Pain Information:              Pain rating: averages 5/10 on a 0-10 scale.        Current Pain Treatments:    Medications:               Baclofen IT pump              Amitriptyline 50 mg at bedtime (PCP, dx chronic midline thoracic back pain)              Zolpidem (PCP, sleep)              Medical cannabis (last renewed  on 11/21/23), takes it at night, finds this most helpful for sleep              Lyrica 50 mg in AM and 100 mg in PM              Tizanidine TID PRN     Other therapies:               PMR - baclofen pump management        Current MME: 0      Review of [place] Prescription Monitoring Program (): No concern for abuse or misuse of controlled medications based on this report. Reviewed - appears appropriate.         Past pain treatments:  Baclofen PO      Medications:  Current Outpatient Medications   Medication Sig Dispense Refill    Acetaminophen (TYLENOL PO) Take 500 mg by mouth as needed for mild pain or fever Reported on 2/15/2017      albuterol (PROAIR HFA/PROVENTIL HFA/VENTOLIN HFA) 108 (90 Base) MCG/ACT inhaler INHALE 1 TO 2 PUFFS INTO THE LUNGS EVERY 4 HOURS AS NEEDED FOR SHORTNESS OF BREATH OR DIFFICULT BREATHING OR WHEEZING 36 g 4    alum & mag hydroxide-simethicone (MYLANTA/MAALOX) 200-200-20 MG/5ML SUSP suspension Take 30 mLs by mouth  0    amitriptyline (ELAVIL) 25 MG tablet Take 2 tablets (50 mg) by mouth at bedtime 180 tablet 0    baclofen (LIORESAL) 10 MG tablet Take 1 tablet (10 mg) by mouth 2 times daily      CARAFATE 1 GM/10ML PO suspension       cetirizine (ZYRTEC) 10 MG tablet Take 10 mg by mouth daily      COMPOUNDED NON-CONTROLLED SUBSTANCE (CMPD RX) - PHARMACY TO MIX COMPOUNDED MEDICATION 30 mLs by Bladder Instillation route at bedtime sodium chloride 0.9% (bottle) 1,000 mL with gentamicin 480mg.  Instill 30mL into the bladder at bedtime. 1 each 11    DUPIXENT 300 MG/2ML prefilled pen       ENEMEEZ MINI 283 MG/5ML enema USE ONE RECTALLY EVERY OTHER  mL 0    ferrous sulfate (FE TABS) 325 (65 Fe) MG EC tablet Take 1 tablet (325 mg) by mouth every other day 45 tablet 1    hydrocortisone, Perianal, (ANUSOL-HC) 2.5 % cream APPLY RECTALLY TO THE AFFECTED AREA TWICE DAILY 30 g 9    lidocaine (XYLOCAINE) 5 % external ointment Apply topically as needed for moderate pain 2500 g 0    omeprazole  (PRILOSEC) 40 MG DR capsule TAKE 1 CAPSULE(40 MG) BY MOUTH DAILY 90 capsule 3    ondansetron (ZOFRAN) 4 MG tablet TAKE 1 TABLET(4 MG) BY MOUTH EVERY 8 HOURS AS NEEDED FOR NAUSEA 10 tablet 0    phenylephrine-cocoa butter (PREPARATION H) 0.25-88.44 % suppository Insert one suppository rectally twice daily as needed. 48 suppository 3    polyethylene glycol (MIRALAX) powder Take 17 g (1 capful) by mouth daily as needed for constipation 510 g 1    pregabalin (LYRICA) 100 MG capsule TAKE 1 CAPSULE BY MOUTH AT BEDTIME ALONG WITH 50MG IN AM AND AFTERNOON. 90 capsule 1    pregabalin (LYRICA) 50 MG capsule Take 50 mg in morning along with 100 mg at bedtime      sodium chloride 0.9% (bottle) 1,000 mL with gentamicin 480 mg COMPOUNDED irrigation Instill 60mg of Gentamicin solution into bladder at bedtime. 1800 mL 4    tiZANidine (ZANAFLEX) 2 MG tablet TAKE 1 TO 4 TABLETS(2 TO 8 MG) BY MOUTH THREE TIMES DAILY 270 tablet 1    tolterodine (DETROL) 2 MG tablet TAKE 1 TABLET(2 MG) BY MOUTH TWICE DAILY 180 tablet 1    zolpidem (AMBIEN) 10 MG tablet TAKE 1 TABLET(10 MG) BY MOUTH EVERY NIGHT AS NEEDED FOR SLEEP 90 tablet 1    budesonide (PULMICORT) 1 MG/2ML neb solution MIX 1 RESPULE IN SYRUP AND TAKE BY MOUTH TWICE DAILY (Patient not taking: Reported on 9/25/2024) 180 mL 1    famotidine (PEPCID) 20 MG tablet Take 1 tablet (20 mg) by mouth 2 times daily as needed (Patient not taking: Reported on 9/25/2024) 60 tablet 0    magic mouthwash suspension, diphenhydrAMINE, lidocaine, aluminum-magnesium & simethicone, (FIRST-MOUTHWASH BLM) compounding kit Combine 1/3rd each of viscous lidocaine, maalox and liquid benadryl - swish and swallow 5 mL as needed every 4-6 hours for pain (Patient not taking: Reported on 9/25/2024) 240 mL 1       Medical History: any changes in medical history since they were last seen? No      Objective:    Physical Exam:  Blood pressure 116/82, pulse 105.  Constitutional: Well developed, well nourished, appears  stated age.  Gait: Wheelchair bound   HEENT: Head atraumatic, normocephalic. Eyes without conjunctival injection or jaundice. Oropharynx clear. Neck supple. No obvious neck masses.  Skin: No rash, lesions, or petechiae of exposed skin.   Psychiatric/mental status: Alert, without lethargy or stupor. Speech fluent. Appropriate affect. Mood normal. Able to follow commands without difficulty.     Diagnostic Tests/Imaging/Labs:  BMP on 8/13/24 - GFR >90    BILLING TIME DOCUMENTATION:   The total TIME spent on this patient on the date of the encounter/appointment was 20 minutes.      TOTAL TIME includes:   Time spent preparing to see the patient (reviewing records and tests)   Time spent face to face (or over the phone) with the patient   Time spent ordering tests, medications, procedures and referrals   Time spent Referring and communicating with other healthcare professionals   Time spent documenting clinical information in Epic

## 2024-09-24 NOTE — TELEPHONE ENCOUNTER
Chart reviewed - Request appears appropriate. Refilled for 90 day supply.     Nalini Resendez DNP, APRN, AGNP-C  [facility] Pain Management

## 2024-09-25 ENCOUNTER — OFFICE VISIT (OUTPATIENT)
Dept: PALLIATIVE MEDICINE | Facility: CLINIC | Age: 49
End: 2024-09-25
Payer: COMMERCIAL

## 2024-09-25 VITALS — HEART RATE: 105 BPM | SYSTOLIC BLOOD PRESSURE: 116 MMHG | DIASTOLIC BLOOD PRESSURE: 82 MMHG

## 2024-09-25 DIAGNOSIS — M79.2 NEUROPATHIC PAIN: Primary | ICD-10-CM

## 2024-09-25 DIAGNOSIS — M54.2 NECK PAIN: ICD-10-CM

## 2024-09-25 DIAGNOSIS — M54.6 PAIN IN THORACIC SPINE: ICD-10-CM

## 2024-09-25 DIAGNOSIS — G82.50 QUADRIPLEGIA (H): ICD-10-CM

## 2024-09-25 DIAGNOSIS — G89.29 CHRONIC BILATERAL LOW BACK PAIN WITHOUT SCIATICA: ICD-10-CM

## 2024-09-25 DIAGNOSIS — M54.50 CHRONIC BILATERAL LOW BACK PAIN WITHOUT SCIATICA: ICD-10-CM

## 2024-09-25 DIAGNOSIS — M79.18 MYOFASCIAL PAIN: ICD-10-CM

## 2024-09-25 PROCEDURE — 99214 OFFICE O/P EST MOD 30 MIN: CPT

## 2024-09-25 RX ORDER — PREGABALIN 50 MG/1
CAPSULE ORAL
COMMUNITY
Start: 2024-09-25

## 2024-09-25 RX ORDER — DUPILUMAB 300 MG/2ML
INJECTION, SOLUTION SUBCUTANEOUS
COMMUNITY
Start: 2024-09-25

## 2024-09-25 ASSESSMENT — PAIN SCALES - GENERAL: PAINLEVEL: MODERATE PAIN (5)

## 2024-09-25 NOTE — PATIENT INSTRUCTIONS
Pain Physical Therapy:  NO - engaging in HEP.      Pain Psychology: NO     Diagnostic Studies:    EKG completed on 7/3/24 - QT/QTc WNL     Medication Management:   Amitriptyline - Continue 50 mg at bedtime. Appreciates some degree of benefit, no side effects. Discussed potential dose increase in future to help better target baseline pain, if needed.  Lyrica -current dose 50 mg in morning and 100 mg at bedtime. Had increased daytime sedation effects with higher dose. Appreciates some degree of benefit.   Medical cannabis - [place] for [place] program, last renewed on 9/25/24. Appreciates benefit.   Tizanidine 2-8 mg TID PRN - generally takes 2 mg three times daily. Appreciates benefit with neck/back pain, sedation effects with daytime that are manageable.      Potential procedures:   Baclofen IT pump implant on 1/3/23 ([facility]). Refill next month and plans to inquire about dosage increase to better target spasms.      Other Orders/Referrals:   None      Follow up with SUJATA Armendariz CNP around 6 months or sooner if needed.     ----------------------------------------------------------------  Clinic Number:  [phone]   Call with any questions about your care and for scheduling assistance.   Calls are returned Monday through Friday between 8 AM and 4:30 PM. We usually get back to you within 2 business days depending on the issue/request.    If we are prescribing your medications:  For opioid medication refills, call the clinic or send a [facility] message 7 days in advance.  Please include:  Name of requested medication  Name of the pharmacy.  For non-opioid medications, call your pharmacy directly to request a refill. Please allow 3-4 days to be processed.   Per [place] State Law:  All controlled substance prescriptions must be filled within 30 days of being written.    For those controlled substances allowing refills, pickup must occur within 30 days of last fill.      We believe regular attendance is key to your  success in our program!    Any time you are unable to keep your appointment we ask that you call us at least 24 hours in advance to cancel.This will allow us to offer the appointment time to another patient.   Multiple missed appointments may lead to dismissal from the clinic.

## 2024-10-08 DIAGNOSIS — K59.09 CHRONIC CONSTIPATION: ICD-10-CM

## 2024-10-08 RX ORDER — DOCUSATE SODIUM 283 MG/5ML
LIQUID RECTAL
Qty: 150 ML | Refills: 0 | Status: SHIPPED | OUTPATIENT
Start: 2024-10-08

## 2024-10-11 ENCOUNTER — MEDICAL CORRESPONDENCE (OUTPATIENT)
Dept: HEALTH INFORMATION MANAGEMENT | Facility: CLINIC | Age: 49
End: 2024-10-11
Payer: COMMERCIAL

## 2024-10-11 DIAGNOSIS — L30.4 INTERTRIGO: ICD-10-CM

## 2024-10-14 RX ORDER — NYSTATIN 100000 U/G
CREAM TOPICAL
Qty: 60 G | Refills: 0 | Status: SHIPPED | OUTPATIENT
Start: 2024-10-14

## 2024-10-21 DIAGNOSIS — M62.838 MUSCLE SPASM: ICD-10-CM

## 2024-10-21 DIAGNOSIS — G89.29 CHRONIC BILATERAL THORACIC BACK PAIN: ICD-10-CM

## 2024-10-21 DIAGNOSIS — K64.9 HEMORRHOIDS, UNSPECIFIED HEMORRHOID TYPE: ICD-10-CM

## 2024-10-21 DIAGNOSIS — M54.6 CHRONIC BILATERAL THORACIC BACK PAIN: ICD-10-CM

## 2024-10-21 DIAGNOSIS — M54.2 NECK PAIN: ICD-10-CM

## 2024-10-21 DIAGNOSIS — G44.86 CERVICOGENIC HEADACHE: ICD-10-CM

## 2024-10-21 DIAGNOSIS — M79.18 MYOFASCIAL PAIN: ICD-10-CM

## 2024-10-21 RX ORDER — HYDROCORTISONE 25 MG/G
CREAM TOPICAL
Qty: 30 G | Refills: 0 | Status: SHIPPED | OUTPATIENT
Start: 2024-10-21

## 2024-10-21 NOTE — TELEPHONE ENCOUNTER
Drug: tiZANidine (ZANAFLEX) 2 MG tablet   Qty: 270 tablet   Last filled 9/03/24  Last seen: 9/25/24  Next appointment 03/19/25

## 2024-10-22 ENCOUNTER — PATIENT OUTREACH (OUTPATIENT)
Dept: CARE COORDINATION | Facility: CLINIC | Age: 49
End: 2024-10-22
Payer: COMMERCIAL

## 2024-10-22 RX ORDER — TIZANIDINE 2 MG/1
TABLET ORAL
Qty: 270 TABLET | Refills: 1 | Status: SHIPPED | OUTPATIENT
Start: 2024-10-22

## 2024-10-22 NOTE — PROGRESS NOTES
Clinic Care Coordination Contact  [facility]/Voicemail    Clinical Data: Care Coordinator Outreach    Outreach Documentation Number of Outreach Attempt   10/22/2024  10:52 AM 1       Left message on patient's voicemail with call back information and requested return call.    Plan: Care Coordinator sent care coordination introduction letter on 3/24/21 via [employer]. Care Coordinator will try to reach patient again in 10 business days.    Robbin Vanegas MSN, RN, PHN, CCM   Primary Care Clinical RN Care Coordinator  [facility]  10/22/2024   10:53 AM  [email or web address]  Office: [phone]

## 2024-10-23 NOTE — TELEPHONE ENCOUNTER
Chart reviewed - Request appears appropriate. Refilled for 90 day supply.     Nalini Resendez DNP, APRN, AGNP-C  [facility] Pain Management

## 2024-11-04 DIAGNOSIS — K59.00 CONSTIPATION, UNSPECIFIED CONSTIPATION TYPE: ICD-10-CM

## 2024-11-05 ENCOUNTER — VIRTUAL VISIT (OUTPATIENT)
Dept: FAMILY MEDICINE | Facility: CLINIC | Age: 49
End: 2024-11-05
Payer: COMMERCIAL

## 2024-11-05 DIAGNOSIS — G82.50 QUADRIPLEGIA (H): ICD-10-CM

## 2024-11-05 DIAGNOSIS — D50.0 IRON DEFICIENCY ANEMIA DUE TO CHRONIC BLOOD LOSS: Primary | ICD-10-CM

## 2024-11-05 DIAGNOSIS — N30.00 ACUTE CYSTITIS WITHOUT HEMATURIA: ICD-10-CM

## 2024-11-05 DIAGNOSIS — M62.838 MUSCLE SPASM: ICD-10-CM

## 2024-11-05 PROCEDURE — G2211 COMPLEX E/M VISIT ADD ON: HCPCS | Mod: 95 | Performed by: PHYSICIAN ASSISTANT

## 2024-11-05 PROCEDURE — 99214 OFFICE O/P EST MOD 30 MIN: CPT | Mod: 95 | Performed by: PHYSICIAN ASSISTANT

## 2024-11-05 RX ORDER — POLYETHYLENE GLYCOL 3350 17 G/17G
17 POWDER, FOR SOLUTION ORAL DAILY PRN
Qty: 510 G | Refills: 1 | Status: SHIPPED | OUTPATIENT
Start: 2024-11-05

## 2024-11-05 RX ORDER — BACLOFEN 10 MG/1
10 TABLET ORAL 4 TIMES DAILY
COMMUNITY
Start: 2024-11-05

## 2024-11-05 NOTE — PROGRESS NOTES
Riley is a 48 year old who is being evaluated via a billable video visit.    How would you like to obtain your AVS? MyChart  If the video visit is dropped, the invitation should be resent by: Text to cell phone: [phone]  Will anyone else be joining your video visit? No      Assessment & Plan     Iron deficiency anemia due to chronic blood loss  If worsening could be contributing to muscle spasms.   - Ferritin; Future  - Iron & Iron Binding Capacity; Future  - CBC with platelets; Future  - Med Therapy Management Referral    Muscle spasm  Will get UA to rule out infection.   - UA Macroscopic with reflex to Microscopic and Culture; Future  - Med Therapy Management Referral    Quadriplegia (H)  Patient would benefit from new wheelchair cushion that will allow him to offload pressure points as patient is not able to move himself.   Patient would benefit from MTM medication review due to interactions and side effects.   - baclofen (LIORESAL) 10 MG tablet; Take 1 tablet (10 mg) by mouth 4 times daily.  - Med Therapy Management Referral        The longitudinal plan of care for the diagnosis(es)/condition(s) as documented were addressed during this visit. Due to the added complexity in care, I will continue to support Riley in the subsequent management and with ongoing continuity of care.        Subjective   Riley is a 48 year old, presenting for the following health issues:  Patient Inquiry        11/5/2024     4:26 PM   Additional Questions   Roomed by coleen hutchinson     Video Start Time: 5:21 PM    HPI     like to discuss DME order for his power chair       Patient is a quadriplegic whom requires a motorized wheelchair for daily use.   He is looking to demo a different cushion. He will need a order if it works.  No current pressure sores but having more pain in the buttocks. More prevention- trying a new cushion.   Contact information- [employer] SANDRAT-[phone] Reliable medical supplies. [employer] smart seating.     Having spasms  more frequently. Has increased his pump dosage of baclofen and also started baclofen orally. No urinary symptoms.     Taking the oral iron very other day due to constipation.   No bleeding anywhere. Does have hemorrhoids, but nothing recently.   Colonoscopy planned for January.             Objective           Vitals:  No vitals were obtained today due to virtual visit.    Physical Exam   GENERAL: alert and no distress  EYES: Eyes grossly normal to inspection.  No discharge or erythema, or obvious scleral/conjunctival abnormalities.  RESP: No audible wheeze, cough, or visible cyanosis.    SKIN: Visible skin clear. No significant rash, abnormal pigmentation or lesions.  NEURO: Cranial nerves grossly intact.  Mentation and speech appropriate for age.  PSYCH: Appropriate affect, tone, and pace of words          Video-Visit Details    Type of service:  Video Visit   Video End Time:5:41 PM  Originating Location (pt. Location): Home    Distant Location (provider location):  On-site  Platform used for Video Visit: Caden  Signed Electronically by: Jenny Fay PA-C  DME (Durable Medical Equipment) Orders and Documentation  Orders Placed This Encounter   Procedures    Miscellaneous DME Order        The patient was assessed and it was determined the patient is in need of the following listed DME Supplies/Equipment. Please complete supporting documentation below to demonstrate medical necessity.

## 2024-11-06 ENCOUNTER — PATIENT OUTREACH (OUTPATIENT)
Dept: CARE COORDINATION | Facility: CLINIC | Age: 49
End: 2024-11-06
Payer: COMMERCIAL

## 2024-11-06 NOTE — PROGRESS NOTES
Clinic Care Coordination Contact  Follow Up Progress Note      Assessment: The RN CC nurse care coordinator contacted the patient by phone for a follow up call.  The patient states that he saw the PMR provider who suggested that in addition to the Baclofen pump that he take additional Baclofen pills to try and control the pain and spasms that he is currently having.  The patient also saw his PCP who ordered some labs and a urine test.  The patient is also getting a new cushion for his wheelchair which he hopes will help with the pain.  It will take some time for all of these measures to being to help the situation of an increase in the pain.        Care Gaps:    Health Maintenance Due   Topic Date Due    HEPATITIS B IMMUNIZATION (1 of 3 - 19+ 3-dose series) Never done    MEDICARE ANNUAL WELLNESS VISIT  05/31/2023    COLORECTAL CANCER SCREENING  01/30/2024       Care Gaps Last addressed on 11/6/24    Care Plans  Care Plan: General  take medications as prescribed       Problem: HP GENERAL PROBLEM       Goal: General Goal -  I will take all medications as prescribed by the providers.       Start Date: 11/6/2024 Expected End Date: 11/6/2025    This Visit's Progress: 80% Recent Progress: 80%    Note:     Barriers: wheelchair bound  Strengths: engaged in care coordination  Patient expressed understanding of goal: yes  Action steps to achieve this goal:  1. I will take all medications as prescribed.  2. I will ask any questions that I have regarding new medications.  3. I will work with the RN CC if I have any difficulty getting refills.   4. I will work with urology for the PA on the gentamicin instillations for the bladder.                              Care Plan: General -  Make and attend follow up appointments       Problem: HP GENERAL PROBLEM       Goal: General Goal - I will make and attend all recommended appointments from my providers.       Start Date: 11/6/2024 Expected End Date: 11/6/2025    This Visit's  Progress: 90% Recent Progress: 80%    Note:     Barriers: Many providers.  Strengths: engaged in care coordination  Patient expressed understanding of goal: yes  Action steps to achieve this goal:  1. I will make all of the recommended follow up appointments.  2. I will attend all of the follow up appointments as recommended by the providers.                            Care Plan: Chronic Pain       Problem: Chronic Pain is Not Self-Managed       Goal: Demonstrate improved self-management of chronic pain       Start Date: 11/5/2024 Expected End Date: 11/5/2025    This Visit's Progress: 40% Recent Progress: 30%    Note:     Barriers: chronic pain and spasms in his back  Strengths: engaged in care coordination  Patient expressed understanding of goal: yes  Action steps to achieve this goal:  1. I will try ice or heat for very short times and monitored by his PCA.  2. I will take all medications as prescribed by the providers.  3. I will work with PMR provider over the use of the Baclofen pump that he has implanted.                                  Intervention/Education provided during outreach: The RN CC reviewed with the patient that any change in medications takes some time to see the full effect.  Also reminded the patient to make an appointment with the lab for the blood and urine samples.     Outreach Frequency: monthly, more frequently as needed    Plan:    The patient will make a lab appointment to have his lab and urine samples taken for the tests ordered.  The patient will monitor the contact from the [facility] pharmacist and if they don't reach out by Friday call them to set up an appointment.   The patient will make sure that he hears about the cushion for his wheelchair or to reach out so that the RN CC can check into it.    Care Coordinator will follow up in 4 weeks.        Robbin Vanegas MSN, RN, PHN, [facility]   Primary Care Clinical RN Care Coordinator  [facility]  11/6/2024   11:31  [email or web address]  Office: [phone]

## 2024-11-07 ENCOUNTER — LAB (OUTPATIENT)
Dept: LAB | Facility: CLINIC | Age: 49
End: 2024-11-07
Payer: COMMERCIAL

## 2024-11-07 DIAGNOSIS — M62.838 MUSCLE SPASM: ICD-10-CM

## 2024-11-07 DIAGNOSIS — D50.0 IRON DEFICIENCY ANEMIA DUE TO CHRONIC BLOOD LOSS: ICD-10-CM

## 2024-11-07 LAB
ALBUMIN UR-MCNC: NEGATIVE MG/DL
APPEARANCE UR: CLEAR
BACTERIA #/AREA URNS HPF: ABNORMAL /HPF
BILIRUB UR QL STRIP: NEGATIVE
COLOR UR AUTO: YELLOW
ERYTHROCYTE [DISTWIDTH] IN BLOOD BY AUTOMATED COUNT: 12.9 % (ref 10–15)
FERRITIN SERPL-MCNC: 54 NG/ML (ref 31–409)
GLUCOSE UR STRIP-MCNC: NEGATIVE MG/DL
HCT VFR BLD AUTO: 40.6 % (ref 40–53)
HGB BLD-MCNC: 13.7 G/DL (ref 13.3–17.7)
HGB UR QL STRIP: NEGATIVE
IRON BINDING CAPACITY (ROCHE): ABNORMAL
IRON SATN MFR SERPL: ABNORMAL %
IRON SERPL-MCNC: 182 UG/DL (ref 61–157)
KETONES UR STRIP-MCNC: NEGATIVE MG/DL
LEUKOCYTE ESTERASE UR QL STRIP: ABNORMAL
MCH RBC QN AUTO: 30.8 PG (ref 26.5–33)
MCHC RBC AUTO-ENTMCNC: 33.7 G/DL (ref 31.5–36.5)
MCV RBC AUTO: 91 FL (ref 78–100)
NITRATE UR QL: NEGATIVE
PH UR STRIP: 7.5 [PH] (ref 5–7)
PLATELET # BLD AUTO: 121 10E3/UL (ref 150–450)
RBC # BLD AUTO: 4.45 10E6/UL (ref 4.4–5.9)
RBC #/AREA URNS AUTO: ABNORMAL /HPF
SP GR UR STRIP: 1.01 (ref 1–1.03)
SQUAMOUS #/AREA URNS AUTO: ABNORMAL /LPF
UROBILINOGEN UR STRIP-ACNC: 0.2 E.U./DL
WBC # BLD AUTO: 8.3 10E3/UL (ref 4–11)
WBC #/AREA URNS AUTO: ABNORMAL /HPF

## 2024-11-07 PROCEDURE — 83540 ASSAY OF IRON: CPT

## 2024-11-07 PROCEDURE — 81001 URINALYSIS AUTO W/SCOPE: CPT

## 2024-11-07 PROCEDURE — 85027 COMPLETE CBC AUTOMATED: CPT

## 2024-11-07 PROCEDURE — 83550 IRON BINDING TEST: CPT

## 2024-11-07 PROCEDURE — 87088 URINE BACTERIA CULTURE: CPT

## 2024-11-07 PROCEDURE — 87086 URINE CULTURE/COLONY COUNT: CPT

## 2024-11-07 PROCEDURE — 82728 ASSAY OF FERRITIN: CPT

## 2024-11-07 PROCEDURE — 36415 COLL VENOUS BLD VENIPUNCTURE: CPT

## 2024-11-08 LAB — BACTERIA UR CULT: ABNORMAL

## 2024-11-08 NOTE — RESULT ENCOUNTER NOTE
Riley,     Your iron levels improved with the iron-so not likely causing the muscle spasms. I am waiting on the urine culture results still.   Jenny Fay PA-C

## 2024-11-11 RX ORDER — PENICILLIN V POTASSIUM 500 MG/1
500 TABLET, FILM COATED ORAL 2 TIMES DAILY
Qty: 10 TABLET | Refills: 0 | Status: SHIPPED | OUTPATIENT
Start: 2024-11-11 | End: 2024-11-16

## 2024-11-11 NOTE — RESULT ENCOUNTER NOTE
Riley,     Your urine showed group B strep. I have prescribed antibiotics for you to take for 5 days for this.   Let me know if you have questions.   Jenny Fay PA-C

## 2024-11-25 ENCOUNTER — OFFICE VISIT (OUTPATIENT)
Dept: PHARMACY | Facility: CLINIC | Age: 49
End: 2024-11-25
Attending: PHYSICIAN ASSISTANT
Payer: COMMERCIAL

## 2024-11-25 VITALS — DIASTOLIC BLOOD PRESSURE: 75 MMHG | SYSTOLIC BLOOD PRESSURE: 105 MMHG

## 2024-11-25 DIAGNOSIS — G47.00 INSOMNIA: ICD-10-CM

## 2024-11-25 DIAGNOSIS — G82.50 QUADRIPLEGIA (H): Primary | ICD-10-CM

## 2024-11-25 DIAGNOSIS — K20.0 EOSINOPHILIC ESOPHAGITIS: ICD-10-CM

## 2024-11-25 DIAGNOSIS — K59.09 CHRONIC CONSTIPATION: ICD-10-CM

## 2024-11-25 DIAGNOSIS — J30.81 ALLERGIC RHINITIS DUE TO ANIMAL DANDER: ICD-10-CM

## 2024-11-25 DIAGNOSIS — D64.9 ANEMIA: ICD-10-CM

## 2024-11-25 DIAGNOSIS — Z78.9 SELF-CATHETERIZES URINARY BLADDER: ICD-10-CM

## 2024-11-25 DIAGNOSIS — S14.109S CERVICAL SPINAL CORD INJURY, SEQUELA (H): ICD-10-CM

## 2024-11-25 DIAGNOSIS — N31.9 NEUROGENIC BLADDER: ICD-10-CM

## 2024-11-25 DIAGNOSIS — N39.0 RECURRENT UTI: ICD-10-CM

## 2024-11-25 DIAGNOSIS — F51.04 PSYCHOPHYSIOLOGICAL INSOMNIA: ICD-10-CM

## 2024-11-25 DIAGNOSIS — M62.838 MUSCLE SPASM: ICD-10-CM

## 2024-11-25 PROCEDURE — 99605 MTMS BY PHARM NP 15 MIN: CPT | Performed by: PHARMACIST

## 2024-11-25 PROCEDURE — 99607 MTMS BY PHARM ADDL 15 MIN: CPT | Performed by: PHARMACIST

## 2024-11-25 RX ORDER — TRAZODONE HYDROCHLORIDE 50 MG/1
25-50 TABLET, FILM COATED ORAL AT BEDTIME
Qty: 90 TABLET | Refills: 1 | Status: SHIPPED | OUTPATIENT
Start: 2024-11-25

## 2024-11-25 RX ORDER — DUPILUMAB 300 MG/2ML
300 INJECTION, SOLUTION SUBCUTANEOUS WEEKLY
COMMUNITY
Start: 2024-10-21

## 2024-11-25 NOTE — LETTER
"Recommended To-Do List      Prepared on: Nov 25, 2024       You can get the best results from your medications by completing the items on this \"To-Do List.\"      Bring your To-Do List when you go to your doctor. And, share it with your family or caregivers.    My To-Do List:  What we talked about: What I should do:   An issue with your medication    Change the medication you are taking from zolpidem (AMBIEN) to traZODone (DESYREL)          What we talked about: What I should do:                       "

## 2024-11-25 NOTE — LETTER
_  Medication List        Prepared on: Nov 25, 2024     Bring your Medication List when you go to the doctor, hospital, or   emergency room. And, share it with your family or caregivers.     Note any changes to how you take your medications.  Cross out medications when you no longer use them.    Medication How I take it Why I use it Prescriber   Acetaminophen (TYLENOL PO) Take 500 mg by mouth as needed for mild pain or fever Reported on 2/15/2017  pain Patient Reported   albuterol (PROAIR HFA/PROVENTIL HFA/VENTOLIN HFA) 108 (90 Base) MCG/ACT inhaler INHALE 1 TO 2 PUFFS INTO THE LUNGS EVERY 4 HOURS AS NEEDED FOR SHORTNESS OF BREATH OR DIFFICULT BREATHING OR WHEEZING Cough Jenny Fay PA-C   amitriptyline (ELAVIL) 25 MG tablet Take 2 tablets (50 mg) by mouth at bedtime Chronic midline thoracic back pain SUJATA Armendariz CNP   baclofen (LIORESAL) 10 MG tablet Take 1 tablet (10 mg) by mouth 4 times daily. Quadriplegia (H) Jenny Fay PA-C   cetirizine (ZYRTEC) 10 MG tablet Take 10 mg by mouth daily Hayfever Patient Reported   Coloplast barrier cream CREA Apply topically. Barrier cream for hemorrhoids, daily  Barrier cream Patient Reported   COMPOUNDED NON-CONTROLLED SUBSTANCE (CMPD RX) - PHARMACY TO MIX COMPOUNDED MEDICATION 30 mLs by Bladder Instillation route at bedtime sodium chloride 0.9% (bottle) 1,000 mL with gentamicin 480mg.  Instill 30mL into the bladder at bedtime. Neurogenic Bladder Glenny Rosado MD   DUPIXENT 300 MG/2ML prefilled pen Inject 300 mg subcutaneously once a week.  Eosinophilic esophagitis Patient Reported   ENEMEEZ MINI 283 MG/5ML enema USE ONE RECTALLY EVERY OTHER DAY Chronic Constipation Jenny Fay PA-C   famotidine (PEPCID) 20 MG tablet Take 1 tablet (20 mg) by mouth 2 times daily as needed Eosinophilic Esophagitis SUJATA Morris CNP   ferrous sulfate (FE TABS) 325 (65 Fe) MG EC tablet Take 1 tablet (325 mg) by mouth every other day Iron Deficiency Anemia due to Chronic Blood  Loss Jenny Fay PA-C   hydrocortisone, Perianal, (ANUSOL-HC) 2.5 % cream APPLY RECTALLY TO THE AFFECTED AREA TWICE DAILY Hemorrhoids, unspecified hemorrhoid type Dorie Mckee MD   lidocaine (XYLOCAINE) 5 % external ointment Apply topically as needed for moderate pain Chronic bilateral low back pain without sciatica Jenny Fay PA-C   medical cannabis (Patient's own supply) See Admin Instructions. (The purpose of this order is to document that the patient reports taking medical cannabis.  This is not a prescription, and is not used to certify that the patient has a qualifying medical condition.)Green Goods  Pain, sleep Patient Reported   nystatin (MYCOSTATIN) 437311 UNIT/GM external cream APPLY TWICE DAILY TO THE AFFFECTED AREAS FOR 5 DAYS AS NEEDED FOR INTERTRIGO Intertrigo Jenny Fay PA-C   omeprazole (PRILOSEC) 40 MG DR capsule TAKE 1 CAPSULE(40 MG) BY MOUTH DAILY Atypical Chest Pain; Eosinophilic Esophagitis Jenny Fay PA-C   OnabotulinumtoxinA (BOTOX IJ) Inject as directed. Every 6 months  Bladder spasms Patient Reported   ondansetron (ZOFRAN) 4 MG tablet TAKE 1 TABLET(4 MG) BY MOUTH EVERY 8 HOURS AS NEEDED FOR NAUSEA Nausea SUJATA Morris CNP   phenylephrine-cocoa butter (PREPARATION H) 0.25-88.44 % suppository Insert one suppository rectally twice daily as needed. External Hemorrhoids Laura Yao MD   polyethylene glycol (MIRALAX) 17 GM/Dose powder Take 17 g by mouth daily as needed for constipation. Constipation, unspecified constipation type Jenny Fay PA-C   pregabalin (LYRICA) 100 MG capsule TAKE 1 CAPSULE BY MOUTH AT BEDTIME ALONG WITH 50MG IN AM AND AFTERNOON. Pain in Thoracic Spine; Quadriplegia (H); Neuropathic pain SUJATA Armendariz CNP   pregabalin (LYRICA) 50 MG capsule Take 50 mg in morning Pain in Thoracic Spine; Neuropathic pain; Quadriplegia (H) SUJATA Armendariz CNP   tiZANidine (ZANAFLEX) 2 MG tablet TAKE 1 TO 4 TABLETS(2 TO 8 MG) BY MOUTH THREE TIMES  DAILY Myofascial Pain; Neck Pain; Chronic bilateral thoracic back pain; Cervicogenic headache; Muscle Spasm SUJATA Armendariz CNP   tolterodine (DETROL) 2 MG tablet TAKE 1 TABLET(2 MG) BY MOUTH TWICE DAILY Neurogenic Bladder Jenny Fay PA-C   traZODone (DESYREL) 50 MG tablet Take 0.5-1 tablets (25-50 mg) by mouth at bedtime. Insomnia Jenny aFy PA-C   UNABLE TO FIND MEDICATION NAME: baclofen pump  Muscle spacticity  Patient Reported   zolpidem (AMBIEN) 10 MG tablet TAKE 1 TABLET(10 MG) BY MOUTH EVERY NIGHT AS NEEDED FOR SLEEP Primary Insomnia Jenny Fay PA-C         Add new medications, over-the-counter drugs, herbals, vitamins, or  minerals in the blank rows below.    Medication How I take it Why I use it Prescriber                                      Allergies:      - Banana - Hives  - Chicken-derived Products (egg)  - Fish  - Soybean Oil  - Wheat        Side effects I have had:      Not on File        Other Information:              My notes and questions:

## 2024-11-25 NOTE — PATIENT INSTRUCTIONS
"Recommendations from today's MTM visit:                                                         Consider trial of backing off on hydrocortisone 2.5% cream for a week, if things worsen, can restart daily or every other day.  Can talk with neurologist about possibly reducing tolterodine for the bladder spasms, as this can contribute to constipation.  Leda will ask Jenny about possibly changing zolpidem to trazodone 25-50 mg at bedtime for sleep. After that we can consider reducing amitriptyline.     Follow-up: Return in about 4 weeks (around 12/23/2024) for Medication Therapy Management.    It was great speaking with you today.  I value your experience and would be very thankful for your time in providing feedback in our clinic survey. In the next few days, you may receive an email or text message from [employer] with a link to a survey related to your  clinical pharmacist.\"     To schedule another MTM appointment, please call the clinic directly or you may call the MTM scheduling line at [phone] or toll-free at [phone].     My Clinical Pharmacist's contact information:                                                      Please feel free to contact me with any questions or concerns you have.      Leda Perez, PharmD  Medication Therapy Management Pharmacist   "

## 2024-11-25 NOTE — Clinical Note
Johnny Lin is interested in trial of replacing zolpidem with trazodone, does that sound alright to you?  After that, we may try backing off on his amitriptyline as this can be contributing to constipation, among other medications.   Thanks, Leda Perez, PharmD Medication Therapy Management Pharmacist

## 2024-11-25 NOTE — PROGRESS NOTES
Medication Therapy Management (MTM) Encounter    ASSESSMENT:                            Medication Adherence/Access: No issues identified.    Quadriplegia/muscle spasms/spasticity/pain/insomina:  Hasn't tried trazodone in past - could consider this as an alternate to Zolpidem considering significant morning grogginess, as see if any more helpful for sleep.  Amitriptyline can contribute to constipation, could consider reducing dose/titrating off an monitoring for improvement in this, and also monitor for worsened sleep/mental health.     Allergies/Esophageals Esophagitis: Improved.    Constipation/Nausea  Amitriptyline, tolterodine, and iron can contribute to constipation.    Hemorrhoids:  Can consider reducing use of hydrocortisone cream and monitor.      UTI prevention/Urinary Catheter/Bladder spasms:  It appears the Botox is very helpful, unsure how effective tolterodine is - while this also could be contributing to constipation.    Iron Deficiency Anemia:  may benefit from maintaining current use of iron to replete stores further for another 3-6 months, then consider stopping.     PLAN:                            Consider trial of backing off on hydrocortisone 2.5% cream for a week, if things worsen, can restart daily or every other day.  Can talk with neurologist about possibly reducing tolterodine for the bladder spasms, as this can contribute to constipation.  Leda will ask Jenny about possibly changing zolpidem to trazodone 25-50 mg at bedtime for sleep. After that we can consider reducing amitriptyline.     Jenny Fay PA-C Rust, Jaclyn, RPH Hi Jackie,    All of that sounds great. Thanks for the recommendations!  Jenny Fay PA-C          Previous Messages       ----- Message -----  From: Brittaney Perez RPH  Sent: 11/25/2024  11:01 AM CST  To: DANNI Sandra is interested in trial of replacing zolpidem with trazodone, does that sound alright to you?  After that, we may try backing  off on his amitriptyline as this can be contributing to constipation, among other medications.    Thanks,  Leda Perez, PharmD  Medication Therapy Management Pharmacist    Follow-up: Return in about 4 weeks (around 12/23/2024) for Medication Therapy Management.    SUBJECTIVE/OBJECTIVE:                          Riley Gifford is a 48 year old male seen for an initial visit. He was referred to me from Jenny Fay for Medication reduction side effects.      Reason for visit: med review.    Allergies/ADRs: Reviewed in chart  Past Medical History: Reviewed in chart  Tobacco: He reports that he has never smoked. He has never been exposed to tobacco smoke. He has never used smokeless tobacco.  Alcohol: none    Medication Adherence/Access: no issues reported.  Lives at assisted living home with staff - [facility].  Staff sets up pills in a pill box for him, he takes them on his own then. Has a few as needed options that don't get put in the pill box.     Quadriplegia/muscle spasms/spasticity/pain/insomina:   Medical marijuana 10 mg gummy at bedtime - for pain and mostly for sleep  Acetaminophen 500 mg as needed, max 6 pills per day, ranges 2-6/day  Amitriptyline 50 mg at bedtime   Baclofen pump (>600 mg/day)  Baclofen 10 mg four times daily as needed  Tizanidine 2 mg as needed during the day, usually 4 mg at bedtime - this really helps but also knocks him out  Lidocaine 5% ointment as needed for neck and back pain  Pregabalin 50 mg every morning and 100 mg at bedtime- causes drowsiness.  Zolpidem 10 mg at bedtime     Follows with pain management. He attempted to reduce amitriptyline for 1-2 weeks, sleep was worse, so he increase back up. The baclofen is helpful.   Even with all of these sleep still isn't great, averaging 4-6 hours of not great sleep, has to cath himself every 3-4 hours at night too. Notes wakes up groggy most days, kind of normal for him with the poor sleep.    Allergies/Esophageals  Esophagitis:   Dupixent (dupilumab) 300 mg injection weekly - started two months ago  Albuterol HFA as needed    Cetirizine 10 mg daily as needed for seasonal allergies  Omeprazole 40 mg daily   Famotidine 20 mg twice daily as needed (no recent use)    Follows with [facility]. Swallowing has been steady and not bothering his as much since starting Dupixent, not needing inhaler as much.  Possibly more joint pain in neck and shoulders since starting Dupixant, hard to say though, he notes could also be the year's he's been in chair too.    Constipation/Nausea    Miralax powder one capful daily daily  Enemeez enema every other day  Ondansetron 4 mg as needed (rare to use, it causes him more constipation)    Bowel regimen overall is working well, has some good days and some bad days still. Is on several medications that can contribute to constipation, interested in backing off on some of these if possible.     Hemorrhoids:     Hydrocortisone 2.5% cream at least once daily, sometimes twice daily  Preparation H suppositories - these help a lot  Barrier cream daily    He has some sensation in his lower extremities, some pain from the hemorrhoids but not terrible. The hydrocosione dosen't seem to help, wonders if he still needs this.  He will have a colonoscopy in January.       UTI prevention/Urinary Catheter/Bladder spasms:    Compounded gentamicin irrigation once daily at bedtime - this really helps   Nystatin cream as needed in groin  Botox every 6 months - very helpful  Tolterodine 2 mg twice daily for bladder spasms - has been very helpful    Staff help him with the gentamicin irrigation, otherwise he self-caths every 3-4 hours, overnight as well.    Iron Deficiency Anemia:    Ferrous sulfate 325 mg ~2-3x/week    Anemia has improved, remaining on this for a while still.  Side effects; constipation.      Today's Vitals: /75   ----------------      I spent 50 minutes with this patient today. All changes  were made via collaborative practice agreement with Jenny Fay PA-C. A copy of the visit note was provided to the patient's provider(s).    A summary of these recommendations was sent via [employer].    Leda Perez, PharmD  Medication Therapy Management Pharmacist         Medication Therapy Recommendations  Insomnia   1 Current Medication: zolpidem (AMBIEN) 10 MG tablet   Current Medication Sig: TAKE 1 TABLET(10 MG) BY MOUTH EVERY NIGHT AS NEEDED FOR SLEEP   Rationale: Undesirable effect - Adverse medication event - Safety   Recommendation: Change Medication - traZODone 25 mg Tabs half-tab   Status: Accepted per Provider   Identified Date: 11/25/2024 Completed Date: 11/25/2024

## 2024-11-25 NOTE — LETTER
November 26, 2024  Riley Gifford  [address] [place] [address] [place]   [place] [address]    Dear Mr. Gifford, MICHELLE [facility] ELLIE     Thank you for talking with me on Nov 25, 2024 about your health and medications. As a follow-up to our conversation, I have included two documents:      Your Recommended To-Do List has steps you should take to get the best results from your medications.  Your Medication List will help you keep track of your medications and how to take them.    If you want to talk about these documents, please call Brittaney Perez RPH at phone: [phone], Monday-Friday 8-4:30pm.    I look forward to working with you and your doctors to make sure your medications work well for you.    Sincerely,  Brittaney Perez RPH  [facility] Pharmacist, [facility]

## 2024-12-02 ENCOUNTER — NURSE TRIAGE (OUTPATIENT)
Dept: FAMILY MEDICINE | Facility: CLINIC | Age: 49
End: 2024-12-02
Payer: COMMERCIAL

## 2024-12-02 NOTE — TELEPHONE ENCOUNTER
"Received call from patient. He reports muscle spasms, generalized throughout the body. This started last night and has been intermittent ever since. He then describes this as \"muscle aches\". It is not localized at all. He states that he has had this in the past with UTI's. No urinary symptoms however. Patient was triaged and advised of red flag symptoms to watch for that would warrant ER. Scheduled patient for tomorrow, 12/3/24 and he will call back PRN in the meantime.    Reason for Disposition   Patient wants to be seen    Additional Information   Negative: Shock suspected (e.g., cold/pale/clammy skin, too weak to stand, low BP, rapid pulse)   Negative: Difficult to awaken or acting confused (e.g., disoriented, slurred speech)   Negative: Sounds like a life-threatening emergency to the triager   Negative: Chest pain   Negative: Arm pains with exertion (e.g., walking)   Negative: Muscle aches from influenza (flu) suspected   Negative: Muscle aches from heat exposure suspected   Negative: Lyme disease suspected (e.g., bull's eye rash or tick bite / exposure in past month)   Negative: Pain only in back   Negative: Pain in one arm OR arm pains caused by recent vigorous activity (e.g., sports, lifting, overuse)   Negative: Pain in one leg OR leg pains caused by recent vigorous activity (e.g., sports, lifting, overuse)   Negative: Rash over large area or most of the body (widespread or generalized)   Negative: Dark (cola or tea-colored) or red-colored urine   Negative: Drinking very little and dehydration suspected (e.g., no urine > 12 hours, very dry mouth, very lightheaded)   Negative: Patient sounds very sick or weak to the triager   Negative: SEVERE pain (e.g., excruciating, unable to do any normal activities) and not improved 2 hours after pain medicine   Negative: SEVERE pain and taking a statin medicine (a lipid or cholesterol lowering drug)   Negative: Fever > 104 F (40 C)   Negative: Fever > 101 F (38.3 C) and " "over 60 years of age   Negative: Fever > 100 F  (37.8 C) and bedridden (e.g., CVA, chronic illness, recovering from surgery)   Negative: Fever > 100 F (37.8 C) and indwelling urinary catheter (e.g., Nguyen, Coude)   Negative: Fever > 100 F (37.8 C) and diabetes mellitus or weak immune system (e.g., HIV positive, cancer chemo, splenectomy, organ transplant, chronic steroids)   Negative: Fever present > 3 days (72 hours)   Negative: Muscle aches are unexplained and occur within 1 month of a tick bite   Negative: Diabetes mellitus or weak immune system (e.g., HIV positive, cancer chemo, splenectomy, organ transplant, chronic steroids)   Negative: MODERATE pain (e.g., interferes with normal activities) and present > 3 days   Negative: MILD or MODERATE muscle aches or pain and taking a statin medicine (a lipid or cholesterol lowering drug)   Negative: Age > 50 and bilateral shoulder pains present > 2 weeks    Answer Assessment - Initial Assessment Questions  1. ONSET: \"When did the muscle aches or body pains start?\"       Yesterday, muscle aches/spasms.  2. LOCATION: \"What part of your body is hurting?\" (e.g., entire body, arms, legs)       Generalized.   3. SEVERITY: \"How bad is the pain?\" (Scale 1-10; or mild, moderate, severe)      A little achy.   4. CAUSE: \"What do you think is causing the pains?\"      Unsure.   5. FEVER: \"Have you been having fever?\"      No.   6. OTHER SYMPTOMS: \"Do you have any other symptoms?\" (e.g., chest pain, weakness, rash, cold or flu symptoms, weight loss)      No.  7. PREGNANCY: \"Is there any chance you are pregnant?\" \"When was your last menstrual period?\"      N/A.  8. TRAVEL: \"Have you traveled out of the country in the last month?\" (e.g., travel history, exposures)      No.    Protocols used: Muscle Aches and Body Pain-A-[place]    JADON Russell RN  [facility]  "

## 2024-12-02 NOTE — TELEPHONE ENCOUNTER
The patient called regarding the repeat of his CT scan from last summer.  Due to the fact that he is still having abdominal pain and back pain with nausea he has a request for a CT of the abdomin as well as the chest.  He would like to have the orders placed now and the CT's scheduled now rather than waiting until later in the summer or fall.  Please contact the patient with the outcome and assist in scheduling the patient.        Robbin Vanegas, MSN, RN, PHN I Clinic Care Coordinator  [facility]  Phone: [phone]  [email or web address]     no

## 2024-12-03 ENCOUNTER — OFFICE VISIT (OUTPATIENT)
Dept: FAMILY MEDICINE | Facility: CLINIC | Age: 49
End: 2024-12-03
Payer: COMMERCIAL

## 2024-12-03 VITALS
RESPIRATION RATE: 19 BRPM | SYSTOLIC BLOOD PRESSURE: 118 MMHG | HEART RATE: 95 BPM | TEMPERATURE: 96.9 F | DIASTOLIC BLOOD PRESSURE: 87 MMHG | OXYGEN SATURATION: 95 %

## 2024-12-03 DIAGNOSIS — R07.89 ATYPICAL CHEST PAIN: ICD-10-CM

## 2024-12-03 DIAGNOSIS — M62.838 MUSCLE SPASM: ICD-10-CM

## 2024-12-03 DIAGNOSIS — S14.109S INJURY OF CERVICAL SPINAL CORD, SEQUELA (H): ICD-10-CM

## 2024-12-03 DIAGNOSIS — G89.29 CHRONIC MIDLINE THORACIC BACK PAIN: ICD-10-CM

## 2024-12-03 DIAGNOSIS — M62.838 MUSCLE SPASM: Primary | ICD-10-CM

## 2024-12-03 DIAGNOSIS — M79.2 NEUROPATHIC PAIN: ICD-10-CM

## 2024-12-03 DIAGNOSIS — K59.09 CHRONIC CONSTIPATION: ICD-10-CM

## 2024-12-03 DIAGNOSIS — M54.6 PAIN IN THORACIC SPINE: ICD-10-CM

## 2024-12-03 DIAGNOSIS — M54.6 CHRONIC MIDLINE THORACIC BACK PAIN: ICD-10-CM

## 2024-12-03 DIAGNOSIS — K20.0 EOSINOPHILIC ESOPHAGITIS: ICD-10-CM

## 2024-12-03 DIAGNOSIS — G47.00 PERSISTENT INSOMNIA: ICD-10-CM

## 2024-12-03 DIAGNOSIS — N31.9 NEUROGENIC BLADDER: ICD-10-CM

## 2024-12-03 DIAGNOSIS — G82.50 QUADRIPLEGIA (H): ICD-10-CM

## 2024-12-03 LAB
ALBUMIN UR-MCNC: NEGATIVE MG/DL
APPEARANCE UR: CLEAR
BACTERIA #/AREA URNS HPF: ABNORMAL /HPF
BILIRUB UR QL STRIP: NEGATIVE
COLOR UR AUTO: YELLOW
GLUCOSE UR STRIP-MCNC: NEGATIVE MG/DL
HGB UR QL STRIP: NEGATIVE
KETONES UR STRIP-MCNC: NEGATIVE MG/DL
LEUKOCYTE ESTERASE UR QL STRIP: ABNORMAL
MUCOUS THREADS #/AREA URNS LPF: PRESENT /LPF
NITRATE UR QL: NEGATIVE
PH UR STRIP: 7 [PH] (ref 5–7)
RBC #/AREA URNS AUTO: ABNORMAL /HPF
SP GR UR STRIP: 1.01 (ref 1–1.03)
SQUAMOUS #/AREA URNS AUTO: ABNORMAL /LPF
UROBILINOGEN UR STRIP-ACNC: 0.2 E.U./DL
WBC #/AREA URNS AUTO: ABNORMAL /HPF
WBC CLUMPS #/AREA URNS HPF: PRESENT /HPF

## 2024-12-03 PROCEDURE — 36415 COLL VENOUS BLD VENIPUNCTURE: CPT | Performed by: FAMILY MEDICINE

## 2024-12-03 PROCEDURE — 81001 URINALYSIS AUTO W/SCOPE: CPT | Performed by: PHYSICIAN ASSISTANT

## 2024-12-03 PROCEDURE — 80048 BASIC METABOLIC PNL TOTAL CA: CPT | Performed by: FAMILY MEDICINE

## 2024-12-03 PROCEDURE — 99214 OFFICE O/P EST MOD 30 MIN: CPT | Performed by: FAMILY MEDICINE

## 2024-12-03 RX ORDER — DOCUSATE SODIUM 283 MG/5ML
LIQUID RECTAL
Qty: 150 ML | Refills: 0 | Status: SHIPPED | OUTPATIENT
Start: 2024-12-03

## 2024-12-03 RX ORDER — PREGABALIN 50 MG/1
CAPSULE ORAL
Qty: 90 CAPSULE | Refills: 1 | Status: SHIPPED | OUTPATIENT
Start: 2024-12-03

## 2024-12-03 RX ORDER — OMEPRAZOLE 40 MG/1
40 CAPSULE, DELAYED RELEASE ORAL DAILY
Qty: 90 CAPSULE | Refills: 3 | Status: SHIPPED | OUTPATIENT
Start: 2024-12-03

## 2024-12-03 RX ORDER — TOLTERODINE TARTRATE 2 MG/1
TABLET, EXTENDED RELEASE ORAL
Qty: 180 TABLET | Refills: 1 | Status: SHIPPED | OUTPATIENT
Start: 2024-12-03

## 2024-12-03 ASSESSMENT — PAIN SCALES - GENERAL: PAINLEVEL_OUTOF10: MODERATE PAIN (5)

## 2024-12-03 NOTE — TELEPHONE ENCOUNTER
Request appears appropriate, refill approved for 30 day supply +1 additional fill(s).     Nalini Resendez DNP, APRN, AGNP-C  [facility]

## 2024-12-03 NOTE — PATIENT INSTRUCTIONS
Muscle:    Hold trazodone for a week or so to see if spasms improve (spasms started after replacing ambiem with trazodone; also taking Amitriptyline).

## 2024-12-03 NOTE — PROGRESS NOTES
"  Assessment & Plan     Muscle spasms   Possibly medication interactions but will check urinalysis and electrolytes and hold trazodone for now (possible interactions with amitriptyline), can do ambien for insomnia in meantime (has some remaining)  - UA Macroscopic with reflex to Microscopic and Culture - Lab Collect  - Basic metabolic panel  (Ca, Cl, CO2, Creat, Gluc, K, Na, BUN)  - Basic metabolic panel  (Ca, Cl, CO2, Creat, Gluc, K, Na, BUN)    Persistent insomnia   - was started on trazodone but been noticing frequent muscle spasms whenever trying to move. Will hold trazodone for at least 1 week; can do  ambien which trazodone replaced in meantime (has some remaining)    Quadriplegia (H)   - wheelchair bound    Injury of cervical spinal cord, sequela (H)   - with quadriplegia    Neurogenic bladder   -   stable on botox injections    See Patient Instructions    Subjective   Riley is a 48 year old, presenting for the following health issues:  Musculoskeletal Problem    12/2  Received call from patient. He reports muscle spasms, generalized throughout the body. This started last night and has been intermittent ever since. He then describes this as \"muscle aches\". It is not localized at all. He states that he has had this in the past with UTI's. No urinary symptoms however. Patient was triaged and advised of red flag symptoms to watch for that would warrant ER. Scheduled patient for tomorrow, 12/3/24 and he will call back PRN in the meantime.     Muscle spasms x 2 days  Usually when moves  If stays still has no spasms  When bladder   Has had similar symptoms; when had a UTI    Medication change:   Changed from Zolpidem to Trazodone    Colonoscopy   Coming up in [place] 25      12/3/2024     4:06 PM   Additional Questions   Roomed by tracie Diaz ma   Accompanied by self     Musculoskeletal Problem    History of Present Illness       Back Pain:  He presents for follow up of back pain. Patient's back pain is a chronic " problem.  Location of back pain:  Right middle of back, left middle of back, right side of neck, left side of neck, right buttock and left buttock  Description of back pain: dull ache  Back pain spreads: nowhere    Since patient first noticed back pain, pain is: unchanged  Does back pain interfere with his job:  Not applicable       Reason for visit:  Possable uti  Symptom onset:  1-3 days ago  Symptoms include:  Muscle spasems and pain  Symptom intensity:  Severe  Symptom progression:  Staying the same  Had these symptoms before:  Yes  Has tried/received treatment for these symptoms:  Yes  Previous treatment was successful:  Yes  Prior treatment description:  Antibodtics  What makes it better:  Muscle relaxers pain relief pills   He is taking medications regularly.     Review of Systems  Constitutional, HEENT, cardiovascular, pulmonary, gi and gu systems are negative, except as otherwise noted.      Objective    /87 (BP Location: Left arm, Cuff Size: Adult Large)   Pulse 95   Temp 96.9  F (36.1  C) (Temporal)   Resp 19   SpO2 95%   There is no height or weight on file to calculate BMI.  Physical Exam   GENERAL: on power chair, alert and no distress  NECK: no adenopathy, no asymmetry, masses, or scars  RESP: lungs clear to auscultation - no rales, rhonchi or wheezes  CV: regular rate and rhythm, no murmur, click or rub, no peripheral edema  ABDOMEN: soft, nontender, no masses and bowel sounds normal  MS: no gross musculoskeletal defects noted, no edema  NEURO: Normal strength and tone, mentation intact and speech normal  PSYCH: mentation appears normal, affect normal/bright    Results for orders placed or performed in visit on 12/03/24   UA Macroscopic with reflex to Microscopic and Culture - Lab Collect     Status: Abnormal    Specimen: Urine, Midstream   Result Value Ref Range    Color Urine Yellow Colorless, Straw, Light Yellow, Yellow    Appearance Urine Clear Clear    Glucose Urine Negative Negative  mg/dL    Bilirubin Urine Negative Negative    Ketones Urine Negative Negative mg/dL    Specific Gravity Urine 1.015 1.003 - 1.035    Blood Urine Negative Negative    pH Urine 7.0 5.0 - 7.0    Protein Albumin Urine Negative Negative mg/dL    Urobilinogen Urine 0.2 0.2, 1.0 E.U./dL    Nitrite Urine Negative Negative    Leukocyte Esterase Urine Small (A) Negative   UA Microscopic with Reflex to Culture     Status: Abnormal   Result Value Ref Range    Bacteria Urine Few (A) None Seen /HPF    RBC Urine 0-2 0-2 /HPF /HPF    WBC Urine 5-10 (A) 0-5 /HPF /HPF    Squamous Epithelials Urine Few (A) None Seen /LPF    WBC Clumps Urine Present (A) None Seen /HPF    Mucus Urine Present (A) None Seen /LPF    Narrative    Urine Culture not indicated             Signed Electronically by: Cyrus uDffy MD

## 2024-12-03 NOTE — PROGRESS NOTES
"12/2  Received call from patient. He reports muscle spasms, generalized throughout the body. This started last night and has been intermittent ever since. He then describes this as \"muscle aches\". It is not localized at all. He states that he has had this in the past with UTI's. No urinary symptoms however. Patient was triaged and advised of red flag symptoms to watch for that would warrant ER. Scheduled patient for tomorrow, 12/3/24 and he will call back PRN in the meantime.   "

## 2024-12-04 LAB
ANION GAP SERPL CALCULATED.3IONS-SCNC: 10 MMOL/L (ref 7–15)
BUN SERPL-MCNC: 6.5 MG/DL (ref 6–20)
CALCIUM SERPL-MCNC: 8.8 MG/DL (ref 8.8–10.4)
CHLORIDE SERPL-SCNC: 102 MMOL/L (ref 98–107)
CREAT SERPL-MCNC: 0.75 MG/DL (ref 0.67–1.17)
EGFRCR SERPLBLD CKD-EPI 2021: >90 ML/MIN/1.73M2
GLUCOSE SERPL-MCNC: 95 MG/DL (ref 70–99)
HCO3 SERPL-SCNC: 23 MMOL/L (ref 22–29)
POTASSIUM SERPL-SCNC: 4.2 MMOL/L (ref 3.4–5.3)
SODIUM SERPL-SCNC: 135 MMOL/L (ref 135–145)

## 2024-12-09 DIAGNOSIS — K64.9 HEMORRHOIDS, UNSPECIFIED HEMORRHOID TYPE: ICD-10-CM

## 2024-12-09 RX ORDER — HYDROCORTISONE 25 MG/G
CREAM TOPICAL
Qty: 30 G | Refills: 0 | Status: SHIPPED | OUTPATIENT
Start: 2024-12-09

## 2024-12-11 ENCOUNTER — TELEPHONE (OUTPATIENT)
Dept: FAMILY MEDICINE | Facility: CLINIC | Age: 49
End: 2024-12-11
Payer: COMMERCIAL

## 2024-12-11 NOTE — TELEPHONE ENCOUNTER
Forms received from [employer]/ Detailed Prescription - repairs to lift (initial date: 11/27/24) for Vazquez Huggins NP.  Forms placed in provider 'sign me' folder.  Please fax forms to [phone] after completion.    Raffy Haley RT (R) (ARRT)  Radiology Dept

## 2024-12-16 ENCOUNTER — NURSE TRIAGE (OUTPATIENT)
Dept: NURSING | Facility: CLINIC | Age: 49
End: 2024-12-16
Payer: COMMERCIAL

## 2024-12-16 ENCOUNTER — TELEPHONE (OUTPATIENT)
Dept: UROLOGY | Facility: CLINIC | Age: 49
End: 2024-12-16
Payer: COMMERCIAL

## 2024-12-16 NOTE — TELEPHONE ENCOUNTER
Called patient to schedule surgery with Dr. Hayes    Spoke with:  Patient    Date of Surgery: 3/7/25 per surgeon request     Arrival time Discussed with Patient:  No    Location of surgery: [facility]     Pre-Op H&P: With PCP Dr. Fay within 30 days of the surgery date    Post-Op Appts: N/A     Discussed with patient pre-op RN will call 2-3 days prior to surgery with arrival time and instructions:  Yes     Packet sent out: Yes  via [employer] Message [DATE] 12/16/24    Informed patient that they will need an adult  to bring patient home from surgery: Yes        Additional Comments:  N/A      All patients questions were answered and patient was instructed to review surgical packet and call back with any questions or concerns.       Silvina Orozco on 12/16/2024 at 3:25 PM

## 2024-12-16 NOTE — TELEPHONE ENCOUNTER
Nurse Triage SBAR       Is this a 2nd Level Triage?  YES, LICENSED PRACTITIONER REVIEW IS REQUIRED    CC: bleeding hemorrhoids      Situation:   Pt of Lulú Burkett, LOV 9/25/23.  Pt states that after completing bowel program this am, his hemorrhoids started bleeding heavily and have continued to bleed intermittently since onset.  Pt unable to feel pain secondary to quadriplegia.  Pt denies fever.  Pt would like call back from clinic to further assess symptoms.     Protocol Recommended Disposition:   Discuss With PCP And Call Back By Nurse Today        Recommendation: Please call pt and assess symptoms further. Encouraged patient to: Callback if:  symptoms worsen or if new symptoms arise or go to ER for evaluation. Pt verbalizes understanding and amenable to plan.     Routed to provider/care team.  Parvin BRAMBILA RN  P Central Nursing/Red Flag Triage & Med Refill Team    Reason for Disposition   Patient wants to be seen    Additional Information   Negative: Passed out (e.g., fainted, lost consciousness, blacked out and was not responding)   Negative: Shock suspected (e.g., cold/pale/clammy skin, too weak to stand, low BP, rapid pulse)   Negative: Vomiting red blood or black (coffee ground) material   Negative: Sounds like a life-threatening emergency to the triager   Negative: Diarrhea is main symptom   Negative: Rectal symptoms   Negative: SEVERE rectal bleeding (large blood clots; constant or on and off bleeding)   Negative: SEVERE dizziness (e.g., unable to stand, requires support to walk, feels like passing out now)   Negative: MODERATE rectal bleeding (small blood clots, passing blood without stool, or toilet water turns red) more than once a day   Negative: Bloody, black, or tarry bowel movements  (Exception: Chronic-unchanged black-grey bowel movements and is taking iron pills or Pepto-Bismol.)   Negative: High-risk adult (e.g., prior surgery on aorta, abdominal aortic aneurysm)   Negative: Rectal  "foreign body (inserted or swallowed)   Negative: SEVERE abdominal pain (e.g., excruciating)   Negative: Constant abdominal pain lasting > 2 hours   Negative: Pale skin (pallor) of new-onset or getting worse   Negative: Patient sounds very sick or weak to the triager   Negative: MODERATE rectal bleeding (small blood clots, passing blood without stool, or toilet water turns red)   Negative: Taking Coumadin (warfarin) or other strong blood thinner, or known bleeding disorder (e.g., thrombocytopenia)   Negative: Colonoscopy in past 72 hours   Negative: Known cirrhosis of the liver (or history of liver failure or ascites)    Answer Assessment - Initial Assessment Questions  1. APPEARANCE of BLOOD: \"What color is it?\" \"Is it passed separately, on the surface of the stool, or mixed in with the stool?\"       From hemorroids  2. AMOUNT: \"How much blood was passed?\"       Heavy amt after bowel regimen  3. FREQUENCY: \"How many times has blood been passed with the stools?\"       This am, has been oozing since  4. ONSET: \"When was the blood first seen in the stools?\" (Days or weeks)       In the past, but started again today  5. DIARRHEA: \"Is there also some diarrhea?\" If Yes, ask: \"How many diarrhea stools in the past 24 hours?\"       no  6. CONSTIPATION: \"Do you have constipation?\" If Yes, ask: \"How bad is it?\"      no  7. RECURRENT SYMPTOMS: \"Have you had blood in your stools before?\" If Yes, ask: \"When was the last time?\" and \"What happened that time?\"       Have Hemorids  8. BLOOD THINNERS: \"Do you take any blood thinners?\" (e.g., Coumadin/warfarin, Pradaxa/dabigatran, aspirin)      no  9. OTHER SYMPTOMS: \"Do you have any other symptoms?\"  (e.g., abdomen pain, vomiting, dizziness, fever)      no  10. PREGNANCY: \"Is there any chance you are pregnant?\" \"When was your last menstrual period?\"        N/a    Protocols used: Rectal Bleeding-A-OH    "

## 2024-12-16 NOTE — TELEPHONE ENCOUNTER
Bleeding has stopped. He does do enemas and digitally disimpacts for a bowel program. Scheduled follow up for possible banding (hx of banding x1 with Lulú Weiss NP)

## 2024-12-17 ENCOUNTER — PATIENT OUTREACH (OUTPATIENT)
Dept: CARE COORDINATION | Facility: CLINIC | Age: 49
End: 2024-12-17
Payer: COMMERCIAL

## 2024-12-17 NOTE — PROGRESS NOTES
Clinic Care Coordination Contact  Follow Up Progress Note      Assessment: The RN CC nurse care coordinator contacted the patient by phone for a follow-up call.  He has been having some difficulty with bleeding hemorrhoids that does not seem to want to stop.  The patient contacted the colorectal team and they were able to get him an appointment for 12/30/2024.  The RN CC encouraged him to call again if he has a repeat of this problem with his bowel regime.  The patient stated understanding.  The patient mentioned that he has been scheduled again for his colonoscopy and he wanted to make sure that it was scheduled properly.  The RN CC told him to call her if it was not gradual appropriately.    Care Gaps:    Health Maintenance Due   Topic Date Due    HEPATITIS B IMMUNIZATION (1 of 3 - 19+ 3-dose series) Never done    MEDICARE ANNUAL WELLNESS VISIT  05/31/2023    COLORECTAL CANCER SCREENING  01/30/2024       Care Gaps Last addressed on 12/17/24    Care Plans  Care Plan: General  take medications as prescribed       Problem: HP GENERAL PROBLEM       Goal: General Goal -  I will take all medications as prescribed by the providers.       Start Date: 11/6/2024 Expected End Date: 11/6/2025    This Visit's Progress: 80% Recent Progress: 80%    Note:     Barriers: wheelchair bound  Strengths: engaged in care coordination  Patient expressed understanding of goal: yes  Action steps to achieve this goal:  1. I will take all medications as prescribed.  2. I will ask any questions that I have regarding new medications.  3. I will work with the RN CC if I have any difficulty getting refills.   4. I will work with urology for the PA on the gentamicin instillations for the bladder.                              Care Plan: General -  Make and attend follow up appointments       Problem: HP GENERAL PROBLEM       Goal: General Goal - I will make and attend all recommended appointments from my providers.       Start Date: 11/6/2024  Expected End Date: 11/6/2025    This Visit's Progress: 90% Recent Progress: 90%    Note:     Barriers: Many providers.  Strengths: engaged in care coordination  Patient expressed understanding of goal: yes  Action steps to achieve this goal:  1. I will make all of the recommended follow up appointments.  2. I will attend all of the follow up appointments as recommended by the providers.                            Care Plan: Chronic Pain       Problem: Chronic Pain is Not Self-Managed       Goal: Demonstrate improved self-management of chronic pain       Start Date: 11/5/2024 Expected End Date: 11/5/2025    This Visit's Progress: 50% Recent Progress: 40%    Note:     Barriers: chronic pain and spasms in his back  Strengths: engaged in care coordination  Patient expressed understanding of goal: yes  Action steps to achieve this goal:  1. I will try ice or heat for very short times and monitored by his PCA.  2. I will take all medications as prescribed by the providers.  3. I will work with PMR provider over the use of the Baclofen pump that he has implanted.                                  Intervention/Education provided during outreach: The patient has a very good understanding of the disease process and all of the things that are happening.  The RN CC reinforced and answered any questions that the patient had.     Outreach Frequency: monthly, more frequently as needed    Plan:   1.  The patient will take all medications as prescribed by the providers.  2.  The patient will reach out to the RN CC if the orders for his colonoscopy are not correct.  3.  The patient will take and use his pain medications as directed.    Care Coordinator will follow up in 4 weeks.          Robbin Vanegas MSN, RN, PHN, CCM   Primary Care Clinical RN Care Coordinator  [facility]  12/17/2024   3:34 PM  [email or web address]  Office: [phone]

## 2024-12-18 ENCOUNTER — NURSE TRIAGE (OUTPATIENT)
Dept: FAMILY MEDICINE | Facility: CLINIC | Age: 49
End: 2024-12-18
Payer: COMMERCIAL

## 2024-12-18 NOTE — TELEPHONE ENCOUNTER
Received call from patient. He reports rectal bleeding from hemorrhoids. The first time he noticed this was Monday of this week, only after completing his scheduled enemas which he does every other day. It resolved spontaneously, was hard to gauge how much blood. It was bright red, not mixed with stool. He notes it occurred again today after completing another enema. He again notes it spontaneously resolved but is concerned that this keeps happening. He does not report any red flag symptoms and is aware of what to watch for that would warrant emergency care. He has spoken to the colorectal team regarding this, whom follows his care closely. He states they cannot see him until 12/30/24 and he would like to be seen prior to then. Writer assisted with scheduling for tomorrow, 12/19/24.     Reason for Disposition   MILD rectal bleeding (more than just a few drops or streaks)    Additional Information   Negative: Passed out (e.g., fainted, lost consciousness, blacked out and was not responding)   Negative: Shock suspected (e.g., cold/pale/clammy skin, too weak to stand, low BP, rapid pulse)   Negative: Vomiting red blood or black (coffee ground) material   Negative: Sounds like a life-threatening emergency to the triager   Negative: Diarrhea is main symptom   Negative: Rectal symptoms   Negative: SEVERE rectal bleeding (large blood clots; constant or on and off bleeding)   Negative: SEVERE dizziness (e.g., unable to stand, requires support to walk, feels like passing out now)   Negative: MODERATE rectal bleeding (small blood clots, passing blood without stool, or toilet water turns red) more than once a day   Negative: Bloody, black, or tarry bowel movements  (Exception: Chronic-unchanged black-grey bowel movements and is taking iron pills or Pepto-Bismol.)   Negative: High-risk adult (e.g., prior surgery on aorta, abdominal aortic aneurysm)   Negative: Rectal foreign body (inserted or swallowed)   Negative: SEVERE  "abdominal pain (e.g., excruciating)   Negative: Constant abdominal pain lasting > 2 hours   Negative: Pale skin (pallor) of new-onset or getting worse   Negative: Patient sounds very sick or weak to the triager   Negative: MODERATE rectal bleeding (small blood clots, passing blood without stool, or toilet water turns red)   Negative: Taking Coumadin (warfarin) or other strong blood thinner, or known bleeding disorder (e.g., thrombocytopenia)   Negative: Colonoscopy in past 72 hours   Negative: Known cirrhosis of the liver (or history of liver failure or ascites)   Negative: Patient wants to be seen    Answer Assessment - Initial Assessment Questions  1. APPEARANCE of BLOOD: \"What color is it?\" \"Is it passed separately, on the surface of the stool, or mixed in with the stool?\"       Bright red, it is not mixed in with the stool   2. AMOUNT: \"How much blood was passed?\"       Hard to tell   3. FREQUENCY: \"How many times has blood been passed with the stools?\"       With enemas, 2-3 times   4. ONSET: \"When was the blood first seen in the stools?\" (Days or weeks)       Monday morning   5. DIARRHEA: \"Is there also some diarrhea?\" If Yes, ask: \"How many diarrhea stools in the past 24 hours?\"       No   6. CONSTIPATION: \"Do you have constipation?\" If Yes, ask: \"How bad is it?\"      No   7. RECURRENT SYMPTOMS: \"Have you had blood in your stools before?\" If Yes, ask: \"When was the last time?\" and \"What happened that time?\"       Had hemorrhoids in the past   8. BLOOD THINNERS: \"Do you take any blood thinners?\" (e.g., Coumadin/warfarin, Pradaxa/dabigatran, aspirin)      No  9. OTHER SYMPTOMS: \"Do you have any other symptoms?\"  (e.g., abdomen pain, vomiting, dizziness, fever)      No   10. PREGNANCY: \"Is there any chance you are pregnant?\" \"When was your last menstrual period?\"        N/A    Protocols used: Rectal Bleeding-A-[place]    JADON Russell [facility]  "

## 2024-12-18 NOTE — TELEPHONE ENCOUNTER
Reason for Call:  Appointment Request    Patient requesting this type of appt:  Bleeding rectal     Requested provider: Jenny Fay or any dr     Reason patient unable to be scheduled: Not within requested timeframe    When does patient want to be seen/preferred time: 1-2 days    Comments: He would like to get in and be seen within the next day or two .     Could we send this information to you in [facility] or would you prefer to receive a phone call?:   Patient would prefer a phone call   Okay to leave a detailed message?: Yes at Cell number on file:    Telephone Information:   Mobile [phone]       Call taken on 12/18/2024 at 11:42 AM by Ann-Marie Lopes

## 2024-12-19 ENCOUNTER — MEDICAL CORRESPONDENCE (OUTPATIENT)
Dept: HEALTH INFORMATION MANAGEMENT | Facility: CLINIC | Age: 49
End: 2024-12-19

## 2024-12-19 ENCOUNTER — OFFICE VISIT (OUTPATIENT)
Dept: FAMILY MEDICINE | Facility: CLINIC | Age: 49
End: 2024-12-19
Payer: COMMERCIAL

## 2024-12-19 VITALS
OXYGEN SATURATION: 96 % | HEART RATE: 94 BPM | TEMPERATURE: 97.9 F | RESPIRATION RATE: 20 BRPM | SYSTOLIC BLOOD PRESSURE: 120 MMHG | DIASTOLIC BLOOD PRESSURE: 80 MMHG

## 2024-12-19 DIAGNOSIS — K64.9 HEMORRHOIDS, UNSPECIFIED HEMORRHOID TYPE: Primary | ICD-10-CM

## 2024-12-19 LAB
ALBUMIN SERPL BCG-MCNC: 4.3 G/DL (ref 3.5–5.2)
ALP SERPL-CCNC: 48 U/L (ref 40–150)
ALT SERPL W P-5'-P-CCNC: 16 U/L (ref 0–70)
AST SERPL W P-5'-P-CCNC: 25 U/L (ref 0–45)
BASOPHILS # BLD AUTO: 0.1 10E3/UL (ref 0–0.2)
BASOPHILS NFR BLD AUTO: 1 %
BILIRUB DIRECT SERPL-MCNC: <0.2 MG/DL (ref 0–0.3)
BILIRUB SERPL-MCNC: 0.2 MG/DL
EOSINOPHIL # BLD AUTO: 1.2 10E3/UL (ref 0–0.7)
EOSINOPHIL NFR BLD AUTO: 19 %
ERYTHROCYTE [DISTWIDTH] IN BLOOD BY AUTOMATED COUNT: 12.3 % (ref 10–15)
FERRITIN SERPL-MCNC: 39 NG/ML (ref 31–409)
HCT VFR BLD AUTO: 37.9 % (ref 40–53)
HGB BLD-MCNC: 13 G/DL (ref 13.3–17.7)
IMM GRANULOCYTES # BLD: 0 10E3/UL
IMM GRANULOCYTES NFR BLD: 0 %
LYMPHOCYTES # BLD AUTO: 1.3 10E3/UL (ref 0.8–5.3)
LYMPHOCYTES NFR BLD AUTO: 19 %
MCH RBC QN AUTO: 31.3 PG (ref 26.5–33)
MCHC RBC AUTO-ENTMCNC: 34.3 G/DL (ref 31.5–36.5)
MCV RBC AUTO: 91 FL (ref 78–100)
MONOCYTES # BLD AUTO: 0.4 10E3/UL (ref 0–1.3)
MONOCYTES NFR BLD AUTO: 6 %
NEUTROPHILS # BLD AUTO: 3.7 10E3/UL (ref 1.6–8.3)
NEUTROPHILS NFR BLD AUTO: 56 %
PLATELET # BLD AUTO: 282 10E3/UL (ref 150–450)
PROT SERPL-MCNC: 6.8 G/DL (ref 6.4–8.3)
RBC # BLD AUTO: 4.15 10E6/UL (ref 4.4–5.9)
WBC # BLD AUTO: 6.7 10E3/UL (ref 4–11)

## 2024-12-19 RX ORDER — SENNOSIDES 8.6 MG
1 TABLET ORAL 2 TIMES DAILY PRN
Qty: 30 TABLET | Refills: 0 | Status: SHIPPED | OUTPATIENT
Start: 2024-12-19

## 2024-12-19 ASSESSMENT — ENCOUNTER SYMPTOMS
FATIGUE: 0
SHORTNESS OF BREATH: 0
HEADACHES: 0

## 2024-12-19 ASSESSMENT — PAIN SCALES - GENERAL: PAINLEVEL_OUTOF10: MODERATE PAIN (4)
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
## 2024-12-19 NOTE — PATIENT INSTRUCTIONS
Hold iron until we get labs back.    Go to Emergency department for intractable bleeding or systemic symptoms (fatigue, shortness of breath, etc.)

## 2024-12-23 ENCOUNTER — VIRTUAL VISIT (OUTPATIENT)
Dept: PHARMACY | Facility: CLINIC | Age: 49
End: 2024-12-23
Payer: COMMERCIAL

## 2024-12-23 DIAGNOSIS — J30.81 ALLERGIC RHINITIS DUE TO ANIMAL DANDER: ICD-10-CM

## 2024-12-23 DIAGNOSIS — D64.9 ANEMIA: ICD-10-CM

## 2024-12-23 DIAGNOSIS — G82.50 QUADRIPLEGIA (H): Primary | ICD-10-CM

## 2024-12-23 DIAGNOSIS — F51.04 PSYCHOPHYSIOLOGICAL INSOMNIA: ICD-10-CM

## 2024-12-23 DIAGNOSIS — S14.109S CERVICAL SPINAL CORD INJURY, SEQUELA (H): ICD-10-CM

## 2024-12-23 DIAGNOSIS — K59.09 CHRONIC CONSTIPATION: ICD-10-CM

## 2024-12-23 DIAGNOSIS — M62.838 MUSCLE SPASM: ICD-10-CM

## 2024-12-23 DIAGNOSIS — N39.0 RECURRENT UTI: ICD-10-CM

## 2024-12-23 DIAGNOSIS — Z78.9 SELF-CATHETERIZES URINARY BLADDER: ICD-10-CM

## 2024-12-23 DIAGNOSIS — K20.0 EOSINOPHILIC ESOPHAGITIS: ICD-10-CM

## 2024-12-23 DIAGNOSIS — N31.9 NEUROGENIC BLADDER: ICD-10-CM

## 2024-12-23 DIAGNOSIS — G47.00 INSOMNIA: ICD-10-CM

## 2024-12-23 PROCEDURE — 99606 MTMS BY PHARM EST 15 MIN: CPT | Mod: 93 | Performed by: PHARMACIST

## 2024-12-23 PROCEDURE — 99607 MTMS BY PHARM ADDL 15 MIN: CPT | Mod: 93 | Performed by: PHARMACIST

## 2024-12-23 NOTE — PROGRESS NOTES
Medication Therapy Management (MTM) Encounter    ASSESSMENT:                            Medication Adherence/Access: No issues identified.    Quadriplegia/muscle spasms/spasticity/pain/insomina: On several medications that can contribute to CNS depression. It's possible amitriptyline 50 mg was too high of a dose to take with trazodone.     Discussed keeping things the same/no changes or retrial of trazodone. He'll consider this, will wait until after his upcoming colonoscopy.   Amitriptyline can contribute to constipation and may be beneficial to taper off of. Replacement of amitriptyline and zolpidem with trazodone may be most beneficial long-term, as long as pain and sleep are not worse.     Allergies/Esophageals Esophagitis: Improved.    Constipation/Nausea  Amitriptyline, tolterodine, and iron can contribute to constipation.    Hemorrhoids: Plan in place, education on senna-s provided, he plans to start.      UTI prevention/Urinary Catheter/Bladder spasms:  It appears the Botox is very helpful, unsure how effective tolterodine is - while this also could be contributing to constipation.    Iron Deficiency Anemia:  may benefit from maintaining current use of iron to replete stores further for another 3-6 months, then consider stopping.     PLAN:                            If you'd like to try changing evening medications again, here is would I would recommend:    Week 1: decrease amitriptyline to 25 mg at bedtime decrease zolpidem to 5 mg at bedtime, and start trazodone 25 mg at bedtime (half of a 50 mg pill).  Week 2: stop amitriptyline, continue zolpidem 5 mg at bedtime and continue trazodone 25 mg at bedtime.  Week 3: Stop zolpidem and increase trazodone to 50 mg at bedtime.      Follow-up: No follow-ups on file.    SUBJECTIVE/OBJECTIVE:                          Riley Gifford is a 49 year old male seen for a follow-up visit.       Reason for visit: med review.    Allergies/ADRs: Reviewed in chart  Past Medical  History: Reviewed in chart  Tobacco: He reports that he has never smoked. He has never been exposed to tobacco smoke. He has never used smokeless tobacco.  Alcohol: none    Medication Adherence/Access: no issues reported.  Lives at assisted living home with staff - [facility].  Staff sets up pills in a pill box for him, he takes them on his own then. Has a few as needed options that don't get put in the pill box.     Plan from last time:  Consider trial of backing off on hydrocortisone 2.5% cream for a week, if things worsen, can restart daily or every other day.  Can talk with neurologist about possibly reducing tolterodine for the bladder spasms, as this can contribute to constipation.  Leda will ask Jenny about possibly changing zolpidem to trazodone 25-50 mg at bedtime for sleep. After that we can consider reducing amitriptyline.     Quadriplegia/muscle spasms/spasticity/pain/insomina:   Medical marijuana 10 mg gummy at bedtime - for pain and mostly for sleep  Acetaminophen 500 mg as needed, max 6 pills per day, ranges 2-6/day  Amitriptyline 50 mg at bedtime   Baclofen pump (>600 mg/day)  Baclofen 10 mg four times daily as needed  Tizanidine 2 mg as needed during the day, usually 4 mg at bedtime - this really helps but also knocks him out  Lidocaine 5% ointment as needed for neck and back pain  Pregabalin 50 mg every morning and 100 mg at bedtime- causes drowsiness.  Zolpidem 10 mg at bedtime      He tried reducing zolpidem to 5 mg, then stopped this and started trazodone 25 mg, but the addition of trazodone caused more muscle spasticity, he did see a provider and they decided to have him stop the trazodone and things settled down and went back to how they were before. He did try backing off on the amitriptyline a few nights to 25 mg and sleep was worse, pain the same.  Follows with pain management. He previously attempted to reduce amitriptyline for 1-2 weeks, sleep was worse, so he  increase back up. The baclofen is helpful.   Even with all of these sleep still isn't great, averaging 4-6 hours of not great sleep, has to cath himself every 3-4 hours at night too. Notes wakes up groggy most days, kind of normal for him with the poor sleep.    Allergies/Esophageals Esophagitis:   Dupixent (dupilumab) 300 mg injection weekly   Albuterol HFA as needed    Cetirizine 10 mg daily as needed for seasonal allergies  Omeprazole 40 mg daily   Famotidine 20 mg twice daily as needed (no recent use)    Follows with [facility]. Swallowing has been steady and not bothering his as much since starting Dupixent, not needing inhaler as much.  Possibly more joint pain in neck and shoulders since starting Dupixant, hard to say though, he notes could also be the year's he's been in chair too.    Constipation/Nausea    Miralax powder one capful daily daily  Enemeez enema every other day  Ondansetron 4 mg as needed (rare to use, it causes him more constipation)    Bowel regimen overall is working well, has some good days and some bad days still. Is on several medications that can contribute to constipation, interested in backing off on some of these if possible.     Hemorrhoids:     Hydrocortisone 2.5% cream at least once daily, sometimes twice daily  Preparation H suppositories - these help a lot  Barrier cream daily  Senna-S one tablet twice daily -he hasn't started this yet     He has some sensation in his lower extremities, some pain from the hemorrhoids but not terrible - things did flare here and Senna-S was recommended to start.   He will have a colonoscopy in January  -he'd like to wait until after this to work on adjusting sleep medications again.      UTI prevention/Urinary Catheter/Bladder spasms:    Compounded gentamicin irrigation once daily at bedtime - this really helps   Nystatin cream as needed in groin  Botox every 6 months - very helpful  Tolterodine 2 mg twice daily for bladder spasms - has  been very helpful    Staff help him with the gentamicin irrigation, otherwise he self-caths every 3-4 hours, overnight as well.    Iron Deficiency Anemia:    Ferrous sulfate 325 mg ~2-3x/week    Anemia has improved, remaining on this for a while still.  Side effects; constipation.      Today's Vitals: There were no vitals taken for this visit.  ----------------      I spent 16 minutes with this patient today. All changes were made via collaborative practice agreement with Jenny Fay PA-C.     A summary of these recommendations was sent via [employer].    Leda Perez, PharmD  Medication Therapy Management Pharmacist    Telemedicine Visit Details  The patient's medications can be safely assessed via a telemedicine encounter.  Type of service:  Telephone visit  Originating Location (pt. Location): Home    Distant Location (provider location):  On-site  Start Time: 11:30 AM  End Time: 11:46 AM     Medication Therapy Recommendations  No medication therapy recommendations to display

## 2024-12-23 NOTE — PATIENT INSTRUCTIONS
"Recommendations from today's MTM visit:                                                         If you'd like to try changing evening medications again, here is would I would recommend:    Week 1: decrease amitriptyline to 25 mg at bedtime decrease zolpidem to 5 mg at bedtime, and start trazodone 25 mg at bedtime (half of a 50 mg pill).  Week 2: stop amitriptyline, continue zolpidem 5 mg at bedtime and continue trazodone 25 mg at bedtime.  Week 3: Stop zolpidem and increase trazodone to 50 mg at bedtime.    Follow-up: No follow-ups on file.    It was great speaking with you today.  I value your experience and would be very thankful for your time in providing feedback in our clinic survey. In the next few days, you may receive an email or text message from [facility] with a link to a survey related to your  clinical pharmacist.\"     To schedule another MTM appointment, please call the clinic directly or you may call the MTM scheduling line at [phone] or toll-free at [phone].     My Clinical Pharmacist's contact information:                                                      Please feel free to contact me with any questions or concerns you have.      Leda Perez, PharmD  Medication Therapy Management Pharmacist     "

## 2024-12-30 ENCOUNTER — TELEPHONE (OUTPATIENT)
Dept: FAMILY MEDICINE | Facility: CLINIC | Age: 49
End: 2024-12-30

## 2024-12-30 NOTE — TELEPHONE ENCOUNTER
----- Message from Karon LEÓN sent at 12/30/2024  3:49 PM CST -----  Regarding: Hospital admission for colonoscopy prep  Hello -     This patient is scheduled for a colonoscopy 1.9.25. He is looking to have hospital admission for prep. I did warn him that this is usually not authorized. But if so, he would need the admit date of 1.7.25 for 2 day prep due to constipation. He said he would reach out to you as well.    Thank you,    Karon Landers RN  Endoscopy Procedure Pre Assessment [facility]  [phone] option 4

## 2024-12-30 NOTE — TELEPHONE ENCOUNTER
I believe pre-admission orders were already placed for patient for this procedure. However, I am unclear how to check this. Will have care coordination make sure this process has been completed.   Jenny Fay PA-C

## 2025-01-02 ENCOUNTER — MEDICAL CORRESPONDENCE (OUTPATIENT)
Dept: HEALTH INFORMATION MANAGEMENT | Facility: CLINIC | Age: 50
End: 2025-01-02
Payer: COMMERCIAL

## 2025-01-03 ENCOUNTER — TELEPHONE (OUTPATIENT)
Dept: FAMILY MEDICINE | Facility: CLINIC | Age: 50
End: 2025-01-03
Payer: COMMERCIAL

## 2025-01-03 NOTE — TELEPHONE ENCOUNTER
Mitzi fro the [facility] scheduled admits called they can not schedule patient for his scheduled admit for colonoscopy they do not use this for that it was from a referral you sent in June he said you will need to work with GI please call Mitzi with any questions at [phone].  Jenn Jordan   Purple Team

## 2025-01-03 NOTE — TELEPHONE ENCOUNTER
Spoke with Mitzi- noted that [facility] policy is to no longer admit any patient regardless of diagnosis for colonoscopy prep. It was noted that there was no alternative from administration offered.   Reached out to Robbin Andino Care coordination and she will assist.   Jenny Fay PA-C

## 2025-01-06 ENCOUNTER — PATIENT OUTREACH (OUTPATIENT)
Dept: CARE COORDINATION | Facility: CLINIC | Age: 50
End: 2025-01-06
Payer: COMMERCIAL

## 2025-01-06 ENCOUNTER — TELEPHONE (OUTPATIENT)
Dept: GASTROENTEROLOGY | Facility: CLINIC | Age: 50
End: 2025-01-06
Payer: COMMERCIAL

## 2025-01-06 NOTE — TELEPHONE ENCOUNTER
Caller: Robbin    Reason for Reschedule/Cancellation   (please be detailed, any staff messages or encounters to note?): Cannot be admitted for prep      Prior to reschedule please review:  Ordering Provider: Jenny Fay  Sedation Determined: MAC  Does patient have any ASC Exclusions, please identify?: Y      Notes on Cancelled Procedure:  Procedure: Lower Endoscopy [Colonoscopy]   Date: 1/9/25  Location: [facility]; [address] [place] [address], [address] Floor, [place] [id number]   Surgeon: Julian      Rescheduled: No, case removed from depot and sent letter to close the loop.       Did you cancel or rescheduled an EUS procedure? No.

## 2025-01-06 NOTE — PROGRESS NOTES
Clinic Care Coordination Contact  Care Team Conversations    The RN CC nurse care coordinator contacted the patient by phone.  The RN CC is unable to make it work to have the patient admitted for the prep before his colonoscopy.  Therefore the colon and rectal department was notified that we will try to see if [facility] would be able to work it so that the prep could be done as an inpatient.      The patient also had a question regarding the fax that would have come from [facility] as the patient is running out of supplies.  The RN CC checked with the provider and she reassured that she has signed many faxes recently.      The coloscopy scheduled for 1/9/25 was cancelled by phone.      Robbin Vanegas MSN, RN, PHN, CCM   Primary Care Clinical RN Care Coordinator  [facility]  1/6/2025   4:11 PM  [email or web address]  Office: [phone]

## 2025-01-07 ENCOUNTER — HOSPITAL ENCOUNTER (OUTPATIENT)
Age: 50
End: 2025-01-07
Payer: COMMERCIAL

## 2025-01-16 DIAGNOSIS — K59.00 CONSTIPATION, UNSPECIFIED CONSTIPATION TYPE: ICD-10-CM

## 2025-01-16 RX ORDER — POLYETHYLENE GLYCOL 3350 17 G/17G
POWDER ORAL
Qty: 510 G | Refills: 1 | Status: SHIPPED | OUTPATIENT
Start: 2025-01-16

## 2025-01-20 DIAGNOSIS — K64.9 HEMORRHOIDS, UNSPECIFIED HEMORRHOID TYPE: ICD-10-CM

## 2025-01-21 ENCOUNTER — MEDICAL CORRESPONDENCE (OUTPATIENT)
Dept: HEALTH INFORMATION MANAGEMENT | Facility: CLINIC | Age: 50
End: 2025-01-21
Payer: COMMERCIAL

## 2025-01-21 DIAGNOSIS — N39.0 RECURRENT UTI: ICD-10-CM

## 2025-01-21 DIAGNOSIS — N31.9 NEUROGENIC BLADDER: Primary | ICD-10-CM

## 2025-01-21 DIAGNOSIS — N31.9 NEUROGENIC BLADDER: ICD-10-CM

## 2025-01-21 DIAGNOSIS — Z01.812 PRE-PROCEDURE LAB EXAM: ICD-10-CM

## 2025-01-21 RX ORDER — HYDROCORTISONE 25 MG/G
CREAM TOPICAL 2 TIMES DAILY
Qty: 28 G | Refills: 0 | Status: SHIPPED | OUTPATIENT
Start: 2025-01-21

## 2025-01-21 NOTE — TELEPHONE ENCOUNTER
Called patient stated his Urologist has left the practice and would need to wait to establish care with another provider.     Carina

## 2025-01-22 ENCOUNTER — DOCUMENTATION ONLY (OUTPATIENT)
Dept: UROLOGY | Facility: CLINIC | Age: 50
End: 2025-01-22
Payer: COMMERCIAL

## 2025-01-22 ENCOUNTER — TELEPHONE (OUTPATIENT)
Dept: CARE COORDINATION | Facility: CLINIC | Age: 50
End: 2025-01-22
Payer: COMMERCIAL

## 2025-01-22 NOTE — PROGRESS NOTES
Call placed to Amanda Root with [facility] in response to her phone message. Message left to let her know that I don't see a current gentamicin order and have questions about this and can run it by Riley's provider after hearing back from her. My direct line given for her to call back.      Thank you,  Anni Jay RN, BSN   Urology Triage Nurse

## 2025-01-22 NOTE — TELEPHONE ENCOUNTER
The Gentamicin for installation into the bladder at night does need a PA called into [facility].  The PA line is [phone].  Provide the order     30 mls bladder instillation  at bedtime.  Sodium chorlide 0.9%(bottle) 1,000 ml with gentamicin 480 mg.  Instill 30 ml into the bladder at bedtime.    Ask for an ASAP determination please.          Robbin Vanegas MSN, RN, PHN, CCM   Primary Care Clinical RN Care Coordinator  [facility]  1/22/2025   12:07 PM  [email or web address]  Office: [phone]

## 2025-01-23 ENCOUNTER — TELEPHONE (OUTPATIENT)
Dept: FAMILY MEDICINE | Facility: CLINIC | Age: 50
End: 2025-01-23
Payer: COMMERCIAL

## 2025-01-23 NOTE — TELEPHONE ENCOUNTER
I called and spoke with patient he states he is thinking he needs a PA. I advised he should call and speak with the pharmacy and if a PA is needed they can fax the request to urology to get it started.  Rachel Woo CMA

## 2025-01-23 NOTE — TELEPHONE ENCOUNTER
A prior authorization is needed for the following compounded medications prescribed.  Please complete a prior authorization with the information included below.    Medication: Gentamicin 480mg/l bladder irrigation (COMPOUNDED)    Ingredients                                                                  NDCs                                                Quantities                         Gentamicin sulfate 40mg/ml soln   [id number]   10.8 ml  Sodium chloride 0.9% soln    [id number]   889.2 ml  Bd syringe 50ml misc     [id number]   30 each      RX #: [id number]  Reason for Rejection:rts 2/3 - patient said he got a letter from [facility] that he will need another prior auth submitted for further fills and wants to submit proactively since he ran into problems with coverage last year    Pharmacy Insurance plan:[employer] dual  BIN #:[id number]  ID #:[id number]  PCN #:[id number]  Phone #:[phone]      Pharmacy NPI:[id number]      Please advise the [facility] @ [phone] when the prior authorization is approved or denied.     Thank you for your time.    Cris Garcia CPhT, CAROL    [facility]  [address] [place] [id number]   [email or web address]  [email or web address]   Phone: [phone]  Fax: [phone]

## 2025-01-25 DIAGNOSIS — K59.09 CHRONIC CONSTIPATION: ICD-10-CM

## 2025-01-26 RX ORDER — DOCUSATE SODIUM 283 MG/5ML
LIQUID RECTAL
Qty: 150 ML | Refills: 0 | Status: SHIPPED | OUTPATIENT
Start: 2025-01-26

## 2025-01-27 ENCOUNTER — TELEPHONE (OUTPATIENT)
Dept: FAMILY MEDICINE | Facility: CLINIC | Age: 50
End: 2025-01-27
Payer: COMMERCIAL

## 2025-01-27 NOTE — TELEPHONE ENCOUNTER
Routing to PCP    See TE from 1/23 regarding gentamicin.    Amanda, care coordinator with [facility], calling (she works with his medica)    [facility] has denied his gentamicin medication. She is going to fax a form over to have PCP fill it out to appeal the denial.     JADON Nuñez  [place] Triage RN  [facility]

## 2025-01-28 ENCOUNTER — PATIENT OUTREACH (OUTPATIENT)
Dept: CARE COORDINATION | Facility: CLINIC | Age: 50
End: 2025-01-28
Payer: COMMERCIAL

## 2025-01-28 NOTE — PROGRESS NOTES
"Clinic Care Coordination Contact  Care Team Conversations    Writer is covering for Robbin, JOSE RN.     VM left 1/27/25 at 1:33 pm Amanda Root a [place] health care worker, \"I need your help with Riley again, still dealing with the gentamicin\" Cell [phone]     I returned her call on 1/28/25 at 9:08 AM and had to leave a VM. I provided my direct line if she needed assistance sooner than Robbin's return.     Amanda, [place] care coordinator with [facility], calling she works with his med[employer] insurance. [employer] denied gentamicin prescription. She stated this is something the patient needs.   She is going to send a form to patients PCP called \"request for redetermination of medicare prescription drug denial\"  Amanda's request is for PCP to fill out and fax to medica. Amanda noted that the fax number on the form is different than where she was told the form should go. She will call our clinic back once she has the correct fax number for the form.   Abbey has the fax number for the the [facility] and will send Attn: Jenny Fay PA-C today hopefully.     Plan:   Routing this message to PCP.   Robbin to review chart in 3-5 business days.     Vera Trujillo RN, BSN, PHN Care Coordinator  [facility]   Phone: [phone]   (Covering for robbin GARCIA RN)      "

## 2025-01-29 NOTE — PROGRESS NOTES
Clinic Care Coordination Contact  Care Team Conversations    Writer is covering for JOSE Siegel RN.      Amanda Root a [facility] care worker, Cell [phone] called writer to let Jenny Fay know that patients gentamicin prescription IS covered x5 months.     No further action needed.   Thank you, Vera Trujillo RN, BSN, PHN Care Coordinator  [facility]   Phone: [phone]   (Covering for Robbin GARCIA RN)

## 2025-02-18 ENCOUNTER — MEDICAL CORRESPONDENCE (OUTPATIENT)
Dept: HEALTH INFORMATION MANAGEMENT | Facility: CLINIC | Age: 50
End: 2025-02-18
Payer: COMMERCIAL

## 2025-02-19 ENCOUNTER — PATIENT OUTREACH (OUTPATIENT)
Dept: CARE COORDINATION | Facility: CLINIC | Age: 50
End: 2025-02-19
Payer: COMMERCIAL

## 2025-02-19 ASSESSMENT — ACTIVITIES OF DAILY LIVING (ADL): DEPENDENT_IADLS:: CLEANING;COOKING;LAUNDRY;SHOPPING;MEAL PREPARATION;MEDICATION MANAGEMENT

## 2025-02-19 NOTE — PROGRESS NOTES
"Clinic Care Coordination Contact  Follow Up Progress Note      Assessment: CC RN contacted patient for goal progression.   Patient made aware that lead CC RN is out and CC RN is covering.   Patient reports that \"everything is stable.\"  Aware of upcoming appointments, and has a plan for pain and for taking his prescription meds. Patient would be open to a follow up call from CC team in 2 months. He is hoping Robbin will be back by then.     Care Gaps: CC RN let patient know he can schedule his AWE to addres his care gaps at any time moving forward, patient thanked writer for this info and stated he will \"keep that in mind.\"     Health Maintenance Due   Topic Date Due    HEPATITIS B IMMUNIZATION (1 of 3 - 19+ 3-dose series) Never done    MEDICARE ANNUAL WELLNESS VISIT  05/31/2023    COLORECTAL CANCER SCREENING  01/30/2024    PHQ-2 (once per calendar year)  01/01/2025       Care Plans  Care Plan: General  take medications as prescribed       Problem: HP GENERAL PROBLEM       Goal: General Goal -  I will take all medications as prescribed by the providers.       Start Date: 11/6/2024 Expected End Date: 11/6/2025    This Visit's Progress: 80% Recent Progress: 80%    Note:     Barriers: wheelchair bound  Strengths: engaged in care coordination  Patient expressed understanding of goal: yes  Action steps to achieve this goal:  1. I will take all medications as prescribed.  2. I will ask any questions that I have regarding new medications.  3. I will work with the RN CC if I have any difficulty getting refills.   4. I will work with urology for the PA on the gentamicin instillations for the bladder.                              Care Plan: General -  Make and attend follow up appointments       Problem: HP GENERAL PROBLEM       Goal: General Goal - I will make and attend all recommended appointments from my providers.       Start Date: 11/6/2024 Expected End Date: 11/6/2025    This Visit's Progress: 90% Recent Progress: 90%    " Note:     Barriers: Many providers.  Strengths: engaged in care coordination  Patient expressed understanding of goal: yes  Action steps to achieve this goal:  1. I will make all of the recommended follow up appointments.  2. I will attend all of the follow up appointments as recommended by the providers.                            Care Plan: Chronic Pain       Problem: Chronic Pain is Not Self-Managed       Goal: Demonstrate improved self-management of chronic pain       Start Date: 11/5/2024 Expected End Date: 11/5/2025    This Visit's Progress: 50% Recent Progress: 40%    Note:     Barriers: chronic pain and spasms in his back  Strengths: engaged in care coordination  Patient expressed understanding of goal: yes  Action steps to achieve this goal:  1. I will try ice or heat for very short times and monitored by his PCA.  2. I will take all medications as prescribed by the providers.  3. I will work with PMR provider over the use of the Baclofen pump that he has implanted.                                Intervention/Education provided during outreach: Discussed patients goal and action steps. CC RN asked open ended questions, provided support, resources, and encouragement as needed. Patient will reach out sooner than planned with new questions or concerns.          Plan:   Patient to continue to carry out his current care plans.   Patient to work on his CC goals and action steps.   Care Coordinator will follow up in 2 months  Covering for Robbin, lead CC RN

## 2025-02-19 NOTE — LETTER
[facility]  Patient Centered Plan of Care  About Me:        Patient Name:  Riley Gifford    YOB: 1975  Age:         49 year old   Rut MRN:    [id number] Telephone Information:  Home Phone [phone]   Mobile [phone]       Address:  [address] [place] [address] [address]  [place] [address] Email address:  [email or web address]      Emergency Contact(s)    Name Relationship Lgl Grd Work Phone Home Phone Mobile Phone   1. TREY GIFFORD (NEP* Relative No noen none [phone]   2. AGUSTO CAVAZOS Sister   [phone]    3. HODA GIFFORD Brother No  [phone]            Primary language:  English     needed? No   [place] [facility]:  [phone] op. 1  Other communication barriers:Glasses    Preferred Method of Communication:  Lisa  Current living arrangement: I live in assisted living    Mobility Status/ Medical Equipment: Dependent/Assisted by Another        Health Maintenance  Health Maintenance Reviewed: Due/Overdue   Health Maintenance Due   Topic Date Due    HEPATITIS B IMMUNIZATION (1 of 3 - 19+ 3-dose series) Never done    MEDICARE ANNUAL WELLNESS VISIT  05/31/2023    COLORECTAL CANCER SCREENING  01/30/2024    PHQ-2 (once per calendar year)  01/01/2025          My Access Plan  Medical Emergency 911   Primary Clinic Line [facility] [phone]   24 Hour Appointment Line [phone] or  [phone]) (toll-free)   24 Hour Nurse Line [phone] (toll-free)   Preferred Urgent Care [facility] [phone]     Preferred Hospital [facility]  [phone]     Preferred Pharmacy [id number] - [place], [place] - [address] [place] [address] AT [facility] & [address]     Behavioral Health Crisis Line The National Suicide Prevention Lifeline at 1-943.893.2680 or Text/Call 448           My Care Team Members  Patient Care Team         Relationship  Specialty Notifications Start End    Jenny Fay PA-C PCP - General Family Medicine  12/21/21     Phone: [phone] Fax: [phone] [address] [facility] [address]    Robbin Andino, RN Lead Care Coordinator Nurse Admissions 1/6/16     phone:  [phone]    Phone: [phone] Fax: [phone]        Rober Leo MD Referring Physician Urology  1/8/16     Phone: [phone] Fax: [phone]         [address] [facility] [address]    Amanda Other (see comments)   1/8/16     [facility]     Phone: [phone]         Evaristo Hayes MD MD Urology  4/19/16     Phone: [phone] Fax: [phone]         [address] [facility] [address] [facility] [address]    Jenny Wright, RN Registered Nurse Urology  4/19/16     Rosemary Thomas RN Nurse Coordinator Physical Medicine and Rehabilitation  3/30/17     Phone: [phone] Pager: [phone]        Lulú Reveles APRN CNP Nurse Practitioner Nurse Practitioner  8/28/20     Phone: [phone] Fax: [phone]         [address] [facility] [address] [facility] [address]    [facility] Occupational Therapist Occupational Therapy  1/7/22     [facility] Shanique Alberto  fax [phone]    Phone: [phone] Fax: [phone]         [address] [facility] [address]    Evaristo Hayes MD MD Urology  1/17/22     Phone: [phone] Fax: [phone]         [address] [facility] [address] [facility] [address]    Yessy Tapia, RN Specialty Care Coordinator   1/17/22     Jenny Fay PA-C Assigned PCP   4/3/22     Phone: [phone] Fax: [phone] [address] [facility] [place] [address]    Nalini Resendez APRN CNP Nurse Practitioner   9/12/23     Phone: [phone] Fax: [phone]         [facility] [place] [address]    uLlú Reveles APRN CNP Assigned Surgical Provider   8/23/24     Phone:  [phone] Fax: [phone]         [address] [facility] [address] [facility] [address]    Brittaney Perez RPH Pharmacist Pharmacist Ambulatory Care  11/25/24     Phone: [phone] [address] [facility] [address]    Brittaney Perez RPH Assigned MTM Pharmacist   12/23/24     Phone: [phone] [address] [facility] [address]    Vera Trujillo, RN Lead Care Coordinator Primary Care - CC Admissions 2/17/25     Phone: [phone] Fax: [phone]                    My Care Plans  Self Management and Treatment Plan    Care Plan  Care Plan: General  take medications as prescribed       Problem: HP GENERAL PROBLEM       Goal: General Goal -  I will take all medications as prescribed by the providers.       Start Date: 11/6/2024 Expected End Date: 11/6/2025    This Visit's Progress: 80% Recent Progress: 80%    Note:     Barriers: wheelchair bound  Strengths: engaged in care coordination  Patient expressed understanding of goal: yes  Action steps to achieve this goal:  1. I will take all medications as prescribed.  2. I will ask any questions that I have regarding new medications.  3. I will work with the RN CC if I have any difficulty getting refills.   4. I will work with urology for the PA on the gentamicin instillations for the bladder.                              Care Plan: General -  Make and attend follow up appointments       Problem: HP GENERAL PROBLEM       Goal: General Goal - I will make and attend all recommended appointments from my providers.       Start Date: 11/6/2024 Expected End Date: 11/6/2025    This Visit's Progress: 90% Recent Progress: 90%    Note:     Barriers: Many providers.  Strengths: engaged in care coordination  Patient expressed understanding of goal: yes  Action steps to achieve this goal:  1. I will make all of the recommended follow up appointments.  2. I will attend all of the follow up appointments as recommended by the providers.                             Care Plan: Chronic Pain       Problem: Chronic Pain is Not Self-Managed       Goal: Demonstrate improved self-management of chronic pain       Start Date: 11/5/2024 Expected End Date: 11/5/2025    This Visit's Progress: 50% Recent Progress: 40%    Note:     Barriers: chronic pain and spasms in his back  Strengths: engaged in care coordination  Patient expressed understanding of goal: yes  Action steps to achieve this goal:  1. I will try ice or heat for very short times and monitored by his PCA.  2. I will take all medications as prescribed by the providers.  3. I will work with PMR provider over the use of the Baclofen pump that he has implanted.                                  Action Plans on File:            Depression          Advance Care Plans/Directives:   Advanced Care Plan/Directives on file: Yes    Status of Document(s): On File and Validated    Advanced Care Plan/Directives Type: Advanced Directive - On File             My Medical and Care Information  Problem List   Patient Active Problem List   Diagnosis    Vitamin D deficiency    Eosinophilic esophagitis    Neurogenic bladder    Quadriplegia (H)    Chronic constipation    Allergic rhinitis due to animal dander    Allergy to mold spores    House dust mite allergy    Insomnia    Gallstones    Chronic midline thoracic back pain    Pulmonary nodules    Cervical spinal cord injury, sequela    Self-catheterizes urinary bladder    History of claustrophobia    Recurrent UTI    Muscle spasm    Low sodium levels      Current Medications:  Please refer to the most recent medication list provided to you by your medical team and reach out to your provider with any questions or to make any corrections.    Care Coordination Start Date: 1/6/2016   Frequency of Care Coordination: 2 months, more frequently as needed     Form Last Updated: 02/19/2025

## 2025-02-20 ENCOUNTER — TELEPHONE (OUTPATIENT)
Dept: UROLOGY | Facility: CLINIC | Age: 50
End: 2025-02-20
Payer: COMMERCIAL

## 2025-03-01 DIAGNOSIS — K64.9 HEMORRHOIDS, UNSPECIFIED HEMORRHOID TYPE: ICD-10-CM

## 2025-03-03 ENCOUNTER — TELEPHONE (OUTPATIENT)
Dept: UROLOGY | Facility: CLINIC | Age: 50
End: 2025-03-03
Payer: COMMERCIAL

## 2025-03-03 RX ORDER — HYDROCORTISONE 2.5% 25 MG/G
CREAM TOPICAL 2 TIMES DAILY
Qty: 30 G | Refills: 0 | Status: SHIPPED | OUTPATIENT
Start: 2025-03-03

## 2025-03-03 NOTE — TELEPHONE ENCOUNTER
Received message from PAN that patient was looking to reschedule 3/7 procedure date with Dr. Hayes. Writer reached out, M asking for a call back to assist with the reschedule. Provided [phone] for call back. Kalyn Stoll on 3/3/2025 at 3:57 PM

## 2025-03-04 ENCOUNTER — PREP FOR PROCEDURE (OUTPATIENT)
Dept: UROLOGY | Facility: CLINIC | Age: 50
End: 2025-03-04
Payer: COMMERCIAL

## 2025-03-04 ENCOUNTER — MEDICAL CORRESPONDENCE (OUTPATIENT)
Dept: HEALTH INFORMATION MANAGEMENT | Facility: CLINIC | Age: 50
End: 2025-03-04

## 2025-03-04 NOTE — TELEPHONE ENCOUNTER
Patient returned call to reschedule surgery on 3/7 with Dr. Hayes/Dr. Causey    Spoke with: Patient     Date of Surgery: 03/14/2025    Approximate surgery arrival time given:  No    Location of surgery: [facility]     Pre-Op H&P: Completed on 2/28 with PCP - Jenny Fay PA-C    Pre-op Imaging: No    No follow-up ordered    Discussed with patient pre-op RN will call 2-3 days prior to surgery with arrival time and instructions:  Yes       Additional Comments:  Patient requested to reschedule to the following week.     Patient inquired if he will need to redo the urinalysis - routed to ROBERTO Stanley on 3/4/2025 at 11:08 AM

## 2025-03-06 ENCOUNTER — MYC MEDICAL ADVICE (OUTPATIENT)
Dept: PALLIATIVE MEDICINE | Facility: CLINIC | Age: 50
End: 2025-03-06
Payer: COMMERCIAL

## 2025-03-06 ENCOUNTER — DOCUMENTATION ONLY (OUTPATIENT)
Dept: UROLOGY | Facility: CLINIC | Age: 50
End: 2025-03-06
Payer: COMMERCIAL

## 2025-03-06 DIAGNOSIS — M62.838 MUSCLE SPASM: ICD-10-CM

## 2025-03-06 DIAGNOSIS — G82.50 QUADRIPLEGIA (H): ICD-10-CM

## 2025-03-06 DIAGNOSIS — M54.6 CHRONIC BILATERAL THORACIC BACK PAIN: ICD-10-CM

## 2025-03-06 DIAGNOSIS — G44.86 CERVICOGENIC HEADACHE: ICD-10-CM

## 2025-03-06 DIAGNOSIS — M79.18 MYOFASCIAL PAIN: ICD-10-CM

## 2025-03-06 DIAGNOSIS — G89.29 CHRONIC BILATERAL THORACIC BACK PAIN: ICD-10-CM

## 2025-03-06 DIAGNOSIS — M54.2 NECK PAIN: ICD-10-CM

## 2025-03-06 NOTE — PROGRESS NOTES
Call received from patient. He reports some intermittent lower abdominal pain and cloudy urine. Message sent to Dr Causey.      Thank you,  Anni Jay RN, BSN   Urology Triage Nurse

## 2025-03-11 DIAGNOSIS — G89.29 CHRONIC MIDLINE THORACIC BACK PAIN: ICD-10-CM

## 2025-03-11 DIAGNOSIS — M54.6 CHRONIC MIDLINE THORACIC BACK PAIN: ICD-10-CM

## 2025-03-11 RX ORDER — TIZANIDINE 2 MG/1
2-8 TABLET ORAL 3 TIMES DAILY PRN
Qty: 270 TABLET | Refills: 0 | Status: SHIPPED | OUTPATIENT
Start: 2025-03-11

## 2025-03-11 NOTE — TELEPHONE ENCOUNTER
Request appears appropriate, refill approved for #270 tabs.     Nalini Resendez, KENDELL, APRN, AGNP-C  [facility]

## 2025-03-11 NOTE — TELEPHONE ENCOUNTER
Received fax from pharmacy requesting refill(s) for       tiZANidine (ZANAFLEX) 2 MG tablet       Date last filled 12/28/2024    Last Appt Date:9/25/2024    Next Appt scheduled: 3/19/2025    Pharmacy:      [facility] - [place], [place] - [address] [facility] [address],    [facility] route for processing    Serena Rajan [facility]

## 2025-03-11 NOTE — TELEPHONE ENCOUNTER
Received request from pt requesting refill(s) for tizanidine    Last processed on 10/22/24 with 1 additional refill.    Pt last seen on 9/25/24  Next appt scheduled for 3/19/25      Routed to provider.     Joie RN-BSN  [facility]

## 2025-03-12 ENCOUNTER — TRANSFERRED RECORDS (OUTPATIENT)
Dept: HEALTH INFORMATION MANAGEMENT | Facility: CLINIC | Age: 50
End: 2025-03-12
Payer: COMMERCIAL

## 2025-03-13 ENCOUNTER — ANESTHESIA EVENT (OUTPATIENT)
Dept: SURGERY | Facility: AMBULATORY SURGERY CENTER | Age: 50
End: 2025-03-13
Payer: COMMERCIAL

## 2025-03-14 ENCOUNTER — HOSPITAL ENCOUNTER (OUTPATIENT)
Facility: AMBULATORY SURGERY CENTER | Age: 50
Discharge: HOME | End: 2025-03-14
Attending: STUDENT IN AN ORGANIZED HEALTH CARE EDUCATION/TRAINING PROGRAM | Admitting: STUDENT IN AN ORGANIZED HEALTH CARE EDUCATION/TRAINING PROGRAM
Payer: COMMERCIAL

## 2025-03-14 ENCOUNTER — ANESTHESIA (OUTPATIENT)
Dept: SURGERY | Facility: AMBULATORY SURGERY CENTER | Age: 50
End: 2025-03-14
Payer: COMMERCIAL

## 2025-03-14 VITALS
DIASTOLIC BLOOD PRESSURE: 73 MMHG | BODY MASS INDEX: 17.74 KG/M2 | SYSTOLIC BLOOD PRESSURE: 95 MMHG | TEMPERATURE: 97 F | RESPIRATION RATE: 20 BRPM | HEART RATE: 89 BPM | HEIGHT: 72 IN | WEIGHT: 131 LBS | OXYGEN SATURATION: 95 %

## 2025-03-14 PROCEDURE — 52287 CYSTOSCOPY CHEMODENERVATION: CPT | Mod: GC | Performed by: UROLOGY

## 2025-03-14 PROCEDURE — 52287 CYSTOSCOPY CHEMODENERVATION: CPT

## 2025-03-14 RX ORDER — SODIUM CHLORIDE, SODIUM LACTATE, POTASSIUM CHLORIDE, CALCIUM CHLORIDE 600; 310; 30; 20 MG/100ML; MG/100ML; MG/100ML; MG/100ML
INJECTION, SOLUTION INTRAVENOUS CONTINUOUS
Status: DISCONTINUED | OUTPATIENT
Start: 2025-03-14 | End: 2025-03-14 | Stop reason: HOSPADM

## 2025-03-14 RX ORDER — PROPOFOL 10 MG/ML
INJECTION, EMULSION INTRAVENOUS CONTINUOUS PRN
Status: DISCONTINUED | OUTPATIENT
Start: 2025-03-14 | End: 2025-03-14

## 2025-03-14 RX ORDER — ONDANSETRON 4 MG/1
4 TABLET, ORALLY DISINTEGRATING ORAL EVERY 30 MIN PRN
Status: DISCONTINUED | OUTPATIENT
Start: 2025-03-14 | End: 2025-03-15 | Stop reason: HOSPADM

## 2025-03-14 RX ORDER — PROPOFOL 10 MG/ML
INJECTION, EMULSION INTRAVENOUS PRN
Status: DISCONTINUED | OUTPATIENT
Start: 2025-03-14 | End: 2025-03-14

## 2025-03-14 RX ORDER — NALOXONE HYDROCHLORIDE 0.4 MG/ML
0.1 INJECTION, SOLUTION INTRAMUSCULAR; INTRAVENOUS; SUBCUTANEOUS
Status: DISCONTINUED | OUTPATIENT
Start: 2025-03-14 | End: 2025-03-15 | Stop reason: HOSPADM

## 2025-03-14 RX ORDER — CEFAZOLIN SODIUM 2 G/50ML
2 SOLUTION INTRAVENOUS
Status: COMPLETED | OUTPATIENT
Start: 2025-03-14 | End: 2025-03-14

## 2025-03-14 RX ORDER — DEXAMETHASONE SODIUM PHOSPHATE 10 MG/ML
4 INJECTION, SOLUTION INTRAMUSCULAR; INTRAVENOUS
Status: DISCONTINUED | OUTPATIENT
Start: 2025-03-14 | End: 2025-03-15 | Stop reason: HOSPADM

## 2025-03-14 RX ORDER — ONDANSETRON 2 MG/ML
INJECTION INTRAMUSCULAR; INTRAVENOUS PRN
Status: DISCONTINUED | OUTPATIENT
Start: 2025-03-14 | End: 2025-03-14

## 2025-03-14 RX ORDER — ALBUTEROL SULFATE 0.83 MG/ML
2.5 SOLUTION RESPIRATORY (INHALATION) EVERY 6 HOURS PRN
Status: DISCONTINUED | OUTPATIENT
Start: 2025-03-14 | End: 2025-03-15 | Stop reason: HOSPADM

## 2025-03-14 RX ORDER — LIDOCAINE 40 MG/G
CREAM TOPICAL
Status: DISCONTINUED | OUTPATIENT
Start: 2025-03-14 | End: 2025-03-14 | Stop reason: HOSPADM

## 2025-03-14 RX ORDER — ACETAMINOPHEN 325 MG/1
975 TABLET ORAL ONCE
Status: COMPLETED | OUTPATIENT
Start: 2025-03-14 | End: 2025-03-14

## 2025-03-14 RX ORDER — ONDANSETRON 2 MG/ML
4 INJECTION INTRAMUSCULAR; INTRAVENOUS EVERY 30 MIN PRN
Status: DISCONTINUED | OUTPATIENT
Start: 2025-03-14 | End: 2025-03-15 | Stop reason: HOSPADM

## 2025-03-14 RX ORDER — ACETAMINOPHEN 650 MG/1
650 SUPPOSITORY RECTAL ONCE
Status: COMPLETED | OUTPATIENT
Start: 2025-03-14 | End: 2025-03-14

## 2025-03-14 RX ORDER — FENTANYL CITRATE 50 UG/ML
25 INJECTION, SOLUTION INTRAMUSCULAR; INTRAVENOUS
Status: DISCONTINUED | OUTPATIENT
Start: 2025-03-14 | End: 2025-03-15 | Stop reason: HOSPADM

## 2025-03-14 RX ORDER — OXYCODONE HYDROCHLORIDE 5 MG/1
5 TABLET ORAL
Status: DISCONTINUED | OUTPATIENT
Start: 2025-03-14 | End: 2025-03-15 | Stop reason: HOSPADM

## 2025-03-14 RX ORDER — OXYCODONE HYDROCHLORIDE 5 MG/1
10 TABLET ORAL
Status: DISCONTINUED | OUTPATIENT
Start: 2025-03-14 | End: 2025-03-15 | Stop reason: HOSPADM

## 2025-03-14 RX ORDER — ACETAMINOPHEN 325 MG/1
975 TABLET ORAL ONCE
Status: DISCONTINUED | OUTPATIENT
Start: 2025-03-14 | End: 2025-03-14 | Stop reason: HOSPADM

## 2025-03-14 RX ORDER — CEFAZOLIN SODIUM 2 G/50ML
2 SOLUTION INTRAVENOUS SEE ADMIN INSTRUCTIONS
Status: DISCONTINUED | OUTPATIENT
Start: 2025-03-14 | End: 2025-03-14 | Stop reason: HOSPADM

## 2025-03-14 RX ORDER — LIDOCAINE HYDROCHLORIDE 20 MG/ML
INJECTION, SOLUTION INFILTRATION; PERINEURAL PRN
Status: DISCONTINUED | OUTPATIENT
Start: 2025-03-14 | End: 2025-03-14

## 2025-03-14 RX ADMIN — LIDOCAINE HYDROCHLORIDE 60 MG: 20 INJECTION, SOLUTION INFILTRATION; PERINEURAL at 12:56

## 2025-03-14 RX ADMIN — ALBUTEROL SULFATE 2.5 MG: 0.83 SOLUTION RESPIRATORY (INHALATION) at 13:40

## 2025-03-14 RX ADMIN — ONDANSETRON 4 MG: 2 INJECTION INTRAMUSCULAR; INTRAVENOUS at 12:55

## 2025-03-14 RX ADMIN — PROPOFOL 50 MG: 10 INJECTION, EMULSION INTRAVENOUS at 12:56

## 2025-03-14 RX ADMIN — ACETAMINOPHEN 975 MG: 325 TABLET ORAL at 12:06

## 2025-03-14 RX ADMIN — SODIUM CHLORIDE, SODIUM LACTATE, POTASSIUM CHLORIDE, CALCIUM CHLORIDE: 600; 310; 30; 20 INJECTION, SOLUTION INTRAVENOUS at 12:09

## 2025-03-14 RX ADMIN — PROPOFOL 200 MCG/KG/MIN: 10 INJECTION, EMULSION INTRAVENOUS at 12:51

## 2025-03-14 RX ADMIN — CEFAZOLIN SODIUM 2 G: 2 SOLUTION INTRAVENOUS at 12:55

## 2025-03-14 ASSESSMENT — ENCOUNTER SYMPTOMS: ORTHOPNEA: 0

## 2025-03-14 ASSESSMENT — COPD QUESTIONNAIRES: COPD: 0

## 2025-03-14 ASSESSMENT — LIFESTYLE VARIABLES: TOBACCO_USE: 0
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
## 2025-03-14 NOTE — ANESTHESIA CARE TRANSFER NOTE
Patient: Riley Gifford    Procedure: Procedure(s):  CYSTOSCOPY, WITH BOTULINUM TOXIN INJECTION       Diagnosis: Neurogenic bladder [N31.9]  Diagnosis Additional Information: No value filed.    Anesthesia Type:   MAC     Note:    Oropharynx: oropharynx clear of all foreign objects  Level of Consciousness: awake  Oxygen Supplementation: room air    Independent Airway: airway patency satisfactory and stable  Dentition: dentition unchanged  Vital Signs Stable: post-procedure vital signs reviewed and stable  Report to RN Given: handoff report given  Patient transferred to: Phase II    Handoff Report: Identifed the Patient, Identified the Reponsible Provider, Reviewed the pertinent medical history, Discussed the surgical course, Reviewed Intra-OP anesthesia mangement and issues during anesthesia, Set expectations for post-procedure period and Allowed opportunity for questions and acknowledgement of understanding      Vitals:  Vitals Value Taken Time   BP 85/50 03/14/25 1323   Temp 36.2  C (97.16  F) 03/14/25 1325   Pulse 90 03/14/25 1323   Resp     SpO2 93 % 03/14/25 1326   Vitals shown include unfiled device data.    Electronically Signed By: SUJATA Olmos CRNA  March 14, 2025  1:27 PM

## 2025-03-14 NOTE — OP NOTE
OPERATIVE REPORT  03/14/25    PREOPERATIVE DIAGNOSIS:    Neurogenic bladder due to spinal cord injury    POSTOPERATIVE DIAGNOSIS:  Neurogenic bladder due to spinal cord injury    PROCEDURES PERFORMED:   Cystourethroscopy with injection of botulinum toxin A 300 units in 20 cc sterile saline    STAFF SURGEON: Dr. Evaristo Hayes MD, available for entire case.     FELLOW: Matias Causey MD    RESIDENT(S):  None    ANESTHESIA: MAC    ESTIMATED BLOOD LOSS: 1 mL.     DRAINS: None    OPERATIVE INDICATIONS:   Riley Gifford is a(n) 49 year old male with a history of neurogenic bladder due to cervical spinal cord injury. He manages his bladder with CIC, periodic Botox injections (every 6 months), and gentamicin instillations to prevent UTIs. He presents today for routine Botox injection. The patient was counseled on the alternatives, risks, and benefits and elected to proceed with the above stated procedure.    DESCRIPTION OF PROCEDURE:   After verification of informed consent was obtained, the patient was brought to the operating room, and moved to the operating table. After adequate anesthesia was induced, the patient was repositioned in the lithotomy position and prepped and draped in the usual sterile fashion. A formal timeout was performed to confirm the correct patient, procedure and operative site.     The flexible cystoscope was placed into the bladder. The urethra was of small caliber but accommodated the cystoscope without difficulty. The bladder mucosa appeared to be normal without stones, tumors or diverticulum on 360 degree inspection. The ureteral orifices were in the normal orthotopic position bilaterally. We mixed 300 U botulinum toxin A into 20 mL of injectable saline. We performed a template injection procedure with 5 columns of 4 injections. All injections were placed deep to the mucosa into the detrusor muscle. We then emptied the bladder to re-inspect our injection sites and found that there was a minimal amount  of blood. The bladder was then emptied again. The patient was returned to supine position and transported to recovery in stable condition.    The procedure was then concluded. The patient was transferred to the postanesthesia care unit in stable condition and tolerated the procedure well.    POSTOP PLAN:  Follow up for repeat Botox injection in 6 months. Continue use of the flexible cystoscope.  Surveillance renal US due in 5-6 months    Matias Causey MD  Genitourinary Reconstructive Surgery Fellow

## 2025-03-14 NOTE — DISCHARGE INSTRUCTIONS
"INSTRUCTIONS AFTER BLADDER BOTOX INJECTION    Activity  - Avoid straining (eg with bowel movements or heavy lifting) for the next 3 days as this may increase blood in the urine.   - Do not drive until you can press the brake pedal quickly and fully without pain.   - Do not operate a motor vehicle while taking narcotic pain medications.   - Some blood in the urine is expected. Increase your fluid intake to help flush the blood out of your bladder  - If you have clots of blood in your urine that are large (the size of a quarter) or that are obstructing the flow of urine, please call our office and/or be seen     Medications  - No narcotic pain medications are required and over the counter tylenol should suffice.  Wean yourself off all pain medications as you are able.  - Some pain medications contain both tylenol (acetaminophen) and a narcotic (Norco, vicodin, percocet), do not take more than 4,000mg of Tylenol (acetaminophen) from all sources in any 24 hour period.  - Take over the counter fiber (metamucil or benefiber) and stool softeners (miralax, docusate or senna) to prevent postoperative constipation, but stop if you develop diarrhea.  - No driving or operating machinery while taking narcotic pain medications     Follow-Up:  - Call the office in 3-4 months to schedule your next Botox injection in roughly 6 months  - Call or return sooner than your regularly scheduled visit if you develop any of the following: fever (greater than 101.5), uncontrolled pain, uncontrolled nausea or vomiting, as well as worsening blood in the urine    Phone numbers:   - Monday through Friday 8am to 4:30pm: Call [phone] with questions or to schedule or confirm appointment.    - Nights or weekends: call the after hours emergency pager - [phone] and tell the  \"I would like to page the Urology Resident on call.\"  - For emergencies, call [phone]  "

## 2025-03-17 NOTE — ANESTHESIA CARE TRANSFER NOTE
Patient: Riley Gifford    Procedure: Procedure(s):  CYSTOSCOPY, WITH BOTULINUM TOXIN INJECTION       Diagnosis: Neurogenic bladder [N31.9]  Diagnosis Additional Information: No value filed.    Anesthesia Type:   MAC     Note:    Oropharynx: oropharynx clear of all foreign objects  Level of Consciousness: awake  Oxygen Supplementation: room air    Independent Airway: airway patency satisfactory and stable  Dentition: dentition unchanged  Vital Signs Stable: post-procedure vital signs reviewed and stable  Report to RN Given: handoff report given  Patient transferred to: Phase II    Handoff Report: Identifed the Patient, Identified the Reponsible Provider, Reviewed the pertinent medical history, Discussed the surgical course, Reviewed Intra-OP anesthesia mangement and issues during anesthesia, Set expectations for post-procedure period and Allowed opportunity for questions and acknowledgement of understanding      Vitals:  Vitals Value Taken Time   BP 95/73 03/14/25 1400   Temp 36.1  C (97  F) 03/14/25 1400   Pulse 89 03/14/25 1400   Resp 20 03/14/25 1400   SpO2 95 % 03/14/25 1400       Electronically Signed By: SUJATA Olmos CRNA  March 17, 2025  8:42 AM

## 2025-03-18 NOTE — PROGRESS NOTES
[facility] Pain Management     Date of visit: 3/19/2025      Assessment:   Riley Gifford is a 49 year old male with a past medical history significant for cervical spinal cord injury, quadriplegia, thoracic/lumbar pain, neurogenic bladder, recurrent UTI, spasticity, chronic constipation, s/p cervical fusion who presents with complaints of mid back (lower thoracic) and abdominal pain.      Thoracic/abdominal pain - Onset occurred following spinal cord injury in 1995, progressive worsening of symptoms over time. Etiology is likely mixed, with h/o cervical spinal cord injury (quadraplegic), neuropathic pain secondary to spinal cord injury, underlying degenerative changes of spine, and overlying myofascial component to some extent.      Assigned to [facility] nursing team.    Visit Diagnoses:  1. Neuropathic pain    2. Quadriplegia (H)    3. Pain in thoracic spine    4. Myofascial pain    5. Neck pain    6. Chronic bilateral thoracic back pain    7. Cervicogenic headache    8. Muscle spasm        Plan:  Diagnosis reviewed, treatment option addressed, and risk/benifits discussed.  Self-care instructions given.  I am recommending a multidisciplinary treatment plan to help this patient better manage their pain.      Pain Physical Therapy:  NO - engaging in HEP.      Pain Psychology: NO     Diagnostic Studies:    EKG completed on 7/3/24 - QT/QTc WNL..   Will plan to repeat annually for TCA monitoring.      Medication Management:   Amitriptyline - Continue 50 mg at bedtime. Appreciates some degree of benefit, no side effects. Discussed potential dose increase in future to help better target baseline pain, if needed.  Lyrica -current dose 50 mg in morning and 100 mg at bedtime. Had increased daytime sedation effects with higher dose. Appreciates notable benefit.   [place] cannabis - [place] for [place] program, last renewed on 9/25/24. Appreciates benefit.   Muscle relaxants:  Tizanidine 2-4 mg at bedtime PRN.   Trial  methocarbamol 500 mg TID PRN. Advised to monitor for benefit with myofascial pain and less sedation side effects.   Counseled to allowed 4-6 hours in between all muscle relaxant doses.      Potential procedures:   Baclofen IT pump implant on 1/3/23 ([facility]).      Other Orders/Referrals:   None      Follow up with SUJATA Armendariz CNP within 3-6 months      Review of Electronic Chart: Today I have also reviewed available medical information in the patient's medical record at [facility] ([facility]) and Care Everywhere (if available), including relevant provider notes, laboratory work, and imaging.     Nalini Resendez, KENDELL, SUJATA, AGNP-C  [facility] Pain Management     -------------------------------------------------    Subjective:    Chief complaint:   Chief Complaint   Patient presents with    Pain       Interval history:  Riley Gifford is a 49 year old male last seen on 9/25/24.      Recommendations/plan at the last visit included:  Pain Physical Therapy:  NO - engaging in HEP.      Pain Psychology: NO     Diagnostic Studies:    EKG completed on 7/3/24 - QT/QTc WNL     Medication Management:   Amitriptyline - Continue 50 mg at bedtime. Appreciates some degree of benefit, no side effects. Discussed potential dose increase in future to help better target baseline pain, if needed.  Lyrica -current dose 50 mg in morning and 100 mg at bedtime. Had increased daytime sedation effects with higher dose. Appreciates some degree of benefit.   [place] - [place], last renewed on 9/25/24. Appreciates benefit.   Tizanidine 2-8 mg TID PRN - generally takes 2 mg three times daily. Appreciates benefit with neck/back pain, sedation effects with daytime that are manageable.      Potential procedures:   Baclofen IT pump implant on 1/3/23 ([facility]). Refill next month and plans to inquire about dosage increase to better target spasms.      Other Orders/Referrals:   None      Follow up with Nalini Resendez  SUJATA CNP around 6 months or sooner if needed.     Since his last visit, Rileysrinivas Gifford reports:  -He reports pain is overall about the same is it was at last visit.   -He has good days and bad days.   -He is learning to deal with the pain.   -He is unable to tolerate higher doses of Lyrica in daytime.   -He takes tizanidine at bedtime and uses during daytime to an extent. He has to be careful with use due to sedation side effects.   -He would be interestd in methocarbamol trial.   -He needs refills of Lyrica.     Pain Information:   Pain rating: Moderate Pain (5)      HPI/Interval Hx from last visit:  -He has good days and bad days with pain.   -Pain overall is stable, no new symptoms or significant changes.   -Tried to increase Lyrica dose in morning in between visits, had sedation side effects and resumed 50 mg dose.   -He would like to continue meds at current doses.   -He is due for medical cannabis renewal.       Current Pain Treatments:      Medications:               Baclofen IT pump              Amitriptyline 50 mg at bedtime (PCP, dx chronic midline thoracic back pain)              Zolpidem (PCP, sleep)              Medical cannabis (last renewed on 11/21/23), takes it at night, finds this most helpful for sleep              Lyrica 50 mg in AM and 100 mg in PM              Tizanidine at bedtime PRN   Methocarbamol 500 mg TID PRN     Other therapies:               PMR - baclofen pump management        Current MME: 0      Review of [place] Prescription Monitoring Program (): YES        Past pain treatments:  Baclofen PO      Medications:  Current Outpatient Medications   Medication Sig Dispense Refill    Acetaminophen (TYLENOL PO) Take 500 mg by mouth as needed for mild pain or fever Reported on 2/15/2017      albuterol (PROAIR HFA/PROVENTIL HFA/VENTOLIN HFA) 108 (90 Base) MCG/ACT inhaler INHALE 1 TO 2 PUFFS INTO THE LUNGS EVERY 4 HOURS AS NEEDED FOR SHORTNESS OF BREATH OR DIFFICULT BREATHING OR WHEEZING 36  g 4    amitriptyline (ELAVIL) 25 MG tablet TAKE TWO TABLETS BY MOUTH EVERY DAY AT BEDTIME 180 tablet 0    baclofen (LIORESAL) 10 MG tablet Take 1 tablet (10 mg) by mouth 4 times daily.      cetirizine (ZYRTEC) 10 MG tablet Take 10 mg by mouth daily      COMPOUNDED NON-CONTROLLED SUBSTANCE (CMPD RX) - PHARMACY TO MIX COMPOUNDED MEDICATION 30 mLs by Bladder Instillation route at bedtime. sodium chloride 0.9% (bottle) 1,000 mL with gentamicin 480mg.  Instill 30mL into the bladder at bedtime. 1 each 2    diphenhydrAMINE (BENADRYL) 25 MG capsule Take 25 mg by mouth every 6 hours as needed for allergies.      docusate sodium (ENEMEEZ MINI) 283 MG enema USE RECTALLY EVERY OTHER  mL 0    DUPIXENT 300 MG/2ML prefilled pen Inject 300 mg subcutaneously once a week.      famotidine (PEPCID) 20 MG tablet Take 1 tablet (20 mg) by mouth 2 times daily as needed 60 tablet 0    ferrous sulfate (FE TABS) 325 (65 Fe) MG EC tablet Take 1 tablet (325 mg) by mouth every other day 45 tablet 1    lidocaine (XYLOCAINE) 5 % external ointment Apply topically as needed for moderate pain 2500 g 0    medical cannabis (Patient's own supply) See Admin Instructions. (The purpose of this order is to document that the patient reports taking medical cannabis.  This is not a prescription, and is not used to certify that the patient has a qualifying medical condition.)  Green Goods      methocarbamol (ROBAXIN) 500 MG tablet Take 1 tablet (500 mg) by mouth 3 times daily as needed for muscle spasms. Allow 4-6 hours in between all muscle relaxant doses. 45 tablet 1    nystatin (MYCOSTATIN) 870587 UNIT/GM external cream APPLY TWICE DAILY TO THE AFFFECTED AREAS FOR 5 DAYS AS NEEDED FOR INTERTRIGO 60 g 0    omeprazole (PRILOSEC) 40 MG DR capsule Take 1 capsule (40 mg) by mouth daily. 90 capsule 3    OnabotulinumtoxinA (BOTOX IJ) Inject as directed. Every 6 months      ondansetron (ZOFRAN) 4 MG tablet TAKE 1 TABLET(4 MG) BY MOUTH EVERY 8 HOURS AS NEEDED  FOR NAUSEA 10 tablet 0    phenylephrine-cocoa butter (PREPARATION H) 0.25-88.44 % suppository Insert one suppository rectally twice daily as needed. 48 suppository 3    Polyethylene Glycol 3350 (PEG 3350) 17 GM/SCOOP POWD MIX 17G IN LIQUID AND TAKE ONCE DAILY AS NEEDED FOR CONTIPATION 510 g 1    pregabalin (LYRICA) 100 MG capsule Take 1 capsule (100 mg) by mouth at bedtime. 90 capsule 1    pregabalin (LYRICA) 50 MG capsule Take 1 capsule (50 mg) by mouth every morning. 90 capsule 1    PROCTOZONE-HC 2.5 % cream APPLY TO AFFECTED AREA(S) TWO TIMES A DAY 30 g 0    sennosides (SENOKOT) 8.6 MG tablet Take 1 tablet by mouth 2 times daily as needed for constipation. Hold for diarrhea 30 tablet 0    tiZANidine (ZANAFLEX) 2 MG tablet Take 1-4 tablets (2-8 mg) by mouth nightly as needed for muscle spasms. Allow 4-6 hours in between all muscle relaxant doses.      tolterodine (DETROL) 2 MG tablet TAKE 1 TABLET(2 MG) BY MOUTH TWICE DAILY 180 tablet 1    traZODone (DESYREL) 50 MG tablet Take 0.5-1 tablets (25-50 mg) by mouth at bedtime. 90 tablet 1    UNABLE TO FIND MEDICATION NAME: baclofen pump      zolpidem (AMBIEN) 10 MG tablet TAKE 1 TABLET(10 MG) BY MOUTH EVERY NIGHT AS NEEDED FOR SLEEP 90 tablet 1       Medical History: any changes in medical history since they were last seen? No      Objective:    Physical Exam:  Blood pressure 103/72, pulse 110.  Constitutional: Well developed, well nourished, appears stated age.  Gait: Wheelchair bound   HEENT: Head atraumatic, normocephalic. Eyes without conjunctival injection or jaundice. Oropharynx clear. Neck supple. No obvious neck masses.  Skin: No rash, lesions, or petechiae of exposed skin.   Psychiatric/mental status: Alert, without lethargy or stupor. Speech fluent. Appropriate affect. Mood normal. Able to follow commands without difficulty.     Diagnostic Tests/Imaging/Labs:  Reviewed last CBC and BMP from 2/28/25    BILLING TIME DOCUMENTATION:   The total TIME spent on this  patient on the date of the encounter/appointment was 23 minutes.      TOTAL TIME includes:   Time spent preparing to see the patient (reviewing records and tests)   Time spent face to face (or over the phone) with the patient   Time spent ordering tests, medications, procedures and referrals   Time spent Referring and communicating with other healthcare professionals   Time spent documenting clinical information in Epic

## 2025-03-18 NOTE — ANESTHESIA POSTPROCEDURE EVALUATION
Patient: Riley Gifford    Procedure: Procedure(s):  CYSTOSCOPY, WITH BOTULINUM TOXIN INJECTION       Anesthesia Type:  MAC    Note:  Disposition: Outpatient   Postop Pain Control: Uneventful            Sign Out: Well controlled pain   PONV: No   Neuro/Psych: Uneventful            Sign Out: Acceptable/Baseline neuro status   Airway/Respiratory: Uneventful            Sign Out: Acceptable/Baseline resp. status   CV/Hemodynamics: Uneventful            Sign Out: Acceptable CV status; No obvious hypovolemia; No obvious fluid overload   Other NRE: NONE   DID A NON-ROUTINE EVENT OCCUR? No           Last vitals:  Vitals Value Taken Time   BP 95/73 03/14/25 1400   Temp 36.1  C (97  F) 03/14/25 1400   Pulse 89 03/14/25 1400   Resp 20 03/14/25 1400   SpO2 95 % 03/14/25 1400       Electronically Signed By: Raven Phelps MD  March 18, 2025  7:34 AM

## 2025-03-19 ENCOUNTER — OFFICE VISIT (OUTPATIENT)
Dept: PALLIATIVE MEDICINE | Facility: CLINIC | Age: 50
End: 2025-03-19
Payer: COMMERCIAL

## 2025-03-19 VITALS — HEART RATE: 110 BPM | SYSTOLIC BLOOD PRESSURE: 103 MMHG | DIASTOLIC BLOOD PRESSURE: 72 MMHG

## 2025-03-19 DIAGNOSIS — M54.6 CHRONIC BILATERAL THORACIC BACK PAIN: ICD-10-CM

## 2025-03-19 DIAGNOSIS — M54.6 PAIN IN THORACIC SPINE: ICD-10-CM

## 2025-03-19 DIAGNOSIS — M79.18 MYOFASCIAL PAIN: ICD-10-CM

## 2025-03-19 DIAGNOSIS — M79.2 NEUROPATHIC PAIN: Primary | ICD-10-CM

## 2025-03-19 DIAGNOSIS — M62.838 MUSCLE SPASM: ICD-10-CM

## 2025-03-19 DIAGNOSIS — G44.86 CERVICOGENIC HEADACHE: ICD-10-CM

## 2025-03-19 DIAGNOSIS — G82.50 QUADRIPLEGIA (H): ICD-10-CM

## 2025-03-19 DIAGNOSIS — M54.2 NECK PAIN: ICD-10-CM

## 2025-03-19 DIAGNOSIS — G89.29 CHRONIC BILATERAL THORACIC BACK PAIN: ICD-10-CM

## 2025-03-19 RX ORDER — PREGABALIN 50 MG/1
50 CAPSULE ORAL EVERY MORNING
Qty: 90 CAPSULE | Refills: 1 | Status: SHIPPED | OUTPATIENT
Start: 2025-03-19

## 2025-03-19 RX ORDER — METHOCARBAMOL 500 MG/1
500 TABLET, FILM COATED ORAL 3 TIMES DAILY PRN
Qty: 45 TABLET | Refills: 1 | Status: SHIPPED | OUTPATIENT
Start: 2025-03-19

## 2025-03-19 RX ORDER — TIZANIDINE 2 MG/1
2-8 TABLET ORAL
COMMUNITY
Start: 2025-03-19

## 2025-03-19 RX ORDER — PREGABALIN 100 MG/1
100 CAPSULE ORAL AT BEDTIME
Qty: 90 CAPSULE | Refills: 1 | Status: SHIPPED | OUTPATIENT
Start: 2025-03-19

## 2025-03-19 ASSESSMENT — PAIN SCALES - GENERAL: PAINLEVEL_OUTOF10: MODERATE PAIN (5)

## 2025-03-19 NOTE — PATIENT INSTRUCTIONS
Reduce tizanidine use to bedtime only.   Start methocarbamol 1/2 to 1 tab three times daily as needed. Monitor for benefit with muscle spasms and pain. Watch out for potential sedation side effects.   Follow up within 3-6 months    ----------------------------------------------------------------  Clinic Number:  [phone]   Call with any questions about your care and for scheduling assistance.   Calls are returned Monday through Friday between 8 AM and 4:30 PM. We usually get back to you within 2 business days depending on the issue/request.    If we are prescribing your medications:  For opioid medication refills, call the clinic or send a [employer] message 7 days in advance.  Please include:  Name of requested medication  Name of the pharmacy.  For non-opioid medications, call your pharmacy directly to request a refill. Please allow 3-4 days to be processed.   Per [place] State Law:  All controlled substance prescriptions must be filled within 30 days of being written.    For those controlled substances allowing refills, pickup must occur within 30 days of last fill.      We believe regular attendance is key to your success in our program!    Any time you are unable to keep your appointment we ask that you call us at least 24 hours in advance to cancel.This will allow us to offer the appointment time to another patient.   Multiple missed appointments may lead to dismissal from the clinic.

## 2025-03-25 DIAGNOSIS — K59.09 CHRONIC CONSTIPATION: ICD-10-CM

## 2025-03-25 DIAGNOSIS — K59.00 CONSTIPATION, UNSPECIFIED CONSTIPATION TYPE: ICD-10-CM

## 2025-03-25 RX ORDER — POLYETHYLENE GLYCOL 3350 17 G/17G
POWDER ORAL
Qty: 510 G | Refills: 1 | Status: SHIPPED | OUTPATIENT
Start: 2025-03-25

## 2025-03-25 RX ORDER — DOCUSATE SODIUM 283 MG/5ML
LIQUID RECTAL
Qty: 150 ML | Refills: 0 | Status: SHIPPED | OUTPATIENT
Start: 2025-03-25

## 2025-04-22 DIAGNOSIS — F51.01 PRIMARY INSOMNIA: ICD-10-CM

## 2025-04-22 RX ORDER — ZOLPIDEM TARTRATE 10 MG/1
TABLET ORAL
Qty: 90 TABLET | Refills: 1 | Status: SHIPPED | OUTPATIENT
Start: 2025-04-22

## 2025-04-30 ENCOUNTER — TELEPHONE (OUTPATIENT)
Dept: FAMILY MEDICINE | Facility: CLINIC | Age: 50
End: 2025-04-30

## 2025-04-30 ENCOUNTER — VIRTUAL VISIT (OUTPATIENT)
Dept: FAMILY MEDICINE | Facility: CLINIC | Age: 50
End: 2025-04-30
Payer: COMMERCIAL

## 2025-04-30 DIAGNOSIS — N32.89 BLADDER SPASM: ICD-10-CM

## 2025-04-30 DIAGNOSIS — Z78.9 SELF-CATHETERIZES URINARY BLADDER: Primary | ICD-10-CM

## 2025-04-30 NOTE — TELEPHONE ENCOUNTER
Order/Referral Request    Who is requesting: patient    Orders being requested: Urine analysis     Reason service is needed/diagnosis: possible UTI    When are orders needed by: as soon as possible     Has this been discussed with Provider: No    Does patient have a preference on a Group/Provider/Facility? [facility]    Does patient have an appointment scheduled?: No    Where to send orders: Place orders within Epic    Could we send this information to you in [facility] or would you prefer to receive a phone call?:   Patient would prefer a phone call   Okay to leave a detailed message?: Yes at Cell number on file:    Telephone Information:   Mobile [phone]

## 2025-04-30 NOTE — PROGRESS NOTES
Riley is a 49 year old who is being evaluated via a billable video visit.    {ROOMING STAFF complete during rooming of virtual visit (Optional):[id number]}  {If patient encounters technical issues they should call [phone] :[id number]}    Assessment & Plan     Self-catheterizes urinary bladder  Cloudy Urine  Bladder spasm  - UA Macroscopic with reflex to Microscopic and Culture - Lab Collect  - Education provided on follow up/ER criteria        Subjective   Riley is a 49 year old, presenting for the following health issues:  No chief complaint on file.    Pt reports via virtual visit for UTI concern. Self caths and experiences these regularly. Reports urine is somewhat cloudy, bladder spasms have worsened today, and he feels achy which is his typical UTI presentation.     Denies- fever, SOB, chills       Video Start Time:  1440          Objective           Vitals:  No vitals were obtained today due to virtual visit.    Physical Exam   GENERAL: alert and no distress  EYES: Eyes grossly normal to inspection.  No discharge or erythema, or obvious scleral/conjunctival abnormalities.  RESP: No audible wheeze, cough, or visible cyanosis.    SKIN: Visible skin clear. No significant rash, abnormal pigmentation or lesions.  NEURO: Cranial nerves grossly intact.  Mentation and speech appropriate for age.  PSYCH: Appropriate affect, tone, and pace of words          Video-Visit Details    Type of service:  Video Visit   Originating Location (pt. Location): Home  {PROVIDER LOCATION On-site should be selected for visits conducted from your clinic location or adjoining [facility] hospital, academic office, or other nearby [facility] building. Off-site should be selected for all other provider locations, including home:[id number]}  Distant Location (provider location):  On-site  Platform used for Video Visit: Gerardo  Signed Electronically by: SUJATA Espino CNP  {Email feedback regarding this note to [email or web address]    :[id number]}The longitudinal plan of care for the diagnosis(es)/condition(s) as documented were addressed during this visit. Due to the added complexity in care, I will continue to support Riley in the subsequent management and with ongoing continuity of care.

## 2025-04-30 NOTE — TELEPHONE ENCOUNTER
Spasms, achy, urine is slighty cloudy. Pt states it feels similar to when he has UTI's in the past. Denies any other symptoms. Just looking for labs to rule out UTI.    Scheduled virtual visit and discussed with provider. Provider approved virtual visit.    Tamela Galvin RN on 4/30/2025 at 1:06 PM

## 2025-05-01 ENCOUNTER — OFFICE VISIT (OUTPATIENT)
Dept: OPTOMETRY | Facility: CLINIC | Age: 50
End: 2025-05-01
Payer: COMMERCIAL

## 2025-05-01 ENCOUNTER — LAB (OUTPATIENT)
Dept: LAB | Facility: CLINIC | Age: 50
End: 2025-05-01
Payer: COMMERCIAL

## 2025-05-01 DIAGNOSIS — H52.13 MYOPIA OF BOTH EYES: ICD-10-CM

## 2025-05-01 DIAGNOSIS — H52.4 PRESBYOPIA: ICD-10-CM

## 2025-05-01 DIAGNOSIS — N32.89 BLADDER SPASM: ICD-10-CM

## 2025-05-01 DIAGNOSIS — Z01.00 ENCOUNTER FOR EXAMINATION OF EYES AND VISION WITHOUT ABNORMAL FINDINGS: Primary | ICD-10-CM

## 2025-05-01 DIAGNOSIS — H52.223 REGULAR ASTIGMATISM OF BOTH EYES: ICD-10-CM

## 2025-05-01 LAB
ALBUMIN UR-MCNC: NEGATIVE MG/DL
APPEARANCE UR: CLEAR
BILIRUB UR QL STRIP: NEGATIVE
COLOR UR AUTO: YELLOW
GLUCOSE UR STRIP-MCNC: NEGATIVE MG/DL
HGB UR QL STRIP: NEGATIVE
KETONES UR STRIP-MCNC: NEGATIVE MG/DL
LEUKOCYTE ESTERASE UR QL STRIP: ABNORMAL
NITRATE UR QL: NEGATIVE
PH UR STRIP: 7 [PH] (ref 5–7)
RBC #/AREA URNS AUTO: ABNORMAL /HPF
SP GR UR STRIP: 1.01 (ref 1–1.03)
SQUAMOUS #/AREA URNS AUTO: ABNORMAL /LPF
UROBILINOGEN UR STRIP-ACNC: 0.2 E.U./DL
WBC #/AREA URNS AUTO: ABNORMAL /HPF

## 2025-05-01 ASSESSMENT — CONF VISUAL FIELD
OD_INFERIOR_NASAL_RESTRICTION: 0
METHOD: COUNTING FINGERS
OS_NORMAL: 1
OD_SUPERIOR_TEMPORAL_RESTRICTION: 0
OS_SUPERIOR_NASAL_RESTRICTION: 0
OD_NORMAL: 1
OD_SUPERIOR_NASAL_RESTRICTION: 0
OS_INFERIOR_TEMPORAL_RESTRICTION: 0
OS_INFERIOR_NASAL_RESTRICTION: 0
OS_SUPERIOR_TEMPORAL_RESTRICTION: 0
OD_INFERIOR_TEMPORAL_RESTRICTION: 0

## 2025-05-01 ASSESSMENT — REFRACTION_WEARINGRX
OD_SPHERE: -3.00
OS_AXIS: 086
OD_ADD: +1.50
SPECS_TYPE: BIFOCAL
OD_AXIS: 112
OS_ADD: +1.50
OS_CYLINDER: +1.25
OD_CYLINDER: +2.00
OS_SPHERE: -2.00

## 2025-05-01 ASSESSMENT — TONOMETRY
OD_IOP_MMHG: 10
OS_IOP_MMHG: 10
IOP_METHOD: ICARE

## 2025-05-01 ASSESSMENT — REFRACTION_MANIFEST
OS_AXIS: 094
OD_CYLINDER: +2.00
OD_SPHERE: -3.00
OS_CYLINDER: +1.25
OS_ADD: +1.75
OD_ADD: +1.75
OS_SPHERE: -2.00
OD_AXIS: 101

## 2025-05-01 ASSESSMENT — VISUAL ACUITY
OS_CC: 20/25 +2
METHOD: SNELLEN - LINEAR
OS_CC: 20/20
CORRECTION_TYPE: GLASSES
OD_CC: 20/20
OD_CC: 20/30 +2

## 2025-05-01 ASSESSMENT — CUP TO DISC RATIO
OD_RATIO: 0.25
OS_RATIO: 0.3

## 2025-05-01 ASSESSMENT — EXTERNAL EXAM - RIGHT EYE: OD_EXAM: NORMAL

## 2025-05-01 ASSESSMENT — EXTERNAL EXAM - LEFT EYE: OS_EXAM: NORMAL

## 2025-05-01 ASSESSMENT — SLIT LAMP EXAM - LIDS
COMMENTS: NORMAL
COMMENTS: NORMAL

## 2025-05-01 NOTE — PATIENT INSTRUCTIONS
Updated glasses prescription provided today.   Allow 2 weeks to adapt to the new glasses.     Return in 2 years for a comprehensive eye exam, or sooner if needed.      The effects of the dilating drops last for 4- 6 hours.  You will be more sensitive to light and vision will be blurry up close.  Mydriatic sunglasses were given if needed.     Raffy Hernández, OD  [facility]  [address] [place] [address]. [place]  [id number]    [phone]

## 2025-05-01 NOTE — LETTER
5/1/2025      Riley Gifford  [facility] [address]  [place] [address]      Dear Colleague,    Thank you for referring your patient, Riley Gifford, to the [facility]. Please see a copy of my visit note below.    Chief Complaint   Patient presents with     Annual Eye Exam         Last Eye Exam: 08/10/2022  Dilated Previously: Yes    What are you currently using to see?  Glasses FT bifocal - wears full time     Distance Vision Acuity: Satisfied with vision    Near Vision Acuity: Satisfied with vision while reading and using computer with glasses    Eye Comfort: dry possibly due to medication  Do you use eye drops? : No  Occupation or Hobbies: Website consultant    Helga Hall  Optometric Assistant      Medical, surgical and family histories reviewed and updated 5/1/2025.       OBJECTIVE: See Ophthalmology exam    ASSESSMENT:    ICD-10-CM    1. Encounter for examination of eyes and vision without abnormal findings  Z01.00       2. Myopia of both eyes  H52.13       3. Regular astigmatism of both eyes  H52.223       4. Presbyopia  H52.4           PLAN:     Patient Instructions   Updated glasses prescription provided today.   Allow 2 weeks to adapt to the new glasses.     Return in 2 years for a comprehensive eye exam, or sooner if needed.      The effects of the dilating drops last for 4- 6 hours.  You will be more sensitive to light and vision will be blurry up close.  Mydriatic sunglasses were given if needed.     Raffy Hernández OD  [facility]  [address] [place] [address]. [place]  [address]    [phone]         Again, thank you for allowing me to participate in the care of your patient.        Sincerely,        Raffy Hernández OD    Electronically signed

## 2025-05-01 NOTE — PROGRESS NOTES
Chief Complaint   Patient presents with    Annual Eye Exam         Last Eye Exam: 08/10/2022  Dilated Previously: Yes    What are you currently using to see?  Glasses FT bifocal - wears full time     Distance Vision Acuity: Satisfied with vision    Near Vision Acuity: Satisfied with vision while reading and using computer with glasses    Eye Comfort: dry possibly due to medication  Do you use eye drops? : No  Occupation or Hobbies: Website consultant    Helga Hall  Optometric Assistant      Medical, surgical and family histories reviewed and updated 5/1/2025.       OBJECTIVE: See Ophthalmology exam    ASSESSMENT:    ICD-10-CM    1. Encounter for examination of eyes and vision without abnormal findings  Z01.00       2. Myopia of both eyes  H52.13       3. Regular astigmatism of both eyes  H52.223       4. Presbyopia  H52.4           PLAN:     Patient Instructions   Updated glasses prescription provided today.   Allow 2 weeks to adapt to the new glasses.     Return in 2 years for a comprehensive eye exam, or sooner if needed.      The effects of the dilating drops last for 4- 6 hours.  You will be more sensitive to light and vision will be blurry up close.  Mydriatic sunglasses were given if needed.     Raffy Hernández, OD  [facility]  [address] [place] [address]. [place]  [address]    [phone]

## 2025-05-14 ENCOUNTER — MYC REFILL (OUTPATIENT)
Dept: FAMILY MEDICINE | Facility: CLINIC | Age: 50
End: 2025-05-14
Payer: COMMERCIAL

## 2025-05-14 DIAGNOSIS — M79.18 MYOFASCIAL PAIN: ICD-10-CM

## 2025-05-14 DIAGNOSIS — M54.2 NECK PAIN: ICD-10-CM

## 2025-05-14 DIAGNOSIS — M62.838 MUSCLE SPASM: ICD-10-CM

## 2025-05-14 DIAGNOSIS — G44.86 CERVICOGENIC HEADACHE: ICD-10-CM

## 2025-05-14 DIAGNOSIS — M54.6 CHRONIC BILATERAL THORACIC BACK PAIN: ICD-10-CM

## 2025-05-14 DIAGNOSIS — G89.29 CHRONIC BILATERAL THORACIC BACK PAIN: ICD-10-CM

## 2025-05-15 ENCOUNTER — OFFICE VISIT (OUTPATIENT)
Dept: FAMILY MEDICINE | Facility: CLINIC | Age: 50
End: 2025-05-15
Payer: COMMERCIAL

## 2025-05-15 VITALS
BODY MASS INDEX: 17.61 KG/M2 | SYSTOLIC BLOOD PRESSURE: 139 MMHG | DIASTOLIC BLOOD PRESSURE: 89 MMHG | OXYGEN SATURATION: 96 % | RESPIRATION RATE: 17 BRPM | TEMPERATURE: 98.7 F | HEART RATE: 76 BPM | HEIGHT: 72 IN | WEIGHT: 130 LBS

## 2025-05-15 DIAGNOSIS — Z01.818 PREOP GENERAL PHYSICAL EXAM: Primary | ICD-10-CM

## 2025-05-15 DIAGNOSIS — G82.50 QUADRIPLEGIA (H): ICD-10-CM

## 2025-05-15 DIAGNOSIS — K20.0 EOSINOPHILIC ESOPHAGITIS: ICD-10-CM

## 2025-05-15 DIAGNOSIS — N39.0 URINARY TRACT INFECTION WITHOUT HEMATURIA, SITE UNSPECIFIED: ICD-10-CM

## 2025-05-15 DIAGNOSIS — Z78.9 SELF-CATHETERIZES URINARY BLADDER: ICD-10-CM

## 2025-05-15 RX ORDER — TIZANIDINE 2 MG/1
2-8 TABLET ORAL
Qty: 120 TABLET | Refills: 11 | Status: SHIPPED | OUTPATIENT
Start: 2025-05-15

## 2025-05-15 NOTE — PATIENT INSTRUCTIONS
How to Take Your Medication Before Surgery  Preoperative Medication Instructions   Antiplatelet or Anticoagulation Medication Instructions   - We reviewed the medication list and the patient is not on an antiplatelet or anticoagulation medications.    Additional Medication Instructions  Take all scheduled medications on the day of surgery       Patient Education   Preparing for Your Surgery  For Adults  Getting started  In most cases, a nurse will call to review your health history and instructions. They will give you an arrival time based on your scheduled surgery time. Please be ready to share:  Your doctor's clinic name and phone number  Your medical, surgical, and anesthesia history  A list of allergies and sensitivities  A list of medicines, including herbal treatments and over-the-counter drugs  Whether the patient has a legal guardian (ask how to send us the papers in advance)  Note: You may not receive a call if you were seen at our PAC (Preoperative Assessment Center).  Please tell us if you're pregnant--or if there's any chance you might be pregnant. Some surgeries may injure a fetus (unborn baby), so they require a pregnancy test. Surgeries that are safe for a fetus don't always need a test, and you can choose whether to have one.   Preparing for surgery  Within 10 to 30 days of surgery: Have a pre-op exam (sometimes called an H&P, or History and Physical). This can be done at a clinic or pre-operative center.  If you're having a , you may not need this exam. Talk to your care team.  At your pre-op exam, talk to your care team about all medicines you take. (This includes CBD oil and any drugs, such as THC, marijuana, and other forms of cannabis.) If you need to stop any medicine before surgery, ask when to start taking it again.  This is for your safety. Many medicines and drugs can make you bleed too much during surgery. Some change how well surgery (anesthesia) drugs work.  Call your insurance  Patient called in said she was returning call, requesting another call back   Patient said she talked to cardiologist and appointments  have been made.    company to let them know you're having surgery. (If you don't have insurance, call [phone].)  Call your clinic if there's any change in your health. This includes a scrape or scratch near the surgery site, or any signs of a cold (sore throat, runny nose, cough, rash, fever).  Eating and drinking guidelines  For your safety: Unless your surgeon tells you otherwise, follow the guidelines below.  Eat and drink as normal until 8 hours before you arrive for surgery. After that, no food or milk. You can spit out gum when you arrive.  Drink clear liquids until 2 hours before you arrive. These are liquids you can see through, like water, Gatorade, and Propel Water. They also include plain black coffee and tea (no cream or milk).  No alcohol for 24 hours before you arrive. The night before surgery, stop any drinks that contain THC.  If your care team tells you to take medicine on the morning of surgery, it's okay to take it with a sip of water. No other medicines or drugs are allowed (including CBD oil)--follow your care team's instructions.  If you have questions the day of surgery, call your hospital or surgery center.   Preventing infection  Shower or bathe the night before and the morning of surgery. Follow the instructions your clinic gave you. (If no instructions, use regular soap.)  Don't shave or clip hair near your surgery site. We'll remove the hair if needed.  Don't smoke or vape the morning of surgery. No chewing tobacco for 6 hours before you arrive. A nicotine patch is okay. You may spit out nicotine gum when you arrive.  For some surgeries, the surgeon will tell you to fully quit smoking and nicotine.  We will make every effort to keep you safe from infection. We will:  Clean our hands often with soap and water (or an alcohol-based hand rub).  Clean the skin at your surgery site with a special soap that kills germs.  Give you a special gown to keep you warm. (Cold raises the risk of infection.)  Wear hair  covers, masks, gowns, and gloves during surgery.  Give antibiotic medicine, if prescribed. Not all surgeries need this medicine.  What to bring on the day of surgery  Photo ID and insurance card  Copy of your health care directive, if you have one  Glasses and hearing aids (bring cases)  You can't wear contacts during surgery  Inhaler and eye drops, if you use them (tell us about these when you arrive)  CPAP machine or breathing device, if you use them  A few personal items, if spending the night  If you have . . .  A pacemaker, ICD (cardiac defibrillator), or other implant: Bring the ID card.  An implanted stimulator: Bring the remote control.  A legal guardian: Bring a copy of the certified (court-stamped) guardianship papers.  Please remove any jewelry, including body piercings. Leave jewelry and other valuables at home.  If you're going home the day of surgery  You must have a responsible adult drive you home. They should stay with you overnight as well.  If you don't have someone to stay with you, and you aren't safe to go home alone, we may keep you overnight. Insurance often won't pay for this.  After surgery  If it's hard to control your pain or you need more pain medicine, please call your surgeon's office.  Questions?   If you have any questions for your care team, list them here:   ____________________________________________________________________________________________________________________________________________________________________________________________________________________________________________________________  For informational purposes only. Not to replace the advice of your health care provider. Copyright   2003, 2019 [employer]. All rights reserved. Clinically reviewed by Shankar Quijano MD. [employer] 605991 - REV 08/24.      no
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
## 2025-05-17 LAB — BACTERIA UR CULT: NORMAL

## 2025-05-19 ENCOUNTER — RESULTS FOLLOW-UP (OUTPATIENT)
Dept: FAMILY MEDICINE | Facility: CLINIC | Age: 50
End: 2025-05-19

## 2025-06-02 ENCOUNTER — PATIENT OUTREACH (OUTPATIENT)
Dept: CARE COORDINATION | Facility: CLINIC | Age: 50
End: 2025-06-02
Payer: COMMERCIAL

## 2025-06-02 ASSESSMENT — ACTIVITIES OF DAILY LIVING (ADL): DEPENDENT_IADLS:: CLEANING;COOKING;LAUNDRY;SHOPPING;MEAL PREPARATION;MEDICATION MANAGEMENT

## 2025-06-02 NOTE — PROGRESS NOTES
"Clinic Care Coordination Contact  Follow Up Progress Note      Assessment: CC RN contacted patient for goal progression.   CC gaol: General Goal - I will take all medications as prescribed by the providers.   Patient reports zero missed doses in the last week  CC Goal: Demonstrate improved self-management of chronic pain   Patient reports, \"Some days are better than others\" patient following pain clinic care plans. Has phone number to reach out with questions.   CC goal: I will make and attend all recommended appointments from my providers.   [facility]. Patient did complete an EGD and developed a yeast infection in his throat. Patient was started on treatment for this and has follow up with [place] this week. He has no other questions on his follow up care.     Care Gaps: patient is welcome to make his AWE with PCP at any point going forward.   Health Maintenance Due   Topic Date Due    HEPATITIS B VACCINE (1 of 3 - 19+ 3-dose series) Never done    MEDICARE ANNUAL WELLNESS VISIT  05/31/2023    COLORECTAL CANCER SCREENING  01/30/2024     Care Plans  Care Plan: General  take medications as prescribed       Problem: HP GENERAL PROBLEM       Goal: General Goal -  I will take all medications as prescribed by the providers.       Start Date: 11/6/2024 Expected End Date: 11/6/2025    This Visit's Progress: 80% Recent Progress: 80%    Note:     Barriers: wheelchair bound  Strengths: engaged in care coordination  Patient expressed understanding of goal: yes  Action steps to achieve this goal:  1. I will take all medications as prescribed.  2. I will ask any questions that I have regarding new medications.  3. I will work with the RN CC if I have any difficulty getting refills.   4. I will work with urology for the PA on the gentamicin instillations for the bladder.                              Care Plan: General -  Make and attend follow up appointments       Problem: HP GENERAL " PROBLEM       Goal: I will make and attend all recommended appointments from my providers.       Start Date: 11/6/2024 Expected End Date: 11/6/2025    Recent Progress: 90%    Note:     Barriers: Many providers.  Strengths: engaged in care coordination  Patient expressed understanding of goal: yes  Action steps to achieve this goal:  1. I will make all of the recommended follow up appointments.  2. I will attend all of the follow up appointments as recommended by the providers.                            Care Plan: Chronic Pain       Problem: Chronic Pain is Not Self-Managed       Goal: Demonstrate improved self-management of chronic pain       Start Date: 11/5/2024 Expected End Date: 11/5/2025    Recent Progress: 50%    Note:     Barriers: chronic pain and spasms in his back  Strengths: engaged in care coordination  Patient expressed understanding of goal: yes  Action steps to achieve this goal:  1. I will try ice or heat for very short times and monitored by his PCA.  2. I will take all medications as prescribed by the providers.  3. I will work with PMR provider over the use of the Baclofen pump that he has implanted.    4. PMR referred patient to message therapy.                                 Intervention/Education provided during outreach: Discussed patients goal and action steps. CC RN asked open ended questions, provided support, resources, and encouragement as needed. Patient will reach out sooner than planned with new questions or concerns.        Plan:   Patient to carry out current care plans.   Patient to work on CC goals and action steps.   Care Coordinator will follow up in 1 month.  Vera Trujillo RN, BSN, PHN Care Coordinator  [place], [place], [facility]   Phone: [phone]

## 2025-06-02 NOTE — LETTER
[facility]  Patient Centered Plan of Care  About Me:        Patient Name:  Riley Gifford    YOB: 1975  Age:         49 year old   Rut MRN:    [id number] Telephone Information:  Home Phone [phone]   Mobile [phone]       Address:  [address] [place] [address] [address]  [place] [address] Email address:  [email or web address]      Emergency Contact(s)    Name Relationship Lgl Grd Work Phone Home Phone Mobile Phone   1. TREY GIFFORD (NEP* Relative No noen none [phone]   2. AGUSTO CAVAZOS Sister   [phone]    3. HODA GIFFORD Brother No  [phone]            Primary language:  English     needed? No   [facility]:  [phone] op. 1  Other communication barriers:Glasses    Preferred Method of Communication:  Lisa  Current living arrangement: I live in assisted living    Mobility Status/ Medical Equipment: Dependent/Assisted by Another        Health Maintenance  Health Maintenance Reviewed: Due/Overdue   Health Maintenance Due   Topic Date Due    HEPATITIS B VACCINE (1 of 3 - 19+ 3-dose series) Never done    MEDICARE ANNUAL WELLNESS VISIT  05/31/2023    COLORECTAL CANCER SCREENING  01/30/2024          My Access Plan  Medical Emergency 911   Primary Clinic Line [facility] [phone]   24 Hour Appointment Line [phone] or  [phone]) (toll-free)   24 Hour Nurse Line [phone] (toll-free)   Preferred Urgent Care [facility] [phone]     Preferred Hospital [facility]  [phone]     Preferred Pharmacy [id number] - [place], [place] - [address] [place] [address] AT [facility] [address] [place]     Behavioral Health Crisis Line The National Suicide Prevention Lifeline at 1-985.235.9603 or Text/Call 208           My Care Team Members  Patient Care Team         Relationship Specialty Notifications Start End    Jenny Fay, PAALLYSSA  PCP - General Family Medicine  12/21/21     Phone: [phone] Fax: [phone] [address] [facility] [address]    Robbin Andino, RN Lead Care Coordinator Nurse Admissions 1/6/16     phone:  [phone]    Phone: [phone] Fax: [phone]        Rober Leo MD Referring Physician Urology  1/8/16     Phone: [phone] Fax: [phone]         [address] [facility] [address]    Amanda Other (see comments)   1/8/16     [facility]     Phone: [phone]         Evaristo Hayes MD MD Urology  4/19/16     Phone: [phone] Fax: [phone]         [address] [facility] [address] [facility] [address]    Jenny Wright, RN Registered Nurse Urology  4/19/16     Rosemary Thomas, RN Nurse Coordinator Physical Medicine and Rehabilitation  3/30/17     Phone: [phone] Pager: [phone]        Lulú Reveles APRN CNP Nurse Practitioner Nurse Practitioner  8/28/20     Phone: [phone] Fax: [phone]         [address] [facility] [address] [facility] [address]    [facility] Occupational Therapist Occupational Therapy  1/7/22     [facility] Shanique Alberto  fax [phone]    Phone: [phone] Fax: [phone]         [address] [facility] [address]    Evaristo Hayes MD MD Urology  1/17/22     Phone: [phone] Fax: [phone]         [address] [facility] [address] [facility] [address]    Yessy Tapia, RN Specialty Care Coordinator   1/17/22     Jenny Fay, PA-C Assigned PCP   4/3/22     Phone: [phone] Fax: [phone] [address] [facility] [address]    Nalini Resendez APRN CNP Nurse Practitioner   9/12/23     Phone: [phone] Fax: [phone] [address] [place] [address]    Brittaney Perez [facility] Pharmacist Pharmacist Ambulatory Care  11/25/24     Phone: [phone] [address] [facility] [address]    Brittaney Perez [facility]  Assigned MTM Pharmacist   12/23/24     Phone: [phone]          [address] [facility] [address]    Vera Trujillo, RN Lead Care Coordinator Primary Care - CC Admissions 2/17/25     Phone: [phone] Fax: [phone]        Raffy Hernández, OD MD Optometrist  4/28/25     Phone: [phone] Fax: [phone]         [address] [facility] [address]    Raffy Hernández, ERICA Assigned Surgical Provider   5/23/25     Phone: [phone] Fax: [phone]         [address] [facility] [address]                My Care Plans  Self Management and Treatment Plan    Care Plan  Care Plan: General  take medications as prescribed       Problem: HP GENERAL PROBLEM       Goal: General Goal -  I will take all medications as prescribed by the providers.       Start Date: 11/6/2024 Expected End Date: 11/6/2025    This Visit's Progress: 80% Recent Progress: 80%    Note:     Barriers: wheelchair bound  Strengths: engaged in care coordination  Patient expressed understanding of goal: yes  Action steps to achieve this goal:  1. I will take all medications as prescribed.  2. I will ask any questions that I have regarding new medications.  3. I will work with the RN CC if I have any difficulty getting refills.   4. I will work with urology for the PA on the gentamicin instillations for the bladder.                              Care Plan: General -  Make and attend follow up appointments       Problem: HP GENERAL PROBLEM       Goal: I will make and attend all recommended appointments from my providers.       Start Date: 11/6/2024 Expected End Date: 11/6/2025    Recent Progress: 90%    Note:     Barriers: Many providers.  Strengths: engaged in care coordination  Patient expressed understanding of goal: yes  Action steps to achieve this goal:  1. I will make all of the recommended follow up appointments.  2. I will attend all of the follow up appointments as recommended by the providers.                             Care Plan: Chronic Pain       Problem: Chronic Pain is Not Self-Managed       Goal: Demonstrate improved self-management of chronic pain       Start Date: 11/5/2024 Expected End Date: 11/5/2025    Recent Progress: 50%    Note:     Barriers: chronic pain and spasms in his back  Strengths: engaged in care coordination  Patient expressed understanding of goal: yes  Action steps to achieve this goal:  1. I will try ice or heat for very short times and monitored by his PCA.  2. I will take all medications as prescribed by the providers.  3. I will work with PMR provider over the use of the Baclofen pump that he has implanted.    4. PMR referred patient to message therapy.                                 Action Plans on File:            Depression          Advance Care Plans/Directives:   Advanced Care Plan/Directives on file: Yes    Status of Document(s): On File and Validated    Advanced Care Plan/Directives Type: Advanced Directive - On File             My Medical and Care Information  Problem List   Patient Active Problem List   Diagnosis    Vitamin D deficiency    Eosinophilic esophagitis    Neurogenic bladder    Quadriplegia (H)    Chronic constipation    Allergic rhinitis due to animal dander    Allergy to mold spores    House dust mite allergy    Insomnia    Gallstones    Chronic midline thoracic back pain    Pulmonary nodules    Cervical spinal cord injury, sequela    Self-catheterizes urinary bladder    History of claustrophobia    Recurrent UTI    Muscle spasm    Low sodium levels      Current Medications:  Please refer to the most recent medication list provided to you by your medical team and reach out to your provider with any questions or to make any corrections.    Care Coordination Start Date: 1/6/2016   Frequency of Care Coordination: monthly, more frequently as needed     Form Last Updated: 06/02/2025

## 2025-06-05 DIAGNOSIS — N31.9 NEUROGENIC BLADDER: ICD-10-CM

## 2025-06-05 DIAGNOSIS — G89.29 CHRONIC MIDLINE THORACIC BACK PAIN: ICD-10-CM

## 2025-06-05 DIAGNOSIS — M54.6 CHRONIC MIDLINE THORACIC BACK PAIN: ICD-10-CM

## 2025-06-05 DIAGNOSIS — K59.00 CONSTIPATION, UNSPECIFIED CONSTIPATION TYPE: ICD-10-CM

## 2025-06-05 RX ORDER — POLYETHYLENE GLYCOL 3350 17 G/17G
POWDER ORAL
Qty: 510 G | Refills: 1 | Status: SHIPPED | OUTPATIENT
Start: 2025-06-05

## 2025-06-05 RX ORDER — TOLTERODINE TARTRATE 2 MG/1
2 TABLET, EXTENDED RELEASE ORAL
Qty: 180 TABLET | Refills: 1 | Status: SHIPPED | OUTPATIENT
Start: 2025-06-05

## 2025-06-10 ENCOUNTER — MYC REFILL (OUTPATIENT)
Dept: FAMILY MEDICINE | Facility: CLINIC | Age: 50
End: 2025-06-10
Payer: COMMERCIAL

## 2025-06-10 DIAGNOSIS — N31.9 NEUROGENIC BLADDER: ICD-10-CM

## 2025-06-15 DIAGNOSIS — K64.9 HEMORRHOIDS, UNSPECIFIED HEMORRHOID TYPE: ICD-10-CM

## 2025-06-15 RX ORDER — HYDROCORTISONE 2.5% 25 MG/G
CREAM TOPICAL 2 TIMES DAILY
Qty: 30 G | Refills: 0 | Status: SHIPPED | OUTPATIENT
Start: 2025-06-15

## 2025-06-17 ENCOUNTER — MEDICAL CORRESPONDENCE (OUTPATIENT)
Dept: HEALTH INFORMATION MANAGEMENT | Facility: CLINIC | Age: 50
End: 2025-06-17
Payer: COMMERCIAL

## 2025-06-20 NOTE — ANESTHESIA POSTPROCEDURE EVALUATION
Patient: Riley Gifford    Procedure: Procedure(s):  CYSTOSCOPY, URETHRAL DILATION, WITH BOTULINUM TOXIN INJECTION       Anesthesia Type:  MAC    Note:  Disposition: Outpatient   Postop Pain Control: Uneventful            Sign Out: Well controlled pain   PONV: No   Neuro/Psych: Uneventful            Sign Out: Acceptable/Baseline neuro status   Airway/Respiratory: Uneventful            Sign Out: Acceptable/Baseline resp. status   CV/Hemodynamics: Uneventful            Sign Out: Acceptable CV status; No obvious hypovolemia; No obvious fluid overload   Other NRE:    DID A NON-ROUTINE EVENT OCCUR?     Event details/Postop Comments:  Doing well. Alert, oriented. No sore throat, nausea, or problems with pain control. Patient denies any concerns.                Last vitals:  Vitals Value Taken Time   /69 09/01/23 1408   Temp 36.1  C (97  F) 09/01/23 1408   Pulse     Resp 16 09/01/23 1408   SpO2 94 % 09/01/23 1408       Electronically Signed By: Parvin Mackey MD  September 1, 2023  3:35 PM   [Alert] : alert [No Acute Distress] : no acute distress [Normocephalic] : normocephalic [Conjunctivae with no discharge] : conjunctivae with no discharge [PERRL] : PERRL [EOMI Bilateral] : EOMI bilateral [Auricles Well Formed] : auricles well formed [Clear Tympanic membranes with present light reflex and bony landmarks] : clear tympanic membranes with present light reflex and bony landmarks [No Discharge] : no discharge [Nares Patent] : nares patent [Pink Nasal Mucosa] : pink nasal mucosa [Palate Intact] : palate intact [Nonerythematous Oropharynx] : nonerythematous oropharynx [Supple, full passive range of motion] : supple, full passive range of motion [No Palpable Masses] : no palpable masses [Symmetric Chest Rise] : symmetric chest rise [Clear to Auscultation Bilaterally] : clear to auscultation bilaterally [Regular Rate and Rhythm] : regular rate and rhythm [Normal S1, S2 present] : normal S1, S2 present [No Murmurs] : no murmurs [+2 Femoral Pulses] : +2 femoral pulses [Soft] : soft [NonTender] : non tender [Non Distended] : non distended [No Hepatomegaly] : no hepatomegaly [No Splenomegaly] : no splenomegaly [Martín: ____] : Martín [unfilled] [No Abnormal Lymph Nodes Palpated] : no abnormal lymph nodes palpated [No Gait Asymmetry] : no gait asymmetry [Normal Muscle Tone] : normal muscle tone [Straight] : straight [Cranial Nerves Grossly Intact] : cranial nerves grossly intact [No Rash or Lesions] : no rash or lesions [de-identified] : last year [FreeTextEntry6] : PH 2-3 [de-identified] : Evaluation for scoliosis:  No scoliosis on exam, ( Normal [place] Test).

## 2025-06-27 ENCOUNTER — MEDICAL CORRESPONDENCE (OUTPATIENT)
Dept: HEALTH INFORMATION MANAGEMENT | Facility: CLINIC | Age: 50
End: 2025-06-27
Payer: COMMERCIAL

## 2025-07-01 ENCOUNTER — PATIENT OUTREACH (OUTPATIENT)
Dept: CARE COORDINATION | Facility: CLINIC | Age: 50
End: 2025-07-01
Payer: COMMERCIAL

## 2025-07-01 NOTE — PROGRESS NOTES
Clinic Care Coordination Contact  [facility]/Voicemail    Clinical Data: Care Coordinator Outreach    Outreach Documentation Number of Outreach Attempt   7/1/2025   1:37 PM 1       Left message on patient's voicemail with call back information and requested return call.      Plan: Care Coordinator sent care coordination introduction letter on 1/6/2016. Care Coordinator will try to reach patient again in 10-14 business days.    Silvina Kemp RN Care Coordination   [facility]- [place], [place], [place]  Email: [email or web address]  Phone: [phone]

## 2025-07-10 ENCOUNTER — VIRTUAL VISIT (OUTPATIENT)
Dept: URGENT CARE | Facility: CLINIC | Age: 50
End: 2025-07-10
Payer: COMMERCIAL

## 2025-07-10 DIAGNOSIS — R30.0 DYSURIA: Primary | ICD-10-CM

## 2025-07-14 ENCOUNTER — MEDICAL CORRESPONDENCE (OUTPATIENT)
Dept: HEALTH INFORMATION MANAGEMENT | Facility: CLINIC | Age: 50
End: 2025-07-14
Payer: COMMERCIAL

## 2025-07-14 ENCOUNTER — PATIENT OUTREACH (OUTPATIENT)
Dept: CARE COORDINATION | Facility: CLINIC | Age: 50
End: 2025-07-14
Payer: COMMERCIAL

## 2025-07-14 ASSESSMENT — ACTIVITIES OF DAILY LIVING (ADL): DEPENDENT_IADLS:: CLEANING;COOKING;LAUNDRY;SHOPPING;MEAL PREPARATION;MEDICATION MANAGEMENT

## 2025-07-14 NOTE — PROGRESS NOTES
Clinic Care Coordination Contact  Follow Up Progress Note      Assessment: Patient was due for a monthly outreach on goal progression. He said he is doing well, much better than over the weekend. He had and UC visit and it was determined he was having muscle spasms. He said he will be reaching out today to pain management to get seen and to make some other visits. Patient is due for AWV and advised to get this scheduled at well. Patient is aware that he is due for a colonoscopy. He denies any needs from RN CC at this time but will reach out of something comes up.      Care Gaps:    Health Maintenance Due   Topic Date Due    HEPATITIS B VACCINE (1 of 3 - 19+ 3-dose series) Never done    MEDICARE ANNUAL WELLNESS VISIT  05/31/2023    COLORECTAL CANCER SCREENING  01/30/2024       Discussed with patient, he plans to call and make appointments today.     Care Plans  Care Plan: General  take medications as prescribed       Problem: HP GENERAL PROBLEM       Goal: General Goal -  I will take all medications as prescribed by the providers.       Start Date: 11/6/2024 Expected End Date: 11/6/2025    This Visit's Progress: 80% Recent Progress: 80%    Note:     Barriers: wheelchair bound  Strengths: engaged in care coordination  Patient expressed understanding of goal: yes  Action steps to achieve this goal:  1. I will take all medications as prescribed.  2. I will ask any questions that I have regarding new medications.  3. I will work with the RN CC if I have any difficulty getting refills.   4. I will work with urology for the PA on the gentamicin instillations for the bladder.                              Care Plan: General -  Make and attend follow up appointments       Problem: HP GENERAL PROBLEM       Goal: I will make and attend all recommended appointments from my providers.       Start Date: 11/6/2024 Expected End Date: 11/6/2025    Recent Progress: 90%    Note:     Barriers: Many providers.  Strengths: engaged in care  coordination  Patient expressed understanding of goal: yes  Action steps to achieve this goal:  1. I will make all of the recommended follow up appointments.  2. I will attend all of the follow up appointments as recommended by the providers.                            Care Plan: Chronic Pain       Problem: Chronic Pain is Not Self-Managed       Goal: Demonstrate improved self-management of chronic pain       Start Date: 11/5/2024 Expected End Date: 11/5/2025    Recent Progress: 50%    Note:     Barriers: chronic pain and spasms in his back  Strengths: engaged in care coordination  Patient expressed understanding of goal: yes  Action steps to achieve this goal:  1. I will try ice or heat for very short times and monitored by his PCA.  2. I will take all medications as prescribed by the providers.  3. I will work with PMR provider over the use of the Baclofen pump that he has implanted.    4. PMR referred patient to message therapy.                                 Intervention/Education provided during outreach: As above. CC role, goal(s), clinic after hours, appointments, discussed/reviewed. Support provided.      Outreach Frequency: monthly, more frequently as needed    Plan:   1-Patient will call and schedule follow up with Primary Care Provider.   2-Patient will call and schedule follow up with pain management.   3-Patient will take medications as prescribed.  4-Patient will call RNCC with questions, concerns, support needs. RNCC will be available as needed.       Care Coordinator will follow up in one month.       Silvina Kemp RN Care Coordination   [facility]- [place]  Email: [email or web address]  Phone: [phone]

## 2025-08-04 ENCOUNTER — PATIENT OUTREACH (OUTPATIENT)
Dept: CARE COORDINATION | Facility: CLINIC | Age: 50
End: 2025-08-04
Payer: COMMERCIAL

## 2025-08-11 ENCOUNTER — PATIENT OUTREACH (OUTPATIENT)
Dept: CARE COORDINATION | Facility: CLINIC | Age: 50
End: 2025-08-11
Payer: COMMERCIAL

## 2025-08-13 DIAGNOSIS — N31.9 NEUROGENIC BLADDER: Primary | ICD-10-CM

## 2025-08-19 ENCOUNTER — TELEPHONE (OUTPATIENT)
Dept: UROLOGY | Facility: CLINIC | Age: 50
End: 2025-08-19
Payer: COMMERCIAL

## 2025-08-19 DIAGNOSIS — N31.9 NEUROGENIC BLADDER: Primary | ICD-10-CM

## 2025-08-25 ENCOUNTER — PATIENT OUTREACH (OUTPATIENT)
Dept: CARE COORDINATION | Facility: CLINIC | Age: 50
End: 2025-08-25
Payer: COMMERCIAL

## 2025-08-26 DIAGNOSIS — R05.9 COUGH: ICD-10-CM

## 2025-08-26 DIAGNOSIS — K59.00 CONSTIPATION, UNSPECIFIED CONSTIPATION TYPE: ICD-10-CM

## 2025-08-26 RX ORDER — POLYETHYLENE GLYCOL 3350 17 G/17G
POWDER ORAL
Qty: 510 G | Refills: 1 | Status: SHIPPED | OUTPATIENT
Start: 2025-08-26

## 2025-08-27 RX ORDER — ALBUTEROL SULFATE 90 UG/1
INHALANT RESPIRATORY (INHALATION)
Qty: 36 G | Refills: 0 | Status: SHIPPED | OUTPATIENT
Start: 2025-08-27

## 2025-08-29 DIAGNOSIS — M79.2 NEUROPATHIC PAIN: ICD-10-CM

## 2025-08-29 DIAGNOSIS — M54.6 PAIN IN THORACIC SPINE: ICD-10-CM

## 2025-08-29 DIAGNOSIS — G82.50 QUADRIPLEGIA (H): ICD-10-CM

## 2025-09-02 RX ORDER — PREGABALIN 100 MG/1
100 CAPSULE ORAL AT BEDTIME
Qty: 90 CAPSULE | Refills: 0 | Status: SHIPPED | OUTPATIENT
Start: 2025-09-02

## 2025-09-04 ENCOUNTER — OFFICE VISIT (OUTPATIENT)
Dept: FAMILY MEDICINE | Facility: CLINIC | Age: 50
End: 2025-09-04
Payer: COMMERCIAL

## 2025-09-04 VITALS
SYSTOLIC BLOOD PRESSURE: 107 MMHG | DIASTOLIC BLOOD PRESSURE: 78 MMHG | HEART RATE: 103 BPM | TEMPERATURE: 98.1 F | RESPIRATION RATE: 16 BRPM | OXYGEN SATURATION: 96 %

## 2025-09-04 DIAGNOSIS — Z78.9 SELF-CATHETERIZES URINARY BLADDER: ICD-10-CM

## 2025-09-04 DIAGNOSIS — N31.9 NEUROGENIC BLADDER: ICD-10-CM

## 2025-09-04 DIAGNOSIS — N39.0 RECURRENT UTI: ICD-10-CM

## 2025-09-04 DIAGNOSIS — Z01.818 PREOP GENERAL PHYSICAL EXAM: Primary | ICD-10-CM

## 2025-09-04 DIAGNOSIS — G82.50 QUADRIPLEGIA (H): ICD-10-CM

## 2025-09-04 DIAGNOSIS — K20.0 EOSINOPHILIC ESOPHAGITIS: ICD-10-CM

## 2025-09-04 LAB
ERYTHROCYTE [DISTWIDTH] IN BLOOD BY AUTOMATED COUNT: 11.9 % (ref 10–15)
HCT VFR BLD AUTO: 43.2 % (ref 40–53)
HGB BLD-MCNC: 14.2 G/DL (ref 13.3–17.7)
MCH RBC QN AUTO: 30.5 PG (ref 26.5–33)
MCHC RBC AUTO-ENTMCNC: 32.9 G/DL (ref 31.5–36.5)
MCV RBC AUTO: 92.9 FL (ref 78–100)
PLATELET # BLD AUTO: 287 10E3/UL (ref 150–450)
RBC # BLD AUTO: 4.65 10E6/UL (ref 4.4–5.9)
WBC # BLD AUTO: 8.08 10E3/UL (ref 4–11)

## (undated) DEVICE — PACK CYSTO CUSTOM ASC

## (undated) DEVICE — NDL ASPIRATING INJECT 22GA 31.25CM

## (undated) DEVICE — LINEN TOWEL PACK X5 5464

## (undated) DEVICE — PREP POVIDONE IODINE SOLUTION 10% 4OZ BOTTLE 29906-004

## (undated) DEVICE — SOL WATER IRRIG 3000ML BAG 2B7117

## (undated) DEVICE — CONNECTOR WATER VALVE PERFUSION PACK STR 020272801

## (undated) DEVICE — SOL NACL 0.9% IRRIG 3000ML BAG 2B7477

## (undated) DEVICE — SUCTION MANIFOLD NEPTUNE 2 SYS 4 PORT 0702-020-000

## (undated) DEVICE — SOL NACL 0.9% IRRIG 500ML BOTTLE 2F7123

## (undated) DEVICE — SYR 10ML LL W/O NDL

## (undated) DEVICE — SOL WATER IRRIG 500ML BOTTLE 2F7113

## (undated) DEVICE — SOL NACL 0.9% IRRIG 1000ML BOTTLE 2F7124

## (undated) DEVICE — SYR 30ML LL W/O NDL 302832

## (undated) DEVICE — NDL BLUNT 18GA 1" W/O FILTER 305181

## (undated) DEVICE — GLOVE PROTEXIS W/NEU-THERA 8.0  2D73TE80

## (undated) DEVICE — SOL NACL 0.9% 10ML VIAL 0409-4888-02

## (undated) DEVICE — SUCTION MANIFOLD DORNOCH ULTRA CART UL-CL500

## (undated) DEVICE — GLOVE PROTEXIS W/NEU-THERA 7.5  2D73TE75

## (undated) DEVICE — NDL BLUNT 18GA 1.5" W/O FILTER 305180

## (undated) DEVICE — PAD CHUX UNDERPAD 30X30"

## (undated) DEVICE — LINEN GOWN XLG 5407

## (undated) DEVICE — SOL NACL 0.9% INJ 1000ML BAG 2B1324X

## (undated) DEVICE — PACK CYSTO UMMC CUSTOM

## (undated) DEVICE — PAD CHUX UNDERPAD 30X36" P3036C

## (undated) DEVICE — KIT ENDO FIRST STEP DISINFECTANT 200ML W/POUCH EP-4

## (undated) DEVICE — PREP TECHNI-CARE CHLOROXYLENOL 3% 4OZ BOTTLE C222-4ZWO

## (undated) DEVICE — GLOVE BIOGEL PI MICRO SZ 7.5 48575

## (undated) DEVICE — SYR 30ML LL W/O NDL

## (undated) DEVICE — NDL WOLF ASPIRATING INJECT 22GA 31.25CM 8652.7775

## (undated) DEVICE — SYR 10ML LL W/O NDL 302995

## (undated) DEVICE — PAD CHUX UNDERPAD 23X24" 7136

## (undated) DEVICE — PREP POVIDONE-IODINE 7.5% SCRUB 4OZ BOTTLE MDS093945

## (undated) DEVICE — SOL NACL 0.9% IRRIG 1000ML BOTTLE 07138-09

## (undated) DEVICE — SYR 50ML LL W/O NDL 309653

## (undated) DEVICE — SOL WATER 3000ML BAG 7973-08

## (undated) DEVICE — GLOVE BIOGEL PI MICRO SZ 8.0 48580

## (undated) DEVICE — DRAPE GYN/UROLOGY FLUID POUCH TUR 29455

## (undated) DEVICE — SUCTION MANIFOLD NEPTUNE 2 SYS 1 PORT 702-025-000

## (undated) DEVICE — ESU GROUND PAD ADULT W/CORD E7507

## (undated) DEVICE — NDL BLADDER INJ BONEE 70CM NBI070

## (undated) RX ORDER — LIDOCAINE HYDROCHLORIDE 20 MG/ML
INJECTION, SOLUTION EPIDURAL; INFILTRATION; INTRACAUDAL; PERINEURAL
Status: DISPENSED
Start: 2022-08-05

## (undated) RX ORDER — GABAPENTIN 300 MG/1
CAPSULE ORAL
Status: DISPENSED
Start: 2019-10-30

## (undated) RX ORDER — ONDANSETRON 2 MG/ML
INJECTION INTRAMUSCULAR; INTRAVENOUS
Status: DISPENSED
Start: 2017-06-23

## (undated) RX ORDER — LIDOCAINE HYDROCHLORIDE 20 MG/ML
INJECTION, SOLUTION EPIDURAL; INFILTRATION; INTRACAUDAL; PERINEURAL
Status: DISPENSED
Start: 2020-12-11

## (undated) RX ORDER — FENTANYL CITRATE 50 UG/ML
INJECTION, SOLUTION INTRAMUSCULAR; INTRAVENOUS
Status: DISPENSED
Start: 2019-04-05

## (undated) RX ORDER — DEXAMETHASONE SODIUM PHOSPHATE 4 MG/ML
INJECTION, SOLUTION INTRA-ARTICULAR; INTRALESIONAL; INTRAMUSCULAR; INTRAVENOUS; SOFT TISSUE
Status: DISPENSED
Start: 2020-12-11

## (undated) RX ORDER — GABAPENTIN 300 MG/1
CAPSULE ORAL
Status: DISPENSED
Start: 2017-06-23

## (undated) RX ORDER — ONDANSETRON 2 MG/ML
INJECTION INTRAMUSCULAR; INTRAVENOUS
Status: DISPENSED
Start: 2020-06-12

## (undated) RX ORDER — GABAPENTIN 300 MG/1
CAPSULE ORAL
Status: DISPENSED
Start: 2020-12-11

## (undated) RX ORDER — LIDOCAINE HYDROCHLORIDE 20 MG/ML
INJECTION, SOLUTION EPIDURAL; INFILTRATION; INTRACAUDAL; PERINEURAL
Status: DISPENSED
Start: 2020-06-12

## (undated) RX ORDER — HYDRALAZINE HYDROCHLORIDE 20 MG/ML
INJECTION INTRAMUSCULAR; INTRAVENOUS
Status: DISPENSED
Start: 2022-02-04

## (undated) RX ORDER — FENTANYL CITRATE 50 UG/ML
INJECTION, SOLUTION INTRAMUSCULAR; INTRAVENOUS
Status: DISPENSED
Start: 2022-02-04

## (undated) RX ORDER — CEFAZOLIN SODIUM 2 G/50ML
SOLUTION INTRAVENOUS
Status: DISPENSED
Start: 2024-03-01

## (undated) RX ORDER — PROPOFOL 10 MG/ML
INJECTION, EMULSION INTRAVENOUS
Status: DISPENSED
Start: 2021-06-25

## (undated) RX ORDER — PROPOFOL 10 MG/ML
INJECTION, EMULSION INTRAVENOUS
Status: DISPENSED
Start: 2020-06-12

## (undated) RX ORDER — LIDOCAINE HYDROCHLORIDE 20 MG/ML
INJECTION, SOLUTION EPIDURAL; INFILTRATION; INTRACAUDAL; PERINEURAL
Status: DISPENSED
Start: 2021-06-25

## (undated) RX ORDER — FENTANYL CITRATE 50 UG/ML
INJECTION, SOLUTION INTRAMUSCULAR; INTRAVENOUS
Status: DISPENSED
Start: 2017-06-23

## (undated) RX ORDER — FENTANYL CITRATE 50 UG/ML
INJECTION, SOLUTION INTRAMUSCULAR; INTRAVENOUS
Status: DISPENSED
Start: 2018-01-04

## (undated) RX ORDER — FENTANYL CITRATE 50 UG/ML
INJECTION, SOLUTION INTRAMUSCULAR; INTRAVENOUS
Status: DISPENSED
Start: 2021-06-25

## (undated) RX ORDER — ACETAMINOPHEN 325 MG/1
TABLET ORAL
Status: DISPENSED
Start: 2020-12-11

## (undated) RX ORDER — PHENYLEPHRINE HCL IN 0.9% NACL 1 MG/10 ML
SYRINGE (ML) INTRAVENOUS
Status: DISPENSED
Start: 2018-01-04

## (undated) RX ORDER — FENTANYL CITRATE 50 UG/ML
INJECTION, SOLUTION INTRAMUSCULAR; INTRAVENOUS
Status: DISPENSED
Start: 2024-03-01

## (undated) RX ORDER — PROPOFOL 10 MG/ML
INJECTION, EMULSION INTRAVENOUS
Status: DISPENSED
Start: 2019-04-05

## (undated) RX ORDER — ONDANSETRON 2 MG/ML
INJECTION INTRAMUSCULAR; INTRAVENOUS
Status: DISPENSED
Start: 2018-01-04

## (undated) RX ORDER — LIDOCAINE HYDROCHLORIDE 20 MG/ML
INJECTION, SOLUTION EPIDURAL; INFILTRATION; INTRACAUDAL; PERINEURAL
Status: DISPENSED
Start: 2019-10-30

## (undated) RX ORDER — ALBUTEROL SULFATE 0.83 MG/ML
SOLUTION RESPIRATORY (INHALATION)
Status: DISPENSED
Start: 2023-09-01

## (undated) RX ORDER — CEFAZOLIN SODIUM 2 G/50ML
SOLUTION INTRAVENOUS
Status: DISPENSED
Start: 2021-06-25

## (undated) RX ORDER — PROPOFOL 10 MG/ML
INJECTION, EMULSION INTRAVENOUS
Status: DISPENSED
Start: 2022-08-05

## (undated) RX ORDER — PROPOFOL 10 MG/ML
INJECTION, EMULSION INTRAVENOUS
Status: DISPENSED
Start: 2018-01-04

## (undated) RX ORDER — FENTANYL CITRATE 50 UG/ML
INJECTION, SOLUTION INTRAMUSCULAR; INTRAVENOUS
Status: DISPENSED
Start: 2024-09-13

## (undated) RX ORDER — PROPOFOL 10 MG/ML
INJECTION, EMULSION INTRAVENOUS
Status: DISPENSED
Start: 2024-09-13

## (undated) RX ORDER — ONDANSETRON 2 MG/ML
INJECTION INTRAMUSCULAR; INTRAVENOUS
Status: DISPENSED
Start: 2024-09-13

## (undated) RX ORDER — CEFAZOLIN SODIUM 1 G/3ML
INJECTION, POWDER, FOR SOLUTION INTRAMUSCULAR; INTRAVENOUS
Status: DISPENSED
Start: 2020-12-11

## (undated) RX ORDER — ONDANSETRON 2 MG/ML
INJECTION INTRAMUSCULAR; INTRAVENOUS
Status: DISPENSED
Start: 2020-12-11

## (undated) RX ORDER — PROPOFOL 10 MG/ML
INJECTION, EMULSION INTRAVENOUS
Status: DISPENSED
Start: 2024-03-01

## (undated) RX ORDER — FENTANYL CITRATE 50 UG/ML
INJECTION, SOLUTION INTRAMUSCULAR; INTRAVENOUS
Status: DISPENSED
Start: 2020-06-12

## (undated) RX ORDER — CEFAZOLIN SODIUM 2 G/50ML
SOLUTION INTRAVENOUS
Status: DISPENSED
Start: 2023-08-25

## (undated) RX ORDER — GABAPENTIN 300 MG/1
CAPSULE ORAL
Status: DISPENSED
Start: 2020-06-12

## (undated) RX ORDER — CEFAZOLIN SODIUM 2 G/50ML
SOLUTION INTRAVENOUS
Status: DISPENSED
Start: 2023-09-01

## (undated) RX ORDER — KETOROLAC TROMETHAMINE 30 MG/ML
INJECTION, SOLUTION INTRAMUSCULAR; INTRAVENOUS
Status: DISPENSED
Start: 2020-12-11

## (undated) RX ORDER — ACETAMINOPHEN 325 MG/1
TABLET ORAL
Status: DISPENSED
Start: 2024-09-13

## (undated) RX ORDER — FENTANYL CITRATE 50 UG/ML
INJECTION, SOLUTION INTRAMUSCULAR; INTRAVENOUS
Status: DISPENSED
Start: 2025-03-14

## (undated) RX ORDER — ONDANSETRON 2 MG/ML
INJECTION INTRAMUSCULAR; INTRAVENOUS
Status: DISPENSED
Start: 2022-08-05

## (undated) RX ORDER — PROPOFOL 10 MG/ML
INJECTION, EMULSION INTRAVENOUS
Status: DISPENSED
Start: 2019-10-30

## (undated) RX ORDER — DEXAMETHASONE SODIUM PHOSPHATE 4 MG/ML
INJECTION, SOLUTION INTRA-ARTICULAR; INTRALESIONAL; INTRAMUSCULAR; INTRAVENOUS; SOFT TISSUE
Status: DISPENSED
Start: 2017-06-23

## (undated) RX ORDER — ACETAMINOPHEN 325 MG/1
TABLET ORAL
Status: DISPENSED
Start: 2024-03-01

## (undated) RX ORDER — PIPERACILLIN SODIUM, TAZOBACTAM SODIUM 3; .375 G/15ML; G/15ML
INJECTION, POWDER, LYOPHILIZED, FOR SOLUTION INTRAVENOUS
Status: DISPENSED
Start: 2022-02-04

## (undated) RX ORDER — FENTANYL CITRATE 50 UG/ML
INJECTION, SOLUTION INTRAMUSCULAR; INTRAVENOUS
Status: DISPENSED
Start: 2022-08-05

## (undated) RX ORDER — ACETAMINOPHEN 325 MG/1
TABLET ORAL
Status: DISPENSED
Start: 2019-10-30

## (undated) RX ORDER — CEFAZOLIN SODIUM 2 G/50ML
SOLUTION INTRAVENOUS
Status: DISPENSED
Start: 2024-09-13

## (undated) RX ORDER — ACETAMINOPHEN 325 MG/1
TABLET ORAL
Status: DISPENSED
Start: 2022-08-05

## (undated) RX ORDER — ONDANSETRON 2 MG/ML
INJECTION INTRAMUSCULAR; INTRAVENOUS
Status: DISPENSED
Start: 2019-10-30

## (undated) RX ORDER — LIDOCAINE HYDROCHLORIDE 20 MG/ML
JELLY TOPICAL
Status: DISPENSED
Start: 2022-08-05

## (undated) RX ORDER — CEFAZOLIN SODIUM 2 G/50ML
SOLUTION INTRAVENOUS
Status: DISPENSED
Start: 2020-06-12

## (undated) RX ORDER — CEFAZOLIN SODIUM 1 G/3ML
INJECTION, POWDER, FOR SOLUTION INTRAMUSCULAR; INTRAVENOUS
Status: DISPENSED
Start: 2019-10-30

## (undated) RX ORDER — LABETALOL HYDROCHLORIDE 5 MG/ML
INJECTION, SOLUTION INTRAVENOUS
Status: DISPENSED
Start: 2024-09-13

## (undated) RX ORDER — ALBUTEROL SULFATE 0.83 MG/ML
SOLUTION RESPIRATORY (INHALATION)
Status: DISPENSED
Start: 2025-03-14

## (undated) RX ORDER — ACETAMINOPHEN 325 MG/1
TABLET ORAL
Status: DISPENSED
Start: 2023-08-25

## (undated) RX ORDER — ACETAMINOPHEN 325 MG/1
TABLET ORAL
Status: DISPENSED
Start: 2019-04-05

## (undated) RX ORDER — EPHEDRINE SULFATE 50 MG/ML
INJECTION, SOLUTION INTRAMUSCULAR; INTRAVENOUS; SUBCUTANEOUS
Status: DISPENSED
Start: 2018-01-04

## (undated) RX ORDER — CEFAZOLIN SODIUM 1 G/3ML
INJECTION, POWDER, FOR SOLUTION INTRAMUSCULAR; INTRAVENOUS
Status: DISPENSED
Start: 2019-04-05

## (undated) RX ORDER — FENTANYL CITRATE 50 UG/ML
INJECTION, SOLUTION INTRAMUSCULAR; INTRAVENOUS
Status: DISPENSED
Start: 2020-12-11

## (undated) RX ORDER — PROPOFOL 10 MG/ML
INJECTION, EMULSION INTRAVENOUS
Status: DISPENSED
Start: 2022-02-04

## (undated) RX ORDER — FENTANYL CITRATE 50 UG/ML
INJECTION, SOLUTION INTRAMUSCULAR; INTRAVENOUS
Status: DISPENSED
Start: 2023-08-25

## (undated) RX ORDER — FENTANYL CITRATE 50 UG/ML
INJECTION, SOLUTION INTRAMUSCULAR; INTRAVENOUS
Status: DISPENSED
Start: 2019-10-30

## (undated) RX ORDER — PROPOFOL 10 MG/ML
INJECTION, EMULSION INTRAVENOUS
Status: DISPENSED
Start: 2020-12-11

## (undated) RX ORDER — SCOLOPAMINE TRANSDERMAL SYSTEM 1 MG/1
PATCH, EXTENDED RELEASE TRANSDERMAL
Status: DISPENSED
Start: 2022-08-05

## (undated) RX ORDER — ACETAMINOPHEN 325 MG/1
TABLET ORAL
Status: DISPENSED
Start: 2017-06-23

## (undated) RX ORDER — KETOROLAC TROMETHAMINE 30 MG/ML
INJECTION, SOLUTION INTRAMUSCULAR; INTRAVENOUS
Status: DISPENSED
Start: 2019-04-05

## (undated) RX ORDER — FENTANYL CITRATE 50 UG/ML
INJECTION, SOLUTION INTRAMUSCULAR; INTRAVENOUS
Status: DISPENSED
Start: 2023-09-01